# Patient Record
Sex: MALE | Race: WHITE | NOT HISPANIC OR LATINO | Employment: OTHER | RURAL
[De-identification: names, ages, dates, MRNs, and addresses within clinical notes are randomized per-mention and may not be internally consistent; named-entity substitution may affect disease eponyms.]

---

## 2020-06-19 ENCOUNTER — HISTORICAL (OUTPATIENT)
Dept: ADMINISTRATIVE | Facility: HOSPITAL | Age: 80
End: 2020-06-19

## 2020-06-19 LAB
APTT PPP: 28.3 SECONDS (ref 25.2–37.3)
BASOPHILS # BLD AUTO: 0.05 X10E3/UL (ref 0–0.2)
BASOPHILS NFR BLD AUTO: 0.7 % (ref 0–1)
BUN SERPL-MCNC: 42 MG/DL (ref 7–18)
CALCIUM SERPL-MCNC: 8.6 MG/DL (ref 8.5–10.1)
CHLORIDE SERPL-SCNC: 104 MMOL/L (ref 98–107)
CK MB SERPL-MCNC: 2.4 NG/ML (ref 1–3.6)
CK SERPL-CCNC: 133 U/L (ref 39–308)
CO2 SERPL-SCNC: 27 MMOL/L (ref 21–32)
CREAT SERPL-MCNC: 2.01 MG/DL (ref 0.7–1.3)
EOSINOPHIL # BLD AUTO: 0.22 X10E3/UL (ref 0–0.5)
EOSINOPHIL NFR BLD AUTO: 2.9 % (ref 1–4)
ERYTHROCYTE [DISTWIDTH] IN BLOOD BY AUTOMATED COUNT: 13.8 % (ref 11.5–14.5)
GLUCOSE SERPL-MCNC: 214 MG/DL (ref 74–106)
HCT VFR BLD AUTO: 41.4 % (ref 40–54)
HGB BLD-MCNC: 12.7 G/DL (ref 13.5–18)
IMM GRANULOCYTES # BLD AUTO: 0.03 X10E3/UL (ref 0–0.04)
IMM GRANULOCYTES NFR BLD: 0.4 % (ref 0–0.4)
INR BLD: 1.02 (ref 0–3.3)
LYMPHOCYTES # BLD AUTO: 1.97 X10E3/UL (ref 1–4.8)
LYMPHOCYTES NFR BLD AUTO: 26.1 % (ref 27–41)
MAGNESIUM SERPL-MCNC: 2.3 MG/DL (ref 1.7–2.3)
MCH RBC QN AUTO: 28.7 PG (ref 27–31)
MCHC RBC AUTO-ENTMCNC: 30.7 G/DL (ref 32–36)
MCV RBC AUTO: 93.7 FL (ref 80–96)
MONOCYTES # BLD AUTO: 0.9 X10E3/UL (ref 0–0.8)
MONOCYTES NFR BLD AUTO: 11.9 % (ref 2–6)
MPC BLD CALC-MCNC: 10.8 FL (ref 9.4–12.4)
MYOGLOBIN SERPL-MCNC: 272 NG/ML (ref 16–116)
NEUTROPHILS # BLD AUTO: 4.39 X10E3/UL (ref 1.8–7.7)
NEUTROPHILS NFR BLD AUTO: 58 % (ref 53–65)
NRBC # BLD AUTO: 0 X10E3/UL (ref 0–0)
NRBC, AUTO (.00): 0 /100 (ref 0–0)
PLATELET # BLD AUTO: 194 X10E3/UL (ref 150–400)
POTASSIUM SERPL-SCNC: 4.5 MMOL/L (ref 3.5–5.1)
PROTHROMBIN TIME: 13.5 SECONDS (ref 11.7–14.7)
RBC # BLD AUTO: 4.42 X10E6/UL (ref 4.6–6.2)
SODIUM SERPL-SCNC: 137 MMOL/L (ref 136–145)
TROPONIN I SERPL-MCNC: <0.017 NG/ML (ref 0–0.06)
WBC # BLD AUTO: 7.56 X10E3/UL (ref 4.5–11)

## 2021-03-10 VITALS
BODY MASS INDEX: 30.92 KG/M2 | HEART RATE: 60 BPM | TEMPERATURE: 97 F | RESPIRATION RATE: 20 BRPM | OXYGEN SATURATION: 95 % | SYSTOLIC BLOOD PRESSURE: 124 MMHG | WEIGHT: 204 LBS | DIASTOLIC BLOOD PRESSURE: 76 MMHG | HEIGHT: 68 IN

## 2021-03-10 RX ORDER — CHOLECALCIFEROL (VITAMIN D3) 125 MCG
5000 CAPSULE ORAL DAILY
COMMUNITY

## 2021-03-10 RX ORDER — CLOPIDOGREL BISULFATE 75 MG/1
75 TABLET ORAL DAILY
COMMUNITY
End: 2021-03-17 | Stop reason: SDUPTHER

## 2021-03-10 RX ORDER — TAMSULOSIN HYDROCHLORIDE 0.4 MG/1
0.4 CAPSULE ORAL DAILY
COMMUNITY
End: 2021-03-17 | Stop reason: SDUPTHER

## 2021-03-10 RX ORDER — METOPROLOL SUCCINATE 50 MG/1
50 TABLET, EXTENDED RELEASE ORAL DAILY
COMMUNITY
End: 2021-03-17 | Stop reason: SDUPTHER

## 2021-03-10 RX ORDER — ROSUVASTATIN CALCIUM 20 MG/1
20 TABLET, COATED ORAL DAILY
COMMUNITY
End: 2021-03-17 | Stop reason: SDUPTHER

## 2021-03-10 RX ORDER — ACETAMINOPHEN 160 MG/5ML
200 SUSPENSION, ORAL (FINAL DOSE FORM) ORAL DAILY
COMMUNITY

## 2021-03-10 RX ORDER — INSULIN GLARGINE 100 [IU]/ML
70 INJECTION, SOLUTION SUBCUTANEOUS NIGHTLY
COMMUNITY
End: 2021-03-17 | Stop reason: SDUPTHER

## 2021-03-10 RX ORDER — EMPAGLIFLOZIN 10 MG/1
10 TABLET, FILM COATED ORAL DAILY
COMMUNITY
End: 2021-03-17 | Stop reason: SDUPTHER

## 2021-03-17 ENCOUNTER — OFFICE VISIT (OUTPATIENT)
Dept: FAMILY MEDICINE | Facility: CLINIC | Age: 81
End: 2021-03-17
Payer: MEDICARE

## 2021-03-17 VITALS
HEIGHT: 68 IN | WEIGHT: 209.81 LBS | HEART RATE: 56 BPM | DIASTOLIC BLOOD PRESSURE: 78 MMHG | OXYGEN SATURATION: 95 % | RESPIRATION RATE: 20 BRPM | SYSTOLIC BLOOD PRESSURE: 122 MMHG | TEMPERATURE: 97 F | BODY MASS INDEX: 31.8 KG/M2

## 2021-03-17 DIAGNOSIS — N18.31 STAGE 3A CHRONIC KIDNEY DISEASE: ICD-10-CM

## 2021-03-17 DIAGNOSIS — Z79.899 ENCOUNTER FOR LONG-TERM (CURRENT) USE OF OTHER MEDICATIONS: ICD-10-CM

## 2021-03-17 DIAGNOSIS — I10 ESSENTIAL HYPERTENSION: ICD-10-CM

## 2021-03-17 DIAGNOSIS — Z95.5 HISTORY OF CORONARY ARTERY STENT PLACEMENT: ICD-10-CM

## 2021-03-17 DIAGNOSIS — Z12.5 PROSTATE CANCER SCREENING: ICD-10-CM

## 2021-03-17 DIAGNOSIS — N18.31 TYPE 2 DIABETES MELLITUS WITH STAGE 3A CHRONIC KIDNEY DISEASE, WITH LONG-TERM CURRENT USE OF INSULIN: Primary | ICD-10-CM

## 2021-03-17 DIAGNOSIS — N40.0 BENIGN PROSTATIC HYPERPLASIA, UNSPECIFIED WHETHER LOWER URINARY TRACT SYMPTOMS PRESENT: ICD-10-CM

## 2021-03-17 DIAGNOSIS — E11.22 TYPE 2 DIABETES MELLITUS WITH STAGE 3A CHRONIC KIDNEY DISEASE, WITH LONG-TERM CURRENT USE OF INSULIN: Primary | ICD-10-CM

## 2021-03-17 DIAGNOSIS — I25.10 CORONARY ARTERY DISEASE INVOLVING NATIVE CORONARY ARTERY OF NATIVE HEART WITHOUT ANGINA PECTORIS: ICD-10-CM

## 2021-03-17 DIAGNOSIS — E78.2 MIXED HYPERLIPIDEMIA: ICD-10-CM

## 2021-03-17 DIAGNOSIS — Z79.4 TYPE 2 DIABETES MELLITUS WITH STAGE 3A CHRONIC KIDNEY DISEASE, WITH LONG-TERM CURRENT USE OF INSULIN: Primary | ICD-10-CM

## 2021-03-17 PROCEDURE — 99214 PR OFFICE/OUTPT VISIT, EST, LEVL IV, 30-39 MIN: ICD-10-PCS | Mod: ,,, | Performed by: NURSE PRACTITIONER

## 2021-03-17 PROCEDURE — 99214 OFFICE O/P EST MOD 30 MIN: CPT | Mod: ,,, | Performed by: NURSE PRACTITIONER

## 2021-03-17 RX ORDER — INSULIN GLARGINE 100 [IU]/ML
70 INJECTION, SOLUTION SUBCUTANEOUS NIGHTLY
Qty: 63 ML | Refills: 3 | Status: SHIPPED | OUTPATIENT
Start: 2021-03-17 | End: 2021-12-21 | Stop reason: SDUPTHER

## 2021-03-17 RX ORDER — METOPROLOL SUCCINATE 50 MG/1
50 TABLET, EXTENDED RELEASE ORAL DAILY
Qty: 90 TABLET | Refills: 3 | Status: SHIPPED | OUTPATIENT
Start: 2021-03-17 | End: 2021-07-06 | Stop reason: SDUPTHER

## 2021-03-17 RX ORDER — TAMSULOSIN HYDROCHLORIDE 0.4 MG/1
0.4 CAPSULE ORAL DAILY
Qty: 90 CAPSULE | Refills: 3 | Status: SHIPPED | OUTPATIENT
Start: 2021-03-17 | End: 2021-12-21

## 2021-03-17 RX ORDER — PEN NEEDLE, DIABETIC 32GX 5/32"
NEEDLE, DISPOSABLE MISCELLANEOUS
COMMUNITY
Start: 2021-01-20

## 2021-03-17 RX ORDER — EMPAGLIFLOZIN 10 MG/1
10 TABLET, FILM COATED ORAL DAILY
Qty: 90 TABLET | Refills: 3 | Status: SHIPPED | OUTPATIENT
Start: 2021-03-17 | End: 2021-12-21 | Stop reason: SDUPTHER

## 2021-03-17 RX ORDER — ROSUVASTATIN CALCIUM 20 MG/1
20 TABLET, COATED ORAL DAILY
Qty: 90 TABLET | Refills: 3 | Status: SHIPPED | OUTPATIENT
Start: 2021-03-17 | End: 2021-12-01 | Stop reason: DRUGHIGH

## 2021-03-17 RX ORDER — CLOPIDOGREL BISULFATE 75 MG/1
75 TABLET ORAL DAILY
Qty: 90 TABLET | Refills: 3 | Status: SHIPPED | OUTPATIENT
Start: 2021-03-17 | End: 2022-10-19 | Stop reason: SDUPTHER

## 2021-03-19 LAB
ALBUMIN SERPL BCP-MCNC: 3.9 G/DL (ref 3.5–5)
ALBUMIN/GLOB SERPL: 1.1 {RATIO}
ALP SERPL-CCNC: 91 U/L (ref 45–115)
ALT SERPL W P-5'-P-CCNC: 35 U/L (ref 16–61)
ANION GAP SERPL CALCULATED.3IONS-SCNC: 10 MMOL/L (ref 7–16)
AST SERPL W P-5'-P-CCNC: 25 U/L (ref 15–37)
BASOPHILS # BLD AUTO: 0.09 X10E3/UL (ref 0–0.2)
BASOPHILS NFR BLD AUTO: 1.1 % (ref 0–1)
BILIRUB SERPL-MCNC: 0.5 MG/DL (ref 0–1.2)
BUN SERPL-MCNC: 35 MG/DL (ref 7–18)
BUN/CREAT SERPL: 25
CALCIUM SERPL-MCNC: 8.9 MG/DL (ref 8.5–10.1)
CHLORIDE SERPL-SCNC: 106 MMOL/L (ref 98–107)
CHOLEST SERPL-MCNC: 239 MG/DL
CHOLEST/HDLC SERPL: 5.2 {RATIO}
CO2 SERPL-SCNC: 29 MMOL/L (ref 21–32)
CREAT SERPL-MCNC: 1.38 MG/DL (ref 0.7–1.3)
CREAT UR-MCNC: 83 MG/DL (ref 39–259)
EOSINOPHIL # BLD AUTO: 0.17 X10E3/UL (ref 0–0.5)
EOSINOPHIL NFR BLD AUTO: 2.1 % (ref 1–4)
ERYTHROCYTE [DISTWIDTH] IN BLOOD BY AUTOMATED COUNT: 14.5 % (ref 11.5–14.5)
EST. AVERAGE GLUCOSE BLD GHB EST-MCNC: 147 MG/DL
GLOBULIN SER-MCNC: 3.4 G/DL (ref 2–4)
GLUCOSE SERPL-MCNC: 88 MG/DL (ref 74–106)
HBA1C MFR BLD HPLC: 7 %
HCT VFR BLD AUTO: 47.7 % (ref 40–54)
HDLC SERPL-MCNC: 46 MG/DL
HGB BLD-MCNC: 14.6 G/DL (ref 13.5–18)
IMM GRANULOCYTES # BLD AUTO: 0.03 X10E3/UL (ref 0–0.04)
IMM GRANULOCYTES NFR BLD: 0.4 % (ref 0–0.4)
LDLC SERPL CALC-MCNC: 170 MG/DL
LYMPHOCYTES # BLD AUTO: 1.86 X10E3/UL (ref 1–4.8)
LYMPHOCYTES NFR BLD AUTO: 22.5 % (ref 27–41)
MCH RBC QN AUTO: 29.1 PG (ref 27–31)
MCHC RBC AUTO-ENTMCNC: 30.6 G/DL (ref 32–36)
MCV RBC AUTO: 95.2 FL (ref 80–96)
MICROALBUMIN UR-MCNC: 3.4 MG/DL (ref 0–2.8)
MICROALBUMIN/CREAT RATIO PNL UR: 41 MG/G (ref 0–30)
MONOCYTES # BLD AUTO: 0.62 X10E3/UL (ref 0–0.8)
MONOCYTES NFR BLD AUTO: 7.5 % (ref 2–6)
MPC BLD CALC-MCNC: 12.5 FL (ref 9.4–12.4)
NEUTROPHILS # BLD AUTO: 5.48 X10E3/UL (ref 1.8–7.7)
NEUTROPHILS NFR BLD AUTO: 66.4 % (ref 53–65)
NRBC # BLD AUTO: 0 X10E3/UL (ref 0–0)
NRBC, AUTO (.00): 0 /100 (ref 0–0)
PLATELET # BLD AUTO: 227 X10E3/UL (ref 150–400)
POTASSIUM SERPL-SCNC: 5.3 MMOL/L (ref 3.5–5.1)
PROT SERPL-MCNC: 7.3 G/DL (ref 6.4–8.2)
PSA SERPL-MCNC: 0.64 NG/ML (ref 0–4.4)
RBC # BLD AUTO: 5.01 X10E6/UL (ref 4.6–6.2)
SODIUM SERPL-SCNC: 140 MMOL/L (ref 136–145)
TRIGL SERPL-MCNC: 115 MG/DL
WBC # BLD AUTO: 8.25 X10E3/UL (ref 4.5–11)

## 2021-03-24 DIAGNOSIS — E78.2 MIXED HYPERLIPIDEMIA: ICD-10-CM

## 2021-03-24 RX ORDER — ROSUVASTATIN CALCIUM 40 MG/1
40 TABLET, COATED ORAL DAILY
Qty: 90 TABLET | Refills: 1 | Status: CANCELLED | OUTPATIENT
Start: 2021-03-24

## 2021-03-26 ENCOUNTER — TELEPHONE (OUTPATIENT)
Dept: FAMILY MEDICINE | Facility: CLINIC | Age: 81
End: 2021-03-26

## 2021-03-26 RX ORDER — ROSUVASTATIN CALCIUM 40 MG/1
10 TABLET, COATED ORAL NIGHTLY
COMMUNITY
End: 2021-12-01 | Stop reason: DRUGHIGH

## 2021-07-06 DIAGNOSIS — I10 ESSENTIAL HYPERTENSION: ICD-10-CM

## 2021-07-06 RX ORDER — METOPROLOL SUCCINATE 50 MG/1
50 TABLET, EXTENDED RELEASE ORAL DAILY
Qty: 90 TABLET | Refills: 1 | Status: SHIPPED | OUTPATIENT
Start: 2021-07-06 | End: 2021-12-21 | Stop reason: SDUPTHER

## 2021-07-25 ENCOUNTER — HOSPITAL ENCOUNTER (EMERGENCY)
Facility: HOSPITAL | Age: 81
Discharge: HOME | End: 2021-07-25
Payer: MEDICARE

## 2021-07-25 VITALS
SYSTOLIC BLOOD PRESSURE: 164 MMHG | RESPIRATION RATE: 19 BRPM | WEIGHT: 255 LBS | BODY MASS INDEX: 36.51 KG/M2 | HEART RATE: 58 BPM | DIASTOLIC BLOOD PRESSURE: 81 MMHG | TEMPERATURE: 98 F | HEIGHT: 70 IN | OXYGEN SATURATION: 96 %

## 2021-07-25 DIAGNOSIS — N18.31 TYPE 2 DIABETES MELLITUS WITH STAGE 3A CHRONIC KIDNEY DISEASE, WITH LONG-TERM CURRENT USE OF INSULIN: ICD-10-CM

## 2021-07-25 DIAGNOSIS — N18.31 STAGE 3A CHRONIC KIDNEY DISEASE: ICD-10-CM

## 2021-07-25 DIAGNOSIS — S20.229A CONTUSION OF BACK, UNSPECIFIED LATERALITY, INITIAL ENCOUNTER: ICD-10-CM

## 2021-07-25 DIAGNOSIS — Z79.4 TYPE 2 DIABETES MELLITUS WITH STAGE 3A CHRONIC KIDNEY DISEASE, WITH LONG-TERM CURRENT USE OF INSULIN: ICD-10-CM
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
## 2021-07-25 DIAGNOSIS — E78.2 MIXED HYPERLIPIDEMIA: ICD-10-CM

## 2021-07-25 DIAGNOSIS — I25.10 CORONARY ARTERY DISEASE INVOLVING NATIVE CORONARY ARTERY OF NATIVE HEART WITHOUT ANGINA PECTORIS: ICD-10-CM

## 2021-07-25 DIAGNOSIS — E11.22 TYPE 2 DIABETES MELLITUS WITH STAGE 3A CHRONIC KIDNEY DISEASE, WITH LONG-TERM CURRENT USE OF INSULIN: ICD-10-CM

## 2021-07-25 DIAGNOSIS — N40.0 BENIGN PROSTATIC HYPERPLASIA: ICD-10-CM

## 2021-07-25 DIAGNOSIS — I10 ESSENTIAL HYPERTENSION: ICD-10-CM

## 2021-07-25 DIAGNOSIS — Z95.5 HISTORY OF CORONARY ARTERY STENT PLACEMENT: ICD-10-CM

## 2021-07-25 PROCEDURE — 63600175 PHARM REV CODE 636 W HCPCS: Performed by: NURSE PRACTITIONER

## 2021-07-25 PROCEDURE — 99284 EMERGENCY DEPT VISIT MOD MDM: CPT

## 2021-07-25 PROCEDURE — 99283 EMERGENCY DEPT VISIT LOW MDM: CPT | Mod: ,,, | Performed by: NURSE PRACTITIONER

## 2021-07-25 PROCEDURE — 96372 THER/PROPH/DIAG INJ SC/IM: CPT

## 2021-07-25 PROCEDURE — 99283 PR EMERGENCY DEPT VISIT,LEVEL III: ICD-10-PCS | Mod: ,,, | Performed by: NURSE PRACTITIONER

## 2021-07-25 RX ORDER — MUPIROCIN 20 MG/G
OINTMENT TOPICAL 3 TIMES DAILY
Qty: 15 G | Refills: 0 | Status: SHIPPED | OUTPATIENT
Start: 2021-07-25 | End: 2021-12-21

## 2021-07-25 RX ORDER — KETOROLAC TROMETHAMINE 30 MG/ML
60 INJECTION, SOLUTION INTRAMUSCULAR; INTRAVENOUS
Status: COMPLETED | OUTPATIENT
Start: 2021-07-25 | End: 2021-07-25

## 2021-07-25 RX ORDER — IBUPROFEN 800 MG/1
800 TABLET ORAL 3 TIMES DAILY
Qty: 15 TABLET | Refills: 0 | Status: SHIPPED | OUTPATIENT
Start: 2021-07-25 | End: 2021-07-25 | Stop reason: SDUPTHER

## 2021-07-25 RX ORDER — IBUPROFEN 800 MG/1
800 TABLET ORAL 3 TIMES DAILY
Qty: 15 TABLET | Refills: 0 | Status: SHIPPED | OUTPATIENT
Start: 2021-07-25 | End: 2022-05-19

## 2021-07-25 RX ORDER — MUPIROCIN 20 MG/G
OINTMENT TOPICAL 3 TIMES DAILY
Qty: 15 G | Refills: 0 | Status: SHIPPED | OUTPATIENT
Start: 2021-07-25 | End: 2021-07-25 | Stop reason: SDUPTHER

## 2021-07-25 RX ORDER — METHOCARBAMOL 500 MG/1
1000 TABLET, FILM COATED ORAL 3 TIMES DAILY
Qty: 30 TABLET | Refills: 0 | Status: SHIPPED | OUTPATIENT
Start: 2021-07-25 | End: 2021-07-30

## 2021-07-25 RX ORDER — METHOCARBAMOL 500 MG/1
1000 TABLET, FILM COATED ORAL 3 TIMES DAILY
Qty: 30 TABLET | Refills: 0 | Status: SHIPPED | OUTPATIENT
Start: 2021-07-25 | End: 2021-07-25 | Stop reason: SDUPTHER

## 2021-07-25 RX ADMIN — KETOROLAC TROMETHAMINE 60 MG: 60 INJECTION, SOLUTION INTRAMUSCULAR at 10:07

## 2021-12-01 ENCOUNTER — OFFICE VISIT (OUTPATIENT)
Dept: CARDIOLOGY | Facility: CLINIC | Age: 81
End: 2021-12-01
Payer: MEDICARE

## 2021-12-01 VITALS
WEIGHT: 191.5 LBS | SYSTOLIC BLOOD PRESSURE: 122 MMHG | BODY MASS INDEX: 29.02 KG/M2 | HEART RATE: 56 BPM | RESPIRATION RATE: 14 BRPM | HEIGHT: 68 IN | DIASTOLIC BLOOD PRESSURE: 50 MMHG

## 2021-12-01 DIAGNOSIS — E78.5 HYPERLIPIDEMIA, UNSPECIFIED HYPERLIPIDEMIA TYPE: Chronic | ICD-10-CM

## 2021-12-01 DIAGNOSIS — N18.9 CHRONIC KIDNEY DISEASE, UNSPECIFIED CKD STAGE: Chronic | ICD-10-CM

## 2021-12-01 DIAGNOSIS — I25.10 CORONARY ARTERY DISEASE INVOLVING NATIVE CORONARY ARTERY OF NATIVE HEART WITHOUT ANGINA PECTORIS: Primary | Chronic | ICD-10-CM

## 2021-12-01 DIAGNOSIS — E11.9 DIABETES MELLITUS WITHOUT COMPLICATION: Chronic | ICD-10-CM

## 2021-12-01 DIAGNOSIS — I10 ESSENTIAL HYPERTENSION: Chronic | ICD-10-CM

## 2021-12-01 PROCEDURE — 93010 ELECTROCARDIOGRAM REPORT: CPT | Mod: S$PBB,,, | Performed by: INTERNAL MEDICINE

## 2021-12-01 PROCEDURE — 93010 EKG 12-LEAD: ICD-10-PCS | Mod: S$PBB,,, | Performed by: INTERNAL MEDICINE

## 2021-12-01 PROCEDURE — 93005 ELECTROCARDIOGRAM TRACING: CPT | Mod: PBBFAC | Performed by: INTERNAL MEDICINE

## 2021-12-01 PROCEDURE — 99214 PR OFFICE/OUTPT VISIT, EST, LEVL IV, 30-39 MIN: ICD-10-PCS | Mod: S$PBB,,, | Performed by: INTERNAL MEDICINE

## 2021-12-01 PROCEDURE — 99214 OFFICE O/P EST MOD 30 MIN: CPT | Mod: PBBFAC | Performed by: INTERNAL MEDICINE

## 2021-12-01 PROCEDURE — 99214 OFFICE O/P EST MOD 30 MIN: CPT | Mod: S$PBB,,, | Performed by: INTERNAL MEDICINE

## 2021-12-01 RX ORDER — NITROGLYCERIN 0.4 MG/1
0.4 TABLET SUBLINGUAL EVERY 5 MIN PRN
Qty: 25 TABLET | Refills: 3 | Status: SHIPPED | OUTPATIENT
Start: 2021-12-01 | End: 2022-08-29 | Stop reason: SDUPTHER

## 2021-12-01 RX ORDER — ROSUVASTATIN CALCIUM 40 MG/1
40 TABLET, COATED ORAL NIGHTLY
Qty: 90 TABLET | Refills: 1 | Status: SHIPPED | OUTPATIENT
Start: 2021-12-01 | End: 2021-12-21 | Stop reason: SDUPTHER

## 2021-12-06 PROBLEM — N18.9 CHRONIC KIDNEY DISEASE: Chronic | Status: ACTIVE | Noted: 2021-12-06

## 2021-12-06 PROBLEM — E11.9 DIABETES MELLITUS WITHOUT COMPLICATION: Chronic | Status: ACTIVE | Noted: 2021-12-06

## 2021-12-06 PROBLEM — E78.5 HYPERLIPIDEMIA: Status: ACTIVE | Noted: 2021-03-17

## 2021-12-21 ENCOUNTER — OFFICE VISIT (OUTPATIENT)
Dept: FAMILY MEDICINE | Facility: CLINIC | Age: 81
End: 2021-12-21
Payer: MEDICARE

## 2021-12-21 VITALS
HEIGHT: 65 IN | BODY MASS INDEX: 31.29 KG/M2 | TEMPERATURE: 97 F | WEIGHT: 187.81 LBS | SYSTOLIC BLOOD PRESSURE: 128 MMHG | DIASTOLIC BLOOD PRESSURE: 82 MMHG | OXYGEN SATURATION: 98 % | RESPIRATION RATE: 20 BRPM | HEART RATE: 68 BPM

## 2021-12-21 DIAGNOSIS — I10 ESSENTIAL HYPERTENSION: ICD-10-CM

## 2021-12-21 DIAGNOSIS — N18.31 TYPE 2 DIABETES MELLITUS WITH STAGE 3A CHRONIC KIDNEY DISEASE, WITH LONG-TERM CURRENT USE OF INSULIN: Primary | ICD-10-CM

## 2021-12-21 DIAGNOSIS — E11.22 TYPE 2 DIABETES MELLITUS WITH STAGE 3A CHRONIC KIDNEY DISEASE, WITH LONG-TERM CURRENT USE OF INSULIN: Primary | ICD-10-CM

## 2021-12-21 DIAGNOSIS — Z79.899 ENCOUNTER FOR LONG-TERM (CURRENT) USE OF OTHER MEDICATIONS: ICD-10-CM

## 2021-12-21 DIAGNOSIS — Z79.4 TYPE 2 DIABETES MELLITUS WITH STAGE 3A CHRONIC KIDNEY DISEASE, WITH LONG-TERM CURRENT USE OF INSULIN: Primary | ICD-10-CM

## 2021-12-21 DIAGNOSIS — E78.5 HYPERLIPIDEMIA, UNSPECIFIED HYPERLIPIDEMIA TYPE: ICD-10-CM

## 2021-12-21 DIAGNOSIS — N18.31 STAGE 3A CHRONIC KIDNEY DISEASE: ICD-10-CM

## 2021-12-21 PROBLEM — N18.9 CHRONIC KIDNEY DISEASE: Chronic | Status: RESOLVED | Noted: 2021-12-06 | Resolved: 2021-12-21

## 2021-12-21 LAB
ALBUMIN SERPL BCP-MCNC: 3.5 G/DL (ref 3.5–5)
ALBUMIN/GLOB SERPL: 0.9 {RATIO}
ALP SERPL-CCNC: 91 U/L (ref 45–115)
ALT SERPL W P-5'-P-CCNC: 30 U/L (ref 16–61)
ANION GAP SERPL CALCULATED.3IONS-SCNC: 8 MMOL/L (ref 7–16)
AST SERPL W P-5'-P-CCNC: 16 U/L (ref 15–37)
BILIRUB SERPL-MCNC: 0.4 MG/DL (ref 0–1.2)
BUN SERPL-MCNC: 33 MG/DL (ref 7–18)
BUN/CREAT SERPL: 25 (ref 6–20)
CALCIUM SERPL-MCNC: 9.1 MG/DL (ref 8.5–10.1)
CHLORIDE SERPL-SCNC: 107 MMOL/L (ref 98–107)
CHOLEST SERPL-MCNC: 183 MG/DL (ref 0–200)
CHOLEST/HDLC SERPL: 3.9 {RATIO}
CO2 SERPL-SCNC: 29 MMOL/L (ref 21–32)
CREAT SERPL-MCNC: 1.32 MG/DL (ref 0.7–1.3)
EST. AVERAGE GLUCOSE BLD GHB EST-MCNC: 170 MG/DL
GLOBULIN SER-MCNC: 3.9 G/DL (ref 2–4)
GLUCOSE SERPL-MCNC: 179 MG/DL (ref 74–106)
HBA1C MFR BLD HPLC: 7.7 % (ref 4.5–6.6)
HDLC SERPL-MCNC: 47 MG/DL (ref 40–60)
LDLC SERPL CALC-MCNC: 116 MG/DL
LDLC/HDLC SERPL: 2.5 {RATIO}
NONHDLC SERPL-MCNC: 136 MG/DL
POTASSIUM SERPL-SCNC: 5.4 MMOL/L (ref 3.5–5.1)
PROT SERPL-MCNC: 7.4 G/DL (ref 6.4–8.2)
SODIUM SERPL-SCNC: 139 MMOL/L (ref 136–145)
TRIGL SERPL-MCNC: 102 MG/DL (ref 35–150)
TSH SERPL DL<=0.005 MIU/L-ACNC: 2.88 UIU/ML (ref 0.36–3.74)
VLDLC SERPL-MCNC: 20 MG/DL

## 2021-12-21 PROCEDURE — 99214 OFFICE O/P EST MOD 30 MIN: CPT | Mod: ,,, | Performed by: NURSE PRACTITIONER

## 2021-12-21 PROCEDURE — 80053 COMPREHENSIVE METABOLIC PANEL: ICD-10-PCS | Mod: ,,, | Performed by: CLINICAL MEDICAL LABORATORY

## 2021-12-21 PROCEDURE — 83036 HEMOGLOBIN GLYCOSYLATED A1C: CPT | Mod: ,,, | Performed by: CLINICAL MEDICAL LABORATORY

## 2021-12-21 PROCEDURE — 80053 COMPREHEN METABOLIC PANEL: CPT | Mod: ,,, | Performed by: CLINICAL MEDICAL LABORATORY

## 2021-12-21 PROCEDURE — 83036 HEMOGLOBIN A1C: ICD-10-PCS | Mod: ,,, | Performed by: CLINICAL MEDICAL LABORATORY

## 2021-12-21 PROCEDURE — 84443 ASSAY THYROID STIM HORMONE: CPT | Mod: ,,, | Performed by: CLINICAL MEDICAL LABORATORY

## 2021-12-21 PROCEDURE — 84443 TSH: ICD-10-PCS | Mod: ,,, | Performed by: CLINICAL MEDICAL LABORATORY

## 2021-12-21 PROCEDURE — 80061 LIPID PANEL: CPT | Mod: ,,, | Performed by: CLINICAL MEDICAL LABORATORY

## 2021-12-21 PROCEDURE — 80061 LIPID PANEL: ICD-10-PCS | Mod: ,,, | Performed by: CLINICAL MEDICAL LABORATORY

## 2021-12-21 PROCEDURE — 99214 PR OFFICE/OUTPT VISIT, EST, LEVL IV, 30-39 MIN: ICD-10-PCS | Mod: ,,, | Performed by: NURSE PRACTITIONER

## 2021-12-21 RX ORDER — METOPROLOL SUCCINATE 50 MG/1
50 TABLET, EXTENDED RELEASE ORAL DAILY
Qty: 90 TABLET | Refills: 1 | Status: SHIPPED | OUTPATIENT
Start: 2021-12-21 | End: 2022-05-26 | Stop reason: SDUPTHER

## 2021-12-21 RX ORDER — EMPAGLIFLOZIN 10 MG/1
10 TABLET, FILM COATED ORAL DAILY
Qty: 90 TABLET | Refills: 3 | Status: SHIPPED | OUTPATIENT
Start: 2021-12-21 | End: 2022-05-31 | Stop reason: DRUGHIGH

## 2021-12-21 RX ORDER — ROSUVASTATIN CALCIUM 40 MG/1
40 TABLET, COATED ORAL NIGHTLY
Qty: 90 TABLET | Refills: 1 | Status: SHIPPED | OUTPATIENT
Start: 2021-12-21 | End: 2022-10-19 | Stop reason: SDUPTHER

## 2021-12-21 RX ORDER — INSULIN GLARGINE 100 [IU]/ML
70 INJECTION, SOLUTION SUBCUTANEOUS NIGHTLY
Qty: 63 ML | Refills: 3 | Status: SHIPPED | OUTPATIENT
Start: 2021-12-21 | End: 2022-06-03

## 2022-01-20 DIAGNOSIS — E78.5 HYPERLIPIDEMIA, UNSPECIFIED HYPERLIPIDEMIA TYPE: Primary | ICD-10-CM

## 2022-01-20 DIAGNOSIS — Z79.899 ENCOUNTER FOR LONG-TERM (CURRENT) USE OF OTHER MEDICATIONS: ICD-10-CM

## 2022-02-04 RX ORDER — EZETIMIBE 10 MG/1
10 TABLET ORAL DAILY
Qty: 90 TABLET | Refills: 3 | Status: SHIPPED | OUTPATIENT
Start: 2022-02-04 | End: 2022-04-06 | Stop reason: SDUPTHER

## 2022-03-11 DIAGNOSIS — Z71.89 COMPLEX CARE COORDINATION: ICD-10-CM

## 2022-03-25 DIAGNOSIS — E78.5 HYPERLIPIDEMIA, UNSPECIFIED HYPERLIPIDEMIA TYPE: Primary | ICD-10-CM

## 2022-03-25 DIAGNOSIS — Z79.899 ENCOUNTER FOR LONG-TERM (CURRENT) USE OF OTHER MEDICATIONS: ICD-10-CM

## 2022-04-06 RX ORDER — EZETIMIBE 10 MG/1
10 TABLET ORAL DAILY
Qty: 90 TABLET | Refills: 3 | Status: SHIPPED | OUTPATIENT
Start: 2022-04-06 | End: 2023-08-10

## 2022-05-19 ENCOUNTER — OFFICE VISIT (OUTPATIENT)
Dept: FAMILY MEDICINE | Facility: CLINIC | Age: 82
End: 2022-05-19
Payer: MEDICARE

## 2022-05-19 VITALS
OXYGEN SATURATION: 97 % | RESPIRATION RATE: 17 BRPM | DIASTOLIC BLOOD PRESSURE: 64 MMHG | SYSTOLIC BLOOD PRESSURE: 122 MMHG | HEIGHT: 66 IN | TEMPERATURE: 98 F | BODY MASS INDEX: 29.15 KG/M2 | HEART RATE: 56 BPM | WEIGHT: 181.38 LBS

## 2022-05-19 DIAGNOSIS — N18.31 TYPE 2 DIABETES MELLITUS WITH STAGE 3A CHRONIC KIDNEY DISEASE, WITH LONG-TERM CURRENT USE OF INSULIN: ICD-10-CM

## 2022-05-19 DIAGNOSIS — Z79.899 ENCOUNTER FOR LONG-TERM (CURRENT) USE OF OTHER MEDICATIONS: ICD-10-CM

## 2022-05-19 DIAGNOSIS — I73.9 PERIPHERAL VASCULAR DISEASE: ICD-10-CM

## 2022-05-19 DIAGNOSIS — I25.10 CORONARY ARTERY DISEASE INVOLVING NATIVE CORONARY ARTERY OF NATIVE HEART WITHOUT ANGINA PECTORIS: ICD-10-CM

## 2022-05-19 DIAGNOSIS — Z79.4 TYPE 2 DIABETES MELLITUS WITH STAGE 3A CHRONIC KIDNEY DISEASE, WITH LONG-TERM CURRENT USE OF INSULIN: ICD-10-CM

## 2022-05-19 DIAGNOSIS — I10 ESSENTIAL HYPERTENSION: ICD-10-CM

## 2022-05-19 DIAGNOSIS — Z23 NEED FOR DIPHTHERIA-TETANUS-PERTUSSIS (TDAP) VACCINE: ICD-10-CM

## 2022-05-19 DIAGNOSIS — S40.811A ABRASION OF MULTIPLE SITES OF RIGHT UPPER ARM, INITIAL ENCOUNTER: Primary | ICD-10-CM

## 2022-05-19 DIAGNOSIS — E11.22 TYPE 2 DIABETES MELLITUS WITH STAGE 3A CHRONIC KIDNEY DISEASE, WITH LONG-TERM CURRENT USE OF INSULIN: ICD-10-CM

## 2022-05-19 DIAGNOSIS — N18.31 STAGE 3A CHRONIC KIDNEY DISEASE: ICD-10-CM

## 2022-05-19 LAB
ANION GAP SERPL CALCULATED.3IONS-SCNC: 13 MMOL/L (ref 7–16)
BUN SERPL-MCNC: 36 MG/DL (ref 7–18)
BUN/CREAT SERPL: 23 (ref 6–20)
CALCIUM SERPL-MCNC: 9.2 MG/DL (ref 8.5–10.1)
CHLORIDE SERPL-SCNC: 104 MMOL/L (ref 98–107)
CO2 SERPL-SCNC: 26 MMOL/L (ref 21–32)
CREAT SERPL-MCNC: 1.58 MG/DL (ref 0.7–1.3)
CREAT UR-MCNC: 58 MG/DL (ref 39–259)
EST. AVERAGE GLUCOSE BLD GHB EST-MCNC: 277 MG/DL
GLUCOSE SERPL-MCNC: 370 MG/DL (ref 74–106)
HBA1C MFR BLD HPLC: 10.9 % (ref 4.5–6.6)
MICROALBUMIN UR-MCNC: 1.4 MG/DL (ref 0–2.8)
MICROALBUMIN/CREAT RATIO PNL UR: 24.1 MG/G (ref 0–30)
POTASSIUM SERPL-SCNC: 5.6 MMOL/L (ref 3.5–5.1)
SODIUM SERPL-SCNC: 137 MMOL/L (ref 136–145)
VIT B12 SERPL-MCNC: 788 PG/ML (ref 193–986)

## 2022-05-19 PROCEDURE — 82570 MICROALBUMIN / CREATININE RATIO URINE: ICD-10-PCS | Mod: ,,, | Performed by: CLINICAL MEDICAL LABORATORY

## 2022-05-19 PROCEDURE — 80048 BASIC METABOLIC PNL TOTAL CA: CPT | Mod: ,,, | Performed by: CLINICAL MEDICAL LABORATORY

## 2022-05-19 PROCEDURE — 83036 HEMOGLOBIN GLYCOSYLATED A1C: CPT | Mod: ,,, | Performed by: CLINICAL MEDICAL LABORATORY

## 2022-05-19 PROCEDURE — 82043 UR ALBUMIN QUANTITATIVE: CPT | Mod: ,,, | Performed by: CLINICAL MEDICAL LABORATORY

## 2022-05-19 PROCEDURE — 90471 TDAP VACCINE GREATER THAN OR EQUAL TO 7YO IM: ICD-10-PCS | Mod: AT,,, | Performed by: NURSE PRACTITIONER

## 2022-05-19 PROCEDURE — 83036 HEMOGLOBIN A1C: ICD-10-PCS | Mod: ,,, | Performed by: CLINICAL MEDICAL LABORATORY

## 2022-05-19 PROCEDURE — 99214 PR OFFICE/OUTPT VISIT, EST, LEVL IV, 30-39 MIN: ICD-10-PCS | Mod: ,,, | Performed by: NURSE PRACTITIONER

## 2022-05-19 PROCEDURE — 82607 VITAMIN B12: ICD-10-PCS | Mod: ,,, | Performed by: CLINICAL MEDICAL LABORATORY

## 2022-05-19 PROCEDURE — 90715 TDAP VACCINE GREATER THAN OR EQUAL TO 7YO IM: ICD-10-PCS | Mod: AT,,, | Performed by: NURSE PRACTITIONER

## 2022-05-19 PROCEDURE — 99214 OFFICE O/P EST MOD 30 MIN: CPT | Mod: ,,, | Performed by: NURSE PRACTITIONER

## 2022-05-19 PROCEDURE — 82570 ASSAY OF URINE CREATININE: CPT | Mod: ,,, | Performed by: CLINICAL MEDICAL LABORATORY

## 2022-05-19 PROCEDURE — 90715 TDAP VACCINE 7 YRS/> IM: CPT | Mod: AT,,, | Performed by: NURSE PRACTITIONER

## 2022-05-19 PROCEDURE — 82043 MICROALBUMIN / CREATININE RATIO URINE: ICD-10-PCS | Mod: ,,, | Performed by: CLINICAL MEDICAL LABORATORY

## 2022-05-19 PROCEDURE — 80048 BASIC METABOLIC PANEL: ICD-10-PCS | Mod: ,,, | Performed by: CLINICAL MEDICAL LABORATORY

## 2022-05-19 PROCEDURE — 90471 IMMUNIZATION ADMIN: CPT | Mod: AT,,, | Performed by: NURSE PRACTITIONER

## 2022-05-19 PROCEDURE — 82607 VITAMIN B-12: CPT | Mod: ,,, | Performed by: CLINICAL MEDICAL LABORATORY

## 2022-05-19 NOTE — PROGRESS NOTES
[facility] - FAM[facility]IC[facility]       PATIENT NAME: Negro Hale   : 1940    AGE: 81 y.o. DATE: 2022    MRN: [id number]        Reason for Visit / Chief Complaint:  Follow-up (Pt presents for 4 month DM follow up. ) and Diabetes     Subjective:     HPI:  Presents for f/u uncontrolled T2DM on insulin with stage 3 CKD.     Monitoring glucose occasionally with levels ranging 120-130s. Did not bring glucose log or meter. Denies any recent hypoglycemia.   Medications: Lantus 70 units at HS and Jardiance 10 mg  Eye exams per Dr. Lewis.    Injury to bilat arms from new puppy, last tetanus unknown    PHQ9 2022   Total Score 0       Review of Systems:     Review of Systems   Constitutional: Positive for fatigue. Negative for chills and fever.   HENT: Negative.    Eyes: Negative.    Respiratory: Negative.    Cardiovascular: Negative.    Gastrointestinal: Negative.    Endocrine: Negative.    Genitourinary: Negative.    Musculoskeletal: Negative.    Skin: Negative.    Allergic/Immunologic: Negative.    Neurological: Negative.    Hematological: Negative.    Psychiatric/Behavioral: Negative.        Allergies and Medications:     Review of patient's allergies indicates:   Allergen Reactions    Mayonnaise      Upset stomach        Current Outpatient Medications on File Prior to Visit   Medication Sig Dispense Refill    cholecalciferol, vitamin D3, 125 mcg (5,000 unit) capsule Take 5,000 Units by mouth once daily.      clopidogreL (PLAVIX) 75 mg tablet Take 1 tablet (75 mg total) by mouth once daily. 90 tablet 3    coenzyme Q10 200 mg capsule Take 200 mg by mouth once daily.      empagliflozin (JARDIANCE) 10 mg tablet Take 1 tablet (10 mg total) by mouth once daily. for 360 doses 90 tablet 3    ezetimibe (ZETIA) 10 mg tablet Take 1 tablet (10 mg total) by mouth once daily. 90 tablet 3    insulin glargine (LANTUS U-100 INSULIN) 100 unit/mL injection  "Inject 70 Units into the skin every evening. 63 mL 3    metoprolol succinate (TOPROL-XL) 50 MG 24 hr tablet Take 1 tablet (50 mg total) by mouth once daily. 90 tablet 1    nitroGLYCERIN (NITROSTAT) 0.4 MG SL tablet Place 1 tablet (0.4 mg total) under the tongue every 5 (five) minutes as needed for Chest pain. 25 tablet 3    rosuvastatin (CRESTOR) 40 MG Tab Take 1 tablet (40 mg total) by mouth every evening. 90 tablet 1    SURE COMFORT PEN NEEDLE 32 gauge x 5/32" Ndle AS DIRECTED      [DISCONTINUED] ibuprofen (ADVIL,MOTRIN) 800 MG tablet Take 1 tablet (800 mg total) by mouth 3 (three) times daily. (Patient not taking: Reported on 5/19/2022) 15 tablet 0     No current facility-administered medications on file prior to visit.       Labs:      Lab Results   Component Value Date    HGBA1C 7.7 (H) 12/21/2021      Lipids:   Lab Results   Component Value Date    CHOL 156 04/04/2022    CHOL 188 01/31/2022    CHOL 183 12/21/2021     Lab Results   Component Value Date    HDL 42 04/04/2022    HDL 49 01/31/2022    HDL 47 12/21/2021     Lab Results   Component Value Date    LDLCALC 97 04/04/2022    LDLCALC 125 01/31/2022    LDLCALC 116 12/21/2021     Lab Results   Component Value Date    TRIG 83 04/04/2022    TRIG 70 01/31/2022    TRIG 102 12/21/2021     Lab Results   Component Value Date    CHOLHDL 3.7 04/04/2022    CHOLHDL 3.8 01/31/2022    CHOLHDL 3.9 12/21/2021      Urine Ma/creat ratio:  Lab Results   Component Value Date    MICROALBUR 3.4 (H) 03/19/2021      CMP:  Sodium   Date Value Ref Range Status   12/21/2021 139 136 - 145 mmol/L Final     Potassium   Date Value Ref Range Status   12/21/2021 5.4 (H) 3.5 - 5.1 mmol/L Final     Chloride   Date Value Ref Range Status   12/21/2021 107 98 - 107 mmol/L Final     CO2   Date Value Ref Range Status   12/21/2021 29 21 - 32 mmol/L Final     Glucose   Date Value Ref Range Status   12/21/2021 179 (H) 74 - 106 mg/dL Final     BUN   Date Value Ref Range Status   12/21/2021 33 (H) " 7 - 18 mg/dL Final     Creatinine   Date Value Ref Range Status   12/21/2021 1.32 (H) 0.70 - 1.30 mg/dL Final     Calcium   Date Value Ref Range Status   12/21/2021 9.1 8.5 - 10.1 mg/dL Final     Total Protein   Date Value Ref Range Status   12/21/2021 7.4 6.4 - 8.2 g/dL Final     Albumin   Date Value Ref Range Status   12/21/2021 3.5 3.5 - 5.0 g/dL Final     Bilirubin, Total   Date Value Ref Range Status   12/21/2021 0.4 0.0 - 1.2 mg/dL Final     Alk Phos   Date Value Ref Range Status   12/21/2021 91 45 - 115 U/L Final     AST   Date Value Ref Range Status   12/21/2021 16 15 - 37 U/L Final     ALT   Date Value Ref Range Status   04/04/2022 57 16 - 61 U/L Final     Anion Gap   Date Value Ref Range Status   12/21/2021 8 7 - 16 mmol/L Final     eGFR    Date Value Ref Range Status   03/19/2021 64  Final     eGFR   Date Value Ref Range Status   12/21/2021 55 (L) >=60 mL/min/1.73m² Final      CBC:  Lab Results   Component Value Date    WBC 8.25 03/19/2021    RBC 5.01 03/19/2021    HGB 14.6 03/19/2021    HCT 47.7 03/19/2021    MCV 95.2 03/19/2021    MCH 29.1 03/19/2021    MCHC 30.6 (L) 03/19/2021    RDW 14.5 03/19/2021     03/19/2021    MPV 12.5 (H) 03/19/2021    LYMPH 22.5 (L) 03/19/2021    LYMPH 1.86 03/19/2021    MONO 7.5 (H) 03/19/2021    EOS 0.17 03/19/2021    BASO 0.09 03/19/2021    EOSINOPHIL 2.1 03/19/2021    BASOPHIL 1.1 (H) 03/19/2021      Lab Results   Component Value Date    TSH 2.880 12/21/2021       Medical/Social/Family History:     Past Medical History:   Diagnosis Date    Abnormal thyroid stimulating hormone (TSH) level 08/22/2019    Anemia 08/15/2019    Atrial fibrillation 09/07/2012    Bradycardia 05/23/2017    asymptomatic    Change in bowel habit 11/13/2018    Chronic kidney disease, unspecified 01/14/2019    Coronary arteriosclerosis 09/07/2012    Disease of skin and subcutaneous tissue 08/16/2017    medial aspect RLE- cystic structure seen on venous doppler US.   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    Other secondary pulmonary hypertension 05/23/2017    Other spondylosis, lumbosacral region 01/26/2017    Peripheral vascular disease     Personal history of tobacco use, presenting hazards to health 11/17/2016    Pulmonary hypertension     Type 2 diabetes mellitus with chronic kidney disease 01/14/2019    Type 2 diabetes mellitus with hyperglycemia 10/02/2011    Varicose veins of both lower extremities with pain 01/02/2019    Venous insufficiency 01/08/2019      Social History     Tobacco Use   Smoking Status Former Smoker   Smokeless Tobacco Never Used   Tobacco Comment    Quit 10/15/1976      Social History     Substance and Sexual Activity   Alcohol Use Yes    Comment: occasionally       Family History   Problem Relation Age of Onset    No Known Problems Mother     Heart disease Father     Throat cancer Sister     Diabetes Brother     No Known Problems Brother     No Known Problems Maternal Grandmother     No Known Problems Maternal Grandfather     No Known Problems Paternal Grandmother     No Known Problems Paternal Grandfather       Past Surgical History:   Procedure Laterality Date    APPENDECTOMY      CARDIAC CATHETERIZATION  2007    [place]- Stent placement    CATARACT EXTRACTION, BILATERAL      COLONOSCOPY  11/06/2019    Dr. Mendoza    HERNIA REPAIR      Inguinal hernia repair    TONSILLECTOMY          Health Maintenance:     Immunization History   Administered Date(s) Administered    Pneumococcal Conjugate - 13 Valent 11/13/2018     "Pneumococcal Polysaccharide - 23 Valent 01/28/2020    Tdap 05/19/2022      Health Maintenance Due   Topic Date Due    COVID-19 Vaccine (1) Never done    Shingles Vaccine (1 of 2) Never done    Eye Exam  05/28/2021    Diabetes Urine Screening  03/19/2022     Health Maintenance Topics with due status: Not Due       Topic Last Completion Date    Foot Exam 12/21/2021    Hemoglobin A1c 12/21/2021    Lipid Panel 04/04/2022    TETANUS VACCINE 05/19/2022    Aspirin/Antiplatelet Therapy 05/19/2022       Objective:      Wt Readings from Last 3 Encounters:   05/19/22 1104 82.3 kg (181 lb 6 oz)   12/21/21 0820 85.2 kg (187 lb 12.8 oz)   12/01/21 1331 86.9 kg (191 lb 8 oz)     Vitals:    05/19/22 1104   BP: 122/64   BP Location: Right arm   Patient Position: Sitting   BP Method: Large (Automatic)   Pulse: (!) 56   Resp: 17   Temp: 97.5 °F (36.4 °C)   SpO2: 97%   Weight: 82.3 kg (181 lb 6 oz)   Height: 5' 5.5" (1.664 m)     Body mass index is 29.72 kg/m².     Physical Exam:    Physical Exam  Vitals and nursing note reviewed.   Constitutional:       Appearance: Normal appearance.   Cardiovascular:      Rate and Rhythm: Normal rate and regular rhythm.      Pulses: Normal pulses.      Heart sounds: Normal heart sounds.   Pulmonary:      Effort: Pulmonary effort is normal.      Breath sounds: Normal breath sounds.   Musculoskeletal:         General: Signs of injury (abrasion right forearm with mild redness, no drainage) present. No swelling.      Cervical back: Neck supple.   Skin:     General: Skin is warm and dry.   Neurological:      Mental Status: He is alert and oriented to person, place, and time.   Psychiatric:         Mood and Affect: Mood normal.         Behavior: Behavior normal.          Assessment:          ICD-10-CM ICD-9-CM   1. Abrasion of multiple sites of right upper arm, initial encounter  S40.811A 912.0   2. Type 2 diabetes mellitus with stage 3a chronic kidney disease, with long-term current use of insulin  " E11.22 250.40    N18.31 585.3    Z79.4 V58.67   3. Stage 3a chronic kidney disease  N18.31 585.3   4. Essential hypertension  I10 401.9   5. Encounter for long-term (current) use of other medications  Z79.899 V58.69   6. Peripheral vascular disease  I73.9 443.9   7. Coronary artery disease involving native coronary artery of native heart without angina pectoris  I25.10 414.01   8. Need for diphtheria-tetanus-pertussis (Tdap) vaccine  Z23 V06.1        Plan:       Abrasion of multiple sites of right upper arm, initial encounter  -     Tdap Vaccine    Type 2 diabetes mellitus with stage 3a chronic kidney disease, with long-term current use of insulin  -     Microalbumin/Creatinine Ratio, Urine; Future; Expected date: 05/19/2022  -     Hemoglobin A1C; Future; Expected date: 05/19/2022  -     Basic Metabolic Panel; Future; Expected date: 05/19/2022    Stage 3a chronic kidney disease  -     Basic Metabolic Panel; Future; Expected date: 05/19/2022    Essential hypertension    Encounter for long-term (current) use of other medications  -     Vitamin B12; Future; Expected date: 05/19/2022  -     Basic Metabolic Panel; Future; Expected date: 05/19/2022    Peripheral vascular disease    Coronary artery disease involving native coronary artery of native heart without angina pectoris    Need for diphtheria-tetanus-pertussis (Tdap) vaccine  -     Tdap Vaccine        Current Outpatient Medications:     cholecalciferol, vitamin D3, 125 mcg (5,000 unit) capsule, Take 5,000 Units by mouth once daily., Disp: , Rfl:     clopidogreL (PLAVIX) 75 mg tablet, Take 1 tablet (75 mg total) by mouth once daily., Disp: 90 tablet, Rfl: 3    coenzyme Q10 200 mg capsule, Take 200 mg by mouth once daily., Disp: , Rfl:     empagliflozin (JARDIANCE) 10 mg tablet, Take 1 tablet (10 mg total) by mouth once daily. for 360 doses, Disp: 90 tablet, Rfl: 3    ezetimibe (ZETIA) 10 mg tablet, Take 1 tablet (10 mg total) by mouth once daily., Disp: 90  "tablet, Rfl: 3    insulin glargine (LANTUS U-100 INSULIN) 100 unit/mL injection, Inject 70 Units into the skin every evening., Disp: 63 mL, Rfl: 3    metoprolol succinate (TOPROL-XL) 50 MG 24 hr tablet, Take 1 tablet (50 mg total) by mouth once daily., Disp: 90 tablet, Rfl: 1    nitroGLYCERIN (NITROSTAT) 0.4 MG SL tablet, Place 1 tablet (0.4 mg total) under the tongue every 5 (five) minutes as needed for Chest pain., Disp: 25 tablet, Rfl: 3    rosuvastatin (CRESTOR) 40 MG Tab, Take 1 tablet (40 mg total) by mouth every evening., Disp: 90 tablet, Rfl: 1    SURE COMFORT PEN NEEDLE 32 gauge x 5/32" Ndle, AS DIRECTED, Disp: , Rfl:       New & refilled meds:  Requested Prescriptions      No prescriptions requested or ordered in this encounter       Rx changes: none  Reviewed diabetes management goals:  A1C <8.0% given his age and RFs, BP <130/80, and cholesterol LDL <100.  Reminded to get yearly retinal exam.  Discussed ways to avoid symptomatic hypoglycemia.  Current treatment plan is effective, no change in therapy.  Lab results and schedule of future lab studies reviewed with patient.  Orders and follow up as documented in patient record.  Reviewed diet, exercise and weight control.    Return to clinic 4 mths for T2DM on insulin w/ CKD, nonfasting labs; and f/u as needed.    Future Appointments   Date Time Provider Department Center   7/19/2022 12:45 PM Kb Bridges MD [facility] CARD [facility] [place]   9/22/2022 10:40 AM [facility]        Signature:  [facility] - [facility]    Date of encounter: 5/19/22    "

## 2022-05-23 DIAGNOSIS — Z79.899 ENCOUNTER FOR LONG-TERM (CURRENT) USE OF OTHER MEDICATIONS: ICD-10-CM

## 2022-05-23 DIAGNOSIS — E78.5 HYPERLIPIDEMIA, UNSPECIFIED HYPERLIPIDEMIA TYPE: Primary | ICD-10-CM

## 2022-05-26 DIAGNOSIS — I10 ESSENTIAL HYPERTENSION: ICD-10-CM

## 2022-05-26 RX ORDER — METOPROLOL SUCCINATE 50 MG/1
50 TABLET, EXTENDED RELEASE ORAL DAILY
Qty: 90 TABLET | Refills: 0 | Status: SHIPPED | OUTPATIENT
Start: 2022-05-26 | End: 2022-10-19 | Stop reason: SDUPTHER

## 2022-06-03 DIAGNOSIS — Z79.4 TYPE 2 DIABETES MELLITUS WITH STAGE 3A CHRONIC KIDNEY DISEASE, WITH LONG-TERM CURRENT USE OF INSULIN: ICD-10-CM

## 2022-06-03 DIAGNOSIS — N18.31 TYPE 2 DIABETES MELLITUS WITH STAGE 3A CHRONIC KIDNEY DISEASE, WITH LONG-TERM CURRENT USE OF INSULIN: ICD-10-CM

## 2022-06-03 DIAGNOSIS — E11.22 TYPE 2 DIABETES MELLITUS WITH STAGE 3A CHRONIC KIDNEY DISEASE, WITH LONG-TERM CURRENT USE OF INSULIN: ICD-10-CM

## 2022-06-08 DIAGNOSIS — E11.22 TYPE 2 DIABETES MELLITUS WITH STAGE 3A CHRONIC KIDNEY DISEASE, WITH LONG-TERM CURRENT USE OF INSULIN: ICD-10-CM

## 2022-06-08 DIAGNOSIS — Z79.4 TYPE 2 DIABETES MELLITUS WITH STAGE 3A CHRONIC KIDNEY DISEASE, WITH LONG-TERM CURRENT USE OF INSULIN: ICD-10-CM

## 2022-06-08 DIAGNOSIS — N18.31 TYPE 2 DIABETES MELLITUS WITH STAGE 3A CHRONIC KIDNEY DISEASE, WITH LONG-TERM CURRENT USE OF INSULIN: ICD-10-CM

## 2022-06-08 RX ORDER — INSULIN GLARGINE 100 [IU]/ML
80 INJECTION, SOLUTION SUBCUTANEOUS NIGHTLY
Qty: 72 ML | Refills: 3 | Status: SHIPPED | OUTPATIENT
Start: 2022-06-08 | End: 2022-06-08 | Stop reason: SDUPTHER

## 2022-06-08 RX ORDER — INSULIN GLARGINE 100 [IU]/ML
80 INJECTION, SOLUTION SUBCUTANEOUS NIGHTLY
Qty: 5 EACH | Refills: 2 | Status: SHIPPED | OUTPATIENT
Start: 2022-06-08 | End: 2023-01-24 | Stop reason: SDUPTHER

## 2022-06-08 RX ORDER — DAPAGLIFLOZIN 10 MG/1
10 TABLET, FILM COATED ORAL DAILY
Qty: 90 TABLET | Refills: 3 | Status: SHIPPED | OUTPATIENT
Start: 2022-06-08 | End: 2022-10-19 | Stop reason: SDUPTHER

## 2022-06-08 NOTE — TELEPHONE ENCOUNTER
Pharmacy faxed paperwork over stating that the quantity prescribed was less than what his prescription plan allowed and that increasing the days supply may allow pt to take full advantage of benefits.     I have updated the quantity for what they recommended.

## 2022-07-25 DIAGNOSIS — R74.01 ELEVATED ALANINE AMINOTRANSFERASE (ALT) LEVEL: Primary | ICD-10-CM

## 2022-08-08 ENCOUNTER — HOSPITAL ENCOUNTER (EMERGENCY)
Facility: HOSPITAL | Age: 82
Discharge: HOME | End: 2022-08-08
Attending: FAMILY MEDICINE
Payer: MEDICARE

## 2022-08-08 VITALS
TEMPERATURE: 98 F | SYSTOLIC BLOOD PRESSURE: 151 MMHG | BODY MASS INDEX: 29.73 KG/M2 | OXYGEN SATURATION: 99 % | HEART RATE: 68 BPM | WEIGHT: 185 LBS | HEIGHT: 66 IN | RESPIRATION RATE: 14 BRPM | DIASTOLIC BLOOD PRESSURE: 67 MMHG

## 2022-08-08 DIAGNOSIS — R53.1 WEAKNESS: ICD-10-CM

## 2022-08-08 DIAGNOSIS — E16.2 HYPOGLYCEMIA: Primary | ICD-10-CM

## 2022-08-08 LAB
ALBUMIN SERPL BCP-MCNC: 3.3 G/DL (ref 3.5–5)
ALBUMIN/GLOB SERPL: 1.1 {RATIO}
ALP SERPL-CCNC: 84 U/L (ref 45–115)
ALT SERPL W P-5'-P-CCNC: 64 U/L (ref 16–61)
ANION GAP SERPL CALCULATED.3IONS-SCNC: 11 MMOL/L (ref 7–16)
AST SERPL W P-5'-P-CCNC: 46 U/L (ref 15–37)
BASOPHILS # BLD AUTO: 0.08 K/UL (ref 0–0.2)
BASOPHILS NFR BLD AUTO: 0.6 % (ref 0–1)
BILIRUB SERPL-MCNC: 0.2 MG/DL (ref 0–1.2)
BILIRUB UR QL STRIP: NEGATIVE
BUN SERPL-MCNC: 33 MG/DL (ref 7–18)
BUN/CREAT SERPL: 25 (ref 6–20)
CALCIUM SERPL-MCNC: 8.8 MG/DL (ref 8.5–10.1)
CHLORIDE SERPL-SCNC: 110 MMOL/L (ref 98–107)
CLARITY UR: CLEAR
CO2 SERPL-SCNC: 26 MMOL/L (ref 21–32)
COLOR UR: COLORLESS
CREAT SERPL-MCNC: 1.33 MG/DL (ref 0.7–1.3)
DIFFERENTIAL METHOD BLD: ABNORMAL
EGFR (NO RACE VARIABLE) (RUSH/TITUS): 53 ML/MIN/1.73M²
EOSINOPHIL # BLD AUTO: 0.37 K/UL (ref 0–0.5)
EOSINOPHIL NFR BLD AUTO: 2.9 % (ref 1–4)
ERYTHROCYTE [DISTWIDTH] IN BLOOD BY AUTOMATED COUNT: 13.6 % (ref 11.5–14.5)
GLOBULIN SER-MCNC: 3 G/DL (ref 2–4)
GLUCOSE SERPL-MCNC: 159 MG/DL (ref 70–105)
GLUCOSE SERPL-MCNC: 38 MG/DL (ref 70–105)
GLUCOSE SERPL-MCNC: 40 MG/DL (ref 74–106)
GLUCOSE SERPL-MCNC: 41 MG/DL (ref 70–105)
GLUCOSE SERPL-MCNC: 51 MG/DL (ref 70–105)
GLUCOSE SERPL-MCNC: 59 MG/DL (ref 70–105)
GLUCOSE SERPL-MCNC: 73 MG/DL (ref 70–105)
GLUCOSE UR STRIP-MCNC: NORMAL MG/DL
HCT VFR BLD AUTO: 39.7 % (ref 40–54)
HGB BLD-MCNC: 13.6 G/DL (ref 13.5–18)
IMM GRANULOCYTES # BLD AUTO: 0.05 K/UL (ref 0–0.04)
IMM GRANULOCYTES NFR BLD: 0.4 % (ref 0–0.4)
KETONES UR STRIP-SCNC: NEGATIVE MG/DL
LEUKOCYTE ESTERASE UR QL STRIP: NEGATIVE
LYMPHOCYTES # BLD AUTO: 2.93 K/UL (ref 1–4.8)
LYMPHOCYTES NFR BLD AUTO: 23.3 % (ref 27–41)
MCH RBC QN AUTO: 31.1 PG (ref 27–31)
MCHC RBC AUTO-ENTMCNC: 34.3 G/DL (ref 32–36)
MCV RBC AUTO: 90.6 FL (ref 80–96)
MONOCYTES # BLD AUTO: 1.37 K/UL (ref 0–0.8)
MONOCYTES NFR BLD AUTO: 10.9 % (ref 2–6)
MPC BLD CALC-MCNC: 10.7 FL (ref 9.4–12.4)
NEUTROPHILS # BLD AUTO: 7.75 K/UL (ref 1.8–7.7)
NEUTROPHILS NFR BLD AUTO: 61.9 % (ref 53–65)
NITRITE UR QL STRIP: NEGATIVE
NRBC # BLD AUTO: 0 X10E3/UL
NRBC, AUTO (.00): 0 %
PH UR STRIP: 5 PH UNITS
PLATELET # BLD AUTO: 218 K/UL (ref 150–400)
POTASSIUM SERPL-SCNC: 3.9 MMOL/L (ref 3.5–5.1)
PROT SERPL-MCNC: 6.3 G/DL (ref 6.4–8.2)
PROT UR QL STRIP: NEGATIVE
RBC # BLD AUTO: 4.38 M/UL (ref 4.6–6.2)
RBC # UR STRIP: NEGATIVE /UL
SODIUM SERPL-SCNC: 143 MMOL/L (ref 136–145)
SP GR UR STRIP: 1.01
UROBILINOGEN UR STRIP-ACNC: NORMAL MG/DL
WBC # BLD AUTO: 12.55 K/UL (ref 4.5–11)

## 2022-08-08 PROCEDURE — 99283 EMERGENCY DEPT VISIT LOW MDM: CPT | Mod: ,,, | Performed by: FAMILY MEDICINE

## 2022-08-08 PROCEDURE — 36415 COLL VENOUS BLD VENIPUNCTURE: CPT | Performed by: FAMILY MEDICINE

## 2022-08-08 PROCEDURE — 99284 EMERGENCY DEPT VISIT MOD MDM: CPT

## 2022-08-08 PROCEDURE — 93010 ELECTROCARDIOGRAM REPORT: CPT | Mod: ,,, | Performed by: INTERNAL MEDICINE

## 2022-08-08 PROCEDURE — 85025 COMPLETE CBC W/AUTO DIFF WBC: CPT | Performed by: FAMILY MEDICINE

## 2022-08-08 PROCEDURE — 93005 ELECTROCARDIOGRAM TRACING: CPT

## 2022-08-08 PROCEDURE — 93010 EKG 12-LEAD: ICD-10-PCS | Mod: ,,, | Performed by: INTERNAL MEDICINE

## 2022-08-08 PROCEDURE — 81003 URINALYSIS AUTO W/O SCOPE: CPT | Performed by: FAMILY MEDICINE

## 2022-08-08 PROCEDURE — 99283 PR EMERGENCY DEPT VISIT,LEVEL III: ICD-10-PCS | Mod: ,,, | Performed by: FAMILY MEDICINE

## 2022-08-08 PROCEDURE — 80053 COMPREHEN METABOLIC PANEL: CPT | Performed by: FAMILY MEDICINE

## 2022-08-08 PROCEDURE — 82962 GLUCOSE BLOOD TEST: CPT

## 2022-08-08 NOTE — ED NOTES
Negro Hale, a 82 y.o. male presents to the ED w/ complaint of weakness, left arm pain/tingling, heart racing and disorientation for 30 minutes. Pt is awake, alert and oriented x3 at this time. No focal weakness. Denies any chest pain or headache. Reports chronic shortness of breath, no change reported tonight.     Triage note:  Chief Complaint   Patient presents with    Weakness     Present with wife. Pt reports 30 minutes ago awoke from sleep, disoriented, weak, heart racing and left arm pain/tingling.     Review of patient's allergies indicates:   Allergen Reactions    Mayonnaise      Upset stomach     Past Medical History:   Diagnosis Date    Abnormal thyroid stimulating hormone (TSH) level 08/22/2019    Anemia 08/15/2019    Atrial fibrillation 09/07/2012    Bradycardia 05/23/2017    asymptomatic    Change in bowel habit 11/13/2018    Chronic kidney disease, unspecified 01/14/2019    Coronary arteriosclerosis 09/07/2012    Disease of skin and subcutaneous tissue 08/16/2017    medial aspect RLE- cystic structure seen on venous doppler US.   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

## 2022-08-08 NOTE — ED PROVIDER NOTES
Encounter Date: 8/8/2022       History     Chief Complaint   Patient presents with    Weakness     Present with wife. Pt reports 30 minutes ago awoke from sleep, disoriented, weak, heart racing and left arm pain/tingling.     Patient is an 82-year-old male, with a past medical history of diabetes, AFib, coronary artery disease, and hypertension, who presents emergency department for dizziness.  Patient states he woke up this is in the middle of the night with dizziness, diaphoresis, and overall poor feeling.  He also complains of chronic neck pain with radiation down his left arm.  He states for the last several weeks he has been waking up in the middle the night feeling this way.  Blood sugar was noted at 38. He does take Lantus every evening.  He was taking Jardiance, but stopped taking that 4 days ago.          Review of patient's allergies indicates:   Allergen Reactions    Mayonnaise      Upset stomach     Past Medical History:   Diagnosis Date    Abnormal thyroid stimulating hormone (TSH) level 08/22/2019    Anemia 08/15/2019    Atrial fibrillation 09/07/2012    Bradycardia 05/23/2017    asymptomatic    Change in bowel habit 11/13/2018    Chronic kidney disease, unspecified 01/14/2019    Coronary arteriosclerosis 09/07/2012    Disease of skin and subcutaneous tissue 08/16/2017    medial aspect RLE- cystic structure seen on venous doppler US.   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    Other secondary pulmonary hypertension 05/23/2017    Other spondylosis, lumbosacral region 01/26/2017     Peripheral vascular disease     Personal history of tobacco use, presenting hazards to health 11/17/2016    Pulmonary hypertension     Type 2 diabetes mellitus with chronic kidney disease 01/14/2019    Type 2 diabetes mellitus with hyperglycemia 10/02/2011    Varicose veins of both lower extremities with pain 01/02/2019    Venous insufficiency 01/08/2019     Past Surgical History:   Procedure Laterality Date    APPENDECTOMY      CARDIAC CATHETERIZATION  2007    [place]- Stent placement    CATARACT EXTRACTION, BILATERAL      COLONOSCOPY  11/06/2019    Dr. Mendoza    HERNIA REPAIR      Inguinal hernia repair    TONSILLECTOMY       Family History   Problem Relation Age of Onset    No Known Problems Mother     Heart disease Father     Throat cancer Sister     Diabetes Brother     No Known Problems Brother     No Known Problems Maternal Grandmother     No Known Problems Maternal Grandfather     No Known Problems Paternal Grandmother     No Known Problems Paternal Grandfather      Social History     Tobacco Use    Smoking status: Former Smoker    Smokeless tobacco: Never Used    Tobacco comment: Quit 10/15/1976   Substance Use Topics    Alcohol use: Yes     Comment: occasionally    Drug use: Never     Review of Systems   Constitutional: Positive for diaphoresis.   Neurological:        Dizziness     All other systems reviewed and are negative.      Physical Exam     Initial Vitals [08/08/22 0055]   BP Pulse Resp Temp SpO2   (!) 134/48 76 20 97.4 °F (36.3 °C) 97 %      MAP       --         Physical Exam    Vitals reviewed.  Constitutional: He appears well-developed and well-nourished.   HENT:   Head: Normocephalic.   Eyes: Pupils are equal, round, and reactive to light.   Cardiovascular: Normal rate, regular rhythm and normal heart sounds.   Pulmonary/Chest: Breath sounds normal. No respiratory distress. He has no wheezes. He has no rales.   Abdominal: Abdomen is soft. Bowel sounds are normal. He  exhibits no distension. There is no abdominal tenderness.     Neurological: He is alert and oriented to person, place, and time.   Psychiatric: He has a normal mood and affect.         Medical Screening Exam   See Full Note    ED Course   Procedures  Labs Reviewed   COMPREHENSIVE METABOLIC PANEL - Abnormal; Notable for the following components:       Result Value    Chloride 110 (*)     Glucose 40 (*)     BUN 33 (*)     Creatinine 1.33 (*)     BUN/Creatinine Ratio 25 (*)     Total Protein 6.3 (*)     Albumin 3.3 (*)     ALT 64 (*)     AST 46 (*)     eGFR 53 (*)     All other components within normal limits   CBC WITH DIFFERENTIAL - Abnormal; Notable for the following components:    WBC 12.55 (*)     RBC 4.38 (*)     Hematocrit 39.7 (*)     MCH 31.1 (*)     Lymphocytes % 23.3 (*)     Monocytes % 10.9 (*)     Neutrophils, Abs 7.75 (*)     Monocytes, Absolute 1.37 (*)     Immature Granulocytes, Absolute 0.05 (*)     All other components within normal limits   POCT GLUCOSE MONITORING CONTINUOUS - Abnormal; Notable for the following components:    POC Glucose 38 (*)     All other components within normal limits   POCT GLUCOSE MONITORING CONTINUOUS - Abnormal; Notable for the following components:    POC Glucose 41 (*)     All other components within normal limits   POCT GLUCOSE MONITORING CONTINUOUS - Abnormal; Notable for the following components:    POC Glucose 51 (*)     All other components within normal limits   POCT GLUCOSE MONITORING CONTINUOUS - Abnormal; Notable for the following components:    POC Glucose 59 (*)     All other components within normal limits   POCT GLUCOSE MONITORING CONTINUOUS - Abnormal; Notable for the following components:    POC Glucose 159 (*)     All other components within normal limits   CBC W/ AUTO DIFFERENTIAL    Narrative:     The following orders were created for panel order CBC auto differential.  Procedure                               Abnormality         Status                      ---------                               -----------         ------                     CBC with Differential[[id number]]        Abnormal            Final result                 Please view results for these tests on the individual orders.   URINALYSIS, REFLEX TO URINE CULTURE   EXTRA TUBES    Narrative:     The following orders were created for panel order EXTRA TUBES.  Procedure                               Abnormality         Status                     ---------                               -----------         ------                     Light Blue Top Hold[[id number]]                              In process                 Red Top Hold[[id number]]                                     In process                 Light Green Top Hold[[id number]]                             In process                   Please view results for these tests on the individual orders.   LIGHT BLUE TOP HOLD   RED TOP HOLD   LIGHT GREEN TOP HOLD   POCT GLUCOSE MONITORING CONTINUOUS   POCT GLUCOSE MONITORING CONTINUOUS          Imaging Results    None          Medications - No data to display  Medical Decision Making:   ED Management:  Patient was given several cups of juice and his blood sugar eventually did go up.  He states he is feeling much better.  I discussed decreasing his insulin because I feel his dose is too high.                   Clinical Impression:   Final diagnoses:  [R53.1] Weakness  [E16.2] Hypoglycemia (Primary)          ED Disposition Condition    Discharge Stable        ED Prescriptions     None        Follow-up Information    None          Kelly Cai MD  08/08/22 3637

## 2022-08-08 NOTE — DISCHARGE INSTRUCTIONS
Decrease your Lantus to 65 units. Continue to monitor your blood sugars at home and follow up with your primary care provider in 1-2 weeks.

## 2022-08-09 ENCOUNTER — TELEPHONE (OUTPATIENT)
Dept: EMERGENCY MEDICINE | Facility: HOSPITAL | Age: 82
End: 2022-08-09
Payer: MEDICARE

## 2022-08-29 ENCOUNTER — OFFICE VISIT (OUTPATIENT)
Dept: CARDIOLOGY | Facility: CLINIC | Age: 82
End: 2022-08-29
Payer: MEDICARE

## 2022-08-29 VITALS
OXYGEN SATURATION: 96 % | HEIGHT: 68 IN | WEIGHT: 191 LBS | SYSTOLIC BLOOD PRESSURE: 130 MMHG | BODY MASS INDEX: 28.95 KG/M2 | HEART RATE: 61 BPM | DIASTOLIC BLOOD PRESSURE: 60 MMHG

## 2022-08-29 DIAGNOSIS — R07.9 CHEST PAIN, UNSPECIFIED TYPE: Primary | ICD-10-CM

## 2022-08-29 PROCEDURE — 93005 ELECTROCARDIOGRAM TRACING: CPT | Mod: PBBFAC | Performed by: INTERNAL MEDICINE

## 2022-08-29 PROCEDURE — 93010 ELECTROCARDIOGRAM REPORT: CPT | Mod: S$PBB,,, | Performed by: INTERNAL MEDICINE

## 2022-08-29 PROCEDURE — 99213 OFFICE O/P EST LOW 20 MIN: CPT | Mod: PBBFAC | Performed by: NURSE PRACTITIONER

## 2022-08-29 PROCEDURE — 99214 PR OFFICE/OUTPT VISIT, EST, LEVL IV, 30-39 MIN: ICD-10-PCS | Mod: S$PBB,,, | Performed by: NURSE PRACTITIONER

## 2022-08-29 PROCEDURE — 93010 EKG 12-LEAD: ICD-10-PCS | Mod: S$PBB,,, | Performed by: INTERNAL MEDICINE

## 2022-08-29 PROCEDURE — 99214 OFFICE O/P EST MOD 30 MIN: CPT | Mod: S$PBB,,, | Performed by: NURSE PRACTITIONER

## 2022-08-29 RX ORDER — PANTOPRAZOLE SODIUM 40 MG/1
40 TABLET, DELAYED RELEASE ORAL DAILY
COMMUNITY
End: 2022-10-19 | Stop reason: SDUPTHER

## 2022-08-29 RX ORDER — NITROGLYCERIN 0.4 MG/1
0.4 TABLET SUBLINGUAL EVERY 5 MIN PRN
Qty: 25 TABLET | Refills: 3 | Status: SHIPPED | OUTPATIENT
Start: 2022-08-29 | End: 2022-11-17 | Stop reason: SDUPTHER

## 2022-08-29 RX ORDER — TAMSULOSIN HYDROCHLORIDE 0.4 MG/1
CAPSULE ORAL DAILY
COMMUNITY
End: 2023-01-23

## 2022-08-29 NOTE — PROGRESS NOTES
"[facility] note        DATE OF SERVICE: 08/29/2022       PCP: BARBARA Green      CHIEF COMPLAINT:   Chief Complaint   Patient presents with    Chest Pain     Right now. First one in a long time    Shortness of Breath     Right now.         HISTORY OF PRESENT ILLNESS:  Negro Hale is a 82 y.o. male with a PMH of   Past Medical History:   Diagnosis Date    Abnormal thyroid stimulating hormone (TSH) level 08/22/2019    Anemia 08/15/2019    Atrial fibrillation 09/07/2012    Bradycardia 05/23/2017    asymptomatic    Change in bowel habit 11/13/2018    Chronic kidney disease, unspecified 01/14/2019    Coronary arteriosclerosis 09/07/2012    Disease of skin and subcutaneous tissue 08/16/2017    medial aspect RLE- cystic structure seen on venous doppler US.    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    Hyperlipidemia 09/21/2012    Lower extremity edema 04/06/2017    Lumbar radiculopathy 01/26/2017    Obesity, unspecified 12/05/2016    Old myocardial infarction 12/06/2017    Other secondary pulmonary hypertension 05/23/2017    Other spondylosis, lumbosacral region 01/26/2017    Peripheral vascular disease     Personal history of tobacco use, presenting hazards to health 11/17/2016    Pulmonary hypertension     Type 2 diabetes mellitus with chronic kidney disease 01/14/2019    Type 2 diabetes mellitus with hyperglycemia 10/02/2011    Varicose veins of both lower extremities with pain 01/02/2019    Venous insufficiency 01/08/2019     who presents for routine follow up. The patient states that after he sat down he had some needle like chest discomfort substernally lasting seconds. This resolved with "deep breathing". This is the same issue that he described to Dr. Bridges " "in 12/2021. He was given sublingual ntg to use prn and has not haider it but he states he recently lost the ntg bottle. His pain at the time of his MI was "every tooth in my head was hurting and both arms. He has no jaw nor arm pain today.   Chief Complaint   Patient presents with    Chest Pain     Right now. First one in a long time    Shortness of Breath     Right now.           Review of Systems: Review of Systems   Respiratory:  Positive for shortness of breath.    Cardiovascular:  Positive for chest pain. Negative for leg swelling.   Neurological:  Negative for dizziness and loss of consciousness.      PAST MEDICAL HISTORY:  Past Medical History:   Diagnosis Date    Abnormal thyroid stimulating hormone (TSH) level 08/22/2019    Anemia 08/15/2019    Atrial fibrillation 09/07/2012    Bradycardia 05/23/2017    asymptomatic    Change in bowel habit 11/13/2018    Chronic kidney disease, unspecified 01/14/2019    Coronary arteriosclerosis 09/07/2012    Disease of skin and subcutaneous tissue 08/16/2017    medial aspect RLE- cystic structure seen on venous doppler US.    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    Hyperlipidemia 09/21/2012    Lower extremity edema 04/06/2017    Lumbar radiculopathy 01/26/2017    Obesity, unspecified 12/05/2016    Old myocardial infarction 12/06/2017    Other secondary pulmonary hypertension 05/23/2017    Other spondylosis, lumbosacral region 01/26/2017    Peripheral vascular disease     Personal history of tobacco use, presenting hazards to health 11/17/2016    Pulmonary hypertension     Type 2 diabetes mellitus with chronic kidney disease 01/14/2019    Type 2 diabetes mellitus with hyperglycemia 10/02/2011    Varicose veins of both lower extremities with pain " 01/02/2019    Venous insufficiency 01/08/2019       PAST SURGICAL HISTORY:  Past Surgical History:   Procedure Laterality Date    APPENDECTOMY      CARDIAC CATHETERIZATION  2007    [facility]- Stent placement    CATARACT EXTRACTION, BILATERAL      COLONOSCOPY  11/06/2019    Dr. Mendoza    HERNIA REPAIR      Inguinal hernia repair    TONSILLECTOMY         SOCIAL HISTORY:  Social History     Socioeconomic History    Marital status:    Tobacco Use    Smoking status: Former    Smokeless tobacco: Never    Tobacco comments:     Quit 10/15/1976   Substance and Sexual Activity    Alcohol use: Yes     Comment: occasionally    Drug use: Never    Sexual activity: Not Currently       FAMILY HISTORY:  Family History   Problem Relation Age of Onset    No Known Problems Mother     Heart disease Father     Throat cancer Sister     Diabetes Brother     No Known Problems Brother     No Known Problems Maternal Grandmother     No Known Problems Maternal Grandfather     No Known Problems Paternal Grandmother     No Known Problems Paternal Grandfather          ALLERGIES:  Review of patient's allergies indicates:   Allergen Reactions    Mayonnaise      Upset stomach        MEDICATIONS:    Current Outpatient Medications:     cholecalciferol, vitamin D3, 125 mcg (5,000 unit) capsule, Take 5,000 Units by mouth once daily., Disp: , Rfl:     ezetimibe (ZETIA) 10 mg tablet, Take 1 tablet (10 mg total) by mouth once daily., Disp: 90 tablet, Rfl: 3    metoprolol succinate (TOPROL-XL) 50 MG 24 hr tablet, Take 1 tablet (50 mg total) by mouth once daily., Disp: 90 tablet, Rfl: 0    pantoprazole (PROTONIX) 40 MG tablet, Take 40 mg by mouth once daily., Disp: , Rfl:     rosuvastatin (CRESTOR) 40 MG Tab, Take 1 tablet (40 mg total) by mouth every evening., Disp: 90 tablet, Rfl: 1    tamsulosin (FLOMAX) 0.4 mg Cap, Take by mouth once daily., Disp: , Rfl:     clopidogreL (PLAVIX) 75 mg tablet, Take 1 tablet (75 mg total) by mouth once daily., Disp:  "90 tablet, Rfl: 3    coenzyme Q10 200 mg capsule, Take 200 mg by mouth once daily., Disp: , Rfl:     dapagliflozin (FARXIGA) 10 mg tablet, Take 1 tablet (10 mg total) by mouth once daily., Disp: 90 tablet, Rfl: 3    insulin glargine (LANTUS U-100 INSULIN) 100 unit/mL injection, Inject 80 Units into the skin every evening., Disp: 5 each, Rfl: 2    nitroGLYCERIN (NITROSTAT) 0.4 MG SL tablet, Place 1 tablet (0.4 mg total) under the tongue every 5 (five) minutes as needed for Chest pain., Disp: 25 tablet, Rfl: 3    SURE COMFORT PEN NEEDLE 32 gauge x 5/32" Ndle, AS DIRECTED, Disp: , Rfl:      PHYSICAL EXAM:  /60   Pulse 61   Ht 5' 8" (1.727 m)   Wt 86.6 kg (191 lb)   SpO2 96%   BMI 29.04 kg/m²   Wt Readings from Last 3 Encounters:   08/29/22 86.6 kg (191 lb)   08/08/22 83.9 kg (185 lb)   05/19/22 82.3 kg (181 lb 6 oz)      Body mass index is 29.04 kg/m².    Physical Exam  Vitals reviewed.   Constitutional:       Appearance: Normal appearance.   HENT:      Head: Normocephalic and atraumatic.   Neck:      Vascular: No carotid bruit or JVD.   Cardiovascular:      Rate and Rhythm: Normal rate and regular rhythm.      Pulses: Normal pulses.           Radial pulses are 2+ on the right side and 2+ on the left side.        Dorsalis pedis pulses are 2+ on the right side and 2+ on the left side.      Heart sounds: Normal heart sounds.   Pulmonary:      Effort: Pulmonary effort is normal.      Breath sounds: Normal breath sounds.   Musculoskeletal:      Right lower leg: No edema.      Left lower leg: No edema.   Skin:     General: Skin is warm and dry.   Neurological:      Mental Status: He is alert.       LABS REVIEWED:  Lab Results   Component Value Date    WBC 12.55 (H) 08/08/2022    RBC 4.38 (L) 08/08/2022    HGB 13.6 08/08/2022    HCT 39.7 (L) 08/08/2022    MCV 90.6 08/08/2022    MCH 31.1 (H) 08/08/2022    MCHC 34.3 08/08/2022    RDW 13.6 08/08/2022     08/08/2022    MPV 10.7 08/08/2022    NRBC 0.0 08/08/2022 "    INR 1.02 06/19/2020     Lab Results   Component Value Date     08/08/2022    K 3.9 08/08/2022     (H) 08/08/2022    CO2 26 08/08/2022    BUN 33 (H) 08/08/2022    MG 2.3 06/19/2020     Lab Results   Component Value Date     06/19/2020    AST 46 (H) 08/08/2022    ALT 64 (H) 08/08/2022     Lab Results   Component Value Date    GLU 40 (LL) 08/08/2022    HGBA1C 10.9 (H) 05/19/2022     Lab Results   Component Value Date    CHOL 127 06/07/2022    HDL 45 06/07/2022    TRIG 67 06/07/2022    CHOLHDL 2.8 06/07/2022       CARDIAC STUDIES REVIEWED:EKG: RSR with PAC's; HR 63 bpm; o/w normal EKG; reviewed with Dr. Bridges        ASSESSMENT:   Active Problem List with Overview Notes    Diagnosis Date Noted    Peripheral vascular disease 05/19/2022    Type 2 diabetes mellitus with stage 3a chronic kidney disease, with long-term current use of insulin 03/17/2021    Essential hypertension 03/17/2021    Hyperlipidemia 03/17/2021    Stage 3a chronic kidney disease 03/17/2021    Benign prostatic hyperplasia 03/17/2021    Coronary artery disease involving native coronary artery of native heart without angina pectoris 03/17/2021    History of coronary artery stent placement 03/17/2021     VISIT DIAGNOSIS:  Chest pain, unspecified type  -     EKG 12-lead; Future  -     nitroGLYCERIN (NITROSTAT) 0.4 MG SL tablet; Place 1 tablet (0.4 mg total) under the tongue every 5 (five) minutes as needed for Chest pain.  Dispense: 25 tablet; Refill: 3       PLAN:  1NTG 0.4 mg SL q5 min prn chest pain  The patient thinks he needs oxygen at hs like he has had in the past. He has spoken to a representative fromt he company. We discussed referral to the sleep center but he declined.     Orders Placed This Encounter   Procedures    EKG 12-lead     Standing Status:   Future     Number of Occurrences:   1     Standing Expiration Date:   8/29/2023      RTC 6 month.

## 2022-10-09 DIAGNOSIS — Z71.89 COMPLEX CARE COORDINATION: ICD-10-CM

## 2022-10-19 ENCOUNTER — OFFICE VISIT (OUTPATIENT)
Dept: FAMILY MEDICINE | Facility: CLINIC | Age: 82
End: 2022-10-19
Payer: MEDICARE

## 2022-10-19 VITALS
BODY MASS INDEX: 29.25 KG/M2 | SYSTOLIC BLOOD PRESSURE: 120 MMHG | RESPIRATION RATE: 18 BRPM | TEMPERATURE: 98 F | HEIGHT: 68 IN | DIASTOLIC BLOOD PRESSURE: 64 MMHG | WEIGHT: 193 LBS | HEART RATE: 76 BPM | OXYGEN SATURATION: 96 %

## 2022-10-19 DIAGNOSIS — Z79.899 ENCOUNTER FOR LONG-TERM (CURRENT) USE OF OTHER MEDICATIONS: ICD-10-CM

## 2022-10-19 DIAGNOSIS — Z79.4 TYPE 2 DIABETES MELLITUS WITH STAGE 3A CHRONIC KIDNEY DISEASE, WITH LONG-TERM CURRENT USE OF INSULIN: Primary | Chronic | ICD-10-CM

## 2022-10-19 DIAGNOSIS — I10 ESSENTIAL HYPERTENSION: Chronic | ICD-10-CM

## 2022-10-19 DIAGNOSIS — E11.22 TYPE 2 DIABETES MELLITUS WITH STAGE 3A CHRONIC KIDNEY DISEASE, WITH LONG-TERM CURRENT USE OF INSULIN: Primary | Chronic | ICD-10-CM

## 2022-10-19 DIAGNOSIS — E78.2 MIXED HYPERLIPIDEMIA: Chronic | ICD-10-CM

## 2022-10-19 DIAGNOSIS — Z95.5 HISTORY OF CORONARY ARTERY STENT PLACEMENT: ICD-10-CM

## 2022-10-19 DIAGNOSIS — N18.31 TYPE 2 DIABETES MELLITUS WITH STAGE 3A CHRONIC KIDNEY DISEASE, WITH LONG-TERM CURRENT USE OF INSULIN: Primary | Chronic | ICD-10-CM

## 2022-10-19 DIAGNOSIS — I25.10 CORONARY ARTERY DISEASE INVOLVING NATIVE CORONARY ARTERY OF NATIVE HEART WITHOUT ANGINA PECTORIS: Chronic | ICD-10-CM

## 2022-10-19 LAB
ANION GAP SERPL CALCULATED.3IONS-SCNC: 14 MMOL/L (ref 7–16)
BASOPHILS # BLD AUTO: 0.08 K/UL (ref 0–0.2)
BASOPHILS NFR BLD AUTO: 0.9 % (ref 0–1)
BUN SERPL-MCNC: 30 MG/DL (ref 7–18)
BUN/CREAT SERPL: 20 (ref 6–20)
CALCIUM SERPL-MCNC: 9 MG/DL (ref 8.5–10.1)
CHLORIDE SERPL-SCNC: 100 MMOL/L (ref 98–107)
CO2 SERPL-SCNC: 24 MMOL/L (ref 21–32)
CREAT SERPL-MCNC: 1.53 MG/DL (ref 0.7–1.3)
DIFFERENTIAL METHOD BLD: ABNORMAL
EGFR (NO RACE VARIABLE) (RUSH/TITUS): 45 ML/MIN/1.73M²
EOSINOPHIL # BLD AUTO: 0.23 K/UL (ref 0–0.5)
EOSINOPHIL NFR BLD AUTO: 2.6 % (ref 1–4)
ERYTHROCYTE [DISTWIDTH] IN BLOOD BY AUTOMATED COUNT: 13.3 % (ref 11.5–14.5)
EST. AVERAGE GLUCOSE BLD GHB EST-MCNC: 190 MG/DL
GLUCOSE SERPL-MCNC: 462 MG/DL (ref 74–106)
HBA1C MFR BLD HPLC: 8.3 % (ref 4.5–6.6)
HCT VFR BLD AUTO: 41.5 % (ref 40–54)
HGB BLD-MCNC: 13.6 G/DL (ref 13.5–18)
IMM GRANULOCYTES # BLD AUTO: 0.03 K/UL (ref 0–0.04)
IMM GRANULOCYTES NFR BLD: 0.3 % (ref 0–0.4)
LYMPHOCYTES # BLD AUTO: 1.73 K/UL (ref 1–4.8)
LYMPHOCYTES NFR BLD AUTO: 19.7 % (ref 27–41)
MCH RBC QN AUTO: 30 PG (ref 27–31)
MCHC RBC AUTO-ENTMCNC: 32.8 G/DL (ref 32–36)
MCV RBC AUTO: 91.4 FL (ref 80–96)
MONOCYTES # BLD AUTO: 0.66 K/UL (ref 0–0.8)
MONOCYTES NFR BLD AUTO: 7.5 % (ref 2–6)
MPC BLD CALC-MCNC: 11.5 FL (ref 9.4–12.4)
NEUTROPHILS # BLD AUTO: 6.03 K/UL (ref 1.8–7.7)
NEUTROPHILS NFR BLD AUTO: 69 % (ref 53–65)
NRBC # BLD AUTO: 0 X10E3/UL
NRBC, AUTO (.00): 0 %
PLATELET # BLD AUTO: 249 K/UL (ref 150–400)
POTASSIUM SERPL-SCNC: 5.6 MMOL/L (ref 3.5–5.1)
RBC # BLD AUTO: 4.54 M/UL (ref 4.6–6.2)
SODIUM SERPL-SCNC: 132 MMOL/L (ref 136–145)
WBC # BLD AUTO: 8.76 K/UL (ref 4.5–11)

## 2022-10-19 PROCEDURE — 80048 BASIC METABOLIC PANEL: ICD-10-PCS | Mod: ,,, | Performed by: CLINICAL MEDICAL LABORATORY

## 2022-10-19 PROCEDURE — 83036 HEMOGLOBIN GLYCOSYLATED A1C: CPT | Mod: ,,, | Performed by: CLINICAL MEDICAL LABORATORY

## 2022-10-19 PROCEDURE — 85025 CBC WITH DIFFERENTIAL: ICD-10-PCS | Mod: ,,, | Performed by: CLINICAL MEDICAL LABORATORY

## 2022-10-19 PROCEDURE — 83036 HEMOGLOBIN A1C: ICD-10-PCS | Mod: ,,, | Performed by: CLINICAL MEDICAL LABORATORY

## 2022-10-19 PROCEDURE — 99214 OFFICE O/P EST MOD 30 MIN: CPT | Mod: ,,, | Performed by: NURSE PRACTITIONER

## 2022-10-19 PROCEDURE — 80048 BASIC METABOLIC PNL TOTAL CA: CPT | Mod: ,,, | Performed by: CLINICAL MEDICAL LABORATORY

## 2022-10-19 PROCEDURE — 99214 PR OFFICE/OUTPT VISIT, EST, LEVL IV, 30-39 MIN: ICD-10-PCS | Mod: ,,, | Performed by: NURSE PRACTITIONER

## 2022-10-19 PROCEDURE — 85025 COMPLETE CBC W/AUTO DIFF WBC: CPT | Mod: ,,, | Performed by: CLINICAL MEDICAL LABORATORY

## 2022-10-19 RX ORDER — DAPAGLIFLOZIN 10 MG/1
10 TABLET, FILM COATED ORAL DAILY
Qty: 90 TABLET | Refills: 3 | Status: SHIPPED | OUTPATIENT
Start: 2022-10-19 | End: 2022-11-17 | Stop reason: SDUPTHER

## 2022-10-19 RX ORDER — METOPROLOL SUCCINATE 50 MG/1
50 TABLET, EXTENDED RELEASE ORAL DAILY
Qty: 90 TABLET | Refills: 3 | Status: SHIPPED | OUTPATIENT
Start: 2022-10-19 | End: 2023-03-01 | Stop reason: SDUPTHER

## 2022-10-19 RX ORDER — PANTOPRAZOLE SODIUM 40 MG/1
40 TABLET, DELAYED RELEASE ORAL DAILY
Qty: 90 TABLET | Refills: 3 | Status: SHIPPED | OUTPATIENT
Start: 2022-10-19 | End: 2023-04-25

## 2022-10-19 RX ORDER — ROSUVASTATIN CALCIUM 40 MG/1
40 TABLET, COATED ORAL NIGHTLY
Qty: 90 TABLET | Refills: 3 | Status: SHIPPED | OUTPATIENT
Start: 2022-10-19 | End: 2023-08-22 | Stop reason: SDUPTHER

## 2022-10-19 RX ORDER — CLOPIDOGREL BISULFATE 75 MG/1
75 TABLET ORAL DAILY
Qty: 90 TABLET | Refills: 3 | Status: SHIPPED | OUTPATIENT
Start: 2022-10-19 | End: 2023-03-01 | Stop reason: SDUPTHER

## 2022-10-19 NOTE — PROGRESS NOTES
[facility] - FAMILY MEDICINE       PATIENT NAME: Negro Hale   : 1940    AGE: 82 y.o. DATE OF ENCOUNTER: 10/19/22    MRN: [id number]        Reason for Visit / Chief Complaint:  Diabetes (Uncontrolled, missed last f/u appt)     Subjective:     HPI:    Presents for f/u uncontrolled T2DM on insulin with stage 3 CKD.  Last visit w/ me May 2022, A1c 10.9% so advised increase lantus to 80 units dly and change Jardiance 10 mg to Farxiga 10 mg due to CKD and for CV benefits.  Canceled his 1 mth f/u uncontrolled DM f/u to review glucose levels & didn't reschedule.    Today reports had to go to ER 22 for glucose 38, was advised to decrease Lantus to 70 units dly and per ER note said he had stopped Jardiance on his own due to lower glucose levels.  States he never received Farxiga 10 mg which was to replace Jardiance in May.    Occasionally checks glucose with levels ranging 90-160s, doesn't bring a log or meter to appts.      Eye exams per Dr. Lewis.    Review of Systems:     Review of Systems   Constitutional: Negative.    HENT: Negative.     Eyes: Negative.    Respiratory: Negative.     Cardiovascular: Negative.    Gastrointestinal: Negative.    Endocrine: Negative.    Genitourinary: Negative.    Musculoskeletal: Negative.    Skin: Negative.    Allergic/Immunologic: Negative.    Neurological: Negative.    Hematological: Negative.    Psychiatric/Behavioral: Negative.       Allergies:     Review of patient's allergies indicates:   Allergen Reactions    Mayonnaise      Upset stomach        Labs:      Lab Results   Component Value Date    HGBA1C 10.9 (H) 2022      Lipids:   Lab Results   Component Value Date    CHOL 127 2022     Lab Results   Component Value Date    HDL 45 2022     Lab Results   Component Value Date    LDLCALC 69 2022     Lab Results   Component Value Date    TRIG 67 2022     CMP:  Sodium   Date Value Ref Range Status   2022 143 136 - 145  mmol/L Final     Potassium   Date Value Ref Range Status   08/08/2022 3.9 3.5 - 5.1 mmol/L Final     Chloride   Date Value Ref Range Status   08/08/2022 110 (H) 98 - 107 mmol/L Final     Glucose   Date Value Ref Range Status   08/08/2022 40 (LL) 74 - 106 mg/dL Final     BUN   Date Value Ref Range Status   08/08/2022 33 (H) 7 - 18 mg/dL Final     Creatinine   Date Value Ref Range Status   08/08/2022 1.33 (H) 0.70 - 1.30 mg/dL Final     AST   Date Value Ref Range Status   08/08/2022 46 (H) 15 - 37 U/L Final     ALT   Date Value Ref Range Status   08/08/2022 64 (H) 16 - 61 U/L Final     eGFR    Date Value Ref Range Status   03/19/2021 64  Final     eGFR   Date Value Ref Range Status   05/19/2022 45 (L) >=60 mL/min/1.73m² Final      CBC:  Lab Results   Component Value Date    WBC 12.55 (H) 08/08/2022    RBC 4.38 (L) 08/08/2022    HGB 13.6 08/08/2022    HCT 39.7 (L) 08/08/2022     08/08/2022      TSH:  Lab Results   Component Value Date    TSH 2.880 12/21/2021       Medical History:     Past Medical History:   Diagnosis Date    Abnormal thyroid stimulating hormone (TSH) level 08/22/2019    Anemia 08/15/2019    Atrial fibrillation 09/07/2012    Bradycardia 05/23/2017    asymptomatic    Change in bowel habit 11/13/2018    Chronic kidney disease, unspecified 01/14/2019    Coronary arteriosclerosis 09/07/2012    Disease of skin and subcutaneous tissue 08/16/2017    medial aspect RLE- cystic structure seen on venous doppler US.   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    Other  "secondary pulmonary hypertension 05/23/2017    Other spondylosis, lumbosacral region 01/26/2017    Peripheral vascular disease     Personal history of tobacco use, presenting hazards to health 11/17/2016    Pulmonary hypertension     Type 2 diabetes mellitus with chronic kidney disease 01/14/2019    Type 2 diabetes mellitus with hyperglycemia 10/02/2011    Varicose veins of both lower extremities with pain 01/02/2019    Venous insufficiency 01/08/2019      Social History     Tobacco Use   Smoking Status Former   Smokeless Tobacco Never   Tobacco Comments    Quit 10/15/1976      Past Surgical History:   Procedure Laterality Date    APPENDECTOMY      CARDIAC CATHETERIZATION  2007    [facility]- Stent placement    CATARACT EXTRACTION, BILATERAL      COLONOSCOPY  11/06/2019    Dr. Mendoza    HERNIA REPAIR      Inguinal hernia repair    TONSILLECTOMY        Objective:      Wt Readings from Last 3 Encounters:   10/19/22 1358 87.5 kg (193 lb)   08/29/22 1242 86.6 kg (191 lb)   08/08/22 0055 83.9 kg (185 lb)     Vitals:    10/19/22 1358   BP: 120/64   Pulse: 76   Resp: 18   Temp: 98.3 °F (36.8 °C)   SpO2: 96%   Weight: 87.5 kg (193 lb)   Height: 5' 8" (1.727 m)     Body mass index is 29.35 kg/m².     Physical Exam:    Physical Exam  Vitals and nursing note reviewed.   Constitutional:       General: He is not in acute distress.     Appearance: Normal appearance.   HENT:      Head: Normocephalic.   Eyes:      Conjunctiva/sclera: Conjunctivae normal.   Neck:      Thyroid: No thyromegaly or thyroid tenderness.      Trachea: Trachea normal.   Cardiovascular:      Rate and Rhythm: Normal rate and regular rhythm.      Pulses: Normal pulses.      Heart sounds: Normal heart sounds.   Pulmonary:      Effort: Pulmonary effort is normal. No respiratory distress.      Breath sounds: Normal breath sounds.   Musculoskeletal:      Cervical back: Neck supple.      Right lower leg: No edema.      Left lower leg: No edema. "   Lymphadenopathy:      Cervical: No cervical adenopathy.   Skin:     General: Skin is warm and dry.   Neurological:      Mental Status: He is alert and oriented to person, place, and time.   Psychiatric:         Mood and Affect: Mood normal.         Behavior: Behavior normal.        Assessment:          ICD-10-CM ICD-9-CM   1. Type 2 diabetes mellitus with stage 3a chronic kidney disease, with long-term current use of insulin  E11.22 250.40    N18.31 585.3    Z79.4 V58.67   2. Essential hypertension  I10 401.9   3. Coronary artery disease involving native coronary artery of native heart without angina pectoris  I25.10 414.01   4. History of coronary artery stent placement  Z95.5 V45.82   5. Mixed hyperlipidemia  E78.2 272.2   6. Encounter for long-term (current) use of other medications  Z79.899 V58.69   7. BMI 29.0-29.9,adult  Z68.29 V85.25        Plan:       Type 2 diabetes mellitus with stage 3a chronic kidney disease, with long-term current use of insulin  Comments:  poorly controlled, fluctuating levels  Orders:  -     dapagliflozin (FARXIGA) 10 mg tablet; Take 1 tablet (10 mg total) by mouth once daily.  Dispense: 90 tablet; Refill: 3  -     CBC Auto Differential; Future; Expected date: 10/19/2022  -     Basic Metabolic Panel; Future; Expected date: 10/19/2022  -     Hemoglobin A1C; Future; Expected date: 10/19/2022    Essential hypertension  Comments:  well controlled  Orders:  -     metoprolol succinate (TOPROL-XL) 50 MG 24 hr tablet; Take 1 tablet (50 mg total) by mouth once daily.  Dispense: 90 tablet; Refill: 3    Coronary artery disease involving native coronary artery of native heart without angina pectoris  Comments:  followed by Cardiology  Orders:  -     clopidogreL (PLAVIX) 75 mg tablet; Take 1 tablet (75 mg total) by mouth once daily.  Dispense: 90 tablet; Refill: 3    History of coronary artery stent placement  -     clopidogreL (PLAVIX) 75 mg tablet; Take 1 tablet (75 mg total) by mouth once  "daily.  Dispense: 90 tablet; Refill: 3    Mixed hyperlipidemia  -     rosuvastatin (CRESTOR) 40 MG Tab; Take 1 tablet (40 mg total) by mouth every evening.  Dispense: 90 tablet; Refill: 3    Encounter for long-term (current) use of other medications  -     CBC Auto Differential; Future; Expected date: 10/19/2022    BMI 29.0-29.9,adult    Other orders  -     pantoprazole (PROTONIX) 40 MG tablet; Take 1 tablet (40 mg total) by mouth once daily.  Dispense: 90 tablet; Refill: 3      Current Outpatient Medications:     cholecalciferol, vitamin D3, 125 mcg (5,000 unit) capsule, Take 5,000 Units by mouth once daily., Disp: , Rfl:     coenzyme Q10 200 mg capsule, Take 200 mg by mouth once daily., Disp: , Rfl:     ezetimibe (ZETIA) 10 mg tablet, Take 1 tablet (10 mg total) by mouth once daily., Disp: 90 tablet, Rfl: 3    insulin glargine (LANTUS U-100 INSULIN) 100 unit/mL injection, Inject 80 Units into the skin every evening., Disp: 5 each, Rfl: 2    nitroGLYCERIN (NITROSTAT) 0.4 MG SL tablet, Place 1 tablet (0.4 mg total) under the tongue every 5 (five) minutes as needed for Chest pain., Disp: 25 tablet, Rfl: 3    SURE COMFORT PEN NEEDLE 32 gauge x 5/32" Ndle, AS DIRECTED, Disp: , Rfl:     tamsulosin (FLOMAX) 0.4 mg Cap, Take by mouth once daily., Disp: , Rfl:     clopidogreL (PLAVIX) 75 mg tablet, Take 1 tablet (75 mg total) by mouth once daily., Disp: 90 tablet, Rfl: 3    dapagliflozin (FARXIGA) 10 mg tablet, Take 1 tablet (10 mg total) by mouth once daily., Disp: 90 tablet, Rfl: 3    metoprolol succinate (TOPROL-XL) 50 MG 24 hr tablet, Take 1 tablet (50 mg total) by mouth once daily., Disp: 90 tablet, Rfl: 3    pantoprazole (PROTONIX) 40 MG tablet, Take 1 tablet (40 mg total) by mouth once daily., Disp: 90 tablet, Rfl: 3    rosuvastatin (CRESTOR) 40 MG Tab, Take 1 tablet (40 mg total) by mouth every evening., Disp: 90 tablet, Rfl: 3    New & refilled meds:  Requested Prescriptions     Signed Prescriptions " Disp Refills    dapagliflozin (FARXIGA) 10 mg tablet 90 tablet 3     Sig: Take 1 tablet (10 mg total) by mouth once daily.    rosuvastatin (CRESTOR) 40 MG Tab 90 tablet 3     Sig: Take 1 tablet (40 mg total) by mouth every evening.    metoprolol succinate (TOPROL-XL) 50 MG 24 hr tablet 90 tablet 3     Sig: Take 1 tablet (50 mg total) by mouth once daily.    pantoprazole (PROTONIX) 40 MG tablet 90 tablet 3     Sig: Take 1 tablet (40 mg total) by mouth once daily.    clopidogreL (PLAVIX) 75 mg tablet 90 tablet 3     Sig: Take 1 tablet (75 mg total) by mouth once daily.     Had 1st Covid (Moderna) vaccines. Does not plan on getting booster doses.    Discussed previous lab results, very uncontrolled DM - tx plan & measures to prevent hypoglycemia.  Must call if/when glucose levels begin decreasing, with his co-morbidities & age want to avoid glucose < 100 at all times, a little higher is better.    Discussed benefits of Farxiga 10 mg for CKD, CV, & glucose regulation.  Better to take it and we can wean dose of insulin if needed to prevent hypoglycemia - urged to call for advice when needed.  VU.    Med refills sent as requested    Return to clinic 3 mths uncontrolled T2DM, nonfasting; and f/u as needed.    Future Appointments   Date Time Provider Department Center   11/9/2022  3:00 PM DENA NURSE, DAR [facility] DAHIANA Bright   1/23/2023 10:40 AM BARBARA Green [facility]   3/2/2023 11:00 AM CHATO Persaud [facility]        Signature:  BARBARA Green

## 2022-10-25 NOTE — PROGRESS NOTES
Stable CKD.  He isn't on anything that would raise K+, so make sure he isn't using salt substitute or taking OTC K+.  A1c is much improved, but non-fasting glucose was 462 which is way too high (random gluc should be < 200).  He has been taking 70 units Lantus & I sent rx to add Farxiga 10 mg dly - has he received that rx?

## 2022-11-14 NOTE — PROGRESS NOTES
[facility]      PATIENT NAME: Negro Hale   : 1940    AGE: 82 y.o. DATE: 2022   MRN: [id number]        Reason for Visit / Chief Complaint: Medicare AWV (Subsequent Medicare AWV visit )        Negro Hale presents for a Subsequent Medicare AWV today.    Review of Systems   Constitutional: Negative.    HENT: Negative.     Respiratory: Negative.     Cardiovascular: Negative.    Genitourinary: Negative.    Musculoskeletal: Negative.    Skin: Negative.    Neurological: Negative.    Psychiatric/Behavioral: Negative.        The following components were reviewed and updated:      Medical/Social/Family History:  Past Medical History:   Diagnosis Date    Abnormal thyroid stimulating hormone (TSH) level 2019    Anemia 08/15/2019    Atrial fibrillation 2012    Bradycardia 2017    asymptomatic    Change in bowel habit 2018    Chronic kidney disease, unspecified 2019    Coronary arteriosclerosis 2012    Disease of skin and subcutaneous tissue 2017    medial aspect RLE- cystic structure seen on venous doppler US.    Dyspnea on exertion 2016    Elevated serum creatinine 2017    Encounter for long-term (current) use of other medications 2016    Essential (primary) hypertension 2017    Heart disease, hypertensive 2019    Hereditary lymphedema 2019    Hyperlipidemia 2012    Lower extremity edema 2017    Lumbar radiculopathy 2017    Obesity, unspecified 2016    Old myocardial infarction 2017    Other secondary pulmonary hypertension 2017    Other spondylosis, lumbosacral region 2017    Peripheral vascular disease     Personal history of tobacco use, presenting hazards to health 2016    Pulmonary hypertension     Type 2 diabetes mellitus with chronic kidney disease 2019    Type 2 diabetes mellitus with hyperglycemia 10/02/2011    Varicose veins of  both lower extremities with pain 01/02/2019    Venous insufficiency 01/08/2019        Family History   Problem Relation Age of Onset    No Known Problems Mother     Heart disease Father     Throat cancer Sister     Diabetes Brother     No Known Problems Brother     No Known Problems Maternal Grandmother     No Known Problems Maternal Grandfather     No Known Problems Paternal Grandmother     No Known Problems Paternal Grandfather         Past Surgical History:   Procedure Laterality Date    APPENDECTOMY      CARDIAC CATHETERIZATION  2007    [facility]- Stent placement    CATARACT EXTRACTION, BILATERAL      COLONOSCOPY  11/06/2019    Dr. Mendoza    HERNIA REPAIR      Inguinal hernia repair    TONSILLECTOMY         Social History     Tobacco Use   Smoking Status Former   Smokeless Tobacco Never   Tobacco Comments    Quit 10/15/1976       Social History     Substance and Sexual Activity   Alcohol Use Yes    Comment: occasionally         Allergies and Current Medications     Review of patient's allergies indicates:   Allergen Reactions    Mayonnaise      Upset stomach       Current Outpatient Medications:     cholecalciferol, vitamin D3, 125 mcg (5,000 unit) capsule, Take 5,000 Units by mouth once daily., Disp: , Rfl:     clopidogreL (PLAVIX) 75 mg tablet, Take 1 tablet (75 mg total) by mouth once daily., Disp: 90 tablet, Rfl: 3    coenzyme Q10 200 mg capsule, Take 200 mg by mouth once daily., Disp: , Rfl:     ezetimibe (ZETIA) 10 mg tablet, Take 1 tablet (10 mg total) by mouth once daily., Disp: 90 tablet, Rfl: 3    insulin glargine (LANTUS U-100 INSULIN) 100 unit/mL injection, Inject 80 Units into the skin every evening., Disp: 5 each, Rfl: 2    metoprolol succinate (TOPROL-XL) 50 MG 24 hr tablet, Take 1 tablet (50 mg total) by mouth once daily., Disp: 90 tablet, Rfl: 3    nitroGLYCERIN (NITROSTAT) 0.4 MG SL tablet, Place 1 tablet (0.4 mg total) under the tongue every 5 (five) minutes as needed for Chest pain., Disp: 25  "tablet, Rfl: 3    pantoprazole (PROTONIX) 40 MG tablet, Take 1 tablet (40 mg total) by mouth once daily., Disp: 90 tablet, Rfl: 3    rosuvastatin (CRESTOR) 40 MG Tab, Take 1 tablet (40 mg total) by mouth every evening., Disp: 90 tablet, Rfl: 3    SURE COMFORT PEN NEEDLE 32 gauge x 5/32" Ndle, AS DIRECTED, Disp: , Rfl:     dapagliflozin (FARXIGA) 10 mg tablet, Take 1 tablet (10 mg total) by mouth once daily. (Patient not taking: Reported on 11/17/2022), Disp: 90 tablet, Rfl: 3    tamsulosin (FLOMAX) 0.4 mg Cap, Take by mouth once daily., Disp: , Rfl:       Health Risk Assessment   Fall Risk:  at risk with fall safety instructions given   Obesity: BMI Body mass index is 29.19 kg/m².   Advance Directive: has AD   Depression: PHQ9-  0   HTN: DASH diet, exercise, weight management, med compliance, home BP monitoring, and follow-up discussed.   T2DM: diabetic diet, glucose monitoring, activity level, weight management, med compliance, and follow-up discussed.  STI: not at risk   Statin Use: yes      Health Maintenance   Last eye exam: saw Dr. Lewis 6/24/22   Last CV screen with lipids: 6/7/22   Diabetes screening with fasting glucose or A1c: 10/19/22   Colonoscopy: 11/6/19 Dr. Mendoza -no repeat due to age   Flu Vaccine: declined (states can't take)   Pneumonia vaccines: Pneu 13-11/13/18, Pneu 23-1/28/20   COVID vaccine: (moderna) 1/15/21, 2/21/21   Hep B vaccine: na   DEXA: na   Last PSA screen: advanced age   AAA screening: na   HIV Screening: not at risk  Hepatitis C Screen: na  Low Dose CT Scan: na    Health Maintenance Topics with due status: Not Due       Topic Last Completion Date    TETANUS VACCINE 05/19/2022    Diabetes Urine Screening 05/19/2022    Lipid Panel 06/07/2022    Eye Exam 06/24/2022    Hemoglobin A1c 10/19/2022    Aspirin/Antiplatelet Therapy 11/17/2022     There are no preventive care reminders to display for this patient.      Lab results available in Epic or see dates from [facility] above:   Lab Results "   Component Value Date    CHOL 127 06/07/2022    CHOL 156 04/04/2022    CHOL 188 01/31/2022     Lab Results   Component Value Date    HDL 45 06/07/2022    HDL 42 04/04/2022    HDL 49 01/31/2022     Lab Results   Component Value Date    LDLCALC 69 06/07/2022    LDLCALC 97 04/04/2022    LDLCALC 125 01/31/2022     Lab Results   Component Value Date    TRIG 67 06/07/2022    TRIG 83 04/04/2022    TRIG 70 01/31/2022     Lab Results   Component Value Date    CHOLHDL 2.8 06/07/2022    CHOLHDL 3.7 04/04/2022    CHOLHDL 3.8 01/31/2022       Lab Results   Component Value Date    HGBA1C 8.3 (H) 10/19/2022       Sodium   Date Value Ref Range Status   10/19/2022 132 (L) 136 - 145 mmol/L Final     Potassium   Date Value Ref Range Status   10/19/2022 5.6 (H) 3.5 - 5.1 mmol/L Final     Chloride   Date Value Ref Range Status   10/19/2022 100 98 - 107 mmol/L Final     CO2   Date Value Ref Range Status   10/19/2022 24 21 - 32 mmol/L Final     Glucose   Date Value Ref Range Status   10/19/2022 462 (H) 74 - 106 mg/dL Final     BUN   Date Value Ref Range Status   10/19/2022 30 (H) 7 - 18 mg/dL Final     Creatinine   Date Value Ref Range Status   10/19/2022 1.53 (H) 0.70 - 1.30 mg/dL Final     Calcium   Date Value Ref Range Status   10/19/2022 9.0 8.5 - 10.1 mg/dL Final     Total Protein   Date Value Ref Range Status   08/08/2022 6.3 (L) 6.4 - 8.2 g/dL Final     Albumin   Date Value Ref Range Status   08/08/2022 3.3 (L) 3.5 - 5.0 g/dL Final     Bilirubin, Total   Date Value Ref Range Status   08/08/2022 0.2 0.0 - 1.2 mg/dL Final     Alk Phos   Date Value Ref Range Status   08/08/2022 84 45 - 115 U/L Final     AST   Date Value Ref Range Status   08/08/2022 46 (H) 15 - 37 U/L Final     ALT   Date Value Ref Range Status   08/08/2022 64 (H) 16 - 61 U/L Final     Anion Gap   Date Value Ref Range Status   10/19/2022 14 7 - 16 mmol/L Final     eGFR    Date Value Ref Range Status   03/19/2021 64  Final     eGFR   Date Value Ref  "Range Status   05/19/2022 45 (L) >=60 mL/min/1.73m² Final         Lab Results   Component Value Date    PSA 0.636 03/19/2021        Incontinence  Bowel: no  Bladder: no      Care Team  MAGALIE Trevino FNP -PCP                 Dr. Bridges-cardiology                 Dr. Lewis-optometry    **See Completed Assessments for Annual Wellness visit within the encounter summary    The following assessments were completed & reviewed:  Depression Screening  Cognitive function Screening  Timed Get Up Test  Whisper Test  Vision Screen  Health Risk Assessment  Checklist of ADLs and IADLs        Objective  Vitals:    11/17/22 0922   BP: 132/62   Pulse: 60   Resp: 16   Temp: 97.9 °F (36.6 °C)   TempSrc: Oral   SpO2: 96%   Weight: 87.1 kg (192 lb)   Height: 5' 8" (1.727 m)   PainSc: 0-No pain      Body mass index is 29.19 kg/m².  Ideal body weight: 68.4 kg (150 lb 12.7 oz)       Physical Exam  Vitals and nursing note reviewed.   Constitutional:       General: He is not in acute distress.     Appearance: Normal appearance.   HENT:      Head: Normocephalic.   Eyes:      Conjunctiva/sclera: Conjunctivae normal.   Neck:      Thyroid: No thyromegaly or thyroid tenderness.      Trachea: Trachea normal.   Cardiovascular:      Rate and Rhythm: Normal rate and regular rhythm.      Pulses: Normal pulses.      Heart sounds: Normal heart sounds.   Pulmonary:      Effort: Pulmonary effort is normal. No respiratory distress.      Breath sounds: Normal breath sounds.   Musculoskeletal:      Cervical back: Neck supple.      Right lower leg: No edema.      Left lower leg: No edema.   Lymphadenopathy:      Cervical: No cervical adenopathy.   Skin:     General: Skin is warm and dry.   Neurological:      Mental Status: He is alert and oriented to person, place, and time.   Psychiatric:         Mood and Affect: Mood normal.         Behavior: Behavior normal.         Assessment:     1. Encounter for subsequent annual wellness visit (AWV) in Medicare " patient    2. Essential hypertension    3. Mixed hyperlipidemia    4. Type 2 diabetes mellitus with stage 3a chronic kidney disease, with long-term current use of insulin    5. Coronary artery disease involving native coronary artery of native heart without angina pectoris         Plan:    Discussed and provided with a screening schedule and personal prevention plan in accordance with USPSTF age appropriate recommendations and Medicare screening guidelines.   Education, counseling, and referrals were provided as needed.  After Visit Summary printed and given to patient which includes written education and a list of any referrals if indicated.     Education including diet, exercise, falls, preventive health care for older adults and advanced directives discussed with patient and patient verbalized understanding.      F/u plan for yearly AWV.    Signature: Smiley JIMENEZ

## 2022-11-17 ENCOUNTER — OFFICE VISIT (OUTPATIENT)
Dept: FAMILY MEDICINE | Facility: CLINIC | Age: 82
End: 2022-11-17
Payer: MEDICARE

## 2022-11-17 VITALS
WEIGHT: 192 LBS | HEIGHT: 68 IN | RESPIRATION RATE: 16 BRPM | SYSTOLIC BLOOD PRESSURE: 132 MMHG | BODY MASS INDEX: 29.1 KG/M2 | OXYGEN SATURATION: 96 % | DIASTOLIC BLOOD PRESSURE: 62 MMHG | TEMPERATURE: 98 F | HEART RATE: 60 BPM

## 2022-11-17 DIAGNOSIS — N18.31 TYPE 2 DIABETES MELLITUS WITH STAGE 3A CHRONIC KIDNEY DISEASE, WITH LONG-TERM CURRENT USE OF INSULIN: Chronic | ICD-10-CM

## 2022-11-17 DIAGNOSIS — E11.22 TYPE 2 DIABETES MELLITUS WITH STAGE 3A CHRONIC KIDNEY DISEASE, WITH LONG-TERM CURRENT USE OF INSULIN: Chronic | ICD-10-CM

## 2022-11-17 DIAGNOSIS — Z00.00 ENCOUNTER FOR SUBSEQUENT ANNUAL WELLNESS VISIT (AWV) IN MEDICARE PATIENT: Primary | ICD-10-CM

## 2022-11-17 DIAGNOSIS — I25.10 CORONARY ARTERY DISEASE INVOLVING NATIVE CORONARY ARTERY OF NATIVE HEART WITHOUT ANGINA PECTORIS: ICD-10-CM

## 2022-11-17 DIAGNOSIS — I10 ESSENTIAL HYPERTENSION: Chronic | ICD-10-CM

## 2022-11-17 DIAGNOSIS — R07.9 CHEST PAIN, UNSPECIFIED TYPE: ICD-10-CM

## 2022-11-17 DIAGNOSIS — Z79.4 TYPE 2 DIABETES MELLITUS WITH STAGE 3A CHRONIC KIDNEY DISEASE, WITH LONG-TERM CURRENT USE OF INSULIN: Chronic | ICD-10-CM

## 2022-11-17 DIAGNOSIS — E78.2 MIXED HYPERLIPIDEMIA: ICD-10-CM

## 2022-11-17 PROCEDURE — G0439 PPPS, SUBSEQ VISIT: HCPCS | Mod: ,,, | Performed by: NURSE PRACTITIONER

## 2022-11-17 PROCEDURE — G0439 PR MEDICARE ANNUAL WELLNESS SUBSEQUENT VISIT: ICD-10-PCS | Mod: ,,, | Performed by: NURSE PRACTITIONER

## 2022-11-17 RX ORDER — NITROGLYCERIN 0.4 MG/1
0.4 TABLET SUBLINGUAL EVERY 5 MIN PRN
Qty: 25 TABLET | Refills: 3 | Status: SHIPPED | OUTPATIENT
Start: 2022-11-17 | End: 2023-10-11 | Stop reason: SDUPTHER

## 2022-11-17 RX ORDER — DAPAGLIFLOZIN 10 MG/1
10 TABLET, FILM COATED ORAL DAILY
Qty: 90 TABLET | Refills: 3 | Status: SHIPPED | OUTPATIENT
Start: 2022-11-17 | End: 2023-11-14 | Stop reason: SDUPTHER

## 2022-11-17 NOTE — PATIENT INSTRUCTIONS
*Counseling and Referral of Other Preventative  (Italic type indicates deductible and co-insurance are waived)    Patient Name: Negro Hale  Today's Date: 11/17/2022    Health Maintenance         Date Due Completion Date    Shingles Vaccine (1 of 2) 10/19/2023 (Originally 7/30/1990) ---    Hemoglobin A1c 01/19/2023 10/19/2022    Diabetes Urine Screening 05/19/2023 5/19/2022    Lipid Panel 06/07/2023 6/7/2022    Eye Exam 06/24/2023 6/24/2022 (Done)    Override on 6/24/2022: Done ([facility])    Override on 5/28/2020: Done ([facility]. Scanned into documents.)    Aspirin/Antiplatelet Therapy 10/19/2023 10/19/2022    TETANUS VACCINE 05/19/2032 5/19/2022          No orders of the defined types were placed in this encounter.

## 2022-11-23 ENCOUNTER — TELEPHONE (OUTPATIENT)
Dept: FAMILY MEDICINE | Facility: CLINIC | Age: 82
End: 2022-11-23
Payer: MEDICARE

## 2023-01-23 ENCOUNTER — OFFICE VISIT (OUTPATIENT)
Dept: FAMILY MEDICINE | Facility: CLINIC | Age: 83
End: 2023-01-23
Payer: MEDICARE

## 2023-01-23 VITALS
DIASTOLIC BLOOD PRESSURE: 60 MMHG | RESPIRATION RATE: 20 BRPM | TEMPERATURE: 98 F | WEIGHT: 198.63 LBS | OXYGEN SATURATION: 99 % | HEIGHT: 68 IN | SYSTOLIC BLOOD PRESSURE: 132 MMHG | HEART RATE: 69 BPM | BODY MASS INDEX: 30.1 KG/M2

## 2023-01-23 DIAGNOSIS — N18.31 STAGE 3A CHRONIC KIDNEY DISEASE: Chronic | ICD-10-CM

## 2023-01-23 DIAGNOSIS — N18.31 TYPE 2 DIABETES MELLITUS WITH STAGE 3A CHRONIC KIDNEY DISEASE, WITH LONG-TERM CURRENT USE OF INSULIN: Primary | Chronic | ICD-10-CM

## 2023-01-23 DIAGNOSIS — E11.22 TYPE 2 DIABETES MELLITUS WITH STAGE 3A CHRONIC KIDNEY DISEASE, WITH LONG-TERM CURRENT USE OF INSULIN: Primary | Chronic | ICD-10-CM

## 2023-01-23 DIAGNOSIS — I73.9 PERIPHERAL VASCULAR DISEASE: ICD-10-CM

## 2023-01-23 DIAGNOSIS — I10 ESSENTIAL HYPERTENSION: Chronic | ICD-10-CM

## 2023-01-23 DIAGNOSIS — Z79.4 TYPE 2 DIABETES MELLITUS WITH STAGE 3A CHRONIC KIDNEY DISEASE, WITH LONG-TERM CURRENT USE OF INSULIN: Primary | Chronic | ICD-10-CM

## 2023-01-23 PROBLEM — E78.00 HYPERCHOLESTEROLEMIA: Chronic | Status: ACTIVE | Noted: 2021-03-17

## 2023-01-23 PROBLEM — I25.10 CORONARY ARTERY DISEASE INVOLVING NATIVE CORONARY ARTERY OF NATIVE HEART WITHOUT ANGINA PECTORIS: Chronic | Status: ACTIVE | Noted: 2021-03-17

## 2023-01-23 PROBLEM — N40.0 BENIGN PROSTATIC HYPERPLASIA: Chronic | Status: ACTIVE | Noted: 2021-03-17

## 2023-01-23 LAB
ANION GAP SERPL CALCULATED.3IONS-SCNC: 8 MMOL/L (ref 7–16)
BUN SERPL-MCNC: 28 MG/DL (ref 7–18)
BUN/CREAT SERPL: 19 (ref 6–20)
CALCIUM SERPL-MCNC: 9 MG/DL (ref 8.5–10.1)
CHLORIDE SERPL-SCNC: 109 MMOL/L (ref 98–107)
CO2 SERPL-SCNC: 28 MMOL/L (ref 21–32)
CREAT SERPL-MCNC: 1.47 MG/DL (ref 0.7–1.3)
EGFR (NO RACE VARIABLE) (RUSH/TITUS): 47 ML/MIN/1.73M²
EST. AVERAGE GLUCOSE BLD GHB EST-MCNC: 174 MG/DL
GLUCOSE SERPL-MCNC: 125 MG/DL (ref 74–106)
HBA1C MFR BLD HPLC: 7.8 % (ref 4.5–6.6)
POTASSIUM SERPL-SCNC: 5.8 MMOL/L (ref 3.5–5.1)
SODIUM SERPL-SCNC: 139 MMOL/L (ref 136–145)

## 2023-01-23 PROCEDURE — 99214 OFFICE O/P EST MOD 30 MIN: CPT | Mod: ,,, | Performed by: NURSE PRACTITIONER

## 2023-01-23 PROCEDURE — 83036 HEMOGLOBIN GLYCOSYLATED A1C: CPT | Mod: ,,, | Performed by: CLINICAL MEDICAL LABORATORY

## 2023-01-23 PROCEDURE — 80048 BASIC METABOLIC PANEL: ICD-10-PCS | Mod: ,,, | Performed by: CLINICAL MEDICAL LABORATORY

## 2023-01-23 PROCEDURE — 99214 PR OFFICE/OUTPT VISIT, EST, LEVL IV, 30-39 MIN: ICD-10-PCS | Mod: ,,, | Performed by: NURSE PRACTITIONER

## 2023-01-23 PROCEDURE — 83036 HEMOGLOBIN A1C: ICD-10-PCS | Mod: ,,, | Performed by: CLINICAL MEDICAL LABORATORY

## 2023-01-23 PROCEDURE — 80048 BASIC METABOLIC PNL TOTAL CA: CPT | Mod: ,,, | Performed by: CLINICAL MEDICAL LABORATORY

## 2023-01-23 NOTE — PROGRESS NOTES
[facility]       PATIENT NAME: Negro Hale   : 1940    AGE: 82 y.o. DATE OF ENCOUNTER: 23    MRN: [id number]      PCP: BARBARA Green    Reason for Visit / Chief Complaint:  Diabetes and Follow-up (Patient presents to clinic for 3 month follow up of DM)         274}    Subjective:     HPI:    Presents for 3 mth f/u uncontrolled T2DM & HTN.    Review of Systems:   Review of Systems   Constitutional: Negative.    HENT: Negative.     Eyes: Negative.    Respiratory: Negative.     Cardiovascular: Negative.    Gastrointestinal: Negative.    Endocrine: Negative.    Genitourinary: Negative.    Musculoskeletal: Negative.    Skin: Negative.    Allergic/Immunologic: Negative.    Neurological: Negative.    Hematological: Negative.    Psychiatric/Behavioral: Negative.       Allergies and Meds: 274}     Review of patient's allergies indicates:   Allergen Reactions    Mayonnaise      Upset stomach        Current Outpatient Medications:     cholecalciferol, vitamin D3, 125 mcg (5,000 unit) capsule, Take 5,000 Units by mouth once daily., Disp: , Rfl:     clopidogreL (PLAVIX) 75 mg tablet, Take 1 tablet (75 mg total) by mouth once daily., Disp: 90 tablet, Rfl: 3    coenzyme Q10 200 mg capsule, Take 200 mg by mouth once daily., Disp: , Rfl:     ezetimibe (ZETIA) 10 mg tablet, Take 1 tablet (10 mg total) by mouth once daily., Disp: 90 tablet, Rfl: 3    insulin glargine (LANTUS U-100 INSULIN) 100 unit/mL injection, Inject 80 Units into the skin every evening. (Patient taking differently: Inject 70 Units into the skin every evening.), Disp: 5 each, Rfl: 2    metoprolol succinate (TOPROL-XL) 50 MG 24 hr tablet, Take 1 tablet (50 mg total) by mouth once daily., Disp: 90 tablet, Rfl: 3    nitroGLYCERIN (NITROSTAT) 0.4 MG SL tablet, Place 1 tablet (0.4 mg total) under the tongue every 5 (five) minutes as needed for Chest pain., Disp: 25 tablet, Rfl: 3    rosuvastatin (CRESTOR)  "40 MG Tab, Take 1 tablet (40 mg total) by mouth every evening., Disp: 90 tablet, Rfl: 3    SURE COMFORT PEN NEEDLE 32 gauge x 5/32" Ndle, AS DIRECTED, Disp: , Rfl:     dapagliflozin (FARXIGA) 10 mg tablet, Take 1 tablet (10 mg total) by mouth once daily. (Patient not taking: Reported on 1/23/2023), Disp: 90 tablet, Rfl: 3    Labs:274}    I have reviewed old labs below:  Lab Results   Component Value Date    WBC 8.76 10/19/2022    RBC 4.54 (L) 10/19/2022    HGB 13.6 10/19/2022    HCT 41.5 10/19/2022     10/19/2022     (L) 10/19/2022    K 5.6 (H) 10/19/2022     10/19/2022    CALCIUM 9.0 10/19/2022     (H) 10/19/2022    BUN 30 (H) 10/19/2022    CREATININE 1.53 (H) 10/19/2022    ESTGFRAFRICA 64 03/19/2021    EGFRNONAA 45 (L) 05/19/2022    ALT 64 (H) 08/08/2022    AST 46 (H) 08/08/2022    INR 1.02 06/19/2020    CHOL 127 06/07/2022    TRIG 67 06/07/2022    HDL 45 06/07/2022    LDLCALC 69 06/07/2022    TSH 2.880 12/21/2021    PSA 0.636 03/19/2021    HGBA1C 8.3 (H) 10/19/2022    MICROALBUR 1.4 05/19/2022       Medical History: 274}     Past Medical History:   Diagnosis Date    Abnormal thyroid stimulating hormone (TSH) level 08/22/2019    Anemia 08/15/2019    Atrial fibrillation 09/07/2012    Bradycardia 05/23/2017    asymptomatic    Change in bowel habit 11/13/2018    Chronic kidney disease, unspecified 01/14/2019    Coronary arteriosclerosis 09/07/2012    Disease of skin and subcutaneous tissue 08/16/2017    medial aspect RLE- cystic structure seen on venous doppler US.    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    Hyperlipidemia 09/21/2012    Lower extremity edema 04/06/2017    Lumbar radiculopathy 01/26/2017    Obesity, unspecified 12/05/2016    Old myocardial " "infarction 12/06/2017    Other secondary pulmonary hypertension 05/23/2017    Other spondylosis, lumbosacral region 01/26/2017    Peripheral vascular disease     Personal history of tobacco use, presenting hazards to health 11/17/2016    Pulmonary hypertension     Type 2 diabetes mellitus with chronic kidney disease 01/14/2019    Type 2 diabetes mellitus with hyperglycemia 10/02/2011    Varicose veins of both lower extremities with pain 01/02/2019    Venous insufficiency 01/08/2019      Social History     Tobacco Use   Smoking Status Former   Smokeless Tobacco Never   Tobacco Comments    Quit 10/15/1976      Past Surgical History:   Procedure Laterality Date    APPENDECTOMY      CARDIAC CATHETERIZATION  2007    [facility]- Stent placement    CATARACT EXTRACTION, BILATERAL      COLONOSCOPY  11/06/2019    Dr. Mendoza    HERNIA REPAIR      Inguinal hernia repair    TONSILLECTOMY          Health Maintenance: 274}     Health Maintenance         Date Due Completion Date    Hemoglobin A1c 01/19/2023 10/19/2022    Shingles Vaccine (1 of 2) 10/19/2023 (Originally 7/30/1990) ---    Diabetes Urine Screening 05/19/2023 5/19/2022    Lipid Panel 06/07/2023 6/7/2022    Eye Exam 06/24/2023 6/24/2022 (Done)    Override on 6/24/2022: Done ([facility])    Override on 5/28/2020: Done ([facility]. Scanned into documents.)    Aspirin/Antiplatelet Therapy 11/17/2023 11/17/2022    TETANUS VACCINE 05/19/2032 5/19/2022            Objective:  274}   /60 (BP Location: Left arm, Patient Position: Sitting, BP Method: Large (Manual))   Pulse 69   Temp 97.9 °F (36.6 °C) (Oral)   Resp 20   Ht 5' 8" (1.727 m)   Wt 90.1 kg (198 lb 9.6 oz)   SpO2 99%   BMI 30.20 kg/m²   Wt Readings from Last 3 Encounters:   01/23/23 90.1 kg (198 lb 9.6 oz)   11/17/22 87.1 kg (192 lb)   10/19/22 87.5 kg (193 lb)     BP Readings from Last 3 Encounters:   01/23/23 132/60   11/17/22 132/62   10/19/22 120/64     Body mass index is 30.2 " kg/m².     Physical Exam  Vitals and nursing note reviewed.   Constitutional:       General: He is not in acute distress.     Appearance: Normal appearance.   HENT:      Head: Normocephalic.   Eyes:      Conjunctiva/sclera: Conjunctivae normal.   Neck:      Thyroid: No thyromegaly or thyroid tenderness.      Trachea: Trachea normal.   Cardiovascular:      Rate and Rhythm: Normal rate and regular rhythm.      Pulses: Normal pulses.      Heart sounds: Normal heart sounds.   Pulmonary:      Effort: Pulmonary effort is normal. No respiratory distress.      Breath sounds: Normal breath sounds.   Musculoskeletal:      Cervical back: Neck supple.      Right lower leg: No edema.      Left lower leg: No edema.   Lymphadenopathy:      Cervical: No cervical adenopathy.   Skin:     General: Skin is warm and dry.   Neurological:      Mental Status: He is alert and oriented to person, place, and time.   Psychiatric:         Mood and Affect: Mood normal.         Behavior: Behavior normal.        Assessment and Plan: 274}     1. Type 2 diabetes mellitus with stage 3a chronic kidney disease, with long-term current use of insulin  Assessment & Plan:  Uncontrolled.  Checking glucose once per day - running 130s in the morning and less than 200 in evening.  Last A1c 8.3%, will recheck today.  Has only been taking Lantus 70 units daily.  Encouraged to start Farxiga 10 mg dly which also offers benefit with CKD & prevention of CHF.  Will decrease insulin if needed to prevent hypoglycemia.    Orders:  -     Basic Metabolic Panel; Future; Expected date: 01/23/2023  -     Hemoglobin A1C; Future; Expected date: 01/23/2023    2. Stage 3a chronic kidney disease  Assessment & Plan:  Chronic, stable    Orders:  -     Basic Metabolic Panel; Future; Expected date: 01/23/2023    3. Essential hypertension  Assessment & Plan:  The current medical regimen is effective;  continue present plan and medications.      4. Peripheral vascular  disease  Assessment & Plan:  Chronic, stable.      5. BMI 30.0-30.9,adult  Assessment & Plan:  Discussed need for healthy diet, regular exercise, and weight control.          Lab results reviewed with patient and plan for today or future lab studies discussed.  Will notify if any abnormal results or treatment changes are needed.     Return to clinic 3 mths for f/u uncontrolled T2DM w/ fasting labs; and sooner as needed.    Future Appointments   Date Time Provider Department Center   3/2/2023 11:00 AM CHATO Persaud [facility] CARD [facility]   5/8/2023 10:40 AM BARBARA Green [facility] DAHIANA Bright   11/30/2023  9:00 AM AWJIM NURSE, [facility] FAMILY MEDICINE [facility] DAHIANA Bright        Signature:  BARBARA Green        Reviewed 3/10/2023 by Thaddeus Macias MD

## 2023-01-23 NOTE — ASSESSMENT & PLAN NOTE
Uncontrolled.  Checking glucose once per day - running 130s in the morning and less than 200 in evening.  Last A1c 8.3%, will recheck today.  Has only been taking Lantus 70 units daily.  Encouraged to start Farxiga 10 mg dly which also offers benefit with CKD & prevention of CHF.  Will decrease insulin if needed to prevent hypoglycemia.

## 2023-01-24 ENCOUNTER — TELEPHONE (OUTPATIENT)
Dept: FAMILY MEDICINE | Facility: CLINIC | Age: 83
End: 2023-01-24
Payer: MEDICARE

## 2023-01-24 DIAGNOSIS — E11.22 TYPE 2 DIABETES MELLITUS WITH STAGE 3A CHRONIC KIDNEY DISEASE, WITH LONG-TERM CURRENT USE OF INSULIN: ICD-10-CM

## 2023-01-24 DIAGNOSIS — Z79.4 TYPE 2 DIABETES MELLITUS WITH STAGE 3A CHRONIC KIDNEY DISEASE, WITH LONG-TERM CURRENT USE OF INSULIN: ICD-10-CM

## 2023-01-24 DIAGNOSIS — N18.31 TYPE 2 DIABETES MELLITUS WITH STAGE 3A CHRONIC KIDNEY DISEASE, WITH LONG-TERM CURRENT USE OF INSULIN: ICD-10-CM

## 2023-01-24 RX ORDER — INSULIN GLARGINE 100 [IU]/ML
70 INJECTION, SOLUTION SUBCUTANEOUS NIGHTLY
Qty: 63 ML | Refills: 3 | Status: SHIPPED | OUTPATIENT
Start: 2023-01-24 | End: 2023-11-14 | Stop reason: SDUPTHER

## 2023-01-24 NOTE — TELEPHONE ENCOUNTER
----- Message from Cesilia Wood sent at 1/24/2023 11:15 AM CST -----  Pt need refill on his insulin glargine (LANTUS U-100 INSULIN) 100 unit/mL injection Sent to [facility]  Pt # [phone]

## 2023-01-24 NOTE — PROGRESS NOTES
Stable CKD.  Needs to increase water intake.  A1c improved slightly.  Remind him to let us know if any low blood sugar so we can decrease his Lantus when he starts taking Farxiga 10 mg daily.

## 2023-02-28 ENCOUNTER — TELEPHONE (OUTPATIENT)
Dept: FAMILY MEDICINE | Facility: CLINIC | Age: 83
End: 2023-02-28
Payer: MEDICARE

## 2023-02-28 DIAGNOSIS — I10 ESSENTIAL HYPERTENSION: Chronic | ICD-10-CM

## 2023-02-28 NOTE — TELEPHONE ENCOUNTER
----- Message from Damian Johnson sent at 2/28/2023 11:11 AM CST -----  PT SAID THAT HE NEEDS ALL HIS MEDS ???? (HE WOULDN'T SAY)SENT TO [facility] -[phone]

## 2023-03-01 DIAGNOSIS — I10 ESSENTIAL HYPERTENSION: Chronic | ICD-10-CM

## 2023-03-01 DIAGNOSIS — Z95.5 HISTORY OF CORONARY ARTERY STENT PLACEMENT: ICD-10-CM

## 2023-03-01 DIAGNOSIS — I25.10 CORONARY ARTERY DISEASE INVOLVING NATIVE CORONARY ARTERY OF NATIVE HEART WITHOUT ANGINA PECTORIS: Chronic | ICD-10-CM

## 2023-03-01 RX ORDER — CLOPIDOGREL BISULFATE 75 MG/1
75 TABLET ORAL DAILY
Qty: 90 TABLET | Refills: 3 | Status: SHIPPED | OUTPATIENT
Start: 2023-03-01 | End: 2023-07-24 | Stop reason: SDUPTHER

## 2023-03-01 RX ORDER — METOPROLOL SUCCINATE 50 MG/1
50 TABLET, EXTENDED RELEASE ORAL DAILY
Qty: 90 TABLET | Refills: 3 | Status: SHIPPED | OUTPATIENT
Start: 2023-03-01 | End: 2023-11-14 | Stop reason: SDUPTHER

## 2023-03-16 ENCOUNTER — TELEPHONE (OUTPATIENT)
Dept: CARDIOLOGY | Facility: CLINIC | Age: 83
End: 2023-03-16
Payer: MEDICARE

## 2023-03-16 NOTE — TELEPHONE ENCOUNTER
Ana at Dr. Woodard's office called asking if pt could hold Plavix for a tooth extraction.     Per Breanna, okay to hold 5-7 days before procedure and restart ASAP.     I spoke with Ana at Dr. Woodard's office and she verbalized understanding.

## 2023-04-13 ENCOUNTER — OFFICE VISIT (OUTPATIENT)
Dept: FAMILY MEDICINE | Facility: CLINIC | Age: 83
End: 2023-04-13
Payer: MEDICARE

## 2023-04-13 VITALS
HEIGHT: 68 IN | OXYGEN SATURATION: 95 % | SYSTOLIC BLOOD PRESSURE: 120 MMHG | WEIGHT: 186 LBS | DIASTOLIC BLOOD PRESSURE: 69 MMHG | BODY MASS INDEX: 28.19 KG/M2 | HEART RATE: 66 BPM | TEMPERATURE: 98 F | RESPIRATION RATE: 20 BRPM

## 2023-04-13 DIAGNOSIS — J06.9 UPPER RESPIRATORY INFECTION WITH COUGH AND CONGESTION: Primary | ICD-10-CM

## 2023-04-13 DIAGNOSIS — Z79.4 TYPE 2 DIABETES MELLITUS WITH STAGE 3A CHRONIC KIDNEY DISEASE, WITH LONG-TERM CURRENT USE OF INSULIN: Chronic | ICD-10-CM

## 2023-04-13 DIAGNOSIS — E11.22 TYPE 2 DIABETES MELLITUS WITH STAGE 3A CHRONIC KIDNEY DISEASE, WITH LONG-TERM CURRENT USE OF INSULIN: Chronic | ICD-10-CM

## 2023-04-13 DIAGNOSIS — N18.31 TYPE 2 DIABETES MELLITUS WITH STAGE 3A CHRONIC KIDNEY DISEASE, WITH LONG-TERM CURRENT USE OF INSULIN: Chronic | ICD-10-CM

## 2023-04-13 LAB
CTP QC/QA: YES
FLUAV AG NPH QL: NEGATIVE
FLUBV AG NPH QL: NEGATIVE
SARS-COV-2 AG RESP QL IA.RAPID: NEGATIVE

## 2023-04-13 PROCEDURE — 99213 OFFICE O/P EST LOW 20 MIN: CPT | Mod: ,,, | Performed by: NURSE PRACTITIONER

## 2023-04-13 PROCEDURE — 99213 PR OFFICE/OUTPT VISIT, EST, LEVL III, 20-29 MIN: ICD-10-PCS | Mod: ,,, | Performed by: NURSE PRACTITIONER

## 2023-04-13 PROCEDURE — 87428 SARSCOV & INF VIR A&B AG IA: CPT | Mod: RHCUB | Performed by: NURSE PRACTITIONER

## 2023-04-13 RX ORDER — BENZONATATE 200 MG/1
200 CAPSULE ORAL 3 TIMES DAILY PRN
Qty: 30 CAPSULE | Refills: 0 | Status: SHIPPED | OUTPATIENT
Start: 2023-04-13 | End: 2023-05-08

## 2023-04-13 RX ORDER — AZELASTINE 1 MG/ML
1 SPRAY, METERED NASAL 2 TIMES DAILY
Qty: 30 ML | Refills: 2 | Status: SHIPPED | OUTPATIENT
Start: 2023-04-13 | End: 2023-08-10

## 2023-04-13 NOTE — PROGRESS NOTES
"[facility] FAMILY MEDICINE       PATIENT NAME: Negro Hale   : 1940    AGE: 82 y.o. DATE OF ENCOUNTER: 23   MRN: [id number]      Visit type: WALK-IN  PCP:  BARBARA Geren    Reason for Visit / Chief Complaint: Cough (Patient presents to the clinic complaints of cough and sore throat for 3 days)     Subjective:     Presents c/o upper resp s/s x 5 days.  Had a cold x 2 days then after sitting outside in the wind he developed a cough 3 days ago. Denies SOB or fever.  Feels a little better today.  Took some OTC nasal spray and hasn't taken anything for cough.     T2DM not controlled - reports glucose has been okay "depending on what he eats", doesn't have a glucose log    Review of Systems:     Review of Systems   Constitutional:  Positive for fatigue. Negative for chills and fever.   HENT:  Positive for congestion, postnasal drip, rhinorrhea and sore throat. Negative for ear pain.    Respiratory:  Positive for cough. Negative for shortness of breath and wheezing.    Cardiovascular: Negative.    Skin: Negative.    Neurological: Negative.      Allergies and Meds:     Review of patient's allergies indicates:   Allergen Reactions    Mayonnaise      Upset stomach        Current Outpatient Medications:     cholecalciferol, vitamin D3, 125 mcg (5,000 unit) capsule, Take 5,000 Units by mouth once daily., Disp: , Rfl:     clopidogreL (PLAVIX) 75 mg tablet, Take 1 tablet (75 mg total) by mouth once daily., Disp: 90 tablet, Rfl: 3    coenzyme Q10 200 mg capsule, Take 200 mg by mouth once daily., Disp: , Rfl:     dapagliflozin (FARXIGA) 10 mg tablet, Take 1 tablet (10 mg total) by mouth once daily., Disp: 90 tablet, Rfl: 3    ezetimibe (ZETIA) 10 mg tablet, Take 1 tablet (10 mg total) by mouth once daily., Disp: 90 tablet, Rfl: 3    insulin glargine (LANTUS U-100 INSULIN) 100 unit/mL injection, Inject 70 Units into the skin every evening. (Patient taking differently: Inject 50 Units into " "the skin every evening.), Disp: 63 mL, Rfl: 3    metoprolol succinate (TOPROL-XL) 50 MG 24 hr tablet, Take 1 tablet (50 mg total) by mouth once daily., Disp: 90 tablet, Rfl: 3    nitroGLYCERIN (NITROSTAT) 0.4 MG SL tablet, Place 1 tablet (0.4 mg total) under the tongue every 5 (five) minutes as needed for Chest pain., Disp: 25 tablet, Rfl: 3    rosuvastatin (CRESTOR) 40 MG Tab, Take 1 tablet (40 mg total) by mouth every evening., Disp: 90 tablet, Rfl: 3    SURE COMFORT PEN NEEDLE 32 gauge x 5/32" Ndle, AS DIRECTED, Disp: , Rfl:     azelastine (ASTELIN) 137 mcg (0.1 %) nasal spray, 1 spray (137 mcg total) by Nasal route 2 (two) times daily., Disp: 30 mL, Rfl: 2    benzonatate (TESSALON) 200 MG capsule, Take 1 capsule (200 mg total) by mouth 3 (three) times daily as needed for Cough., Disp: 30 capsule, Rfl: 0    Medical History:     Past Medical History:   Diagnosis Date    Abnormal thyroid stimulating hormone (TSH) level 08/22/2019    Anemia 08/15/2019    Atrial fibrillation 09/07/2012    Bradycardia 05/23/2017    asymptomatic    Change in bowel habit 11/13/2018    Chronic kidney disease, unspecified 01/14/2019    Coronary arteriosclerosis 09/07/2012    Disease of skin and subcutaneous tissue 08/16/2017    medial aspect RLE- cystic structure seen on venous doppler US.    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    Hyperlipidemia 09/21/2012    Lower extremity edema 04/06/2017    Lumbar radiculopathy 01/26/2017    Obesity, unspecified 12/05/2016    Old myocardial infarction 12/06/2017    Other secondary pulmonary hypertension 05/23/2017    Other spondylosis, lumbosacral region 01/26/2017    Peripheral vascular disease     Personal history of tobacco use, presenting hazards to health 11/17/2016 " "   Pulmonary hypertension     Type 2 diabetes mellitus with chronic kidney disease 01/14/2019    Type 2 diabetes mellitus with hyperglycemia 10/02/2011    Varicose veins of both lower extremities with pain 01/02/2019    Venous insufficiency 01/08/2019      Social History     Tobacco Use   Smoking Status Former   Smokeless Tobacco Never   Tobacco Comments    Quit 10/15/1976      Objective:     Wt Readings from Last 3 Encounters:   04/13/23 1039 84.4 kg (186 lb)   01/23/23 1021 90.1 kg (198 lb 9.6 oz)   11/17/22 0922 87.1 kg (192 lb)       /69 (BP Location: Right arm, Patient Position: Sitting, BP Method: Large (Manual))   Pulse 66   Temp 97.7 °F (36.5 °C) (Oral)   Resp 20   Ht 5' 8" (1.727 m)   Wt 84.4 kg (186 lb)   SpO2 95%   BMI 28.28 kg/m²   Body mass index is 28.28 kg/m².     Physical Exam:    Physical Exam  Vitals and nursing note reviewed.   Constitutional:       General: He is not in acute distress.     Appearance: Normal appearance.   HENT:      Head: Normocephalic.      Right Ear: Hearing, tympanic membrane, ear canal and external ear normal.      Left Ear: Hearing, tympanic membrane, ear canal and external ear normal.      Nose: Congestion and rhinorrhea present. Rhinorrhea is clear.      Right Turbinates: Swollen.      Left Turbinates: Swollen.      Right Sinus: No maxillary sinus tenderness or frontal sinus tenderness.      Left Sinus: No maxillary sinus tenderness or frontal sinus tenderness.      Mouth/Throat:      Lips: Pink.      Mouth: Mucous membranes are moist.      Tongue: No lesions.      Pharynx: Uvula midline. No pharyngeal swelling, oropharyngeal exudate, posterior oropharyngeal erythema (injected, PND) or uvula swelling.   Eyes:      Conjunctiva/sclera: Conjunctivae normal.      Pupils: Pupils are equal, round, and reactive to light.   Cardiovascular:      Rate and Rhythm: Normal rate and regular rhythm.      Heart sounds: Normal heart sounds.   Pulmonary:      Effort: Pulmonary " effort is normal. No respiratory distress.      Breath sounds: Normal breath sounds. No wheezing, rhonchi or rales.      Comments: No cough noted during visit.  Musculoskeletal:      Cervical back: No rigidity.   Lymphadenopathy:      Cervical: No cervical adenopathy.   Skin:     General: Skin is warm and dry.   Neurological:      General: No focal deficit present.      Mental Status: He is alert and oriented to person, place, and time.       Assessment and Plan:        1. Upper respiratory infection with cough and congestion  -     POCT SARS-COV2 (COVID) with Flu Antigen  -     azelastine (ASTELIN) 137 mcg (0.1 %) nasal spray; 1 spray (137 mcg total) by Nasal route 2 (two) times daily.  Dispense: 30 mL; Refill: 2  -     benzonatate (TESSALON) 200 MG capsule; Take 1 capsule (200 mg total) by mouth 3 (three) times daily as needed for Cough.  Dispense: 30 capsule; Refill: 0    2. Type 2 diabetes mellitus with stage 3a chronic kidney disease, with long-term current use of insulin  Comments:  uncontrolled, encouraged continued diet changes and weight loss, glucose monitoring up to 3x/day due to patient on insulin, continue current meds       Office Visit on 04/13/2023   Component Date Value Ref Range Status    SARS Coronavirus 2 Antigen 04/13/2023 Negative  Negative Final    Rapid Influenza A Ag 04/13/2023 Negative  Negative Final    Rapid Influenza B Ag 04/13/2023 Negative  Negative Final     Acceptable 04/13/2023 Yes   Final        Discussed diagnosis and treatment of URI.  Discussed the importance of avoiding unnecessary antibiotic therapy.  Nasal steroids per orders.  Increase water intake, encouraged deep breathing and cough, monitor for worsening and f/u for persistent cough, fever, SOB, or other concerns.    F/u as needed or if symptoms worsen or persist.  Future Appointments   Date Time Provider Department Center   5/8/2023 10:40 AM BARBARA Green [facility]   11/30/2023   9:00 AM AWV NURSE, [facility] FAMILY MEDICINE [facility] DAHIANA Bright       Signature: Smiley Trevino FNP-BC

## 2023-04-25 DIAGNOSIS — K21.9 GASTROESOPHAGEAL REFLUX DISEASE WITHOUT ESOPHAGITIS: Primary | ICD-10-CM

## 2023-04-25 RX ORDER — PANTOPRAZOLE SODIUM 40 MG/1
40 TABLET, DELAYED RELEASE ORAL DAILY
Qty: 90 TABLET | Refills: 3 | Status: SHIPPED | OUTPATIENT
Start: 2023-04-25 | End: 2023-08-22

## 2023-04-25 NOTE — TELEPHONE ENCOUNTER
----- Message from Cesilia Wood sent at 4/25/2023  1:30 PM CDT -----  Pt Need refill on [facility]op[facility]

## 2023-05-08 ENCOUNTER — OFFICE VISIT (OUTPATIENT)
Dept: FAMILY MEDICINE | Facility: CLINIC | Age: 83
End: 2023-05-08
Payer: MEDICARE

## 2023-05-08 VITALS
WEIGHT: 181 LBS | BODY MASS INDEX: 27.43 KG/M2 | OXYGEN SATURATION: 97 % | HEART RATE: 54 BPM | DIASTOLIC BLOOD PRESSURE: 78 MMHG | SYSTOLIC BLOOD PRESSURE: 120 MMHG | RESPIRATION RATE: 20 BRPM | HEIGHT: 68 IN | TEMPERATURE: 98 F

## 2023-05-08 DIAGNOSIS — Z79.899 ENCOUNTER FOR LONG-TERM (CURRENT) USE OF OTHER MEDICATIONS: ICD-10-CM

## 2023-05-08 DIAGNOSIS — E11.22 TYPE 2 DIABETES MELLITUS WITH STAGE 3A CHRONIC KIDNEY DISEASE, WITH LONG-TERM CURRENT USE OF INSULIN: Primary | Chronic | ICD-10-CM

## 2023-05-08 DIAGNOSIS — I10 ESSENTIAL HYPERTENSION: Chronic | ICD-10-CM

## 2023-05-08 DIAGNOSIS — I25.10 CORONARY ARTERY DISEASE INVOLVING NATIVE CORONARY ARTERY OF NATIVE HEART WITHOUT ANGINA PECTORIS: Chronic | ICD-10-CM

## 2023-05-08 DIAGNOSIS — Z79.4 TYPE 2 DIABETES MELLITUS WITH STAGE 3A CHRONIC KIDNEY DISEASE, WITH LONG-TERM CURRENT USE OF INSULIN: Primary | Chronic | ICD-10-CM

## 2023-05-08 DIAGNOSIS — N18.31 TYPE 2 DIABETES MELLITUS WITH STAGE 3A CHRONIC KIDNEY DISEASE, WITH LONG-TERM CURRENT USE OF INSULIN: Primary | Chronic | ICD-10-CM

## 2023-05-08 DIAGNOSIS — E78.00 HYPERCHOLESTEROLEMIA: Chronic | ICD-10-CM

## 2023-05-08 LAB
ALBUMIN SERPL BCP-MCNC: 3.8 G/DL (ref 3.5–5)
ALBUMIN/GLOB SERPL: 1.2 {RATIO}
ALP SERPL-CCNC: 79 U/L (ref 45–115)
ALT SERPL W P-5'-P-CCNC: 107 U/L (ref 16–61)
ANION GAP SERPL CALCULATED.3IONS-SCNC: 6 MMOL/L (ref 7–16)
AST SERPL W P-5'-P-CCNC: 88 U/L (ref 15–37)
BILIRUB SERPL-MCNC: 0.4 MG/DL (ref ?–1.2)
BUN SERPL-MCNC: 39 MG/DL (ref 7–18)
BUN/CREAT SERPL: 28 (ref 6–20)
CALCIUM SERPL-MCNC: 9.1 MG/DL (ref 8.5–10.1)
CHLORIDE SERPL-SCNC: 104 MMOL/L (ref 98–107)
CHOLEST SERPL-MCNC: 169 MG/DL (ref 0–200)
CHOLEST/HDLC SERPL: 3.5 {RATIO}
CO2 SERPL-SCNC: 31 MMOL/L (ref 21–32)
CREAT SERPL-MCNC: 1.37 MG/DL (ref 0.7–1.3)
CREAT UR-MCNC: 116 MG/DL (ref 39–259)
EGFR (NO RACE VARIABLE) (RUSH/TITUS): 51 ML/MIN/1.73M2
EST. AVERAGE GLUCOSE BLD GHB EST-MCNC: 320 MG/DL
GLOBULIN SER-MCNC: 3.1 G/DL (ref 2–4)
GLUCOSE SERPL-MCNC: 114 MG/DL (ref 74–106)
HBA1C MFR BLD HPLC: 12.2 % (ref 4.5–6.6)
HDLC SERPL-MCNC: 48 MG/DL (ref 40–60)
LDLC SERPL CALC-MCNC: 101 MG/DL
LDLC/HDLC SERPL: 2.1 {RATIO}
MICROALBUMIN UR-MCNC: 3.2 MG/DL (ref 0–2.8)
MICROALBUMIN/CREAT RATIO PNL UR: 27.6 MG/G (ref 0–30)
NONHDLC SERPL-MCNC: 121 MG/DL
POTASSIUM SERPL-SCNC: 4.6 MMOL/L (ref 3.5–5.1)
PROT SERPL-MCNC: 6.9 G/DL (ref 6.4–8.2)
SODIUM SERPL-SCNC: 136 MMOL/L (ref 136–145)
TRIGL SERPL-MCNC: 102 MG/DL (ref 35–150)
VLDLC SERPL-MCNC: 20 MG/DL

## 2023-05-08 PROCEDURE — 82043 MICROALBUMIN / CREATININE RATIO URINE: ICD-10-PCS | Mod: ,,, | Performed by: CLINICAL MEDICAL LABORATORY

## 2023-05-08 PROCEDURE — 82570 ASSAY OF URINE CREATININE: CPT | Mod: ,,, | Performed by: CLINICAL MEDICAL LABORATORY

## 2023-05-08 PROCEDURE — 99214 PR OFFICE/OUTPT VISIT, EST, LEVL IV, 30-39 MIN: ICD-10-PCS | Mod: ,,, | Performed by: NURSE PRACTITIONER

## 2023-05-08 PROCEDURE — 80053 COMPREHEN METABOLIC PANEL: CPT | Mod: ,,, | Performed by: CLINICAL MEDICAL LABORATORY

## 2023-05-08 PROCEDURE — 80061 LIPID PANEL: CPT | Mod: ,,, | Performed by: CLINICAL MEDICAL LABORATORY

## 2023-05-08 PROCEDURE — 80053 COMPREHENSIVE METABOLIC PANEL: ICD-10-PCS | Mod: ,,, | Performed by: CLINICAL MEDICAL LABORATORY

## 2023-05-08 PROCEDURE — 82043 UR ALBUMIN QUANTITATIVE: CPT | Mod: ,,, | Performed by: CLINICAL MEDICAL LABORATORY

## 2023-05-08 PROCEDURE — 83036 HEMOGLOBIN A1C: ICD-10-PCS | Mod: ,,, | Performed by: CLINICAL MEDICAL LABORATORY

## 2023-05-08 PROCEDURE — 82570 MICROALBUMIN / CREATININE RATIO URINE: ICD-10-PCS | Mod: ,,, | Performed by: CLINICAL MEDICAL LABORATORY

## 2023-05-08 PROCEDURE — 80061 LIPID PANEL: ICD-10-PCS | Mod: ,,, | Performed by: CLINICAL MEDICAL LABORATORY

## 2023-05-08 PROCEDURE — 83036 HEMOGLOBIN GLYCOSYLATED A1C: CPT | Mod: ,,, | Performed by: CLINICAL MEDICAL LABORATORY

## 2023-05-08 PROCEDURE — 99214 OFFICE O/P EST MOD 30 MIN: CPT | Mod: ,,, | Performed by: NURSE PRACTITIONER

## 2023-05-08 NOTE — PROGRESS NOTES
[facility] - FAMILY MEDICINE       PATIENT NAME: Negro Hale   : 1940    AGE: 82 y.o. DATE OF ENCOUNTER: 23    MRN: [id number]      PCP: BARBARA Green    Reason for Visit / Chief Complaint:  Follow-up (Patient present to the clinic f/u on diabetes) and Diabetes         274}    Subjective:     HPI:    Presents for 3 mth f/u uncontrolled T2DM & HTN.  Denies problems or complaints.    Checking glucose several times per week, no glucose log or meter.  On lantus 50 units dly & farxiga 10 mg dly.    Review of Systems:   Review of Systems   Constitutional: Negative.    HENT: Negative.     Eyes: Negative.    Respiratory: Negative.     Cardiovascular: Negative.    Gastrointestinal: Negative.    Endocrine: Negative.    Genitourinary: Negative.    Musculoskeletal: Negative.    Skin: Negative.    Allergic/Immunologic: Negative.    Neurological: Negative.    Hematological: Negative.    Psychiatric/Behavioral: Negative.       Allergies and Meds: 274}     Review of patient's allergies indicates:   Allergen Reactions    Mayonnaise      Upset stomach        Current Outpatient Medications:     azelastine (ASTELIN) 137 mcg (0.1 %) nasal spray, 1 spray (137 mcg total) by Nasal route 2 (two) times daily., Disp: 30 mL, Rfl: 2    cholecalciferol, vitamin D3, 125 mcg (5,000 unit) capsule, Take 5,000 Units by mouth once daily., Disp: , Rfl:     clopidogreL (PLAVIX) 75 mg tablet, Take 1 tablet (75 mg total) by mouth once daily., Disp: 90 tablet, Rfl: 3    coenzyme Q10 200 mg capsule, Take 200 mg by mouth once daily., Disp: , Rfl:     dapagliflozin (FARXIGA) 10 mg tablet, Take 1 tablet (10 mg total) by mouth once daily., Disp: 90 tablet, Rfl: 3    ezetimibe (ZETIA) 10 mg tablet, Take 1 tablet (10 mg total) by mouth once daily., Disp: 90 tablet, Rfl: 3    insulin glargine (LANTUS U-100 INSULIN) 100 unit/mL injection, Inject 70 Units into the skin every evening. (Patient taking differently: Inject  "50 Units into the skin every evening.), Disp: 63 mL, Rfl: 3    metoprolol succinate (TOPROL-XL) 50 MG 24 hr tablet, Take 1 tablet (50 mg total) by mouth once daily., Disp: 90 tablet, Rfl: 3    nitroGLYCERIN (NITROSTAT) 0.4 MG SL tablet, Place 1 tablet (0.4 mg total) under the tongue every 5 (five) minutes as needed for Chest pain., Disp: 25 tablet, Rfl: 3    pantoprazole (PROTONIX) 40 MG tablet, Take 1 tablet (40 mg total) by mouth once daily., Disp: 90 tablet, Rfl: 3    rosuvastatin (CRESTOR) 40 MG Tab, Take 1 tablet (40 mg total) by mouth every evening., Disp: 90 tablet, Rfl: 3    SURE COMFORT PEN NEEDLE 32 gauge x 5/32" Ndle, AS DIRECTED, Disp: , Rfl:     Labs:274}    I have reviewed old labs below:  Lab Results   Component Value Date    WBC 8.76 10/19/2022    RBC 4.54 (L) 10/19/2022    HGB 13.6 10/19/2022    HCT 41.5 10/19/2022     10/19/2022     01/23/2023    K 5.8 (H) 01/23/2023     (H) 01/23/2023    CALCIUM 9.0 01/23/2023     (H) 01/23/2023    BUN 28 (H) 01/23/2023    CREATININE 1.47 (H) 01/23/2023    ESTGFRAFRICA 64 03/19/2021    EGFRNONAA 45 (L) 05/19/2022    ALT 64 (H) 08/08/2022    AST 46 (H) 08/08/2022    INR 1.02 06/19/2020    CHOL 127 06/07/2022    TRIG 67 06/07/2022    HDL 45 06/07/2022    LDLCALC 69 06/07/2022    TSH 2.880 12/21/2021    PSA 0.636 03/19/2021    HGBA1C 7.8 (H) 01/23/2023    MICROALBUR 1.4 05/19/2022       Medical History: 274}     Past Medical History:   Diagnosis Date    Abnormal thyroid stimulating hormone (TSH) level 08/22/2019    Anemia 08/15/2019    Atrial fibrillation 09/07/2012    Bradycardia 05/23/2017    asymptomatic    Change in bowel habit 11/13/2018    Chronic kidney disease, unspecified 01/14/2019    Coronary arteriosclerosis 09/07/2012    Disease of skin and subcutaneous tissue 08/16/2017    medial aspect RLE- cystic structure seen on venous doppler US.    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    Hyperlipidemia 09/21/2012    Lower extremity edema 04/06/2017    Lumbar radiculopathy 01/26/2017    Obesity, unspecified 12/05/2016    Old myocardial infarction 12/06/2017    Other secondary pulmonary hypertension 05/23/2017    Other spondylosis, lumbosacral region 01/26/2017    Peripheral vascular disease     Personal history of tobacco use, presenting hazards to health 11/17/2016    Pulmonary hypertension     Type 2 diabetes mellitus with chronic kidney disease 01/14/2019    Type 2 diabetes mellitus with hyperglycemia 10/02/2011    Varicose veins of both lower extremities with pain 01/02/2019    Venous insufficiency 01/08/2019      Social History     Tobacco Use   Smoking Status Former   Smokeless Tobacco Never   Tobacco Comments    Quit 10/15/1976      Past Surgical History:   Procedure Laterality Date    APPENDECTOMY      CARDIAC CATHETERIZATION  2007    [facility]- Stent placement    CATARACT EXTRACTION, BILATERAL      COLONOSCOPY  11/06/2019    Dr. Mendoza    HERNIA REPAIR      Inguinal hernia repair    TONSILLECTOMY          Health Maintenance: 274}     Health Maintenance         Date Due Completion Date    Hemoglobin A1c 04/23/2023 1/23/2023    Diabetes Urine Screening 05/19/2023 5/19/2022    Shingles Vaccine (1 of 2) 10/19/2023 (Originally 7/30/1989) ---    Lipid Panel 06/07/2023 6/7/2022    Eye Exam 02/01/2024 2/1/2023    Override on 6/24/2022: Done ([facility])    Override on 5/28/2020: Done ([facility]. Scanned into documents.)    Aspirin/Antiplatelet Therapy 04/13/2024 4/13/2023    TETANUS VACCINE 05/19/2032 5/19/2022            Objective:  274}   /78 (BP Location: Left arm, Patient Position: Sitting, BP Method: Large (Manual))   Pulse (!) 54   Temp 97.6 °F (36.4 °C) (Oral)   Resp 20   Ht 5'  "8" (1.727 m)   Wt 82.1 kg (181 lb)   SpO2 97%   BMI 27.52 kg/m²     Wt Readings from Last 3 Encounters:   05/08/23 82.1 kg (181 lb)   04/13/23 84.4 kg (186 lb)   01/23/23 90.1 kg (198 lb 9.6 oz)     BP Readings from Last 3 Encounters:   05/08/23 120/78   04/13/23 120/69   01/23/23 132/60     Body mass index is 27.52 kg/m².     Physical Exam  Vitals and nursing note reviewed.   Constitutional:       General: He is not in acute distress.     Appearance: Normal appearance. He is not ill-appearing.   HENT:      Head: Normocephalic.   Eyes:      Conjunctiva/sclera: Conjunctivae normal.   Cardiovascular:      Rate and Rhythm: Normal rate and regular rhythm.      Heart sounds: Normal heart sounds.   Pulmonary:      Effort: Pulmonary effort is normal. No respiratory distress.      Breath sounds: Normal breath sounds.   Musculoskeletal:      Cervical back: Neck supple.      Right lower leg: No edema.      Left lower leg: No edema.   Skin:     General: Skin is warm and dry.   Neurological:      Mental Status: He is alert and oriented to person, place, and time.        Assessment and Plan: 274}     1. Type 2 diabetes mellitus with stage 3a chronic kidney disease, with long-term current use of insulin  Comments:  Not well controlled, continue Lantus and Farxiga.  Orders:  -     Comprehensive Metabolic Panel; Future; Expected date: 05/08/2023  -     Hemoglobin A1C; Future; Expected date: 05/08/2023  -     Microalbumin/Creatinine Ratio, Urine; Future; Expected date: 05/08/2023    2. Essential hypertension  Comments:  Controlled, continue current treatment.    3. Hypercholesterolemia  Comments:  Controlled, continue rosuvastatin.  Orders:  -     Lipid Panel; Future; Expected date: 05/08/2023    4. Encounter for long-term (current) use of other medications  -     Comprehensive Metabolic Panel; Future; Expected date: 05/08/2023    5. Coronary artery disease involving native coronary artery of native heart without angina " pectoris  Comments:  Stable, followed by Cardiology.         Return to clinic 3-mth f/u insulin and oral treated T2DM, uncontrolled; and sooner as needed.    Future Appointments   Date Time Provider Department Center   8/10/2023 11:00 AM BARBARA Green [facility] DAHIANA Bright   11/30/2023  9:00 AM AWV NURSE, [facility] FAMILY MEDICINE [facility] DAHIANA Bright        Signature:  BARBARA Green

## 2023-05-09 DIAGNOSIS — Z71.89 COMPLEX CARE COORDINATION: ICD-10-CM

## 2023-05-15 NOTE — PROGRESS NOTES
Stable kidney function. DM control is terrible w/ A1c 12.2% higher than it has ever been.  Decreased sugars, portions, white foods  Make sure he is taking Farxiga 10 mg every day w/o fail and even though his med list says Lantus 70 units I think he is taking 50 units.  Make sure he is taking lantus every day w/o fail and increase it to 54 units daily. Bring glucose meter to next f/u visit.

## 2023-07-24 DIAGNOSIS — I25.10 CORONARY ARTERY DISEASE INVOLVING NATIVE CORONARY ARTERY OF NATIVE HEART WITHOUT ANGINA PECTORIS: Chronic | ICD-10-CM

## 2023-07-24 DIAGNOSIS — Z95.5 HISTORY OF CORONARY ARTERY STENT PLACEMENT: ICD-10-CM

## 2023-07-24 RX ORDER — CLOPIDOGREL BISULFATE 75 MG/1
75 TABLET ORAL DAILY
Qty: 30 TABLET | Refills: 0 | Status: SHIPPED | OUTPATIENT
Start: 2023-07-24 | End: 2023-10-11 | Stop reason: SDUPTHER

## 2023-07-25 ENCOUNTER — TELEPHONE (OUTPATIENT)
Dept: FAMILY MEDICINE | Facility: CLINIC | Age: 83
End: 2023-07-25
Payer: MEDICARE

## 2023-07-25 NOTE — TELEPHONE ENCOUNTER
----- Message from Damian Johnson sent at 7/24/2023 12:17 PM CDT -----  Rosuvastatin 40mg, clopidogrel 75mg to [facility] pt num [phone]

## 2023-08-10 ENCOUNTER — OFFICE VISIT (OUTPATIENT)
Dept: FAMILY MEDICINE | Facility: CLINIC | Age: 83
End: 2023-08-10
Payer: MEDICARE

## 2023-08-10 VITALS
DIASTOLIC BLOOD PRESSURE: 69 MMHG | RESPIRATION RATE: 20 BRPM | BODY MASS INDEX: 27.58 KG/M2 | OXYGEN SATURATION: 97 % | WEIGHT: 182 LBS | HEIGHT: 68 IN | HEART RATE: 63 BPM | SYSTOLIC BLOOD PRESSURE: 122 MMHG | TEMPERATURE: 99 F

## 2023-08-10 DIAGNOSIS — N18.31 TYPE 2 DIABETES MELLITUS WITH STAGE 3A CHRONIC KIDNEY DISEASE, WITH LONG-TERM CURRENT USE OF INSULIN: Primary | Chronic | ICD-10-CM

## 2023-08-10 DIAGNOSIS — Z79.4 TYPE 2 DIABETES MELLITUS WITH STAGE 3A CHRONIC KIDNEY DISEASE, WITH LONG-TERM CURRENT USE OF INSULIN: Primary | Chronic | ICD-10-CM

## 2023-08-10 DIAGNOSIS — E11.22 TYPE 2 DIABETES MELLITUS WITH STAGE 3A CHRONIC KIDNEY DISEASE, WITH LONG-TERM CURRENT USE OF INSULIN: Primary | Chronic | ICD-10-CM

## 2023-08-10 LAB
ANION GAP SERPL CALCULATED.3IONS-SCNC: 11 MMOL/L (ref 7–16)
BUN SERPL-MCNC: 25 MG/DL (ref 7–18)
BUN/CREAT SERPL: 20 (ref 6–20)
CALCIUM SERPL-MCNC: 9.3 MG/DL (ref 8.5–10.1)
CHLORIDE SERPL-SCNC: 106 MMOL/L (ref 98–107)
CO2 SERPL-SCNC: 29 MMOL/L (ref 21–32)
CREAT SERPL-MCNC: 1.23 MG/DL (ref 0.7–1.3)
EGFR (NO RACE VARIABLE) (RUSH/TITUS): 58 ML/MIN/1.73M2
EST. AVERAGE GLUCOSE BLD GHB EST-MCNC: 234 MG/DL
GLUCOSE SERPL-MCNC: 100 MG/DL (ref 74–106)
HBA1C MFR BLD HPLC: 9.6 % (ref 4.5–6.6)
POTASSIUM SERPL-SCNC: 5 MMOL/L (ref 3.5–5.1)
SODIUM SERPL-SCNC: 141 MMOL/L (ref 136–145)

## 2023-08-10 PROCEDURE — 80048 BASIC METABOLIC PANEL: ICD-10-PCS | Mod: ,,, | Performed by: CLINICAL MEDICAL LABORATORY

## 2023-08-10 PROCEDURE — 83036 HEMOGLOBIN GLYCOSYLATED A1C: CPT | Mod: ,,, | Performed by: CLINICAL MEDICAL LABORATORY

## 2023-08-10 PROCEDURE — 83036 HEMOGLOBIN A1C: ICD-10-PCS | Mod: ,,, | Performed by: CLINICAL MEDICAL LABORATORY

## 2023-08-10 PROCEDURE — 80048 BASIC METABOLIC PNL TOTAL CA: CPT | Mod: ,,, | Performed by: CLINICAL MEDICAL LABORATORY

## 2023-08-10 PROCEDURE — 99213 OFFICE O/P EST LOW 20 MIN: CPT | Mod: ,,, | Performed by: NURSE PRACTITIONER

## 2023-08-10 PROCEDURE — 99213 PR OFFICE/OUTPT VISIT, EST, LEVL III, 20-29 MIN: ICD-10-PCS | Mod: ,,, | Performed by: NURSE PRACTITIONER

## 2023-08-10 RX ORDER — FLASH GLUCOSE SCANNING READER
1 EACH MISCELLANEOUS ONCE
Qty: 1 EACH | Refills: 0 | Status: SHIPPED | OUTPATIENT
Start: 2023-08-10 | End: 2023-11-14

## 2023-08-10 RX ORDER — FLASH GLUCOSE SENSOR
KIT MISCELLANEOUS
Qty: 6 KIT | Refills: 12 | Status: SHIPPED | OUTPATIENT
Start: 2023-08-10 | End: 2023-11-14

## 2023-08-10 NOTE — PROGRESS NOTES
[facility] FAMILY MEDICINE       PATIENT NAME: Negro Hale   : 1940    AGE: 83 y.o. DATE OF ENCOUNTER: 8/10/23    MRN: [id number]      PCP: Smiley Trevino FNP    Reason for Visit / Chief Complaint:  Diabetes (Patient presents to the clinic for 3m f/u dm )         274}    Subjective:     HPI:    Presents for 3-mth f/u uncontrolled T2DM.  Last A1c 12.2%.  He had decreased insulin on his own from 70 units to 50 units due to previous hypoglycemic episode requiring ER visit.  Last appointment reported taking 50 units Lantus daily. After lab review last visit, was advised to increase Lantus to 54 units daily and bring meter to appointment, but he did not.    Reports he has been taking 55 units of Lantus which he forgets to take on occasion due to falling asleep, admits he did not take it last night, and reports 100% compliance with Farxiga 10 mg daily.  Admits only checking glucose once about every 2 weeks, and is requesting a CGM which I feel would improved insulin compliance and DM control due to increasing awareness as well as addressing his concern of hypoglycemia risk due to being able to monitor glucose frequently w/o fear of pain to fingers.    Review of Systems:   Review of Systems   Constitutional: Negative.    HENT: Negative.     Eyes: Negative.    Respiratory: Negative.     Cardiovascular: Negative.    Gastrointestinal: Negative.    Endocrine: Negative.    Genitourinary: Negative.    Musculoskeletal:  Positive for neck pain (has intermittent tingling in LUE).   Skin: Negative.    Allergic/Immunologic: Negative.    Neurological: Negative.    Hematological: Negative.    Psychiatric/Behavioral: Negative.         Allergies and Meds: 274}     Review of patient's allergies indicates:   Allergen Reactions    Mayonnaise      Upset stomach        Current Outpatient Medications:     cholecalciferol, vitamin D3, 125 mcg (5,000 unit) capsule, Take 5,000 Units by mouth once daily.,  "Disp: , Rfl:     clopidogreL (PLAVIX) 75 mg tablet, Take 1 tablet (75 mg total) by mouth once daily., Disp: 30 tablet, Rfl: 0    coenzyme Q10 200 mg capsule, Take 200 mg by mouth once daily., Disp: , Rfl:     dapagliflozin (FARXIGA) 10 mg tablet, Take 1 tablet (10 mg total) by mouth once daily., Disp: 90 tablet, Rfl: 3    insulin glargine (LANTUS U-100 INSULIN) 100 unit/mL injection, Inject 70 Units into the skin every evening. (Patient taking differently: Inject 50 Units into the skin every evening.), Disp: 63 mL, Rfl: 3    metoprolol succinate (TOPROL-XL) 50 MG 24 hr tablet, Take 1 tablet (50 mg total) by mouth once daily., Disp: 90 tablet, Rfl: 3    nitroGLYCERIN (NITROSTAT) 0.4 MG SL tablet, Place 1 tablet (0.4 mg total) under the tongue every 5 (five) minutes as needed for Chest pain., Disp: 25 tablet, Rfl: 3    rosuvastatin (CRESTOR) 40 MG Tab, Take 1 tablet (40 mg total) by mouth every evening., Disp: 90 tablet, Rfl: 3    SURE COMFORT PEN NEEDLE 32 gauge x 5/32" Ndle, AS DIRECTED, Disp: , Rfl:     flash glucose scanning reader (FREESTYLE ADORE 2 READER) Misc, 1 each by Misc.(Non-Drug; Combo Route) route once. FreeStyle Adore 2 reader for continuous glucose monitoring with uncontrolled T2DM with hyperglycemia and CKD stage 3b. for 1 dose, Disp: 1 each, Rfl: 0    flash glucose sensor (FREESTYLE ADORE 2 SENSOR) Kit, Apply FreeStyle Adore sensor to upper arm and change every 14 days or as needed if becomes dislodged.  For continuous glucose monitoring with uncontrolled T2DM with hyperglycemia and CKD stage 3., Disp: 6 kit, Rfl: 12    Labs:274}   I have reviewed labs below:  Lab Results   Component Value Date    WBC 8.76 10/19/2022    RBC 4.54 (L) 10/19/2022    HGB 13.6 10/19/2022    HCT 41.5 10/19/2022     10/19/2022     08/10/2023    K 5.0 08/10/2023     08/10/2023    CALCIUM 9.3 08/10/2023     08/10/2023    BUN 25 (H) 08/10/2023    CREATININE 1.23 08/10/2023    ESTGFRAFRICA 64 " 03/19/2021    EGFRNONAA 45 (L) 05/19/2022     (H) 05/08/2023    AST 88 (H) 05/08/2023    INR 1.02 06/19/2020    CHOL 169 05/08/2023    TRIG 102 05/08/2023    HDL 48 05/08/2023    LDLCALC 101 05/08/2023    TSH 2.880 12/21/2021    PSA 0.636 03/19/2021    HGBA1C 9.6 (H) 08/10/2023    MICROALBUR 3.2 (H) 05/08/2023       Medical History: 274}     Past Medical History:   Diagnosis Date    Abnormal thyroid stimulating hormone (TSH) level 08/22/2019    Anemia 08/15/2019    Atrial fibrillation 09/07/2012    Bradycardia 05/23/2017    asymptomatic    Change in bowel habit 11/13/2018    Chronic kidney disease, unspecified 01/14/2019    Coronary arteriosclerosis 09/07/2012    Disease of skin and subcutaneous tissue 08/16/2017    medial aspect RLE- cystic structure seen on venous doppler US.    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    Hyperlipidemia 09/21/2012    Lower extremity edema 04/06/2017    Lumbar radiculopathy 01/26/2017    Obesity, unspecified 12/05/2016    Old myocardial infarction 12/06/2017    Other secondary pulmonary hypertension 05/23/2017    Other spondylosis, lumbosacral region 01/26/2017    Peripheral vascular disease     Personal history of tobacco use, presenting hazards to health 11/17/2016    Pulmonary hypertension     Type 2 diabetes mellitus with chronic kidney disease 01/14/2019    Type 2 diabetes mellitus with hyperglycemia 10/02/2011    Varicose veins of both lower extremities with pain 01/02/2019    Venous insufficiency 01/08/2019      Social History     Tobacco Use   Smoking Status Former    Current packs/day: 0.00   Smokeless Tobacco Never   Tobacco Comments    Quit 10/15/1976      Past Surgical History:   Procedure Laterality Date    APPENDECTOMY      CARDIAC  "CATHETERIZATION  2007    [place]- Stent placement    CATARACT EXTRACTION, BILATERAL      COLONOSCOPY  11/06/2019    Dr. Mendoza    HERNIA REPAIR      Inguinal hernia repair    TONSILLECTOMY          Health Maintenance: 274}     Health Maintenance         Date Due Completion Date    Shingles Vaccine (1 of 2) 10/19/2023 (Originally 7/30/1989) ---    Hemoglobin A1c 11/10/2023 8/10/2023    Eye Exam 02/01/2024 2/1/2023    Override on 6/24/2022: Done ([facility])    Override on 5/28/2020: Done ([facility]. Scanned into documents.)    Diabetes Urine Screening 05/08/2024 5/8/2023    Lipid Panel 05/08/2024 5/8/2023    Aspirin/Antiplatelet Therapy 08/10/2024 8/10/2023    TETANUS VACCINE 05/19/2032 5/19/2022            Objective:  274}   /69 (BP Location: Right arm, Patient Position: Sitting, BP Method: Large (Automatic))   Pulse 63   Temp 98.6 °F (37 °C) (Oral)   Resp 20   Ht 5' 8" (1.727 m)   Wt 82.6 kg (182 lb)   SpO2 97%   BMI 27.67 kg/m²     Wt Readings from Last 3 Encounters:   08/10/23 82.6 kg (182 lb)   05/08/23 82.1 kg (181 lb)   04/13/23 84.4 kg (186 lb)     BP Readings from Last 3 Encounters:   08/10/23 122/69   05/08/23 120/78   04/13/23 120/69     Body mass index is 27.67 kg/m².     Physical Exam  Vitals and nursing note reviewed.   Constitutional:       General: He is not in acute distress.     Appearance: Normal appearance.   HENT:      Head: Normocephalic.   Eyes:      Conjunctiva/sclera: Conjunctivae normal.   Neck:      Thyroid: No thyromegaly or thyroid tenderness.      Trachea: Trachea normal.   Cardiovascular:      Rate and Rhythm: Normal rate and regular rhythm.      Pulses: Normal pulses.      Heart sounds: Normal heart sounds.   Pulmonary:      Effort: Pulmonary effort is normal. No respiratory distress.      Breath sounds: Normal breath sounds.   Musculoskeletal:      Cervical back: Neck supple. Bony tenderness and crepitus present. No edema or erythema. No pain " with movement.      Right lower leg: No edema.      Left lower leg: No edema.   Lymphadenopathy:      Cervical: No cervical adenopathy.   Skin:     General: Skin is warm and dry.   Neurological:      Mental Status: He is alert and oriented to person, place, and time.   Psychiatric:         Mood and Affect: Mood normal.         Behavior: Behavior normal.          Assessment and Plan: 274}     1. Type 2 diabetes mellitus with stage 3a chronic kidney disease, with long-term current use of insulin  Comments:  not controlled  Orders:  -     Basic Metabolic Panel; Future; Expected date: 08/10/2023  -     Hemoglobin A1C; Future; Expected date: 08/10/2023  -     flash glucose sensor (FREESTYLE DAVID 2 SENSOR) Kit; Apply FreeStyle David sensor to upper arm and change every 14 days or as needed if becomes dislodged.  For continuous glucose monitoring with uncontrolled T2DM with hyperglycemia and CKD stage 3.  Dispense: 6 kit; Refill: 12  -     flash glucose scanning reader (FREESTYLE DAVID 2 READER) Misc; 1 each by Misc.(Non-Drug; Combo Route) route once. FreeStyle David 2 reader for continuous glucose monitoring with uncontrolled T2DM with hyperglycemia and CKD stage 3b. for 1 dose  Dispense: 1 each; Refill: 0       Advised to increase Lantus to 60 units daily and can take in am if it is easier to remember & improve daily compliance.  Reminded to bring glucose meter to visits. Voices understanding.    Reviewed neck stretches/exercises to help with discomfort.    Return to clinic 3 mth f/u uncontrolled T2DM; and sooner as needed.    Future Appointments   Date Time Provider Department Center   8/14/2023 12:45 PM Margy Marie ACNP [facility] CARD [facility]   11/14/2023 11:00 AM Smiley Trevino FNP [facility]   11/30/2023  9:00 AM AWV NURSE, [facility] FAMILY MEDICINE [facility]        Signature:  BARBARA Green

## 2023-08-17 NOTE — PROGRESS NOTES
Call pt and review results. A1c is some better but still very uncontrolled at 9.6%.  I advised increasing Lantus to 60 units daily at his appointment and I would like to increase further if if he is not having any low blood sugars.  I did order a freestyle David so he can monitor his glucose more often if it is covered.  Kidney function is better on this check. Stress to bring glucose meter to appts.

## 2023-08-19 ENCOUNTER — HOSPITAL ENCOUNTER (EMERGENCY)
Facility: HOSPITAL | Age: 83
Discharge: HOME | End: 2023-08-19
Attending: FAMILY MEDICINE
Payer: MEDICARE

## 2023-08-19 VITALS
OXYGEN SATURATION: 97 % | SYSTOLIC BLOOD PRESSURE: 158 MMHG | HEART RATE: 60 BPM | WEIGHT: 182 LBS | RESPIRATION RATE: 11 BRPM | BODY MASS INDEX: 27.58 KG/M2 | HEIGHT: 68 IN | DIASTOLIC BLOOD PRESSURE: 60 MMHG | TEMPERATURE: 99 F

## 2023-08-19 DIAGNOSIS — R07.89 ATYPICAL CHEST PAIN: Primary | ICD-10-CM

## 2023-08-19 DIAGNOSIS — R07.9 CHEST PAIN: ICD-10-CM

## 2023-08-19 LAB
ALBUMIN SERPL BCP-MCNC: 3.3 G/DL (ref 3.5–5)
ALBUMIN/GLOB SERPL: 1.1 {RATIO}
ALP SERPL-CCNC: 76 U/L (ref 45–115)
ALT SERPL W P-5'-P-CCNC: 35 U/L (ref 16–61)
ANION GAP SERPL CALCULATED.3IONS-SCNC: 10 MMOL/L (ref 7–16)
AST SERPL W P-5'-P-CCNC: 31 U/L (ref 15–37)
BASOPHILS # BLD AUTO: 0.07 K/UL (ref 0–0.2)
BASOPHILS NFR BLD AUTO: 1.1 % (ref 0–1)
BILIRUB SERPL-MCNC: 0.4 MG/DL (ref ?–1.2)
BUN SERPL-MCNC: 31 MG/DL (ref 7–18)
BUN/CREAT SERPL: 24 (ref 6–20)
CALCIUM SERPL-MCNC: 8.6 MG/DL (ref 8.5–10.1)
CHLORIDE SERPL-SCNC: 110 MMOL/L (ref 98–107)
CO2 SERPL-SCNC: 26 MMOL/L (ref 21–32)
CREAT SERPL-MCNC: 1.29 MG/DL (ref 0.7–1.3)
EGFR (NO RACE VARIABLE) (RUSH/TITUS): 55 ML/MIN/1.73M2
EOSINOPHIL # BLD AUTO: 0.1 K/UL (ref 0–0.5)
EOSINOPHIL NFR BLD AUTO: 1.6 % (ref 1–4)
ERYTHROCYTE [DISTWIDTH] IN BLOOD BY AUTOMATED COUNT: 14.6 % (ref 11.5–14.5)
GLOBULIN SER-MCNC: 2.9 G/DL (ref 2–4)
GLUCOSE SERPL-MCNC: 149 MG/DL (ref 74–106)
HCT VFR BLD AUTO: 41.2 % (ref 40–54)
HGB BLD-MCNC: 13.5 G/DL (ref 13.5–18)
IMM GRANULOCYTES # BLD AUTO: 0.01 K/UL (ref 0–0.04)
IMM GRANULOCYTES NFR BLD: 0.2 % (ref 0–0.4)
INR BLD: 1.02
LYMPHOCYTES # BLD AUTO: 1.52 K/UL (ref 1–4.8)
LYMPHOCYTES NFR BLD AUTO: 24.9 % (ref 27–41)
MCH RBC QN AUTO: 29.6 PG (ref 27–31)
MCHC RBC AUTO-ENTMCNC: 32.8 G/DL (ref 32–36)
MCV RBC AUTO: 90.4 FL (ref 80–96)
MONOCYTES # BLD AUTO: 0.6 K/UL (ref 0–0.8)
MONOCYTES NFR BLD AUTO: 9.8 % (ref 2–6)
MPC BLD CALC-MCNC: 9.8 FL (ref 9.4–12.4)
NEUTROPHILS # BLD AUTO: 3.8 K/UL (ref 1.8–7.7)
NEUTROPHILS NFR BLD AUTO: 62.4 % (ref 53–65)
NRBC # BLD AUTO: 0 X10E3/UL
NRBC, AUTO (.00): 0 %
PLATELET # BLD AUTO: 247 K/UL (ref 150–400)
POTASSIUM SERPL-SCNC: 4.7 MMOL/L (ref 3.5–5.1)
PROT SERPL-MCNC: 6.2 G/DL (ref 6.4–8.2)
PROTHROMBIN TIME: 13.3 SECONDS (ref 11.7–14.7)
RBC # BLD AUTO: 4.56 M/UL (ref 4.6–6.2)
SODIUM SERPL-SCNC: 141 MMOL/L (ref 136–145)
TROPONIN I SERPL DL<=0.01 NG/ML-MCNC: 13 PG/ML
WBC # BLD AUTO: 6.1 K/UL (ref 4.5–11)

## 2023-08-19 PROCEDURE — 99283 PR EMERGENCY DEPT VISIT,LEVEL III: ICD-10-PCS | Mod: ,,, | Performed by: FAMILY MEDICINE

## 2023-08-19 PROCEDURE — 85610 PROTHROMBIN TIME: CPT | Performed by: FAMILY MEDICINE

## 2023-08-19 PROCEDURE — 93005 ELECTROCARDIOGRAM TRACING: CPT

## 2023-08-19 PROCEDURE — 99284 EMERGENCY DEPT VISIT MOD MDM: CPT | Mod: 25

## 2023-08-19 PROCEDURE — 93010 EKG 12-LEAD: ICD-10-PCS | Mod: ,,, | Performed by: INTERNAL MEDICINE

## 2023-08-19 PROCEDURE — 85025 COMPLETE CBC W/AUTO DIFF WBC: CPT | Performed by: FAMILY MEDICINE

## 2023-08-19 PROCEDURE — 93010 ELECTROCARDIOGRAM REPORT: CPT | Mod: ,,, | Performed by: INTERNAL MEDICINE

## 2023-08-19 PROCEDURE — 84484 ASSAY OF TROPONIN QUANT: CPT | Performed by: FAMILY MEDICINE

## 2023-08-19 PROCEDURE — 99283 EMERGENCY DEPT VISIT LOW MDM: CPT | Mod: ,,, | Performed by: FAMILY MEDICINE

## 2023-08-19 PROCEDURE — 80053 COMPREHEN METABOLIC PANEL: CPT | Performed by: FAMILY MEDICINE

## 2023-08-19 NOTE — ED PROVIDER NOTES
Encounter Date: 8/19/2023       History     Chief Complaint   Patient presents with    Chest Pain     Chest tightness     Patient comes in with chest tightness.  No other complaint history having hypoglycemic episode and hypotension episode this year saw Dr. Jones--he is much better present he has no pain and no dizziness no nausea vomiting        Review of patient's allergies indicates:   Allergen Reactions    Mayonnaise      Upset stomach     Past Medical History:   Diagnosis Date    Abnormal thyroid stimulating hormone (TSH) level 08/22/2019    Anemia 08/15/2019    Atrial fibrillation 09/07/2012    Bradycardia 05/23/2017    asymptomatic    Change in bowel habit 11/13/2018    Chronic kidney disease, unspecified 01/14/2019    Coronary arteriosclerosis 09/07/2012    Disease of skin and subcutaneous tissue 08/16/2017    medial aspect RLE- cystic structure seen on venous doppler US.    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    Hyperlipidemia 09/21/2012    Lower extremity edema 04/06/2017    Lumbar radiculopathy 01/26/2017    Obesity, unspecified 12/05/2016    Old myocardial infarction 12/06/2017    Other secondary pulmonary hypertension 05/23/2017    Other spondylosis, lumbosacral region 01/26/2017    Peripheral vascular disease     Personal history of tobacco use, presenting hazards to health 11/17/2016    Pulmonary hypertension     Type 2 diabetes mellitus with chronic kidney disease 01/14/2019    Type 2 diabetes mellitus with hyperglycemia 10/02/2011    Varicose veins of both lower extremities with pain 01/02/2019    Venous insufficiency 01/08/2019     Past Surgical History:   Procedure Laterality Date    APPENDECTOMY      CARDIAC CATHETERIZATION  2007    [place]- Stent placement     CATARACT EXTRACTION, BILATERAL      COLONOSCOPY  11/06/2019    Dr. Mendoza    HERNIA REPAIR      Inguinal hernia repair    TONSILLECTOMY       Family History   Problem Relation Age of Onset    No Known Problems Mother     Heart disease Father     Throat cancer Sister     Diabetes Brother     No Known Problems Brother     No Known Problems Maternal Grandmother     No Known Problems Maternal Grandfather     No Known Problems Paternal Grandmother     No Known Problems Paternal Grandfather      Social History     Tobacco Use    Smoking status: Former     Current packs/day: 0.00    Smokeless tobacco: Never    Tobacco comments:     Quit 10/15/1976   Substance Use Topics    Alcohol use: Yes     Comment: occasionally    Drug use: Never     Review of Systems   Constitutional:  Positive for fatigue. Negative for fever.   HENT: Negative.  Negative for sore throat.    Eyes: Negative.    Respiratory:  Positive for chest tightness. Negative for shortness of breath.    Cardiovascular: Negative.  Negative for chest pain.   Gastrointestinal: Negative.  Negative for nausea.   Endocrine: Negative.    Genitourinary: Negative.  Negative for dysuria.   Musculoskeletal: Negative.  Negative for back pain.   Skin: Negative.  Negative for rash.   Allergic/Immunologic: Negative.    Neurological: Negative.  Negative for weakness.   Hematological: Negative.  Does not bruise/bleed easily.   Psychiatric/Behavioral: Negative.         Physical Exam     Initial Vitals [08/19/23 1025]   BP Pulse Resp Temp SpO2   (!) 155/52 65 14 98.7 °F (37.1 °C) 98 %      MAP       --         Physical Exam    Constitutional: He appears well-developed and well-nourished.   HENT:   Head: Normocephalic and atraumatic.   Right Ear: External ear normal.   Left Ear: External ear normal.   Nose: Nose normal.   Mouth/Throat: Oropharynx is clear and moist.   Eyes: Conjunctivae and EOM are normal. Pupils are equal, round, and reactive to light.   Neck: Neck supple.   Normal  range of motion.  Cardiovascular:  Normal rate, regular rhythm, normal heart sounds and intact distal pulses.           Pulmonary/Chest: Breath sounds normal.   Abdominal: Abdomen is soft. Bowel sounds are normal.   Genitourinary:    Prostate and penis normal.     Musculoskeletal:         General: Normal range of motion.      Cervical back: Normal range of motion and neck supple.     Neurological: He is alert and oriented to person, place, and time. He has normal strength and normal reflexes.   Skin: Skin is warm and dry.   Psychiatric: He has a normal mood and affect. His behavior is normal. Judgment and thought content normal.         Medical Screening Exam   See Full Note    ED Course   Procedures  Labs Reviewed   COMPREHENSIVE METABOLIC PANEL - Abnormal; Notable for the following components:       Result Value    Chloride 110 (*)     Glucose 149 (*)     BUN 31 (*)     BUN/Creatinine Ratio 24 (*)     Total Protein 6.2 (*)     Albumin 3.3 (*)     eGFR 55 (*)     All other components within normal limits   CBC WITH DIFFERENTIAL - Abnormal; Notable for the following components:    RBC 4.56 (*)     RDW 14.6 (*)     Lymphocytes % 24.9 (*)     Monocytes % 9.8 (*)     Basophils % 1.1 (*)     All other components within normal limits   TROPONIN I - Normal   PROTIME-INR - Normal   CBC W/ AUTO DIFFERENTIAL    Narrative:     The following orders were created for panel order CBC auto differential.  Procedure                               Abnormality         Status                     ---------                               -----------         ------                     CBC with Differential[[id number]]        Abnormal            Final result                 Please view results for these tests on the individual orders.          Imaging Results              X-Ray Chest AP Portable (Final result)  Result time 08/19/23 11:43:55      Final result by Scott Lynne DO (08/19/23 11:43:55)                   Impression:      No  acute pulmonary disease      Electronically signed by: Scott Lynne  Date:    08/19/2023  Time:    11:43               Narrative:    EXAMINATION:  XR CHEST AP PORTABLE    CLINICAL HISTORY:  Chest pain, unspecified    TECHNIQUE:  XR CHEST AP PORTABLE    COMPARISON:  2019    FINDINGS:  No lines or tubes.    Lungs are clear.    Normal pleura.    Cardiac silhouette is similar to comparison exam.    No obvious acute bone findings.                                       Medications - No data to display  Medical Decision Making  Amount and/or Complexity of Data Reviewed  Labs: ordered.  Radiology: ordered.                          Medical Decision Making:   Initial Assessment:   Patient with chest pain tightness.  Started this morning much better present no shortness breast no diaphoresis  Differential Diagnosis:   Chest tightness      Clinical Impression:   Final diagnoses:  [R07.9] Chest pain  [R07.89] Atypical chest pain (Primary)        ED Disposition Condition    Discharge Stable          ED Prescriptions    None       Follow-up Information    None          Chago Whipple,   08/19/23 1238

## 2023-08-22 DIAGNOSIS — E78.2 MIXED HYPERLIPIDEMIA: Chronic | ICD-10-CM

## 2023-08-22 DIAGNOSIS — K21.9 GASTROESOPHAGEAL REFLUX DISEASE WITHOUT ESOPHAGITIS: ICD-10-CM

## 2023-08-22 RX ORDER — PANTOPRAZOLE SODIUM 40 MG/1
40 TABLET, DELAYED RELEASE ORAL DAILY
Qty: 90 TABLET | Refills: 3 | Status: SHIPPED | OUTPATIENT
Start: 2023-08-22 | End: 2023-10-11 | Stop reason: SDUPTHER

## 2023-08-22 RX ORDER — ROSUVASTATIN CALCIUM 40 MG/1
40 TABLET, COATED ORAL NIGHTLY
Qty: 90 TABLET | Refills: 3 | Status: SHIPPED | OUTPATIENT
Start: 2023-08-22 | End: 2023-10-11 | Stop reason: SDUPTHER

## 2023-08-22 NOTE — TELEPHONE ENCOUNTER
----- Message from Damian Johnson sent at 8/22/2023 11:06 AM CDT -----  [facility] [phone]

## 2023-08-25 ENCOUNTER — PATIENT OUTREACH (OUTPATIENT)
Dept: ADMINISTRATIVE | Facility: HOSPITAL | Age: 83
End: 2023-08-25
Payer: MEDICARE

## 2023-08-25 LAB
LEFT EYE DM RETINOPATHY: POSITIVE
RIGHT EYE DM RETINOPATHY: POSITIVE

## 2023-08-28 ENCOUNTER — OFFICE VISIT (OUTPATIENT)
Dept: CARDIOLOGY | Facility: CLINIC | Age: 83
End: 2023-08-28
Payer: MEDICARE

## 2023-08-28 VITALS
OXYGEN SATURATION: 95 % | BODY MASS INDEX: 27.46 KG/M2 | HEART RATE: 66 BPM | SYSTOLIC BLOOD PRESSURE: 122 MMHG | DIASTOLIC BLOOD PRESSURE: 60 MMHG | WEIGHT: 181.19 LBS | HEIGHT: 68 IN

## 2023-08-28 DIAGNOSIS — I20.89 ANGINAL EQUIVALENT: ICD-10-CM

## 2023-08-28 DIAGNOSIS — Z95.5 HISTORY OF CORONARY ARTERY STENT PLACEMENT: ICD-10-CM

## 2023-08-28 DIAGNOSIS — E78.00 HYPERCHOLESTEROLEMIA: Chronic | ICD-10-CM

## 2023-08-28 DIAGNOSIS — R07.9 CHEST PAIN, UNSPECIFIED TYPE: ICD-10-CM

## 2023-08-28 DIAGNOSIS — R94.31 ABNORMAL ELECTROCARDIOGRAM (ECG) (EKG): ICD-10-CM

## 2023-08-28 DIAGNOSIS — I25.10 ATHEROSCLEROSIS OF NATIVE CORONARY ARTERY OF NATIVE HEART WITHOUT ANGINA PECTORIS: Primary | ICD-10-CM

## 2023-08-28 DIAGNOSIS — I10 ESSENTIAL HYPERTENSION: Chronic | ICD-10-CM

## 2023-08-28 DIAGNOSIS — I25.10 CORONARY ARTERY DISEASE INVOLVING NATIVE CORONARY ARTERY OF NATIVE HEART WITHOUT ANGINA PECTORIS: Chronic | ICD-10-CM

## 2023-08-28 PROCEDURE — 99214 PR OFFICE/OUTPT VISIT, EST, LEVL IV, 30-39 MIN: ICD-10-PCS | Mod: S$PBB,,, | Performed by: NURSE PRACTITIONER

## 2023-08-28 PROCEDURE — 99214 OFFICE O/P EST MOD 30 MIN: CPT | Mod: PBBFAC | Performed by: NURSE PRACTITIONER

## 2023-08-28 PROCEDURE — 99214 OFFICE O/P EST MOD 30 MIN: CPT | Mod: S$PBB,,, | Performed by: NURSE PRACTITIONER

## 2023-08-28 NOTE — PROGRESS NOTES
"PCP: Smiley Trevino FNP    Referring Provider:     Subjective:   Negro Hale is a 83 y.o. male with hx of a fib, bradycardia, MI/CAD, CKD, pulmonary HTN, HLD, and DM,  who presents for ED follow.Patient was seen in the ED 8/19/2023 with chest tightness and left arm burning pain. He r/o for MI and follows up today for evaluation. His EKG 8/19/2023 demonstrates RSR with PAC and 1st degree AVB with HR 64 and NS lateral TWA.   The patient has had ongoing issues with his left arm from his shoulder to his fingers burning. This is intermittent. It seems to start in the am when he awakens and gets better after he gets up and starts moving. Slow deep breaths also improves the discomfort. The patient feels that he may have neck issues similar to what his brother has had surgery for in the past.       8/29/2022 presents for routine follow up. The patient states that after he sat down he had some needle like chest discomfort substernally lasting seconds. This resolved with "deep breathing". This is the same issue that he described to Dr. Bridges in 12/2021. He was given sublingual ntg to use prn and has not haider it but he states he recently lost the ntg bottle. His pain at the time of his MI was "every tooth in my head was hurting and both arms. He has no jaw nor arm pain today.      Fhx:  Family History   Problem Relation Age of Onset    No Known Problems Mother     Heart disease Father     Throat cancer Sister     Diabetes Brother     No Known Problems Brother     No Known Problems Maternal Grandmother     No Known Problems Maternal Grandfather     No Known Problems Paternal Grandmother     No Known Problems Paternal Grandfather      Shx:   Social History     Socioeconomic History    Marital status:    Tobacco Use    Smoking status: Former    Smokeless tobacco: Never    Tobacco comments:     Quit 10/15/1976   Substance and Sexual Activity    Alcohol use: Yes     Comment: occasionally    Drug use: Never    " Sexual activity: Not Currently       EKG 8/19/2023 RSR with Pas with 1st degree AVB; HR 64 bpm;  NS lateral TWA  8/29/2022 RSR with PACs; HR 63 bpm; o/w normal EKG    ECHO No results found for this or any previous visit.    [facility] 2/26/07 SEVERE SINGLE VESSEL CAD, INFEROLATERAL HYPOKINESIS WITH OVERALL NORMAL LV SYSTOLIC FUNCITON, SUCCESSFUL STENT WITH POST DILATATION OF MID LEFT CIRCUMFLEX, SUCCESSFUL CARDIOVERSION OF A-FIB TO NSR         Lab Results   Component Value Date     08/19/2023    K 4.7 08/19/2023     (H) 08/19/2023    CO2 26 08/19/2023    BUN 31 (H) 08/19/2023    CREATININE 1.29 08/19/2023    CALCIUM 8.6 08/19/2023    ANIONGAP 10 08/19/2023    ESTGFRAFRICA 64 03/19/2021    EGFRNONAA 45 (L) 05/19/2022       Lab Results   Component Value Date    CHOL 169 05/08/2023    CHOL 127 06/07/2022    CHOL 156 04/04/2022     Lab Results   Component Value Date    HDL 48 05/08/2023    HDL 45 06/07/2022    HDL 42 04/04/2022     Lab Results   Component Value Date    LDLCALC 101 05/08/2023    LDLCALC 69 06/07/2022    LDLCALC 97 04/04/2022     Lab Results   Component Value Date    TRIG 102 05/08/2023    TRIG 67 06/07/2022    TRIG 83 04/04/2022     Lab Results   Component Value Date    CHOLHDL 3.5 05/08/2023    CHOLHDL 2.8 06/07/2022    CHOLHDL 3.7 04/04/2022       Lab Results   Component Value Date    WBC 6.10 08/19/2023    HGB 13.5 08/19/2023    HCT 41.2 08/19/2023    MCV 90.4 08/19/2023     08/19/2023           Current Outpatient Medications:     cholecalciferol, vitamin D3, 125 mcg (5,000 unit) capsule, Take 5,000 Units by mouth once daily., Disp: , Rfl:     clopidogreL (PLAVIX) 75 mg tablet, Take 1 tablet (75 mg total) by mouth once daily., Disp: 30 tablet, Rfl: 0    coenzyme Q10 200 mg capsule, Take 200 mg by mouth once daily., Disp: , Rfl:     dapagliflozin (FARXIGA) 10 mg tablet, Take 1 tablet (10 mg total) by mouth once daily., Disp: 90 tablet, Rfl: 3    flash glucose scanning reader (FREESTYLE  "ADORE 2 READER) Misc, 1 each by Misc.(Non-Drug; Combo Route) route once. FreeStyle Adore 2 reader for continuous glucose monitoring with uncontrolled T2DM with hyperglycemia and CKD stage 3b. for 1 dose, Disp: 1 each, Rfl: 0    flash glucose sensor (FREESTYLE ADORE 2 SENSOR) Kit, Apply FreeStyle Adore sensor to upper arm and change every 14 days or as needed if becomes dislodged.  For continuous glucose monitoring with uncontrolled T2DM with hyperglycemia and CKD stage 3., Disp: 6 kit, Rfl: 12    insulin glargine (LANTUS U-100 INSULIN) 100 unit/mL injection, Inject 70 Units into the skin every evening. (Patient taking differently: Inject 50 Units into the skin every evening.), Disp: 63 mL, Rfl: 3    metoprolol succinate (TOPROL-XL) 50 MG 24 hr tablet, Take 1 tablet (50 mg total) by mouth once daily., Disp: 90 tablet, Rfl: 3    nitroGLYCERIN (NITROSTAT) 0.4 MG SL tablet, Place 1 tablet (0.4 mg total) under the tongue every 5 (five) minutes as needed for Chest pain., Disp: 25 tablet, Rfl: 3    pantoprazole (PROTONIX) 40 MG tablet, Take 1 tablet (40 mg total) by mouth once daily., Disp: 90 tablet, Rfl: 3    rosuvastatin (CRESTOR) 40 MG Tab, Take 1 tablet (40 mg total) by mouth every evening., Disp: 90 tablet, Rfl: 3    SURE COMFORT PEN NEEDLE 32 gauge x 5/32" Ndle, AS DIRECTED, Disp: , Rfl:   Meds reviewed but not reconciled.  He did not bring his meds.    Review of Systems   Respiratory:  Negative for shortness of breath.    Cardiovascular:  Positive for chest pain. Negative for palpitations, orthopnea, claudication, leg swelling and PND.        Chest tightness with left arm burning pain from shoulder to finger tips   Neurological:  Negative for dizziness, loss of consciousness and weakness.           Objective:   /60 (BP Location: Left arm, Patient Position: Sitting)   Pulse 66   Ht 5' 8" (1.727 m)   Wt 82.2 kg (181 lb 3.2 oz)   SpO2 95%   BMI 27.55 kg/m²     Physical Exam  Vitals and nursing note " reviewed.   Constitutional:       Appearance: Normal appearance. He is normal weight.   HENT:      Head: Normocephalic and atraumatic.   Neck:      Vascular: No carotid bruit.   Cardiovascular:      Rate and Rhythm: Normal rate and regular rhythm.      Pulses: Normal pulses.      Heart sounds: Normal heart sounds.   Pulmonary:      Effort: Pulmonary effort is normal.      Breath sounds: Normal breath sounds.   Abdominal:      Palpations: Abdomen is soft.   Musculoskeletal:      Cervical back: Neck supple.      Right lower leg: No edema.      Left lower leg: No edema.   Skin:     General: Skin is warm and dry.      Capillary Refill: Capillary refill takes less than 2 seconds.   Neurological:      Mental Status: He is alert.           Assessment:     1. Atherosclerosis of native coronary artery of native heart without angina pectoris  NM Myocardial Perfusion Spect Multi Pharmacologic    Nuclear Stress Test    Echo      2. Chest pain, unspecified type  NM Myocardial Perfusion Spect Multi Pharmacologic    Nuclear Stress Test    Echo      3. Abnormal electrocardiogram (ECG) (EKG)  NM Myocardial Perfusion Spect Multi Pharmacologic    Nuclear Stress Test    Echo      4. Coronary artery disease involving native coronary artery of native heart without angina pectoris        5. Essential hypertension        6. Hypercholesterolemia        7. History of coronary artery stent placement        8. Anginal equivalent              Plan:   Coronary artery disease involving native coronary artery of native heart without angina pectoris  BHAKTI mid LCx 2/26/2007  Left arm burning pain; no chest pain; not similar to pain prior to previous revascularization.  Continue ASA/Plavix/Crestor  Lexiscan/echo    Essential hypertension  122/60 mmHg    Hypercholesterolemia  Lipid panel results from 5/8/2023 reviewed    Trig 102  HDL 48    Crestor 40 mg po daily  Lipids followed by PCP    RT: 3 months

## 2023-08-28 NOTE — ASSESSMENT & PLAN NOTE
BHAKTI mid LCx 2/26/2007  Left arm burning pain; no chest pain; not similar to pain prior to previous revascularization.  Continue ASA/Plavix/Crestor  Lexiscan/echo

## 2023-08-28 NOTE — ASSESSMENT & PLAN NOTE
Patient was seen in the ED 8/19/2023 with chest tightness and left arm burning pain. He r/o for MI and follows up today for evaluation. His EKG 8/19/2023 demonstrates RSR with PAC and 1st degree AVB with HR 64 and NS lateral TWA.   The patient has had ongoing issues with his left arm from his shoulder to his fingers burning. This is intermittent. It seems to start in the am when he awakens and gets better after he gets up and starts moving. Slow deep breaths also improves the discomfort. The patient feels that he may have neck issues similar to what his brother has had surgery for in the past.     Lexiscan/echo

## 2023-08-28 NOTE — ASSESSMENT & PLAN NOTE
Lipid panel results from 5/8/2023 reviewed    Trig 102  HDL 48    Crestor 40 mg po daily  Lipids followed by PCP

## 2023-09-12 ENCOUNTER — HOSPITAL ENCOUNTER (OUTPATIENT)
Dept: RADIOLOGY | Facility: HOSPITAL | Age: 83
Discharge: HOME | End: 2023-09-12
Attending: NURSE PRACTITIONER
Payer: MEDICARE

## 2023-09-12 ENCOUNTER — HOSPITAL ENCOUNTER (OUTPATIENT)
Dept: CARDIOLOGY | Facility: HOSPITAL | Age: 83
Discharge: HOME | End: 2023-09-12
Attending: NURSE PRACTITIONER
Payer: MEDICARE

## 2023-09-12 VITALS — BODY MASS INDEX: 27.43 KG/M2 | HEIGHT: 68 IN | WEIGHT: 181 LBS

## 2023-09-12 VITALS
SYSTOLIC BLOOD PRESSURE: 144 MMHG | HEART RATE: 60 BPM | HEIGHT: 68 IN | DIASTOLIC BLOOD PRESSURE: 58 MMHG | BODY MASS INDEX: 27.52 KG/M2

## 2023-09-12 DIAGNOSIS — I25.10 ATHEROSCLEROSIS OF NATIVE CORONARY ARTERY OF NATIVE HEART WITHOUT ANGINA PECTORIS: ICD-10-CM

## 2023-09-12 DIAGNOSIS — R94.31 ABNORMAL ELECTROCARDIOGRAM (ECG) (EKG): ICD-10-CM

## 2023-09-12 DIAGNOSIS — R07.9 CHEST PAIN, UNSPECIFIED TYPE: ICD-10-CM

## 2023-09-12 LAB
AORTIC ROOT ANNULUS: 2.74 CM
AORTIC VALVE CUSP SEPERATION: 1.7 CM
AV INDEX (PROSTH): 0.92
AV MEAN GRADIENT: 2 MMHG
AV PEAK GRADIENT: 4 MMHG
AV VALVE AREA BY VELOCITY RATIO: 2.36 CM²
AV VALVE AREA: 2.51 CM²
AV VELOCITY RATIO: 0.87
BSA FOR ECHO PROCEDURE: 1.98 M2
CV ECHO LV RWT: 0.6 CM
CV STRESS BASE HR: 60 BPM
DIASTOLIC BLOOD PRESSURE: 58 MMHG
DOP CALC AO PEAK VEL: 1 M/S
DOP CALC AO VTI: 24.8 CM
DOP CALC LVOT AREA: 2.7 CM2
DOP CALC LVOT DIAMETER: 1.86 CM
DOP CALC LVOT PEAK VEL: 0.87 M/S
DOP CALC LVOT STROKE VOLUME: 62.19 CM3
DOP CALCLVOT PEAK VEL VTI: 22.9 CM
E WAVE DECELERATION TIME: 343.36 MSEC
E/A RATIO: 0.76
E/E' RATIO: 13.83 M/S
ECHO LV POSTERIOR WALL: 1.2 CM (ref 0.6–1.1)
EJECTION FRACTION: 55 %
FRACTIONAL SHORTENING: 14 % (ref 28–44)
INTERVENTRICULAR SEPTUM: 1.01 CM (ref 0.6–1.1)
LEFT ATRIUM SIZE: 3.4 CM
LEFT ATRIUM VOLUME INDEX MOD: 23.9 ML/M2
LEFT ATRIUM VOLUME MOD: 46.91 CM3
LEFT INTERNAL DIMENSION IN SYSTOLE: 3.44 CM (ref 2.1–4)
LEFT VENTRICLE DIASTOLIC VOLUME INDEX: 35.89 ML/M2
LEFT VENTRICLE DIASTOLIC VOLUME: 70.34 ML
LEFT VENTRICLE MASS INDEX: 75 G/M2
LEFT VENTRICLE SYSTOLIC VOLUME INDEX: 24.9 ML/M2
LEFT VENTRICLE SYSTOLIC VOLUME: 48.8 ML
LEFT VENTRICULAR INTERNAL DIMENSION IN DIASTOLE: 4.01 CM (ref 3.5–6)
LEFT VENTRICULAR MASS: 147.17 G
LV LATERAL E/E' RATIO: 11.86 M/S
LV SEPTAL E/E' RATIO: 16.6 M/S
LVOT MG: 1.56 MMHG
LVOT MV: 0.58 CM/S
MV PEAK A VEL: 1.09 M/S
MV PEAK E VEL: 0.83 M/S
MV STENOSIS PRESSURE HALF TIME: 99.57 MS
MV VALVE AREA P 1/2 METHOD: 2.21 CM2
OHS CV CPX 85 PERCENT MAX PREDICTED HEART RATE MALE: 116
OHS CV CPX MAX PREDICTED HEART RATE: 136
OHS CV CPX PATIENT IS FEMALE: 0
OHS CV CPX PATIENT IS MALE: 1
OHS CV CPX PEAK DIASTOLIC BLOOD PRESSURE: 60 MMHG
OHS CV CPX PEAK HEAR RATE: 75 BPM
OHS CV CPX PEAK RATE PRESSURE PRODUCT: NORMAL
OHS CV CPX PEAK SYSTOLIC BLOOD PRESSURE: 144 MMHG
OHS CV CPX PERCENT MAX PREDICTED HEART RATE ACHIEVED: 55
OHS CV CPX RATE PRESSURE PRODUCT PRESENTING: 8640
PISA TR MAX VEL: 2.37 M/S
PV PEAK GRADIENT: 3 MMHG
PV PEAK VELOCITY: 0.89 M/S
RA PRESSURE ESTIMATED: 3 MMHG
RA VOL SYS: 38.24 ML
RIGHT ATRIAL AREA: 15 CM2
RIGHT VENTRICLE DIASTOLIC LENGTH: 5.4 CM
RIGHT VENTRICLE DIASTOLIC MID DIMENSION: 1.9 CM
RIGHT VENTRICULAR END-DIASTOLIC DIMENSION: 3.5 CM
RIGHT VENTRICULAR LENGTH IN DIASTOLE (APICAL 4-CHAMBER VIEW): 5.38 CM
RV MID DIAMA: 1.88 CM
RV TB RVSP: 5 MMHG
SYSTOLIC BLOOD PRESSURE: 144 MMHG
TDI LATERAL: 0.07 M/S
TDI SEPTAL: 0.05 M/S
TDI: 0.06 M/S
TR MAX PG: 22 MMHG
TRICUSPID ANNULAR PLANE SYSTOLIC EXCURSION: 2.63 CM
TV REST PULMONARY ARTERY PRESSURE: 25 MMHG
Z-SCORE OF LEFT VENTRICULAR DIMENSION IN END DIASTOLE: -3.36
Z-SCORE OF LEFT VENTRICULAR DIMENSION IN END SYSTOLE: -0.02

## 2023-09-12 PROCEDURE — 93016 NUCLEAR STRESS TEST (CUPID ONLY): ICD-10-PCS | Mod: ,,, | Performed by: NURSE PRACTITIONER

## 2023-09-12 PROCEDURE — 93306 TTE W/DOPPLER COMPLETE: CPT | Mod: 26,,, | Performed by: STUDENT IN AN ORGANIZED HEALTH CARE EDUCATION/TRAINING PROGRAM

## 2023-09-12 PROCEDURE — 93016 CV STRESS TEST SUPVJ ONLY: CPT | Mod: ,,, | Performed by: NURSE PRACTITIONER

## 2023-09-12 PROCEDURE — 93018 CV STRESS TEST I&R ONLY: CPT | Mod: ,,, | Performed by: STUDENT IN AN ORGANIZED HEALTH CARE EDUCATION/TRAINING PROGRAM

## 2023-09-12 PROCEDURE — 63600175 PHARM REV CODE 636 W HCPCS: Performed by: NURSE PRACTITIONER

## 2023-09-12 PROCEDURE — A9500 TC99M SESTAMIBI: HCPCS

## 2023-09-12 PROCEDURE — 93306 TTE W/DOPPLER COMPLETE: CPT

## 2023-09-12 PROCEDURE — 78452 HT MUSCLE IMAGE SPECT MULT: CPT | Mod: TC

## 2023-09-12 PROCEDURE — 93018 NUCLEAR STRESS TEST (CUPID ONLY): ICD-10-PCS | Mod: ,,, | Performed by: STUDENT IN AN ORGANIZED HEALTH CARE EDUCATION/TRAINING PROGRAM

## 2023-09-12 PROCEDURE — 93306 ECHO (CUPID ONLY): ICD-10-PCS | Mod: 26,,, | Performed by: STUDENT IN AN ORGANIZED HEALTH CARE EDUCATION/TRAINING PROGRAM

## 2023-09-12 PROCEDURE — 93017 CV STRESS TEST TRACING ONLY: CPT

## 2023-09-12 PROCEDURE — 78452 HT MUSCLE IMAGE SPECT MULT: CPT | Mod: 26,,, | Performed by: STUDENT IN AN ORGANIZED HEALTH CARE EDUCATION/TRAINING PROGRAM

## 2023-09-12 PROCEDURE — 78452 NM MYOCARDIAL PERFUSION SPECT MULTI PHARM: ICD-10-PCS | Mod: 26,,, | Performed by: STUDENT IN AN ORGANIZED HEALTH CARE EDUCATION/TRAINING PROGRAM

## 2023-09-12 RX ORDER — REGADENOSON 0.08 MG/ML
0.4 INJECTION, SOLUTION INTRAVENOUS ONCE
Status: COMPLETED | OUTPATIENT
Start: 2023-09-12 | End: 2023-09-12

## 2023-09-12 RX ADMIN — REGADENOSON 0.4 MG: 0.08 INJECTION, SOLUTION INTRAVENOUS at 09:09

## 2023-10-11 DIAGNOSIS — Z95.5 HISTORY OF CORONARY ARTERY STENT PLACEMENT: ICD-10-CM

## 2023-10-11 DIAGNOSIS — E78.2 MIXED HYPERLIPIDEMIA: Chronic | ICD-10-CM

## 2023-10-11 DIAGNOSIS — I25.10 CORONARY ARTERY DISEASE INVOLVING NATIVE CORONARY ARTERY OF NATIVE HEART WITHOUT ANGINA PECTORIS: Chronic | ICD-10-CM

## 2023-10-11 DIAGNOSIS — K21.9 GASTROESOPHAGEAL REFLUX DISEASE WITHOUT ESOPHAGITIS: ICD-10-CM

## 2023-10-11 DIAGNOSIS — R07.9 CHEST PAIN, UNSPECIFIED TYPE: ICD-10-CM

## 2023-10-11 RX ORDER — CLOPIDOGREL BISULFATE 75 MG/1
75 TABLET ORAL DAILY
Qty: 90 TABLET | Refills: 3 | Status: SHIPPED | OUTPATIENT
Start: 2023-10-11 | End: 2024-02-08 | Stop reason: SDUPTHER

## 2023-10-11 RX ORDER — NITROGLYCERIN 0.4 MG/1
0.4 TABLET SUBLINGUAL EVERY 5 MIN PRN
Qty: 25 TABLET | Refills: 1 | Status: SHIPPED | OUTPATIENT
Start: 2023-10-11 | End: 2024-03-18 | Stop reason: SDUPTHER

## 2023-10-11 RX ORDER — PANTOPRAZOLE SODIUM 40 MG/1
40 TABLET, DELAYED RELEASE ORAL DAILY
Qty: 90 TABLET | Refills: 3 | Status: SHIPPED | OUTPATIENT
Start: 2023-10-11 | End: 2024-02-08 | Stop reason: SDUPTHER

## 2023-10-11 RX ORDER — ROSUVASTATIN CALCIUM 40 MG/1
40 TABLET, COATED ORAL NIGHTLY
Qty: 90 TABLET | Refills: 3 | Status: SHIPPED | OUTPATIENT
Start: 2023-10-11

## 2023-10-11 NOTE — TELEPHONE ENCOUNTER
----- Message from Cesilia Wood sent at 10/10/2023  4:00 PM CDT -----  Pt need refill on  nitroGLYCERIN , clopidogreL,LANTUS ,rosuvastatin, pantoprazole sent to [facility]

## 2023-11-14 ENCOUNTER — OFFICE VISIT (OUTPATIENT)
Dept: FAMILY MEDICINE | Facility: CLINIC | Age: 83
End: 2023-11-14
Payer: MEDICARE

## 2023-11-14 VITALS
HEIGHT: 68 IN | SYSTOLIC BLOOD PRESSURE: 119 MMHG | RESPIRATION RATE: 20 BRPM | WEIGHT: 186 LBS | OXYGEN SATURATION: 96 % | BODY MASS INDEX: 28.19 KG/M2 | HEART RATE: 65 BPM | DIASTOLIC BLOOD PRESSURE: 68 MMHG | TEMPERATURE: 98 F

## 2023-11-14 DIAGNOSIS — N18.31 STAGE 3A CHRONIC KIDNEY DISEASE: Chronic | ICD-10-CM

## 2023-11-14 DIAGNOSIS — I25.10 CORONARY ARTERY DISEASE INVOLVING NATIVE CORONARY ARTERY OF NATIVE HEART WITHOUT ANGINA PECTORIS: Chronic | ICD-10-CM

## 2023-11-14 DIAGNOSIS — N18.31 TYPE 2 DIABETES MELLITUS WITH STAGE 3A CHRONIC KIDNEY DISEASE, WITH LONG-TERM CURRENT USE OF INSULIN: Primary | Chronic | ICD-10-CM

## 2023-11-14 DIAGNOSIS — Z79.4 TYPE 2 DIABETES MELLITUS WITH STAGE 3A CHRONIC KIDNEY DISEASE, WITH LONG-TERM CURRENT USE OF INSULIN: Primary | Chronic | ICD-10-CM

## 2023-11-14 DIAGNOSIS — E11.22 TYPE 2 DIABETES MELLITUS WITH STAGE 3A CHRONIC KIDNEY DISEASE, WITH LONG-TERM CURRENT USE OF INSULIN: Primary | Chronic | ICD-10-CM

## 2023-11-14 DIAGNOSIS — I10 ESSENTIAL HYPERTENSION: Chronic | ICD-10-CM

## 2023-11-14 LAB
ANION GAP SERPL CALCULATED.3IONS-SCNC: 9 MMOL/L (ref 7–16)
BUN SERPL-MCNC: 29 MG/DL (ref 7–18)
BUN/CREAT SERPL: 24 (ref 6–20)
CALCIUM SERPL-MCNC: 8.7 MG/DL (ref 8.5–10.1)
CHLORIDE SERPL-SCNC: 105 MMOL/L (ref 98–107)
CO2 SERPL-SCNC: 30 MMOL/L (ref 21–32)
CREAT SERPL-MCNC: 1.23 MG/DL (ref 0.7–1.3)
EGFR (NO RACE VARIABLE) (RUSH/TITUS): 58 ML/MIN/1.73M2
EST. AVERAGE GLUCOSE BLD GHB EST-MCNC: 197 MG/DL
GLUCOSE SERPL-MCNC: 385 MG/DL (ref 74–106)
HBA1C MFR BLD HPLC: 8.5 % (ref 4.5–6.6)
POTASSIUM SERPL-SCNC: 5.2 MMOL/L (ref 3.5–5.1)
SODIUM SERPL-SCNC: 139 MMOL/L (ref 136–145)

## 2023-11-14 PROCEDURE — 99214 OFFICE O/P EST MOD 30 MIN: CPT | Mod: ,,, | Performed by: NURSE PRACTITIONER

## 2023-11-14 PROCEDURE — 80048 BASIC METABOLIC PNL TOTAL CA: CPT | Mod: ,,, | Performed by: CLINICAL MEDICAL LABORATORY

## 2023-11-14 PROCEDURE — 80048 BASIC METABOLIC PANEL: ICD-10-PCS | Mod: ,,, | Performed by: CLINICAL MEDICAL LABORATORY

## 2023-11-14 PROCEDURE — 83036 HEMOGLOBIN GLYCOSYLATED A1C: CPT | Mod: ,,, | Performed by: CLINICAL MEDICAL LABORATORY

## 2023-11-14 PROCEDURE — 83036 HEMOGLOBIN A1C: ICD-10-PCS | Mod: ,,, | Performed by: CLINICAL MEDICAL LABORATORY

## 2023-11-14 PROCEDURE — 99214 PR OFFICE/OUTPT VISIT, EST, LEVL IV, 30-39 MIN: ICD-10-PCS | Mod: ,,, | Performed by: NURSE PRACTITIONER

## 2023-11-14 RX ORDER — DAPAGLIFLOZIN 10 MG/1
10 TABLET, FILM COATED ORAL DAILY
Qty: 90 TABLET | Refills: 3 | Status: SHIPPED | OUTPATIENT
Start: 2023-11-14 | End: 2024-02-08 | Stop reason: SDUPTHER

## 2023-11-14 RX ORDER — METOPROLOL SUCCINATE 50 MG/1
50 TABLET, EXTENDED RELEASE ORAL DAILY
Qty: 90 TABLET | Refills: 3 | Status: ON HOLD | OUTPATIENT
Start: 2023-11-14 | End: 2024-02-05 | Stop reason: HOSPADM

## 2023-11-14 RX ORDER — INSULIN GLARGINE 100 [IU]/ML
70 INJECTION, SOLUTION SUBCUTANEOUS NIGHTLY
Qty: 63 ML | Refills: 3 | Status: SHIPPED | OUTPATIENT
Start: 2023-11-14 | End: 2024-01-04 | Stop reason: SDUPTHER

## 2023-11-14 NOTE — PROGRESS NOTES
"   [facility]       PATIENT NAME: Negro Hale   : 1940    AGE: 83 y.o. DATE OF ENCOUNTER: 23    MRN: [id number]      PCP: Smiley Trevino FNP    Reason for Visit / Chief Complaint:  Follow-up (Patient presents to the clinic for 3m f/u dm ckd)         274}    Subjective:     HPI:    Presents for 3 mth f/u uncontrolled T2DM, not taking meds as prescribed and only checks glucose "every once and a while" states insurance wouldn't cover CGM, and admits noncompliance with diet.    Prior to running out of lantus he was taking "50-60 units daily".  Reports he ordered his insulin but didn't get any and hasn't had it in 2 wks; also didn't get toprol or farxiga.    Review of Systems:   Review of Systems   Constitutional: Negative.    HENT: Negative.     Eyes: Negative.    Respiratory: Negative.     Cardiovascular: Negative.    Gastrointestinal: Negative.    Endocrine: Negative.    Genitourinary: Negative.    Musculoskeletal: Negative.    Skin: Negative.    Allergic/Immunologic: Negative.    Neurological: Negative.    Hematological: Negative.    Psychiatric/Behavioral: Negative.         Allergies and Meds: 274}     Review of patient's allergies indicates:   Allergen Reactions    Mayonnaise      Upset stomach        Current Outpatient Medications:     cholecalciferol, vitamin D3, 125 mcg (5,000 unit) capsule, Take 5,000 Units by mouth once daily., Disp: , Rfl:     clopidogreL (PLAVIX) 75 mg tablet, Take 1 tablet (75 mg total) by mouth once daily., Disp: 90 tablet, Rfl: 3    coenzyme Q10 200 mg capsule, Take 200 mg by mouth once daily., Disp: , Rfl:     nitroGLYCERIN (NITROSTAT) 0.4 MG SL tablet, Place 1 tablet (0.4 mg total) under the tongue every 5 (five) minutes as needed for Chest pain., Disp: 25 tablet, Rfl: 1    pantoprazole (PROTONIX) 40 MG tablet, Take 1 tablet (40 mg total) by mouth once daily., Disp: 90 tablet, Rfl: 3    rosuvastatin (CRESTOR) 40 MG Tab, Take 1 " "tablet (40 mg total) by mouth every evening., Disp: 90 tablet, Rfl: 3    SURE COMFORT PEN NEEDLE 32 gauge x 5/32" Ndle, AS DIRECTED, Disp: , Rfl:     dapagliflozin propanediol (FARXIGA) 10 mg tablet, Take 1 tablet (10 mg total) by mouth once daily., Disp: 90 tablet, Rfl: 3    insulin glargine (LANTUS U-100 INSULIN) 100 unit/mL injection, Inject 70 Units into the skin every evening., Disp: 63 mL, Rfl: 3    metoprolol succinate (TOPROL-XL) 50 MG 24 hr tablet, Take 1 tablet (50 mg total) by mouth once daily., Disp: 90 tablet, Rfl: 3    Labs:274}   I have reviewed labs below:  Lab Results   Component Value Date    WBC 6.10 08/19/2023    RBC 4.56 (L) 08/19/2023    HGB 13.5 08/19/2023    HCT 41.2 08/19/2023     08/19/2023     08/19/2023    K 4.7 08/19/2023     (H) 08/19/2023    CALCIUM 8.6 08/19/2023     (H) 08/19/2023    BUN 31 (H) 08/19/2023    CREATININE 1.29 08/19/2023    ESTGFRAFRICA 64 03/19/2021    EGFRNONAA 45 (L) 05/19/2022    ALT 35 08/19/2023    AST 31 08/19/2023    INR 1.02 08/19/2023    CHOL 169 05/08/2023    TRIG 102 05/08/2023    HDL 48 05/08/2023    LDLCALC 101 05/08/2023    TSH 2.880 12/21/2021    PSA 0.636 03/19/2021    HGBA1C 9.6 (H) 08/10/2023    MICROALBUR 3.2 (H) 05/08/2023       Medical History: 274}     Past Medical History:   Diagnosis Date    Abnormal thyroid stimulating hormone (TSH) level 08/22/2019    Anemia 08/15/2019    Atrial fibrillation 09/07/2012    Bradycardia 05/23/2017    asymptomatic    Change in bowel habit 11/13/2018    Chronic kidney disease, unspecified 01/14/2019    Coronary arteriosclerosis 09/07/2012    Disease of skin and subcutaneous tissue 08/16/2017    medial aspect RLE- cystic structure seen on venous doppler US.    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    Hyperlipidemia 09/21/2012    Lower extremity edema 04/06/2017    Lumbar radiculopathy 01/26/2017    Obesity, unspecified 12/05/2016    Old myocardial infarction 12/06/2017    Other secondary pulmonary hypertension 05/23/2017    Other spondylosis, lumbosacral region 01/26/2017    Peripheral vascular disease     Personal history of tobacco use, presenting hazards to health 11/17/2016    Pulmonary hypertension     Type 2 diabetes mellitus with chronic kidney disease 01/14/2019    Type 2 diabetes mellitus with hyperglycemia 10/02/2011    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 Lewis Eye exam    Varicose veins of both lower extremities with pain 01/02/2019    Venous insufficiency 01/08/2019      Social History     Tobacco Use   Smoking Status Former   Smokeless Tobacco Never   Tobacco Comments    Quit 10/15/1976      Past Surgical History:   Procedure Laterality Date    APPENDECTOMY      CARDIAC CATHETERIZATION  2007    [facility]- Stent placement    CATARACT EXTRACTION, BILATERAL      COLONOSCOPY  11/06/2019    Dr. Mendoza    HERNIA REPAIR      Inguinal hernia repair    TONSILLECTOMY          Health Maintenance: 274}     Health Maintenance         Date Due Completion Date    Shingles Vaccine (1 of 2) Never done ---    RSV Vaccine (Age 60+) (1 - 1-dose 60+ series) Never done ---    Influenza Vaccine (1) Never done ---    COVID-19 Vaccine (2 - 2023-24 season) 09/01/2023 2/21/2021    Hemoglobin A1c 02/14/2024 11/14/2023    Diabetes Urine Screening 05/08/2024 5/8/2023    Lipid Panel 05/08/2024 5/8/2023    Eye Exam 08/25/2024 8/25/2023    Override on 6/24/2022: Done ([facility])    Override on 5/28/2020: Done ([facility]. Scanned into documents.)  "   Aspirin/Antiplatelet Therapy 11/14/2024 11/14/2023    TETANUS VACCINE 05/19/2032 5/19/2022          Immunization History   Administered Date(s) Administered    COVID-19, MRNA, LN-S, PF (MODERNA FULL 0.5 ML DOSE) 01/15/2021    COVID-19, mRNA, LNP-S, bivalent booster, PF (Moderna Omicron) 02/21/2021    Pneumococcal Conjugate - 13 Valent 11/13/2018    Pneumococcal Polysaccharide - 23 Valent 01/28/2020    Tdap 05/19/2022     Objective:  274}   /68 (BP Location: Right arm, Patient Position: Sitting, BP Method: X-Large (Automatic))   Pulse 65   Temp 97.7 °F (36.5 °C) (Oral)   Resp 20   Ht 5' 8" (1.727 m)   Wt 84.4 kg (186 lb)   SpO2 96%   BMI 28.28 kg/m²     Wt Readings from Last 3 Encounters:   11/14/23 84.4 kg (186 lb)   09/12/23 82.1 kg (181 lb)   08/28/23 82.2 kg (181 lb 3.2 oz)     BP Readings from Last 3 Encounters:   11/14/23 119/68   09/12/23 (!) 144/58   08/28/23 122/60     Body mass index is 28.28 kg/m².     Physical Exam  Vitals and nursing note reviewed.   Constitutional:       General: He is not in acute distress.     Appearance: Normal appearance.   HENT:      Head: Normocephalic.   Eyes:      Conjunctiva/sclera: Conjunctivae normal.   Neck:      Thyroid: No thyromegaly or thyroid tenderness.      Trachea: Trachea normal.   Cardiovascular:      Rate and Rhythm: Normal rate and regular rhythm.      Pulses: Normal pulses.      Heart sounds: Normal heart sounds.   Pulmonary:      Effort: Pulmonary effort is normal. No respiratory distress.      Breath sounds: Normal breath sounds.   Musculoskeletal:      Cervical back: Neck supple.      Right lower leg: No edema.      Left lower leg: No edema.   Lymphadenopathy:      Cervical: No cervical adenopathy.   Skin:     General: Skin is warm and dry.   Neurological:      Mental Status: He is alert and oriented to person, place, and time.   Psychiatric:         Mood and Affect: Mood normal.         Behavior: Behavior normal.          Assessment and Plan: " 274}     1. Type 2 diabetes mellitus with stage 3a chronic kidney disease, with long-term current use of insulin  Comments:  poorly controlled, last A1c 9.6% - hasn't taken any DM meds (insulin or farxiga) in > 2 wks  noncompliant w/ monitoring and diet  Orders:  -     Basic Metabolic Panel; Future; Expected date: 11/14/2023  -     Hemoglobin A1C; Future; Expected date: 11/14/2023  -     dapagliflozin propanediol (FARXIGA) 10 mg tablet; Take 1 tablet (10 mg total) by mouth once daily.  Dispense: 90 tablet; Refill: 3  -     insulin glargine (LANTUS U-100 INSULIN) 100 unit/mL injection; Inject 70 Units into the skin every evening.  Dispense: 63 mL; Refill: 3    2. Stage 3a chronic kidney disease  Comments:  Stable, continue monitoring.    3. Essential hypertension  Comments:  Controlled, continue metoprolol XL.  Orders:  -     metoprolol succinate (TOPROL-XL) 50 MG 24 hr tablet; Take 1 tablet (50 mg total) by mouth once daily.  Dispense: 90 tablet; Refill: 3    4. Coronary artery disease involving native coronary artery of native heart without angina pectoris  Comments:  stable/controlled, followed by cardiology  Overview:  BHAKTI mid LCx 2/26/2007    Orders:  -     metoprolol succinate (TOPROL-XL) 50 MG 24 hr tablet; Take 1 tablet (50 mg total) by mouth once daily.  Dispense: 90 tablet; Refill: 3    Shouldn't have been out of refills, not sure how he ran out of meds. Resume meds and take daily as prescribed.  Needs to monitor glucose at minimum daily, but ideally 3 times per day, and needs to improve dietary compliance.  Wants to try [facility] since [facility] isn't sending meds as prescribed.    Return to clinic 3 mth f/u uncontrolled T2DM, CKD, HTN, non-fasting labs; and sooner as needed.    Future Appointments   Date Time Provider Department Center   12/27/2023  9:00 AM DENA NURSEDAR [facility]   2/15/2024 10:20 AM Smiley Trevino FNP [facility]         Signature:  BARBARA Green      Reviewed on 01/04/2023 by Thaddeus Macias MD

## 2023-11-20 NOTE — PROGRESS NOTES
Call pt and review results. 1.1% improvement in A1c, but I am surprised at that since his glucose was 385 and he had not been taking insulin or Farxiga x2 wks prior to labs.  Hopefully his A1c will improve a lot when back taking his meds consistently.

## 2023-11-30 ENCOUNTER — OFFICE VISIT (OUTPATIENT)
Dept: FAMILY MEDICINE | Facility: CLINIC | Age: 83
End: 2023-11-30
Payer: MEDICARE

## 2023-11-30 VITALS
BODY MASS INDEX: 27.89 KG/M2 | DIASTOLIC BLOOD PRESSURE: 66 MMHG | TEMPERATURE: 98 F | RESPIRATION RATE: 20 BRPM | HEART RATE: 65 BPM | OXYGEN SATURATION: 99 % | HEIGHT: 68 IN | SYSTOLIC BLOOD PRESSURE: 106 MMHG | WEIGHT: 184 LBS

## 2023-11-30 DIAGNOSIS — J01.90 ACUTE NON-RECURRENT SINUSITIS, UNSPECIFIED LOCATION: Primary | ICD-10-CM

## 2023-11-30 PROCEDURE — 99213 PR OFFICE/OUTPT VISIT, EST, LEVL III, 20-29 MIN: ICD-10-PCS | Mod: ,,, | Performed by: NURSE PRACTITIONER

## 2023-11-30 PROCEDURE — 99213 OFFICE O/P EST LOW 20 MIN: CPT | Mod: ,,, | Performed by: NURSE PRACTITIONER

## 2023-11-30 RX ORDER — AMOXICILLIN 875 MG/1
875 TABLET, FILM COATED ORAL EVERY 12 HOURS
Qty: 20 TABLET | Refills: 0 | Status: SHIPPED | OUTPATIENT
Start: 2023-11-30 | End: 2023-12-10

## 2023-11-30 NOTE — PROGRESS NOTES
[facility]       PATIENT NAME: Negro aHle   : 1940    AGE: 83 y.o. DATE OF ENCOUNTER: 23   MRN: [id number]      Visit type: WALK-IN  PCP:  Smiley Trevino FNP    Reason for Visit / Chief Complaint: Sinus Problem (Patient presents to clinic with complaints of sinus pressure and cough for over a week. )     Subjective:     Presents c/o upper resp X 1 wk with lots of PND & cough.  Started to feel some better.    Review of Systems:     Review of Systems   Constitutional:  Positive for fatigue. Negative for chills and fever.   HENT:  Positive for congestion, postnasal drip, rhinorrhea and sore throat. Negative for ear pain.    Respiratory:  Positive for cough. Negative for shortness of breath and wheezing.    Cardiovascular: Negative.    Skin: Negative.    Neurological: Negative.        Allergies and Meds:     Review of patient's allergies indicates:   Allergen Reactions    Mayonnaise      Upset stomach        Current Outpatient Medications:     cholecalciferol, vitamin D3, 125 mcg (5,000 unit) capsule, Take 5,000 Units by mouth once daily., Disp: , Rfl:     clopidogreL (PLAVIX) 75 mg tablet, Take 1 tablet (75 mg total) by mouth once daily., Disp: 90 tablet, Rfl: 3    coenzyme Q10 200 mg capsule, Take 200 mg by mouth once daily., Disp: , Rfl:     dapagliflozin propanediol (FARXIGA) 10 mg tablet, Take 1 tablet (10 mg total) by mouth once daily., Disp: 90 tablet, Rfl: 3    insulin glargine (LANTUS U-100 INSULIN) 100 unit/mL injection, Inject 70 Units into the skin every evening., Disp: 63 mL, Rfl: 3    metoprolol succinate (TOPROL-XL) 50 MG 24 hr tablet, Take 1 tablet (50 mg total) by mouth once daily., Disp: 90 tablet, Rfl: 3    nitroGLYCERIN (NITROSTAT) 0.4 MG SL tablet, Place 1 tablet (0.4 mg total) under the tongue every 5 (five) minutes as needed for Chest pain., Disp: 25 tablet, Rfl: 1    pantoprazole (PROTONIX) 40 MG tablet, Take 1 tablet (40 mg total) by mouth  "once daily., Disp: 90 tablet, Rfl: 3    rosuvastatin (CRESTOR) 40 MG Tab, Take 1 tablet (40 mg total) by mouth every evening., Disp: 90 tablet, Rfl: 3    SURE COMFORT PEN NEEDLE 32 gauge x 5/32" Ndle, AS DIRECTED, Disp: , Rfl:     amoxicillin (AMOXIL) 875 MG tablet, Take 1 tablet (875 mg total) by mouth every 12 (twelve) hours. for 10 days, Disp: 20 tablet, Rfl: 0    Medical History:     Past Medical History:   Diagnosis Date    Abnormal thyroid stimulating hormone (TSH) level 08/22/2019    Anemia 08/15/2019    Atrial fibrillation 09/07/2012    Bradycardia 05/23/2017    asymptomatic    Change in bowel habit 11/13/2018    Chronic kidney disease, unspecified 01/14/2019    Coronary arteriosclerosis 09/07/2012    Disease of skin and subcutaneous tissue 08/16/2017    medial aspect RLE- cystic structure seen on venous doppler US.    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 Lewis Eye exam    Varicose veins of " "both lower extremities with pain 01/02/2019    Venous insufficiency 01/08/2019      Social History     Tobacco Use   Smoking Status Former   Smokeless Tobacco Never   Tobacco Comments    Quit 10/15/1976       Immunization History   Administered Date(s) Administered    COVID-19, MRNA, LN-S, PF (MODERNA FULL 0.5 ML DOSE) 01/15/2021    COVID-19, mRNA, LNP-S, bivalent booster, PF (Moderna Omicron) 02/21/2021    Pneumococcal Conjugate - 13 Valent 11/13/2018    Pneumococcal Polysaccharide - 23 Valent 01/28/2020    Tdap 05/19/2022        Objective:     Wt Readings from Last 3 Encounters:   11/30/23 1129 83.5 kg (184 lb)   11/14/23 1028 84.4 kg (186 lb)   09/12/23 0857 82.1 kg (181 lb)       /66 (BP Location: Right arm, Patient Position: Sitting, BP Method: Large (Automatic))   Pulse 65   Temp 98.4 °F (36.9 °C) (Oral)   Resp 20   Ht 5' 8" (1.727 m)   Wt 83.5 kg (184 lb)   SpO2 99%   BMI 27.98 kg/m²   Body mass index is 27.98 kg/m².     Physical Exam:    Physical Exam  Vitals and nursing note reviewed.   Constitutional:       General: He is not in acute distress.     Appearance: Normal appearance.   HENT:      Head: Normocephalic.      Right Ear: Hearing, tympanic membrane, ear canal and external ear normal.      Left Ear: Hearing, tympanic membrane, ear canal and external ear normal.      Nose: Congestion and rhinorrhea present. Rhinorrhea is purulent.      Right Turbinates: Swollen.      Left Turbinates: Swollen.      Right Sinus: No maxillary sinus tenderness or frontal sinus tenderness.      Left Sinus: No maxillary sinus tenderness or frontal sinus tenderness.      Mouth/Throat:      Lips: Pink.      Mouth: Mucous membranes are moist.      Tongue: No lesions.      Pharynx: Uvula midline. No pharyngeal swelling, oropharyngeal exudate, posterior oropharyngeal erythema (injected, PND) or uvula swelling.   Eyes:      Conjunctiva/sclera: Conjunctivae normal.      Pupils: Pupils are equal, round, and reactive to " light.   Cardiovascular:      Rate and Rhythm: Normal rate and regular rhythm.      Heart sounds: Normal heart sounds.   Pulmonary:      Effort: Pulmonary effort is normal. No respiratory distress.      Breath sounds: Normal breath sounds. No wheezing, rhonchi or rales.      Comments: No cough noted during visit.  Musculoskeletal:      Cervical back: No rigidity.   Lymphadenopathy:      Cervical: No cervical adenopathy.   Skin:     General: Skin is warm and dry.   Neurological:      General: No focal deficit present.      Mental Status: He is alert and oriented to person, place, and time.       Assessment and Plan:        1. Acute non-recurrent sinusitis, unspecified location  -     amoxicillin (AMOXIL) 875 MG tablet; Take 1 tablet (875 mg total) by mouth every 12 (twelve) hours. for 10 days  Dispense: 20 tablet; Refill: 0       F/u as needed or if symptoms worsen or persist.  Future Appointments   Date Time Provider Department Center   12/27/2023  9:00 AM AWJIM NURSE, [facility] FAMILY MEDICINE [facility]   2/15/2024 10:20 AM Smiley Trevino FNP [facility]       Signature: Smiley Trevino [facility]

## 2023-12-09 DIAGNOSIS — Z71.89 COMPLEX CARE COORDINATION: ICD-10-CM

## 2024-01-21 ENCOUNTER — HOSPITAL ENCOUNTER (EMERGENCY)
Facility: HOSPITAL | Age: 84
Discharge: HOME | End: 2024-01-21
Attending: EMERGENCY MEDICINE
Payer: MEDICARE

## 2024-01-21 VITALS
HEART RATE: 71 BPM | HEIGHT: 68 IN | WEIGHT: 176 LBS | SYSTOLIC BLOOD PRESSURE: 172 MMHG | BODY MASS INDEX: 26.67 KG/M2 | RESPIRATION RATE: 20 BRPM | DIASTOLIC BLOOD PRESSURE: 114 MMHG | OXYGEN SATURATION: 97 % | TEMPERATURE: 98 F

## 2024-01-21 DIAGNOSIS — S01.81XA LACERATION OF FOREHEAD, INITIAL ENCOUNTER: Primary | ICD-10-CM

## 2024-01-21 PROCEDURE — 90471 IMMUNIZATION ADMIN: CPT | Performed by: EMERGENCY MEDICINE

## 2024-01-21 PROCEDURE — 99284 EMERGENCY DEPT VISIT MOD MDM: CPT | Mod: 25,,, | Performed by: EMERGENCY MEDICINE

## 2024-01-21 PROCEDURE — 12013 RPR F/E/E/N/L/M 2.6-5.0 CM: CPT | Mod: ,,, | Performed by: EMERGENCY MEDICINE

## 2024-01-21 PROCEDURE — 99285 EMERGENCY DEPT VISIT HI MDM: CPT | Mod: 25

## 2024-01-21 PROCEDURE — 25000003 PHARM REV CODE 250: Performed by: EMERGENCY MEDICINE

## 2024-01-21 PROCEDURE — 63600175 PHARM REV CODE 636 W HCPCS: Performed by: EMERGENCY MEDICINE

## 2024-01-21 PROCEDURE — 90715 TDAP VACCINE 7 YRS/> IM: CPT | Performed by: EMERGENCY MEDICINE

## 2024-01-21 PROCEDURE — 12013 RPR F/E/E/N/L/M 2.6-5.0 CM: CPT

## 2024-01-21 RX ORDER — LIDOCAINE HYDROCHLORIDE 10 MG/ML
10 INJECTION, SOLUTION EPIDURAL; INFILTRATION; INTRACAUDAL; PERINEURAL
Status: COMPLETED | OUTPATIENT
Start: 2024-01-21 | End: 2024-01-21

## 2024-01-21 RX ORDER — ACETAMINOPHEN 325 MG/1
650 TABLET ORAL
Status: COMPLETED | OUTPATIENT
Start: 2024-01-21 | End: 2024-01-21

## 2024-01-21 RX ADMIN — TETANUS TOXOID, REDUCED DIPHTHERIA TOXOID AND ACELLULAR PERTUSSIS VACCINE, ADSORBED 0.5 ML: 5; 2.5; 8; 8; 2.5 SUSPENSION INTRAMUSCULAR at 03:01

## 2024-01-21 RX ADMIN — BACITRACIN ZINC, NEOMYCIN, POLYMYXIN B 1 EACH: 400; 3.5; 5 OINTMENT TOPICAL at 03:01

## 2024-01-21 RX ADMIN — LIDOCAINE HYDROCHLORIDE 100 MG: 10 INJECTION, SOLUTION EPIDURAL; INFILTRATION; INTRACAUDAL; PERINEURAL at 03:01

## 2024-01-21 RX ADMIN — ACETAMINOPHEN 650 MG: 325 TABLET ORAL at 03:01

## 2024-01-21 NOTE — ED PROVIDER NOTES
Encounter Date: 1/21/2024    SCRIBE #1 NOTE: I, Michael Gonzáles, am scribing for, and in the presence of,  Jamal Hollins MD. I have scribed the entire note.       History     Chief Complaint   Patient presents with    Fall    Head Laceration    Elbow Pain     Left elbow with skin tear    Knee Pain     Abrasions to bilateral knees     83 y.o.male presents to the ED for a fall. PT states he fell from the stairs 2 hours ago and caused a laceration above his left eyebrow, wound on his nose,laceration on both knees, and his left forearm. PT also scraped both thumbs. PT stated he doesn't have any sickness in his home and didn't lose consciousness. PT has  HX of smoking.  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   PT reports no pain/ cough presented in the ED at this time.     The history is provided by the patient. No  was used.   Laceration   The incident occurred today. The laceration is located on the Face, right hand, left hand, left leg, right leg and left arm. The laceration mechanism is unknown.He reports no foreign bodies present.     Review of patient's allergies indicates:   Allergen Reactions    Mayonnaise      Upset stomach     Past Medical History:   Diagnosis Date    Abnormal thyroid stimulating hormone (TSH) level 08/22/2019    Anemia 08/15/2019    Atrial fibrillation 09/07/2012    Bradycardia 05/23/2017    asymptomatic    Change in bowel habit 11/13/2018    Chronic kidney disease, unspecified 01/14/2019    Coronary arteriosclerosis 09/07/2012    Disease of skin and subcutaneous tissue 08/16/2017    medial aspect RLE- cystic structure seen on venous doppler US.    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    Hyperlipidemia 09/21/2012    Lower extremity edema 04/06/2017    Lumbar radiculopathy 01/26/2017    Obesity, unspecified 12/05/2016    Old myocardial infarction 12/06/2017    Other secondary pulmonary hypertension 05/23/2017    Other spondylosis, lumbosacral region 01/26/2017    Peripheral vascular disease     Personal history of tobacco use, presenting hazards to health 11/17/2016    Pulmonary hypertension     Type 2 diabetes mellitus with chronic kidney disease 01/14/2019    Type 2 diabetes mellitus with hyperglycemia 10/02/2011    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 Lewis Eye exam    Varicose veins of both lower extremities with pain 01/02/2019    Venous insufficiency 01/08/2019     Past Surgical History:   Procedure Laterality Date    APPENDECTOMY      CARDIAC CATHETERIZATION  2007    [facility]- Stent placement    CATARACT EXTRACTION, BILATERAL      COLONOSCOPY  11/06/2019    Dr. Mendoza    HERNIA REPAIR      Inguinal hernia repair    TONSILLECTOMY       Family History   Problem Relation Age of Onset    No Known Problems Mother     Heart disease Father     Throat cancer Sister     Diabetes Brother     No Known Problems Brother     No Known Problems Maternal Grandmother     No Known Problems Maternal Grandfather     No Known Problems Paternal Grandmother     No Known Problems Paternal Grandfather      Social History     Tobacco Use    Smoking status: Former    Smokeless tobacco: Never    Tobacco comments:     Quit 10/15/1976   Substance Use Topics    Alcohol use: Yes     Comment: occasionally     Drug use: Never     Review of Systems   Constitutional:  Negative for fever.   Respiratory:  Negative for cough.    Cardiovascular:  Negative for leg swelling.   Gastrointestinal:  Negative for diarrhea, nausea and vomiting.   Musculoskeletal:  Negative for back pain, joint swelling and neck pain.   Skin:  Positive for wound (Lacerations on right forehead, both knees, left elbow, both thumbs).   Neurological:  Negative for weakness.   All other systems reviewed and are negative.      Physical Exam     Initial Vitals [01/21/24 1330]   BP Pulse Resp Temp SpO2   (!) 172/114 71 20 97.5 °F (36.4 °C) 97 %      MAP       --         Physical Exam    Nursing note and vitals reviewed.  Constitutional: He appears well-developed and well-nourished.   HENT:   SCRAPE ON NOSE / DOESN'T REQUIRE REPAIR OR STITCHES.    Eyes: EOM are normal.   Neck:   Normal range of motion.  Cardiovascular:  Normal rate, regular rhythm and normal heart sounds.           Pulmonary/Chest: Breath sounds normal.   Abdominal: Abdomen is soft.   Musculoskeletal:         General: No tenderness or edema. Normal range of motion.      Cervical back: Normal range of motion.     Neurological: He is alert.   Skin: Skin is warm.   Lacerations on right forehead, both knees, left elbow, both thumbs   Psychiatric: He has a normal mood and affect. His behavior is normal. Judgment and thought content normal.         ED Course   Lac Repair    Date/Time: 1/21/2024 4:31 PM    Performed by: Jamal Hollins MD  Authorized by: Jamal Hollins MD    Consent:     Consent obtained:  Verbal    Consent given by:  Patient  Universal protocol:     Patient identity confirmed:  Verbally with patient and arm band  Anesthesia:     Anesthesia method:  Local infiltration    Local anesthetic:  Lidocaine 1% w/o epi  Laceration details:     Location:  Face    Face location:  Forehead    Length (cm):  4.5  Pre-procedure details:     Preparation:  Patient was prepped and draped in  usual sterile fashion  Treatment:     Area cleansed with:  Povidone-iodine    Amount of cleaning:  Standard  Skin repair:     Repair method:  Sutures    Suture size:  5-0    Suture material:  Prolene    Suture technique:  Simple interrupted    Number of sutures:  8  Approximation:     Approximation:  Close  Post-procedure details:     Procedure completion:  Tolerated well, no immediate complications  Lac Repair    Date/Time: 1/21/2024 4:38 PM    Performed by: Jamal Hollins MD  Authorized by: Jamal Hollins MD    Consent:     Consent obtained:  Written    Consent given by:  Patient  Universal protocol:     Patient identity confirmed:  Verbally with patient and arm band  Anesthesia:     Anesthesia method:  None  Laceration details:     Location:  Shoulder/arm    Shoulder/arm location:  L elbow  Skin repair:     Repair method:  Steri-Strips  Post-procedure details:     Dressing:  Antibiotic ointment and adhesive bandage  Lac Repair    Date/Time: 1/21/2024 4:40 PM    Performed by: Jamal Hollins MD  Authorized by: Jamal Hollins MD    Consent:     Consent obtained:  Written    Consent given by:  Patient  Universal protocol:     Patient identity confirmed:  Verbally with patient and arm band  Anesthesia:     Anesthesia method:  None  Laceration details:     Location:  Finger    Finger location:  L thumb  Exploration:     Limited defect created (wound extended): no      Contaminated: no    Comments:      Was taken care of on both thumbs by Nurse on duty with bandages. NO SUTURES NEEDED.     Labs Reviewed - No data to display       Imaging Results              CT Cervical Spine Without Contrast (Final result)  Result time 01/21/24 14:47:31      Final result by Paul Rodriguez DO (01/21/24 14:47:31)                   Impression:      No convincing CT evidence of acute injury involving the osseous cervical spine.    The CT exam was performed using one or more of the following dose    reduction  techniques- Automated exposure control, adjustment of the mA    and/or kV according to patient size, and/or use of iterative    reconstructed technique.    Point of Service: [facility]      Electronically signed by: Paul Rodriguez  Date:    01/21/2024  Time:    14:47               Narrative:    EXAMINATION:  CT CERVICAL SPINE WITHOUT CONTRAST    CLINICAL HISTORY:  FALL;    COMPARISON:  None    TECHNIQUE:  Multiple axial tomographic images of the cervical spine were obtained without the use of intravenous contrast. Coronal and sagittal reformatted images provided.    FINDINGS:  Scattered degenerative change of the cervical spine.  Cervical vertebral body heights appear maintained. Scattered posterior facet and uncovertebral joint hypertrophy.  Osseous fusion of posterior elements at several locations.  Bridging osteophyte formation noted at several levels.                                       CT Head Without Contrast (Final result)  Result time 01/21/24 14:45:21      Final result by Paul Rodriguez DO (01/21/24 14:45:21)                   Impression:      No convincing evidence of acute intracranial hemorrhage.  Probable chronic microvascular ischemic change and volume loss.  Small left frontal scalp contusion/laceration.    The CT exam was performed using one or more of the following dose    reduction techniques- Automated exposure control, adjustment of the mA    and/or kV according to patient size, and/or use of iterative    reconstructed technique.    Point of Service: [facility]      Electronically signed by: Paul Rodriguez  Date:    01/21/2024  Time:    14:45               Narrative:    EXAMINATION:  CT HEAD WITHOUT CONTRAST    CLINICAL HISTORY:  Polytrauma, blunt;    COMPARISON:  CT brain December 16, 2016    TECHNIQUE:  Multiple axial tomographic images of the brain were obtained without the use of intravenous contrast.    FINDINGS:  Midline structures are nondisplaced.  No  convincing evidence of acute intracranial hemorrhage.  No convincing evidence of hydrocephalus.  Moderate global volume loss demonstrated.  Mild periventricular and subcortical hypoattenuation noted which is nonspecific but consistent with chronic microvascular ischemic change. Less likely considerations include demyelinating process and vasculitis. Small left frontal scalp contusion/laceration.  Visualized paranasal sinuses and mastoid air cells are predominantly clear.                                       Medications   LIDOcaine (PF) 10 mg/ml (1%) injection 100 mg (100 mg Infiltration Given 1/21/24 1503)   acetaminophen tablet 650 mg (650 mg Oral Given 1/21/24 1500)   neomycin-bacitracnZn-polymyxnB packet (1 each Topical (Top) Given 1/21/24 1503)   Tdap (BOOSTRIX) vaccine injection 0.5 mL (0.5 mLs Intramuscular Given 1/21/24 1529)     Medical Decision Making  [facility]    Patient presents for emergent evaluation of acute fall that poses a threat to life and/or bodily function.    In the ED patient found to have acute laceration of head abrasion of nose bright had left forearm bilateral knees.  Not consistent with fracture of the extremities.  No hip for long bone tenderness.  Full range of motion of all joints ambulating well.  Patient fell due to a mechanical reason do not suspect syncope or MI. neck nontender not consistent with cervical fracture.  GCS 15 no loss of consciousness  I ordered CT scan and personally reviewed it and reviewed the radiologist interpretation.  CT significant for CT head and C-spine unremarkable.      Discharge MDM  Patient was managed in the ED with sutures Steri-Strips left forearm and bandages on the abrasions   The response to treatment was improved.    Patient was discharged in stable condition.  Detailed return precautions discussed.     Amount and/or Complexity of Data Reviewed  Radiology: ordered.    Risk  OTC drugs.  Prescription drug management.              Attending Attestation:            Physician Attestation for Scribe:  Physician Attestation Statement for Scribe #1: I, Jamal Hollins MD, reviewed documentation, as scribed by Michael Gonzáles in my presence, and it is both accurate and complete.                                    Clinical Impression:  Final diagnoses:  [S01.81XA] Laceration of forehead, initial encounter (Primary)          ED Disposition Condition    Discharge Stable          ED Prescriptions    None       Follow-up Information       Follow up With Specialties Details Why Contact Info    [facility] - Emergency Department Emergency Medicine Go in 6 days For suture removal [address] [place] [id number] [phone]             Jamal Hollins MD  01/21/24 3055

## 2024-01-22 ENCOUNTER — TELEPHONE (OUTPATIENT)
Dept: EMERGENCY MEDICINE | Facility: HOSPITAL | Age: 84
End: 2024-01-22
Payer: MEDICARE

## 2024-01-27 ENCOUNTER — HOSPITAL ENCOUNTER (EMERGENCY)
Facility: HOSPITAL | Age: 84
Discharge: HOME | End: 2024-01-27
Payer: MEDICARE

## 2024-01-27 VITALS
RESPIRATION RATE: 20 BRPM | TEMPERATURE: 98 F | HEIGHT: 68 IN | BODY MASS INDEX: 26.67 KG/M2 | WEIGHT: 176 LBS | OXYGEN SATURATION: 97 % | DIASTOLIC BLOOD PRESSURE: 73 MMHG | HEART RATE: 82 BPM | SYSTOLIC BLOOD PRESSURE: 123 MMHG

## 2024-01-27 DIAGNOSIS — Z48.02 VISIT FOR SUTURE REMOVAL: Primary | ICD-10-CM

## 2024-01-27 PROCEDURE — 99024 POSTOP FOLLOW-UP VISIT: CPT | Mod: ,,, | Performed by: NURSE PRACTITIONER

## 2024-01-27 PROCEDURE — 99281 EMR DPT VST MAYX REQ PHY/QHP: CPT

## 2024-01-27 NOTE — ED PROVIDER NOTES
Encounter Date: 1/27/2024       History     Chief Complaint   Patient presents with    Suture / Staple Removal     Pt here for suture removal to forehead.       83-year-old male presents to the emergency department to be evaluated for scheduled suture removal.  He had sutures placed 6 days ago after he fell and hit his face.  He denies any complaints said he is doing well.    The history is provided by the patient.   Suture / Staple Removal   The sutures were placed 3 to 6 days ago. There has been no drainage from the wound. There is no redness present. There is no swelling present. There is no pain present.     Review of patient's allergies indicates:   Allergen Reactions    Mayonnaise      Upset stomach     Past Medical History:   Diagnosis Date    Abnormal thyroid stimulating hormone (TSH) level 08/22/2019    Anemia 08/15/2019    Atrial fibrillation 09/07/2012    Bradycardia 05/23/2017    asymptomatic    Change in bowel habit 11/13/2018    Chronic kidney disease, unspecified 01/14/2019    Coronary arteriosclerosis 09/07/2012    Disease of skin and subcutaneous tissue 08/16/2017    medial aspect RLE- cystic structure seen on venous doppler US.    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    Hyperlipidemia 09/21/2012    Lower extremity edema 04/06/2017    Lumbar radiculopathy 01/26/2017    Obesity, unspecified 12/05/2016    Old myocardial infarction 12/06/2017    Other secondary pulmonary hypertension 05/23/2017    Other spondylosis, lumbosacral region 01/26/2017    Peripheral vascular disease     Personal history of tobacco use, presenting hazards to health 11/17/2016    Pulmonary hypertension     Type 2 diabetes mellitus with chronic kidney disease 01/14/2019    Type 2 diabetes mellitus  with hyperglycemia 10/02/2011    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 Lewis Eye exam    Varicose veins of both lower extremities with pain 01/02/2019    Venous insufficiency 01/08/2019     Past Surgical History:   Procedure Laterality Date    APPENDECTOMY      CARDIAC CATHETERIZATION  2007    [facility]- Stent placement    CATARACT EXTRACTION, BILATERAL      COLONOSCOPY  11/06/2019    Dr. Mendoza    HERNIA REPAIR      Inguinal hernia repair    TONSILLECTOMY       Family History   Problem Relation Age of Onset    No Known Problems Mother     Heart disease Father     Throat cancer Sister     Diabetes Brother     No Known Problems Brother     No Known Problems Maternal Grandmother     No Known Problems Maternal Grandfather     No Known Problems Paternal Grandmother     No Known Problems Paternal Grandfather      Social History     Tobacco Use    Smoking status: Former    Smokeless tobacco: Never    Tobacco comments:     Quit 10/15/1976   Substance Use Topics    Alcohol use: Yes     Comment: occasionally    Drug use: Never     Review of Systems   Constitutional:  Negative for chills, diaphoresis and fever.   All other systems reviewed and are negative.      Physical Exam     Initial Vitals [01/27/24 1054]   BP Pulse Resp Temp SpO2   123/73 82 20 98.4 °F (36.9 °C) 97 %      MAP       --         Physical Exam    Vitals reviewed.  Constitutional: He appears well-developed and well-nourished.   Neck: Neck supple.   Cardiovascular:  Normal rate and regular rhythm.           Pulmonary/Chest: Breath sounds normal.   Musculoskeletal:         General: Normal range of motion.      Cervical back: Neck supple.     Neurological: He is alert and oriented to person, place, and time. He has normal strength. GCS score  is 15. GCS eye subscore is 4. GCS verbal subscore is 5. GCS motor subscore is 6.   Skin: Skin is warm and dry. Capillary refill takes less than 2 seconds.     Skin tear to left upper extremity any dry and intact without any redness, swelling or drainage   Psychiatric: He has a normal mood and affect.         Medical Screening Exam   See Full Note    ED Course   Procedures  Labs Reviewed - No data to display       Imaging Results    None          Medications - No data to display  Medical Decision Making   83-year-old male presents to the emergency department to be evaluated for scheduled suture removal.  He had sutures placed 6 days ago after he fell and hit his face.  He denies any complaints said he is doing well.   Sutures removed by the nurse, patient  tolerated well, no complications   Diagnosis: Suture removal                                      Clinical Impression:   Final diagnoses:  [Z48.02] Visit for suture removal (Primary)        ED Disposition Condition    Discharge Stable          ED Prescriptions    None       Follow-up Information    None          Michelle Toth FNP  01/27/24 1100

## 2024-02-01 ENCOUNTER — HOSPITAL ENCOUNTER (INPATIENT)
Facility: HOSPITAL | Age: 84
LOS: 4 days | Discharge: HOME HEALTH | DRG: 552 | End: 2024-02-05
Attending: EMERGENCY MEDICINE | Admitting: INTERNAL MEDICINE
Payer: MEDICARE

## 2024-02-01 DIAGNOSIS — Z79.4 TYPE 2 DIABETES MELLITUS WITH STAGE 3A CHRONIC KIDNEY DISEASE, WITH LONG-TERM CURRENT USE OF INSULIN: Chronic | ICD-10-CM

## 2024-02-01 DIAGNOSIS — S22.068A OTHER CLOSED FRACTURE OF EIGHTH THORACIC VERTEBRA, INITIAL ENCOUNTER: ICD-10-CM
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
## 2024-02-01 DIAGNOSIS — R07.9 CHEST PAIN: ICD-10-CM

## 2024-02-01 DIAGNOSIS — N18.31 TYPE 2 DIABETES MELLITUS WITH STAGE 3A CHRONIC KIDNEY DISEASE, WITH LONG-TERM CURRENT USE OF INSULIN: Chronic | ICD-10-CM

## 2024-02-01 DIAGNOSIS — S22.069A CLOSED FRACTURE OF EIGHTH THORACIC VERTEBRA, UNSPECIFIED FRACTURE MORPHOLOGY, INITIAL ENCOUNTER: Primary | ICD-10-CM

## 2024-02-01 DIAGNOSIS — E11.22 TYPE 2 DIABETES MELLITUS WITH STAGE 3A CHRONIC KIDNEY DISEASE, WITH LONG-TERM CURRENT USE OF INSULIN: Chronic | ICD-10-CM

## 2024-02-01 PROBLEM — E11.9 TYPE 2 DIABETES MELLITUS, WITH LONG-TERM CURRENT USE OF INSULIN: Status: ACTIVE | Noted: 2024-02-01

## 2024-02-01 LAB
ALBUMIN SERPL BCP-MCNC: 3.6 G/DL (ref 3.5–5)
ALBUMIN/GLOB SERPL: 0.8 {RATIO}
ALP SERPL-CCNC: 121 U/L (ref 45–115)
ALT SERPL W P-5'-P-CCNC: 62 U/L (ref 16–61)
ANION GAP SERPL CALCULATED.3IONS-SCNC: 7 MMOL/L (ref 7–16)
APTT PPP: 27.6 SECONDS (ref 25.2–37.3)
AST SERPL W P-5'-P-CCNC: 38 U/L (ref 15–37)
BASOPHILS # BLD AUTO: 0.07 K/UL (ref 0–0.2)
BASOPHILS NFR BLD AUTO: 0.8 % (ref 0–1)
BILIRUB SERPL-MCNC: 0.6 MG/DL (ref ?–1.2)
BILIRUB UR QL STRIP: NEGATIVE
BUN SERPL-MCNC: 28 MG/DL (ref 7–18)
BUN/CREAT SERPL: 19 (ref 6–20)
CALCIUM SERPL-MCNC: 9.9 MG/DL (ref 8.5–10.1)
CHLORIDE SERPL-SCNC: 98 MMOL/L (ref 98–107)
CLARITY UR: CLEAR
CO2 SERPL-SCNC: 30 MMOL/L (ref 21–32)
COLOR UR: COLORLESS
CREAT SERPL-MCNC: 1.5 MG/DL (ref 0.7–1.3)
DIFFERENTIAL METHOD BLD: ABNORMAL
EGFR (NO RACE VARIABLE) (RUSH/TITUS): 46 ML/MIN/1.73M2
EOSINOPHIL # BLD AUTO: 0.11 K/UL (ref 0–0.5)
EOSINOPHIL NFR BLD AUTO: 1.2 % (ref 1–4)
ERYTHROCYTE [DISTWIDTH] IN BLOOD BY AUTOMATED COUNT: 13.2 % (ref 11.5–14.5)
GLOBULIN SER-MCNC: 4.3 G/DL (ref 2–4)
GLUCOSE SERPL-MCNC: 369 MG/DL (ref 70–105)
GLUCOSE SERPL-MCNC: 485 MG/DL (ref 74–106)
GLUCOSE UR STRIP-MCNC: >1000 MG/DL
HCT VFR BLD AUTO: 42.2 % (ref 40–54)
HGB BLD-MCNC: 14.2 G/DL (ref 13.5–18)
IMM GRANULOCYTES # BLD AUTO: 0.08 K/UL (ref 0–0.04)
IMM GRANULOCYTES NFR BLD: 0.9 % (ref 0–0.4)
INDIRECT COOMBS: NORMAL
INR BLD: 1.07
KETONES UR STRIP-SCNC: NEGATIVE MG/DL
LEUKOCYTE ESTERASE UR QL STRIP: NEGATIVE
LYMPHOCYTES # BLD AUTO: 1.46 K/UL (ref 1–4.8)
LYMPHOCYTES NFR BLD AUTO: 15.6 % (ref 27–41)
MAGNESIUM SERPL-MCNC: 2 MG/DL (ref 1.7–2.3)
MCH RBC QN AUTO: 30.1 PG (ref 27–31)
MCHC RBC AUTO-ENTMCNC: 33.6 G/DL (ref 32–36)
MCV RBC AUTO: 89.6 FL (ref 80–96)
MONOCYTES # BLD AUTO: 0.6 K/UL (ref 0–0.8)
MONOCYTES NFR BLD AUTO: 6.4 % (ref 2–6)
MPC BLD CALC-MCNC: 10.9 FL (ref 9.4–12.4)
NEUTROPHILS # BLD AUTO: 7.01 K/UL (ref 1.8–7.7)
NEUTROPHILS NFR BLD AUTO: 75.1 % (ref 53–65)
NITRITE UR QL STRIP: NEGATIVE
NRBC # BLD AUTO: 0 X10E3/UL
NRBC, AUTO (.00): 0 %
PH UR STRIP: 7 PH UNITS
PLATELET # BLD AUTO: 251 K/UL (ref 150–400)
POTASSIUM SERPL-SCNC: 5 MMOL/L (ref 3.5–5.1)
PROT SERPL-MCNC: 7.9 G/DL (ref 6.4–8.2)
PROT UR QL STRIP: NEGATIVE
PROTHROMBIN TIME: 13.8 SECONDS (ref 11.7–14.7)
RBC # BLD AUTO: 4.71 M/UL (ref 4.6–6.2)
RBC # UR STRIP: NEGATIVE /UL
RH BLD: NORMAL
SODIUM SERPL-SCNC: 130 MMOL/L (ref 136–145)
SP GR UR STRIP: 1.02
SPECIMEN OUTDATE: NORMAL
TROPONIN I SERPL DL<=0.01 NG/ML-MCNC: 15.2 PG/ML
TROPONIN I SERPL DL<=0.01 NG/ML-MCNC: 17.4 PG/ML
UROBILINOGEN UR STRIP-ACNC: NORMAL MG/DL
WBC # BLD AUTO: 9.33 K/UL (ref 4.5–11)

## 2024-02-01 PROCEDURE — 99285 EMERGENCY DEPT VISIT HI MDM: CPT | Mod: 25

## 2024-02-01 PROCEDURE — 84484 ASSAY OF TROPONIN QUANT: CPT | Performed by: EMERGENCY MEDICINE

## 2024-02-01 PROCEDURE — 80053 COMPREHEN METABOLIC PANEL: CPT | Performed by: EMERGENCY MEDICINE

## 2024-02-01 PROCEDURE — 93005 ELECTROCARDIOGRAM TRACING: CPT

## 2024-02-01 PROCEDURE — 93010 ELECTROCARDIOGRAM REPORT: CPT | Mod: ,,, | Performed by: HOSPITALIST

## 2024-02-01 PROCEDURE — 25000003 PHARM REV CODE 250: Performed by: EMERGENCY MEDICINE

## 2024-02-01 PROCEDURE — 96375 TX/PRO/DX INJ NEW DRUG ADDON: CPT

## 2024-02-01 PROCEDURE — 63600175 PHARM REV CODE 636 W HCPCS: Mod: JZ,JG | Performed by: EMERGENCY MEDICINE

## 2024-02-01 PROCEDURE — 99285 EMERGENCY DEPT VISIT HI MDM: CPT | Mod: ,,, | Performed by: EMERGENCY MEDICINE

## 2024-02-01 PROCEDURE — 82962 GLUCOSE BLOOD TEST: CPT

## 2024-02-01 PROCEDURE — 25000003 PHARM REV CODE 250: Performed by: INTERNAL MEDICINE

## 2024-02-01 PROCEDURE — 96374 THER/PROPH/DIAG INJ IV PUSH: CPT

## 2024-02-01 PROCEDURE — 86901 BLOOD TYPING SEROLOGIC RH(D): CPT | Performed by: EMERGENCY MEDICINE

## 2024-02-01 PROCEDURE — 85730 THROMBOPLASTIN TIME PARTIAL: CPT | Performed by: INTERNAL MEDICINE

## 2024-02-01 PROCEDURE — 81003 URINALYSIS AUTO W/O SCOPE: CPT | Performed by: EMERGENCY MEDICINE

## 2024-02-01 PROCEDURE — 99223 1ST HOSP IP/OBS HIGH 75: CPT | Mod: AI,,, | Performed by: INTERNAL MEDICINE

## 2024-02-01 PROCEDURE — 83735 ASSAY OF MAGNESIUM: CPT | Performed by: EMERGENCY MEDICINE

## 2024-02-01 PROCEDURE — 85610 PROTHROMBIN TIME: CPT | Performed by: INTERNAL MEDICINE

## 2024-02-01 PROCEDURE — 63600175 PHARM REV CODE 636 W HCPCS: Performed by: INTERNAL MEDICINE

## 2024-02-01 PROCEDURE — G0390 TRAUMA RESPONS W/HOSP CRITI: HCPCS | Mod: TRAUMA2 | Performed by: EMERGENCY MEDICINE

## 2024-02-01 PROCEDURE — 11000001 HC ACUTE MED/SURG PRIVATE ROOM

## 2024-02-01 PROCEDURE — 85025 COMPLETE CBC W/AUTO DIFF WBC: CPT | Performed by: EMERGENCY MEDICINE

## 2024-02-01 RX ORDER — MORPHINE SULFATE 4 MG/ML
4 INJECTION, SOLUTION INTRAMUSCULAR; INTRAVENOUS EVERY 4 HOURS PRN
Status: DISCONTINUED | OUTPATIENT
Start: 2024-02-01 | End: 2024-02-01

## 2024-02-01 RX ORDER — ONDANSETRON HYDROCHLORIDE 2 MG/ML
4 INJECTION, SOLUTION INTRAVENOUS
Status: COMPLETED | OUTPATIENT
Start: 2024-02-01 | End: 2024-02-01

## 2024-02-01 RX ORDER — NITROGLYCERIN 0.4 MG/1
0.4 TABLET SUBLINGUAL EVERY 5 MIN PRN
Status: DISCONTINUED | OUTPATIENT
Start: 2024-02-01 | End: 2024-02-05 | Stop reason: HOSPADM

## 2024-02-01 RX ORDER — NALOXONE HCL 0.4 MG/ML
0.02 VIAL (ML) INJECTION
Status: DISCONTINUED | OUTPATIENT
Start: 2024-02-01 | End: 2024-02-05 | Stop reason: HOSPADM

## 2024-02-01 RX ORDER — ACETAMINOPHEN 325 MG/1
650 TABLET ORAL EVERY 4 HOURS PRN
Status: DISCONTINUED | OUTPATIENT
Start: 2024-02-01 | End: 2024-02-05 | Stop reason: HOSPADM

## 2024-02-01 RX ORDER — PANTOPRAZOLE SODIUM 40 MG/1
40 TABLET, DELAYED RELEASE ORAL DAILY
Status: DISCONTINUED | OUTPATIENT
Start: 2024-02-02 | End: 2024-02-05 | Stop reason: HOSPADM

## 2024-02-01 RX ORDER — HYDROCODONE BITARTRATE AND ACETAMINOPHEN 5; 325 MG/1; MG/1
1 TABLET ORAL EVERY 6 HOURS PRN
Status: DISCONTINUED | OUTPATIENT
Start: 2024-02-01 | End: 2024-02-05 | Stop reason: HOSPADM

## 2024-02-01 RX ORDER — INSULIN ASPART 100 [IU]/ML
0-10 INJECTION, SOLUTION INTRAVENOUS; SUBCUTANEOUS EVERY 4 HOURS PRN
Status: DISCONTINUED | OUTPATIENT
Start: 2024-02-01 | End: 2024-02-05 | Stop reason: HOSPADM

## 2024-02-01 RX ORDER — MORPHINE SULFATE 2 MG/ML
2 INJECTION, SOLUTION INTRAMUSCULAR; INTRAVENOUS
Status: COMPLETED | OUTPATIENT
Start: 2024-02-01 | End: 2024-02-01

## 2024-02-01 RX ORDER — ATORVASTATIN CALCIUM 80 MG/1
80 TABLET, FILM COATED ORAL DAILY
Status: DISCONTINUED | OUTPATIENT
Start: 2024-02-02 | End: 2024-02-05 | Stop reason: HOSPADM

## 2024-02-01 RX ORDER — GLUCAGON 1 MG
1 KIT INJECTION
Status: DISCONTINUED | OUTPATIENT
Start: 2024-02-01 | End: 2024-02-05 | Stop reason: HOSPADM

## 2024-02-01 RX ORDER — IBUPROFEN 200 MG
24 TABLET ORAL
Status: DISCONTINUED | OUTPATIENT
Start: 2024-02-01 | End: 2024-02-05 | Stop reason: HOSPADM

## 2024-02-01 RX ORDER — ACETAMINOPHEN 500 MG
5000 TABLET ORAL DAILY
Status: DISCONTINUED | OUTPATIENT
Start: 2024-02-02 | End: 2024-02-05 | Stop reason: HOSPADM

## 2024-02-01 RX ORDER — METOPROLOL SUCCINATE 50 MG/1
50 TABLET, EXTENDED RELEASE ORAL DAILY
Status: DISCONTINUED | OUTPATIENT
Start: 2024-02-02 | End: 2024-02-04

## 2024-02-01 RX ORDER — ONDANSETRON HYDROCHLORIDE 2 MG/ML
4 INJECTION, SOLUTION INTRAVENOUS EVERY 8 HOURS PRN
Status: DISCONTINUED | OUTPATIENT
Start: 2024-02-01 | End: 2024-02-05 | Stop reason: HOSPADM

## 2024-02-01 RX ORDER — LANOLIN ALCOHOL/MO/W.PET/CERES
100 CREAM (GRAM) TOPICAL DAILY
COMMUNITY

## 2024-02-01 RX ORDER — MORPHINE SULFATE 2 MG/ML
2 INJECTION, SOLUTION INTRAMUSCULAR; INTRAVENOUS EVERY 4 HOURS PRN
Status: DISCONTINUED | OUTPATIENT
Start: 2024-02-01 | End: 2024-02-05 | Stop reason: HOSPADM

## 2024-02-01 RX ORDER — SODIUM CHLORIDE 0.9 % (FLUSH) 0.9 %
10 SYRINGE (ML) INJECTION EVERY 12 HOURS PRN
Status: DISCONTINUED | OUTPATIENT
Start: 2024-02-01 | End: 2024-02-05 | Stop reason: HOSPADM

## 2024-02-01 RX ORDER — HEPARIN SODIUM 5000 [USP'U]/ML
5000 INJECTION, SOLUTION INTRAVENOUS; SUBCUTANEOUS EVERY 8 HOURS
Status: DISCONTINUED | OUTPATIENT
Start: 2024-02-01 | End: 2024-02-05 | Stop reason: HOSPADM

## 2024-02-01 RX ORDER — PROCHLORPERAZINE EDISYLATE 5 MG/ML
5 INJECTION INTRAMUSCULAR; INTRAVENOUS EVERY 6 HOURS PRN
Status: DISCONTINUED | OUTPATIENT
Start: 2024-02-01 | End: 2024-02-05 | Stop reason: HOSPADM

## 2024-02-01 RX ORDER — IBUPROFEN 200 MG
16 TABLET ORAL
Status: DISCONTINUED | OUTPATIENT
Start: 2024-02-01 | End: 2024-02-05 | Stop reason: HOSPADM

## 2024-02-01 RX ORDER — ACETAMINOPHEN 325 MG/1
650 TABLET ORAL
Status: COMPLETED | OUTPATIENT
Start: 2024-02-01 | End: 2024-02-01

## 2024-02-01 RX ADMIN — ONDANSETRON 4 MG: 2 INJECTION INTRAMUSCULAR; INTRAVENOUS at 06:02

## 2024-02-01 RX ADMIN — HEPARIN SODIUM 5000 UNITS: 5000 INJECTION, SOLUTION INTRAVENOUS; SUBCUTANEOUS at 09:02

## 2024-02-01 RX ADMIN — INSULIN ASPART 5 UNITS: 100 INJECTION, SOLUTION INTRAVENOUS; SUBCUTANEOUS at 09:02

## 2024-02-01 RX ADMIN — MORPHINE SULFATE 2 MG: 2 INJECTION, SOLUTION INTRAMUSCULAR; INTRAVENOUS at 06:02

## 2024-02-01 RX ADMIN — INSULIN DETEMIR 70 UNITS: 100 INJECTION, SOLUTION SUBCUTANEOUS at 09:02

## 2024-02-01 RX ADMIN — ACETAMINOPHEN 650 MG: 325 TABLET ORAL at 11:02

## 2024-02-01 RX ADMIN — HYDROCODONE BITARTRATE AND ACETAMINOPHEN 1 TABLET: 5; 325 TABLET ORAL at 09:02

## 2024-02-01 NOTE — ED PROVIDER NOTES
Encounter Date: 2/1/2024       History     Chief Complaint   Patient presents with    Fall     Was getting a cooler full of ice out of back of truck when he fell backwards.  C/C hit head, No LOC, middle back pain.      Patient complains of a fall from standing.  Patient had been cleaning a cooler in the back of the truck and says the cooler slid abruptly causing him to fall backwards onto the ground.  Patient struck his head but had no loss of consciousness.  Complains of a laceration to the posterior scalp with controlled bleeding.  Also he complains of pain to the mid back around T10.  No associated numbness or weakness.  Patient also complained of some mid chest pain that occurred after the fall.  Patient had a similar fall recently.  But however he denies recent illness.  No focal weakness or numbness.  Patient says he is little slower than he used to be but he has been ambulating well at home.  No associated hip or extremity pain.  Patient was healing well from his last ER visit for a separate fall.      Review of patient's allergies indicates:   Allergen Reactions    Mayonnaise      Upset stomach     Past Medical History:   Diagnosis Date    Abnormal thyroid stimulating hormone (TSH) level 08/22/2019    Anemia 08/15/2019    Atrial fibrillation 09/07/2012    Bradycardia 05/23/2017    asymptomatic    Change in bowel habit 11/13/2018    Chronic kidney disease, unspecified 01/14/2019    Coronary arteriosclerosis 09/07/2012    Disease of skin and subcutaneous tissue 08/16/2017    medial aspect RLE- cystic structure seen on venous doppler US.    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    Hyperlipidemia 09/21/2012    Lower extremity edema 04/06/2017    Lumbar radiculopathy 01/26/2017     Obesity, unspecified 12/05/2016    Old myocardial infarction 12/06/2017    Other secondary pulmonary hypertension 05/23/2017    Other spondylosis, lumbosacral region 01/26/2017    Peripheral vascular disease     Personal history of tobacco use, presenting hazards to health 11/17/2016    Pulmonary hypertension     Type 2 diabetes mellitus with chronic kidney disease 01/14/2019    Type 2 diabetes mellitus with hyperglycemia 10/02/2011    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 Lewis Eye exam    Varicose veins of both lower extremities with pain 01/02/2019    Venous insufficiency 01/08/2019     Past Surgical History:   Procedure Laterality Date    APPENDECTOMY      CARDIAC CATHETERIZATION  2007    [facility]- Stent placement    CATARACT EXTRACTION, BILATERAL      COLONOSCOPY  11/06/2019    Dr. Mendoza    HERNIA REPAIR      Inguinal hernia repair    TONSILLECTOMY       Family History   Problem Relation Age of Onset    No Known Problems Mother     Heart disease Father     Throat cancer Sister     Diabetes Brother     No Known Problems Brother     No Known Problems Maternal Grandmother     No Known Problems Maternal Grandfather     No Known Problems Paternal Grandmother     No Known Problems Paternal Grandfather      Social History     Tobacco Use    Smoking status: Former    Smokeless tobacco: Never    Tobacco comments:     Quit 10/15/1976   Substance Use Topics    Alcohol use: Yes     Comment: occasionally    Drug use: Never     Review of Systems   Constitutional:  Negative for fever.   HENT:  Negative for sore throat.    Respiratory:  Negative for shortness of breath.    Cardiovascular:  Negative for chest pain.   Gastrointestinal:  Negative for nausea.   Genitourinary:  Negative for dysuria.    Musculoskeletal:  Negative for back pain.   Skin:  Negative for rash.   Neurological:  Negative for weakness.   Hematological:  Does not bruise/bleed easily.       Physical Exam     Initial Vitals [02/01/24 1111]   BP Pulse Resp Temp SpO2   (!) 170/62 (!) 59 18 97.6 °F (36.4 °C) 100 %      MAP       --         Physical Exam    Nursing note and vitals reviewed.  Constitutional: He appears well-developed and well-nourished.   HENT:   Head: Normocephalic.   Healing bruises all the face consistent with injuries from his last ER visit.  No acute lacerations.  Laceration site to the forehead healing well.  Posterior scalp with a acute laceration 3 cm with hemostasis   Eyes: EOM are normal. Pupils are equal, round, and reactive to light.   Neck: Neck supple. No thyromegaly present. No JVD present.   Normal range of motion.  Cardiovascular:  Normal rate, regular rhythm, normal heart sounds and intact distal pulses.           No murmur heard.  Pulmonary/Chest: Breath sounds normal. No stridor. No respiratory distress. He has no wheezes. He exhibits tenderness.   Abdominal: Abdomen is soft. Bowel sounds are normal. He exhibits no distension. There is no abdominal tenderness.   Musculoskeletal:         General: No tenderness or edema. Normal range of motion.      Cervical back: Normal range of motion and neck supple.      Comments: No extremity tenderness     Lymphadenopathy:     He has no cervical adenopathy.   Neurological: He is alert and oriented to person, place, and time. He has normal strength. No cranial nerve deficit or sensory deficit. GCS score is 15. GCS eye subscore is 4. GCS verbal subscore is 5. GCS motor subscore is 6.   Skin: Skin is warm and dry. Capillary refill takes less than 2 seconds. No rash noted.   Psychiatric: He has a normal mood and affect.         Medical Screening Exam   See Full Note    ED Course   Procedures  Labs Reviewed   COMPREHENSIVE METABOLIC PANEL - Abnormal; Notable for the following  components:       Result Value    Sodium 130 (*)     Glucose 485 (*)     BUN 28 (*)     Creatinine 1.50 (*)     Globulin 4.3 (*)     Alk Phos 121 (*)     ALT 62 (*)     AST 38 (*)     eGFR 46 (*)     All other components within normal limits   URINALYSIS, REFLEX TO URINE CULTURE - Abnormal; Notable for the following components:    Glucose, UA >1000 (*)     All other components within normal limits   CBC WITH DIFFERENTIAL - Abnormal; Notable for the following components:    Neutrophils % 75.1 (*)     Lymphocytes % 15.6 (*)     Monocytes % 6.4 (*)     Immature Granulocytes % 0.9 (*)     Immature Granulocytes, Absolute 0.08 (*)     All other components within normal limits   POCT GLUCOSE MONITORING CONTINUOUS - Abnormal; Notable for the following components:    POC Glucose 369 (*)     All other components within normal limits   POCT GLUCOSE MONITORING CONTINUOUS - Abnormal; Notable for the following components:    POC Glucose 263 (*)     All other components within normal limits   MAGNESIUM - Normal   TROPONIN I - Normal   TROPONIN I - Normal   PROTIME-INR - Normal   APTT - Normal   CBC W/ AUTO DIFFERENTIAL    Narrative:     The following orders were created for panel order CBC auto differential.  Procedure                               Abnormality         Status                     ---------                               -----------         ------                     CBC with Differential[[id number]]       Abnormal            Final result                 Please view results for these tests on the individual orders.   EXTRA TUBES    Narrative:     The following orders were created for panel order EXTRA TUBES.  Procedure                               Abnormality         Status                     ---------                               -----------         ------                     Light Green Top Hold[[id number]]                            In process                 Light Green Top Hold[[id number]]                             In process                   Please view results for these tests on the individual orders.   LIGHT GREEN TOP HOLD   LIGHT GREEN TOP HOLD   TYPE & SCREEN        ECG Results              EKG 12-lead (Final result)  Result time 02/02/24 00:07:14      Final result by Interface, Lab In [facility] (02/02/24 00:07:14)                   Narrative:    Test Reason : R07.9,    Vent. Rate : 057 BPM     Atrial Rate : 057 BPM     P-R Int : 216 ms          QRS Dur : 088 ms      QT Int : 416 ms       P-R-T Axes : 061 006 086 degrees     QTc Int : 404 ms    Sinus bradycardia with 1st degree A-V block  Otherwise normal ECG  When compared with ECG of 19-AUG-2023 10:24,  PREVIOUS ECG IS PRESENT  Confirmed by Colin Lopez MD ([id number]) on 2/2/2024 12:07:04 AM    Referred By: [email or web address]   SELF           Confirmed By:Colin Lopez MD                                  Imaging Results              MRI Thoracic Spine Without Contrast (Final result)  Result time 02/01/24 15:59:42      Final result by Flako Perez MD (02/01/24 15:59:42)                   Impression:      Fracture at the T7-8 bridging osteophyte extending through the T7-8 disc and along the superior endplate of T8 as detailed.  Disruption anterior longitudinal ligament T7-8.  Very minimal edema at the T7-8 inter spinous ligament without disruption.  No epidural hematoma.    Additional fractures of T3 and T4 as detailed without ligamentous disruption.      Electronically signed by: Flako Perez  Date:    02/01/2024  Time:    15:59               Narrative:    EXAMINATION:  MRI THORACIC SPINE WITHOUT CONTRAST    CLINICAL HISTORY:  Spine fracture, thoracic, traumatic;    TECHNIQUE:  Multiplanar, multisequence images were performed through the thoracic spine.  Contrast was not administered.    COMPARISON:  Cervical spine CT 02/01/2024.    FINDINGS:  There is a fracture through the anterior bridging osteophyte at the T7-8 level with disruption of the anterior longitudinal ligament.   The fracture extends through the disc of T7-8 and extends posterolaterally along the superior endplate of the T8 vertebral body.  The posterior longitudinal ligament appears to be intact.  No epidural hematoma in this location or ligamentum flavum disruption.  There is also increased STIR signal very faintly at the T7-8 inter spinous ligament without clear disruption.    There is also fracture involving the inferior endplate of T3 that appears to extend through the potentially the vertebral body and into the disc.  There is marrow edema and what also appears to be a minimal superior endplate compression fracture of T4.  No ligamentous disruption.  There is also edema that extends to the right T3 and T4 pedicle.  No epidural hematoma.  No abnormal cord signal.  No malalignment.  No spinal canal or foraminal stenosis.                                       CT Thoracic Spine Without Contrast (Final result)  Result time 02/01/24 12:27:30      Final result by Donald Monreal MD (02/01/24 12:27:30)                   Impression:      Evidence of chalk stick fracture through the ossified paraspinous ligaments at T7-T8 as discussed with emergency room personnel    The acute fracture of the inferior endplate of T3 is not as well seen on this exam as on the CT cervical spine.      Electronically signed by: Donald Monreal  Date:    02/01/2024  Time:    12:27               Narrative:    EXAMINATION:  CT THORACIC SPINE WITHOUT CONTRAST    CLINICAL HISTORY:  Back trauma, no prior imaging (Age >= 16y);    TECHNIQUE:  Thin spiral CT sections were obtained through the thoracic spine without contrast. Multiplanar reconstruction images are also evaluated.    The CT examination was performed using one or more of the following dose reduction techniques: Automated exposure control, adjustment of the mA and kV according to patient's size, use of acute or iterative reconstruction techniques.    COMPARISON:  Thoracic x-ray July 25,  2021 available.  No previous thoracic CT available    FINDINGS:  There is flowing spondylosis which fuses the thoracic spine throughout its length.  There is an apparent nondisplaced chalk stick type fracture through the ossified ligaments left laterally and right laterally at T7-T8 as seen on image 28 of the coronal images.  This type of fracture can be associated with 3 column injury and has an element of instability as discussed with emergency room personnel.  Recommend consultation with orthopedic spine surgeon.    The acute fracture involving the inferior endplate of T3 anteriorly and laterally is not as well seen on this exam as on the cervical spine CT from the same day.    There is no significant compression of the thoracic vertebral bodies.  There is no malalignment.  There is scattered mild to moderate degenerative disc narrowing of the spine.    There is no disc extrusion or high-grade spinal stenosis at the thoracic level.                                       X-Ray Chest 1 View (Final result)  Result time 02/01/24 12:06:37      Final result by Scott Lynne DO (02/01/24 12:06:37)                   Impression:      No acute pulmonary disease      Electronically signed by: Scott Lynne  Date:    02/01/2024  Time:    12:06               Narrative:    EXAMINATION:  XR CHEST 1 VIEW    CLINICAL HISTORY:  Chest pain, unspecified    TECHNIQUE:  XR CHEST 1 VIEW    COMPARISON:  8/19/23    FINDINGS:  No lines or tubes.    Lungs are clear.    Normal pleura.    Cardiac silhouette is similar to comparison exam.    No obvious acute bone findings.                                       CT Cervical Spine Without Contrast (Final result)  Result time 02/01/24 12:23:06      Final result by Flako Perez MD (02/01/24 12:23:06)                   Impression:      No cervical spine fracture identified.  Question a T3 inferior endplate fracture.  This is not well appreciated on the thoracic spine imaging but given  the presence of a fracture is worrisome none the less.      Electronically signed by: Flako Perez  Date:    02/01/2024  Time:    12:23               Narrative:    EXAMINATION:  CT CERVICAL SPINE WITHOUT CONTRAST    CLINICAL HISTORY:  Polytrauma, blunt;    TECHNIQUE:  CT of the cervical spine performed without intravenous contrast. The CT examination was performed using one or more of the following dose reduction techniques: Automated exposure control, adjustment of the mA and kV according to patient's size, use of acute or iterative reconstruction techniques.    COMPARISON:  Cervical spine CT 01/21/2024    FINDINGS:  Again seen is anterior bridging osteophytes seen at the lower cervical spine into the thoracic spine.  Only seen on coronal image 21 is what appears to be a fracture through the inferior left endplate of the T3 vertebral body.  This is not seen well on the sagittal imaging and preps may be seen on the lowest axial image 84.  There is no cervical spine fracture identified.  The alignment is maintained.  Craniocervical junction intact.  Facet degeneration throughout with foraminal narrowing throughout.                                       CT Head Without Contrast (Final result)  Result time 02/01/24 11:59:12      Final result by Scott Lynne DO (02/01/24 11:59:12)                   Impression:      No acute intracranial findings.      Electronically signed by: Scott Lynne  Date:    02/01/2024  Time:    11:59               Narrative:    EXAMINATION:  CT HEAD WITHOUT CONTRAST    CLINICAL HISTORY:  Polytrauma, blunt    TECHNIQUE:  Multiplanar CT imaging from the vertex to skull the skull base was performed without contrast.    Dose reduction: The CT exam was performed using one or more dose reduction techniques: Automatic exposure control, automated adjustment of the MA and/or kVP according to patient size, or use of iterative reconstruction technique.    COMPARISON:  1/21/24    FINDINGS:  Global  brain parenchymal volume loss.  Chronic small vessel ischemic change, similar.    There is no CT evidence of acute intracranial hemorrhage or large territorial infarct. No epidural/subdural hematomas.    There is no evidence of hydrocephalus, midline shift or mass effect.    Small hematoma overlying the left frontal bone.                                       Medications   cholecalciferol (vitamin D3) 125 mcg (5,000 unit) tablet 5,000 Units (5,000 Units Oral Given 2/2/24 0826)   metoprolol succinate (TOPROL-XL) 24 hr tablet 50 mg (50 mg Oral Given 2/2/24 0825)   nitroGLYCERIN SL tablet 0.4 mg (has no administration in time range)   pantoprazole EC tablet 40 mg (40 mg Oral Given 2/2/24 0825)   atorvastatin tablet 80 mg (80 mg Oral Given 2/2/24 0825)   sodium chloride 0.9% flush 10 mL (has no administration in time range)   naloxone 0.4 mg/mL injection 0.02 mg (has no administration in time range)   glucose chewable tablet 16 g (has no administration in time range)   glucose chewable tablet 24 g (has no administration in time range)   glucagon (human recombinant) injection 1 mg (has no administration in time range)   heparin (porcine) injection 5,000 Units (5,000 Units Subcutaneous Given 2/3/24 0531)   acetaminophen tablet 650 mg (has no administration in time range)   HYDROcodone-acetaminophen 5-325 mg per tablet 1 tablet (1 tablet Oral Given 2/2/24 1607)   ondansetron injection 4 mg (has no administration in time range)   prochlorperazine injection Soln 5 mg (has no administration in time range)   insulin aspart U-100 injection 0-10 Units (2 Units Subcutaneous Given 2/2/24 2134)   dextrose 10% bolus 125 mL 125 mL (has no administration in time range)   dextrose 10% bolus 250 mL 250 mL (has no administration in time range)   morphine injection 2 mg (2 mg Intravenous Given 2/2/24 0321)   gabapentin capsule 100 mg (100 mg Oral Given 2/2/24 2134)   insulin detemir U-100 injection 36 Units (36 Units Subcutaneous Given  2/2/24 2134)   acetaminophen tablet 650 mg (650 mg Oral Given 2/1/24 1157)   morphine injection 2 mg (2 mg Intravenous Given 2/1/24 1841)   ondansetron injection 4 mg (4 mg Intravenous Given 2/1/24 1841)     Medical Decision Making  [facility]    Patient presents for emergent evaluation of acute fall with back pain that poses a threat to life and/or bodily function.    In the ED patient found to have acute unstable thoracic spine fracture.    I ordered labs and personally reviewed them.  Labs significant for creatinine 1.5.  Glucosuria.  Hyperglycemia.  Troponin normal not consistent with myocardial infarction.  Electrolytes unremarkable  I ordered X-rays and personally reviewed them and reviewed the radiologist interpretation.  Xray significant for chest x-ray no acute abnormality.    I ordered EKG and personally reviewed it.  EKG significant for sinus bradycardia.    I ordered CT scan and personally reviewed it and reviewed the radiologist interpretation.  CT significant for CT head shows no intracranial hemorrhage or fracture skull.  CT thoracic spine shows chalk stick fracture concerning for instability.  Emergent MRI ordered also showed compromise of the paravertebral ligaments.  No neurologic deficits.  Not consistent with spinal cord injury    Admission [facility]  I discussed the patient presentation and findings with the consultant for Orthopedic spine surgery hospitalist (speciality).    Patient was managed in the ED with IV morphine.  Orthotic thoracic lumbar brace  The response to treatment was stable.    Patient required emergent consultation to Hospital Medicine (admitting physician) for admission.     Amount and/or Complexity of Data Reviewed  Labs: ordered.  Radiology: ordered.    Risk  OTC drugs.  Prescription drug management.  Decision regarding hospitalization.               ED Course as of 02/03/24 0647   u Feb 01, 2024   5409 Neurologically intact.  Discussed with Dr. Leroy orthopedic spine surgeon will  obtain emergent MRI of the thoracic spine [PK]   1836 Discussed with Dr. Eastman.  Plan is to admit to hospitalist.  Patient last dose of Plavix was yesterday.  Plavix should be discontinued.  Most likely surgery will be Tuesday but uncertain.  No need for NPO. [PK]      ED Course User Index  [PK] Jamal Hollins MD                           Clinical Impression:   Final diagnoses:  [R07.9] Chest pain  [S22.069A] Closed fracture of eighth thoracic vertebra, unspecified fracture morphology, initial encounter (Primary)  [W19.XXXA] Fall, initial encounter        ED Disposition Condition    Admit Stable                Jamal Hollins MD  02/03/24 0627

## 2024-02-02 LAB
ALBUMIN SERPL BCP-MCNC: 3.1 G/DL (ref 3.5–5)
ALBUMIN/GLOB SERPL: 0.9 {RATIO}
ALP SERPL-CCNC: 108 U/L (ref 45–115)
ALT SERPL W P-5'-P-CCNC: 44 U/L (ref 16–61)
ANION GAP SERPL CALCULATED.3IONS-SCNC: 11 MMOL/L (ref 7–16)
AST SERPL W P-5'-P-CCNC: 22 U/L (ref 15–37)
BASOPHILS # BLD AUTO: 0.06 K/UL (ref 0–0.2)
BASOPHILS NFR BLD AUTO: 0.7 % (ref 0–1)
BILIRUB SERPL-MCNC: 0.7 MG/DL (ref ?–1.2)
BUN SERPL-MCNC: 29 MG/DL (ref 7–18)
BUN/CREAT SERPL: 23 (ref 6–20)
CALCIUM SERPL-MCNC: 9.5 MG/DL (ref 8.5–10.1)
CHLORIDE SERPL-SCNC: 102 MMOL/L (ref 98–107)
CO2 SERPL-SCNC: 28 MMOL/L (ref 21–32)
CREAT SERPL-MCNC: 1.26 MG/DL (ref 0.7–1.3)
DIFFERENTIAL METHOD BLD: ABNORMAL
EGFR (NO RACE VARIABLE) (RUSH/TITUS): 56 ML/MIN/1.73M2
EOSINOPHIL # BLD AUTO: 0.11 K/UL (ref 0–0.5)
EOSINOPHIL NFR BLD AUTO: 1.2 % (ref 1–4)
ERYTHROCYTE [DISTWIDTH] IN BLOOD BY AUTOMATED COUNT: 13.2 % (ref 11.5–14.5)
GLOBULIN SER-MCNC: 3.3 G/DL (ref 2–4)
GLUCOSE SERPL-MCNC: 113 MG/DL (ref 70–105)
GLUCOSE SERPL-MCNC: 127 MG/DL (ref 74–106)
GLUCOSE SERPL-MCNC: 175 MG/DL (ref 70–105)
GLUCOSE SERPL-MCNC: 235 MG/DL (ref 70–105)
GLUCOSE SERPL-MCNC: 263 MG/DL (ref 70–105)
HCT VFR BLD AUTO: 39.1 % (ref 40–54)
HGB BLD-MCNC: 13.3 G/DL (ref 13.5–18)
IMM GRANULOCYTES # BLD AUTO: 0.03 K/UL (ref 0–0.04)
IMM GRANULOCYTES NFR BLD: 0.3 % (ref 0–0.4)
LYMPHOCYTES # BLD AUTO: 1.59 K/UL (ref 1–4.8)
LYMPHOCYTES NFR BLD AUTO: 17.4 % (ref 27–41)
MCH RBC QN AUTO: 29.6 PG (ref 27–31)
MCHC RBC AUTO-ENTMCNC: 34 G/DL (ref 32–36)
MCV RBC AUTO: 86.9 FL (ref 80–96)
MONOCYTES # BLD AUTO: 0.85 K/UL (ref 0–0.8)
MONOCYTES NFR BLD AUTO: 9.3 % (ref 2–6)
MPC BLD CALC-MCNC: 10.9 FL (ref 9.4–12.4)
NEUTROPHILS # BLD AUTO: 6.51 K/UL (ref 1.8–7.7)
NEUTROPHILS NFR BLD AUTO: 71.1 % (ref 53–65)
NRBC # BLD AUTO: 0 X10E3/UL
NRBC, AUTO (.00): 0 %
PLATELET # BLD AUTO: 248 K/UL (ref 150–400)
POTASSIUM SERPL-SCNC: 4.2 MMOL/L (ref 3.5–5.1)
PROT SERPL-MCNC: 6.4 G/DL (ref 6.4–8.2)
RBC # BLD AUTO: 4.5 M/UL (ref 4.6–6.2)
SODIUM SERPL-SCNC: 137 MMOL/L (ref 136–145)
WBC # BLD AUTO: 9.15 K/UL (ref 4.5–11)

## 2024-02-02 PROCEDURE — 80053 COMPREHEN METABOLIC PANEL: CPT | Performed by: INTERNAL MEDICINE

## 2024-02-02 PROCEDURE — 25000003 PHARM REV CODE 250: Performed by: INTERNAL MEDICINE

## 2024-02-02 PROCEDURE — 63600175 PHARM REV CODE 636 W HCPCS: Performed by: INTERNAL MEDICINE

## 2024-02-02 PROCEDURE — 82962 GLUCOSE BLOOD TEST: CPT

## 2024-02-02 PROCEDURE — 11000001 HC ACUTE MED/SURG PRIVATE ROOM

## 2024-02-02 PROCEDURE — 99232 SBSQ HOSP IP/OBS MODERATE 35: CPT | Mod: ,,, | Performed by: INTERNAL MEDICINE

## 2024-02-02 PROCEDURE — 85025 COMPLETE CBC W/AUTO DIFF WBC: CPT | Performed by: INTERNAL MEDICINE

## 2024-02-02 RX ORDER — GABAPENTIN 100 MG/1
100 CAPSULE ORAL 2 TIMES DAILY
Status: DISCONTINUED | OUTPATIENT
Start: 2024-02-02 | End: 2024-02-05 | Stop reason: HOSPADM

## 2024-02-02 RX ADMIN — HEPARIN SODIUM 5000 UNITS: 5000 INJECTION, SOLUTION INTRAVENOUS; SUBCUTANEOUS at 01:02

## 2024-02-02 RX ADMIN — ATORVASTATIN CALCIUM 80 MG: 80 TABLET, FILM COATED ORAL at 08:02

## 2024-02-02 RX ADMIN — INSULIN ASPART 2 UNITS: 100 INJECTION, SOLUTION INTRAVENOUS; SUBCUTANEOUS at 09:02

## 2024-02-02 RX ADMIN — PANTOPRAZOLE SODIUM 40 MG: 40 TABLET, DELAYED RELEASE ORAL at 08:02

## 2024-02-02 RX ADMIN — INSULIN ASPART 6 UNITS: 100 INJECTION, SOLUTION INTRAVENOUS; SUBCUTANEOUS at 12:02

## 2024-02-02 RX ADMIN — HEPARIN SODIUM 5000 UNITS: 5000 INJECTION, SOLUTION INTRAVENOUS; SUBCUTANEOUS at 09:02

## 2024-02-02 RX ADMIN — HEPARIN SODIUM 5000 UNITS: 5000 INJECTION, SOLUTION INTRAVENOUS; SUBCUTANEOUS at 06:02

## 2024-02-02 RX ADMIN — GABAPENTIN 100 MG: 100 CAPSULE ORAL at 09:02

## 2024-02-02 RX ADMIN — HYDROCODONE BITARTRATE AND ACETAMINOPHEN 1 TABLET: 5; 325 TABLET ORAL at 08:02

## 2024-02-02 RX ADMIN — MORPHINE SULFATE 2 MG: 2 INJECTION, SOLUTION INTRAMUSCULAR; INTRAVENOUS at 03:02

## 2024-02-02 RX ADMIN — HYDROCODONE BITARTRATE AND ACETAMINOPHEN 1 TABLET: 5; 325 TABLET ORAL at 04:02

## 2024-02-02 RX ADMIN — INSULIN DETEMIR 36 UNITS: 100 INJECTION, SOLUTION SUBCUTANEOUS at 09:02

## 2024-02-02 RX ADMIN — METOPROLOL SUCCINATE 50 MG: 50 TABLET, EXTENDED RELEASE ORAL at 08:02

## 2024-02-02 RX ADMIN — GABAPENTIN 100 MG: 100 CAPSULE ORAL at 01:02

## 2024-02-02 RX ADMIN — CHOLECALCIFEROL TAB 125 MCG (5000 UNIT) 5000 UNITS: 125 TAB at 08:02

## 2024-02-02 NOTE — PHARMACY MED REC
"Admission Medication History     The home medication history was taken by Ericka Persaud.    You may go to "Admission" then "Reconcile Home Medications" tabs to review and/or act upon these items.     The home medication list has been updated by the Pharmacy department.   Please read ALL comments highlighted in yellow.   Please address this information as you see fit.    Feel free to contact us if you have any questions or require assistance.    The following dose change was added:  Lantus 60 units nighlty (from 70 units)  The following medication was added:  Cyanocobalamin 1000 mcg      Medications listed below were obtained from: Patient/family and Analytic software- [employer]  (Not in a hospital admission)        Current Outpatient Medications on File Prior to Encounter   Medication Sig Dispense Refill Last Dose    cholecalciferol, vitamin D3, 125 mcg (5,000 unit) capsule Take 5,000 Units by mouth once daily.   1/31/2024    clopidogreL (PLAVIX) 75 mg tablet Take 1 tablet (75 mg total) by mouth once daily. 90 tablet 3 1/31/2024    coenzyme Q10 200 mg capsule Take 200 mg by mouth once daily.   1/31/2024    cyanocobalamin (VITAMIN B-12) 1000 MCG tablet Take 100 mcg by mouth once daily.   1/31/2024    dapagliflozin propanediol (FARXIGA) 10 mg tablet Take 1 tablet (10 mg total) by mouth once daily. 90 tablet 3 1/31/2024    insulin (LANTUS SOLOSTAR U-100 INSULIN) glargine 100 units/mL SubQ pen Inject 70 Units into the skin every evening. (Patient taking differently: Inject 60 Units into the skin every evening.) 63 mL 3 1/31/2024 at PM    metoprolol succinate (TOPROL-XL) 50 MG 24 hr tablet Take 1 tablet (50 mg total) by mouth once daily. 90 tablet 3 1/31/2024    pantoprazole (PROTONIX) 40 MG tablet Take 1 tablet (40 mg total) by mouth once daily. 90 tablet 3 1/31/2024    rosuvastatin (CRESTOR) 40 MG Tab Take 1 tablet (40 mg total) by mouth every evening. 90 tablet 3 1/31/2024    nitroGLYCERIN (NITROSTAT) 0.4 MG SL tablet " "Place 1 tablet (0.4 mg total) under the tongue every 5 (five) minutes as needed for Chest pain. 25 tablet 1 Unknown    SURE COMFORT PEN NEEDLE 32 gauge x 5/32" Ndle AS DIRECTED          Potential issues to be addressed PRIOR TO DISCHARGE  N/A    Ericka Persaud  EXT [id number]                 .          "

## 2024-02-02 NOTE — PROGRESS NOTES
[facility] Medicine  Progress Note    Patient Name: Negro Hale  MRN: [id number]  Patient Class: IP- Inpatient   Admission Date: 2/1/2024  Length of Stay: 1 days  Attending Physician: Ana Chau MD  Primary Care Provider: Smiley Trevino FNP        Subjective:     Principal Problem:Fracture of eighth thoracic vertebra        HPI:  Patient is a 83-year-old male with a history of type 2 diabetes on insulin coupled with CKD stage IIIA, essential hypertension, CAD status post PCI with stent placement in 2015 with grade 1 diastolic dysfunction who presents to the emergency room today with a chief complaint of upper back pain following a fall while trying to get a cooler on the back of a truck.  Patient denied any associated neurological symptoms such as fecal or urinary incontinence, saddle anesthesia, lower extremity weakness or numbness.    Ensuing workup was notable for presence of significant hyperglycemia with blood glucose of 485 with anion gap of 2 and MRI of thoracic spine demonstrating fracture of T3-T4 and T7-T8.  This case was discussed between the ED physician and spine surgeon and tentative decision was made take patient to OR on Tuesday due to patient's last dose of Plavix being yesterday.    Overview/Hospital Course:  No notes on file    Interval History:   Pt c/o burning apin in L foot that has been bothering him for years, says walking and wrapping help with it, likely c/f RLS , will add low dose gabapentin to see if that help, he has otherwise no new complains, awaiting for surgery on Tuesday.     Review of Systems   Constitutional:  Negative for chills, diaphoresis, fatigue and fever.   HENT:  Negative for congestion, hearing loss, nosebleeds, postnasal drip, sore throat, tinnitus and trouble swallowing.    Eyes:  Negative for photophobia, pain, discharge, itching and visual disturbance.   Respiratory:  Negative for cough, shortness of  breath, wheezing and stridor.    Cardiovascular:  Negative for chest pain, palpitations and leg swelling.   Gastrointestinal:  Negative for abdominal distention, abdominal pain, anal bleeding, blood in stool, constipation, diarrhea, nausea and vomiting.   Endocrine: Negative for cold intolerance, heat intolerance, polydipsia, polyphagia and polyuria.   Genitourinary:  Negative for decreased urine volume, difficulty urinating, dysuria, flank pain, frequency, hematuria and urgency.   Musculoskeletal:  Positive for arthralgias and back pain. Negative for gait problem, joint swelling, myalgias, neck pain and neck stiffness.   Skin:  Negative for color change, pallor and rash.   Allergic/Immunologic: Negative for immunocompromised state.   Neurological:  Negative for dizziness, tremors, seizures, syncope, facial asymmetry, speech difficulty, weakness, light-headedness, numbness and headaches.   Hematological:  Negative for adenopathy. Does not bruise/bleed easily.     Objective:     Vital Signs (Most Recent):  Temp: 97.8 °F (36.6 °C) (02/02/24 1014)  Pulse: 67 (02/02/24 1014)  Resp: 19 (02/02/24 1014)  BP: (!) 145/76 (02/02/24 1014)  SpO2: (!) 92 % (02/02/24 1014) Vital Signs (24h Range):  Temp:  [97.4 °F (36.3 °C)-98 °F (36.7 °C)] 97.8 °F (36.6 °C)  Pulse:  [65-79] 67  Resp:  [16-21] 19  SpO2:  [92 %-99 %] 92 %  BP: (112-167)/(66-84) 145/76     Weight: 78.1 kg (172 lb 3.2 oz)  Body mass index is 26.18 kg/m².    Intake/Output Summary (Last 24 hours) at 2/2/2024 1142  Last data filed at 2/1/2024 2129  Gross per 24 hour   Intake 240 ml   Output 300 ml   Net -60 ml         Physical Exam  Vitals reviewed.   Constitutional:       Comments: Hematoma involving left upper maxillary region was noted   HENT:      Head: Normocephalic and atraumatic.      Right Ear: External ear normal.      Left Ear: External ear normal.   Eyes:      Extraocular Movements: Extraocular movements intact.      Pupils: Pupils are equal, round, and  reactive to light.   Cardiovascular:      Rate and Rhythm: Normal rate and regular rhythm.      Pulses: Normal pulses.      Heart sounds: Normal heart sounds. No murmur heard.  Pulmonary:      Effort: Pulmonary effort is normal. No respiratory distress.      Breath sounds: Normal breath sounds. No wheezing.   Chest:      Chest wall: No tenderness.   Abdominal:      General: Abdomen is flat.      Palpations: Abdomen is soft. There is no mass.      Tenderness: There is no abdominal tenderness. There is no right CVA tenderness or left CVA tenderness.   Musculoskeletal:         General: No swelling or tenderness. Normal range of motion.   Skin:     General: Skin is warm and dry.      Capillary Refill: Capillary refill takes less than 2 seconds.   Neurological:      General: No focal deficit present.      Mental Status: He is alert and oriented to person, place, and time. Mental status is at baseline.   Psychiatric:         Mood and Affect: Mood normal.         Thought Content: Thought content normal.             Significant Labs: All pertinent labs within the past 24 hours have been reviewed.    Significant Imaging: I have reviewed all pertinent imaging results/findings within the past 24 hours.    Assessment/Plan:      * Fracture of eighth thoracic vertebra    MRI of thoracic spine demonstrated fracture of T3-T4 and T7-T8.  This case was discussed between the ED physician and spine surgeon and tentative decision was made take patient to OR on Tuesday due to patient's last dose of Plavix being yesterday.        Type 2 diabetes mellitus, with long-term current use of insulin    Patient's diabetes currently not adequately control with blood glucose of 485.  Anion gap was only 2.  Will resume patient's basal insulin and start sliding scale insulin q.4 hours to maintain CBGs in the range of 130s to 180s      Coronary artery disease involving native coronary artery of native heart without angina pectoris    Patient has a  history of known CAD status post PCI with stent placement of left circumflex artery in 2015 and is currently in the care of Dr. Bridges.  Patient is doing well overall.  Due to the need for thoracic spine surgery Plavix will be held for now    Stage 3a chronic kidney disease    Patient's serum creatinine is at his baseline will continue to monitor serum creatinine levels and urine output    Essential hypertension    Continue present management for now      VTE Risk Mitigation (From admission, onward)           Ordered     heparin (porcine) injection 5,000 Units  Every 8 hours         02/01/24 2010     IP VTE HIGH RISK PATIENT  Once         02/01/24 2010     Place sequential compression device  Until discontinued         02/01/24 2010                    Discharge Planning   RICKY:      Code Status: Full Code   Is the patient medically ready for discharge?:     Reason for patient still in hospital (select all that apply): Treatment  Discharge Plan A: Home with family                  Rehmat CHEYENNE Chau MD  Department of Hospital Medicine   [facility]

## 2024-02-02 NOTE — HPI
Patient is a 83-year-old male with a history of type 2 diabetes on insulin coupled with CKD stage IIIA, essential hypertension, CAD status post PCI with stent placement in 2015 with grade 1 diastolic dysfunction who presents to the emergency room today with a chief complaint of upper back pain following a fall while trying to get a cooler on the back of a truck.  Patient denied any associated neurological symptoms such as fecal or urinary incontinence, saddle anesthesia, lower extremity weakness or numbness.    Ensuing workup was notable for presence of significant hyperglycemia with blood glucose of 485 with anion gap of 2 and MRI of thoracic spine demonstrating fracture of T3-T4 and T7-T8.  This case was discussed between the ED physician and spine surgeon and tentative decision was made take patient to OR on Tuesday due to patient's last dose of Plavix being yesterday.

## 2024-02-02 NOTE — ASSESSMENT & PLAN NOTE
Patient has a history of known CAD status post PCI with stent placement of left circumflex artery in 2015 and is currently in the care of Dr. Bridges.  Patient is doing well overall.  Due to the need for thoracic spine surgery Plavix will be held for now

## 2024-02-02 NOTE — ASSESSMENT & PLAN NOTE
Patient's serum creatinine is at his baseline will continue to monitor serum creatinine levels and urine output

## 2024-02-02 NOTE — H&P
[facility] - Emergency Department  [facility] Medicine  History & Physical    Patient Name: Negro Hale  MRN: [id number]  Patient Class: Emergency  Admission Date: 2/1/2024  Attending Physician:  SONNY Chau MD  Primary Care Provider: Smiley Trevino FNP         Patient information was obtained from patient and ER records.     Subjective:     Principal Problem:Fracture of eighth thoracic vertebra    Chief Complaint:   Chief Complaint   Patient presents with    Fall     Was getting a cooler full of ice out of back of truck when he fell backwards.  C/C hit head, No LOC, middle back pain.         HPI: Patient is a 83-year-old male with a history of type 2 diabetes on insulin coupled with CKD stage IIIA, essential hypertension, CAD status post PCI with stent placement in 2015 with grade 1 diastolic dysfunction who presents to the emergency room today with a chief complaint of upper back pain following a fall while trying to get a cooler on the back of a truck.  Patient otherwise denied any associated neurological symptoms such as fecal or urinary incontinence, saddle anesthesia, lower extremity weakness or numbness.    Ensuing workup was notable for presence of significant hyperglycemia with blood glucose of 485 with anion gap of 2 and MRI of thoracic spine demonstrating fracture of T3-T4 and T7-T8.  This case was discussed between the ED physician and spine surgeon and tentative decision was made take patient to OR on Tuesday due to patient's last dose of Plavix being yesterday.    Past Medical History:   Diagnosis Date    Abnormal thyroid stimulating hormone (TSH) level 08/22/2019    Anemia 08/15/2019    Atrial fibrillation 09/07/2012    Bradycardia 05/23/2017    asymptomatic    Change in bowel habit 11/13/2018    Chronic kidney disease, unspecified 01/14/2019    Coronary arteriosclerosis 09/07/2012    Disease of skin and subcutaneous tissue 08/16/2017    medial aspect RLE- cystic structure seen on  venous doppler US.    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 Lewis Eye exam    Varicose veins of both lower extremities with pain 01/02/2019    Venous insufficiency 01/08/2019       Past Surgical History:   Procedure Laterality Date    APPENDECTOMY      CARDIAC CATHETERIZATION  2007    Bridges- Stent placement    CATARACT EXTRACTION, BILATERAL      COLONOSCOPY  11/06/2019    Dr. Mendoza    HERNIA REPAIR      Inguinal hernia repair    TONSILLECTOMY         Review of patient's allergies indicates:   Allergen Reactions    Mayonnaise      Upset stomach       No current facility-administered medications on file prior to encounter.     Current Outpatient Medications on File Prior to Encounter   Medication Sig    cholecalciferol, vitamin D3, 125 mcg (5,000 unit) capsule Take 5,000 Units by mouth once daily.    clopidogreL (PLAVIX) 75 mg tablet Take 1 tablet (75 mg total) by mouth once daily.    coenzyme Q10  "200 mg capsule Take 200 mg by mouth once daily.    dapagliflozin propanediol (FARXIGA) 10 mg tablet Take 1 tablet (10 mg total) by mouth once daily.    insulin (LANTUS SOLOSTAR U-100 INSULIN) glargine 100 units/mL SubQ pen Inject 70 Units into the skin every evening.    metoprolol succinate (TOPROL-XL) 50 MG 24 hr tablet Take 1 tablet (50 mg total) by mouth once daily.    nitroGLYCERIN (NITROSTAT) 0.4 MG SL tablet Place 1 tablet (0.4 mg total) under the tongue every 5 (five) minutes as needed for Chest pain.    pantoprazole (PROTONIX) 40 MG tablet Take 1 tablet (40 mg total) by mouth once daily.    rosuvastatin (CRESTOR) 40 MG Tab Take 1 tablet (40 mg total) by mouth every evening.    SURE COMFORT PEN NEEDLE 32 gauge x 5/32" Ndle AS DIRECTED     Family History       Problem Relation (Age of Onset)    Diabetes Brother    Heart disease Father    No Known Problems Mother, Brother, Maternal Grandmother, Maternal Grandfather, Paternal Grandmother, Paternal Grandfather    Throat cancer Sister          Tobacco Use    Smoking status: Former    Smokeless tobacco: Never    Tobacco comments:     Quit 10/15/1976   Substance and Sexual Activity    Alcohol use: Yes     Comment: occasionally    Drug use: Never    Sexual activity: Not Currently     Review of Systems   Constitutional:  Negative for chills, diaphoresis, fatigue and fever.   HENT:  Negative for congestion, hearing loss, nosebleeds, postnasal drip, sore throat, tinnitus and trouble swallowing.    Eyes:  Negative for photophobia, pain, discharge, itching and visual disturbance.   Respiratory:  Negative for cough, shortness of breath, wheezing and stridor.    Cardiovascular:  Negative for chest pain, palpitations and leg swelling.   Gastrointestinal:  Negative for abdominal distention, abdominal pain, anal bleeding, blood in stool, constipation, diarrhea, nausea and vomiting.   Endocrine: Negative for cold intolerance, heat intolerance, polydipsia, polyphagia and " polyuria.   Genitourinary:  Negative for decreased urine volume, difficulty urinating, dysuria, flank pain, frequency, hematuria and urgency.   Musculoskeletal:  Positive for back pain. Negative for arthralgias, gait problem, joint swelling, myalgias, neck pain and neck stiffness.   Skin:  Negative for color change, pallor and rash.   Allergic/Immunologic: Negative for immunocompromised state.   Neurological:  Negative for dizziness, tremors, seizures, syncope, facial asymmetry, speech difficulty, weakness, light-headedness, numbness and headaches.   Hematological:  Negative for adenopathy. Does not bruise/bleed easily.     Objective:     Vital Signs (Most Recent):  Temp: 98 °F (36.7 °C) (02/01/24 1842)  Pulse: 70 (02/01/24 1842)  Resp: 16 (02/01/24 1842)  BP: (!) 161/73 (02/01/24 1842)  SpO2: 97 % (02/01/24 1842) Vital Signs (24h Range):  Temp:  [97.6 °F (36.4 °C)-98 °F (36.7 °C)] 98 °F (36.7 °C)  Pulse:  [59-70] 70  Resp:  [16-18] 16  SpO2:  [96 %-100 %] 97 %  BP: (161-170)/(62-84) 161/73     Weight: 79.4 kg (175 lb)  Body mass index is 26.61 kg/m².     Physical Exam  Vitals reviewed.   Constitutional:       Comments: Hematoma involving left upper maxillary region was noted   HENT:      Head: Normocephalic and atraumatic.      Right Ear: External ear normal.      Left Ear: External ear normal.   Eyes:      Extraocular Movements: Extraocular movements intact.      Pupils: Pupils are equal, round, and reactive to light.   Cardiovascular:      Rate and Rhythm: Normal rate and regular rhythm.      Pulses: Normal pulses.      Heart sounds: Normal heart sounds. No murmur heard.  Pulmonary:      Effort: Pulmonary effort is normal. No respiratory distress.      Breath sounds: Normal breath sounds. No wheezing.   Chest:      Chest wall: No tenderness.   Abdominal:      General: Abdomen is flat.      Palpations: Abdomen is soft. There is no mass.      Tenderness: There is no abdominal tenderness. There is no right CVA  tenderness or left CVA tenderness.   Musculoskeletal:         General: No swelling or tenderness. Normal range of motion.   Skin:     General: Skin is warm and dry.      Capillary Refill: Capillary refill takes less than 2 seconds.   Neurological:      General: No focal deficit present.      Mental Status: He is alert and oriented to person, place, and time. Mental status is at baseline.   Psychiatric:         Mood and Affect: Mood normal.         Thought Content: Thought content normal.              CRANIAL NERVES     CN III, IV, VI   Pupils are equal, round, and reactive to light.       Significant Labs: All pertinent labs within the past 24 hours have been reviewed.    Significant Imaging: I have reviewed all pertinent imaging results/findings within the past 24 hours.  Assessment/Plan:     * Fracture of eighth thoracic vertebra    MRI of thoracic spine demonstrated fracture of T3-T4 and T7-T8.  This case was discussed between the ED physician and spine surgeon and tentative decision was made take patient to OR on Tuesday due to patient's last dose of Plavix being yesterday.        Type 2 diabetes mellitus, with long-term current use of insulin    Patient's diabetes currently not adequately control with blood glucose of 485.  Anion gap was only 2.  Will resume patient's basal insulin and start sliding scale insulin q.4 hours to maintain CBGs in the range of 130s to 180s      Coronary artery disease involving native coronary artery of native heart without angina pectoris    Patient has a history of known CAD status post PCI with stent placement of left circumflex artery in 2015 and is currently in the care of Dr. Bridges.  Patient is doing well overall.  Due to the need for thoracic spine surgery Plavix will be held for now    Stage 3a chronic kidney disease    Patient's serum creatinine is at his baseline will continue to monitor serum creatinine levels and urine output    Essential hypertension    Continue  present management for now      VTE Risk Mitigation (From admission, onward)      Heparin 5000 units subQ q.8 hours and SCDs                            Missael Eastman MD  Department of Hospital Medicine  [facility] - Emergency Department

## 2024-02-02 NOTE — SUBJECTIVE & OBJECTIVE
Past Medical History:   Diagnosis Date    Abnormal thyroid stimulating hormone (TSH) level 08/22/2019    Anemia 08/15/2019    Atrial fibrillation 09/07/2012    Bradycardia 05/23/2017    asymptomatic    Change in bowel habit 11/13/2018    Chronic kidney disease, unspecified 01/14/2019    Coronary arteriosclerosis 09/07/2012    Disease of skin and subcutaneous tissue 08/16/2017    medial aspect RLE- cystic structure seen on venous doppler US.    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 Lewis Eye exam    Varicose veins of both lower extremities with pain 01/02/2019    Venous insufficiency 01/08/2019       Past Surgical History:   Procedure Laterality Date    APPENDECTOMY      CARDIAC CATHETERIZATION  2007    [facility]- Stent placement    CATARACT EXTRACTION, BILATERAL      COLONOSCOPY  11/06/2019    Dr. Mendoza    HERNIA REPAIR      Inguinal hernia repair    TONSILLECTOMY         Review of patient's  "allergies indicates:   Allergen Reactions    Mayonnaise      Upset stomach       No current facility-administered medications on file prior to encounter.     Current Outpatient Medications on File Prior to Encounter   Medication Sig    cholecalciferol, vitamin D3, 125 mcg (5,000 unit) capsule Take 5,000 Units by mouth once daily.    clopidogreL (PLAVIX) 75 mg tablet Take 1 tablet (75 mg total) by mouth once daily.    coenzyme Q10 200 mg capsule Take 200 mg by mouth once daily.    dapagliflozin propanediol (FARXIGA) 10 mg tablet Take 1 tablet (10 mg total) by mouth once daily.    insulin (LANTUS SOLOSTAR U-100 INSULIN) glargine 100 units/mL SubQ pen Inject 70 Units into the skin every evening.    metoprolol succinate (TOPROL-XL) 50 MG 24 hr tablet Take 1 tablet (50 mg total) by mouth once daily.    nitroGLYCERIN (NITROSTAT) 0.4 MG SL tablet Place 1 tablet (0.4 mg total) under the tongue every 5 (five) minutes as needed for Chest pain.    pantoprazole (PROTONIX) 40 MG tablet Take 1 tablet (40 mg total) by mouth once daily.    rosuvastatin (CRESTOR) 40 MG Tab Take 1 tablet (40 mg total) by mouth every evening.    SURE COMFORT PEN NEEDLE 32 gauge x 5/32" Ndle AS DIRECTED     Family History       Problem Relation (Age of Onset)    Diabetes Brother    Heart disease Father    No Known Problems Mother, Brother, Maternal Grandmother, Maternal Grandfather, Paternal Grandmother, Paternal Grandfather    Throat cancer Sister          Tobacco Use    Smoking status: Former    Smokeless tobacco: Never    Tobacco comments:     Quit 10/15/1976   Substance and Sexual Activity    Alcohol use: Yes     Comment: occasionally    Drug use: Never    Sexual activity: Not Currently     Review of Systems   Constitutional:  Negative for chills, diaphoresis, fatigue and fever.   HENT:  Negative for congestion, hearing loss, nosebleeds, postnasal drip, sore throat, tinnitus and trouble swallowing.    Eyes:  Negative for photophobia, pain, " discharge, itching and visual disturbance.   Respiratory:  Negative for cough, shortness of breath, wheezing and stridor.    Cardiovascular:  Negative for chest pain, palpitations and leg swelling.   Gastrointestinal:  Negative for abdominal distention, abdominal pain, anal bleeding, blood in stool, constipation, diarrhea, nausea and vomiting.   Endocrine: Negative for cold intolerance, heat intolerance, polydipsia, polyphagia and polyuria.   Genitourinary:  Negative for decreased urine volume, difficulty urinating, dysuria, flank pain, frequency, hematuria and urgency.   Musculoskeletal:  Positive for back pain. Negative for arthralgias, gait problem, joint swelling, myalgias, neck pain and neck stiffness.   Skin:  Negative for color change, pallor and rash.   Allergic/Immunologic: Negative for immunocompromised state.   Neurological:  Negative for dizziness, tremors, seizures, syncope, facial asymmetry, speech difficulty, weakness, light-headedness, numbness and headaches.   Hematological:  Negative for adenopathy. Does not bruise/bleed easily.     Objective:     Vital Signs (Most Recent):  Temp: 98 °F (36.7 °C) (02/01/24 1842)  Pulse: 70 (02/01/24 1842)  Resp: 16 (02/01/24 1842)  BP: (!) 161/73 (02/01/24 1842)  SpO2: 97 % (02/01/24 1842) Vital Signs (24h Range):  Temp:  [97.6 °F (36.4 °C)-98 °F (36.7 °C)] 98 °F (36.7 °C)  Pulse:  [59-70] 70  Resp:  [16-18] 16  SpO2:  [96 %-100 %] 97 %  BP: (161-170)/(62-84) 161/73     Weight: 79.4 kg (175 lb)  Body mass index is 26.61 kg/m².     Physical Exam  Vitals reviewed.   Constitutional:       Comments: Hematoma involving left upper maxillary region was noted   HENT:      Head: Normocephalic and atraumatic.      Right Ear: External ear normal.      Left Ear: External ear normal.   Eyes:      Extraocular Movements: Extraocular movements intact.      Pupils: Pupils are equal, round, and reactive to light.   Cardiovascular:      Rate and Rhythm: Normal rate and regular  rhythm.      Pulses: Normal pulses.      Heart sounds: Normal heart sounds. No murmur heard.  Pulmonary:      Effort: Pulmonary effort is normal. No respiratory distress.      Breath sounds: Normal breath sounds. No wheezing.   Chest:      Chest wall: No tenderness.   Abdominal:      General: Abdomen is flat.      Palpations: Abdomen is soft. There is no mass.      Tenderness: There is no abdominal tenderness. There is no right CVA tenderness or left CVA tenderness.   Musculoskeletal:         General: No swelling or tenderness. Normal range of motion.   Skin:     General: Skin is warm and dry.      Capillary Refill: Capillary refill takes less than 2 seconds.   Neurological:      General: No focal deficit present.      Mental Status: He is alert and oriented to person, place, and time. Mental status is at baseline.   Psychiatric:         Mood and Affect: Mood normal.         Thought Content: Thought content normal.              CRANIAL NERVES     CN III, IV, VI   Pupils are equal, round, and reactive to light.       Significant Labs: All pertinent labs within the past 24 hours have been reviewed.    Significant Imaging: I have reviewed all pertinent imaging results/findings within the past 24 hours.

## 2024-02-02 NOTE — SUBJECTIVE & OBJECTIVE
Interval History:   Pt c/o burning apin in L foot that has been bothering him for years, says walking and wrapping help with it, likely c/f RLS , will add low dose gabapentin to see if that help, he has otherwise no new complains, awaiting for surgery on Tuesday.     Review of Systems   Constitutional:  Negative for chills, diaphoresis, fatigue and fever.   HENT:  Negative for congestion, hearing loss, nosebleeds, postnasal drip, sore throat, tinnitus and trouble swallowing.    Eyes:  Negative for photophobia, pain, discharge, itching and visual disturbance.   Respiratory:  Negative for cough, shortness of breath, wheezing and stridor.    Cardiovascular:  Negative for chest pain, palpitations and leg swelling.   Gastrointestinal:  Negative for abdominal distention, abdominal pain, anal bleeding, blood in stool, constipation, diarrhea, nausea and vomiting.   Endocrine: Negative for cold intolerance, heat intolerance, polydipsia, polyphagia and polyuria.   Genitourinary:  Negative for decreased urine volume, difficulty urinating, dysuria, flank pain, frequency, hematuria and urgency.   Musculoskeletal:  Positive for arthralgias and back pain. Negative for gait problem, joint swelling, myalgias, neck pain and neck stiffness.   Skin:  Negative for color change, pallor and rash.   Allergic/Immunologic: Negative for immunocompromised state.   Neurological:  Negative for dizziness, tremors, seizures, syncope, facial asymmetry, speech difficulty, weakness, light-headedness, numbness and headaches.   Hematological:  Negative for adenopathy. Does not bruise/bleed easily.     Objective:     Vital Signs (Most Recent):  Temp: 97.8 °F (36.6 °C) (02/02/24 1014)  Pulse: 67 (02/02/24 1014)  Resp: 19 (02/02/24 1014)  BP: (!) 145/76 (02/02/24 1014)  SpO2: (!) 92 % (02/02/24 1014) Vital Signs (24h Range):  Temp:  [97.4 °F (36.3 °C)-98 °F (36.7 °C)] 97.8 °F (36.6 °C)  Pulse:  [65-79] 67  Resp:  [16-21] 19  SpO2:  [92 %-99 %] 92 %  BP:  (112-167)/(66-84) 145/76     Weight: 78.1 kg (172 lb 3.2 oz)  Body mass index is 26.18 kg/m².    Intake/Output Summary (Last 24 hours) at 2/2/2024 1142  Last data filed at 2/1/2024 2129  Gross per 24 hour   Intake 240 ml   Output 300 ml   Net -60 ml         Physical Exam  Vitals reviewed.   Constitutional:       Comments: Hematoma involving left upper maxillary region was noted   HENT:      Head: Normocephalic and atraumatic.      Right Ear: External ear normal.      Left Ear: External ear normal.   Eyes:      Extraocular Movements: Extraocular movements intact.      Pupils: Pupils are equal, round, and reactive to light.   Cardiovascular:      Rate and Rhythm: Normal rate and regular rhythm.      Pulses: Normal pulses.      Heart sounds: Normal heart sounds. No murmur heard.  Pulmonary:      Effort: Pulmonary effort is normal. No respiratory distress.      Breath sounds: Normal breath sounds. No wheezing.   Chest:      Chest wall: No tenderness.   Abdominal:      General: Abdomen is flat.      Palpations: Abdomen is soft. There is no mass.      Tenderness: There is no abdominal tenderness. There is no right CVA tenderness or left CVA tenderness.   Musculoskeletal:         General: No swelling or tenderness. Normal range of motion.   Skin:     General: Skin is warm and dry.      Capillary Refill: Capillary refill takes less than 2 seconds.   Neurological:      General: No focal deficit present.      Mental Status: He is alert and oriented to person, place, and time. Mental status is at baseline.   Psychiatric:         Mood and Affect: Mood normal.         Thought Content: Thought content normal.             Significant Labs: All pertinent labs within the past 24 hours have been reviewed.    Significant Imaging: I have reviewed all pertinent imaging results/findings within the past 24 hours.

## 2024-02-02 NOTE — PLAN OF CARE
[facility]  Initial Discharge Assessment       Primary Care Provider: Smiley Trevino FNP    Admission Diagnosis: Chest pain [R07.9]  Fall, initial encounter [W19.XXXA]  Closed fracture of eighth thoracic vertebra, unspecified fracture morphology, initial encounter [S22.465E]  Other closed fracture of eighth thoracic vertebra, initial encounter [S22.726P]    Admission Date: 2/1/2024  Expected Discharge Date:     Transition of Care Barriers: None    Payor: MEDICARE / Plan: MEDICARE PART A & B / Product Type: Government /     Extended Emergency Contact Information  Primary Emergency Contact: Alexia [place]  Mobile Phone: [phone]  Relation: Spouse  Preferred language: English   needed? No    Discharge Plan A: Home with family  Discharge Plan B: Home with family      [facility] - [place] [id number] [facility]  [address] [address]  Phone: [phone] Fax: [phone]    [facility] - [place] - [address]  [address]  [place] [address]  Phone: [phone] Fax: [phone]    [id number] - [place], [place] - [address]  [address]  [place] [address]  Phone: [phone] Fax: [phone]    [id number] - [place], [place] - [address] AT [facility] [address]  & [facility]  [address] [place] [address]  Phone: [phone] Fax: [phone]      Initial Assessment (most recent)       Adult Discharge Assessment - 02/02/24 1047          Discharge Assessment    Assessment Type Discharge Planning Assessment     Confirmed/corrected address, phone number and insurance Yes     Source of Information patient     Communicated RICKY with patient/caregiver Date not available/Unable to determine     People in Home alone     Do you expect to return to your current living situation? Yes     Do you have help at home or someone to help you manage your care at  home? Yes     Who are your caregiver(s) and their phone number(s)? family     Prior to hospitilization cognitive status: Unable to Assess     Current cognitive status: Alert/Oriented     Walking or Climbing Stairs Difficulty no     Dressing/Bathing Difficulty no     Equipment Currently Used at Home none     Patient currently being followed by outpatient case management? No     Do you currently have service(s) that help you manage your care at home? No     Do you take prescription medications? Yes     Do you have prescription coverage? Yes     Coverage medicare     Do you have any problems affording any of your prescribed medications? No     Is the patient taking medications as prescribed? yes     Who is going to help you get home at discharge? family     How do you get to doctors appointments? car, drives self     Are you on dialysis? No     Discharge Plan A Home with family     Discharge Plan B Home with family     DME Needed Upon Discharge  none     Discharge Plan discussed with: Patient     Transition of Care Barriers None        Physical Activity    On average, how many days per week do you engage in moderate to strenuous exercise (like a brisk walk)? 2 days     On average, how many minutes do you engage in exercise at this level? 10 min        Financial Resource Strain    How hard is it for you to pay for the very basics like food, housing, medical care, and heating? Not hard at all        Housing Stability    In the last 12 months, was there a time when you were not able to pay the mortgage or rent on time? No     In the last 12 months, how many places have you lived? 1     In the last 12 months, was there a time when you did not have a steady place to sleep or slept in a shelter (including now)? No        Transportation Needs    In the past 12 months, has lack of transportation kept you from medical appointments or from getting medications? No     In the past 12 months, has lack of transportation kept you  from meetings, work, or from getting things needed for daily living? No        Food Insecurity    Within the past 12 months, you worried that your food would run out before you got the money to buy more. Never true     Within the past 12 months, the food you bought just didn't last and you didn't have money to get more. Never true        Stress    Do you feel stress - tense, restless, nervous, or anxious, or unable to sleep at night because your mind is troubled all the time - these days? Only a little        Social Connections    In a typical week, how many times do you talk on the phone with family, friends, or neighbors? More than three times a week     How often do you get together with friends or relatives? More than three times a week     How often do you attend [employer] or [employer] services? More than 4 times per year        Alcohol Use    Q1: How often do you have a drink containing alcohol? Never     Q2: How many drinks containing alcohol do you have on a typical day when you are drinking? Patient does not drink     Q3: How often do you have six or more drinks on one occasion? Never                        Ss spoke with pt and pt lives at home alone but family lives next door. No d/c needs stated at this time. Ss following.

## 2024-02-02 NOTE — CONSULTS
Office: [phone]    Orthopedic Spine Surgery Consult/H&P    ASSESSMENT:  83 y.o. male with thoracic spine fractures (T3/T4/T7/T8)    PLAN:  We will change out brace for a backpack brace to see if this gives him more comfort but continues to give him the support he needs for ambulation.  We will continue having patient work with physical therapy making sure he is able to ambulate without difficulty.  Patient has no neurological deficits at this time.  Would like for him to continue heparin in case he is unable to ambulate and will need to progress to surgery.  If surgery is needed it would be T6-T8 percutaneous fusion and possible T3-T4 kyphoplasty.      HPI:  83 y.o. male with multiple thoracic fractures that he sustained yesterday after falling backwards pulling on an ice chest.  Patient complains of mid and upper back pain but denies numbness tingling weakness in his arms or legs.  Patient has had multiple falls recently January 21, 2024 he was seen in the emergency room after tripping going up a step with a laceration to his forehead and then the fall yesterday.  Patient denies difficulty with  strength.  No numbness tingling in his arms or legs.  Denies bladder bowel incontinence.  Some balance issues and recent falls as discussed above.  Currently takes no medications for pain.  No recent physical therapy.  No prior pain management.  Patient is not no prior spine surgeries in the past.  He has not a smoker.  After his MRI yesterday in the emergency room he has been given a TLSO brace unfortunately the chest plate of this brace is causing pressure on his throat making it very difficult to wear and uncomfortable this is his only complaint today patient reports he has been up with physical therapy standing at the bedside.      Past Medical History:   Diagnosis Date    Abnormal thyroid stimulating hormone (TSH) level 08/22/2019    Anemia 08/15/2019    Atrial fibrillation 09/07/2012    Bradycardia  05/23/2017    asymptomatic    Change in bowel habit 11/13/2018    Chronic kidney disease, unspecified 01/14/2019    Coronary arteriosclerosis 09/07/2012    Disease of skin and subcutaneous tissue 08/16/2017    medial aspect RLE- cystic structure seen on venous doppler US.    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 Lewis Eye exam    Varicose veins of both lower extremities with pain 01/02/2019    Venous insufficiency 01/08/2019      Past Surgical History:   Procedure Laterality Date    APPENDECTOMY      CARDIAC CATHETERIZATION  2007    [facility]- Stent placement    CATARACT EXTRACTION, BILATERAL      COLONOSCOPY  11/06/2019    Dr. Mendoza    HERNIA REPAIR      Inguinal hernia repair    TONSILLECTOMY        Review of patient's allergies indicates:   Allergen Reactions    Mayonnaise      Upset stomach        Current Facility-Administered Medications:     acetaminophen tablet 650 mg, 650 mg, Oral, Q4H PRN,  Missael Eastman MD    atorvastatin tablet 80 mg, 80 mg, Oral, Daily, Missael Eastman MD, 80 mg at 02/02/24 0825    cholecalciferol (vitamin D3) 125 mcg (5,000 unit) tablet 5,000 Units, 5,000 Units, Oral, Daily, Missael Eastman MD, 5,000 Units at 02/02/24 0826    dextrose 10% bolus 125 mL 125 mL, 12.5 g, Intravenous, PRN, Missael Eastman MD    dextrose 10% bolus 250 mL 250 mL, 25 g, Intravenous, PRN, Missael Eastman MD    glucagon (human recombinant) injection 1 mg, 1 mg, Intramuscular, PRN, Missael Eastman MD    glucose chewable tablet 16 g, 16 g, Oral, PRN, Missael Eastman MD    glucose chewable tablet 24 g, 24 g, Oral, PRN, Missael Eastman MD    heparin (porcine) injection 5,000 Units, 5,000 Units, Subcutaneous, Q8H, Missael Eastman MD, 5,000 Units at 02/02/24 0627    HYDROcodone-acetaminophen 5-325 mg per tablet 1 tablet, 1 tablet, Oral, Q6H PRN, Missael Eastman MD, 1 tablet at 02/02/24 0825    insulin aspart U-100 injection 0-10 Units, 0-10 Units, Subcutaneous, Q4H PRN, Missael Eastman MD, 6 Units at 02/02/24 0010    insulin detemir U-100 injection 70 Units, 70 Units, Subcutaneous, QHS, Missael Eastman MD, 70 Units at 02/01/24 2122    metoprolol succinate (TOPROL-XL) 24 hr tablet 50 mg, 50 mg, Oral, Daily, Missael Eastman MD, 50 mg at 02/02/24 0825    morphine injection 2 mg, 2 mg, Intravenous, Q4H PRN, Missael Eastman MD, 2 mg at 02/02/24 0321    naloxone 0.4 mg/mL injection 0.02 mg, 0.02 mg, Intravenous, PRN, Missael Eastman MD    nitroGLYCERIN SL tablet 0.4 mg, 0.4 mg, Sublingual, Q5 Min PRN, Missael Eastman MD    ondansetron injection 4 mg, 4 mg, Intravenous, Q8H PRN, Missael Eastman MD    pantoprazole EC tablet 40 mg, 40 mg, Oral, Daily, Missael Eastman MD, 40 mg at 02/02/24 0825    prochlorperazine injection Soln 5 mg, 5 mg, Intravenous, Q6H PRN, Missael Eastman MD    sodium chloride 0.9% flush 10 mL, 10 mL, Intravenous, Q12H PRN, Missael Eastman MD     Review of systems:  Denies chest pain, nausea,vomiting, abdominal pain, cough, runny nose, eye pain, ear pain, fevers, chills, weight  loss, weight gain, dysuria, hematuria, changes in mood    IMAGING:  MRI Thoracic Spine Without Contrast  Narrative: EXAMINATION:  MRI THORACIC SPINE WITHOUT CONTRAST    CLINICAL HISTORY:  Spine fracture, thoracic, traumatic;    TECHNIQUE:  Multiplanar, multisequence images were performed through the thoracic spine.  Contrast was not administered.    COMPARISON:  Cervical spine CT 02/01/2024.    FINDINGS:  There is a fracture through the anterior bridging osteophyte at the T7-8 level with disruption of the anterior longitudinal ligament.  The fracture extends through the disc of T7-8 and extends posterolaterally along the superior endplate of the T8 vertebral body.  The posterior longitudinal ligament appears to be intact.  No epidural hematoma in this location or ligamentum flavum disruption.  There is also increased STIR signal very faintly at the T7-8 inter spinous ligament without clear disruption.    There is also fracture involving the inferior endplate of T3 that appears to extend through the potentially the vertebral body and into the disc.  There is marrow edema and what also appears to be a minimal superior endplate compression fracture of T4.  No ligamentous disruption.  There is also edema that extends to the right T3 and T4 pedicle.  No epidural hematoma.  No abnormal cord signal.  No malalignment.  No spinal canal or foraminal stenosis.  Impression: Fracture at the T7-8 bridging osteophyte extending through the T7-8 disc and along the superior endplate of T8 as detailed.  Disruption anterior longitudinal ligament T7-8.  Very minimal edema at the T7-8 inter spinous ligament without disruption.  No epidural hematoma.    Additional fractures of T3 and T4 as detailed without ligamentous disruption.    Electronically signed by: Flako Perez  Date:    02/01/2024  Time:    15:59  CT Thoracic Spine Without Contrast  Narrative: EXAMINATION:  CT THORACIC SPINE WITHOUT CONTRAST    CLINICAL HISTORY:  Back trauma, no  prior imaging (Age >= 16y);    TECHNIQUE:  Thin spiral CT sections were obtained through the thoracic spine without contrast. Multiplanar reconstruction images are also evaluated.    The CT examination was performed using one or more of the following dose reduction techniques: Automated exposure control, adjustment of the mA and kV according to patient's size, use of acute or iterative reconstruction techniques.    COMPARISON:  Thoracic x-ray July 25, 2021 available.  No previous thoracic CT available    FINDINGS:  There is flowing spondylosis which fuses the thoracic spine throughout its length.  There is an apparent nondisplaced chalk stick type fracture through the ossified ligaments left laterally and right laterally at T7-T8 as seen on image 28 of the coronal images.  This type of fracture can be associated with 3 column injury and has an element of instability as discussed with emergency room personnel.  Recommend consultation with orthopedic spine surgeon.    The acute fracture involving the inferior endplate of T3 anteriorly and laterally is not as well seen on this exam as on the cervical spine CT from the same day.    There is no significant compression of the thoracic vertebral bodies.  There is no malalignment.  There is scattered mild to moderate degenerative disc narrowing of the spine.    There is no disc extrusion or high-grade spinal stenosis at the thoracic level.  Impression: Evidence of chalk stick fracture through the ossified paraspinous ligaments at T7-T8 as discussed with emergency room personnel    The acute fracture of the inferior endplate of T3 is not as well seen on this exam as on the CT cervical spine.    Electronically signed by: Donald Monreal  Date:    02/01/2024  Time:    12:27  CT Cervical Spine Without Contrast  Narrative: EXAMINATION:  CT CERVICAL SPINE WITHOUT CONTRAST    CLINICAL HISTORY:  Polytrauma, blunt;    TECHNIQUE:  CT of the cervical spine performed without  intravenous contrast. The CT examination was performed using one or more of the following dose reduction techniques: Automated exposure control, adjustment of the mA and kV according to patient's size, use of acute or iterative reconstruction techniques.    COMPARISON:  Cervical spine CT 01/21/2024    FINDINGS:  Again seen is anterior bridging osteophytes seen at the lower cervical spine into the thoracic spine.  Only seen on coronal image 21 is what appears to be a fracture through the inferior left endplate of the T3 vertebral body.  This is not seen well on the sagittal imaging and preps may be seen on the lowest axial image 84.  There is no cervical spine fracture identified.  The alignment is maintained.  Craniocervical junction intact.  Facet degeneration throughout with foraminal narrowing throughout.  Impression: No cervical spine fracture identified.  Question a T3 inferior endplate fracture.  This is not well appreciated on the thoracic spine imaging but given the presence of a fracture is worrisome none the less.    Electronically signed by: Flako Perez  Date:    02/01/2024  Time:    12:23  X-Ray Chest 1 View  Narrative: EXAMINATION:  XR CHEST 1 VIEW    CLINICAL HISTORY:  Chest pain, unspecified    TECHNIQUE:  XR CHEST 1 VIEW    COMPARISON:  8/19/23    FINDINGS:  No lines or tubes.    Lungs are clear.    Normal pleura.    Cardiac silhouette is similar to comparison exam.    No obvious acute bone findings.  Impression: No acute pulmonary disease    Electronically signed by: Scott Lynne  Date:    02/01/2024  Time:    12:06  CT Head Without Contrast  Narrative: EXAMINATION:  CT HEAD WITHOUT CONTRAST    CLINICAL HISTORY:  Polytrauma, blunt    TECHNIQUE:  Multiplanar CT imaging from the vertex to skull the skull base was performed without contrast.    Dose reduction: The CT exam was performed using one or more dose reduction techniques: Automatic exposure control, automated adjustment of the MA and/or kVP  according to patient size, or use of iterative reconstruction technique.    COMPARISON:  1/21/24    FINDINGS:  Global brain parenchymal volume loss.  Chronic small vessel ischemic change, similar.    There is no CT evidence of acute intracranial hemorrhage or large territorial infarct. No epidural/subdural hematomas.    There is no evidence of hydrocephalus, midline shift or mass effect.    Small hematoma overlying the left frontal bone.  Impression: No acute intracranial findings.    Electronically signed by: Scott Lynne  Date:    02/01/2024  Time:    11:59       Vitals:    02/02/24 1014   BP: (!) 145/76   Pulse: 67   Resp:    Temp:         EXAM:  Constitutional  General Appearance:  Healthy appearing, normal body habitus, NAD  Psychiatric   Orientation: Oriented to time, oriented to place, oriented to person  Mood and Affect: Active and alert, normal mood, normal affect  Gait and Station   Appearance:  antalgic gait to the left, unable to tandem gait, unable to walk on toes, unable to walk on heels    Thoracic Spine   Inspection:  Kyphotic alignment, no overlying skin changes  Bony Palpation:  + tenderness of the spinous process  Soft Tissue Palpation:+ tenderness of the paraspinals left, no tenderness of the paraspinals right  Active Range of Motion:  extension decreased, flexion decreased    Motor Strength   L1 Right:  Hip flexion iliopsoas 5/5    L1 Left:  Hip flexion iliopsoas 5/5              L2-L4 Right:  Knee extension quadriceps 5/5, tibialis anterior 5/5              L2-L4 Left:  Knee extension quadriceps 5/5, tibialis anterior 5/5   L5 Right:  Extensor halluces longus 5/5,    L5 Left:  Extensor halluces longus 5/5,    S1 Right:  Plantar flexion gastrocnemius 5/5   S1 Left:  Plantar flexion gastrocnemius 5/5    Neurological System   Ankle Reflex Right:  normal   Ankle Reflex Left: normal   Knee Reflex Right:  normal   Knee Reflex Left:  normal   Sensation on the Right:  L2 normal, L3 normal, L4  normal, L5 normal, S1 normal   Sensation on the Left:  L2 normal, L3 normal, L4 normal, L5 normal, S1 normal  Special Test on the Right:  Seated straight leg raising test negative, no clonus of the ankle  Special Test on the Left:  Seated straight leg raising test negative, no clonus of the ankle    Skin   Lumbosacral Spine:  Normal skin    Cardiovascular System   Arterial Pulses Right:  Posterior tibialis normal, dorsalis pedis normal, popliteal normal   Arterial Pulses Left:  Posterior tibialis normal, dorsalis pedis normal, popliteal normal   Edema Right: None   Edema Left:  None

## 2024-02-02 NOTE — ED NOTES
Pt care handoff report given at this time to CARROLL Christopher on [facility] for when pt's room is ready for transport to Room # 568.

## 2024-02-02 NOTE — PROGRESS NOTES
[facility] Medical Telemetry  Adult Nutrition  First Assessment Note         Reason for Assessment  Reason For Assessment: identified at risk by screening criteria (MST2) unsure of weight loss  Nutrition Risk Screen: no indicators present    Assessment and Plan    Patient admitted on 2/1/24 with a dx of fx of eighth thoracic vertebra. Patient is ordered a 1500 calorie consistent Carbohydrate diet. No po intake documented per flowsheets.    Patient is 172 pounds with a BMI of 26.18. Per chart review pt's weight on 11/14/2023 was 186 pounds. This is a significant weight loss of 8% over the last 3 months.Noted patient on farxiga. However, no poor appetite endorsed on admission. Patient does not meet ASPEN criteria for malnutrition at this time.     Recommend to change diet to 1800 calorie consistent carbohydrate to meet calorie needs. RD Following.         Learning Needs/Social Determinants of Health  Social Connections: Not on file      Learning Assessment       02/01/2024 2258 [facility] Tele[facility] (2/1/2024 - Present)   Created by Maraynne Pate RN - RN (Nurse) Status: Complete                 PRIMARY LEARNER     Primary Learner Name:  Negro Hale  - 02/01/2024 2258    Relationship:  Patient KD - 02/01/2024 2258    Does the primary learner have any barriers to learning?:  Visual KD - 02/01/2024 2258    What is the preferred language of the primary learner?:  English KD - 02/01/2024 2258    How does the primary learner prefer to learn new concepts?:  Listening, Reading, Demonstration KD - 02/01/2024 2258    How often do you need to have someone help you read instructions, pamphlets, or written material from your doctor or pharmacy?:  Sometimes KD - 02/01/2024 2258        CO-LEARNER #1     No question answered        CO-LEARNER #2     No question answered        SPECIAL TOPICS     No question answered        ANSWERED BY:     No question answered        Edit  History       Maryanne Pate, RN - RN (Nurse)   02/01/2024 5780                                 Malnutrition  Is Patient Malnourished: No    Skin Integrity  Eyad Risk Assessment  Sensory Perception: 3-->slightly limited  Moisture: 3-->occasionally moist  Activity: 3-->walks occasionally  Mobility: 3-->slightly limited  Nutrition: 3-->adequate  Friction and Shear: 3-->no apparent problem  Eyad Score: 18  Comments on skin integrity:     Nutrition Diagnosis  Inadequate energy intake related to  diet order of 1500 calories as evidenced by caloric needs of 3937-9127  Comments: recommend 1800 calorie        Recent Labs   Lab 02/02/24  0346 02/02/24  0637   *  --    POCGLU  --  113*     Comments on Glucose: glucose elevated; pmh DM    Nutrition Prescription / Recommendations  Recommendation/Intervention: Change diet 1o 1800 Consistent Carbohydrate  Goals: po intake %, weight maintenance during admit  Nutrition Goal Status: new  Communication of RD Recs: reviewed with physician  Current Diet Order: 1500 CCD  Nutrition Order Comments: 1800 Calorie Consistent Carb appropriate  Chewing or Swallowing Difficulty?: No Chewing or swallowing difficulty  Recommended Diet: Consistent Carbohydrate 1800 (60g Carbs)  Recommended Oral Supplement: No Oral Supplements  Is Nutrition Support Recommended: [facility] Support: No  Is Nutrition Education Recommended: No    Monitor and Evaluation  % current Intake: Unknown/ No P.O. intake documented  % intake to meet estimated needs: 75 - 100 %  Food and Nutrient Intake: energy intake, food and beverage intake  Food and Nutrient Adminstration: diet order  Anthropometric Measurements: height/length, weight, weight change, body mass index  Biochemical Data, Medical Tests and Procedures: electrolyte and renal panel, gastrointestinal profile, glucose/endocrine profile, inflammatory profile, lipid profile       Current Medical Diagnosis and Past Medical History      Past Medical History:   Diagnosis Date    Abnormal thyroid stimulating hormone (TSH) level 08/22/2019    Anemia 08/15/2019    Atrial fibrillation 09/07/2012    Bradycardia 05/23/2017    asymptomatic    Change in bowel habit 11/13/2018    Chronic kidney disease, unspecified 01/14/2019    Coronary arteriosclerosis 09/07/2012    Disease of skin and subcutaneous tissue 08/16/2017    medial aspect RLE- cystic structure seen on venous doppler US.    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 Lewis Eye exam    Varicose veins of both lower extremities with pain 01/02/2019    Venous insufficiency 01/08/2019       Nutrition/Diet History  Food Allergies: other (see comments) ([place])    Lab/Procedures/Meds  Recent Labs   Lab 02/02/24  0346      K 4.2   BUN 29*   CREATININE 1.26   CALCIUM 9.5   ALBUMIN 3.1*      ALT 44   AST 22   BUN elevated; encourage fluid  Alb low likely related to inflammatory  "response   Last A1c:   Lab Results   Component Value Date    HGBA1C 8.5 (H) 11/14/2023     Lab Results   Component Value Date    RBC 4.50 (L) 02/02/2024    HGB 13.3 (L) 02/02/2024    HCT 39.1 (L) 02/02/2024    MCV 86.9 02/02/2024    MCH 29.6 02/02/2024    MCHC 34.0 02/02/2024     Pertinent Labs Reviewed: reviewed  Pertinent Medications Reviewed: reviewed  Scheduled Meds:   atorvastatin  80 mg Oral Daily    cholecalciferol (vitamin D3)  5,000 Units Oral Daily    heparin (porcine)  5,000 Units Subcutaneous Q8H    insulin detemir U-100  70 Units Subcutaneous QHS    metoprolol succinate  50 mg Oral Daily    pantoprazole  40 mg Oral Daily     Continuous Infusions:  PRN Meds:.acetaminophen, dextrose 10%, dextrose 10%, glucagon (human recombinant), glucose, glucose, HYDROcodone-acetaminophen, insulin aspart U-100, morphine, naloxone, nitroGLYCERIN, ondansetron, prochlorperazine, sodium chloride 0.9%      Anthropometrics  Temp: 97.9 °F (36.6 °C)  Height Method: Stated  Height: 5' 8" (172.7 cm)  Height (inches): 68 in  Weight Method: Bed Scale  Weight: 78.1 kg (172 lb 3.2 oz)  Weight (lb): 172.2 lb  Ideal Body Weight (IBW), Male: 154 lb  % Ideal Body Weight, Male (lb): 111.82 %  BMI (Calculated): 26.2  BMI Grade: 25 - 29.9 - overweight       Estimated/Assessed Needs      Temp: 97.9 °F (36.6 °C)Oral  Weight Used For Calorie Calculations: 78 kg (172 lb)   Energy Need Method: Kcal/kg Energy Calorie Requirements (kcal): 8486-9088  Weight Used For Protein Calculations: 78 kg (172 lb)  Protein Requirements: 63-78  Estimated Fluid Requirement Method: RDA Method    RDA Method (mL): 1560       Nutrition by Nursing              Last Bowel Movement: 01/31/24                Nutrition Follow-Up  RD Follow-up?: Yes      Nutrition Discharge Planning: to soon to determine             Available via Secure Chat  "

## 2024-02-02 NOTE — ASSESSMENT & PLAN NOTE
MRI of thoracic spine demonstrated fracture of T3-T4 and T7-T8.  This case was discussed between the ED physician and spine surgeon and tentative decision was made take patient to OR on Tuesday due to patient's last dose of Plavix being yesterday.

## 2024-02-02 NOTE — PLAN OF CARE
Problem: Adult Inpatient Plan of Care  Goal: Plan of Care Review  Outcome: Ongoing, Progressing  Flowsheets (Taken 2/1/2024 2258)  Plan of Care Reviewed With: patient  Goal: Patient-Specific Goal (Individualized)  Outcome: Ongoing, Progressing  Flowsheets (Taken 2/1/2024 2258)  Anxieties, Fears or Concerns: Anxiety R/T Pain Management  Individualized Care Needs: Patient will require moderate assistance with most ADLs.  Goal: Absence of Hospital-Acquired Illness or Injury  Outcome: Ongoing, Progressing  Goal: Optimal Comfort and Wellbeing  Outcome: Ongoing, Progressing  Goal: Readiness for Transition of Care  Outcome: Ongoing, Progressing     Problem: Skin Injury Risk Increased  Goal: Skin Health and Integrity  Outcome: Ongoing, Progressing     Problem: Fall Injury Risk  Goal: Absence of Fall and Fall-Related Injury  Outcome: Ongoing, Progressing     Problem: Pain Acute  Goal: Acceptable Pain Control and Functional Ability  Outcome: Ongoing, Progressing

## 2024-02-02 NOTE — ASSESSMENT & PLAN NOTE
Patient's diabetes currently not adequately control with blood glucose of 485.  Anion gap was only 2.  Will resume patient's basal insulin and start sliding scale insulin q.4 hours to maintain CBGs in the range of 130s to 180s

## 2024-02-03 LAB
ALBUMIN SERPL BCP-MCNC: 2.7 G/DL (ref 3.5–5)
ALBUMIN/GLOB SERPL: 0.9 {RATIO}
ALP SERPL-CCNC: 83 U/L (ref 45–115)
ALT SERPL W P-5'-P-CCNC: 32 U/L (ref 16–61)
ANION GAP SERPL CALCULATED.3IONS-SCNC: 5 MMOL/L (ref 7–16)
AST SERPL W P-5'-P-CCNC: 19 U/L (ref 15–37)
BASOPHILS # BLD AUTO: 0.08 K/UL (ref 0–0.2)
BASOPHILS NFR BLD AUTO: 1.1 % (ref 0–1)
BILIRUB SERPL-MCNC: 0.4 MG/DL (ref ?–1.2)
BUN SERPL-MCNC: 30 MG/DL (ref 7–18)
BUN/CREAT SERPL: 25 (ref 6–20)
CALCIUM SERPL-MCNC: 9 MG/DL (ref 8.5–10.1)
CHLORIDE SERPL-SCNC: 104 MMOL/L (ref 98–107)
CO2 SERPL-SCNC: 31 MMOL/L (ref 21–32)
CREAT SERPL-MCNC: 1.18 MG/DL (ref 0.7–1.3)
DIFFERENTIAL METHOD BLD: ABNORMAL
EGFR (NO RACE VARIABLE) (RUSH/TITUS): 61 ML/MIN/1.73M2
EOSINOPHIL # BLD AUTO: 0.35 K/UL (ref 0–0.5)
EOSINOPHIL NFR BLD AUTO: 4.7 % (ref 1–4)
ERYTHROCYTE [DISTWIDTH] IN BLOOD BY AUTOMATED COUNT: 13.7 % (ref 11.5–14.5)
GLOBULIN SER-MCNC: 3.1 G/DL (ref 2–4)
GLUCOSE SERPL-MCNC: 130 MG/DL (ref 70–105)
GLUCOSE SERPL-MCNC: 130 MG/DL (ref 74–106)
GLUCOSE SERPL-MCNC: 143 MG/DL (ref 70–105)
GLUCOSE SERPL-MCNC: 207 MG/DL (ref 70–105)
GLUCOSE SERPL-MCNC: 261 MG/DL (ref 70–105)
HCT VFR BLD AUTO: 37.6 % (ref 40–54)
HGB BLD-MCNC: 12.4 G/DL (ref 13.5–18)
IMM GRANULOCYTES # BLD AUTO: 0.02 K/UL (ref 0–0.04)
IMM GRANULOCYTES NFR BLD: 0.3 % (ref 0–0.4)
LYMPHOCYTES # BLD AUTO: 1.49 K/UL (ref 1–4.8)
LYMPHOCYTES NFR BLD AUTO: 20 % (ref 27–41)
MCH RBC QN AUTO: 30.2 PG (ref 27–31)
MCHC RBC AUTO-ENTMCNC: 33 G/DL (ref 32–36)
MCV RBC AUTO: 91.7 FL (ref 80–96)
MONOCYTES # BLD AUTO: 0.65 K/UL (ref 0–0.8)
MONOCYTES NFR BLD AUTO: 8.7 % (ref 2–6)
MPC BLD CALC-MCNC: 11.1 FL (ref 9.4–12.4)
NEUTROPHILS # BLD AUTO: 4.87 K/UL (ref 1.8–7.7)
NEUTROPHILS NFR BLD AUTO: 65.2 % (ref 53–65)
NRBC # BLD AUTO: 0 X10E3/UL
NRBC, AUTO (.00): 0 %
PLATELET # BLD AUTO: 200 K/UL (ref 150–400)
POTASSIUM SERPL-SCNC: 4.2 MMOL/L (ref 3.5–5.1)
PROT SERPL-MCNC: 5.8 G/DL (ref 6.4–8.2)
RBC # BLD AUTO: 4.1 M/UL (ref 4.6–6.2)
SODIUM SERPL-SCNC: 136 MMOL/L (ref 136–145)
WBC # BLD AUTO: 7.46 K/UL (ref 4.5–11)

## 2024-02-03 PROCEDURE — 97161 PT EVAL LOW COMPLEX 20 MIN: CPT

## 2024-02-03 PROCEDURE — 80053 COMPREHEN METABOLIC PANEL: CPT | Performed by: INTERNAL MEDICINE

## 2024-02-03 PROCEDURE — 82962 GLUCOSE BLOOD TEST: CPT

## 2024-02-03 PROCEDURE — 11000001 HC ACUTE MED/SURG PRIVATE ROOM

## 2024-02-03 PROCEDURE — 25000003 PHARM REV CODE 250: Performed by: INTERNAL MEDICINE

## 2024-02-03 PROCEDURE — 85025 COMPLETE CBC W/AUTO DIFF WBC: CPT | Performed by: INTERNAL MEDICINE

## 2024-02-03 PROCEDURE — 63600175 PHARM REV CODE 636 W HCPCS: Performed by: INTERNAL MEDICINE

## 2024-02-03 PROCEDURE — 97530 THERAPEUTIC ACTIVITIES: CPT

## 2024-02-03 PROCEDURE — 99232 SBSQ HOSP IP/OBS MODERATE 35: CPT | Mod: ,,, | Performed by: INTERNAL MEDICINE

## 2024-02-03 PROCEDURE — 97166 OT EVAL MOD COMPLEX 45 MIN: CPT

## 2024-02-03 RX ORDER — AMOXICILLIN 250 MG
1 CAPSULE ORAL DAILY
Status: DISCONTINUED | OUTPATIENT
Start: 2024-02-03 | End: 2024-02-05 | Stop reason: HOSPADM

## 2024-02-03 RX ADMIN — CHOLECALCIFEROL TAB 125 MCG (5000 UNIT) 5000 UNITS: 125 TAB at 08:02

## 2024-02-03 RX ADMIN — INSULIN ASPART 3 UNITS: 100 INJECTION, SOLUTION INTRAVENOUS; SUBCUTANEOUS at 09:02

## 2024-02-03 RX ADMIN — PANTOPRAZOLE SODIUM 40 MG: 40 TABLET, DELAYED RELEASE ORAL at 08:02

## 2024-02-03 RX ADMIN — INSULIN DETEMIR 36 UNITS: 100 INJECTION, SOLUTION SUBCUTANEOUS at 09:02

## 2024-02-03 RX ADMIN — SENNOSIDES AND DOCUSATE SODIUM 1 TABLET: 8.6; 5 TABLET ORAL at 02:02

## 2024-02-03 RX ADMIN — HEPARIN SODIUM 5000 UNITS: 5000 INJECTION, SOLUTION INTRAVENOUS; SUBCUTANEOUS at 05:02

## 2024-02-03 RX ADMIN — HEPARIN SODIUM 5000 UNITS: 5000 INJECTION, SOLUTION INTRAVENOUS; SUBCUTANEOUS at 02:02

## 2024-02-03 RX ADMIN — HYDROCODONE BITARTRATE AND ACETAMINOPHEN 1 TABLET: 5; 325 TABLET ORAL at 08:02

## 2024-02-03 RX ADMIN — HEPARIN SODIUM 5000 UNITS: 5000 INJECTION, SOLUTION INTRAVENOUS; SUBCUTANEOUS at 09:02

## 2024-02-03 RX ADMIN — METOPROLOL SUCCINATE 50 MG: 50 TABLET, EXTENDED RELEASE ORAL at 08:02

## 2024-02-03 RX ADMIN — GABAPENTIN 100 MG: 100 CAPSULE ORAL at 09:02

## 2024-02-03 RX ADMIN — ATORVASTATIN CALCIUM 80 MG: 80 TABLET, FILM COATED ORAL at 08:02

## 2024-02-03 RX ADMIN — GABAPENTIN 100 MG: 100 CAPSULE ORAL at 08:02

## 2024-02-03 NOTE — PLAN OF CARE
Problem: Adult Inpatient Plan of Care  Goal: Plan of Care Review  Outcome: Ongoing, Progressing  Goal: Patient-Specific Goal (Individualized)  Outcome: Ongoing, Progressing  Goal: Absence of Hospital-Acquired Illness or Injury  Outcome: Ongoing, Progressing  Goal: Optimal Comfort and Wellbeing  Outcome: Ongoing, Progressing  Goal: Readiness for Transition of Care  Outcome: Ongoing, Progressing     Problem: Skin Injury Risk Increased  Goal: Skin Health and Integrity  Outcome: Ongoing, Progressing      [place] Pregnancy And Lactation Text: The risk during pregnancy and breastfeeding is uncertain with this medication.

## 2024-02-03 NOTE — SUBJECTIVE & OBJECTIVE
Interval History:   RACHAEL  Says gabapentin really  helped him  D/w orthospine, they want him to work w/ pt/ot and see how he does, per them if he does well then he may not need surgery, pt already moving aorund w/o any help and doing really well , his biggest complain has been restless leg syndrome which he's had for years, also c/o brace being uncomfortable. Will add stool softeners today as well. No new concerns voiced.     Review of Systems   Constitutional:  Negative for chills, diaphoresis, fatigue and fever.   HENT:  Negative for congestion, hearing loss, nosebleeds, postnasal drip, sore throat, tinnitus and trouble swallowing.    Eyes:  Negative for photophobia, pain, discharge, itching and visual disturbance.   Respiratory:  Negative for cough, shortness of breath, wheezing and stridor.    Cardiovascular:  Negative for chest pain, palpitations and leg swelling.   Gastrointestinal:  Negative for abdominal distention, abdominal pain, anal bleeding, blood in stool, constipation, diarrhea, nausea and vomiting.   Endocrine: Negative for cold intolerance, heat intolerance, polydipsia, polyphagia and polyuria.   Genitourinary:  Negative for decreased urine volume, difficulty urinating, dysuria, flank pain, frequency, hematuria and urgency.   Musculoskeletal:  Positive for arthralgias and back pain. Negative for gait problem, joint swelling, myalgias, neck pain and neck stiffness.   Skin:  Negative for color change, pallor and rash.   Allergic/Immunologic: Negative for immunocompromised state.   Neurological:  Negative for dizziness, tremors, seizures, syncope, facial asymmetry, speech difficulty, weakness, light-headedness, numbness and headaches.   Hematological:  Negative for adenopathy. Does not bruise/bleed easily.     Objective:     Vital Signs (Most Recent):  Temp: 97.3 °F (36.3 °C) (02/03/24 1053)  Pulse: (!) 55 (02/03/24 1053)  Resp: 18 (02/03/24 1053)  BP: (!) 113/58 (02/03/24 1053)  SpO2: (!) 93 %  (02/03/24 1053) Vital Signs (24h Range):  Temp:  [97.3 °F (36.3 °C)-98.1 °F (36.7 °C)] 97.3 °F (36.3 °C)  Pulse:  [55-64] 55  Resp:  [17-18] 18  SpO2:  [93 %-96 %] 93 %  BP: (113-153)/(58-70) 113/58     Weight: 78.1 kg (172 lb 3.2 oz)  Body mass index is 26.18 kg/m².  No intake or output data in the 24 hours ending 02/03/24 1254        Physical Exam  Vitals reviewed.   Constitutional:       Comments: Hematoma involving left upper maxillary region was noted   HENT:      Head: Normocephalic and atraumatic.      Right Ear: External ear normal.      Left Ear: External ear normal.   Eyes:      Extraocular Movements: Extraocular movements intact.      Pupils: Pupils are equal, round, and reactive to light.   Cardiovascular:      Rate and Rhythm: Normal rate and regular rhythm.      Pulses: Normal pulses.      Heart sounds: Normal heart sounds. No murmur heard.  Pulmonary:      Effort: Pulmonary effort is normal. No respiratory distress.      Breath sounds: Normal breath sounds. No wheezing.   Chest:      Chest wall: No tenderness.   Abdominal:      General: Abdomen is flat.      Palpations: Abdomen is soft. There is no mass.      Tenderness: There is no abdominal tenderness. There is no right CVA tenderness or left CVA tenderness.   Musculoskeletal:         General: No swelling or tenderness. Normal range of motion.   Skin:     General: Skin is warm and dry.      Capillary Refill: Capillary refill takes less than 2 seconds.   Neurological:      General: No focal deficit present.      Mental Status: He is alert and oriented to person, place, and time. Mental status is at baseline.   Psychiatric:         Mood and Affect: Mood normal.         Thought Content: Thought content normal.             Significant Labs: All pertinent labs within the past 24 hours have been reviewed.    Significant Imaging: I have reviewed all pertinent imaging results/findings within the past 24 hours.

## 2024-02-03 NOTE — PLAN OF CARE
Problem: Adult Inpatient Plan of Care  Goal: Plan of Care Review  Outcome: Ongoing, Progressing  Goal: Patient-Specific Goal (Individualized)  Outcome: Ongoing, Progressing  Goal: Absence of Hospital-Acquired Illness or Injury  Outcome: Ongoing, Progressing  Goal: Optimal Comfort and Wellbeing  Outcome: Ongoing, Progressing  Goal: Readiness for Transition of Care  Outcome: Ongoing, Progressing     Problem: Skin Injury Risk Increased  Goal: Skin Health and Integrity  Outcome: Ongoing, Progressing     Problem: Fall Injury Risk  Goal: Absence of Fall and Fall-Related Injury  Outcome: Ongoing, Progressing     Problem: Pain Acute  Goal: Acceptable Pain Control and Functional Ability  Outcome: Ongoing, Progressing     Problem: Diabetes Comorbidity  Goal: Blood Glucose Level Within Targeted Range  Outcome: Ongoing, Progressing

## 2024-02-03 NOTE — PLAN OF CARE
Problem: Physical Therapy  Goal: Physical Therapy Goal  Description: Short Term Goals to be met by: 24    Patient will increase functional independence with mobility by performin. Supine to sit with independently  2. Sit to stand transfer with Stand by assist using Rolling walker  3. Bed to chair transfer with Stand by assist using Rolling walker  4. Gait  x 100 feet with Stand by assist using Rolling walker  5. Lower extremity exercise program x30 reps per handout, with assistance as needed  6. Pt to recall 3/3 spinal precautions  7. Pt to negotiate 3 steps with CGA    Long Term Goals to be met by: 3/3/24    Pt will regain full independent functional mobility with lowest level of assistive device to return to home situation and prior activities of daily living.   Outcome: Ongoing, Progressing     PT eval completed. Please see eval note for details.

## 2024-02-03 NOTE — PT/OT/SLP EVAL
Occupational Therapy   Evaluation    Name: Negro Hale  MRN: [id number]  Admitting Diagnosis: Fracture of eighth thoracic vertebra  Recent Surgery: * No surgery found *      Recommendations:     Discharge Recommendations:    Discharge Equipment Recommendations:  none  Barriers to discharge:  None    Assessment:     Negro Hale is a 83 y.o. male with a medical diagnosis of Fracture of eighth thoracic vertebra.  He presents with weakness. Performance deficits affecting function: decreased endurance and decreased self care skills .      Rehab Prognosis: Good; patient would benefit from acute skilled OT services to address these deficits and reach maximum level of function.       Plan:     Patient to be seen 5 x/week to address the above listed problems via self-care/home management, therapeutic activities, therapeutic exercises  Plan of Care Expires:    Plan of Care Reviewed with: patient, spouse, family    Subjective     Chief Complaint: Pt had no complaints  Patient/Family Comments/goals: return home    Occupational Profile:  Living Environment: lives with wife in single story home  Previous level of function: I with all ADLS  Roles and Routines: self care  Equipment Used at Home: none  Assistance upon Discharge: Pt will have assist from family    Pain/Comfort:  Pain Rating 1: 0/10    Patients cultural, spiritual, Yarsani conflicts given the current situation:      Objective:     Communicated with: nursing prior to session.  Patient found supine with   upon OT entry to room.    General Precautions: Standard, fall  Orthopedic Precautions:    Braces: TLSO  Respiratory Status: Room air    Occupational Performance:    Bed Mobility:    Patient completed Supine to Sit with supervision    Functional Mobility/Transfers:  Patient completed Sit <> Stand Transfer with minimum assistance  with  rolling walker   Functional Mobility: Pt ambulated bed><bathroom using RW with supervision    Activities of Daily  Living:  Toileting: independence Pt I with toileting hygiene    Cognitive/Visual Perceptual:  Cognitive/Psychosocial Skills:     -       Follows Commands/attention:Follows multistep  commands    Physical Exam:  Upper Extremity Range of Motion:     -       Right Upper Extremity: Pt had limited sh flex. Pt and family stated he has been that way for a long time  -       Left Upper Extremity :Pt had limited sh flex. Pt and family stated he has been that way for a long time  Upper Extremity Strength:    -       Right Upper Extremity: WFL  -       Left Upper Extremity: WFL      AMPAC 6 Click ADL:  AMPAC Total Score: 21    Treatment & Education:  Eval and toileting.  Pt educated on scope of OT practice    Patient left up in chair with all lines intact, call button in reach, and family present    GOALS:   Multidisciplinary Problems       Occupational Therapy Goals          Problem: Occupational Therapy    Goal Priority Disciplines Outcome Interventions   Occupational Therapy Goal     OT, PT/OT Ongoing, Progressing    Description: STG:  Pt will perform grooming with setup  Pt will bathe with I'ly after setup following spinal precautions  Pt will perform UE dressing with I  Pt will perform LE dressing with I following spinal precaution  Pt will sit EOB x 10 min with supervision assistance  Pt will transfer bed/chair/bsc with Mod I  Pt will perform standing task x 10 min with supervision assistance  Pt will tolerate 30 minutes of tx without fatigue      LT.Restore to max I with self care and mobility.                         History:     Past Medical History:   Diagnosis Date    Abnormal thyroid stimulating hormone (TSH) level 2019    Anemia 08/15/2019    Atrial fibrillation 2012    Bradycardia 2017    asymptomatic    Change in bowel habit 2018    Chronic kidney disease, unspecified 2019    Coronary arteriosclerosis 2012    Disease of skin and subcutaneous tissue 2017    medial  aspect RLE- cystic structure seen on venous doppler US.    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 Lewis Eye exam    Varicose veins of both lower extremities with pain 01/02/2019    Venous insufficiency 01/08/2019         Past Surgical History:   Procedure Laterality Date    APPENDECTOMY      CARDIAC CATHETERIZATION  2007    [facility]- Stent placement    CATARACT EXTRACTION, BILATERAL      COLONOSCOPY  11/06/2019    Dr. Mendoza    HERNIA REPAIR      Inguinal hernia repair    TONSILLECTOMY         Time Tracking:     OT Date of Treatment:    OT Start Time: 1111  OT Stop Time: 1130  OT Total Time (min): 19 min    Billable Minutes:Evaluation 19    2/3/2024

## 2024-02-03 NOTE — PT/OT/SLP EVAL
Physical Therapy Evaluation and Treatment    Patient Name: Negro Hale   MRN: [id number]  Recent Surgery: * No surgery found *      Recommendations:     Discharge Recommendations: Low Intensity Therapy   Discharge Equipment Recommendations: none   Barriers to discharge: Ongoing medical treatment    Assessment:     Negro Hale is a 83 y.o. male admitted with a medical diagnosis of Fracture of eighth thoracic vertebra. He presents with the following impairments/functional limitations: impaired functional mobility, gait instability, decreased safety awareness, decreased ROM, orthopedic precautions. Pt now has a backpack brace that is more comfortable than original TLSO and allows better mobility for pt. Pt demo good mobility and ambulation ability and would do well to receive home health when medically stable for d/c.     Rehab Prognosis: Good; patient would benefit from acute PT services to address these deficits and reach maximum level of function.    Plan:     During this hospitalization, patient to be seen daily to address the above listed problems via gait training, therapeutic activities, therapeutic exercises    Plan of Care Expires: 03/03/24    Subjective     Chief Complaint: thoracic vertebrae fractures  Patient Comments/Goals: agreeable to rehab  Pain/Comfort:  Pain Rating 1: 0/10    Social History:  Living Environment: Patient lives with their spouse in a single story home with number of outside stair(s): 1-2 and number of inside stair(s): 2  Prior Level of Function: Prior to admission, patient was modified independent  Equipment Used at Home: none  DME owned (not currently used): rolling walker, single point cane, bedside commode, and shower chair  Assistance Upon Discharge: family    Objective:     Communicated with RN prior to session. Patient found HOB elevated with peripheral IV, TLSO, telemetry upon PT entry to room.    General Precautions: Standard, fall   Orthopedic Precautions: spinal  precautions   Braces: TLSO    Respiratory Status: Room air    Exams:  RLE ROM: WFL  RLE Strength: WFL  LLE ROM: WFL  LLE Strength: WFL except knee flexion 4-/5  Cognitive: Patient is oriented to Person, Place, Time, Situation  Sensation:    -       Intact    Functional Mobility:  Gait belt applied - Yes  Bed Mobility  Supine to Sit: contact guard assistance and minimum assistance for trunk management  Transfers  Sit to Stand: contact guard assistance with rolling walker and with cues for hand placement and foot placement  Toilet Transfer: minimum assistance with rolling walker and with cues for hand placement and foot placement using Step Transfer  Gait  Patient ambulated 20ft x 2 with rolling walker and stand by assistance and contact guard assistance. Patient required cues for position in walker and upright posture to increase independence and safety. Patient required cues ~ 25% of the time.  Balance  Sitting: FAIR+: Maintains balance through MINIMAL excursions of active trunk motion  Standing: FAIR: Needs CONTACT GUARD during gait      Therapeutic Activities and Exercises:  Patient educated on role of acute care PT and PT POC, safety while in hospital including calling nurse for mobility, and call light usage.  Educated on spinal precautions including avoidance of bending, lifting, and twisting. Patient expressed understanding. Educated on log roll technique, performed to right.  Educated about importance of OOB mobility and remaining up in chair most of the day.  Patient educated in:  -PT role and POC  -spinal precautions (no bending, lifting, or twisting)  -log rolling during bed mobility  -safety with transfers including hand placement  -gait sequence and RW management  -OOB activity to maximize recovery including getting on a daily walking program at home   -ambulating in hallway 3x's/day with nursing staff assistance     AM-PAC 6 CLICK MOBILITY  Total Score:18    Patient left up in chair with all lines  intact, call button in reach, RN notified, and family present.    GOALS:   Multidisciplinary Problems       Physical Therapy Goals          Problem: Physical Therapy    Goal Priority Disciplines Outcome Goal Variances Interventions   Physical Therapy Goal     PT, PT/OT Ongoing, Progressing     Description: Short Term Goals to be met by: 24    Patient will increase functional independence with mobility by performin. Supine to sit with independently  2. Sit to stand transfer with Stand by assist using Rolling walker  3. Bed to chair transfer with Stand by assist using Rolling walker  4. Gait  x 100 feet with Stand by assist using Rolling walker  5. Lower extremity exercise program x30 reps per handout, with assistance as needed  6. Pt to recall 3/3 spinal precautions  7. Pt to negotiate 3 steps with CGA    Long Term Goals to be met by: 3/3/24    Pt will regain full independent functional mobility with lowest level of assistive device to return to home situation and prior activities of daily living.                        History:     Past Medical History:   Diagnosis Date    Abnormal thyroid stimulating hormone (TSH) level 2019    Anemia 08/15/2019    Atrial fibrillation 2012    Bradycardia 2017    asymptomatic    Change in bowel habit 2018    Chronic kidney disease, unspecified 2019    Coronary arteriosclerosis 2012    Disease of skin and subcutaneous tissue 2017    medial aspect RLE- cystic structure seen on venous doppler US.    Dyspnea on exertion 2016    Elevated serum creatinine 2017    Encounter for long-term (current) use of other medications 2016    Essential (primary) hypertension 2017    Heart disease, hypertensive 2019    Hereditary lymphedema 2019    Hyperlipidemia 2012    Lower extremity edema 2017    Lumbar radiculopathy 2017    Obesity, unspecified 2016    Old myocardial infarction  12/06/2017    Other secondary pulmonary hypertension 05/23/2017    Other spondylosis, lumbosacral region 01/26/2017    Peripheral vascular disease     Personal history of tobacco use, presenting hazards to health 11/17/2016    Pulmonary hypertension     Type 2 diabetes mellitus with chronic kidney disease 01/14/2019    Type 2 diabetes mellitus with hyperglycemia 10/02/2011    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 Lewis Eye exam    Varicose veins of both lower extremities with pain 01/02/2019    Venous insufficiency 01/08/2019       Past Surgical History:   Procedure Laterality Date    APPENDECTOMY      CARDIAC CATHETERIZATION  2007    [facility]- Stent placement    CATARACT EXTRACTION, BILATERAL      COLONOSCOPY  11/06/2019    Dr. Mendoza    HERNIA REPAIR      Inguinal hernia repair    TONSILLECTOMY         Time Tracking:     PT Received On: 02/03/24  PT Start Time: 1111  PT Stop Time: 1135  PT Total Time (min): 24 min     Billable Minutes: Evaluation 12 and Therapeutic Activity 12    2/3/2024

## 2024-02-03 NOTE — PROGRESS NOTES
[facility] Hospital Medicine  Progress Note    Patient Name: Negro Hale  MRN: [id number]  Patient Class: IP- Inpatient   Admission Date: 2/1/2024  Length of Stay: 2 days  Attending Physician: Ana Chau MD  Primary Care Provider: Smiley Trevino FNP        Subjective:     Principal Problem:Fracture of eighth thoracic vertebra        HPI:  Patient is a 83-year-old male with a history of type 2 diabetes on insulin coupled with CKD stage IIIA, essential hypertension, CAD status post PCI with stent placement in 2015 with grade 1 diastolic dysfunction who presents to the emergency room today with a chief complaint of upper back pain following a fall while trying to get a cooler on the back of a truck.  Patient denied any associated neurological symptoms such as fecal or urinary incontinence, saddle anesthesia, lower extremity weakness or numbness.    Ensuing workup was notable for presence of significant hyperglycemia with blood glucose of 485 with anion gap of 2 and MRI of thoracic spine demonstrating fracture of T3-T4 and T7-T8.  This case was discussed between the ED physician and spine surgeon and tentative decision was made take patient to OR on Tuesday due to patient's last dose of Plavix being yesterday.    Overview/Hospital Course:  No notes on file    Interval History:   RACHAEL  Says gabapentin really  helped him  D/w orthospine, they want him to work w/ pt/ot and see how he does, per them if he does well then he may not need surgery, pt already moving aorund w/o any help and doing really well , his biggest complain has been restless leg syndrome which he's had for years, also c/o brace being uncomfortable. Will add stool softeners today as well. No new concerns voiced.     Review of Systems   Constitutional:  Negative for chills, diaphoresis, fatigue and fever.   HENT:  Negative for congestion, hearing loss, nosebleeds, postnasal drip, sore throat, tinnitus  and trouble swallowing.    Eyes:  Negative for photophobia, pain, discharge, itching and visual disturbance.   Respiratory:  Negative for cough, shortness of breath, wheezing and stridor.    Cardiovascular:  Negative for chest pain, palpitations and leg swelling.   Gastrointestinal:  Negative for abdominal distention, abdominal pain, anal bleeding, blood in stool, constipation, diarrhea, nausea and vomiting.   Endocrine: Negative for cold intolerance, heat intolerance, polydipsia, polyphagia and polyuria.   Genitourinary:  Negative for decreased urine volume, difficulty urinating, dysuria, flank pain, frequency, hematuria and urgency.   Musculoskeletal:  Positive for arthralgias and back pain. Negative for gait problem, joint swelling, myalgias, neck pain and neck stiffness.   Skin:  Negative for color change, pallor and rash.   Allergic/Immunologic: Negative for immunocompromised state.   Neurological:  Negative for dizziness, tremors, seizures, syncope, facial asymmetry, speech difficulty, weakness, light-headedness, numbness and headaches.   Hematological:  Negative for adenopathy. Does not bruise/bleed easily.     Objective:     Vital Signs (Most Recent):  Temp: 97.3 °F (36.3 °C) (02/03/24 1053)  Pulse: (!) 55 (02/03/24 1053)  Resp: 18 (02/03/24 1053)  BP: (!) 113/58 (02/03/24 1053)  SpO2: (!) 93 % (02/03/24 1053) Vital Signs (24h Range):  Temp:  [97.3 °F (36.3 °C)-98.1 °F (36.7 °C)] 97.3 °F (36.3 °C)  Pulse:  [55-64] 55  Resp:  [17-18] 18  SpO2:  [93 %-96 %] 93 %  BP: (113-153)/(58-70) 113/58     Weight: 78.1 kg (172 lb 3.2 oz)  Body mass index is 26.18 kg/m².  No intake or output data in the 24 hours ending 02/03/24 1254        Physical Exam  Vitals reviewed.   Constitutional:       Comments: Hematoma involving left upper maxillary region was noted   HENT:      Head: Normocephalic and atraumatic.      Right Ear: External ear normal.      Left Ear: External ear normal.   Eyes:      Extraocular Movements:  Extraocular movements intact.      Pupils: Pupils are equal, round, and reactive to light.   Cardiovascular:      Rate and Rhythm: Normal rate and regular rhythm.      Pulses: Normal pulses.      Heart sounds: Normal heart sounds. No murmur heard.  Pulmonary:      Effort: Pulmonary effort is normal. No respiratory distress.      Breath sounds: Normal breath sounds. No wheezing.   Chest:      Chest wall: No tenderness.   Abdominal:      General: Abdomen is flat.      Palpations: Abdomen is soft. There is no mass.      Tenderness: There is no abdominal tenderness. There is no right CVA tenderness or left CVA tenderness.   Musculoskeletal:         General: No swelling or tenderness. Normal range of motion.   Skin:     General: Skin is warm and dry.      Capillary Refill: Capillary refill takes less than 2 seconds.   Neurological:      General: No focal deficit present.      Mental Status: He is alert and oriented to person, place, and time. Mental status is at baseline.   Psychiatric:         Mood and Affect: Mood normal.         Thought Content: Thought content normal.             Significant Labs: All pertinent labs within the past 24 hours have been reviewed.    Significant Imaging: I have reviewed all pertinent imaging results/findings within the past 24 hours.    Assessment/Plan:      * Fracture of eighth thoracic vertebra    MRI of thoracic spine demonstrated fracture of T3-T4 and T7-T8.  This case was discussed between the ED physician and spine surgeon and tentative decision was made take patient to OR on Tuesday due to patient's last dose of Plavix being yesterday.        Type 2 diabetes mellitus, with long-term current use of insulin    Patient's diabetes currently not adequately control with blood glucose of 485.  Anion gap was only 2.  Will resume patient's basal insulin and start sliding scale insulin q.4 hours to maintain CBGs in the range of 130s to 180s      Coronary artery disease involving native  coronary artery of native heart without angina pectoris    Patient has a history of known CAD status post PCI with stent placement of left circumflex artery in 2015 and is currently in the care of Dr. Bridges.  Patient is doing well overall.  Due to the need for thoracic spine surgery Plavix will be held for now    Stage 3a chronic kidney disease    Patient's serum creatinine is at his baseline will continue to monitor serum creatinine levels and urine output    Essential hypertension    Continue present management for now      VTE Risk Mitigation (From admission, onward)           Ordered     heparin (porcine) injection 5,000 Units  Every 8 hours         02/01/24 2010     IP VTE HIGH RISK PATIENT  Once         02/01/24 2010     Place sequential compression device  Until discontinued         02/01/24 2010                    Discharge Planning   RICKY:      Code Status: Full Code   Is the patient medically ready for discharge?:     Reason for patient still in hospital (select all that apply): Treatment  Discharge Plan A: Home with family                  Rehmat CHEYENNE Chau MD  Department of Hospital Medicine   [facility]

## 2024-02-03 NOTE — PLAN OF CARE
Problem: Occupational Therapy  Goal: Occupational Therapy Goal  Description: STG:  Pt will perform grooming with setup  Pt will bathe with I'ly after setup following spinal precautions  Pt will perform UE dressing with I  Pt will perform LE dressing with I following spinal precaution  Pt will sit EOB x 10 min with supervision assistance  Pt will transfer bed/chair/bsc with Mod I  Pt will perform standing task x 10 min with supervision assistance  Pt will tolerate 30 minutes of tx without fatigue      LT.Restore to max I with self care and mobility.    Outcome: Ongoing, Progressing

## 2024-02-03 NOTE — PLAN OF CARE
SW received consult for Home Health. Choice previously obtained by YASMANI for [facility].  Referral faxed. SS following.

## 2024-02-04 LAB
ALBUMIN SERPL BCP-MCNC: 2.8 G/DL (ref 3.5–5)
ALBUMIN/GLOB SERPL: 0.8 {RATIO}
ALP SERPL-CCNC: 89 U/L (ref 45–115)
ALT SERPL W P-5'-P-CCNC: 28 U/L (ref 16–61)
ANION GAP SERPL CALCULATED.3IONS-SCNC: 9 MMOL/L (ref 7–16)
AST SERPL W P-5'-P-CCNC: 17 U/L (ref 15–37)
BASOPHILS # BLD AUTO: 0.06 K/UL (ref 0–0.2)
BASOPHILS NFR BLD AUTO: 0.8 % (ref 0–1)
BILIRUB SERPL-MCNC: 0.3 MG/DL (ref ?–1.2)
BUN SERPL-MCNC: 38 MG/DL (ref 7–18)
BUN/CREAT SERPL: 28 (ref 6–20)
CALCIUM SERPL-MCNC: 9 MG/DL (ref 8.5–10.1)
CHLORIDE SERPL-SCNC: 102 MMOL/L (ref 98–107)
CO2 SERPL-SCNC: 29 MMOL/L (ref 21–32)
CREAT SERPL-MCNC: 1.34 MG/DL (ref 0.7–1.3)
DIFFERENTIAL METHOD BLD: ABNORMAL
EGFR (NO RACE VARIABLE) (RUSH/TITUS): 52 ML/MIN/1.73M2
EOSINOPHIL # BLD AUTO: 0.35 K/UL (ref 0–0.5)
EOSINOPHIL NFR BLD AUTO: 4.9 % (ref 1–4)
ERYTHROCYTE [DISTWIDTH] IN BLOOD BY AUTOMATED COUNT: 13.3 % (ref 11.5–14.5)
GLOBULIN SER-MCNC: 3.4 G/DL (ref 2–4)
GLUCOSE SERPL-MCNC: 142 MG/DL (ref 70–105)
GLUCOSE SERPL-MCNC: 165 MG/DL (ref 74–106)
GLUCOSE SERPL-MCNC: 227 MG/DL (ref 70–105)
GLUCOSE SERPL-MCNC: 236 MG/DL (ref 70–105)
HCT VFR BLD AUTO: 37.5 % (ref 40–54)
HGB BLD-MCNC: 12.4 G/DL (ref 13.5–18)
IMM GRANULOCYTES # BLD AUTO: 0.03 K/UL (ref 0–0.04)
IMM GRANULOCYTES NFR BLD: 0.4 % (ref 0–0.4)
LYMPHOCYTES # BLD AUTO: 1.98 K/UL (ref 1–4.8)
LYMPHOCYTES NFR BLD AUTO: 27.8 % (ref 27–41)
MCH RBC QN AUTO: 29.6 PG (ref 27–31)
MCHC RBC AUTO-ENTMCNC: 33.1 G/DL (ref 32–36)
MCV RBC AUTO: 89.5 FL (ref 80–96)
MONOCYTES # BLD AUTO: 0.68 K/UL (ref 0–0.8)
MONOCYTES NFR BLD AUTO: 9.6 % (ref 2–6)
MPC BLD CALC-MCNC: 10.9 FL (ref 9.4–12.4)
NEUTROPHILS # BLD AUTO: 4.02 K/UL (ref 1.8–7.7)
NEUTROPHILS NFR BLD AUTO: 56.5 % (ref 53–65)
NRBC # BLD AUTO: 0 X10E3/UL
NRBC, AUTO (.00): 0 %
PLATELET # BLD AUTO: 221 K/UL (ref 150–400)
POTASSIUM SERPL-SCNC: 4.2 MMOL/L (ref 3.5–5.1)
PROT SERPL-MCNC: 6.2 G/DL (ref 6.4–8.2)
RBC # BLD AUTO: 4.19 M/UL (ref 4.6–6.2)
SODIUM SERPL-SCNC: 136 MMOL/L (ref 136–145)
WBC # BLD AUTO: 7.12 K/UL (ref 4.5–11)

## 2024-02-04 PROCEDURE — 11000001 HC ACUTE MED/SURG PRIVATE ROOM

## 2024-02-04 PROCEDURE — 63600175 PHARM REV CODE 636 W HCPCS: Performed by: INTERNAL MEDICINE

## 2024-02-04 PROCEDURE — 97116 GAIT TRAINING THERAPY: CPT

## 2024-02-04 PROCEDURE — 25000003 PHARM REV CODE 250: Performed by: INTERNAL MEDICINE

## 2024-02-04 PROCEDURE — 99232 SBSQ HOSP IP/OBS MODERATE 35: CPT | Mod: ,,, | Performed by: INTERNAL MEDICINE

## 2024-02-04 PROCEDURE — 82962 GLUCOSE BLOOD TEST: CPT

## 2024-02-04 PROCEDURE — 85025 COMPLETE CBC W/AUTO DIFF WBC: CPT | Performed by: INTERNAL MEDICINE

## 2024-02-04 PROCEDURE — 80053 COMPREHEN METABOLIC PANEL: CPT | Performed by: INTERNAL MEDICINE

## 2024-02-04 RX ORDER — SODIUM CHLORIDE, SODIUM GLUCONATE, SODIUM ACETATE, POTASSIUM CHLORIDE AND MAGNESIUM CHLORIDE 30; 37; 368; 526; 502 MG/100ML; MG/100ML; MG/100ML; MG/100ML; MG/100ML
INJECTION, SOLUTION INTRAVENOUS CONTINUOUS
Status: DISCONTINUED | OUTPATIENT
Start: 2024-02-04 | End: 2024-02-04

## 2024-02-04 RX ORDER — HYDRALAZINE HYDROCHLORIDE 20 MG/ML
5 INJECTION INTRAMUSCULAR; INTRAVENOUS EVERY 6 HOURS PRN
Status: DISCONTINUED | OUTPATIENT
Start: 2024-02-04 | End: 2024-02-05 | Stop reason: HOSPADM

## 2024-02-04 RX ADMIN — HEPARIN SODIUM 5000 UNITS: 5000 INJECTION, SOLUTION INTRAVENOUS; SUBCUTANEOUS at 05:02

## 2024-02-04 RX ADMIN — INSULIN ASPART 2 UNITS: 100 INJECTION, SOLUTION INTRAVENOUS; SUBCUTANEOUS at 09:02

## 2024-02-04 RX ADMIN — SODIUM CHLORIDE, SODIUM GLUCONATE, SODIUM ACETATE, POTASSIUM CHLORIDE AND MAGNESIUM CHLORIDE: 526; 502; 368; 37; 30 INJECTION, SOLUTION INTRAVENOUS at 02:02

## 2024-02-04 RX ADMIN — GABAPENTIN 100 MG: 100 CAPSULE ORAL at 09:02

## 2024-02-04 RX ADMIN — PANTOPRAZOLE SODIUM 40 MG: 40 TABLET, DELAYED RELEASE ORAL at 09:02

## 2024-02-04 RX ADMIN — CHOLECALCIFEROL TAB 125 MCG (5000 UNIT) 5000 UNITS: 125 TAB at 09:02

## 2024-02-04 RX ADMIN — INSULIN DETEMIR 36 UNITS: 100 INJECTION, SOLUTION SUBCUTANEOUS at 09:02

## 2024-02-04 RX ADMIN — INSULIN ASPART 4 UNITS: 100 INJECTION, SOLUTION INTRAVENOUS; SUBCUTANEOUS at 06:02

## 2024-02-04 RX ADMIN — HEPARIN SODIUM 5000 UNITS: 5000 INJECTION, SOLUTION INTRAVENOUS; SUBCUTANEOUS at 02:02

## 2024-02-04 RX ADMIN — SENNOSIDES AND DOCUSATE SODIUM 1 TABLET: 8.6; 5 TABLET ORAL at 09:02

## 2024-02-04 RX ADMIN — HEPARIN SODIUM 5000 UNITS: 5000 INJECTION, SOLUTION INTRAVENOUS; SUBCUTANEOUS at 09:02

## 2024-02-04 RX ADMIN — ATORVASTATIN CALCIUM 80 MG: 80 TABLET, FILM COATED ORAL at 09:02

## 2024-02-04 NOTE — SUBJECTIVE & OBJECTIVE
Interval History:   RACHAEL  Wife says gabapentin has helped him a lot, resting well  Pt is haivng breakfast,he has no new concers, per them ambulation has been pretty good, now awaiting final recs form ortho spine.     Review of Systems   Constitutional:  Negative for chills, diaphoresis, fatigue and fever.   HENT:  Negative for congestion, hearing loss, nosebleeds, postnasal drip, sore throat, tinnitus and trouble swallowing.    Eyes:  Negative for photophobia, pain, discharge, itching and visual disturbance.   Respiratory:  Negative for cough, shortness of breath, wheezing and stridor.    Cardiovascular:  Negative for chest pain, palpitations and leg swelling.   Gastrointestinal:  Negative for abdominal distention, abdominal pain, anal bleeding, blood in stool, constipation, diarrhea, nausea and vomiting.   Endocrine: Negative for cold intolerance, heat intolerance, polydipsia, polyphagia and polyuria.   Genitourinary:  Negative for decreased urine volume, difficulty urinating, dysuria, flank pain, frequency, hematuria and urgency.   Musculoskeletal:  Positive for arthralgias and back pain. Negative for gait problem, joint swelling, myalgias, neck pain and neck stiffness.   Skin:  Negative for color change, pallor and rash.   Allergic/Immunologic: Negative for immunocompromised state.   Neurological:  Negative for dizziness, tremors, seizures, syncope, facial asymmetry, speech difficulty, weakness, light-headedness, numbness and headaches.   Hematological:  Negative for adenopathy. Does not bruise/bleed easily.     Objective:     Vital Signs (Most Recent):  Temp: 97.7 °F (36.5 °C) (02/04/24 1039)  Pulse: 64 (02/04/24 1039)  Resp: 17 (02/04/24 1039)  BP: (!) 173/69 (02/04/24 1039)  SpO2: 95 % (02/04/24 1039) Vital Signs (24h Range):  Temp:  [97.2 °F (36.2 °C)-98.5 °F (36.9 °C)] 97.7 °F (36.5 °C)  Pulse:  [50-64] 64  Resp:  [17-18] 17  SpO2:  [93 %-97 %] 95 %  BP: ()/(54-74) 173/69     Weight: 78.1 kg (172 lb  3.2 oz)  Body mass index is 26.18 kg/m².  No intake or output data in the 24 hours ending 02/04/24 1341        Physical Exam  Vitals reviewed.   Constitutional:       Comments: Hematoma involving left upper maxillary region was noted   HENT:      Head: Normocephalic and atraumatic.      Right Ear: External ear normal.      Left Ear: External ear normal.   Eyes:      Extraocular Movements: Extraocular movements intact.      Pupils: Pupils are equal, round, and reactive to light.   Cardiovascular:      Rate and Rhythm: Normal rate and regular rhythm.      Pulses: Normal pulses.      Heart sounds: Normal heart sounds. No murmur heard.  Pulmonary:      Effort: Pulmonary effort is normal. No respiratory distress.      Breath sounds: Normal breath sounds. No wheezing.   Chest:      Chest wall: No tenderness.   Abdominal:      General: Abdomen is flat.      Palpations: Abdomen is soft. There is no mass.      Tenderness: There is no abdominal tenderness. There is no right CVA tenderness or left CVA tenderness.   Musculoskeletal:         General: No swelling or tenderness. Normal range of motion.   Skin:     General: Skin is warm and dry.      Capillary Refill: Capillary refill takes less than 2 seconds.   Neurological:      General: No focal deficit present.      Mental Status: He is alert and oriented to person, place, and time. Mental status is at baseline.   Psychiatric:         Mood and Affect: Mood normal.         Thought Content: Thought content normal.             Significant Labs: All pertinent labs within the past 24 hours have been reviewed.    Significant Imaging: I have reviewed all pertinent imaging results/findings within the past 24 hours.

## 2024-02-04 NOTE — PT/OT/SLP PROGRESS
Physical Therapy Treatment    Patient Name:  Negro Hale   MRN:  [id number]    Recommendations:     Discharge Recommendations: Low Intensity Therapy  Discharge Equipment Recommendations: none  Barriers to discharge:  ongoing medical care    Assessment:     Negro Hale is a 83 y.o. male admitted with a medical diagnosis of Fracture of eighth thoracic vertebra.  He presents with the following impairments/functional limitations: impaired functional mobility, gait instability, decreased safety awareness, decreased ROM, orthopedic precautions. Pt continues to demo good mobility with no c/o pain.     Rehab Prognosis: Good; patient would benefit from acute skilled PT services to address these deficits and reach maximum level of function.    Recent Surgery: * No surgery found *      Plan:     During this hospitalization, patient to be seen daily to address the identified rehab impairments via gait training, therapeutic activities, therapeutic exercises and progress toward the following goals:    Plan of Care Expires:  03/03/24    Subjective     Chief Complaint: thoracic vertebrae fracture  Patient/Family Comments/goals: agreeable  Pain/Comfort:0/10         Objective:     Communicated with FLAVIO Henry RN prior to session.  Patient found sitting edge of bed with peripheral IV, TLSO, telemetry upon PT entry to room.     General Precautions: Standard, fall  Orthopedic Precautions: spinal precautions  Braces: TLSO  Respiratory Status: Room air     Functional Mobility:  Transfers:     Sit to Stand:  contact guard assistance with rolling walker  Gait: 90ft x 2 with RW for 1st trial and then half of second trial then pt amb with CGA with no AD, shorter stride, good emilee   Balance: Fair+ in stance      AM-PAC 6 CLICK MOBILITY          Treatment & Education:  Bilateral lower extremity exercise x 20 reps: ankle pumps, hip abduction/adduction, Long arc quads, and Marching with verbal cues for sequencing and safety      Patient left up in chair with all lines intact, call button in reach, and wife present..    GOALS:   Multidisciplinary Problems       Physical Therapy Goals          Problem: Physical Therapy    Goal Priority Disciplines Outcome Goal Variances Interventions   Physical Therapy Goal     PT, PT/OT Ongoing, Progressing     Description: Short Term Goals to be met by: 24    Patient will increase functional independence with mobility by performin. Supine to sit with independently  2. Sit to stand transfer with Stand by assist using Rolling walker  3. Bed to chair transfer with Stand by assist using Rolling walker  4. Gait  x 100 feet with Stand by assist using Rolling walker  5. Lower extremity exercise program x30 reps per handout, with assistance as needed  6. Pt to recall 3/3 spinal precautions  7. Pt to negotiate 3 steps with CGA    Long Term Goals to be met by: 3/3/24    Pt will regain full independent functional mobility with lowest level of assistive device to return to home situation and prior activities of daily living.                        Time Tracking:     PT Received On: 24  PT Start Time: 1427     PT Stop Time: 1441  PT Total Time (min): 14 min     Billable Minutes: Gait Training 10    Treatment Type: Evaluation              2024

## 2024-02-04 NOTE — PROGRESS NOTES
[facility] Medicine  Progress Note    Patient Name: Negro Hale  MRN: [id number]  Patient Class: IP- Inpatient   Admission Date: 2/1/2024  Length of Stay: 3 days  Attending Physician: Ana Chau MD  Primary Care Provider: Smiley Trevino FNP        Subjective:     Principal Problem:Fracture of eighth thoracic vertebra        HPI:  Patient is a 83-year-old male with a history of type 2 diabetes on insulin coupled with CKD stage IIIA, essential hypertension, CAD status post PCI with stent placement in 2015 with grade 1 diastolic dysfunction who presents to the emergency room today with a chief complaint of upper back pain following a fall while trying to get a cooler on the back of a truck.  Patient denied any associated neurological symptoms such as fecal or urinary incontinence, saddle anesthesia, lower extremity weakness or numbness.    Ensuing workup was notable for presence of significant hyperglycemia with blood glucose of 485 with anion gap of 2 and MRI of thoracic spine demonstrating fracture of T3-T4 and T7-T8.  This case was discussed between the ED physician and spine surgeon and tentative decision was made take patient to OR on Tuesday due to patient's last dose of Plavix being yesterday.    Overview/Hospital Course:  No notes on file    Interval History:   NAEO  Wife says gabapentin has helped him a lot, resting well  Pt is haivng breakfast,he has no new concers, per them ambulation has been pretty good, now awaiting final recs form ortho spine.     Review of Systems   Constitutional:  Negative for chills, diaphoresis, fatigue and fever.   HENT:  Negative for congestion, hearing loss, nosebleeds, postnasal drip, sore throat, tinnitus and trouble swallowing.    Eyes:  Negative for photophobia, pain, discharge, itching and visual disturbance.   Respiratory:  Negative for cough, shortness of breath, wheezing and stridor.    Cardiovascular:   Negative for chest pain, palpitations and leg swelling.   Gastrointestinal:  Negative for abdominal distention, abdominal pain, anal bleeding, blood in stool, constipation, diarrhea, nausea and vomiting.   Endocrine: Negative for cold intolerance, heat intolerance, polydipsia, polyphagia and polyuria.   Genitourinary:  Negative for decreased urine volume, difficulty urinating, dysuria, flank pain, frequency, hematuria and urgency.   Musculoskeletal:  Positive for arthralgias and back pain. Negative for gait problem, joint swelling, myalgias, neck pain and neck stiffness.   Skin:  Negative for color change, pallor and rash.   Allergic/Immunologic: Negative for immunocompromised state.   Neurological:  Negative for dizziness, tremors, seizures, syncope, facial asymmetry, speech difficulty, weakness, light-headedness, numbness and headaches.   Hematological:  Negative for adenopathy. Does not bruise/bleed easily.     Objective:     Vital Signs (Most Recent):  Temp: 97.7 °F (36.5 °C) (02/04/24 1039)  Pulse: 64 (02/04/24 1039)  Resp: 17 (02/04/24 1039)  BP: (!) 173/69 (02/04/24 1039)  SpO2: 95 % (02/04/24 1039) Vital Signs (24h Range):  Temp:  [97.2 °F (36.2 °C)-98.5 °F (36.9 °C)] 97.7 °F (36.5 °C)  Pulse:  [50-64] 64  Resp:  [17-18] 17  SpO2:  [93 %-97 %] 95 %  BP: ()/(54-74) 173/69     Weight: 78.1 kg (172 lb 3.2 oz)  Body mass index is 26.18 kg/m².  No intake or output data in the 24 hours ending 02/04/24 1341        Physical Exam  Vitals reviewed.   Constitutional:       Comments: Hematoma involving left upper maxillary region was noted   HENT:      Head: Normocephalic and atraumatic.      Right Ear: External ear normal.      Left Ear: External ear normal.   Eyes:      Extraocular Movements: Extraocular movements intact.      Pupils: Pupils are equal, round, and reactive to light.   Cardiovascular:      Rate and Rhythm: Normal rate and regular rhythm.      Pulses: Normal pulses.      Heart sounds: Normal heart  sounds. No murmur heard.  Pulmonary:      Effort: Pulmonary effort is normal. No respiratory distress.      Breath sounds: Normal breath sounds. No wheezing.   Chest:      Chest wall: No tenderness.   Abdominal:      General: Abdomen is flat.      Palpations: Abdomen is soft. There is no mass.      Tenderness: There is no abdominal tenderness. There is no right CVA tenderness or left CVA tenderness.   Musculoskeletal:         General: No swelling or tenderness. Normal range of motion.   Skin:     General: Skin is warm and dry.      Capillary Refill: Capillary refill takes less than 2 seconds.   Neurological:      General: No focal deficit present.      Mental Status: He is alert and oriented to person, place, and time. Mental status is at baseline.   Psychiatric:         Mood and Affect: Mood normal.         Thought Content: Thought content normal.             Significant Labs: All pertinent labs within the past 24 hours have been reviewed.    Significant Imaging: I have reviewed all pertinent imaging results/findings within the past 24 hours.    Assessment/Plan:      * Fracture of eighth thoracic vertebra    MRI of thoracic spine demonstrated fracture of T3-T4 and T7-T8.  This case was discussed between the ED physician and spine surgeon and tentative decision was made take patient to OR on Tuesday due to patient's last dose of Plavix being yesterday.        Type 2 diabetes mellitus, with long-term current use of insulin    Patient's diabetes currently not adequately control with blood glucose of 485.  Anion gap was only 2.  Will resume patient's basal insulin and start sliding scale insulin q.4 hours to maintain CBGs in the range of 130s to 180s      Coronary artery disease involving native coronary artery of native heart without angina pectoris    Patient has a history of known CAD status post PCI with stent placement of left circumflex artery in 2015 and is currently in the care of Dr. Bridges.  Patient is  doing well overall.  Due to the need for thoracic spine surgery Plavix will be held for now    Stage 3a chronic kidney disease    Patient's serum creatinine is at his baseline will continue to monitor serum creatinine levels and urine output    Essential hypertension    Continue present management for now      VTE Risk Mitigation (From admission, onward)           Ordered     heparin (porcine) injection 5,000 Units  Every 8 hours         02/01/24 2010     IP VTE HIGH RISK PATIENT  Once         02/01/24 2010     Place sequential compression device  Until discontinued         02/01/24 2010                    Discharge Planning   RICKY:      Code Status: Full Code   Is the patient medically ready for discharge?:     Reason for patient still in hospital (select all that apply): Treatment  Discharge Plan A: Home with family                  Rehmat CHEYENNE Chau MD  Department of Hospital Medicine   [facility]

## 2024-02-05 VITALS
TEMPERATURE: 97 F | WEIGHT: 172.19 LBS | HEART RATE: 60 BPM | HEIGHT: 68 IN | RESPIRATION RATE: 18 BRPM | BODY MASS INDEX: 26.1 KG/M2 | OXYGEN SATURATION: 98 % | DIASTOLIC BLOOD PRESSURE: 57 MMHG | SYSTOLIC BLOOD PRESSURE: 138 MMHG

## 2024-02-05 LAB
GLUCOSE SERPL-MCNC: 100 MG/DL (ref 70–105)
GLUCOSE SERPL-MCNC: 186 MG/DL (ref 70–105)
GLUCOSE SERPL-MCNC: 234 MG/DL (ref 70–105)

## 2024-02-05 PROCEDURE — 63600175 PHARM REV CODE 636 W HCPCS: Performed by: INTERNAL MEDICINE

## 2024-02-05 PROCEDURE — 99239 HOSP IP/OBS DSCHRG MGMT >30: CPT | Mod: ,,, | Performed by: INTERNAL MEDICINE

## 2024-02-05 PROCEDURE — 25000003 PHARM REV CODE 250: Performed by: INTERNAL MEDICINE

## 2024-02-05 PROCEDURE — 82962 GLUCOSE BLOOD TEST: CPT

## 2024-02-05 PROCEDURE — 97116 GAIT TRAINING THERAPY: CPT

## 2024-02-05 PROCEDURE — 97110 THERAPEUTIC EXERCISES: CPT

## 2024-02-05 RX ORDER — GABAPENTIN 100 MG/1
100 CAPSULE ORAL 2 TIMES DAILY
Qty: 60 CAPSULE | Refills: 0 | Status: SHIPPED | OUTPATIENT
Start: 2024-02-05 | End: 2024-05-21

## 2024-02-05 RX ORDER — INSULIN GLARGINE 100 [IU]/ML
60 INJECTION, SOLUTION SUBCUTANEOUS NIGHTLY
Qty: 54 ML | Refills: 3 | Status: SHIPPED | OUTPATIENT
Start: 2024-02-05 | End: 2024-06-03 | Stop reason: SDUPTHER

## 2024-02-05 RX ORDER — AMOXICILLIN 250 MG
1 CAPSULE ORAL DAILY
Qty: 30 TABLET | Refills: 0 | Status: SHIPPED | OUTPATIENT
Start: 2024-02-06 | End: 2024-02-15

## 2024-02-05 RX ADMIN — HEPARIN SODIUM 5000 UNITS: 5000 INJECTION, SOLUTION INTRAVENOUS; SUBCUTANEOUS at 05:02

## 2024-02-05 RX ADMIN — SENNOSIDES AND DOCUSATE SODIUM 1 TABLET: 8.6; 5 TABLET ORAL at 08:02

## 2024-02-05 RX ADMIN — CHOLECALCIFEROL TAB 125 MCG (5000 UNIT) 5000 UNITS: 125 TAB at 08:02

## 2024-02-05 RX ADMIN — ATORVASTATIN CALCIUM 80 MG: 80 TABLET, FILM COATED ORAL at 08:02

## 2024-02-05 RX ADMIN — GABAPENTIN 100 MG: 100 CAPSULE ORAL at 08:02

## 2024-02-05 RX ADMIN — PANTOPRAZOLE SODIUM 40 MG: 40 TABLET, DELAYED RELEASE ORAL at 08:02

## 2024-02-05 NOTE — HOSPITAL COURSE
For a more detailed hospital course see IP notes briefly, pt was a/w fall resulting in vertebral farcture (see imaging for details), pt was seen by ortho who recommended using brace and per orthospine, since he is doing well with the brace, they do not recommend doing any surgery at this point. Therefore per them pt is okay to d/c with close follow up in clinic and that pt can resume plavix. Pt was educated on red flags , he verbalised understanding.    Pt has no new complains.he wants to go home w/ home health.     Dispo:Home  Condition: HDS.

## 2024-02-05 NOTE — PT/OT/SLP PROGRESS
Physical Therapy Treatment    Patient Name:  Negro Hale   MRN:  [id number]    Recommendations:     Discharge Recommendations: Low Intensity Therapy  Discharge Equipment Recommendations: none  Barriers to discharge:  ongoing medical care    Assessment:     Negro Hale is a 83 y.o. male admitted with a medical diagnosis of Fracture of eighth thoracic vertebra.  He presents with the following impairments/functional limitations: impaired functional mobility, gait instability, decreased safety awareness, decreased ROM, orthopedic precautions. Pt and wife ed on spinal precautions, verbalizing understanding.     Rehab Prognosis: Good; patient would benefit from acute skilled PT services to address these deficits and reach maximum level of function.    Recent Surgery: * No surgery found *      Plan:     During this hospitalization, patient to be seen daily to address the identified rehab impairments via gait training, therapeutic activities, therapeutic exercises and progress toward the following goals:    Plan of Care Expires:  03/03/24    Subjective     Chief Complaint: fracture of thoracic vertebra  Patient/Family Comments/goals: agreeable   Pain/Comfort: 0/10         Objective:     Communicated with RN prior to session.  Patient found HOB elevated with peripheral IV, TLSO, telemetry upon PT entry to room.     General Precautions: Standard, fall  Orthopedic Precautions: spinal precautions  Braces: TLSO  Respiratory Status: Room air     Functional Mobility:  Bed Mobility:     Supine to Sit: modified independence  Transfers:     Sit to Stand:  stand by assistance with rolling walker  Toilet Transfer: stand by assistance with  rolling walker  using  Step Transfer  Gait: 20ft x 2 with RW, SBA normal emilee  Balance: Fair+ in stance       AM-PAC 6 CLICK MOBILITY          Treatment & Education:  Standing exercises x 20 reps toe/heel raises, mini-squats, hip flexion, hip abduction, and hamstring curls  with Stand by  assist, verbal cues for sequencing and safety, and tactile cues       Spinal precautions of no bending, lifting >10lbs, or twisting reviewed with pt. Pt and wife ed on donning and doffing brace.   Patient left up in chair with all lines intact and call button in reach..    GOALS:   Multidisciplinary Problems       Physical Therapy Goals          Problem: Physical Therapy    Goal Priority Disciplines Outcome Goal Variances Interventions   Physical Therapy Goal     PT, PT/OT Ongoing, Progressing     Description: Short Term Goals to be met by: 24    Patient will increase functional independence with mobility by performin. Supine to sit with independently  2. Sit to stand transfer with Stand by assist using Rolling walker  3. Bed to chair transfer with Stand by assist using Rolling walker  4. Gait  x 100 feet with Stand by assist using Rolling walker  5. Lower extremity exercise program x30 reps per handout, with assistance as needed  6. Pt to recall 3/3 spinal precautions  7. Pt to negotiate 3 steps with CGA    Long Term Goals to be met by: 3/3/24    Pt will regain full independent functional mobility with lowest level of assistive device to return to home situation and prior activities of daily living.                        Time Tracking:     PT Received On: 24  PT Start Time: 1050     PT Stop Time: 1114  PT Total Time (min): 24 min     Billable Minutes: Gait Training 10 and Therapeutic Exercise 14    Treatment Type: Evaluation              2024

## 2024-02-05 NOTE — DISCHARGE INSTRUCTIONS
Ankle Pump        Bend ankles up and down, alternating feet.  Repeat 30 times. Do 2 sessions per day.       Quad Set        Slowly tighten muscles on thigh of right straight leg while counting out loud to 5 .   Repeat 30 times. Repeat on the left 30 times. Do 2 sessions per day.           Gluteal Sets        Squeeze pelvic floor and hold. Tighten bottom. Repeat 30 times. Do 2 sessions a day.           Heel Slide        Bend knee and pull right heel toward buttocks. Straighten out the leg.  Repeat 30 times. Repeat on the left 30 times. Do 2 sessions per day.           Hip Abduction / Adduction: with Extended Knee (Supine)        Bring right leg out to side and return to midline position. Keep knee straight.  Repeat 30 times. Repeat on the left 30 times. Do 2 sessions per day.            Straight Leg Raise        Bend left leg. Raise right leg 8-12 inches with knee locked. Exhale and tighten thigh muscles while raising leg.   Repeat 30 times. Switch legs and repeat 30 times. Do 2 sessions per day.           Bridging        Lie on back with feet shoulder width apart. Lift hips toward the ceiling. Hold 5 seconds.  Repeat 30 times. Do 2 sessions per day.         KNEE: Extension, Long Arc Quads - Sitting        Raise right foot until knee is straight. Return foot to the floor.  Repeat 30 times. Repeat on the left 30 times. Do 2 sessions per day.     Physical Therapy Instructions    Complete the exercises standing at the kitchen sink for stability. Work up to 30 repetitions each 2 times a day. Take rest breaks as needed and keep a chair close by to sit down if needed.      Toe/Heel Raises            Stand while holding a stable object. Rise up on toes. Then rock back on heels. Hold each position 2 seconds.  Repeat 30 times per session. Do 2 sessions per day.            Squat: Double Leg (Supported)        With feet shoulder width apart, hold support. Squat, keeping lower leg vertical, knee in line with second toe. Use  legs, do not pull up and down with arms. Only squat down as low as you are comfortable. Repeat 30 times with rest breaks as needed. Do 2 sessions per day         Hip Flexion (Standing)        Stand with support. Lift right knee upward. Repeat 30 times. Repeat with other leg.   Do 2 sessions per day.      HIP: Abduction - Standing        Lift one leg out to the side holding on to a support. Keep toe pointing forward to focus the exercise on the muscles on the outside of your hip.  Repeat 30 times per side. 2 times per day    Copyright © [employer]. All rights reserved.

## 2024-02-05 NOTE — DISCHARGE SUMMARY
[facility] Tele[facility] Hospital Medicine  Discharge Summary      Patient Name: Negro Hale  MRN: [id number]  CHRISTY: [id number]  Patient Class: IP- Inpatient  Admission Date: 2/1/2024  Hospital Length of Stay: 4 days  Discharge Date and Time:  02/05/2024 11:33 AM  Attending Physician: nAa Chau MD   Discharging Provider: Ana Chau MD  Primary Care Provider: Smiley Trevino FNP    Primary Care Team: Networked reference to record PCT     HPI:   Patient is a 83-year-old male with a history of type 2 diabetes on insulin coupled with CKD stage IIIA, essential hypertension, CAD status post PCI with stent placement in 2015 with grade 1 diastolic dysfunction who presents to the emergency room today with a chief complaint of upper back pain following a fall while trying to get a cooler on the back of a truck.  Patient denied any associated neurological symptoms such as fecal or urinary incontinence, saddle anesthesia, lower extremity weakness or numbness.    Ensuing workup was notable for presence of significant hyperglycemia with blood glucose of 485 with anion gap of 2 and MRI of thoracic spine demonstrating fracture of T3-T4 and T7-T8.  This case was discussed between the ED physician and spine surgeon and tentative decision was made take patient to OR on Tuesday due to patient's last dose of Plavix being yesterday.    * No surgery found *      Hospital Course:   For a more detailed hospital course see IP notes briefly, pt was a/w fall resulting in vertebral farcture (see imaging for details), pt was seen by ortho who recommended using brace and per orthospine, since he is doing well with the brace, they do not recommend doing any surgery at this point. Therefore per them pt is okay to d/c with close follow up in clinic and that pt can resume plavix. Pt was educated on red flags , he verbalised understanding.    Pt has no new complains.he wants to go home w/ home health.      Dispo:Home  Condition: HDS.      Goals of Care Treatment Preferences:  Code Status: Full Code      Consults:   Consults (From admission, onward)          Status Ordering Provider     Inpatient consult to Social Work  Once        Provider:  (Not yet assigned)    Completed FRANTZ, REHMAT U     Inpatient consult to Orthopedics  Once        Provider:  Thaddeus Leroy MD    Completed KLAUDIA, MIN S            No new Assessment & Plan notes have been filed under this hospital service since the last note was generated.  Service: Hospital Medicine    Final Active Diagnoses:    Diagnosis Date Noted POA    PRINCIPAL PROBLEM:  Fracture of eighth thoracic vertebra [S22.069A] 02/01/2024 Yes    Type 2 diabetes mellitus, with long-term current use of insulin [E11.9, Z79.4] 02/01/2024 Not Applicable    Stage 3a chronic kidney disease [N18.31] 03/17/2021 Yes     Chronic    Essential hypertension [I10] 03/17/2021 Yes     Chronic    Coronary artery disease involving native coronary artery of native heart without angina pectoris [I25.10] 03/17/2021 Yes     Chronic      Problems Resolved During this Admission:       Discharged Condition: stable    Disposition:     Follow Up:   Follow-up Information       Smiley Trevino FNP Follow up in 1 week(s).    Specialty: Family Medicine  Contact information:  [address]  [facility] [place] [id number] [phone]               Thaddeus Leroy MD Follow up in 1 week(s).    Specialties: Orthopedic Surgery, Spine Surgery  Contact information:  [address] [place] [address] [facility] [address]  [phone]                           Patient Instructions:      Diet Adult Regular     Notify your health care provider if you experience any of the following:  temperature >100.4     Notify your health care provider if you experience any of the following:  persistent nausea and vomiting or diarrhea     Notify your health care provider if you experience  any of the following:  severe uncontrolled pain     Notify your health care provider if you experience any of the following:  difficulty breathing or increased cough     Notify your health care provider if you experience any of the following:  severe persistent headache     Notify your health care provider if you experience any of the following:  worsening rash     Notify your health care provider if you experience any of the following:  persistent dizziness, light-headedness, or visual disturbances     Notify your health care provider if you experience any of the following:  increased confusion or weakness     Activity as tolerated       Significant Diagnostic Studies: N/A    Pending Diagnostic Studies:       Procedure Component Value Units Date/Time    EXTRA TUBES [[id number]] Collected: 02/01/24 2049    Order Status: Sent Lab Status: In process Updated: 02/01/24 2054    Specimen: Blood, Venous     Narrative:      The following orders were created for panel order EXTRA TUBES.  Procedure                               Abnormality         Status                     ---------                               -----------         ------                     Light Green Top Hold[[id number]]                            In process                 Light Green Top Hold[[id number]]                            In process                   Please view results for these tests on the individual orders.           Medications:  Reconciled Home Medications:      Medication List        START taking these medications      gabapentin 100 MG capsule  Commonly known as: NEURONTIN  Take 1 capsule (100 mg total) by mouth 2 (two) times daily.     senna-docusate 8.6-50 mg 8.6-50 mg per tablet  Commonly known as: PERICOLACE  Take 1 tablet by mouth once daily. Hold it if having diarrhea.  Start taking on: February 6, 2024            CONTINUE taking these medications      cholecalciferol (vitamin D3) 125 mcg (5,000 unit) capsule  Take 5,000 Units by  "mouth once daily.     clopidogreL 75 mg tablet  Commonly known as: PLAVIX  Take 1 tablet (75 mg total) by mouth once daily.     coenzyme Q10 200 mg capsule  Take 200 mg by mouth once daily.     FARXIGA 10 mg tablet  Generic drug: dapagliflozin propanediol  Take 1 tablet (10 mg total) by mouth once daily.     LANTUS SOLOSTAR U-100 INSULIN glargine 100 units/mL SubQ pen  Generic drug: insulin  Inject 60 Units into the skin every evening.     nitroGLYCERIN 0.4 MG SL tablet  Commonly known as: NITROSTAT  Place 1 tablet (0.4 mg total) under the tongue every 5 (five) minutes as needed for Chest pain.     pantoprazole 40 MG tablet  Commonly known as: PROTONIX  Take 1 tablet (40 mg total) by mouth once daily.     rosuvastatin 40 MG Tab  Commonly known as: CRESTOR  Take 1 tablet (40 mg total) by mouth every evening.     SURE COMFORT PEN NEEDLE 32 gauge x 5/32" Ndle  Generic drug: pen needle, diabetic  AS DIRECTED     VITAMIN B-12 1000 MCG tablet  Generic drug: cyanocobalamin  Take 100 mcg by mouth once daily.            STOP taking these medications      metoprolol succinate 50 MG 24 hr tablet  Commonly known as: TOPROL-XL              Indwelling Lines/Drains at time of discharge:   Lines/Drains/Airways       None                   Time spent on the discharge of patient: >30 minutes         Rehmat CHEYENNE Chau MD  Department of Hospital Medicine  [facility] - [address] [place] [address]  " all other ROS negative except as per HPI

## 2024-02-05 NOTE — PLAN OF CARE
[facility] - [facility] Telemetry  Discharge Final Note    Primary Care Provider: Smiley Trevino FNP    Expected Discharge Date: 2/5/2024    Final Discharge Note (most recent)       Final Note - 02/05/24 1140          Final Note    Assessment Type Final Discharge Note     Anticipated Discharge Disposition Home-Health Care Svc        Post-Acute Status    Post-Acute Authorization Home Health     Home Health Status Set-up Complete/Auth obtained     Patient choice form signed by patient/caregiver List with quality metrics by geographic area provided;List from CMS Compare;List from System Post-Acute Care     Discharge Delays None known at this time                 Previous ss note stated choice obtained for [employer]. D/c info faxed to [employer].    Important Message from Medicare  Important Message from Medicare regarding Discharge Appeal Rights: Given to patient/caregiver, Explained to patient/caregiver, Signed/date by patient/caregiver     Date IMM was signed: 02/05/24  Time IMM was signed: 1140     Follow-up providers       Smiley Trevino FNP   Specialty: Family Medicine   Relationship: PCP - General    [address] [facility] [place] [address]   Phone: [phone]       Next Steps: Follow up in 1 week(s)    Thaddeus Leroy MD   Specialty: Orthopedic Surgery, Spine Surgery    [address] [place] [address] [facility]  [place] [address]   Phone: [phone]       Next Steps: Follow up in 1 week(s)              After-discharge care                Home Medical Care       [facility]   Service: Home Health Services    [address] [facility] [place] [address]   Phone: [phone]

## 2024-02-06 ENCOUNTER — PATIENT OUTREACH (OUTPATIENT)
Dept: ADMINISTRATIVE | Facility: CLINIC | Age: 84
End: 2024-02-06
Payer: MEDICARE

## 2024-02-06 NOTE — PROGRESS NOTES
C3 nurse attempted to contact Negro Hale  for a TCC post hospital discharge follow up call. No answer. Left voicemail with callback information. The patient has a scheduled HOSFU appointment with Smiley Trevino FNP  on 2/15/2024 @ 1020.

## 2024-02-07 ENCOUNTER — TELEPHONE (OUTPATIENT)
Dept: FAMILY MEDICINE | Facility: CLINIC | Age: 84
End: 2024-02-07
Payer: MEDICARE

## 2024-02-07 NOTE — TELEPHONE ENCOUNTER
----- Message from Apurva Donato RN sent at 2/7/2024 11:14 AM CST -----  Regarding: FW: MEDICATION CLARIFICATION    ----- Message -----  From: Judie Angela RN  Sent: 2/7/2024  11:03 AM CST  To: Belinda ELLISON Staff  Subject: MEDICATION CLARIFICATION                         Good Morning!!  Contacted this patient for post discharge call.    The patient was instructed to continue Farxiga 10 mg daily when discharged from hospital.   The patient's spouse, Virginia, states the patient was told to stop taking this medication and was not currently taking as a home medication.  Patient currently is taking Lantus 60 units in the evening.    Virginia reports patient's blood sugar was 316 on 2/16/2024 around noon.   Does this patient need to take Farxiga 10 mg daily?  Thanks!!!

## 2024-02-07 NOTE — PROGRESS NOTES
The patient was instructed to continue Farxiga 10 mg daily when discharged from hospital.   The patient's spouse, Virginia, states the patient was told to stop taking this medication and was not currently taking as a home medication.  In basket message sent to BARBARA Huitron for clarification on Farxiga.      General Abdominal Pain, N/V/D

## 2024-02-07 NOTE — TELEPHONE ENCOUNTER
I do not know where he would have gotten it from that he was not supposed to take the Farxiga.  I sent refills of Farxiga in November because he has uncontrolled T2DM and CKD.  It is on his hospital discharge that he should be taking Farxiga 10 mg daily and according to the med list the only medication that was stopped was metoprolol succinate 50 mg daily.    He definitely needs to be taking Farxiga 10 mg daily.

## 2024-02-07 NOTE — PROGRESS NOTES
C3 nurse spoke with Negro Hale  for a TCC post hospital discharge follow up call. The patient has a scheduled [facility] appointment with Smiley Trevino FNP  on 2/15/2024  @ 1020.

## 2024-02-07 NOTE — TELEPHONE ENCOUNTER
Spoke with patient explained he is suppose to be taking the Farxiga 10 mg.  States he will start today

## 2024-02-08 DIAGNOSIS — E11.22 TYPE 2 DIABETES MELLITUS WITH STAGE 3A CHRONIC KIDNEY DISEASE, WITH LONG-TERM CURRENT USE OF INSULIN: Primary | Chronic | ICD-10-CM

## 2024-02-08 DIAGNOSIS — I25.10 CORONARY ARTERY DISEASE INVOLVING NATIVE CORONARY ARTERY OF NATIVE HEART WITHOUT ANGINA PECTORIS: Chronic | ICD-10-CM

## 2024-02-08 DIAGNOSIS — Z95.5 HISTORY OF CORONARY ARTERY STENT PLACEMENT: ICD-10-CM

## 2024-02-08 DIAGNOSIS — N18.31 TYPE 2 DIABETES MELLITUS WITH STAGE 3A CHRONIC KIDNEY DISEASE, WITH LONG-TERM CURRENT USE OF INSULIN: Primary | Chronic | ICD-10-CM

## 2024-02-08 DIAGNOSIS — Z79.4 TYPE 2 DIABETES MELLITUS WITH STAGE 3A CHRONIC KIDNEY DISEASE, WITH LONG-TERM CURRENT USE OF INSULIN: Primary | Chronic | ICD-10-CM

## 2024-02-08 DIAGNOSIS — N18.31 STAGE 3A CHRONIC KIDNEY DISEASE: Chronic | ICD-10-CM

## 2024-02-08 DIAGNOSIS — K21.9 GASTROESOPHAGEAL REFLUX DISEASE WITHOUT ESOPHAGITIS: ICD-10-CM

## 2024-02-08 RX ORDER — DAPAGLIFLOZIN 10 MG/1
10 TABLET, FILM COATED ORAL DAILY
Qty: 90 TABLET | Refills: 3 | Status: SHIPPED | OUTPATIENT
Start: 2024-02-08 | End: 2024-06-03 | Stop reason: SDUPTHER

## 2024-02-08 RX ORDER — PANTOPRAZOLE SODIUM 40 MG/1
40 TABLET, DELAYED RELEASE ORAL DAILY
Qty: 90 TABLET | Refills: 3 | Status: SHIPPED | OUTPATIENT
Start: 2024-02-08 | End: 2024-06-03 | Stop reason: SDUPTHER

## 2024-02-08 RX ORDER — CLOPIDOGREL BISULFATE 75 MG/1
75 TABLET ORAL DAILY
Qty: 90 TABLET | Refills: 3 | Status: SHIPPED | OUTPATIENT
Start: 2024-02-08 | End: 2024-06-03 | Stop reason: SDUPTHER

## 2024-02-08 NOTE — TELEPHONE ENCOUNTER
----- Message from Damian Johnson sent at 2/8/2024 11:16 AM CST -----  PLAVIX, PANTOPRAZOLE, FARXIGA TO [facility] [phone]

## 2024-02-08 NOTE — TELEPHONE ENCOUNTER
Patient requesting refills for plavix, protonix, and farxiga. Patient should have refills for plavix and protonix, attempted to call, left vm; needs [facility]iga [facility]. Thank you

## 2024-02-08 NOTE — PROGRESS NOTES
Notified patient's spouse that medication(Farxiga) prescription was at [facility] and would be available for pickup today.  Voiced understanding.

## 2024-02-08 NOTE — PROGRESS NOTES
Follow up call made to patient to verify patient restarted Farxiga  and was able to get prescription from Pharmacy.  Patient's spouse states the pharmacy was unable to fill the prescription.   Notified pharmacy spoke with Leah Martinez Tech.  Presciption is available at pharmacy and will fill for the patient.

## 2024-02-15 ENCOUNTER — OFFICE VISIT (OUTPATIENT)
Dept: FAMILY MEDICINE | Facility: CLINIC | Age: 84
End: 2024-02-15
Payer: MEDICARE

## 2024-02-15 VITALS
WEIGHT: 174.38 LBS | HEIGHT: 68 IN | BODY MASS INDEX: 26.43 KG/M2 | OXYGEN SATURATION: 95 % | TEMPERATURE: 97 F | DIASTOLIC BLOOD PRESSURE: 62 MMHG | HEART RATE: 104 BPM | RESPIRATION RATE: 20 BRPM | SYSTOLIC BLOOD PRESSURE: 124 MMHG

## 2024-02-15 DIAGNOSIS — I10 ESSENTIAL HYPERTENSION: Chronic | ICD-10-CM

## 2024-02-15 DIAGNOSIS — N18.31 STAGE 3A CHRONIC KIDNEY DISEASE: Chronic | ICD-10-CM

## 2024-02-15 DIAGNOSIS — S22.068D OTHER CLOSED FRACTURE OF EIGHTH THORACIC VERTEBRA WITH ROUTINE HEALING, SUBSEQUENT ENCOUNTER: Primary | ICD-10-CM

## 2024-02-15 DIAGNOSIS — I73.9 PERIPHERAL VASCULAR DISEASE: Chronic | ICD-10-CM

## 2024-02-15 DIAGNOSIS — E11.22 TYPE 2 DIABETES MELLITUS WITH STAGE 3A CHRONIC KIDNEY DISEASE, WITH LONG-TERM CURRENT USE OF INSULIN: Chronic | ICD-10-CM

## 2024-02-15 DIAGNOSIS — N18.31 TYPE 2 DIABETES MELLITUS WITH STAGE 3A CHRONIC KIDNEY DISEASE, WITH LONG-TERM CURRENT USE OF INSULIN: Chronic | ICD-10-CM

## 2024-02-15 DIAGNOSIS — Z79.4 TYPE 2 DIABETES MELLITUS WITH STAGE 3A CHRONIC KIDNEY DISEASE, WITH LONG-TERM CURRENT USE OF INSULIN: Chronic | ICD-10-CM

## 2024-02-15 DIAGNOSIS — I25.119 ATHEROSCLEROSIS OF NATIVE CORONARY ARTERY OF NATIVE HEART WITH ANGINA PECTORIS: Chronic | ICD-10-CM

## 2024-02-15 PROBLEM — E11.65 TYPE 2 DIABETES MELLITUS WITH HYPERGLYCEMIA, WITH LONG-TERM CURRENT USE OF INSULIN: Chronic | Status: ACTIVE | Noted: 2024-02-01

## 2024-02-15 LAB
EST. AVERAGE GLUCOSE BLD GHB EST-MCNC: 318 MG/DL
HBA1C MFR BLD HPLC: 12.7 % (ref 4.5–6.6)

## 2024-02-15 PROCEDURE — 99495 TRANSJ CARE MGMT MOD F2F 14D: CPT | Mod: ,,, | Performed by: NURSE PRACTITIONER

## 2024-02-15 PROCEDURE — 83036 HEMOGLOBIN GLYCOSYLATED A1C: CPT | Mod: ,,, | Performed by: CLINICAL MEDICAL LABORATORY

## 2024-02-15 RX ORDER — FLASH GLUCOSE SENSOR
KIT MISCELLANEOUS
Qty: 6 KIT | Refills: 12 | Status: SHIPPED | OUTPATIENT
Start: 2024-02-15 | End: 2024-05-21

## 2024-02-15 RX ORDER — FLASH GLUCOSE SCANNING READER
1 EACH MISCELLANEOUS ONCE
Qty: 1 EACH | Refills: 0 | Status: SHIPPED | OUTPATIENT
Start: 2024-02-15 | End: 2024-05-21 | Stop reason: ALTCHOICE

## 2024-02-15 NOTE — PROGRESS NOTES
[facility] - FAMILY MEDICINE       PATIENT NAME: Negro Hale   : 1940    AGE: 83 y.o. DATE OF ENCOUNTER: 2/15/24    MRN: [id number]      PCP: Smiley Trevino FNP    Reason for Visit / Chief Complaint:  Follow-up (Patient presents to the clinic for a 3m f/u /Flu vaccine refused. ) and Diabetes         274}    Subjective:     HPI:    Negro Hale presents for a [facility] hospital discharge follow-up visit. He was admitted to [facility] on 24 for upper back pain following a fall while trying to get a cooler on the back of a truck with fractures of T3-T4 and T7-T8 noted on MRI of the thoracic spine.  Significant hyperglycemia with blood glucose of 485 noted in ED.  He was admitted with a tentative plan of surgery initially but is doing well with a back brace and will f/u with Dr. Thaddeus Leroy 2024.  He was discharged on 24.     Denies any back pain.    Per wife, checking FPG daily 70-80s with no bedtime snack so HH told him to start eating bedtime snack.  Taking Farxiga 10 mg daily at bedtime and Lantus 55 units nightly.    Would greatly benefit from CGM due to fluctuating blood glucose with both hypo and hyperglycemia which is dangerous given his recent falls and has CKD stage 3.  CGM would allow more frequent glucose monitoring and appropriate medication adjustment.  Rx sent for David 2 but advised can change to Dex Com or David 3 whatever product would be covered.    Family and/or Caretaker present at visit?  Yes, wife.  Diagnostic tests reviewed/disposition: No diagnosic tests pending after this hospitalization.  Home health/community services discussion/referrals: Patient has home health established at [facility] .   Establishment or re-establishment of referral orders for community resources: No other necessary community resources.   Discussion with other health care providers: No discussion with other health care  "providers necessary.     Discharge and current medications have been reconciled.    Review of Systems:   Review of Systems   Constitutional: Negative.    HENT: Negative.     Eyes: Negative.    Respiratory: Negative.     Cardiovascular: Negative.    Gastrointestinal: Negative.    Endocrine: Negative.    Genitourinary: Negative.    Musculoskeletal: Negative.    Skin: Negative.    Allergic/Immunologic: Negative.    Neurological: Negative.    Hematological: Negative.    Psychiatric/Behavioral: Negative.         Allergies and Meds: 274}     Review of patient's allergies indicates:   Allergen Reactions    Mayonnaise      Upset stomach        Current Outpatient Medications:     cholecalciferol, vitamin D3, 125 mcg (5,000 unit) capsule, Take 5,000 Units by mouth once daily., Disp: , Rfl:     clopidogreL (PLAVIX) 75 mg tablet, Take 1 tablet (75 mg total) by mouth once daily., Disp: 90 tablet, Rfl: 3    coenzyme Q10 200 mg capsule, Take 200 mg by mouth once daily., Disp: , Rfl:     cyanocobalamin (VITAMIN B-12) 1000 MCG tablet, Take 100 mcg by mouth once daily., Disp: , Rfl:     dapagliflozin propanediol (FARXIGA) 10 mg tablet, Take 1 tablet (10 mg total) by mouth once daily., Disp: 90 tablet, Rfl: 3    gabapentin (NEURONTIN) 100 MG capsule, Take 1 capsule (100 mg total) by mouth 2 (two) times daily., Disp: 60 capsule, Rfl: 0    insulin (LANTUS SOLOSTAR U-100 INSULIN) glargine 100 units/mL SubQ pen, Inject 60 Units into the skin every evening., Disp: 54 mL, Rfl: 3    nitroGLYCERIN (NITROSTAT) 0.4 MG SL tablet, Place 1 tablet (0.4 mg total) under the tongue every 5 (five) minutes as needed for Chest pain., Disp: 25 tablet, Rfl: 1    pantoprazole (PROTONIX) 40 MG tablet, Take 1 tablet (40 mg total) by mouth once daily., Disp: 90 tablet, Rfl: 3    rosuvastatin (CRESTOR) 40 MG Tab, Take 1 tablet (40 mg total) by mouth every evening., Disp: 90 tablet, Rfl: 3    SURE COMFORT PEN NEEDLE 32 gauge x 5/32" Ndle, AS DIRECTED, Disp: , " Rfl:     flash glucose scanning reader (FREESTYLE ADORE 2 READER) Misc, 1 each by Misc.(Non-Drug; Combo Route) route once. FreeStyle Adore reader for continuous glucose monitoring with uncontrolled T2DM with hyperglycemia and CKD stage 3. Having hypo and hyperglycemia. for 1 dose, Disp: 1 each, Rfl: 0    flash glucose sensor (FREESTYLE ADORE 2 SENSOR) Kit, Apply FreeStyle Adore sensor to upper arm and change every 14 days or as needed if becomes dislodged.  For continuous glucose monitoring with uncontrolled T2DM with hyperglycemia and CKD stage 3., Disp: 6 kit, Rfl: 12    Labs:274}    I have reviewed old labs below:  Lab Results   Component Value Date    WBC 7.12 02/04/2024    RBC 4.19 (L) 02/04/2024    HGB 12.4 (L) 02/04/2024    HCT 37.5 (L) 02/04/2024     02/04/2024     02/04/2024    K 4.2 02/04/2024     02/04/2024    CALCIUM 9.0 02/04/2024     (H) 02/04/2024    BUN 38 (H) 02/04/2024    CREATININE 1.34 (H) 02/04/2024    ESTGFRAFRICA 64 03/19/2021    EGFRNONAA 45 (L) 05/19/2022    ALT 28 02/04/2024    AST 17 02/04/2024    INR 1.07 02/01/2024    CHOL 169 05/08/2023    TRIG 102 05/08/2023    HDL 48 05/08/2023    LDLCALC 101 05/08/2023    TSH 2.880 12/21/2021    PSA 0.636 03/19/2021    HGBA1C 8.5 (H) 11/14/2023    MICROALBUR 3.2 (H) 05/08/2023       Medical History: 274}     Past Medical History:   Diagnosis Date    Abnormal thyroid stimulating hormone (TSH) level 08/22/2019    Anemia 08/15/2019    Atrial fibrillation 09/07/2012    Bradycardia 05/23/2017    asymptomatic    Change in bowel habit 11/13/2018    Chronic kidney disease, unspecified 01/14/2019    Coronary arteriosclerosis 09/07/2012    Disease of skin and subcutaneous tissue 08/16/2017    medial aspect RLE- cystic structure seen on venous doppler US.    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    Hyperlipidemia 09/21/2012    Lower extremity edema 04/06/2017    Lumbar radiculopathy 01/26/2017    Obesity, unspecified 12/05/2016    Old myocardial infarction 12/06/2017    Other secondary pulmonary hypertension 05/23/2017    Other spondylosis, lumbosacral region 01/26/2017    Peripheral vascular disease     Personal history of tobacco use, presenting hazards to health 11/17/2016    Pulmonary hypertension     Type 2 diabetes mellitus with chronic kidney disease 01/14/2019    Type 2 diabetes mellitus with hyperglycemia 10/02/2011    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 Lewis Eye exam    Varicose veins of both lower extremities with pain 01/02/2019    Venous insufficiency 01/08/2019      Social History     Tobacco Use   Smoking Status Former   Smokeless Tobacco Never   Tobacco Comments    Quit 10/15/1976      Past Surgical History:   Procedure Laterality Date    APPENDECTOMY      CARDIAC CATHETERIZATION  2007    [facility]- Stent placement    CATARACT EXTRACTION, BILATERAL      COLONOSCOPY  11/06/2019    Dr. Mendoza    HERNIA REPAIR      Inguinal hernia repair    TONSILLECTOMY          Health Maintenance: 274}     Health Maintenance         Date Due Completion Date    Shingles Vaccine (1 of 2) Never done ---    RSV Vaccine (Age 60+ and Pregnant patients) (1 - 1-dose 60+ series) Never done ---    Influenza Vaccine (1) Never done ---    COVID-19 Vaccine (2 - 2023-24 season) 09/01/2023 2/21/2021    Hemoglobin A1c 02/14/2024 11/14/2023    Diabetes Urine Screening 05/08/2024 5/8/2023    Lipid Panel 05/08/2024 5/8/2023    Eye Exam 08/25/2024 8/25/2023    Override on 6/24/2022: Done ([facility])     "Override on 5/28/2020: Done ([facility]. Scanned into documents.)    TETANUS VACCINE 01/21/2034 1/21/2024            Objective:  274}   /62 (BP Location: Right arm, Patient Position: Sitting, BP Method: Large (Automatic))   Pulse 104   Temp 97.4 °F (36.3 °C) (Oral)   Resp 20   Ht 5' 8" (1.727 m)   Wt 79.1 kg (174 lb 6.4 oz)   SpO2 95%   BMI 26.52 kg/m²     Wt Readings from Last 3 Encounters:   02/15/24 79.1 kg (174 lb 6.4 oz)   02/02/24 78.1 kg (172 lb 3.2 oz)   01/27/24 79.8 kg (176 lb)     BP Readings from Last 3 Encounters:   02/15/24 124/62   02/05/24 (!) 138/57   01/27/24 123/73     Body mass index is 26.52 kg/m².     Physical Exam  Vitals and nursing note reviewed.   Constitutional:       General: He is not in acute distress.     Appearance: Normal appearance. He is not ill-appearing.   HENT:      Head: Normocephalic.   Eyes:      Conjunctiva/sclera: Conjunctivae normal.   Cardiovascular:      Rate and Rhythm: Normal rate and regular rhythm.      Heart sounds: Normal heart sounds.   Pulmonary:      Effort: Pulmonary effort is normal. No respiratory distress.      Breath sounds: Normal breath sounds. No wheezing, rhonchi or rales.   Musculoskeletal:      Cervical back: Neck supple.      Right lower leg: No edema.      Left lower leg: No edema.      Comments: Wearing back brace.  Denies pain.   Skin:     General: Skin is warm and dry.   Neurological:      Mental Status: He is alert and oriented to person, place, and time.          Assessment and Plan: 274}     1. Other closed fracture of eighth thoracic vertebra with routine healing, subsequent encounter  Comments:  Continue wearing back brace f/u with spine specialist as scheduled.  Fall precautions stressed.    2. Type 2 diabetes mellitus with stage 3a chronic kidney disease, with long-term current use of insulin  Comments:  Poor control with hypo and hyperglycemia.  Switch Farxiga to a.m. dosing.  Decrease Lantus to 50 units daily at " bedtime.  CGM highly recommended.  Recheck A1c.  Orders:  -     Hemoglobin A1C; Future; Expected date: 02/15/2024  -     flash glucose sensor (FREESTYLE DAVID 2 SENSOR) Kit; Apply FreeStyle David sensor to upper arm and change every 14 days or as needed if becomes dislodged.  For continuous glucose monitoring with uncontrolled T2DM with hyperglycemia and CKD stage 3.  Dispense: 6 kit; Refill: 12  -     flash glucose scanning reader (FREESTYLE DAVID 2 READER) Misc; 1 each by Misc.(Non-Drug; Combo Route) route once. FreeStyle David reader for continuous glucose monitoring with uncontrolled T2DM with hyperglycemia and CKD stage 3. Having hypo and hyperglycemia. for 1 dose  Dispense: 1 each; Refill: 0    3. Stage 3a chronic kidney disease  Comments:  Stable, continue monitoring.    Avoid nephrotoxins.    4. Essential hypertension  Comments:  Controlled, continue current meds and treatment.    5. Peripheral vascular disease  Comments:  Stable, no treatment needed.    6. Atherosclerosis of native coronary artery of native heart with angina pectoris  Comments:  Stable, followed by Cardiology.  Overview:  BHAKTI mid LCx 2/26/2007       AWV 02/22/2024 as scheduled  Return to clinic 3-mth f/u uncontrolled T2DM; and sooner as needed.  Advised wife after review of labs I may have him return to clinic sooner.    Future Appointments   Date Time Provider Department Center   2/22/2024  2:00 PM DENA NURSE, [facility] FAMILY MEDICINE [facility] DAHIANA Bright   3/1/2024  8:15 AM Thaddeus Leroy MD [facility] SPINE [facility]   5/21/2024 11:00 AM Smiley Trevino FNP [facility]   1/2/2025 10:00 AM AWJIM NURSE, [facility] FAMILY MEDICINE [facility] DAHIANA Bright        Signature:  BARBARA Green

## 2024-02-16 NOTE — PATIENT INSTRUCTIONS
Patient Education       Preventing Falls   The Basics   Written by the doctors and editors at [facility]   Am I at risk of falling? -- Your risk of falling increases as you grow older. That's because getting older can make it harder to walk steadily and keep your balance. Also, the effects of falls are more serious in older people.  Overall, 3 to 4 out of every 10 people over the age of 65 fall each year. Up to 75 percent of people who fracture a hip never recover to the point they were before they had their fracture. If you have fallen in the past, you are at higher risk of falling again.  Several things can increase your risk of a fall, including:  Illness  A change in the medicines you take  An unsafe or unfamiliar setting (for example, a room with rugs or furniture that might trip you, or an area you don't know well)  How can my doctor help me to avoid falling? -- Your doctor can talk to you about the following things:  Past falls - It is important to tell your doctor about any times you have fallen or almost fallen. He or she can then suggest ways to prevent another fall.  Your health conditions - Some health problems can put you at risk of falling. These include conditions that affect eyesight, hearing, muscle strength, or balance.  The medicines you take - Certain medicines can increase the risk of falling. These include some medicines that are used for sleeping problems, anxiety, high blood pressure, or depression. Adding new medicines, or changing doses of some medicines, can also affect your risk of falling.  The more your doctor knows about your situation, the better he or she will be able to help you. For example, if you fell because you have a condition that causes pain, your doctor might suggest treatments to deal with the pain. Or if one of your medicines is making you dizzy and more likely to fall, your doctor might switch you to a different medicine.  Is there anything I can do on my own? -- Yes. To  help keep from falling, you can:  Make your home safer - To avoid falling at home, get rid of things that might make you trip or slip. This might include furniture, electrical cords, clutter, and loose rugs (figure 1). Keep your home well-lit so that you can easily see where you are going. Avoid storing things in high places so you don't have to reach or climb.  Wear sturdy shoes that fit well - Wearing shoes with high heels or slippery soles, or shoes that are too loose, can lead to falls. Walking around in bare feet, or only socks, can also increase your risk of falling.  Take vitamin D pills - Taking vitamin D might lower the risk of falls in older people. This is because vitamin D helps make bones and muscles stronger. Your doctor can talk to you about whether you should take extra vitamin D, and how much.  Stay active - Exercising on a regular basis can help lower your risk of falling. It might also help prevent you from getting hurt if you do fall. It is best to do a few different activities that help with both strength and balance. There are many kinds of exercise that can be safe for older people. These include walking, swimming, and Flynn Chi (a Chinese martial art that involves slow, gentle movements).  Use a cane, walker, and other safety devices - If your doctor recommends that you use a cane or walker, be sure that it's the right size and you know how to use it. There are other devices that might help you avoid falling, too. These include grab bars or a sturdy seat for the shower, non-slip bath mats, and hand rails or treads for the stairs (to prevent slipping).  If you worry that you could fall, there are also alarm buttons that let you call for help if you fall and can't get up.  What should I do if I fall? -- If you fall, see your doctor right away, even if you aren't hurt. Your doctor can try to figure out what caused you to fall, and how likely you are to fall again. He or she will do an exam and  talk to you about your health problems, medicines, and activities. Then he or she can suggest things you can do to avoid falling again.  Many older people have a hard time recovering after a fall. Doing things to prevent falling can help you to protect your health and independence.  All topics are updated as new evidence becomes available and our peer review process is complete.  This topic retrieved from [employer] on: Sep 21, 2021.  Topic 14982 Version 18.0  Release: 29.4.2 - C29.263  © 2021 UpToDate, Inc. and/or its affiliates. All rights reserved.  figure 1: How to avoid falling at home     This picture shows some of the things that can cause a fall in your home. Look around and remove any loose rugs, electrical cords, clutter, or furniture that could trip you.  Graphic 55877 Version 1.0    Consumer Information Use and Disclaimer   This information is not specific medical advice and does not replace information you receive from your health care provider. This is only a brief summary of general information. It does NOT include all information about conditions, illnesses, injuries, tests, procedures, treatments, therapies, discharge instructions or life-style choices that may apply to you. You must talk with your health care provider for complete information about your health and treatment options. This information should not be used to decide whether or not to accept your health care provider's advice, instructions or recommendations. Only your health care provider has the knowledge and training to provide advice that is right for you. The use of this information is governed by the [employer] End User License Agreement, available at https://www.[employer].IT[employer]SUGAR/en/solutions/[employer]/about/nicolle.The use of [employer] content is governed by the [employer] Terms of Use. ©2021 UpToDate, Inc. All rights reserved.  Copyright   © 2021 UpToDate, Inc. and/or its affiliates. All rights reserved.

## 2024-02-18 DIAGNOSIS — E11.22 TYPE 2 DIABETES MELLITUS WITH STAGE 3A CHRONIC KIDNEY DISEASE, WITH LONG-TERM CURRENT USE OF INSULIN: Primary | Chronic | ICD-10-CM

## 2024-02-18 DIAGNOSIS — Z79.4 TYPE 2 DIABETES MELLITUS WITH STAGE 3A CHRONIC KIDNEY DISEASE, WITH LONG-TERM CURRENT USE OF INSULIN: Primary | Chronic | ICD-10-CM

## 2024-02-18 DIAGNOSIS — N18.31 TYPE 2 DIABETES MELLITUS WITH STAGE 3A CHRONIC KIDNEY DISEASE, WITH LONG-TERM CURRENT USE OF INSULIN: Primary | Chronic | ICD-10-CM

## 2024-02-18 NOTE — PROGRESS NOTES
His A1c is higher than ever.  I had advised decreasing Lantus to 50 units because he is waking with glucose 70-80s, but obviously his glucose is way too high at other times for his A1c to be this high.  I recommend referral to Nicloe Dewey NP and she should be able to help get him on a CGM if he wasn't able to get the David I ordered.

## 2024-02-20 NOTE — PROGRESS NOTES
"   [facility] MEDICINE       PATIENT NAME: Negro Hale   : 1940    AGE: 83 y.o. DATE OF ENCOUNTER: 24    MRN: [id number]      Negro Hale presented for a  Medicare AWV and comprehensive Health Risk Assessment today. The following components were reviewed and updated:    Medical history  Family History  Social history  Allergies and Current Medications  Health Risk Assessment  Health Maintenance  Care Team         ** See Completed Assessments for Annual Wellness Visit within the encounter summary.**         The following assessments were completed:  Living Situation  CAGE  Depression Screening  Timed Get Up and Go  Whisper Test  Cognitive Function Screening  Nutrition Screening  ADL Screening  PAQ Screening        Vitals:    24 1100   BP: 110/60   BP Location: Left arm   Patient Position: Sitting   Pulse: 63   Resp: 18   Temp: 98.1 °F (36.7 °C)   TempSrc: Oral   SpO2: 98%   Weight: 80.3 kg (177 lb)   Height: 5' 4.5" (1.638 m)     Body mass index is 29.91 kg/m².  Physical Exam  Vitals and nursing note reviewed.   Constitutional:       General: He is not in acute distress.     Appearance: Normal appearance. He is not ill-appearing.   HENT:      Head: Normocephalic.   Eyes:      Conjunctiva/sclera: Conjunctivae normal.   Cardiovascular:      Rate and Rhythm: Normal rate and regular rhythm.      Heart sounds: Normal heart sounds.   Pulmonary:      Effort: Pulmonary effort is normal. No respiratory distress.      Breath sounds: Normal breath sounds. No wheezing, rhonchi or rales.   Abdominal:      Palpations: Mass: Using walker.   Musculoskeletal:      Cervical back: Neck supple.      Comments: Wearing back brace.  Denies pain.   Skin:     General: Skin is warm and dry.   Neurological:      Mental Status: He is alert and oriented to person, place, and time.      Gait: Gait abnormal.               Diagnoses and health risks identified today and associated " recommendations/orders:    1. Encounter for subsequent annual wellness visit (AWV) in Medicare patient    2. Hypercholesterolemia  Assessment & Plan:   Latest Reference Range & Units 05/08/23 11:32   Cholesterol Total 0 - 200 mg/dL 169   HDL 40 - 60 mg/dL 48   CHOL/HDLC Ratio  3.5   LDL Calculated mg/dL 101   LDL/HDL Ratio  2.1   Non-HDL Cholesterol mg/dL 121   Triglycerides 35 - 150 mg/dL 102   VLDL Cholesterol Lorenzo mg/dL 20     Continue atorvastatin.      3. Type 2 diabetes mellitus with stage 3a chronic kidney disease, with long-term current use of insulin  Assessment & Plan:  CKD stable, but T2DM not controlled with fluctuating glucose levels and A1c 12.7% with fasting hypoglycemia      4. Abnormality of gait and mobility  Overview:  Continue use of walker.    Fall precautions.      5. Other reduced mobility  Comments:  Continue use of walker or other assistive device as needed to prevent falls or injury.    6. BMI 29.0-29.9,adult    7. Need for vaccination  -     varicella-zoster [id number], PF, (SHINGRIX, PF,) 50 mcg/0.5 mL injection; Inject 0.5 mLs into the muscle once. for 1 dose  Dispense: 1 each; Refill: 0    8. Type 2 diabetes mellitus with hyperglycemia, with long-term current use of insulin  Assessment & Plan:  Poorly controlled.   Lab Results   Component Value Date    HGBA1C 12.7 (H) 02/15/2024     Is taking Farxiga 10 mg daily and Lantus 50 units at bedtime.  Only checking glucose fasting daily with levels ranging  (84 [phone] 84 64 78 70 72 72 [phone] 68).  Does not have smart phone, David with reader was denied.    Advised to switch Lantus to a.m. dosing and stay at 50 units for now and begin checking glucose fasting and 2 hours after supper until he sees Nicole Dewey.    Keep appointment 03/14/2024 with [facility] to help with glucose regulation.        9. Atherosclerosis of native coronary artery of native heart with angina pectoris  Comments:  Stable, followed by  Cardiology.  Overview:  BHAKTI mid LCx 2/26/2007      10. Stage 3a chronic kidney disease  Assessment & Plan:  Stable, continue monitoring.  Continue Farxiga 10 mg daily.           Provided Negro with a 5-10 year written screening schedule and personal prevention plan. Recommendations were developed using the USPSTF age appropriate recommendations. Education, counseling, and referrals were provided as needed. After Visit Summary printed and given to patient which includes a list of additional screenings\tests needed.    Follow up in about 1 year (around 2/21/2025).    Smiley P Brandon, FNP    Shingles vaccine - order sent to pharmacy,  Covid vaccine - declined, Influenza vaccine - declined, RSV vaccine - VIS (10/19/2023) given with instructions to take at pharmacy    Patient has an advanced directive and was advised to bring a copy to place on his chart.

## 2024-02-21 ENCOUNTER — OFFICE VISIT (OUTPATIENT)
Dept: FAMILY MEDICINE | Facility: CLINIC | Age: 84
End: 2024-02-21
Payer: MEDICARE

## 2024-02-21 VITALS
DIASTOLIC BLOOD PRESSURE: 60 MMHG | SYSTOLIC BLOOD PRESSURE: 110 MMHG | BODY MASS INDEX: 29.49 KG/M2 | RESPIRATION RATE: 18 BRPM | HEIGHT: 65 IN | WEIGHT: 177 LBS | TEMPERATURE: 98 F | OXYGEN SATURATION: 98 % | HEART RATE: 63 BPM

## 2024-02-21 DIAGNOSIS — E11.22 TYPE 2 DIABETES MELLITUS WITH STAGE 3A CHRONIC KIDNEY DISEASE, WITH LONG-TERM CURRENT USE OF INSULIN: Chronic | ICD-10-CM

## 2024-02-21 DIAGNOSIS — Z79.4 TYPE 2 DIABETES MELLITUS WITH HYPERGLYCEMIA, WITH LONG-TERM CURRENT USE OF INSULIN: Chronic | ICD-10-CM

## 2024-02-21 DIAGNOSIS — I25.119 ATHEROSCLEROSIS OF NATIVE CORONARY ARTERY OF NATIVE HEART WITH ANGINA PECTORIS: Chronic | ICD-10-CM

## 2024-02-21 DIAGNOSIS — Z79.4 TYPE 2 DIABETES MELLITUS WITH STAGE 3A CHRONIC KIDNEY DISEASE, WITH LONG-TERM CURRENT USE OF INSULIN: Chronic | ICD-10-CM

## 2024-02-21 DIAGNOSIS — E11.65 TYPE 2 DIABETES MELLITUS WITH HYPERGLYCEMIA, WITH LONG-TERM CURRENT USE OF INSULIN: Chronic | ICD-10-CM

## 2024-02-21 DIAGNOSIS — R26.9 ABNORMALITY OF GAIT AND MOBILITY: ICD-10-CM

## 2024-02-21 DIAGNOSIS — Z00.00 ENCOUNTER FOR SUBSEQUENT ANNUAL WELLNESS VISIT (AWV) IN MEDICARE PATIENT: Primary | ICD-10-CM

## 2024-02-21 DIAGNOSIS — N18.31 TYPE 2 DIABETES MELLITUS WITH STAGE 3A CHRONIC KIDNEY DISEASE, WITH LONG-TERM CURRENT USE OF INSULIN: Chronic | ICD-10-CM

## 2024-02-21 DIAGNOSIS — E78.00 HYPERCHOLESTEROLEMIA: Chronic | ICD-10-CM

## 2024-02-21 DIAGNOSIS — N18.31 STAGE 3A CHRONIC KIDNEY DISEASE: Chronic | ICD-10-CM

## 2024-02-21 DIAGNOSIS — Z74.09 OTHER REDUCED MOBILITY: ICD-10-CM

## 2024-02-21 DIAGNOSIS — Z23 NEED FOR VACCINATION: ICD-10-CM

## 2024-02-21 PROBLEM — I10 ESSENTIAL HYPERTENSION: Chronic | Status: RESOLVED | Noted: 2021-03-17 | Resolved: 2024-02-21

## 2024-02-21 PROCEDURE — G0439 PPPS, SUBSEQ VISIT: HCPCS | Mod: ,,, | Performed by: NURSE PRACTITIONER

## 2024-02-21 RX ORDER — ZOSTER VACCINE RECOMBINANT, ADJUVANTED 50 MCG/0.5
0.5 KIT INTRAMUSCULAR ONCE
Qty: 1 EACH | Refills: 0 | Status: SHIPPED | OUTPATIENT
Start: 2024-02-21 | End: 2024-02-21

## 2024-02-21 NOTE — PATIENT INSTRUCTIONS
Counseling and Referral of Other Preventative  (Italic type indicates deductible and co-insurance are waived)    Patient Name: Negro Hale  Today's Date: 2/21/2024    Health Maintenance       Date Due Completion Date    Shingles Vaccine (1 of 2) Never done ---    RSV Vaccine (Age 60+ and Pregnant patients) (1 - 1-dose 60+ series) Never done ---    Influenza Vaccine (1) Never done ---    COVID-19 Vaccine (2 - 2023-24 season) 09/01/2023 2/21/2021    Diabetes Urine Screening 05/08/2024 5/8/2023    Lipid Panel 05/08/2024 5/8/2023    Hemoglobin A1c 05/15/2024 2/15/2024    Eye Exam 08/25/2024 8/25/2023    Override on 6/24/2022: Done ([facility])    Override on 5/28/2020: Done ([facility]. Scanned into documents.)    TETANUS VACCINE 01/21/2034 1/21/2024        No orders of the defined types were placed in this encounter.      The following information is provided to all patients.  This information is to help you find resources for any of the problems found today that may be affecting your health:                  Living healthy guide: ms.gov    Understanding Diabetes: www.diabetes.org      Eating healthy: www.cdc.gov/healthyweight      CDC home safety checklist: www.cdc.gov/steadi/patient.html      Agency on Aging: ms.gov    Alcoholics anonymous (AA): www.aa.org      Physical Activity: www.dee dee.nih.gov/rp1rvoh      Tobacco use: ms.gov

## 2024-02-21 NOTE — ASSESSMENT & PLAN NOTE
Poorly controlled.   Lab Results   Component Value Date    HGBA1C 12.7 (H) 02/15/2024     Is taking Farxiga 10 mg daily and Lantus 50 units at bedtime.  Only checking glucose fasting daily with levels ranging  (84 71 113 84 64 78 70 72 72 58 77 72 68).  Does not have smart phone, David with reader was denied.    Advised to switch Lantus to a.m. dosing and stay at 50 units for now and begin checking glucose fasting and 2 hours after supper until he sees Nicole Dewey.    Keep appointment 03/14/2024 with Nicole Dewey NP [facility] to help with glucose regulation.

## 2024-02-21 NOTE — ASSESSMENT & PLAN NOTE
Latest Reference Range & Units 05/08/23 11:32   Cholesterol Total 0 - 200 mg/dL 169   HDL 40 - 60 mg/dL 48   CHOL/HDLC Ratio  3.5   LDL Calculated mg/dL 101   LDL/HDL Ratio  2.1   Non-HDL Cholesterol mg/dL 121   Triglycerides 35 - 150 mg/dL 102   VLDL Cholesterol Lorenzo mg/dL 20     Continue atorvastatin.

## 2024-02-21 NOTE — ASSESSMENT & PLAN NOTE
CKD stable, but T2DM not controlled with fluctuating glucose levels and A1c 12.7% with fasting hypoglycemia

## 2024-02-29 DIAGNOSIS — S22.068D OTHER CLOSED FRACTURE OF EIGHTH THORACIC VERTEBRA WITH ROUTINE HEALING, SUBSEQUENT ENCOUNTER: Primary | ICD-10-CM

## 2024-02-29 NOTE — PROGRESS NOTES
MDM/time:   Greater than 30 minutes spent on this encounter including 10 minutes reviewing imaging and notes, 15 minutes with the patient, 5 minutes documentation    ASSESSMENT:  83 y.o. male with thoracic spine fractures (T3/T4/T7/T8)    PLAN:  Continue conservative care.  Follow-up in 1 month with x-ray scoliosis series      HPI:  83 y.o. male here for repeat evaluation of multiple thoracic fractures that he sustained after falling backwards pulling on an ice chest.  Reports he has been doing well.  Denies any back pain.  Has been wearing the brace.  Denies any new injuries.  Patient has had multiple falls recently January 21, 2024 he was seen in the emergency room after tripping going up a step with a laceration to his forehead.  Patient denies difficulty with  strength.  No numbness tingling in his arms or legs.  Denies bladder bowel incontinence.  Some balance issues and recent falls as discussed above.  Currently takes no medications for pain.  No recent physical therapy.  No prior pain management.  Patient is not no prior spine surgeries in the past.  He is not a smoker.      IMAGING:  X-ray scoliosis series reviewed show:  On the AP there is normal coronal alignment of the thoracic spine and slight lumbar curvature to the right.  There are 5 non-rib-bearing lumbar vertebra.  On the lateral view there is decreased thoracic kyphosis and lumbar lordosis.  There is spondylotic disease of the thoracic and lumbar spine.  There is ankylosis of the thoracic and lumbar spine.  Fractures noted on prior CT not well visualized on these images.    Past Medical History:   Diagnosis Date    Abnormal thyroid stimulating hormone (TSH) level 08/22/2019    Anemia 08/15/2019    Atrial fibrillation 09/07/2012    Bradycardia 05/23/2017    asymptomatic    Change in bowel habit 11/13/2018    Chronic kidney disease, unspecified 01/14/2019    Coronary arteriosclerosis 09/07/2012    Disease of skin and subcutaneous tissue  08/16/2017    medial aspect RLE- cystic structure seen on venous doppler US.    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 Lewis Eye exam    Varicose veins of both lower extremities with pain 01/02/2019    Venous insufficiency 01/08/2019      Past Surgical History:   Procedure Laterality Date    APPENDECTOMY      CARDIAC CATHETERIZATION  2007    [facility]- Stent placement    CATARACT EXTRACTION, BILATERAL      COLONOSCOPY  11/06/2019    Dr. Mendoza    HERNIA REPAIR      Inguinal hernia repair    TONSILLECTOMY        Review of patient's allergies indicates:   Allergen Reactions    Mayonnaise      Upset stomach        Current Outpatient Medications:     cholecalciferol, vitamin D3, 125 mcg (5,000 unit) capsule, Take 5,000 Units by mouth once daily., Disp: , Rfl:     clopidogreL (PLAVIX) 75 mg tablet, Take 1 tablet (75 mg total) by mouth once daily., Disp: 90 tablet, Rfl:  "3    coenzyme Q10 200 mg capsule, Take 200 mg by mouth once daily., Disp: , Rfl:     cyanocobalamin (VITAMIN B-12) 1000 MCG tablet, Take 100 mcg by mouth once daily., Disp: , Rfl:     dapagliflozin propanediol (FARXIGA) 10 mg tablet, Take 1 tablet (10 mg total) by mouth once daily., Disp: 90 tablet, Rfl: 3    flash glucose scanning reader (FREESTYLE ADORE 2 READER) Misc, 1 each by Misc.(Non-Drug; Combo Route) route once. FreeStyle Adore reader for continuous glucose monitoring with uncontrolled T2DM with hyperglycemia and CKD stage 3. Having hypo and hyperglycemia. for 1 dose (Patient not taking: Reported on 2/21/2024), Disp: 1 each, Rfl: 0    flash glucose sensor (FREESTYLE ADORE 2 SENSOR) Kit, Apply FreeStyle Adore sensor to upper arm and change every 14 days or as needed if becomes dislodged.  For continuous glucose monitoring with uncontrolled T2DM with hyperglycemia and CKD stage 3. (Patient not taking: Reported on 2/21/2024), Disp: 6 kit, Rfl: 12    gabapentin (NEURONTIN) 100 MG capsule, Take 1 capsule (100 mg total) by mouth 2 (two) times daily., Disp: 60 capsule, Rfl: 0    insulin (LANTUS SOLOSTAR U-100 INSULIN) glargine 100 units/mL SubQ pen, Inject 60 Units into the skin every evening., Disp: 54 mL, Rfl: 3    nitroGLYCERIN (NITROSTAT) 0.4 MG SL tablet, Place 1 tablet (0.4 mg total) under the tongue every 5 (five) minutes as needed for Chest pain., Disp: 25 tablet, Rfl: 1    pantoprazole (PROTONIX) 40 MG tablet, Take 1 tablet (40 mg total) by mouth once daily., Disp: 90 tablet, Rfl: 3    rosuvastatin (CRESTOR) 40 MG Tab, Take 1 tablet (40 mg total) by mouth every evening., Disp: 90 tablet, Rfl: 3    SURE COMFORT PEN NEEDLE 32 gauge x 5/32" Ndle, AS DIRECTED, Disp: , Rfl:      EXAM:  Constitutional  General Appearance:  Healthy appearing, normal body habitus, NAD  Psychiatric   Orientation: Oriented to time, oriented to place, oriented to person  Mood and Affect: Active and alert, normal mood, normal " affect  Gait and Station   Appearance:  Ambulates with a shuffling gait, unable to tandem gait, unable to walk on toes, unable to walk on heels    Thoracic Spine   Inspection:  Kyphotic alignment, no overlying skin changes  Bony Palpation:  + tenderness of the spinous process  Soft Tissue Palpation:+ tenderness of the paraspinals left, no tenderness of the paraspinals right  Active Range of Motion:  extension decreased, flexion decreased    Motor Strength   L1 Right:  Hip flexion iliopsoas 5/5    L1 Left:  Hip flexion iliopsoas 5/5              L2-L4 Right:  Knee extension quadriceps 5/5, tibialis anterior 5/5              L2-L4 Left:  Knee extension quadriceps 5/5, tibialis anterior 5/5   L5 Right:  Extensor halluces longus 5/5,    L5 Left:  Extensor halluces longus 5/5,    S1 Right:  Plantar flexion gastrocnemius 5/5   S1 Left:  Plantar flexion gastrocnemius 5/5    Neurological System   Ankle Reflex Right:  normal   Ankle Reflex Left: normal   Knee Reflex Right:  normal   Knee Reflex Left:  normal   Sensation on the Right:  L2 normal, L3 normal, L4 normal, L5 normal, S1 normal   Sensation on the Left:  L2 normal, L3 normal, L4 normal, L5 normal, S1 normal  Special Test on the Right:  Seated straight leg raising test negative, no clonus of the ankle  Special Test on the Left:  Seated straight leg raising test negative, no clonus of the ankle    Skin   Lumbosacral Spine:  Normal skin    Cardiovascular System   Arterial Pulses Right:  Posterior tibialis normal, dorsalis pedis normal, popliteal normal   Arterial Pulses Left:  Posterior tibialis normal, dorsalis pedis normal, popliteal normal   Edema Right: None   Edema Left:  None

## 2024-03-01 ENCOUNTER — OFFICE VISIT (OUTPATIENT)
Dept: SPINE | Facility: CLINIC | Age: 84
End: 2024-03-01
Payer: MEDICARE

## 2024-03-01 ENCOUNTER — HOSPITAL ENCOUNTER (OUTPATIENT)
Dept: RADIOLOGY | Facility: HOSPITAL | Age: 84
Discharge: HOME | End: 2024-03-01
Attending: ORTHOPAEDIC SURGERY
Payer: MEDICARE

## 2024-03-01 DIAGNOSIS — S22.068D OTHER CLOSED FRACTURE OF EIGHTH THORACIC VERTEBRA WITH ROUTINE HEALING, SUBSEQUENT ENCOUNTER: Primary | ICD-10-CM

## 2024-03-01 DIAGNOSIS — S22.068D OTHER CLOSED FRACTURE OF EIGHTH THORACIC VERTEBRA WITH ROUTINE HEALING, SUBSEQUENT ENCOUNTER: ICD-10-CM

## 2024-03-01 PROCEDURE — 72082 X-RAY EXAM ENTIRE SPI 2/3 VW: CPT | Mod: 26,,, | Performed by: ORTHOPAEDIC SURGERY

## 2024-03-01 PROCEDURE — 72082 X-RAY EXAM ENTIRE SPI 2/3 VW: CPT | Mod: TC

## 2024-03-01 PROCEDURE — 99214 OFFICE O/P EST MOD 30 MIN: CPT | Mod: S$PBB,,, | Performed by: ORTHOPAEDIC SURGERY

## 2024-03-01 PROCEDURE — 99211 OFF/OP EST MAY X REQ PHY/QHP: CPT | Mod: PBBFAC,25 | Performed by: ORTHOPAEDIC SURGERY

## 2024-03-02 NOTE — PROGRESS NOTES
AP and lateral views scoliosis series reviewed    On the AP there is normal coronal alignment of the thoracic spine and slight lumbar curvature to the right.  There are 5 non-rib-bearing lumbar vertebra.  On the lateral view there is decreased thoracic kyphosis and lumbar lordosis.  There is spondylotic disease of the thoracic and lumbar spine.  There is ankylosis of the thoracic and lumbar spine.  Fractures noted on prior CT not well visualized on these images.      Impression:  Thoracolumbar spondylosis, ankylosis

## 2024-03-11 ENCOUNTER — DOCUMENT SCAN (OUTPATIENT)
Dept: HOME HEALTH SERVICES | Facility: HOSPITAL | Age: 84
End: 2024-03-11
Payer: MEDICARE

## 2024-03-14 ENCOUNTER — OFFICE VISIT (OUTPATIENT)
Dept: DIABETES SERVICES | Facility: CLINIC | Age: 84
End: 2024-03-14
Payer: MEDICARE

## 2024-03-14 VITALS
BODY MASS INDEX: 29.82 KG/M2 | DIASTOLIC BLOOD PRESSURE: 68 MMHG | RESPIRATION RATE: 14 BRPM | HEART RATE: 71 BPM | SYSTOLIC BLOOD PRESSURE: 120 MMHG | OXYGEN SATURATION: 95 % | HEIGHT: 65 IN | WEIGHT: 179 LBS

## 2024-03-14 DIAGNOSIS — E78.00 HYPERCHOLESTEROLEMIA: Chronic | ICD-10-CM

## 2024-03-14 DIAGNOSIS — E11.22 TYPE 2 DIABETES MELLITUS WITH STAGE 3A CHRONIC KIDNEY DISEASE, WITH LONG-TERM CURRENT USE OF INSULIN: Primary | Chronic | ICD-10-CM

## 2024-03-14 DIAGNOSIS — Z79.4 TYPE 2 DIABETES MELLITUS WITH STAGE 3A CHRONIC KIDNEY DISEASE, WITH LONG-TERM CURRENT USE OF INSULIN: Primary | Chronic | ICD-10-CM

## 2024-03-14 DIAGNOSIS — N18.31 TYPE 2 DIABETES MELLITUS WITH STAGE 3A CHRONIC KIDNEY DISEASE, WITH LONG-TERM CURRENT USE OF INSULIN: Primary | Chronic | ICD-10-CM

## 2024-03-14 DIAGNOSIS — I73.9 PERIPHERAL VASCULAR DISEASE: Chronic | ICD-10-CM

## 2024-03-14 DIAGNOSIS — I25.119 ATHEROSCLEROSIS OF NATIVE CORONARY ARTERY OF NATIVE HEART WITH ANGINA PECTORIS: ICD-10-CM

## 2024-03-14 DIAGNOSIS — N18.31 STAGE 3A CHRONIC KIDNEY DISEASE: Chronic | ICD-10-CM

## 2024-03-14 LAB — GLUCOSE SERPL-MCNC: 187 MG/DL (ref 70–110)

## 2024-03-14 PROCEDURE — 82962 GLUCOSE BLOOD TEST: CPT | Mod: PBBFAC | Performed by: NURSE PRACTITIONER

## 2024-03-14 PROCEDURE — 99215 OFFICE O/P EST HI 40 MIN: CPT | Mod: PBBFAC | Performed by: NURSE PRACTITIONER

## 2024-03-14 PROCEDURE — 99214 OFFICE O/P EST MOD 30 MIN: CPT | Mod: S$PBB,ICN,, | Performed by: NURSE PRACTITIONER

## 2024-03-14 PROCEDURE — 99999PBSHW POCT GLUCOSE, HAND-HELD DEVICE: Mod: PBBFAC,,,

## 2024-03-14 RX ORDER — GLIPIZIDE 5 MG/1
5 TABLET, FILM COATED, EXTENDED RELEASE ORAL
Qty: 90 TABLET | Refills: 3 | Status: SHIPPED | OUTPATIENT
Start: 2024-03-14 | End: 2024-03-20

## 2024-03-14 NOTE — PROGRESS NOTES
PCP: Smiley Trevino FNP    Subjective:     Chief Complaint: Diabetes Consult    HISTORY OF PRESENT ILLNESS: 83 y.o.   male presenting for diabetes consult.   Patient is here today to establish for Type 2.    Pertinent to decision making is the following comorbidities: HTN, HLD, CAD, and CKD III    He  is to be enrolled in diabetes education classes and has attended diabetes education in the past.     Patient states since His last A1c His blood glucose levels have been both high and low in the morning / fasting.   Patient monitors blood glucose 1 times per day.  Log of am glucose readings will be scanned into documents.  Range from 60-90s.  We will need to decrease basal insulin.      Patient endorses the following diabetes related symptoms: Urinary Frequency.  Hypoglycemia: Yes          CURRENT DM MEDICATIONS:   lantus    Patient has failed the following Diabetes medications:   none       Labs Reviewed.     Review of Systems   Constitutional:  Negative for activity change, appetite change, diaphoresis and fatigue.   HENT:  Negative for nasal congestion, facial swelling and sinus pressure/congestion.    Eyes:  Negative for visual disturbance.   Respiratory:  Negative for shortness of breath and wheezing.    Cardiovascular:  Negative for chest pain and leg swelling.   Gastrointestinal:  Negative for constipation, diarrhea, nausea and vomiting.   Endocrine: Negative for polydipsia, polyphagia and polyuria.   Genitourinary:  Negative for dysuria, frequency and urgency.   Musculoskeletal:  Negative for gait problem and myalgias.   Integumentary:  Negative for color change, rash and wound.   Neurological:  Negative for dizziness, syncope, weakness, headaches and coordination difficulties.   Hematological:  Does not bruise/bleed easily.   Psychiatric/Behavioral:  Negative for self-injury, sleep disturbance and suicidal ideas. The patient is not nervous/anxious.           Diabetes Management Status  Statin:  Taking  ACE/ARB: Not taking    Screening or Prevention Patient's value Goal    HgA1C Testing and Control   Hemoglobin A1C   Date Value Ref Range Status   02/15/2024 12.7 (H) 4.5 - 6.6 % Final     Comment:       Normal:               <5.7%  Pre-Diabetic:       5.7% to 6.4%  Diabetic:             >6.4%  Diabetic Goal:     <7%   11/14/2023 8.5 (H) 4.5 - 6.6 % Final     Comment:       Normal:               <5.7%  Pre-Diabetic:       5.7% to 6.4%  Diabetic:             >6.4%  Diabetic Goal:     <7%   08/10/2023 9.6 (H) 4.5 - 6.6 % Final     Comment:       Normal:               <5.7%  Pre-Diabetic:       5.7% to 6.4%  Diabetic:             >6.4%  Diabetic Goal:     <7%       Annually/Less than 8%    Lipid profile Lab Results   Component Value Date    CHOL 169 05/08/2023    CHOL 127 06/07/2022    CHOL 156 04/04/2022     Lab Results   Component Value Date    HDL 48 05/08/2023    HDL 45 06/07/2022    HDL 42 04/04/2022     Lab Results   Component Value Date    LDLCALC 101 05/08/2023    LDLCALC 69 06/07/2022    LDLCALC 97 04/04/2022     Lab Results   Component Value Date    TRIG 102 05/08/2023    TRIG 67 06/07/2022    TRIG 83 04/04/2022       Lab Results   Component Value Date    CHOLHDL 3.5 05/08/2023    CHOLHDL 2.8 06/07/2022    CHOLHDL 3.7 04/04/2022      Annually    CMP CMP  Sodium   Date Value Ref Range Status   02/04/2024 136 136 - 145 mmol/L Final     Potassium   Date Value Ref Range Status   02/04/2024 4.2 3.5 - 5.1 mmol/L Final     Chloride   Date Value Ref Range Status   02/04/2024 102 98 - 107 mmol/L Final     CO2   Date Value Ref Range Status   02/04/2024 29 21 - 32 mmol/L Final     Glucose   Date Value Ref Range Status   02/04/2024 165 (H) 74 - 106 mg/dL Final     BUN   Date Value Ref Range Status   02/04/2024 38 (H) 7 - 18 mg/dL Final     Creatinine   Date Value Ref Range Status   02/04/2024 1.34 (H) 0.70 - 1.30 mg/dL Final     Calcium   Date Value Ref Range Status   02/04/2024 9.0 8.5 - 10.1 mg/dL Final  "    Total Protein   Date Value Ref Range Status   02/04/2024 6.2 (L) 6.4 - 8.2 g/dL Final     Albumin   Date Value Ref Range Status   02/04/2024 2.8 (L) 3.5 - 5.0 g/dL Final     Bilirubin, Total   Date Value Ref Range Status   02/04/2024 0.3 >0.0 - 1.2 mg/dL Final     Alk Phos   Date Value Ref Range Status   02/04/2024 89 45 - 115 U/L Final     AST   Date Value Ref Range Status   02/04/2024 17 15 - 37 U/L Final     ALT   Date Value Ref Range Status   02/04/2024 28 16 - 61 U/L Final     Anion Gap   Date Value Ref Range Status   02/04/2024 9 7 - 16 mmol/L Final     eGFR   Date Value Ref Range Status   02/04/2024 52 (L) >=60 mL/min/1.73m2 Final     Annually    Microalbumin  Lab Results   Component Value Date    MICROALBUR 3.2 (H) 05/08/2023     Annually     LDL control Lab Results   Component Value Date    LDLCALC 101 05/08/2023    Annually/Less than 100 mg/dl     Nephropathy screening No results found for: "MICALBCREAT"  Lab Results   Component Value Date    PROTEINUA Negative 02/01/2024    Annually    Blood pressure BP Readings from Last 1 Encounters:   03/14/24 120/68    Less than 140/90    Dilated retinal exam : 08/25/2023 Annually    Foot exam   UTD, today Annually      Social History     Socioeconomic History    Marital status:    Tobacco Use    Smoking status: Former     Passive exposure: Past    Smokeless tobacco: Never    Tobacco comments:     Quit 10/15/1976   Substance and Sexual Activity    Alcohol use: Not Currently     Comment: occasionally    Drug use: Never    Sexual activity: Not Currently     Social Determinants of Health     Financial Resource Strain: Low Risk  (2/21/2024)    Overall Financial Resource Strain (CARDIA)     Difficulty of Paying Living Expenses: Not hard at all   Food Insecurity: No Food Insecurity (2/21/2024)    Hunger Vital Sign     Worried About Running Out of Food in the Last Year: Never true     Ran Out of Food in the Last Year: Never true   Transportation Needs: No " Transportation Needs (2/21/2024)    PRAPARE - Transportation     Lack of Transportation (Medical): No     Lack of Transportation (Non-Medical): No   Physical Activity: Insufficiently Active (2/21/2024)    Exercise Vital Sign     Days of Exercise per Week: 7 days     Minutes of Exercise per Session: 10 min   Stress: No Stress Concern Present (2/21/2024)    [employer] [place] of Occupational Health - Occupational Stress Questionnaire     Feeling of Stress : Not at all   Social Connections: Socially Integrated (2/21/2024)    Social Connection and Isolation Panel [NHANES]     Frequency of Communication with Friends and Family: More than three times a week     Frequency of Social Gatherings with Friends and Family: More than three times a week     Attends Sikh Services: More than 4 times per year     Active Member of Clubs or Organizations: Yes     Attends Club or Organization Meetings: More than 4 times per year     Marital Status:    Housing Stability: Low Risk  (2/21/2024)    Housing Stability Vital Sign     Unable to Pay for Housing in the Last Year: No     Number of Places Lived in the Last Year: 1     Unstable Housing in the Last Year: No     Past Medical History:   Diagnosis Date    Abnormal thyroid stimulating hormone (TSH) level 08/22/2019    Anemia 08/15/2019    Atrial fibrillation 09/07/2012    Bradycardia 05/23/2017    asymptomatic    Change in bowel habit 11/13/2018    Chronic kidney disease, unspecified 01/14/2019    Coronary arteriosclerosis 09/07/2012    Disease of skin and subcutaneous tissue 08/16/2017    medial aspect RLE- cystic structure seen on venous doppler US.    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    Hyperlipidemia  09/21/2012    Lower extremity edema 04/06/2017    Lumbar radiculopathy 01/26/2017    Obesity, unspecified 12/05/2016    Old myocardial infarction 12/06/2017    Other secondary pulmonary hypertension 05/23/2017    Other spondylosis, lumbosacral region 01/26/2017    Peripheral vascular disease     Personal history of tobacco use, presenting hazards to health 11/17/2016    Pulmonary hypertension     Type 2 diabetes mellitus with chronic kidney disease 01/14/2019    Type 2 diabetes mellitus with hyperglycemia 10/02/2011    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 Lewis Eye exam    Varicose veins of both lower extremities with pain 01/02/2019    Venous insufficiency 01/08/2019       Objective:      Physical Exam  Vitals and nursing note reviewed.   Constitutional:       Appearance: He is obese.      Comments: Uses can for ambulation assistance   HENT:      Head: Normocephalic.   Neck:      Thyroid: No thyromegaly.      Vascular: No carotid bruit.   Cardiovascular:      Rate and Rhythm: Normal rate and regular rhythm.      Pulses:           Dorsalis pedis pulses are 2+ on the right side and 2+ on the left side.        Posterior tibial pulses are 2+ on the right side and 2+ on the left side.      Heart sounds: Normal heart sounds.   Pulmonary:      Effort: Pulmonary effort is normal.   Musculoskeletal:         General: Normal range of motion.      Right lower leg: Edema present.      Left lower leg: Edema present.      Right foot: Normal range of motion. Deformity present.      Left foot: Normal range of motion. Deformity present.   Feet:      Right foot:      Protective Sensation: 6 sites tested.  6 sites sensed.      Skin integrity: Callus present.      Toenail Condition: Fungal disease present.     Left  foot:      Protective Sensation: 6 sites tested.  6 sites sensed.      Skin integrity: Callus present.      Toenail Condition: Fungal disease present.  Skin:     General: Skin is warm and dry.      Findings: Erythema present.      Comments: Right lower leg noted with erythema, healing areas of skin where there were previous blisters per pt.  Not suspicious for active infection. States that this is chronic in nature and has been present for over a year now.     Neurological:      General: No focal deficit present.      Mental Status: He is alert and oriented to person, place, and time.   Psychiatric:         Mood and Affect: Mood normal.         Behavior: Behavior normal.         Thought Content: Thought content normal.         Judgment: Judgment normal.           Assessment / Plan:     1. Type 2 diabetes mellitus with stage 3a chronic kidney disease, with long-term current use of insulin  Decrease lantus 45 units.  Continue to decrease by 2 units every 2-3 days as long as you morning readings stay less than 130s.     We will send an order in to a mail order pharmacy for Chevia 3 and when you get the supplies, call [phone] and we will help you put the first sensor on during a nurse visit.     Start glipizide 5 mg daily  Continue farxiga 10 mg once daily     -     POCT Glucose, Hand-Held Device  -     Ambulatory referral/consult to Diabetic Advanced Practice Providers (Medical Management)  -     glipiZIDE 5 MG TR24; Take 1 tablet (5 mg total) by mouth daily with breakfast.  Dispense: 90 tablet; Refill: 3    2. Hypercholesterolemia  Crestro 40mg daily     3. Stage 3a chronic kidney disease  Avoid nephrotoxins, SGLT-2, not on ACE/ARB  low normal b/p will monitor.     4. Peripheral vascular disease      5. Atherosclerosis of native coronary artery of native heart with angina pectoris    Overview:  BHAKTI GROVESx 2/26/2007              Additional Plan Details:    - Encouraged continuation of lifestyle changes including  regular exercise and limiting carbohydrates to 30-45 grams per meal threes times daily and 15 grams per snack with a limit of two daily.      Nicole Dewey FNP-BC ADM-BC  [facility]    A total of 24 minutes was spent in face to face time, of which over 50 % was spent in counseling patient on disease process, complications, treatment, and side effects of medications.

## 2024-03-14 NOTE — PATIENT INSTRUCTIONS
Decrease lantus 45 units.  Continue to decrease by 2 units every 2-3 days as long as you morning readings stay less than 130s.     We will send an order in to a mail order pharmacy for diana 3 and when you get the supplies, call [phone] and we will help you put the first sensor on during a nurse visit.     Start glipizide 5 mg daily

## 2024-03-18 DIAGNOSIS — R07.9 CHEST PAIN, UNSPECIFIED TYPE: ICD-10-CM

## 2024-03-18 RX ORDER — NITROGLYCERIN 0.4 MG/1
0.4 TABLET SUBLINGUAL EVERY 5 MIN PRN
Qty: 25 TABLET | Refills: 1 | Status: SHIPPED | OUTPATIENT
Start: 2024-03-18 | End: 2025-03-18

## 2024-03-18 NOTE — TELEPHONE ENCOUNTER
----- Message from Damian Johnson sent at 3/18/2024  1:48 PM CDT -----  [facility]`

## 2024-03-20 DIAGNOSIS — N18.31 TYPE 2 DIABETES MELLITUS WITH STAGE 3A CHRONIC KIDNEY DISEASE, WITH LONG-TERM CURRENT USE OF INSULIN: Chronic | ICD-10-CM

## 2024-03-20 DIAGNOSIS — E11.22 TYPE 2 DIABETES MELLITUS WITH STAGE 3A CHRONIC KIDNEY DISEASE, WITH LONG-TERM CURRENT USE OF INSULIN: Chronic | ICD-10-CM

## 2024-03-20 DIAGNOSIS — Z79.4 TYPE 2 DIABETES MELLITUS WITH STAGE 3A CHRONIC KIDNEY DISEASE, WITH LONG-TERM CURRENT USE OF INSULIN: Chronic | ICD-10-CM

## 2024-03-20 RX ORDER — METOPROLOL SUCCINATE 50 MG/1
50 TABLET, EXTENDED RELEASE ORAL
Qty: 90 TABLET | Refills: 3 | OUTPATIENT
Start: 2024-03-20

## 2024-03-20 RX ORDER — GLIPIZIDE 5 MG/1
5 TABLET, FILM COATED, EXTENDED RELEASE ORAL
Qty: 90 TABLET | Refills: 1 | Status: SHIPPED | OUTPATIENT
Start: 2024-03-20 | End: 2024-05-29 | Stop reason: SDUPTHER

## 2024-03-20 NOTE — TELEPHONE ENCOUNTER
Received refill request for metoprolol, but this medication was discontinued during his hospitalization 2/1/24   none

## 2024-03-25 ENCOUNTER — DOCUMENT SCAN (OUTPATIENT)
Dept: HOME HEALTH SERVICES | Facility: HOSPITAL | Age: 84
End: 2024-03-25
Payer: MEDICARE

## 2024-03-28 DIAGNOSIS — S22.068D OTHER CLOSED FRACTURE OF EIGHTH THORACIC VERTEBRA WITH ROUTINE HEALING, SUBSEQUENT ENCOUNTER: Primary | ICD-10-CM

## 2024-04-01 ENCOUNTER — HOSPITAL ENCOUNTER (OUTPATIENT)
Dept: RADIOLOGY | Facility: HOSPITAL | Age: 84
Discharge: HOME | End: 2024-04-01
Attending: ORTHOPAEDIC SURGERY
Payer: MEDICARE

## 2024-04-01 ENCOUNTER — OFFICE VISIT (OUTPATIENT)
Dept: SPINE | Facility: CLINIC | Age: 84
End: 2024-04-01
Payer: MEDICARE

## 2024-04-01 DIAGNOSIS — S22.068D OTHER CLOSED FRACTURE OF EIGHTH THORACIC VERTEBRA WITH ROUTINE HEALING, SUBSEQUENT ENCOUNTER: Primary | ICD-10-CM

## 2024-04-01 DIAGNOSIS — S22.068D OTHER CLOSED FRACTURE OF EIGHTH THORACIC VERTEBRA WITH ROUTINE HEALING, SUBSEQUENT ENCOUNTER: ICD-10-CM

## 2024-04-01 PROCEDURE — 72082 X-RAY EXAM ENTIRE SPI 2/3 VW: CPT | Mod: TC

## 2024-04-01 PROCEDURE — 99214 OFFICE O/P EST MOD 30 MIN: CPT | Mod: S$PBB,,, | Performed by: ORTHOPAEDIC SURGERY

## 2024-04-01 PROCEDURE — 99211 OFF/OP EST MAY X REQ PHY/QHP: CPT | Mod: PBBFAC,25 | Performed by: ORTHOPAEDIC SURGERY

## 2024-04-01 PROCEDURE — 72082 X-RAY EXAM ENTIRE SPI 2/3 VW: CPT | Mod: 26,,, | Performed by: ORTHOPAEDIC SURGERY

## 2024-04-04 NOTE — PROGRESS NOTES
MDM/time:   Greater than 30 minutes spent on this encounter including 10 minutes reviewing imaging and notes, 15 minutes with the patient, 5 minutes documentation    ASSESSMENT:  83 y.o. male with thoracic spine fractures (T3/T4/T7/T8)    PLAN:  Okay to discontinue the brace.  Follow-up in 3 months with x-ray scoliosis series      HPI:  83 y.o. male here for repeat evaluation of multiple thoracic fractures that he sustained after falling backwards pulling on an ice chest.  Reports he has been doing well.  Denies any back pain.  Has been wearing the brace.  Denies any new injuries.  Patient has had multiple falls recently January 21, 2024 he was seen in the emergency room after tripping going up a step with a laceration to his forehead.  Patient denies difficulty with  strength.  No numbness tingling in his arms or legs.  Denies bladder bowel incontinence.  Some balance issues and recent falls as discussed above.  Currently takes no medications for pain.  No recent physical therapy.  No prior pain management.  Patient is not no prior spine surgeries in the past.  He is not a smoker.      IMAGING:  X-ray scoliosis series reviewed show:  On the AP there is normal coronal alignment of the thoracic spine and slight lumbar curvature to the right.  There are 5 non-rib-bearing lumbar vertebra.  On the lateral view there is decreased thoracic kyphosis and lumbar lordosis.  There is spondylotic disease of the thoracic and lumbar spine.  There is ankylosis of the thoracic and lumbar spine.  Fractures noted on prior CT not well visualized on these images.    Past Medical History:   Diagnosis Date    Abnormal thyroid stimulating hormone (TSH) level 08/22/2019    Anemia 08/15/2019    Atrial fibrillation 09/07/2012    Bradycardia 05/23/2017    asymptomatic    Change in bowel habit 11/13/2018    Chronic kidney disease, unspecified 01/14/2019    Coronary arteriosclerosis 09/07/2012    Disease of skin and subcutaneous tissue  08/16/2017    medial aspect RLE- cystic structure seen on venous doppler US.    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 Lewis Eye exam    Varicose veins of both lower extremities with pain 01/02/2019    Venous insufficiency 01/08/2019      Past Surgical History:   Procedure Laterality Date    APPENDECTOMY      CARDIAC CATHETERIZATION  2007    [facility]- Stent placement    CATARACT EXTRACTION, BILATERAL      COLONOSCOPY  11/06/2019    Dr. Mendoza    HERNIA REPAIR      Inguinal hernia repair    TONSILLECTOMY        Review of patient's allergies indicates:   Allergen Reactions    Mayonnaise      Upset stomach        Current Outpatient Medications:     cholecalciferol, vitamin D3, 125 mcg (5,000 unit) capsule, Take 5,000 Units by mouth once daily., Disp: , Rfl:     clopidogreL (PLAVIX) 75 mg tablet, Take 1 tablet (75 mg total) by mouth once daily., Disp: 90 tablet, Rfl:  "3    coenzyme Q10 200 mg capsule, Take 200 mg by mouth once daily., Disp: , Rfl:     cyanocobalamin (VITAMIN B-12) 1000 MCG tablet, Take 100 mcg by mouth once daily., Disp: , Rfl:     dapagliflozin propanediol (FARXIGA) 10 mg tablet, Take 1 tablet (10 mg total) by mouth once daily., Disp: 90 tablet, Rfl: 3    flash glucose scanning reader (FREESTYLE ADORE 2 READER) Misc, 1 each by Misc.(Non-Drug; Combo Route) route once. FreeStyle Adore reader for continuous glucose monitoring with uncontrolled T2DM with hyperglycemia and CKD stage 3. Having hypo and hyperglycemia. for 1 dose (Patient not taking: Reported on 2/21/2024), Disp: 1 each, Rfl: 0    flash glucose sensor (FREESTYLE ADORE 2 SENSOR) Kit, Apply FreeStyle Adore sensor to upper arm and change every 14 days or as needed if becomes dislodged.  For continuous glucose monitoring with uncontrolled T2DM with hyperglycemia and CKD stage 3. (Patient not taking: Reported on 2/21/2024), Disp: 6 kit, Rfl: 12    gabapentin (NEURONTIN) 100 MG capsule, Take 1 capsule (100 mg total) by mouth 2 (two) times daily., Disp: 60 capsule, Rfl: 0    glipiZIDE 5 MG TR24, Take 1 tablet (5 mg total) by mouth daily with breakfast., Disp: 90 tablet, Rfl: 1    insulin (LANTUS SOLOSTAR U-100 INSULIN) glargine 100 units/mL SubQ pen, Inject 60 Units into the skin every evening., Disp: 54 mL, Rfl: 3    nitroGLYCERIN (NITROSTAT) 0.4 MG SL tablet, Place 1 tablet (0.4 mg total) under the tongue every 5 (five) minutes as needed for Chest pain., Disp: 25 tablet, Rfl: 1    pantoprazole (PROTONIX) 40 MG tablet, Take 1 tablet (40 mg total) by mouth once daily., Disp: 90 tablet, Rfl: 3    rosuvastatin (CRESTOR) 40 MG Tab, Take 1 tablet (40 mg total) by mouth every evening., Disp: 90 tablet, Rfl: 3    SURE COMFORT PEN NEEDLE 32 gauge x 5/32" Ndle, AS DIRECTED, Disp: , Rfl:      EXAM:  Constitutional  General Appearance:  Healthy appearing, normal body habitus, NAD  Psychiatric   Orientation: Oriented to " time, oriented to place, oriented to person  Mood and Affect: Active and alert, normal mood, normal affect  Gait and Station   Appearance:  Ambulates with a shuffling gait, unable to tandem gait, unable to walk on toes, unable to walk on heels    Thoracic Spine   Inspection:  Kyphotic alignment, no overlying skin changes  Bony Palpation:  + tenderness of the spinous process  Soft Tissue Palpation:+ tenderness of the paraspinals left, no tenderness of the paraspinals right  Active Range of Motion:  extension decreased, flexion decreased    Motor Strength   L1 Right:  Hip flexion iliopsoas 5/5    L1 Left:  Hip flexion iliopsoas 5/5              L2-L4 Right:  Knee extension quadriceps 5/5, tibialis anterior 5/5              L2-L4 Left:  Knee extension quadriceps 5/5, tibialis anterior 5/5   L5 Right:  Extensor halluces longus 5/5,    L5 Left:  Extensor halluces longus 5/5,    S1 Right:  Plantar flexion gastrocnemius 5/5   S1 Left:  Plantar flexion gastrocnemius 5/5    Neurological System   Ankle Reflex Right:  normal   Ankle Reflex Left: normal   Knee Reflex Right:  normal   Knee Reflex Left:  normal   Sensation on the Right:  L2 normal, L3 normal, L4 normal, L5 normal, S1 normal   Sensation on the Left:  L2 normal, L3 normal, L4 normal, L5 normal, S1 normal  Special Test on the Right:  Seated straight leg raising test negative, no clonus of the ankle  Special Test on the Left:  Seated straight leg raising test negative, no clonus of the ankle    Skin   Lumbosacral Spine:  Normal skin    Cardiovascular System   Arterial Pulses Right:  Posterior tibialis normal, dorsalis pedis normal, popliteal normal   Arterial Pulses Left:  Posterior tibialis normal, dorsalis pedis normal, popliteal normal   Edema Right: None   Edema Left:  None

## 2024-05-21 ENCOUNTER — OFFICE VISIT (OUTPATIENT)
Dept: FAMILY MEDICINE | Facility: CLINIC | Age: 84
End: 2024-05-21
Payer: MEDICARE

## 2024-05-21 VITALS
OXYGEN SATURATION: 94 % | BODY MASS INDEX: 28.99 KG/M2 | HEART RATE: 108 BPM | DIASTOLIC BLOOD PRESSURE: 63 MMHG | RESPIRATION RATE: 20 BRPM | SYSTOLIC BLOOD PRESSURE: 127 MMHG | WEIGHT: 174 LBS | TEMPERATURE: 99 F | HEIGHT: 65 IN

## 2024-05-21 DIAGNOSIS — N18.31 STAGE 3A CHRONIC KIDNEY DISEASE: Chronic | ICD-10-CM

## 2024-05-21 DIAGNOSIS — Z79.899 ENCOUNTER FOR LONG-TERM (CURRENT) USE OF OTHER MEDICATIONS: ICD-10-CM

## 2024-05-21 DIAGNOSIS — E78.00 HYPERCHOLESTEROLEMIA: Chronic | ICD-10-CM

## 2024-05-21 DIAGNOSIS — E11.65 TYPE 2 DIABETES MELLITUS WITH HYPERGLYCEMIA, WITH LONG-TERM CURRENT USE OF INSULIN: Primary | Chronic | ICD-10-CM

## 2024-05-21 DIAGNOSIS — Z79.4 TYPE 2 DIABETES MELLITUS WITH HYPERGLYCEMIA, WITH LONG-TERM CURRENT USE OF INSULIN: Primary | Chronic | ICD-10-CM

## 2024-05-21 LAB
ALBUMIN SERPL BCP-MCNC: 3.8 G/DL (ref 3.5–5)
ALBUMIN/GLOB SERPL: 1 {RATIO}
ALP SERPL-CCNC: 80 U/L (ref 45–115)
ALT SERPL W P-5'-P-CCNC: 29 U/L (ref 16–61)
ANION GAP SERPL CALCULATED.3IONS-SCNC: 9 MMOL/L (ref 7–16)
AST SERPL W P-5'-P-CCNC: 32 U/L (ref 15–37)
BASOPHILS # BLD AUTO: 0.07 K/UL (ref 0–0.2)
BASOPHILS NFR BLD AUTO: 0.8 % (ref 0–1)
BILIRUB SERPL-MCNC: 0.4 MG/DL (ref ?–1.2)
BUN SERPL-MCNC: 33 MG/DL (ref 7–18)
BUN/CREAT SERPL: 26 (ref 6–20)
CALCIUM SERPL-MCNC: 9.1 MG/DL (ref 8.5–10.1)
CHLORIDE SERPL-SCNC: 108 MMOL/L (ref 98–107)
CHOLEST SERPL-MCNC: 166 MG/DL (ref 0–200)
CHOLEST/HDLC SERPL: 3.3 {RATIO}
CO2 SERPL-SCNC: 26 MMOL/L (ref 21–32)
CREAT SERPL-MCNC: 1.27 MG/DL (ref 0.7–1.3)
DIFFERENTIAL METHOD BLD: ABNORMAL
EGFR (NO RACE VARIABLE) (RUSH/TITUS): 56 ML/MIN/1.73M2
EOSINOPHIL # BLD AUTO: 0.23 K/UL (ref 0–0.5)
EOSINOPHIL NFR BLD AUTO: 2.6 % (ref 1–4)
ERYTHROCYTE [DISTWIDTH] IN BLOOD BY AUTOMATED COUNT: 14.4 % (ref 11.5–14.5)
EST. AVERAGE GLUCOSE BLD GHB EST-MCNC: 140 MG/DL
GLOBULIN SER-MCNC: 3.7 G/DL (ref 2–4)
GLUCOSE SERPL-MCNC: 101 MG/DL (ref 74–106)
HBA1C MFR BLD HPLC: 6.5 % (ref 4.5–6.6)
HCT VFR BLD AUTO: 46.1 % (ref 40–54)
HDLC SERPL-MCNC: 50 MG/DL (ref 40–60)
HGB BLD-MCNC: 14.2 G/DL (ref 13.5–18)
IMM GRANULOCYTES # BLD AUTO: 0.04 K/UL (ref 0–0.04)
IMM GRANULOCYTES NFR BLD: 0.5 % (ref 0–0.4)
LDLC SERPL CALC-MCNC: 99 MG/DL
LDLC/HDLC SERPL: 2 {RATIO}
LYMPHOCYTES # BLD AUTO: 1.92 K/UL (ref 1–4.8)
LYMPHOCYTES NFR BLD AUTO: 21.8 % (ref 27–41)
MCH RBC QN AUTO: 29.2 PG (ref 27–31)
MCHC RBC AUTO-ENTMCNC: 30.8 G/DL (ref 32–36)
MCV RBC AUTO: 94.9 FL (ref 80–96)
MONOCYTES # BLD AUTO: 0.65 K/UL (ref 0–0.8)
MONOCYTES NFR BLD AUTO: 7.4 % (ref 2–6)
MPC BLD CALC-MCNC: 11.1 FL (ref 9.4–12.4)
NEUTROPHILS # BLD AUTO: 5.89 K/UL (ref 1.8–7.7)
NEUTROPHILS NFR BLD AUTO: 66.9 % (ref 53–65)
NONHDLC SERPL-MCNC: 116 MG/DL
NRBC # BLD AUTO: 0 X10E3/UL
NRBC, AUTO (.00): 0 %
PLATELET # BLD AUTO: 289 K/UL (ref 150–400)
POTASSIUM SERPL-SCNC: 5.8 MMOL/L (ref 3.5–5.1)
PROT SERPL-MCNC: 7.5 G/DL (ref 6.4–8.2)
RBC # BLD AUTO: 4.86 M/UL (ref 4.6–6.2)
SODIUM SERPL-SCNC: 137 MMOL/L (ref 136–145)
TRIGL SERPL-MCNC: 83 MG/DL (ref 35–150)
TSH SERPL DL<=0.005 MIU/L-ACNC: 2.78 UIU/ML (ref 0.36–3.74)
VLDLC SERPL-MCNC: 17 MG/DL
WBC # BLD AUTO: 8.8 K/UL (ref 4.5–11)

## 2024-05-21 PROCEDURE — 99214 OFFICE O/P EST MOD 30 MIN: CPT | Mod: ,,, | Performed by: NURSE PRACTITIONER

## 2024-05-21 PROCEDURE — 85025 COMPLETE CBC W/AUTO DIFF WBC: CPT | Mod: ,,, | Performed by: CLINICAL MEDICAL LABORATORY

## 2024-05-21 PROCEDURE — 80053 COMPREHEN METABOLIC PANEL: CPT | Mod: ,,, | Performed by: CLINICAL MEDICAL LABORATORY

## 2024-05-21 PROCEDURE — 84443 ASSAY THYROID STIM HORMONE: CPT | Mod: ,,, | Performed by: CLINICAL MEDICAL LABORATORY

## 2024-05-21 PROCEDURE — 83036 HEMOGLOBIN GLYCOSYLATED A1C: CPT | Mod: ,,, | Performed by: CLINICAL MEDICAL LABORATORY

## 2024-05-21 PROCEDURE — 80061 LIPID PANEL: CPT | Mod: ,,, | Performed by: CLINICAL MEDICAL LABORATORY

## 2024-05-21 RX ORDER — BLOOD-GLUCOSE SENSOR
1 EACH MISCELLANEOUS ONCE
COMMUNITY

## 2024-05-21 NOTE — ASSESSMENT & PLAN NOTE
Poorly controlled.   Lab Results   Component Value Date    HGBA1C 12.7 (H) 02/15/2024     Had initial consult with Nicole Dewey NP for DM management 3/14/24.  David 3 ordered but having difficulty getting sensors - glucose ranging 60s-250s.   She decreased Lantus to 45 units daily, started glipizide 5 mg daily and continued Farxiga 10 mg daily.    She has left [facility] so will schedule a f/u visit at her new clinic and send her lab results from today.

## 2024-05-21 NOTE — PATIENT INSTRUCTIONS
Patient Education       Preventing Falls   The Basics   Written by the doctors and editors at [facility]   Am I at risk of falling? -- Your risk of falling increases as you grow older. That's because getting older can make it harder to walk steadily and keep your balance. Also, the effects of falls are more serious in older people.  Overall, 3 to 4 out of every 10 people over the age of 65 fall each year. Up to 75 percent of people who fracture a hip never recover to the point they were before they had their fracture. If you have fallen in the past, you are at higher risk of falling again.  Several things can increase your risk of a fall, including:  Illness  A change in the medicines you take  An unsafe or unfamiliar setting (for example, a room with rugs or furniture that might trip you, or an area you don't know well)  How can my doctor help me to avoid falling? -- Your doctor can talk to you about the following things:  Past falls - It is important to tell your doctor about any times you have fallen or almost fallen. He or she can then suggest ways to prevent another fall.  Your health conditions - Some health problems can put you at risk of falling. These include conditions that affect eyesight, hearing, muscle strength, or balance.  The medicines you take - Certain medicines can increase the risk of falling. These include some medicines that are used for sleeping problems, anxiety, high blood pressure, or depression. Adding new medicines, or changing doses of some medicines, can also affect your risk of falling.  The more your doctor knows about your situation, the better he or she will be able to help you. For example, if you fell because you have a condition that causes pain, your doctor might suggest treatments to deal with the pain. Or if one of your medicines is making you dizzy and more likely to fall, your doctor might switch you to a different medicine.  Is there anything I can do on my own? -- Yes. To  help keep from falling, you can:  Make your home safer - To avoid falling at home, get rid of things that might make you trip or slip. This might include furniture, electrical cords, clutter, and loose rugs (figure 1). Keep your home well-lit so that you can easily see where you are going. Avoid storing things in high places so you don't have to reach or climb.  Wear sturdy shoes that fit well - Wearing shoes with high heels or slippery soles, or shoes that are too loose, can lead to falls. Walking around in bare feet, or only socks, can also increase your risk of falling.  Take vitamin D pills - Taking vitamin D might lower the risk of falls in older people. This is because vitamin D helps make bones and muscles stronger. Your doctor can talk to you about whether you should take extra vitamin D, and how much.  Stay active - Exercising on a regular basis can help lower your risk of falling. It might also help prevent you from getting hurt if you do fall. It is best to do a few different activities that help with both strength and balance. There are many kinds of exercise that can be safe for older people. These include walking, swimming, and Flynn Chi (a Chinese martial art that involves slow, gentle movements).  Use a cane, walker, and other safety devices - If your doctor recommends that you use a cane or walker, be sure that it's the right size and you know how to use it. There are other devices that might help you avoid falling, too. These include grab bars or a sturdy seat for the shower, non-slip bath mats, and hand rails or treads for the stairs (to prevent slipping).  If you worry that you could fall, there are also alarm buttons that let you call for help if you fall and can't get up.  What should I do if I fall? -- If you fall, see your doctor right away, even if you aren't hurt. Your doctor can try to figure out what caused you to fall, and how likely you are to fall again. He or she will do an exam and  talk to you about your health problems, medicines, and activities. Then he or she can suggest things you can do to avoid falling again.  Many older people have a hard time recovering after a fall. Doing things to prevent falling can help you to protect your health and independence.  All topics are updated as new evidence becomes available and our peer review process is complete.  This topic retrieved from [employer] on: Sep 21, 2021.  Topic 81240 Version 18.0  Release: 29.4.2 - C29.263  © 2021 UpToDate, Inc. and/or its affiliates. All rights reserved.  figure 1: How to avoid falling at home     This picture shows some of the things that can cause a fall in your home. Look around and remove any loose rugs, electrical cords, clutter, or furniture that could trip you.  Graphic 43570 Version 1.0    Consumer Information Use and Disclaimer   This information is not specific medical advice and does not replace information you receive from your health care provider. This is only a brief summary of general information. It does NOT include all information about conditions, illnesses, injuries, tests, procedures, treatments, therapies, discharge instructions or life-style choices that may apply to you. You must talk with your health care provider for complete information about your health and treatment options. This information should not be used to decide whether or not to accept your health care provider's advice, instructions or recommendations. Only your health care provider has the knowledge and training to provide advice that is right for you. The use of this information is governed by the [employer] End User License Agreement, available at https://[email or web address]emon[employer]/en/solutions/[email or web address]/nicolle.The use of [employer] content is governed by the [employer] Terms of Use. ©2021 UpToDate, Inc. All rights reserved.  Copyright   © 2021 UpToDate, Inc. and/or its affiliates. All rights reserved.

## 2024-05-21 NOTE — PROGRESS NOTES
[facility] - FAMILY MEDICINE       PATIENT NAME: Negro Hale   : 1940    AGE: 83 y.o. DATE OF ENCOUNTER: 24    MRN: [id number]      PCP: Smiley Trevino FNP    Subjective:     Reason for Visit / Chief Complaint:     274}  Chief Complaint   Patient presents with    Diabetes    Follow-up     Pt presents to the clinic for 3m f/u     Medication Refill     Pt has the david 3 so I put in for removal of 2 and need a kit for sensor 3 stated they been calling and nobody been responding        HPI:    Presents for f/u uncontrolled DM, CKD, & HTN.    Referred him to Nicole Dewey NP for uncontrolled T2DM with initial visit 3/14/2024 - David 3 ordered, decrease Lantus to 45 units daily, started glipizide 5 mg daily and continue Farxiga 10 mg daily.  She has left [facility] so will schedule a f/u visit at her new clinic and send her  lab results from today.    Reviewed David 3 monitor with fluctuating glucose noted from 60s to 250s, but reports is out of sensors and having trouble with coverage.  Rx was denied when I ordered CGM & supplies, but hopefully Nicole can help with getting the supplies he needs.    Review of Systems:     Review of Systems   Constitutional: Negative.    HENT: Negative.     Eyes: Negative.    Respiratory: Negative.     Cardiovascular: Negative.    Gastrointestinal: Negative.    Endocrine: Negative.    Genitourinary: Negative.    Musculoskeletal: Negative.    Skin: Negative.    Allergic/Immunologic: Negative.    Neurological: Negative.    Hematological: Negative.    Psychiatric/Behavioral: Negative.         Allergies and Meds: 274}     Review of patient's allergies indicates:   Allergen Reactions    Mayonnaise      Upset stomach        Current Outpatient Medications   Medication Sig Dispense Refill    blood-glucose sensor (FREESTYLE DAVID 3 SENSOR) Giulia 1 each by Misc.(Non-Drug; Combo Route) route once.      cholecalciferol, vitamin D3, 125 mcg (5,000 unit) capsule Take  "5,000 Units by mouth once daily.      clopidogreL (PLAVIX) 75 mg tablet Take 1 tablet (75 mg total) by mouth once daily. 90 tablet 3    coenzyme Q10 200 mg capsule Take 200 mg by mouth once daily.      cyanocobalamin (VITAMIN B-12) 1000 MCG tablet Take 100 mcg by mouth once daily.      dapagliflozin propanediol (FARXIGA) 10 mg tablet Take 1 tablet (10 mg total) by mouth once daily. 90 tablet 3    glipiZIDE 5 MG TR24 Take 1 tablet (5 mg total) by mouth daily with breakfast. 90 tablet 1    insulin (LANTUS SOLOSTAR U-100 INSULIN) glargine 100 units/mL SubQ pen Inject 60 Units into the skin every evening. (Patient taking differently: Inject 45 Units into the skin every evening.) 54 mL 3    nitroGLYCERIN (NITROSTAT) 0.4 MG SL tablet Place 1 tablet (0.4 mg total) under the tongue every 5 (five) minutes as needed for Chest pain. 25 tablet 1    pantoprazole (PROTONIX) 40 MG tablet Take 1 tablet (40 mg total) by mouth once daily. 90 tablet 3    rosuvastatin (CRESTOR) 40 MG Tab Take 1 tablet (40 mg total) by mouth every evening. 90 tablet 3    SURE COMFORT PEN NEEDLE 32 gauge x 5/32" Ndle AS DIRECTED       No current facility-administered medications for this visit.       Labs:274}   I have reviewed labs below:    Lab Results   Component Value Date    WBC 7.12 02/04/2024    RBC 4.19 (L) 02/04/2024    HGB 12.4 (L) 02/04/2024    HCT 37.5 (L) 02/04/2024     02/04/2024     02/04/2024    K 4.2 02/04/2024     02/04/2024    CALCIUM 9.0 02/04/2024     (H) 02/04/2024    BUN 38 (H) 02/04/2024    CREATININE 1.34 (H) 02/04/2024    ESTGFRAFRICA 64 03/19/2021    EGFRNONAA 45 (L) 05/19/2022    ALT 28 02/04/2024    AST 17 02/04/2024    INR 1.07 02/01/2024    CHOL 169 05/08/2023    TRIG 102 05/08/2023    HDL 48 05/08/2023    LDLCALC 101 05/08/2023    TSH 2.880 12/21/2021    PSA 0.636 03/19/2021    HGBA1C 12.7 (H) 02/15/2024    MICROALBUR 3.2 (H) 05/08/2023       Medical History: 274}     Past Medical History: "   Diagnosis Date    Abnormal thyroid stimulating hormone (TSH) level 08/22/2019    Anemia 08/15/2019    Atrial fibrillation 09/07/2012    Bradycardia 05/23/2017    asymptomatic    Change in bowel habit 11/13/2018    Chronic kidney disease, unspecified 01/14/2019    Coronary arteriosclerosis 09/07/2012    Disease of skin and subcutaneous tissue 08/16/2017    medial aspect RLE- cystic structure seen on venous doppler US.    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 Lewis Eye exam    Varicose veins of both lower extremities with pain 01/02/2019    Venous insufficiency 01/08/2019      Social History     Tobacco Use   Smoking Status Former    Passive exposure: Past   Smokeless Tobacco Never   Tobacco Comments    Quit 10/15/1976      Past Surgical History:   Procedure Laterality Date    APPENDECTOMY      CARDIAC CATHETERIZATION  2007    [place]- Stent placement     "CATARACT EXTRACTION, BILATERAL      COLONOSCOPY  11/06/2019    Dr. Mendoza    HERNIA REPAIR      Inguinal hernia repair    TONSILLECTOMY          Health Maintenance: {      Health Maintenance         Date Due Completion Date    Shingles Vaccine (1 of 2) Never done ---    RSV Vaccine (Age 60+ and Pregnant patients) (1 - 1-dose 60+ series) Never done ---    COVID-19 Vaccine (2 - 2023-24 season) 09/01/2023 2/21/2021    Lipid Panel 05/08/2024 5/8/2023    Diabetes Urine Screening 05/08/2024 5/8/2023    Hemoglobin A1c 05/15/2024 2/15/2024    Eye Exam 08/25/2024 8/25/2023    Override on 6/24/2022: Done ([facility])    Override on 5/28/2020: Done ([facility]. Scanned into documents.)    Influenza Vaccine (Season Ended) 09/01/2024 ---    TETANUS VACCINE 01/21/2034 1/21/2024          Immunization History   Administered Date(s) Administered    COVID-19, MRNA, LN-S, PF (MODERNA FULL 0.5 ML DOSE) 01/15/2021    COVID-19, mRNA, LNP-S, bivalent booster, PF (Moderna Omicron) 02/21/2021    Pneumococcal Conjugate - 13 Valent 11/13/2018    Pneumococcal Polysaccharide - 23 Valent 01/28/2020    Tdap 05/19/2022, 01/21/2024       Objective:  274}   Vital Signs  Temp: 98.5 °F (36.9 °C)  Temp Source: Oral  Pulse: 108  Resp: 20  SpO2: (!) 94 %  BP: 127/63  BP Location: Right arm  Patient Position: Sitting  Pain Score: 0-No pain  Height and Weight  Height: 5' 4.5" (163.8 cm)  Weight: 78.9 kg (174 lb)  BSA (Calculated - sq m): 1.9 sq meters  BMI (Calculated): 29.4  Weight in (lb) to have BMI = 25: 147.6    Over the last two weeks how often have you been bothered by little interest or pleasure in doing things: 0  Over the last two weeks how often have you been bothered by feeling down, depressed or hopeless: 0  PHQ-2 Total Score: 0  PHQ-9 Score: 0  PHQ-9 Interpretation: Minimal or None    Wt Readings from Last 3 Encounters:   05/21/24 78.9 kg (174 lb)   03/14/24 81.2 kg (179 lb)   02/21/24 80.3 kg (177 lb)     Physical " Exam  Vitals and nursing note reviewed.   Constitutional:       General: He is not in acute distress.     Appearance: Normal appearance. He is not ill-appearing.   HENT:      Head: Normocephalic.   Eyes:      Conjunctiva/sclera: Conjunctivae normal.   Neck:      Trachea: Trachea normal.   Cardiovascular:      Rate and Rhythm: Normal rate and regular rhythm.      Pulses: Normal pulses.      Heart sounds: Normal heart sounds.   Pulmonary:      Effort: Pulmonary effort is normal. No respiratory distress.      Breath sounds: Normal breath sounds. No wheezing, rhonchi or rales.   Musculoskeletal:      Cervical back: Neck supple.      Right lower leg: No edema.      Left lower leg: No edema.   Lymphadenopathy:      Cervical: No cervical adenopathy.   Skin:     General: Skin is warm and dry.      Coloration: Skin is not jaundiced or pale.   Neurological:      Mental Status: He is alert and oriented to person, place, and time.      Gait: Gait normal.   Psychiatric:         Mood and Affect: Mood normal.         Behavior: Behavior normal.          Assessment and Plan: 274}     1. Type 2 diabetes mellitus with hyperglycemia, with long-term current use of insulin  Assessment & Plan:  Poorly controlled.   Lab Results   Component Value Date    HGBA1C 12.7 (H) 02/15/2024     Had initial consult with Nicole Dewey NP for DM management 3/14/24.  David 3 ordered but having difficulty getting sensors - glucose ranging 60s-250s.   She decreased Lantus to 45 units daily, started glipizide 5 mg daily and continued Farxiga 10 mg daily.    She has left [facility] so will schedule a f/u visit at her new clinic and send her lab results from today.    Orders:  -     Comprehensive Metabolic Panel; Future; Expected date: 05/21/2024  -     Microalbumin/Creatinine Ratio, Urine; Future; Expected date: 05/21/2024  -     Hemoglobin A1C; Future; Expected date: 05/21/2024    2. Hypercholesterolemia  Assessment & Plan:  Controlled  Continue rosuvastatin 40 mg  daily.    Orders:  -     Lipid Panel; Future; Expected date: 05/21/2024    3. Stage 3a chronic kidney disease  Assessment & Plan:  Stable, continue monitoring.  Continue Farxiga 10 mg daily.    Orders:  -     CBC Auto Differential; Future; Expected date: 05/21/2024    4. Encounter for long-term (current) use of other medications  -     CBC Auto Differential; Future; Expected date: 05/21/2024  -     TSH; Future; Expected date: 05/21/2024      Diagnosis, risks, benefits, and side effects of meds and treatment plan were discussed with the patient.  Patient to call or follow-up with any new or worsening symptoms or problems prior to next appointment.  Go to ER for any urgent complications.  All questions were answered to the satisfaction of the patient, and pt verbalized understanding and agreement to treatment plan.      Follow up in about 4 months (around 9/21/2024) for T2DM, CKD, hypertension, with nonfasting lab.    Signature:  BARBARA Green-BC    Future Appointments   Date Time Provider Department Center   7/1/2024  8:15 AM Thaddeus Leroy MD [facility] SPINE [facility]   2/26/2025  3:00 PM AWV NURSE, [facility] FAMILY MEDICINE [facility] DAHIANA Bright

## 2024-05-26 DIAGNOSIS — E87.5 HYPERKALEMIA: Primary | ICD-10-CM

## 2024-05-27 NOTE — PROGRESS NOTES
Call pt and review results. Stable kidney function, anemia is improved, and lipids are well controlled.  A1c is MUCH improved.  Please send results to Nicole Dewey for his DM management.  K+ is a little high, maybe from stick with butterfly, but should be repeated to make sure.  He isn't on any meds that should elevate his potassium.

## 2024-05-29 ENCOUNTER — APPOINTMENT (OUTPATIENT)
Dept: RADIOLOGY | Facility: CLINIC | Age: 84
End: 2024-05-29
Attending: NURSE PRACTITIONER
Payer: MEDICARE

## 2024-05-29 ENCOUNTER — OFFICE VISIT (OUTPATIENT)
Dept: FAMILY MEDICINE | Facility: CLINIC | Age: 84
End: 2024-05-29
Payer: MEDICARE

## 2024-05-29 VITALS
OXYGEN SATURATION: 97 % | WEIGHT: 174 LBS | BODY MASS INDEX: 28.99 KG/M2 | RESPIRATION RATE: 18 BRPM | TEMPERATURE: 99 F | HEART RATE: 71 BPM | SYSTOLIC BLOOD PRESSURE: 126 MMHG | HEIGHT: 65 IN | DIASTOLIC BLOOD PRESSURE: 56 MMHG

## 2024-05-29 DIAGNOSIS — S80.811A INFECTED ABRASION OF RIGHT LEG, INITIAL ENCOUNTER: ICD-10-CM

## 2024-05-29 DIAGNOSIS — Z79.4 TYPE 2 DIABETES MELLITUS WITH STAGE 3A CHRONIC KIDNEY DISEASE, WITH LONG-TERM CURRENT USE OF INSULIN: Chronic | ICD-10-CM

## 2024-05-29 DIAGNOSIS — L08.9 INFECTED ABRASION OF RIGHT LEG, INITIAL ENCOUNTER: ICD-10-CM

## 2024-05-29 DIAGNOSIS — L08.9 INFECTED ABRASION OF RIGHT LEG, INITIAL ENCOUNTER: Primary | ICD-10-CM

## 2024-05-29 DIAGNOSIS — E11.22 TYPE 2 DIABETES MELLITUS WITH STAGE 3A CHRONIC KIDNEY DISEASE, WITH LONG-TERM CURRENT USE OF INSULIN: Chronic | ICD-10-CM

## 2024-05-29 DIAGNOSIS — S80.811A INFECTED ABRASION OF RIGHT LEG, INITIAL ENCOUNTER: Primary | ICD-10-CM

## 2024-05-29 DIAGNOSIS — N18.31 TYPE 2 DIABETES MELLITUS WITH STAGE 3A CHRONIC KIDNEY DISEASE, WITH LONG-TERM CURRENT USE OF INSULIN: Chronic | ICD-10-CM

## 2024-05-29 PROBLEM — I25.119 ATHEROSCLEROSIS OF NATIVE CORONARY ARTERY OF NATIVE HEART WITH ANGINA PECTORIS: Chronic | Status: ACTIVE | Noted: 2021-03-17

## 2024-05-29 PROBLEM — S22.069A: Status: RESOLVED | Noted: 2024-02-01 | Resolved: 2024-05-29

## 2024-05-29 LAB
CREAT UR-MCNC: 111 MG/DL (ref 39–259)
MICROALBUMIN UR-MCNC: 2 MG/DL (ref 0–2.8)
MICROALBUMIN/CREAT RATIO PNL UR: 18 MG/G (ref 0–30)

## 2024-05-29 PROCEDURE — 82570 ASSAY OF URINE CREATININE: CPT | Mod: ,,, | Performed by: CLINICAL MEDICAL LABORATORY

## 2024-05-29 PROCEDURE — 96372 THER/PROPH/DIAG INJ SC/IM: CPT | Mod: ,,, | Performed by: NURSE PRACTITIONER

## 2024-05-29 PROCEDURE — 82043 UR ALBUMIN QUANTITATIVE: CPT | Mod: ,,, | Performed by: CLINICAL MEDICAL LABORATORY

## 2024-05-29 PROCEDURE — 99213 OFFICE O/P EST LOW 20 MIN: CPT | Mod: ,,, | Performed by: NURSE PRACTITIONER

## 2024-05-29 PROCEDURE — 73590 X-RAY EXAM OF LOWER LEG: CPT | Mod: TC,RHCUB,FY,RT | Performed by: NURSE PRACTITIONER

## 2024-05-29 RX ORDER — CEFTRIAXONE 1 G/1
1 INJECTION, POWDER, FOR SOLUTION INTRAMUSCULAR; INTRAVENOUS
Status: COMPLETED | OUTPATIENT
Start: 2024-05-29 | End: 2024-05-29

## 2024-05-29 RX ORDER — CLINDAMYCIN HYDROCHLORIDE 300 MG/1
300 CAPSULE ORAL EVERY 8 HOURS
Qty: 30 CAPSULE | Refills: 0 | Status: SHIPPED | OUTPATIENT
Start: 2024-05-29 | End: 2024-06-08

## 2024-05-29 RX ORDER — GLIPIZIDE 5 MG/1
5 TABLET, FILM COATED, EXTENDED RELEASE ORAL
Qty: 90 TABLET | Refills: 1 | Status: SHIPPED | OUTPATIENT
Start: 2024-05-29 | End: 2024-06-03 | Stop reason: SDUPTHER

## 2024-05-29 RX ORDER — NITROGLYCERIN 0.4 MG/1
0.4 TABLET SUBLINGUAL EVERY 5 MIN PRN
Qty: 25 TABLET | Refills: 1 | Status: CANCELLED | OUTPATIENT
Start: 2024-05-29 | End: 2025-05-29

## 2024-05-29 RX ORDER — MUPIROCIN 20 MG/G
OINTMENT TOPICAL 3 TIMES DAILY
Qty: 22 G | Refills: 0 | Status: SHIPPED | OUTPATIENT
Start: 2024-05-29

## 2024-05-29 RX ADMIN — CEFTRIAXONE 1 G: 1 INJECTION, POWDER, FOR SOLUTION INTRAMUSCULAR; INTRAVENOUS at 01:05

## 2024-05-29 NOTE — PATIENT INSTRUCTIONS
Patient Education       Preventing Falls   The Basics   Written by the doctors and editors at [facility]   Am I at risk of falling? -- Your risk of falling increases as you grow older. That's because getting older can make it harder to walk steadily and keep your balance. Also, the effects of falls are more serious in older people.  Overall, 3 to 4 out of every 10 people over the age of 65 fall each year. Up to 75 percent of people who fracture a hip never recover to the point they were before they had their fracture. If you have fallen in the past, you are at higher risk of falling again.  Several things can increase your risk of a fall, including:  Illness  A change in the medicines you take  An unsafe or unfamiliar setting (for example, a room with rugs or furniture that might trip you, or an area you don't know well)  How can my doctor help me to avoid falling? -- Your doctor can talk to you about the following things:  Past falls - It is important to tell your doctor about any times you have fallen or almost fallen. He or she can then suggest ways to prevent another fall.  Your health conditions - Some health problems can put you at risk of falling. These include conditions that affect eyesight, hearing, muscle strength, or balance.  The medicines you take - Certain medicines can increase the risk of falling. These include some medicines that are used for sleeping problems, anxiety, high blood pressure, or depression. Adding new medicines, or changing doses of some medicines, can also affect your risk of falling.  The more your doctor knows about your situation, the better he or she will be able to help you. For example, if you fell because you have a condition that causes pain, your doctor might suggest treatments to deal with the pain. Or if one of your medicines is making you dizzy and more likely to fall, your doctor might switch you to a different medicine.  Is there anything I can do on my own? -- Yes. To  help keep from falling, you can:  Make your home safer - To avoid falling at home, get rid of things that might make you trip or slip. This might include furniture, electrical cords, clutter, and loose rugs (figure 1). Keep your home well-lit so that you can easily see where you are going. Avoid storing things in high places so you don't have to reach or climb.  Wear sturdy shoes that fit well - Wearing shoes with high heels or slippery soles, or shoes that are too loose, can lead to falls. Walking around in bare feet, or only socks, can also increase your risk of falling.  Take vitamin D pills - Taking vitamin D might lower the risk of falls in older people. This is because vitamin D helps make bones and muscles stronger. Your doctor can talk to you about whether you should take extra vitamin D, and how much.  Stay active - Exercising on a regular basis can help lower your risk of falling. It might also help prevent you from getting hurt if you do fall. It is best to do a few different activities that help with both strength and balance. There are many kinds of exercise that can be safe for older people. These include walking, swimming, and Flynn Chi (a Chinese martial art that involves slow, gentle movements).  Use a cane, walker, and other safety devices - If your doctor recommends that you use a cane or walker, be sure that it's the right size and you know how to use it. There are other devices that might help you avoid falling, too. These include grab bars or a sturdy seat for the shower, non-slip bath mats, and hand rails or treads for the stairs (to prevent slipping).  If you worry that you could fall, there are also alarm buttons that let you call for help if you fall and can't get up.  What should I do if I fall? -- If you fall, see your doctor right away, even if you aren't hurt. Your doctor can try to figure out what caused you to fall, and how likely you are to fall again. He or she will do an exam and  talk to you about your health problems, medicines, and activities. Then he or she can suggest things you can do to avoid falling again.  Many older people have a hard time recovering after a fall. Doing things to prevent falling can help you to protect your health and independence.  All topics are updated as new evidence becomes available and our peer review process is complete.  This topic retrieved from [employer] on: Sep 21, 2021.  Topic 58112 Version 18.0  Release: 29.4.2 - C29.263  © 2021 UpToDate, Inc. and/or its affiliates. All rights reserved.  figure 1: How to avoid falling at home     This picture shows some of the things that can cause a fall in your home. Look around and remove any loose rugs, electrical cords, clutter, or furniture that could trip you.  Graphic 39630 Version 1.0    Consumer Information Use and Disclaimer   This information is not specific medical advice and does not replace information you receive from your health care provider. This is only a brief summary of general information. It does NOT include all information about conditions, illnesses, injuries, tests, procedures, treatments, therapies, discharge instructions or life-style choices that may apply to you. You must talk with your health care provider for complete information about your health and treatment options. This information should not be used to decide whether or not to accept your health care provider's advice, instructions or recommendations. Only your health care provider has the knowledge and training to provide advice that is right for you. The use of this information is governed by the [employer] End User License Agreement, available at https://[email or web address].2theloo/en/solutions/[employer]/about/nicolle.The use of [employer] content is governed by the [employer] Terms of Use. ©2021 UpToDate, Inc. All rights reserved.  Copyright   © 2021 UpToDate, Inc. and/or its affiliates. All rights reserved.

## 2024-05-29 NOTE — PROGRESS NOTES
"[facility] FAMILY MEDICINE       PATIENT NAME: Negro Hale   : 1940    AGE: 83 y.o. DATE OF ENCOUNTER: 24    MRN: [id number]      PCP: Smiley Trevino FNP    Subjective:     Reason for Visit / Chief Complaint:     274}  Chief Complaint   Patient presents with    Leg Injury     Has been using atibiotic ointment BID. Scraped skin off on . Draining, redness present around injury. Incident happened Friday.       HPI:    Presents for c/o redness and drainage from abrasion RLE sustained 5 days ago he struck right lower leg on the frame of his lawnmower  Has been washing and applying advanced healing ointment from [employer].     Allergies and Meds: 274}     Review of patient's allergies indicates:   Allergen Reactions    Mayonnaise      Upset stomach        Current Outpatient Medications   Medication Sig Dispense Refill    cholecalciferol, vitamin D3, 125 mcg (5,000 unit) capsule Take 5,000 Units by mouth once daily.      clopidogreL (PLAVIX) 75 mg tablet Take 1 tablet (75 mg total) by mouth once daily. 90 tablet 3    coenzyme Q10 200 mg capsule Take 200 mg by mouth once daily.      cyanocobalamin (VITAMIN B-12) 1000 MCG tablet Take 100 mcg by mouth once daily.      dapagliflozin propanediol (FARXIGA) 10 mg tablet Take 1 tablet (10 mg total) by mouth once daily. 90 tablet 3    insulin (LANTUS SOLOSTAR U-100 INSULIN) glargine 100 units/mL SubQ pen Inject 60 Units into the skin every evening. (Patient taking differently: Inject 45 Units into the skin every evening.) 54 mL 3    nitroGLYCERIN (NITROSTAT) 0.4 MG SL tablet Place 1 tablet (0.4 mg total) under the tongue every 5 (five) minutes as needed for Chest pain. 25 tablet 1    pantoprazole (PROTONIX) 40 MG tablet Take 1 tablet (40 mg total) by mouth once daily. 90 tablet 3    rosuvastatin (CRESTOR) 40 MG Tab Take 1 tablet (40 mg total) by mouth every evening. 90 tablet 3    SURE COMFORT PEN NEEDLE 32 gauge x 5/32" Ndle " AS DIRECTED      blood-glucose sensor (FREESTYLE ADORE 3 SENSOR) Giulia 1 each by Misc.(Non-Drug; Combo Route) route once. (Patient not taking: Reported on 5/29/2024)      clindamycin (CLEOCIN) 300 MG capsule Take 1 capsule (300 mg total) by mouth every 8 (eight) hours. for 10 days 30 capsule 0    glipiZIDE 5 MG TR24 Take 1 tablet (5 mg total) by mouth daily with breakfast. 90 tablet 1    mupirocin (BACTROBAN) 2 % ointment Apply topically 3 (three) times daily. 22 g 0     No current facility-administered medications for this visit.       Labs:274}   I have reviewed labs below:    Lab Results   Component Value Date    WBC 8.80 05/21/2024    RBC 4.86 05/21/2024    HGB 14.2 05/21/2024    HCT 46.1 05/21/2024     05/21/2024     05/21/2024    K 5.8 (H) 05/21/2024     (H) 05/21/2024    CALCIUM 9.1 05/21/2024     05/21/2024    BUN 33 (H) 05/21/2024    CREATININE 1.27 05/21/2024    ESTGFRAFRICA 64 03/19/2021    EGFRNONAA 45 (L) 05/19/2022    ALT 29 05/21/2024    AST 32 05/21/2024    INR 1.07 02/01/2024    CHOL 166 05/21/2024    TRIG 83 05/21/2024    HDL 50 05/21/2024    LDLCALC 99 05/21/2024    TSH 2.780 05/21/2024    PSA 0.636 03/19/2021    HGBA1C 6.5 05/21/2024    MICROALBUR 3.2 (H) 05/08/2023     Medical History: 274}     Past Medical History:   Diagnosis Date    Abnormal thyroid stimulating hormone (TSH) level 08/22/2019    Anemia 08/15/2019    Atrial fibrillation 09/07/2012    Bradycardia 05/23/2017    asymptomatic    Change in bowel habit 11/13/2018    Chronic kidney disease, unspecified 01/14/2019    Coronary arteriosclerosis 09/07/2012    Disease of skin and subcutaneous tissue 08/16/2017    medial aspect RLE- cystic structure seen on venous doppler US.    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 Lewis    Type 2 diabetes mellitus with mild nonproliferative retinopathy of right eye without macular edema 2023    See Dr. Lewis Eye exam    Varicose veins of both lower extremities with pain 2019    Venous insufficiency 2019      Social History     Tobacco Use   Smoking Status Former    Current packs/day: 0.00    Average packs/day: 0.1 packs/day for 3.0 years (0.3 ttl pk-yrs)    Types: Cigarettes    Start date:     Quit date: 1960    Years since quittin.4    Passive exposure: Past   Smokeless Tobacco Never   Tobacco Comments    Quit 10/15/1976      Past Surgical History:   Procedure Laterality Date    APPENDECTOMY      CARDIAC CATHETERIZATION      [place]- Stent placement    CATARACT EXTRACTION, BILATERAL      COLONOSCOPY  2019    Dr. Mendoza    HERNIA REPAIR      Inguinal hernia repair    TONSILLECTOMY        Objective:  274}   Vital Signs  Temp: 98.5 °F (36.9 °C)  Temp Source: Oral  Pulse: 71  Resp: 18  SpO2: 97 %  BP: (!) 126/56  BP Location: Left arm  Patient Position: Standing  Pain Score:   3  Pain Loc: Leg  Height and Weight  Height: 5' 4.5" (163.8 cm)  Weight: 78.9 kg (174 lb)  BSA (Calculated - sq m): 1.9 sq meters  BMI (Calculated): " 29.4  Weight in (lb) to have BMI = 25: 147.6    Over the last two weeks how often have you been bothered by little interest or pleasure in doing things: 0  Over the last two weeks how often have you been bothered by feeling down, depressed or hopeless: 0  PHQ-2 Total Score: 0  PHQ-9 Score: 0  PHQ-9 Interpretation: Minimal or None    Wt Readings from Last 3 Encounters:   05/29/24 78.9 kg (174 lb)   05/21/24 78.9 kg (174 lb)   03/14/24 81.2 kg (179 lb)     Physical Exam  Vitals and nursing note reviewed.   Constitutional:       General: He is not in acute distress.     Appearance: Normal appearance. He is not ill-appearing.   HENT:      Head: Normocephalic.   Eyes:      Conjunctiva/sclera: Conjunctivae normal.   Cardiovascular:      Rate and Rhythm: Normal rate and regular rhythm.      Heart sounds: Normal heart sounds.   Pulmonary:      Effort: Pulmonary effort is normal. No respiratory distress.      Breath sounds: Normal breath sounds.   Musculoskeletal:         General: Signs of injury (see pics below) present.   Skin:     General: Skin is warm and dry.      Coloration: Skin is not jaundiced or pale.   Neurological:      Mental Status: He is alert and oriented to person, place, and time.      Gait: Gait normal.   Psychiatric:         Mood and Affect: Mood normal.         Behavior: Behavior normal.         Thought Content: Thought content normal.         Judgment: Judgment normal.                        Assessment and Plan: 274}     1. Infected abrasion of right leg, initial encounter  Assessment & Plan:  TDAP was updated 1/21/24    Rocephin 1 GM IM  Start clindamycin po and topical mupirocin.  Wound recheck in 5 days or go to ER outside of regular clinic hours if progressively worsening s/s of infection given his co-morbidities.    Orders:  -     clindamycin (CLEOCIN) 300 MG capsule; Take 1 capsule (300 mg total) by mouth every 8 (eight) hours. for 10 days  Dispense: 30 capsule; Refill: 0  -     mupirocin  (BACTROBAN) 2 % ointment; Apply topically 3 (three) times daily.  Dispense: 22 g; Refill: 0  -     cefTRIAXone injection 1 g  -     X-Ray Tibia Fibula 2 View Right; Future; Expected date: 05/29/2024    2. Type 2 diabetes mellitus with stage 3a chronic kidney disease, with long-term current use of insulin  Assessment & Plan:  CKD stable  Most recent A1c much improved. Followed by Nicole Dewey NP for diabetes management.  Lab Results   Component Value Date    HGBA1C 6.5 05/21/2024      Latest Reference Range & Units 02/15/24 11:05   Hemoglobin A1C External 4.5 - 6.6 % 12.7 (H)       Orders:  -     glipiZIDE 5 MG TR24; Take 1 tablet (5 mg total) by mouth daily with breakfast.  Dispense: 90 tablet; Refill: 1  -     Microalbumin/Creatinine Ratio, Urine; Future; Expected date: 05/29/2024  -     X-Ray Tibia Fibula 2 View Right; Future; Expected date: 05/29/2024      Diagnosis, risks, benefits, and side effects of any meds and treatment plan were discussed with the patient.  Any diagnostic testing results obtained in office or prior to appointment were reviewed.  Patient to call or follow-up with any new or worsening symptoms or problems prior to next appointment.  Go to ER for any urgent complications.  All questions were answered to the satisfaction of the patient, and pt verbalized understanding and agreement to treatment plan.      Follow up in about 5 days (around 6/3/2024) for infected wound recheck.    Signature:  BARBARA Green-BC    Future Appointments   Date Time Provider Department Center   7/1/2024  8:15 AM Thaddeus Leroy MD [facility] SPINE [facility]   9/24/2024  1:20 PM Smiley Trevino FNP [facility]   2/26/2025  3:00 PM AWV NURSE, [facility] FAMILY MEDICINE [facility]

## 2024-05-30 NOTE — ASSESSMENT & PLAN NOTE
CKD stable  Most recent A1c much improved. Followed by Nicole Dewey NP for diabetes management.  Lab Results   Component Value Date    HGBA1C 6.5 05/21/2024      Latest Reference Range & Units 02/15/24 11:05   Hemoglobin A1C External 4.5 - 6.6 % 12.7 (H)

## 2024-05-30 NOTE — ASSESSMENT & PLAN NOTE
TDAP was updated 1/21/24    Rocephin 1 GM IM  Start clindamycin po and topical mupirocin.  Wound recheck in 5 days or go to ER outside of regular clinic hours if progressively worsening s/s of infection given his co-morbidities.

## 2024-06-03 ENCOUNTER — OFFICE VISIT (OUTPATIENT)
Dept: FAMILY MEDICINE | Facility: CLINIC | Age: 84
End: 2024-06-03
Payer: MEDICARE

## 2024-06-03 VITALS
WEIGHT: 175.38 LBS | TEMPERATURE: 98 F | DIASTOLIC BLOOD PRESSURE: 71 MMHG | HEART RATE: 67 BPM | OXYGEN SATURATION: 97 % | HEIGHT: 65 IN | BODY MASS INDEX: 29.22 KG/M2 | RESPIRATION RATE: 18 BRPM | SYSTOLIC BLOOD PRESSURE: 142 MMHG

## 2024-06-03 DIAGNOSIS — N18.31 TYPE 2 DIABETES MELLITUS WITH STAGE 3A CHRONIC KIDNEY DISEASE, WITH LONG-TERM CURRENT USE OF INSULIN: Chronic | ICD-10-CM

## 2024-06-03 DIAGNOSIS — Z79.4 TYPE 2 DIABETES MELLITUS WITH HYPERGLYCEMIA, WITH LONG-TERM CURRENT USE OF INSULIN: Chronic | ICD-10-CM

## 2024-06-03 DIAGNOSIS — Z95.5 HISTORY OF CORONARY ARTERY STENT PLACEMENT: ICD-10-CM

## 2024-06-03 DIAGNOSIS — E11.22 TYPE 2 DIABETES MELLITUS WITH STAGE 3A CHRONIC KIDNEY DISEASE, WITH LONG-TERM CURRENT USE OF INSULIN: Chronic | ICD-10-CM

## 2024-06-03 DIAGNOSIS — S80.811A INFECTED ABRASION OF RIGHT LEG, INITIAL ENCOUNTER: Primary | ICD-10-CM

## 2024-06-03 DIAGNOSIS — E11.65 TYPE 2 DIABETES MELLITUS WITH HYPERGLYCEMIA, WITH LONG-TERM CURRENT USE OF INSULIN: Chronic | ICD-10-CM

## 2024-06-03 DIAGNOSIS — L08.9 INFECTED ABRASION OF RIGHT LEG, INITIAL ENCOUNTER: Primary | ICD-10-CM

## 2024-06-03 DIAGNOSIS — Z79.4 TYPE 2 DIABETES MELLITUS WITH STAGE 3A CHRONIC KIDNEY DISEASE, WITH LONG-TERM CURRENT USE OF INSULIN: Chronic | ICD-10-CM

## 2024-06-03 DIAGNOSIS — K21.9 GASTROESOPHAGEAL REFLUX DISEASE WITHOUT ESOPHAGITIS: ICD-10-CM

## 2024-06-03 DIAGNOSIS — I25.10 ATHEROSCLEROSIS OF NATIVE CORONARY ARTERY OF NATIVE HEART WITHOUT ANGINA PECTORIS: Chronic | ICD-10-CM

## 2024-06-03 DIAGNOSIS — N18.31 STAGE 3A CHRONIC KIDNEY DISEASE: Chronic | ICD-10-CM

## 2024-06-03 DIAGNOSIS — E78.2 MIXED HYPERLIPIDEMIA: Chronic | ICD-10-CM

## 2024-06-03 PROCEDURE — 99214 OFFICE O/P EST MOD 30 MIN: CPT | Mod: ,,, | Performed by: NURSE PRACTITIONER

## 2024-06-03 RX ORDER — PANTOPRAZOLE SODIUM 40 MG/1
40 TABLET, DELAYED RELEASE ORAL DAILY
Qty: 90 TABLET | Refills: 3 | Status: SHIPPED | OUTPATIENT
Start: 2024-06-03 | End: 2025-06-03

## 2024-06-03 RX ORDER — DAPAGLIFLOZIN 10 MG/1
10 TABLET, FILM COATED ORAL DAILY
Qty: 90 TABLET | Refills: 3 | Status: SHIPPED | OUTPATIENT
Start: 2024-06-03

## 2024-06-03 RX ORDER — GLIPIZIDE 5 MG/1
5 TABLET, FILM COATED, EXTENDED RELEASE ORAL
Qty: 90 TABLET | Refills: 1 | Status: SHIPPED | OUTPATIENT
Start: 2024-06-03

## 2024-06-03 RX ORDER — ROSUVASTATIN CALCIUM 40 MG/1
40 TABLET, COATED ORAL NIGHTLY
Qty: 90 TABLET | Refills: 3 | Status: SHIPPED | OUTPATIENT
Start: 2024-06-03

## 2024-06-03 RX ORDER — INSULIN GLARGINE 100 [IU]/ML
45 INJECTION, SOLUTION SUBCUTANEOUS NIGHTLY
Qty: 45 ML | Refills: 3 | Status: SHIPPED | OUTPATIENT
Start: 2024-06-03 | End: 2025-06-03

## 2024-06-03 RX ORDER — CLOPIDOGREL BISULFATE 75 MG/1
75 TABLET ORAL DAILY
Qty: 90 TABLET | Refills: 3 | Status: SHIPPED | OUTPATIENT
Start: 2024-06-03

## 2024-06-03 NOTE — PATIENT INSTRUCTIONS
Patient Education       Preventing Falls   The Basics   Written by the doctors and editors at [facility]   Am I at risk of falling? -- Your risk of falling increases as you grow older. That's because getting older can make it harder to walk steadily and keep your balance. Also, the effects of falls are more serious in older people.  Overall, 3 to 4 out of every 10 people over the age of 65 fall each year. Up to 75 percent of people who fracture a hip never recover to the point they were before they had their fracture. If you have fallen in the past, you are at higher risk of falling again.  Several things can increase your risk of a fall, including:  Illness  A change in the medicines you take  An unsafe or unfamiliar setting (for example, a room with rugs or furniture that might trip you, or an area you don't know well)  How can my doctor help me to avoid falling? -- Your doctor can talk to you about the following things:  Past falls - It is important to tell your doctor about any times you have fallen or almost fallen. He or she can then suggest ways to prevent another fall.  Your health conditions - Some health problems can put you at risk of falling. These include conditions that affect eyesight, hearing, muscle strength, or balance.  The medicines you take - Certain medicines can increase the risk of falling. These include some medicines that are used for sleeping problems, anxiety, high blood pressure, or depression. Adding new medicines, or changing doses of some medicines, can also affect your risk of falling.  The more your doctor knows about your situation, the better he or she will be able to help you. For example, if you fell because you have a condition that causes pain, your doctor might suggest treatments to deal with the pain. Or if one of your medicines is making you dizzy and more likely to fall, your doctor might switch you to a different medicine.  Is there anything I can do on my own? -- Yes. To  help keep from falling, you can:  Make your home safer - To avoid falling at home, get rid of things that might make you trip or slip. This might include furniture, electrical cords, clutter, and loose rugs (figure 1). Keep your home well-lit so that you can easily see where you are going. Avoid storing things in high places so you don't have to reach or climb.  Wear sturdy shoes that fit well - Wearing shoes with high heels or slippery soles, or shoes that are too loose, can lead to falls. Walking around in bare feet, or only socks, can also increase your risk of falling.  Take vitamin D pills - Taking vitamin D might lower the risk of falls in older people. This is because vitamin D helps make bones and muscles stronger. Your doctor can talk to you about whether you should take extra vitamin D, and how much.  Stay active - Exercising on a regular basis can help lower your risk of falling. It might also help prevent you from getting hurt if you do fall. It is best to do a few different activities that help with both strength and balance. There are many kinds of exercise that can be safe for older people. These include walking, swimming, and Flynn Chi (a Chinese martial art that involves slow, gentle movements).  Use a cane, walker, and other safety devices - If your doctor recommends that you use a cane or walker, be sure that it's the right size and you know how to use it. There are other devices that might help you avoid falling, too. These include grab bars or a sturdy seat for the shower, non-slip bath mats, and hand rails or treads for the stairs (to prevent slipping).  If you worry that you could fall, there are also alarm buttons that let you call for help if you fall and can't get up.  What should I do if I fall? -- If you fall, see your doctor right away, even if you aren't hurt. Your doctor can try to figure out what caused you to fall, and how likely you are to fall again. He or she will do an exam and  talk to you about your health problems, medicines, and activities. Then he or she can suggest things you can do to avoid falling again.  Many older people have a hard time recovering after a fall. Doing things to prevent falling can help you to protect your health and independence.  All topics are updated as new evidence becomes available and our peer review process is complete.  This topic retrieved from [employer] on: Sep 21, 2021.  Topic 49988 Version 18.0  Release: 29.4.2 - C29.263  © 2021 UpToDate, Inc. and/or its affiliates. All rights reserved.  figure 1: How to avoid falling at home     This picture shows some of the things that can cause a fall in your home. Look around and remove any loose rugs, electrical cords, clutter, or furniture that could trip you.  Graphic 31312 Version 1.0    Consumer Information Use and Disclaimer   This information is not specific medical advice and does not replace information you receive from your health care provider. This is only a brief summary of general information. It does NOT include all information about conditions, illnesses, injuries, tests, procedures, treatments, therapies, discharge instructions or life-style choices that may apply to you. You must talk with your health care provider for complete information about your health and treatment options. This information should not be used to decide whether or not to accept your health care provider's advice, instructions or recommendations. Only your health care provider has the knowledge and training to provide advice that is right for you. The use of this information is governed by the [employer] End User License Agreement, available at [email or web address].The use of [employer] content is governed by the [employer] Terms of Use. ©2021 UpToDate, Inc. All rights reserved.  Copyright   © 2021 UpToDate, Inc. and/or its affiliates. All rights reserved.

## 2024-06-03 NOTE — ASSESSMENT & PLAN NOTE
Fluctuating glucose levels but A1c much improved on recent check.  Lab Results   Component Value Date    HGBA1C 6.5 05/21/2024     Continue f/u with Nicole Dewey NP for DM management  Continue Lantus 45 units daily, glipizide 5 mg daily, and Farxiga 10 mg daily.

## 2024-06-03 NOTE — PROGRESS NOTES
"     [facility] FAMILY MEDICINE       PATIENT NAME: Negro Hale   : 1940    AGE: 83 y.o. DATE OF ENCOUNTER: 6/3/24    MRN: [id number]      PCP: Smiley Trevino FNP    Subjective:     Reason for Visit / Chief Complaint:     274}  Chief Complaint   Patient presents with    Wound Check     Patient reports to the clinic today for wound check.    Health Maintenance     Care gaps addressed, patient states he has had his first Shingles vaccine, declines all other vaccines.    Namita Gallardo CMA       HPI:    Presents for 5 day f/u infected wound RLE.    Reports  nurse has been checking his leg and gave him Skintegrity to clean it with and bandages which he is using to change dressing 2 times per day with wound improving.      Allergies and Meds: 274}     Review of patient's allergies indicates:   Allergen Reactions    Mayonnaise      Upset stomach        Current Outpatient Medications   Medication Sig Dispense Refill    blood-glucose sensor (FREESTYLE ADORE 3 SENSOR) Giulia 1 each by Misc.(Non-Drug; Combo Route) route once.      cholecalciferol, vitamin D3, 125 mcg (5,000 unit) capsule Take 5,000 Units by mouth once daily.      clindamycin (CLEOCIN) 300 MG capsule Take 1 capsule (300 mg total) by mouth every 8 (eight) hours. for 10 days 30 capsule 0    coenzyme Q10 200 mg capsule Take 200 mg by mouth once daily.      cyanocobalamin (VITAMIN B-12) 1000 MCG tablet Take 100 mcg by mouth once daily.      mupirocin (BACTROBAN) 2 % ointment Apply topically 3 (three) times daily. 22 g 0    nitroGLYCERIN (NITROSTAT) 0.4 MG SL tablet Place 1 tablet (0.4 mg total) under the tongue every 5 (five) minutes as needed for Chest pain. 25 tablet 1    SURE COMFORT PEN NEEDLE 32 gauge x " Ndle AS DIRECTED      clopidogreL (PLAVIX) 75 mg tablet Take 1 tablet (75 mg total) by mouth once daily. 90 tablet 3    dapagliflozin propanediol (FARXIGA) 10 mg tablet Take 1 tablet (10 mg total) by mouth once daily. " "90 tablet 3    glipiZIDE 5 MG TR24 Take 1 tablet (5 mg total) by mouth daily with breakfast. 90 tablet 1    insulin glargine U-100, Lantus, (LANTUS SOLOSTAR U-100 INSULIN) 100 unit/mL (3 mL) InPn pen Inject 45 Units into the skin every evening. 45 mL 3    pantoprazole (PROTONIX) 40 MG tablet Take 1 tablet (40 mg total) by mouth once daily. 90 tablet 3    rosuvastatin (CRESTOR) 40 MG Tab Take 1 tablet (40 mg total) by mouth every evening. 90 tablet 3     No current facility-administered medications for this visit.     Objective:  274}   Vital Signs  Temp: 97.6 °F (36.4 °C)  Temp Source: Oral  Pulse: 67  Resp: 18  SpO2: 97 %  BP: (!) 142/71  BP Location: Left arm  Patient Position: Sitting  Pain Score: 0-No pain  Height and Weight  Height: 5' 4.5" (163.8 cm)  Weight: 79.6 kg (175 lb 6.4 oz)  BSA (Calculated - sq m): 1.9 sq meters  BMI (Calculated): 29.7  Weight in (lb) to have BMI = 25: 147.6    Over the last two weeks how often have you been bothered by little interest or pleasure in doing things: 0  Over the last two weeks how often have you been bothered by feeling down, depressed or hopeless: 0  PHQ-2 Total Score: 0  PHQ-9 Score: 0  PHQ-9 Interpretation: Minimal or None    Wt Readings from Last 3 Encounters:   06/03/24 79.6 kg (175 lb 6.4 oz)   05/29/24 78.9 kg (174 lb)   05/21/24 78.9 kg (174 lb)     Physical Exam  Vitals and nursing note reviewed.   Constitutional:       General: He is not in acute distress.     Appearance: Normal appearance. He is not ill-appearing.   HENT:      Head: Normocephalic.   Eyes:      Conjunctiva/sclera: Conjunctivae normal.   Cardiovascular:      Rate and Rhythm: Normal rate.   Pulmonary:      Effort: Pulmonary effort is normal. No respiratory distress.   Skin:     General: Skin is warm and dry.      Coloration: Skin is not jaundiced or pale.   Neurological:      Mental Status: He is alert and oriented to person, place, and time.      Gait: Gait normal.   Psychiatric:         Mood " and Affect: Mood normal.         Behavior: Behavior normal.         Thought Content: Thought content normal.         Judgment: Judgment normal.              Assessment and Plan: 274}     1. Infected abrasion of right leg, initial encounter  Assessment & Plan:  Improving with leg much less red and swollen than on visit 5 days ago.  Continued monitoring per home health nurse and dressing changes at home with mupirocin 3 times per day.  Complete clindamycin.  Call clinic Monday if needs antibiotic to be extended for one-week.      2. Mixed hyperlipidemia  -     rosuvastatin (CRESTOR) 40 MG Tab; Take 1 tablet (40 mg total) by mouth every evening.  Dispense: 90 tablet; Refill: 3    3. Gastroesophageal reflux disease without esophagitis  -     pantoprazole (PROTONIX) 40 MG tablet; Take 1 tablet (40 mg total) by mouth once daily.  Dispense: 90 tablet; Refill: 3    4. Type 2 diabetes mellitus with stage 3a chronic kidney disease, with long-term current use of insulin  -     dapagliflozin propanediol (FARXIGA) 10 mg tablet; Take 1 tablet (10 mg total) by mouth once daily.  Dispense: 90 tablet; Refill: 3    5. Stage 3a chronic kidney disease  -     dapagliflozin propanediol (FARXIGA) 10 mg tablet; Take 1 tablet (10 mg total) by mouth once daily.  Dispense: 90 tablet; Refill: 3    6. History of coronary artery stent placement  Overview:  [facility] 2007    Orders:  -     clopidogreL (PLAVIX) 75 mg tablet; Take 1 tablet (75 mg total) by mouth once daily.  Dispense: 90 tablet; Refill: 3    7. Atherosclerosis of native coronary artery of native heart without angina pectoris  Overview:  [facility] 2/26/2007    Orders:  -     clopidogreL (PLAVIX) 75 mg tablet; Take 1 tablet (75 mg total) by mouth once daily.  Dispense: 90 tablet; Refill: 3    8. Type 2 diabetes mellitus with hyperglycemia, with long-term current use of insulin  Assessment & Plan:  Fluctuating glucose levels but A1c much improved on recent check.  Lab Results    Component Value Date    HGBA1C 6.5 05/21/2024     Continue f/u with Nicole Dewey NP for DM management  Continue Lantus 45 units daily, glipizide 5 mg daily, and Farxiga 10 mg daily.    Orders:  -     glipiZIDE 5 MG TR24; Take 1 tablet (5 mg total) by mouth daily with breakfast.  Dispense: 90 tablet; Refill: 1  -     insulin glargine U-100, Lantus, (LANTUS SOLOSTAR U-100 INSULIN) 100 unit/mL (3 mL) InPn pen; Inject 45 Units into the skin every evening.  Dispense: 45 mL; Refill: 3      Diagnosis, risks, benefits, and side effects of any meds and treatment plan were discussed with the patient.  Patient to call or follow-up with any new or worsening symptoms or problems prior to next appointment.  Go to ER for any urgent complications.  All questions were answered to the satisfaction of the patient, and pt verbalized understanding and agreement to treatment plan.      Follow up if symptoms worsen or fail to improve.    Signature:  BARBARA Green-BC    Future Appointments   Date Time Provider Department Center   7/1/2024  8:15 AM Thaddeus Leroy MD [facility] SPINE [facility]   9/24/2024  1:20 PM Smiley Trevino FNP [facility]   2/26/2025  3:00 PM AWV NURSE, [facility] FAMILY MEDICINE [facility]

## 2024-06-03 NOTE — ASSESSMENT & PLAN NOTE
Improving with leg much less red and swollen than on visit 5 days ago.  Continued monitoring per home health nurse and dressing changes at home with mupirocin 3 times per day.  Complete clindamycin.  Call clinic Monday if needs antibiotic to be extended for one-week.

## 2024-06-21 ENCOUNTER — DOCUMENT SCAN (OUTPATIENT)
Dept: HOME HEALTH SERVICES | Facility: HOSPITAL | Age: 84
End: 2024-06-21
Payer: MEDICARE

## 2024-06-28 DIAGNOSIS — S22.068D OTHER CLOSED FRACTURE OF EIGHTH THORACIC VERTEBRA WITH ROUTINE HEALING, SUBSEQUENT ENCOUNTER: Primary | ICD-10-CM

## 2024-07-01 ENCOUNTER — OFFICE VISIT (OUTPATIENT)
Dept: SPINE | Facility: CLINIC | Age: 84
End: 2024-07-01
Payer: MEDICARE

## 2024-07-01 ENCOUNTER — HOSPITAL ENCOUNTER (OUTPATIENT)
Dept: RADIOLOGY | Facility: HOSPITAL | Age: 84
Discharge: HOME | End: 2024-07-01
Attending: ORTHOPAEDIC SURGERY
Payer: MEDICARE

## 2024-07-01 DIAGNOSIS — S22.068D OTHER CLOSED FRACTURE OF EIGHTH THORACIC VERTEBRA WITH ROUTINE HEALING, SUBSEQUENT ENCOUNTER: Primary | ICD-10-CM

## 2024-07-01 DIAGNOSIS — S22.068D OTHER CLOSED FRACTURE OF EIGHTH THORACIC VERTEBRA WITH ROUTINE HEALING, SUBSEQUENT ENCOUNTER: ICD-10-CM

## 2024-07-01 PROCEDURE — 99999 PR PBB SHADOW E&M-EST. PATIENT-LVL II: CPT | Mod: PBBFAC,,, | Performed by: ORTHOPAEDIC SURGERY

## 2024-07-01 PROCEDURE — 99212 OFFICE O/P EST SF 10 MIN: CPT | Mod: PBBFAC,25 | Performed by: ORTHOPAEDIC SURGERY

## 2024-07-01 PROCEDURE — 72082 X-RAY EXAM ENTIRE SPI 2/3 VW: CPT | Mod: TC

## 2024-07-01 PROCEDURE — 99214 OFFICE O/P EST MOD 30 MIN: CPT | Mod: S$PBB,,, | Performed by: ORTHOPAEDIC SURGERY

## 2024-07-01 NOTE — PROGRESS NOTES
MDM/time:   30-35 minutes spent on this encounter including 10 minutes reviewing imaging and notes, 15 minutes with the patient, 5 minutes documentation    ASSESSMENT:  83 y.o. male with thoracic spine fractures (T3/T4/T7/T8)    PLAN:  Activity as tolerated.  Follow-up as needed      HPI:  83 y.o. male here for repeat evaluation of multiple thoracic fractures that he sustained after falling backwards pulling on an ice chest.  Reports he has been doing well.  Denies any back pain.  Denies any new injuries.  Patient has had multiple falls, most recently January 21, 2024 he was seen in the emergency room after tripping going up a step with a laceration to his forehead.  Patient denies difficulty with  strength.  No numbness tingling in his arms or legs.  Denies bladder bowel incontinence.  Some balance issues and recent falls as discussed above.  Currently takes no medications for pain.  No recent physical therapy.  No prior pain management.  Patient is not no prior spine surgeries in the past.  He is not a smoker.      IMAGING:  X-ray scoliosis series reviewed show:  On the AP there is normal coronal alignment of the thoracic spine and slight lumbar curvature to the right.  There are 5 non-rib-bearing lumbar vertebra.  On the lateral view there is decreased thoracic kyphosis and lumbar lordosis.  There is spondylotic disease of the thoracic and lumbar spine.  There is ankylosis of the thoracic and lumbar spine.  Fractures noted on prior CT not well visualized on these images.    Past Medical History:   Diagnosis Date    Abnormal thyroid stimulating hormone (TSH) level 08/22/2019    Anemia 08/15/2019    Atrial fibrillation 09/07/2012    Bradycardia 05/23/2017    asymptomatic    Change in bowel habit 11/13/2018    Chronic kidney disease, unspecified 01/14/2019    Coronary arteriosclerosis 09/07/2012    Disease of skin and subcutaneous tissue 08/16/2017    medial aspect RLE- cystic structure seen on venous doppler  US.    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 Lewis Eye exam    Varicose veins of both lower extremities with pain 01/02/2019    Venous insufficiency 01/08/2019      Past Surgical History:   Procedure Laterality Date    APPENDECTOMY      CARDIAC CATHETERIZATION  2007    [facility]- Stent placement    CATARACT EXTRACTION, BILATERAL      COLONOSCOPY  11/06/2019    Dr. Mendoza    HERNIA REPAIR      Inguinal hernia repair    TONSILLECTOMY        Review of patient's allergies indicates:   Allergen Reactions    Mayonnaise      Upset stomach        Current Outpatient Medications:     blood-glucose sensor (FREESTYLE ADORE 3 SENSOR) Giulia, 1 each by Misc.(Non-Drug; Combo Route) route once., Disp: , Rfl:     cholecalciferol, vitamin D3, 125 mcg (5,000 unit) capsule, Take 5,000 Units by mouth once daily., Disp: , Rfl:     clopidogreL (PLAVIX) 75 mg tablet, Take 1 tablet (75 mg  "total) by mouth once daily., Disp: 90 tablet, Rfl: 3    coenzyme Q10 200 mg capsule, Take 200 mg by mouth once daily., Disp: , Rfl:     cyanocobalamin (VITAMIN B-12) 1000 MCG tablet, Take 100 mcg by mouth once daily., Disp: , Rfl:     dapagliflozin propanediol (FARXIGA) 10 mg tablet, Take 1 tablet (10 mg total) by mouth once daily., Disp: 90 tablet, Rfl: 3    glipiZIDE 5 MG TR24, Take 1 tablet (5 mg total) by mouth daily with breakfast., Disp: 90 tablet, Rfl: 1    insulin glargine U-100, Lantus, (LANTUS SOLOSTAR U-100 INSULIN) 100 unit/mL (3 mL) InPn pen, Inject 45 Units into the skin every evening., Disp: 45 mL, Rfl: 3    mupirocin (BACTROBAN) 2 % ointment, Apply topically 3 (three) times daily., Disp: 22 g, Rfl: 0    nitroGLYCERIN (NITROSTAT) 0.4 MG SL tablet, Place 1 tablet (0.4 mg total) under the tongue every 5 (five) minutes as needed for Chest pain., Disp: 25 tablet, Rfl: 1    pantoprazole (PROTONIX) 40 MG tablet, Take 1 tablet (40 mg total) by mouth once daily., Disp: 90 tablet, Rfl: 3    rosuvastatin (CRESTOR) 40 MG Tab, Take 1 tablet (40 mg total) by mouth every evening., Disp: 90 tablet, Rfl: 3    SURE COMFORT PEN NEEDLE 32 gauge x 5/32" Ndle, AS DIRECTED, Disp: , Rfl:      EXAM:  Constitutional  General Appearance:  Healthy appearing, normal body habitus, NAD  Psychiatric   Orientation: Oriented to time, oriented to place, oriented to person  Mood and Affect: Active and alert, normal mood, normal affect  Gait and Station   Appearance:  Ambulates with a shuffling gait, unable to tandem gait, unable to walk on toes, unable to walk on heels    Thoracic Spine   Inspection:  Kyphotic alignment, no overlying skin changes  Bony Palpation:  + tenderness of the spinous process  Soft Tissue Palpation:+ tenderness of the paraspinals left, no tenderness of the paraspinals right  Active Range of Motion:  extension decreased, flexion decreased    Motor Strength   L1 Right:  Hip flexion iliopsoas 5/5    L1 Left:  Hip " flexion iliopsoas 5/5              L2-L4 Right:  Knee extension quadriceps 5/5, tibialis anterior 5/5              L2-L4 Left:  Knee extension quadriceps 5/5, tibialis anterior 5/5   L5 Right:  Extensor halluces longus 5/5,    L5 Left:  Extensor halluces longus 5/5,    S1 Right:  Plantar flexion gastrocnemius 5/5   S1 Left:  Plantar flexion gastrocnemius 5/5    Neurological System   Ankle Reflex Right:  normal   Ankle Reflex Left: normal   Knee Reflex Right:  normal   Knee Reflex Left:  normal   Sensation on the Right:  L2 normal, L3 normal, L4 normal, L5 normal, S1 normal   Sensation on the Left:  L2 normal, L3 normal, L4 normal, L5 normal, S1 normal  Special Test on the Right:  Seated straight leg raising test negative, no clonus of the ankle  Special Test on the Left:  Seated straight leg raising test negative, no clonus of the ankle    Skin   Lumbosacral Spine:  Normal skin    Cardiovascular System   Arterial Pulses Right:  Posterior tibialis normal, dorsalis pedis normal, popliteal normal   Arterial Pulses Left:  Posterior tibialis normal, dorsalis pedis normal, popliteal normal   Edema Right: None   Edema Left:  None

## 2024-07-09 DIAGNOSIS — Z71.89 COMPLEX CARE COORDINATION: ICD-10-CM

## 2024-08-22 ENCOUNTER — HOSPITAL ENCOUNTER (INPATIENT)
Facility: HOSPITAL | Age: 84
LOS: 6 days | Discharge: NURSING, REHAB OR LONG-TERM CARE | DRG: 464 | End: 2024-08-29
Attending: EMERGENCY MEDICINE | Admitting: SURGERY
Payer: MEDICARE

## 2024-08-22 DIAGNOSIS — E87.5 HYPERKALEMIA: ICD-10-CM

## 2024-08-22 DIAGNOSIS — E11.22 TYPE 2 DIABETES MELLITUS WITH STAGE 3A CHRONIC KIDNEY DISEASE, WITH LONG-TERM CURRENT USE OF INSULIN: Chronic | ICD-10-CM

## 2024-08-22 DIAGNOSIS — R50.9 FEVER: ICD-10-CM

## 2024-08-22 DIAGNOSIS — K61.1 PERIRECTAL ABSCESS: Primary | ICD-10-CM

## 2024-08-22 DIAGNOSIS — M79.89 NECROTIZING SOFT TISSUE INFECTION: ICD-10-CM

## 2024-08-22 DIAGNOSIS — E11.65 TYPE 2 DIABETES MELLITUS WITH HYPERGLYCEMIA, WITH LONG-TERM CURRENT USE OF INSULIN: Chronic | ICD-10-CM

## 2024-08-22 DIAGNOSIS — R78.81 POSITIVE BLOOD CULTURE: ICD-10-CM

## 2024-08-22 DIAGNOSIS — Z79.4 TYPE 2 DIABETES MELLITUS WITH HYPERGLYCEMIA, WITH LONG-TERM CURRENT USE OF INSULIN: Chronic | ICD-10-CM

## 2024-08-22 DIAGNOSIS — Z79.4 TYPE 2 DIABETES MELLITUS WITH STAGE 3A CHRONIC KIDNEY DISEASE, WITH LONG-TERM CURRENT USE OF INSULIN: Chronic | ICD-10-CM

## 2024-08-22 DIAGNOSIS — I10 PRIMARY HYPERTENSION: ICD-10-CM

## 2024-08-22 DIAGNOSIS — N18.31 TYPE 2 DIABETES MELLITUS WITH STAGE 3A CHRONIC KIDNEY DISEASE, WITH LONG-TERM CURRENT USE OF INSULIN: Chronic | ICD-10-CM

## 2024-08-22 PROCEDURE — 99285 EMERGENCY DEPT VISIT HI MDM: CPT | Mod: 25

## 2024-08-22 PROCEDURE — 96365 THER/PROPH/DIAG IV INF INIT: CPT

## 2024-08-22 PROCEDURE — 96361 HYDRATE IV INFUSION ADD-ON: CPT

## 2024-08-23 ENCOUNTER — ANESTHESIA EVENT (OUTPATIENT)
Dept: SURGERY | Facility: HOSPITAL | Age: 84
End: 2024-08-23
Payer: MEDICARE

## 2024-08-23 ENCOUNTER — ANESTHESIA (OUTPATIENT)
Dept: SURGERY | Facility: HOSPITAL | Age: 84
End: 2024-08-23
Payer: MEDICARE

## 2024-08-23 PROBLEM — M79.89 NECROTIZING SOFT TISSUE INFECTION: Status: ACTIVE | Noted: 2024-08-23

## 2024-08-23 PROBLEM — K61.1 PERIRECTAL ABSCESS: Status: ACTIVE | Noted: 2024-08-23

## 2024-08-23 PROBLEM — I48.0 PAROXYSMAL ATRIAL FIBRILLATION: Status: ACTIVE | Noted: 2024-08-23

## 2024-08-23 LAB
ALBUMIN SERPL BCP-MCNC: 2.8 G/DL (ref 3.5–5)
ALBUMIN/GLOB SERPL: 0.8 {RATIO}
ALP SERPL-CCNC: 66 U/L (ref 45–115)
ALT SERPL W P-5'-P-CCNC: 27 U/L (ref 16–61)
ANION GAP SERPL CALCULATED.3IONS-SCNC: 9 MMOL/L (ref 7–16)
AST SERPL W P-5'-P-CCNC: 47 U/L (ref 15–37)
BASOPHILS # BLD AUTO: 0.06 K/UL (ref 0–0.2)
BASOPHILS NFR BLD AUTO: 0.3 % (ref 0–1)
BILIRUB SERPL-MCNC: 0.8 MG/DL (ref ?–1.2)
BILIRUB UR QL STRIP: NEGATIVE
BUN SERPL-MCNC: 44 MG/DL (ref 7–18)
BUN/CREAT SERPL: 28 (ref 6–20)
CALCIUM SERPL-MCNC: 8.1 MG/DL (ref 8.5–10.1)
CHLORIDE SERPL-SCNC: 103 MMOL/L (ref 98–107)
CLARITY UR: CLEAR
CO2 SERPL-SCNC: 23 MMOL/L (ref 21–32)
COLOR UR: ABNORMAL
CREAT SERPL-MCNC: 1.57 MG/DL (ref 0.7–1.3)
CRENATED CELLS: ABNORMAL
DIFFERENTIAL METHOD BLD: ABNORMAL
EGFR (NO RACE VARIABLE) (RUSH/TITUS): 43 ML/MIN/1.73M2
EOSINOPHIL # BLD AUTO: 0.12 K/UL (ref 0–0.5)
EOSINOPHIL NFR BLD AUTO: 0.6 % (ref 1–4)
ERYTHROCYTE [DISTWIDTH] IN BLOOD BY AUTOMATED COUNT: 13.8 % (ref 11.5–14.5)
EST. AVERAGE GLUCOSE BLD GHB EST-MCNC: 146 MG/DL
GLOBULIN SER-MCNC: 3.3 G/DL (ref 2–4)
GLUCOSE SERPL-MCNC: 101 MG/DL (ref 70–105)
GLUCOSE SERPL-MCNC: 111 MG/DL (ref 70–105)
GLUCOSE SERPL-MCNC: 116 MG/DL (ref 74–106)
GLUCOSE SERPL-MCNC: 168 MG/DL (ref 70–105)
GLUCOSE UR STRIP-MCNC: 300 MG/DL
HBA1C MFR BLD HPLC: 6.7 % (ref 4.5–6.6)
HCT VFR BLD AUTO: 36 % (ref 40–54)
HGB BLD-MCNC: 11.5 G/DL (ref 13.5–18)
IMM GRANULOCYTES # BLD AUTO: 0.48 K/UL (ref 0–0.04)
IMM GRANULOCYTES NFR BLD: 2.3 % (ref 0–0.4)
KETONES UR STRIP-SCNC: NEGATIVE MG/DL
LACTATE SERPL-SCNC: 1.4 MMOL/L (ref 0.4–2)
LEUKOCYTE ESTERASE UR QL STRIP: NEGATIVE
LYMPHOCYTES # BLD AUTO: 1.02 K/UL (ref 1–4.8)
LYMPHOCYTES NFR BLD AUTO: 4.8 % (ref 27–41)
MCH RBC QN AUTO: 29.7 PG (ref 27–31)
MCHC RBC AUTO-ENTMCNC: 31.9 G/DL (ref 32–36)
MCV RBC AUTO: 93 FL (ref 80–96)
MONOCYTES # BLD AUTO: 2.3 K/UL (ref 0–0.8)
MONOCYTES NFR BLD AUTO: 10.9 % (ref 2–6)
MPC BLD CALC-MCNC: 10.8 FL (ref 9.4–12.4)
MUCOUS, UA: ABNORMAL /LPF
NEUTROPHILS # BLD AUTO: 17.12 K/UL (ref 1.8–7.7)
NEUTROPHILS NFR BLD AUTO: 81.1 % (ref 53–65)
NITRITE UR QL STRIP: NEGATIVE
NRBC # BLD AUTO: 0 X10E3/UL
NRBC, AUTO (.00): 0 %
OVALOCYTES BLD QL SMEAR: ABNORMAL
PH UR STRIP: 5.5 PH UNITS
PLATELET # BLD AUTO: 169 K/UL (ref 150–400)
PLATELET MORPHOLOGY: ABNORMAL
POTASSIUM SERPL-SCNC: 4 MMOL/L (ref 3.5–5.1)
PROT SERPL-MCNC: 6.1 G/DL (ref 6.4–8.2)
PROT UR QL STRIP: 30
RBC # BLD AUTO: 3.87 M/UL (ref 4.6–6.2)
RBC # UR STRIP: ABNORMAL /UL
RBC #/AREA URNS HPF: 3 /HPF
SARS-COV-2 RDRP RESP QL NAA+PROBE: NEGATIVE
SODIUM SERPL-SCNC: 131 MMOL/L (ref 136–145)
SP GR UR STRIP: 1.01
SQUAMOUS #/AREA URNS LPF: ABNORMAL /HPF
UROBILINOGEN UR STRIP-ACNC: NORMAL MG/DL
WBC # BLD AUTO: 21.1 K/UL (ref 4.5–11)
WBC #/AREA URNS HPF: 2 /HPF

## 2024-08-23 PROCEDURE — 87076 CULTURE ANAEROBE IDENT EACH: CPT | Performed by: SURGERY

## 2024-08-23 PROCEDURE — 99900035 HC TECH TIME PER 15 MIN (STAT)

## 2024-08-23 PROCEDURE — 87040 BLOOD CULTURE FOR BACTERIA: CPT | Performed by: EMERGENCY MEDICINE

## 2024-08-23 PROCEDURE — 37000008 HC ANESTHESIA 1ST 15 MINUTES: Performed by: SURGERY

## 2024-08-23 PROCEDURE — 87077 CULTURE AEROBIC IDENTIFY: CPT | Performed by: EMERGENCY MEDICINE

## 2024-08-23 PROCEDURE — 63600175 PHARM REV CODE 636 W HCPCS: Performed by: EMERGENCY MEDICINE

## 2024-08-23 PROCEDURE — 36000705 HC OR TIME LEV I EA ADD 15 MIN: Performed by: SURGERY

## 2024-08-23 PROCEDURE — 99223 1ST HOSP IP/OBS HIGH 75: CPT | Mod: ,,, | Performed by: HOSPITALIST

## 2024-08-23 PROCEDURE — 82962 GLUCOSE BLOOD TEST: CPT

## 2024-08-23 PROCEDURE — 25000003 PHARM REV CODE 250: Performed by: SURGERY

## 2024-08-23 PROCEDURE — 63600175 PHARM REV CODE 636 W HCPCS: Mod: JZ,JG | Performed by: SURGERY

## 2024-08-23 PROCEDURE — 63600175 PHARM REV CODE 636 W HCPCS: Performed by: NURSE ANESTHETIST, CERTIFIED REGISTERED

## 2024-08-23 PROCEDURE — 27000655: Performed by: ANESTHESIOLOGY

## 2024-08-23 PROCEDURE — 46040 I&D ISCHIORCT&/PERIRCT ABSC: CPT | Mod: ,,, | Performed by: SURGERY

## 2024-08-23 PROCEDURE — 87070 CULTURE OTHR SPECIMN AEROBIC: CPT | Performed by: SURGERY

## 2024-08-23 PROCEDURE — 63600175 PHARM REV CODE 636 W HCPCS: Performed by: HOSPITALIST

## 2024-08-23 PROCEDURE — 88304 TISSUE EXAM BY PATHOLOGIST: CPT | Mod: 26,,, | Performed by: PATHOLOGY

## 2024-08-23 PROCEDURE — 71000033 HC RECOVERY, INTIAL HOUR: Performed by: SURGERY

## 2024-08-23 PROCEDURE — 63600175 PHARM REV CODE 636 W HCPCS: Performed by: SURGERY

## 2024-08-23 PROCEDURE — 36415 COLL VENOUS BLD VENIPUNCTURE: CPT | Performed by: EMERGENCY MEDICINE

## 2024-08-23 PROCEDURE — 83605 ASSAY OF LACTIC ACID: CPT | Performed by: EMERGENCY MEDICINE

## 2024-08-23 PROCEDURE — 27000510 HC BLANKET BAIR HUGGER ANY SIZE: Performed by: ANESTHESIOLOGY

## 2024-08-23 PROCEDURE — 99900031 HC PATIENT EDUCATION (STAT)

## 2024-08-23 PROCEDURE — 27000758 HC SPIROMETER

## 2024-08-23 PROCEDURE — 63600175 PHARM REV CODE 636 W HCPCS: Mod: JZ,JG | Performed by: EMERGENCY MEDICINE

## 2024-08-23 PROCEDURE — 80053 COMPREHEN METABOLIC PANEL: CPT | Performed by: EMERGENCY MEDICINE

## 2024-08-23 PROCEDURE — 83036 HEMOGLOBIN GLYCOSYLATED A1C: CPT | Performed by: HOSPITALIST

## 2024-08-23 PROCEDURE — 94761 N-INVAS EAR/PLS OXIMETRY MLT: CPT

## 2024-08-23 PROCEDURE — 81003 URINALYSIS AUTO W/O SCOPE: CPT | Performed by: EMERGENCY MEDICINE

## 2024-08-23 PROCEDURE — 87635 SARS-COV-2 COVID-19 AMP PRB: CPT | Performed by: EMERGENCY MEDICINE

## 2024-08-23 PROCEDURE — 85025 COMPLETE CBC W/AUTO DIFF WBC: CPT | Performed by: EMERGENCY MEDICINE

## 2024-08-23 PROCEDURE — 11000001 HC ACUTE MED/SURG PRIVATE ROOM

## 2024-08-23 PROCEDURE — 37000009 HC ANESTHESIA EA ADD 15 MINS: Performed by: SURGERY

## 2024-08-23 PROCEDURE — 36000704 HC OR TIME LEV I 1ST 15 MIN: Performed by: SURGERY

## 2024-08-23 PROCEDURE — 27000716 HC OXISENSOR PROBE, ANY SIZE: Performed by: ANESTHESIOLOGY

## 2024-08-23 PROCEDURE — 0JBB0ZZ EXCISION OF PERINEUM SUBCUTANEOUS TISSUE AND FASCIA, OPEN APPROACH: ICD-10-PCS | Performed by: SURGERY

## 2024-08-23 PROCEDURE — 88304 TISSUE EXAM BY PATHOLOGIST: CPT | Mod: TC,SUR | Performed by: SURGERY

## 2024-08-23 PROCEDURE — 25000003 PHARM REV CODE 250: Performed by: NURSE ANESTHETIST, CERTIFIED REGISTERED

## 2024-08-23 PROCEDURE — D9220A PRA ANESTHESIA: Mod: ANES,,, | Performed by: ANESTHESIOLOGY

## 2024-08-23 PROCEDURE — 99223 1ST HOSP IP/OBS HIGH 75: CPT | Mod: 57,AI,, | Performed by: SURGERY

## 2024-08-23 PROCEDURE — 27000177 HC AIRWAY, LARYNGEAL MASK: Performed by: ANESTHESIOLOGY

## 2024-08-23 PROCEDURE — 25000003 PHARM REV CODE 250: Performed by: EMERGENCY MEDICINE

## 2024-08-23 PROCEDURE — D9220A PRA ANESTHESIA: Mod: CRNA,,, | Performed by: NURSE ANESTHETIST, CERTIFIED REGISTERED

## 2024-08-23 RX ORDER — MEPERIDINE HYDROCHLORIDE 25 MG/ML
25 INJECTION INTRAMUSCULAR; INTRAVENOUS; SUBCUTANEOUS EVERY 10 MIN PRN
Status: DISCONTINUED | OUTPATIENT
Start: 2024-08-23 | End: 2024-08-23 | Stop reason: HOSPADM

## 2024-08-23 RX ORDER — CLOPIDOGREL BISULFATE 75 MG/1
75 TABLET ORAL DAILY
Status: DISCONTINUED | OUTPATIENT
Start: 2024-08-24 | End: 2024-08-25

## 2024-08-23 RX ORDER — INSULIN ASPART 100 [IU]/ML
0-10 INJECTION, SOLUTION INTRAVENOUS; SUBCUTANEOUS
Status: DISCONTINUED | OUTPATIENT
Start: 2024-08-23 | End: 2024-08-29 | Stop reason: HOSPADM

## 2024-08-23 RX ORDER — HYDROCODONE BITARTRATE AND ACETAMINOPHEN 5; 325 MG/1; MG/1
1 TABLET ORAL EVERY 4 HOURS PRN
Status: DISCONTINUED | OUTPATIENT
Start: 2024-08-23 | End: 2024-08-29 | Stop reason: HOSPADM

## 2024-08-23 RX ORDER — ONDANSETRON HYDROCHLORIDE 2 MG/ML
INJECTION, SOLUTION INTRAVENOUS
Status: DISCONTINUED | OUTPATIENT
Start: 2024-08-23 | End: 2024-08-23

## 2024-08-23 RX ORDER — PHENYLEPHRINE HYDROCHLORIDE 10 MG/ML
INJECTION INTRAVENOUS
Status: DISCONTINUED | OUTPATIENT
Start: 2024-08-23 | End: 2024-08-23

## 2024-08-23 RX ORDER — PROPOFOL 10 MG/ML
VIAL (ML) INTRAVENOUS
Status: DISCONTINUED | OUTPATIENT
Start: 2024-08-23 | End: 2024-08-23

## 2024-08-23 RX ORDER — IPRATROPIUM BROMIDE AND ALBUTEROL SULFATE 2.5; .5 MG/3ML; MG/3ML
3 SOLUTION RESPIRATORY (INHALATION) ONCE
Status: DISCONTINUED | OUTPATIENT
Start: 2024-08-23 | End: 2024-08-23 | Stop reason: HOSPADM

## 2024-08-23 RX ORDER — SODIUM CHLORIDE 9 MG/ML
INJECTION, SOLUTION INTRAVENOUS CONTINUOUS
Status: DISCONTINUED | OUTPATIENT
Start: 2024-08-23 | End: 2024-08-29 | Stop reason: HOSPADM

## 2024-08-23 RX ORDER — ENOXAPARIN SODIUM 100 MG/ML
30 INJECTION SUBCUTANEOUS EVERY 24 HOURS
Status: DISCONTINUED | OUTPATIENT
Start: 2024-08-23 | End: 2024-08-25

## 2024-08-23 RX ORDER — HYDROMORPHONE HYDROCHLORIDE 2 MG/ML
0.5 INJECTION, SOLUTION INTRAMUSCULAR; INTRAVENOUS; SUBCUTANEOUS EVERY 5 MIN PRN
Status: DISCONTINUED | OUTPATIENT
Start: 2024-08-23 | End: 2024-08-23 | Stop reason: HOSPADM

## 2024-08-23 RX ORDER — MORPHINE SULFATE 10 MG/ML
4 INJECTION INTRAMUSCULAR; INTRAVENOUS; SUBCUTANEOUS EVERY 5 MIN PRN
Status: DISCONTINUED | OUTPATIENT
Start: 2024-08-23 | End: 2024-08-23 | Stop reason: HOSPADM

## 2024-08-23 RX ORDER — ACETAMINOPHEN 500 MG
1000 TABLET ORAL
Status: COMPLETED | OUTPATIENT
Start: 2024-08-23 | End: 2024-08-23

## 2024-08-23 RX ORDER — DIPHENHYDRAMINE HYDROCHLORIDE 50 MG/ML
25 INJECTION INTRAMUSCULAR; INTRAVENOUS EVERY 6 HOURS PRN
Status: DISCONTINUED | OUTPATIENT
Start: 2024-08-23 | End: 2024-08-23 | Stop reason: HOSPADM

## 2024-08-23 RX ORDER — IBUPROFEN 200 MG
16 TABLET ORAL
Status: DISCONTINUED | OUTPATIENT
Start: 2024-08-23 | End: 2024-08-29 | Stop reason: HOSPADM

## 2024-08-23 RX ORDER — CLINDAMYCIN PHOSPHATE 900 MG/50ML
900 INJECTION, SOLUTION INTRAVENOUS
Status: COMPLETED | OUTPATIENT
Start: 2024-08-23 | End: 2024-08-23

## 2024-08-23 RX ORDER — HYDROCODONE BITARTRATE AND ACETAMINOPHEN 10; 325 MG/1; MG/1
1 TABLET ORAL EVERY 4 HOURS PRN
Status: DISCONTINUED | OUTPATIENT
Start: 2024-08-23 | End: 2024-08-29 | Stop reason: HOSPADM

## 2024-08-23 RX ORDER — INSULIN GLARGINE 100 [IU]/ML
45 INJECTION, SOLUTION SUBCUTANEOUS NIGHTLY
Status: DISCONTINUED | OUTPATIENT
Start: 2024-08-23 | End: 2024-08-23

## 2024-08-23 RX ORDER — LIDOCAINE HYDROCHLORIDE 20 MG/ML
INJECTION, SOLUTION EPIDURAL; INFILTRATION; INTRACAUDAL; PERINEURAL
Status: DISCONTINUED | OUTPATIENT
Start: 2024-08-23 | End: 2024-08-23

## 2024-08-23 RX ORDER — PANTOPRAZOLE SODIUM 40 MG/1
40 TABLET, DELAYED RELEASE ORAL DAILY
Status: DISCONTINUED | OUTPATIENT
Start: 2024-08-24 | End: 2024-08-29 | Stop reason: HOSPADM

## 2024-08-23 RX ORDER — BUPIVACAINE HYDROCHLORIDE 2.5 MG/ML
INJECTION, SOLUTION EPIDURAL; INFILTRATION; INTRACAUDAL
Status: DISCONTINUED | OUTPATIENT
Start: 2024-08-23 | End: 2024-08-23 | Stop reason: HOSPADM

## 2024-08-23 RX ORDER — IBUPROFEN 200 MG
24 TABLET ORAL
Status: DISCONTINUED | OUTPATIENT
Start: 2024-08-23 | End: 2024-08-29 | Stop reason: HOSPADM

## 2024-08-23 RX ORDER — MUPIROCIN 20 MG/G
1 OINTMENT TOPICAL 2 TIMES DAILY
Status: COMPLETED | OUTPATIENT
Start: 2024-08-23 | End: 2024-08-28

## 2024-08-23 RX ORDER — CLINDAMYCIN PHOSPHATE 600 MG/50ML
600 INJECTION, SOLUTION INTRAVENOUS
Status: DISCONTINUED | OUTPATIENT
Start: 2024-08-23 | End: 2024-08-29 | Stop reason: HOSPADM

## 2024-08-23 RX ORDER — INSULIN GLARGINE 100 [IU]/ML
25 INJECTION, SOLUTION SUBCUTANEOUS NIGHTLY
Status: DISCONTINUED | OUTPATIENT
Start: 2024-08-23 | End: 2024-08-24

## 2024-08-23 RX ORDER — CHOLECALCIFEROL (VITAMIN D3) 10 MCG
5000 TABLET ORAL DAILY
Status: DISCONTINUED | OUTPATIENT
Start: 2024-08-24 | End: 2024-08-23

## 2024-08-23 RX ORDER — GLUCAGON 1 MG
1 KIT INJECTION
Status: DISCONTINUED | OUTPATIENT
Start: 2024-08-23 | End: 2024-08-29 | Stop reason: HOSPADM

## 2024-08-23 RX ORDER — ONDANSETRON HYDROCHLORIDE 2 MG/ML
4 INJECTION, SOLUTION INTRAVENOUS DAILY PRN
Status: DISCONTINUED | OUTPATIENT
Start: 2024-08-23 | End: 2024-08-23 | Stop reason: HOSPADM

## 2024-08-23 RX ORDER — ACETAMINOPHEN 500 MG
5000 TABLET ORAL DAILY
Status: DISCONTINUED | OUTPATIENT
Start: 2024-08-24 | End: 2024-08-29 | Stop reason: HOSPADM

## 2024-08-23 RX ORDER — FENTANYL CITRATE 50 UG/ML
INJECTION, SOLUTION INTRAMUSCULAR; INTRAVENOUS
Status: DISCONTINUED | OUTPATIENT
Start: 2024-08-23 | End: 2024-08-23

## 2024-08-23 RX ADMIN — ENOXAPARIN SODIUM 30 MG: 30 INJECTION SUBCUTANEOUS at 05:08

## 2024-08-23 RX ADMIN — ONDANSETRON 4 MG: 2 INJECTION INTRAMUSCULAR; INTRAVENOUS at 11:08

## 2024-08-23 RX ADMIN — FENTANYL CITRATE 50 MCG: 50 INJECTION, SOLUTION INTRAMUSCULAR; INTRAVENOUS at 11:08

## 2024-08-23 RX ADMIN — ACETAMINOPHEN 1000 MG: 500 TABLET ORAL at 12:08

## 2024-08-23 RX ADMIN — MUPIROCIN 1 G: 20 OINTMENT TOPICAL at 09:08

## 2024-08-23 RX ADMIN — LIDOCAINE HYDROCHLORIDE 100 MG: 20 INJECTION, SOLUTION INTRAVENOUS at 11:08

## 2024-08-23 RX ADMIN — SODIUM CHLORIDE: 9 INJECTION, SOLUTION INTRAVENOUS at 11:08

## 2024-08-23 RX ADMIN — PROPOFOL 80 MG: 10 INJECTION, EMULSION INTRAVENOUS at 11:08

## 2024-08-23 RX ADMIN — SODIUM CHLORIDE, POTASSIUM CHLORIDE, SODIUM LACTATE AND CALCIUM CHLORIDE 500 ML: 600; 310; 30; 20 INJECTION, SOLUTION INTRAVENOUS at 02:08

## 2024-08-23 RX ADMIN — FENTANYL CITRATE 50 MCG: 50 INJECTION, SOLUTION INTRAMUSCULAR; INTRAVENOUS at 12:08

## 2024-08-23 RX ADMIN — PIPERACILLIN SODIUM AND TAZOBACTAM SODIUM 4.5 G: 4; .5 INJECTION, POWDER, LYOPHILIZED, FOR SOLUTION INTRAVENOUS at 06:08

## 2024-08-23 RX ADMIN — VANCOMYCIN HYDROCHLORIDE 1750 MG: 500 INJECTION, POWDER, LYOPHILIZED, FOR SOLUTION INTRAVENOUS at 06:08

## 2024-08-23 RX ADMIN — INSULIN GLARGINE 25 UNITS: 100 INJECTION, SOLUTION SUBCUTANEOUS at 09:08

## 2024-08-23 RX ADMIN — CLINDAMYCIN PHOSPHATE 600 MG: 600 INJECTION, SOLUTION INTRAVENOUS at 11:08

## 2024-08-23 RX ADMIN — PHENYLEPHRINE HYDROCHLORIDE 200 MCG: 10 INJECTION INTRAVENOUS at 12:08

## 2024-08-23 RX ADMIN — CLINDAMYCIN PHOSPHATE 900 MG: 900 INJECTION, SOLUTION INTRAVENOUS at 08:08

## 2024-08-23 NOTE — SUBJECTIVE & OBJECTIVE
Past Medical History:   Diagnosis Date    Abnormal thyroid stimulating hormone (TSH) level 08/22/2019    Anemia 08/15/2019    Atrial fibrillation 09/07/2012    Bradycardia 05/23/2017    asymptomatic    Change in bowel habit 11/13/2018    Chronic kidney disease, unspecified 01/14/2019    Coronary arteriosclerosis 09/07/2012    Disease of skin and subcutaneous tissue 08/16/2017    medial aspect RLE- cystic structure seen on venous doppler US.    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 Lewis Eye exam    Varicose veins of both lower extremities with pain 01/02/2019    Venous insufficiency 01/08/2019       Past Surgical History:   Procedure Laterality Date    APPENDECTOMY      CARDIAC CATHETERIZATION  2007    [facility]- Stent placement    CATARACT EXTRACTION, BILATERAL      COLONOSCOPY  11/06/2019    Dr. Mendoza    HERNIA REPAIR      Inguinal hernia repair     "TONSILLECTOMY         Review of patient's allergies indicates:   Allergen Reactions    Mayonnaise      Upset stomach       No current facility-administered medications on file prior to encounter.     Current Outpatient Medications on File Prior to Encounter   Medication Sig    blood-glucose sensor (FREESTYLE ADORE 3 SENSOR) Giulia 1 each by Misc.(Non-Drug; Combo Route) route once.    cholecalciferol, vitamin D3, 125 mcg (5,000 unit) capsule Take 5,000 Units by mouth once daily.    clopidogreL (PLAVIX) 75 mg tablet Take 1 tablet (75 mg total) by mouth once daily.    coenzyme Q10 200 mg capsule Take 200 mg by mouth once daily.    cyanocobalamin (VITAMIN B-12) 1000 MCG tablet Take 100 mcg by mouth once daily.    dapagliflozin propanediol (FARXIGA) 10 mg tablet Take 1 tablet (10 mg total) by mouth once daily.    glipiZIDE 5 MG TR24 Take 1 tablet (5 mg total) by mouth daily with breakfast.    insulin glargine U-100, Lantus, (LANTUS SOLOSTAR U-100 INSULIN) 100 unit/mL (3 mL) InPn pen Inject 45 Units into the skin every evening.    pantoprazole (PROTONIX) 40 MG tablet Take 1 tablet (40 mg total) by mouth once daily.    rosuvastatin (CRESTOR) 40 MG Tab Take 1 tablet (40 mg total) by mouth every evening.    SURE COMFORT PEN NEEDLE 32 gauge x 5/32" Ndle AS DIRECTED    nitroGLYCERIN (NITROSTAT) 0.4 MG SL tablet Place 1 tablet (0.4 mg total) under the tongue every 5 (five) minutes as needed for Chest pain.    [DISCONTINUED] mupirocin (BACTROBAN) 2 % ointment Apply topically 3 (three) times daily.     Family History       Problem Relation (Age of Onset)    Diabetes Brother    Heart disease Father    Neurofibromatosis Brother    No Known Problems Mother, Brother, Maternal Grandmother, Maternal Grandfather, Paternal Grandmother, Paternal Grandfather    Throat cancer Sister          Tobacco Use    Smoking status: Former     Current packs/day: 0.00     Average packs/day: 0.1 packs/day for 3.0 years (0.3 ttl pk-yrs)     Types: " Cigarettes     Start date:      Quit date:      Years since quittin.6     Passive exposure: Past    Smokeless tobacco: Never    Tobacco comments:     Quit 10/15/1976   Substance and Sexual Activity    Alcohol use: Not Currently     Comment: occasionally    Drug use: Never    Sexual activity: Not Currently     Review of Systems   Constitutional:  Negative for appetite change, fatigue and fever.   HENT:  Negative for congestion, hearing loss and trouble swallowing.    Respiratory:  Negative for chest tightness, shortness of breath and wheezing.    Cardiovascular:  Negative for chest pain and palpitations.   Gastrointestinal:  Positive for constipation and rectal pain. Negative for abdominal pain and nausea.   Genitourinary:  Negative for difficulty urinating and dysuria.   Musculoskeletal:  Positive for gait problem. Negative for back pain and neck stiffness.   Skin:  Negative for pallor and rash.   Neurological:  Negative for dizziness, speech difficulty and headaches.   Psychiatric/Behavioral:  Positive for confusion. Negative for suicidal ideas.      Objective:     Vital Signs (Most Recent):  Temp: 97.7 °F (36.5 °C) (24 1329)  Pulse: 88 (24 1818)  Resp: 15 (24 1310)  BP: (!) 96/49 (24 1818)  SpO2: (!) 85 % (24 181) Vital Signs (24h Range):  Temp:  [97.6 °F (36.4 °C)-102.7 °F (39.3 °C)] 97.7 °F (36.5 °C)  Pulse:  [] 88  Resp:  [11-20] 15  SpO2:  [85 %-99 %] 85 %  BP: ()/(49-70) 96/49     Weight: 81.6 kg (180 lb)  Body mass index is 28.19 kg/m².     Physical Exam  Vitals reviewed.   Constitutional:       General: He is awake. He is not in acute distress.     Appearance: He is well-developed. He is not toxic-appearing.   HENT:      Head: Normocephalic.      Nose: Nose normal.      Mouth/Throat:      Pharynx: Oropharynx is clear.   Eyes:      Extraocular Movements: Extraocular movements intact.      Pupils: Pupils are equal, round, and reactive to light.   Neck:       Thyroid: No thyroid mass.      Vascular: No carotid bruit.   Cardiovascular:      Rate and Rhythm: Normal rate and regular rhythm.      Pulses: Normal pulses.      Heart sounds: Normal heart sounds. No murmur heard.  Pulmonary:      Effort: Pulmonary effort is normal.      Breath sounds: Normal breath sounds and air entry. No wheezing.   Abdominal:      General: Bowel sounds are normal. There is no distension.      Palpations: Abdomen is soft.      Tenderness: There is no abdominal tenderness.   Musculoskeletal:         General: Normal range of motion.      Cervical back: Neck supple. No rigidity.   Skin:     General: Skin is warm.      Coloration: Skin is not jaundiced.      Findings: No lesion.      Comments: Dressing over surgical site to perirectal area   Neurological:      General: No focal deficit present.      Mental Status: He is alert and oriented to person, place, and time.      Cranial Nerves: No cranial nerve deficit.      Comments: Patient is oriented and was able to give me a fairly good history but less several details out and got mixed up on some things had to be straightened out from records   Psychiatric:         Attention and Perception: Attention normal.         Mood and Affect: Mood normal.         Behavior: Behavior normal. Behavior is cooperative.         Thought Content: Thought content normal.         Cognition and Memory: Cognition normal.          Significant Labs: All pertinent labs within the past 24 hours have been reviewed.  BMP:   Recent Labs   Lab 08/23/24  0146   *   *   K 4.0      CO2 23   BUN 44*   CREATININE 1.57*   CALCIUM 8.1*     CBC:   Recent Labs   Lab 08/23/24  0305   WBC 21.10*   HGB 11.5*   HCT 36.0*        CMP:   Recent Labs   Lab 08/23/24  0146   *   K 4.0      CO2 23   *   BUN 44*   CREATININE 1.57*   CALCIUM 8.1*   PROT 6.1*   ALBUMIN 2.8*   BILITOT 0.8   ALKPHOS 66   AST 47*   ALT 27   ANIONGAP 9       Significant  Imaging: I have reviewed all pertinent imaging results/findings within the past 24 hours.    Imaging Results              CT Chest Abdomen Pelvis Without Contrast (XPD) (Final result)  Result time 08/23/24 07:44:53      Final result by Donald Monreal MD (08/23/24 07:44:53)                   Impression:      There is evidence of necrotizing fasciitis of the posterior peroneal area extending into the soft tissues right lateral to the intergluteal cleft.  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  Verbal report to Dr. Loya at 07:30 is documented.    Cholelithiasis    Moderate retained stool in the colon    Moderate coronary artery calcification      Electronically signed by: Donald Monreal  Date:    08/23/2024  Time:    07:44               Narrative:    EXAMINATION:  CT chest abdomen pelvis without IV contrast    CLINICAL HISTORY:  Sepsis    TECHNIQUE:  Axial CT images were obtained from the lung apices through the abdomen and pelvis without IV contrast.  Oral contrast was not given.  Coronal and sagittal reconstructions submitted and interpreted.  Total DLP 1321.9 mGycm.  Automated exposure control utilized.    COMPARISON:  No previous similar    FINDINGS:  CT chest: No supraclavicular soft tissue mass is seen.  There is no mediastinal lymphadenopathy by short-axis diameter criteria.  There is no mediastinal mass otherwise.  There is no focal aneurysm of the moderately calcified thoracic aorta.  There is moderate coronary artery calcification present.    There is no pericardial effusion.  There is trace bilateral pleural effusion.    There is mild dependent atelectasis in the lungs.    CT abdomen:    There is no evidence of pneumoperitoneum.    Liver, bile ducts, spleen, and adrenal glands are unremarkable in noncontrast CT appearance.    Gallbladder is mildly distended and contain some layering radiopaque gallstones.    There is some diffuse fatty infiltration of the pancreas.  Pancreas is  otherwise unremarkable.    There is no hydronephrosis or abnormal perinephric fluid.  There is no radiopaque renal stone.    There is no aneurysm of the moderately calcified abdominal aorta.    Stomach is grossly unremarkable but is not well distended, limiting evaluation.    Appendix is not seen.    There is no rosalee bowel obstruction.  There is moderate retained stool in the colon.    There is no retroperitoneal lymphadenopathy by short-axis diameter criteria.    CT pelvis:    There is abnormal soft tissue emphysema in the right perineal soft tissues posteriorly along the right side of the intergluteal cleft.  This could represent necrotizing fasciitis.  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    There is no soft tissue mass within the pelvis itself.  Urinary bladder is moderately distended.    No acute osseous findings.  There is prominent degenerative disc narrowing and moderate spondylosis of the thoracolumbar spine                                       X-Ray Chest AP Portable (Final result)  Result time 08/23/24 07:36:09      Final result by Flako Perez MD (08/23/24 07:36:09)                   Impression:      No acute findings.      Electronically signed by: Flako Perez  Date:    08/23/2024  Time:    07:36               Narrative:    EXAMINATION:  XR CHEST AP PORTABLE    CLINICAL HISTORY:  Fever, unspecified    TECHNIQUE:  Single frontal view of the chest was performed.    COMPARISON:  02/01/2024    FINDINGS:  Heart size normal. Lungs clear. No pneumothorax or pleural effusion.                                    Intake/Output - Last 3 Shifts         08/21 0700 08/22 0659 08/22 0700 08/23 0659 08/23 0700  08/24 0659    I.V. (mL/kg)   25 (0.3)    IV Piggyback  1000 550    Total Intake(mL/kg)  1000 (12.3) 575 (7)    Blood   10    Total Output   10    Net  +1000 +565                 Microbiology Results (last 7 days)       Procedure Component Value Units Date/Time    Culture, Anaerobe  [[id number]] Collected: 08/23/24 1246    Order Status: Sent Specimen: Abscess from Perirectal Updated: 08/23/24 1246    Culture, Wound [[id number]] Collected: 08/23/24 1245    Order Status: Sent Specimen: Wound from Perirectal Updated: 08/23/24 1246    Blood Culture #1 [[id number]] Collected: 08/23/24 0146    Order Status: Sent Specimen: Blood Updated: 08/23/24 0159    Blood Culture #2 [[id number]] Collected: 08/23/24 0150    Order Status: Sent Specimen: Blood Updated: 08/23/24 0158

## 2024-08-23 NOTE — NURSING
Pt rc'd to the unit via stretcher ; dressing to buttocks noted to be clean dry and intact. Pt oriented to room and controls, bed in lowest position. Will resume care and monitor.

## 2024-08-23 NOTE — ED NOTES
Pt placed on a hospital bed. Warm blankets given. Call bell within pt reach. Instructed pt to press button for any needs.

## 2024-08-23 NOTE — H&P
[facility] - Emergency Department  General Surgery  History & Physical    Patient Name: Negro Hale  MRN: [id number]  Admission Date: 8/22/2024  Attending Physician: He Loya MD   Primary Care Provider: Smiley Trevino FNP    Patient information was obtained from patient, spouse/SO, and ER records.     Subjective:     Chief Complaint/Reason for Admission: rectal pain    History of Present Illness:  Patient is a 84 y.o. male presents with few day history of some rectal discomfort.  He has been having some fevers at home as well.  Came in and CT scan showed this rectal abscess in the right side elevated white count 21.  Never had this before.  He was diabetic.    No current facility-administered medications on file prior to encounter.     Current Outpatient Medications on File Prior to Encounter   Medication Sig    blood-glucose sensor (FREESTYLE ADORE 3 SENSOR) Giulia 1 each by Misc.(Non-Drug; Combo Route) route once.    cholecalciferol, vitamin D3, 125 mcg (5,000 unit) capsule Take 5,000 Units by mouth once daily.    clopidogreL (PLAVIX) 75 mg tablet Take 1 tablet (75 mg total) by mouth once daily.    coenzyme Q10 200 mg capsule Take 200 mg by mouth once daily.    cyanocobalamin (VITAMIN B-12) 1000 MCG tablet Take 100 mcg by mouth once daily.    dapagliflozin propanediol (FARXIGA) 10 mg tablet Take 1 tablet (10 mg total) by mouth once daily.    glipiZIDE 5 MG TR24 Take 1 tablet (5 mg total) by mouth daily with breakfast.    insulin glargine U-100, Lantus, (LANTUS SOLOSTAR U-100 INSULIN) 100 unit/mL (3 mL) InPn pen Inject 45 Units into the skin every evening.    mupirocin (BACTROBAN) 2 % ointment Apply topically 3 (three) times daily.    nitroGLYCERIN (NITROSTAT) 0.4 MG SL tablet Place 1 tablet (0.4 mg total) under the tongue every 5 (five) minutes as needed for Chest pain.    pantoprazole (PROTONIX) 40 MG tablet Take 1 tablet (40 mg total) by mouth once daily.    rosuvastatin (CRESTOR) 40  "MG Tab Take 1 tablet (40 mg total) by mouth every evening.    SURE COMFORT PEN NEEDLE 32 gauge x 5/32" Ndle AS DIRECTED       Review of patient's allergies indicates:   Allergen Reactions    Mayonnaise      Upset stomach       Past Medical History:   Diagnosis Date    Abnormal thyroid stimulating hormone (TSH) level 08/22/2019    Anemia 08/15/2019    Atrial fibrillation 09/07/2012    Bradycardia 05/23/2017    asymptomatic    Change in bowel habit 11/13/2018    Chronic kidney disease, unspecified 01/14/2019    Coronary arteriosclerosis 09/07/2012    Disease of skin and subcutaneous tissue 08/16/2017    medial aspect RLE- cystic structure seen on venous doppler US.    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 Lewis Eye exam    Varicose veins of both lower extremities with pain 01/02/2019    Venous insufficiency 01/08/2019     Past Surgical History:   Procedure " Laterality Date    APPENDECTOMY      CARDIAC CATHETERIZATION      [facility]- Stent placement    CATARACT EXTRACTION, BILATERAL      COLONOSCOPY  2019    Dr. Mendoza    HERNIA REPAIR      Inguinal hernia repair    TONSILLECTOMY       Family History       Problem Relation (Age of Onset)    Diabetes Brother    Heart disease Father    Neurofibromatosis Brother    No Known Problems Mother, Brother, Maternal Grandmother, Maternal Grandfather, Paternal Grandmother, Paternal Grandfather    Throat cancer Sister          Tobacco Use    Smoking status: Former     Current packs/day: 0.00     Average packs/day: 0.1 packs/day for 3.0 years (0.3 ttl pk-yrs)     Types: Cigarettes     Start date:      Quit date:      Years since quittin.6     Passive exposure: Past    Smokeless tobacco: Never    Tobacco comments:     Quit 10/15/1976   Substance and Sexual Activity    Alcohol use: Not Currently     Comment: occasionally    Drug use: Never    Sexual activity: Not Currently     Review of Systems   Constitutional:  Negative for activity change, appetite change, fatigue and fever.   HENT:  Negative for trouble swallowing.    Respiratory:  Negative for cough and shortness of breath.    Cardiovascular:  Negative for chest pain and palpitations.   Gastrointestinal:  Positive for rectal pain. Negative for abdominal distention, abdominal pain, blood in stool, constipation and diarrhea.   Genitourinary:  Negative for flank pain.   Musculoskeletal:  Negative for neck pain and neck stiffness.   Neurological:  Negative for weakness.     Objective:     Vital Signs (Most Recent):  Temp: 97.6 °F (36.4 °C) (24 0754)  Pulse: 79 (24 0754)  Resp: 18 (24 0754)  BP: (!) 109/58 (24 0754)  SpO2: 96 % (24 0754) Vital Signs (24h Range):  Temp:  [97.6 °F (36.4 °C)-102.7 °F (39.3 °C)] 97.6 °F (36.4 °C)  Pulse:  [] 79  Resp:  [18] 18  SpO2:  [93 %-96 %] 96 %  BP: (109-144)/(49-58) 109/58     Weight: 81.6  kg (180 lb)  Body mass index is 28.19 kg/m².    Physical Exam  Constitutional:       General: He is not in acute distress.  HENT:      Head: Normocephalic.   Cardiovascular:      Rate and Rhythm: Normal rate and regular rhythm.      Pulses: Normal pulses.   Pulmonary:      Effort: Pulmonary effort is normal. No respiratory distress.      Breath sounds: Normal breath sounds.   Abdominal:      General: Abdomen is flat. There is no distension.      Palpations: Abdomen is soft.      Tenderness: There is no abdominal tenderness.   Genitourinary:     Comments: Perirectal area has an indurated fluctuant area with some purple discoloration.  Musculoskeletal:         General: Normal range of motion.   Skin:     General: Skin is warm.   Neurological:      General: No focal deficit present.      Mental Status: He is oriented to person, place, and time.         Significant Labs:  I have reviewed all pertinent lab results within the past 24 hours.  CBC:   Recent Labs   Lab 08/23/24  0305   WBC 21.10*   RBC 3.87*   HGB 11.5*   HCT 36.0*      MCV 93.0   MCH 29.7   MCHC 31.9*     BMP:   Recent Labs   Lab 08/23/24  0146   *   *   K 4.0      CO2 23   BUN 44*   CREATININE 1.57*   CALCIUM 8.1*       Significant Diagnostics:  I have reviewed all pertinent imaging results/findings within the past 24 hours.  CT: I have reviewed all pertinent results/findings within the past 24 hours and my personal findings are:  Right perirectal abscess    Assessment/Plan:     Active Diagnoses:    Diagnosis Date Noted POA    Perirectal abscess [K61.1] 08/23/2024 Unknown      Problems Resolved During this Admission:     VTE Risk Mitigation (From admission, onward)      None          Patient go to the OR today for incision and drainage of right perirectal abscess.  Risks and benefits explained to the patient including risk of bleeding, infection, need for additional operations.  All questions were answered.    Pop Butcher,  MD  General Surgery  [facility] - Emergency Department

## 2024-08-23 NOTE — ED NOTES
Pt's family states that pt is generally confused at baseline. Family states he was using the restroom and he could not stand from off commode. Family came in to try and get him off the commode and could not lift him and just lowered him to the ground. Pt states he just felt like he couldn't use his muscles. Family states that his urine has had a foul odor.

## 2024-08-23 NOTE — TRANSFER OF CARE
"Anesthesia Transfer of Care Note    Patient: Negro Hale    Procedure(s) Performed: Procedure(s) (LRB):  INCISION AND DRAINAGE, WITH DEBRIDEMENT PERIRECTAL REGION (Right)    Patient location: PACU    Anesthesia Type: general    Transport from OR: Transported from OR on 6-10 L/min O2 by face mask with adequate spontaneous ventilation    Post pain: adequate analgesia    Post assessment: no apparent anesthetic complications    Post vital signs: stable    Level of consciousness: awake and alert    Nausea/Vomiting: no nausea/vomiting    Complications: none    Transfer of care protocol was followed      Last vitals: Visit Vitals  BP (!) 119/49 (BP Location: Right arm, Patient Position: Lying)   Pulse 73   Temp 37 °C (98.6 °F) (Oral)   Resp 16   Ht 5' 7" (1.702 m)   Wt 81.6 kg (180 lb)   SpO2 99%   BMI 28.19 kg/m²     "

## 2024-08-23 NOTE — PHARMACY MED REC
"Admission Medication History     The home medication history was taken by Shobha Montelongo.    You may go to "Admission" then "Reconcile Home Medications" tabs to review and/or act upon these items.     The home medication list has been updated by the Pharmacy department.   Please read ALL comments highlighted in yellow.   Please address this information as you see fit.    Feel free to contact us if you have any questions or require assistance.      Patient reports no longer taking the following medication(s). The medication(s) listed below were removed from the home medication list. Please reorder if appropriate:  Mupirocin 2% ointment    Medications listed below were obtained from: Analytic software- [employer], Medical records, and Patient's pharmacy  PTA Medications   Medication Sig    blood-glucose sensor (AppsperseSTYLE ADORE 3 SENSOR) Giulia 1 each by Misc.(Non-Drug; Combo Route) route once.    cholecalciferol, vitamin D3, 125 mcg (5,000 unit) capsule Take 5,000 Units by mouth once daily.    clopidogreL (PLAVIX) 75 mg tablet Take 1 tablet (75 mg total) by mouth once daily.    coenzyme Q10 200 mg capsule Take 200 mg by mouth once daily.    cyanocobalamin (VITAMIN B-12) 1000 MCG tablet Take 100 mcg by mouth once daily.    dapagliflozin propanediol (FARXIGA) 10 mg tablet Take 1 tablet (10 mg total) by mouth once daily.    glipiZIDE 5 MG TR24 Take 1 tablet (5 mg total) by mouth daily with breakfast.    insulin glargine U-100, Lantus, (LANTUS SOLOSTAR U-100 INSULIN) 100 unit/mL (3 mL) InPn pen Inject 45 Units into the skin every evening.    pantoprazole (PROTONIX) 40 MG tablet Take 1 tablet (40 mg total) by mouth once daily.    rosuvastatin (CRESTOR) 40 MG Tab Take 1 tablet (40 mg total) by mouth every evening.    SURE COMFORT PEN NEEDLE 32 gauge x 5/32" Ndle AS DIRECTED    nitroGLYCERIN (NITROSTAT) 0.4 MG SL tablet Place 1 tablet (0.4 mg total) under the tongue every 5 (five) minutes as needed for Chest pain.         Current " "Outpatient Medications on File Prior to Encounter   Medication Sig Dispense Refill Last Dose    blood-glucose sensor (FREESTYLE ADORE 3 SENSOR) Giulia 1 each by Misc.(Non-Drug; Combo Route) route once.   Past Week    cholecalciferol, vitamin D3, 125 mcg (5,000 unit) capsule Take 5,000 Units by mouth once daily.   Past Week    clopidogreL (PLAVIX) 75 mg tablet Take 1 tablet (75 mg total) by mouth once daily. 90 tablet 3 Past Week    coenzyme Q10 200 mg capsule Take 200 mg by mouth once daily.   Past Week    cyanocobalamin (VITAMIN B-12) 1000 MCG tablet Take 100 mcg by mouth once daily.   Past Week    dapagliflozin propanediol (FARXIGA) 10 mg tablet Take 1 tablet (10 mg total) by mouth once daily. 90 tablet 3 Past Week    glipiZIDE 5 MG TR24 Take 1 tablet (5 mg total) by mouth daily with breakfast. 90 tablet 1 Past Week    insulin glargine U-100, Lantus, (LANTUS SOLOSTAR U-100 INSULIN) 100 unit/mL (3 mL) InPn pen Inject 45 Units into the skin every evening. 45 mL 3 Past Week    pantoprazole (PROTONIX) 40 MG tablet Take 1 tablet (40 mg total) by mouth once daily. 90 tablet 3 Past Week    rosuvastatin (CRESTOR) 40 MG Tab Take 1 tablet (40 mg total) by mouth every evening. 90 tablet 3 Past Week    SURE COMFORT PEN NEEDLE 32 gauge x 5/32" Ndle AS DIRECTED   Past Week    nitroGLYCERIN (NITROSTAT) 0.4 MG SL tablet Place 1 tablet (0.4 mg total) under the tongue every 5 (five) minutes as needed for Chest pain. 25 tablet 1     [DISCONTINUED] mupirocin (BACTROBAN) 2 % ointment Apply topically 3 (three) times daily. 22 g 0          Potential issues to be addressed PRIOR TO DISCHARGE  No issues identified.    Shobha Montelongo  Pharmacy [facility] - Medication History  EXT. [phone]    .          "

## 2024-08-23 NOTE — ED NOTES
Consent for Incision and Drainage of Perirectal Abscess and Blood Consent signed after risk and benefits discussed per Dr Butcher.  Anesthesia consent to be signed after seen per Anesthesia.

## 2024-08-23 NOTE — ED PROVIDER NOTES
Encounter Date: 8/22/2024    SCRIBE #1 NOTE: I, Martha Pineda, am scribing for, and in the presence of,  Josh Barraza MD. I have scribed the entire note.       History     Chief Complaint   Patient presents with    Fatigue    Fever     This is an 85 y/o male,who presents to the ED via EMS with complaints of weakness. He states over the past few days he has been weak but this has worsened since starting. His wife notes the pt was in the bathroom and had a hard time getting off the toilet. Upon further examination, the pt's nurse reported he has a fever. There is no Hx of a cough or congestion. There are no other complaints/pain in the ED at this time. He has a known hx of Afib, HTN, diabetes, heart disease, CKD, and MI. There is no smoking hx on file.     The history is provided by the patient, the EMS personnel and the spouse.     Review of patient's allergies indicates:   Allergen Reactions    Mayonnaise      Upset stomach     Past Medical History:   Diagnosis Date    Abnormal thyroid stimulating hormone (TSH) level 08/22/2019    Anemia 08/15/2019    Atrial fibrillation 09/07/2012    Bradycardia 05/23/2017    asymptomatic    Change in bowel habit 11/13/2018    Chronic kidney disease, unspecified 01/14/2019    Coronary arteriosclerosis 09/07/2012    Disease of skin and subcutaneous tissue 08/16/2017    medial aspect RLE- cystic structure seen on venous doppler US.    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    Hyperlipidemia 09/21/2012    Lower extremity edema 04/06/2017    Lumbar radiculopathy 01/26/2017    Obesity, unspecified 12/05/2016    Old myocardial infarction 12/06/2017    Other secondary pulmonary hypertension 05/23/2017    Other  spondylosis, lumbosacral region 2017    Peripheral vascular disease     Personal history of tobacco use, presenting hazards to health 2016    Pulmonary hypertension     Type 2 diabetes mellitus with chronic kidney disease 2019    Type 2 diabetes mellitus with hyperglycemia 10/02/2011    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 Lewis    Type 2 diabetes mellitus with mild nonproliferative retinopathy of right eye without macular edema 2023    See Dr. Lewis Eye exam    Varicose veins of both lower extremities with pain 2019    Venous insufficiency 2019     Past Surgical History:   Procedure Laterality Date    APPENDECTOMY      CARDIAC CATHETERIZATION      [facility]- Stent placement    CATARACT EXTRACTION, BILATERAL      COLONOSCOPY  2019    Dr. Mendoza    HERNIA REPAIR      Inguinal hernia repair    TONSILLECTOMY       Family History   Problem Relation Name Age of Onset    No Known Problems Mother      Heart disease Father      Throat cancer Sister      Diabetes Brother      No Known Problems Brother      Neurofibromatosis Brother      No Known Problems Maternal Grandmother      No Known Problems Maternal Grandfather      No Known Problems Paternal Grandmother      No Known Problems Paternal Grandfather       Social History     Tobacco Use    Smoking status: Former     Current packs/day: 0.00     Average packs/day: 0.1 packs/day for 3.0 years (0.3 ttl pk-yrs)     Types: Cigarettes     Start date:      Quit date: 1960     Years since quittin.6     Passive exposure: Past    Smokeless tobacco: Never    Tobacco comments:     Quit 10/15/1976   Substance Use Topics    Alcohol use: Not Currently     Comment: occasionally    Drug use: Never     Review of Systems   Constitutional:  Positive for fever.   HENT:  Negative for congestion.    Respiratory:  Negative for cough.     Neurological:  Positive for weakness.   All other systems reviewed and are negative.      Physical Exam     Initial Vitals [08/23/24 0002]   BP Pulse Resp Temp SpO2   (!) 144/49 107 18 (!) 102.7 °F (39.3 °C) (!) 93 %      MAP       --         Physical Exam    Nursing note and vitals reviewed.  Constitutional: He appears well-developed and well-nourished.   HENT:   Head: Normocephalic and atraumatic.   Eyes: EOM are normal. Pupils are equal, round, and reactive to light.   Neck: Neck supple. No thyromegaly present.   Normal range of motion.  Cardiovascular:  Normal rate, regular rhythm, normal heart sounds and intact distal pulses.           No murmur heard.  Pulmonary/Chest: Breath sounds normal. No respiratory distress. He has no wheezes.   Abdominal: Abdomen is soft. Bowel sounds are normal. There is no abdominal tenderness.   Genitourinary:    Genitourinary Comments: There is a right rectal masses which isarenot tender     Musculoskeletal:         General: No tenderness or edema. Normal range of motion.      Cervical back: Normal range of motion and neck supple.     Lymphadenopathy:     He has no cervical adenopathy.   Neurological: He is alert and oriented to person, place, and time. He has normal strength and normal reflexes. No cranial nerve deficit or sensory deficit. GCS score is 15. GCS eye subscore is 4. GCS verbal subscore is 5. GCS motor subscore is 6.   Skin: Skin is warm and dry. Capillary refill takes less than 2 seconds. No rash noted.   Psychiatric: He has a normal mood and affect.         ED Course   Procedures  Labs Reviewed   COMPREHENSIVE METABOLIC PANEL - Abnormal       Result Value    Sodium 131 (*)     Potassium 4.0      Chloride 103      CO2 23      Anion Gap 9      Glucose 116 (*)     BUN 44 (*)     Creatinine 1.57 (*)     BUN/Creatinine Ratio 28 (*)     Calcium 8.1 (*)     Total Protein 6.1 (*)     Albumin 2.8 (*)     Globulin 3.3      A/G Ratio 0.8      Bilirubin, Total 0.8      Alk  Phos 66      ALT 27      AST 47 (*)     eGFR 43 (*)    URINALYSIS, REFLEX TO URINE CULTURE - Abnormal    Color, UA Light-Yellow      Clarity, UA Clear      pH, UA 5.5      Leukocytes, UA Negative      Nitrites, UA Negative      Protein, UA 30 (*)     Glucose,  (*)     Ketones, UA Negative      Urobilinogen, UA Normal      Bilirubin, UA Negative      Blood, UA Moderate (*)     Specific Gravity, UA 1.013     CBC WITH DIFFERENTIAL - Abnormal    WBC 21.10 (*)     RBC 3.87 (*)     Hemoglobin 11.5 (*)     Hematocrit 36.0 (*)     MCV 93.0      MCH 29.7      MCHC 31.9 (*)     RDW 13.8      Platelet Count 169      MPV 10.8      Neutrophils % 81.1 (*)     Lymphocytes % 4.8 (*)     Monocytes % 10.9 (*)     Eosinophils % 0.6 (*)     Basophils % 0.3      Immature Granulocytes % 2.3 (*)     nRBC, Auto 0.0      Neutrophils, Abs 17.12 (*)     Lymphocytes, Absolute 1.02      Monocytes, Absolute 2.30 (*)     Eosinophils, Absolute 0.12      Basophils, Absolute 0.06      Immature Granulocytes, Absolute 0.48 (*)     nRBC, Absolute 0.00      Diff Type Scan Smear     CBC MORPHOLOGY - Abnormal    Platelet Morphology Few Large Platelets (*)     Crenated Cells Few      Ovalocytes Few     URINALYSIS, MICROSCOPIC - Abnormal    WBC, UA 2      RBC, UA 3      Squamous Epithelial Cells, UA Occasional (*)     Mucous Occasional (*)    SARS-COV-2 RNA AMPLIFICATION, QUAL - Normal    SARS COV-2 Molecular Negative      Narrative:     Negative SARS-CoV results should not be used as the sole basis for treatment or patient management decisions; negative results should be considered in the context of a patient's recent exposures, history and the presene of clinical signs and symptoms consistent with COVID-19.  Negative results should be treated as presumptive and confirmed by molecular assay, if necessary for patient management.   LACTIC ACID, PLASMA - Normal    Lactic Acid 1.4     CULTURE, BLOOD   CULTURE, BLOOD   CBC W/ AUTO DIFFERENTIAL     Narrative:     The following orders were created for panel order CBC auto differential.  Procedure                               Abnormality         Status                     ---------                               -----------         ------                     CBC with Differential[[id number]]       Abnormal            Final result               Manual Differential[[id number]]                                                          Please view results for these tests on the individual orders.          Imaging Results              CT Chest Abdomen Pelvis Without Contrast (XPD) (Final result)  Result time 08/23/24 07:44:53      Final result by Donald Monreal MD (08/23/24 07:44:53)                   Impression:      There is evidence of necrotizing fasciitis of the posterior peroneal area extending into the soft tissues right lateral to the intergluteal cleft.  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  Verbal report to Dr. Loya at 07:30 is documented.    Cholelithiasis    Moderate retained stool in the colon    Moderate coronary artery calcification      Electronically signed by: Donald Monreal  Date:    08/23/2024  Time:    07:44               Narrative:    EXAMINATION:  CT chest abdomen pelvis without IV contrast    CLINICAL HISTORY:  Sepsis    TECHNIQUE:  Axial CT images were obtained from the lung apices through the abdomen and pelvis without IV contrast.  Oral contrast was not given.  Coronal and sagittal reconstructions submitted and interpreted.  Total DLP 1321.9 mGycm.  Automated exposure control utilized.    COMPARISON:  No previous similar    FINDINGS:  CT chest: No supraclavicular soft tissue mass is seen.  There is no mediastinal lymphadenopathy by short-axis diameter criteria.  There is no mediastinal mass otherwise.  There is no focal aneurysm of the moderately calcified thoracic aorta.  There is moderate coronary artery calcification present.    There is no pericardial  effusion.  There is trace bilateral pleural effusion.    There is mild dependent atelectasis in the lungs.    CT abdomen:    There is no evidence of pneumoperitoneum.    Liver, bile ducts, spleen, and adrenal glands are unremarkable in noncontrast CT appearance.    Gallbladder is mildly distended and contain some layering radiopaque gallstones.    There is some diffuse fatty infiltration of the pancreas.  Pancreas is otherwise unremarkable.    There is no hydronephrosis or abnormal perinephric fluid.  There is no radiopaque renal stone.    There is no aneurysm of the moderately calcified abdominal aorta.    Stomach is grossly unremarkable but is not well distended, limiting evaluation.    Appendix is not seen.    There is no rosalee bowel obstruction.  There is moderate retained stool in the colon.    There is no retroperitoneal lymphadenopathy by short-axis diameter criteria.    CT pelvis:    There is abnormal soft tissue emphysema in the right perineal soft tissues posteriorly along the right side of the intergluteal cleft.  This could represent necrotizing fasciitis.  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    There is no soft tissue mass within the pelvis itself.  Urinary bladder is moderately distended.    No acute osseous findings.  There is prominent degenerative disc narrowing and moderate spondylosis of the thoracolumbar spine                                       X-Ray Chest AP Portable (Final result)  Result time 08/23/24 07:36:09      Final result by Flako Perez MD (08/23/24 07:36:09)                   Impression:      No acute findings.      Electronically signed by: Flako Perez  Date:    08/23/2024  Time:    07:36               Narrative:    EXAMINATION:  XR CHEST AP PORTABLE    CLINICAL HISTORY:  Fever, unspecified    TECHNIQUE:  Single frontal view of the chest was performed.    COMPARISON:  02/01/2024    FINDINGS:  Heart size normal. Lungs clear. No pneumothorax or pleural effusion.                                        Medications   0.9%  NaCl infusion (has no administration in time range)   mupirocin 2 % ointment 1 g (has no administration in time range)   HYDROcodone-acetaminophen 5-325 mg per tablet 1 tablet (has no administration in time range)   enoxaparin injection 30 mg (30 mg Subcutaneous Given 8/23/24 1759)   HYDROcodone-acetaminophen  mg per tablet 1 tablet (has no administration in time range)   clindamycin in D5W 600 mg/50 mL IVPB 600 mg (600 mg Intravenous Not Given 8/23/24 1400)   vancomycin - pharmacy to dose (has no administration in time range)   piperacillin-tazobactam (ZOSYN) 4.5 g in D5W 100 mL IVPB (MB+) (4.5 g Intravenous Not Given 8/23/24 1330)   piperacillin-tazobactam (ZOSYN) 4.5 g in D5W 100 mL IVPB (MB+) (4.5 g Intravenous New Bag 8/23/24 1803)   vancomycin (VANCOCIN) 1,750 mg in D5W 500 mL IVPB (1,750 mg Intravenous New Bag 8/23/24 1806)   clopidogreL tablet 75 mg (has no administration in time range)   pantoprazole EC tablet 40 mg (has no administration in time range)   insulin glargine U-100 (Lantus) injection 25 Units (has no administration in time range)   glucose chewable tablet 16 g (has no administration in time range)   glucose chewable tablet 24 g (has no administration in time range)   glucagon (human recombinant) injection 1 mg (has no administration in time range)   insulin aspart U-100 injection 0-10 Units (has no administration in time range)   dextrose 10% bolus 125 mL 125 mL (has no administration in time range)   dextrose 10% bolus 250 mL 250 mL (has no administration in time range)   vitamin D 1000 units tablet 5,000 Units (has no administration in time range)   acetaminophen tablet 1,000 mg (1,000 mg Oral Given 8/23/24 0039)   lactated ringers bolus 500 mL (0 mLs Intravenous Stopped 8/23/24 0329)   lactated ringers bolus 500 mL (0 mLs Intravenous Stopped 8/23/24 0416)   clindamycin in D5W 900 mg/50 mL IVPB 900 mg (0 mg Intravenous Stopped  8/23/24 0858)     Medical Decision Making  This is an 85 y/o male,who presents to the ED via EMS with complaints of weakness. He states over the past few days he has been weak but this has worsened since starting. His wife notes the pt was in the bathroom and had a hard time getting off the toilet. Upon further examination, the pt's nurse reported he has a fever. There is no Hx of a cough or congestion. There are no other complaints/pain in the ED at this time. He has a known hx of Afib, HTN, diabetes, heart disease, CKD, and MI. There is no smoking hx on file.     Amount and/or Complexity of Data Reviewed  Independent Historian: spouse     Details: We spoke with the patient and his wife.   Labs: ordered.  Radiology: ordered.    Risk  OTC drugs.  Prescription drug management.  Decision regarding hospitalization.              Attending Attestation:           Physician Attestation for Scribe:  Physician Attestation Statement for Scribe #1: I, Josh Barraza MD, reviewed documentation, as scribed by Martha Pineda in my presence, and it is both accurate and complete.             ED Course as of 08/23/24 1913   Fri Aug 23, 2024   0609 Care transferred to Dr. Loya [HK]      ED Course User Index  [HK] Josh Barraza MD                           Clinical Impression:  Final diagnoses:  [R50.9] Fever  [K61.1] Perirectal abscess (Primary)          ED Disposition Condition    Admit Stable                Josh Barraza MD  08/23/24 1913

## 2024-08-23 NOTE — ED NOTES
Contacted patient's wife, Yareli Hale and notified her that patient will be going to surgery for Incision and Drainage of Perirectal Abscess.  Wife states that she will be coming back in a little while, patient notified.

## 2024-08-23 NOTE — ASSESSMENT & PLAN NOTE
Patient with known CAD s/p stent placement, which is controlled Will continue ASA and Statin and monitor for S/Sx of angina/ACS. Continue to monitor on telemetry.   [facility]AKTI mid LCx 2/26/2007  followed by Dr. Bridges; this history obtained from chart in patient did not remember    Cardiolite Impression:     1. Medium size, mild intensity [id number] inferolateral defect with decreased wall motion and thickening, suggestive of infarct.  2. The global left ventricular systolic function is normal with an LV ejection fraction of 70 % and no evidence of LV dilatation. Wall motion is impaired.  Electronically signed by:Gabriel Barahona  Date:                                            09/12/2023  Time:                                           14:33

## 2024-08-23 NOTE — OR NURSING
1242 Rec'd pt to PACU asleep with no distress noted, respirations even and unlabored. VSS. Perirectal dressing C/D/I. No needs. Will continue to monitor.     1314 Out of PACU. VSS. No signs of bleeding/distress noted. Family notified of transfer to room.      1325 Pt to room 466 awake and alert. No signs of distress noted, respirations even and unlabored. Family at bedside with pt belongings. Bedside report given to SANCHEZ Tafoya RN. Moved pt to regular bed with safety precautions in place. Perirectal dressing C/D/I. Denies pain/needs. /61, P 74, R 16, O2 96% 3L NC, T 97.7 oral.

## 2024-08-23 NOTE — ED NOTES
Radiology called CT result to Dr Loya, Dr Loya and Dr Butcher in to assess patient for possible surgery.  Patient remains NPO.  Patient placed in gown.

## 2024-08-23 NOTE — HPI
Thank you for consult    Chief complaint:  Rectal abscess    History of present illness:       Mr. Hale is a 84-year-old admitted 08/22 with rectal discomfort.  CT scan showed rectal abscess.  Patient admitted to Dr. Butcher.  Underwent surgery earlier 08/23 with I and D of perirectal region.   Patient did well with surgery.  Hospitalist service was asked to see patient postoperative.  Patient before admission had complained of rectal pain for proximally 1 week.  Had some subjective fever but no chills.  White blood cell count was noted to be elevated on admission.  Patient feels better after surgery.    Review of system:  See above.  Has had some past problems with constipation.  Has 2-3 episodes of nocturia nightly.  States weight is been stable.  Ambulates with cane and walker.  Review of systems otherwise negative.    Past medical history:  -hypertension greater than 20 years  -diabetes mellitus greater than 20 years.  States takes 1 shot of insulin 45 units daily.  -coronary arteriosclerosis has seen Dr. Bridges with BHAKTI mid LCx 2/26/2007  (patient did not remember this and obtained from records)  -paroxysmal atrial fibrillation with rhythm being in normal sinus  -dyslipidemia  -chronic back pain with past vertebral fracture followed by Dr. Destin Leroy    Past surgical history:  Surgery this admission for perirectal abscess with I and D  Bilateral cataract surgery  Remote appendectomy  Tonsillectomy  Inguinal hernia repair    Allergy to Mayonnaise    Social history:  No history of tobacco alcohol use    Family history  Father with heart disease    Health maintenance issues:  Primary care provider is Smiley Trevino NP  Had flu shots  Had previous Pneumovax  No known COVID-19 infection  COVID vaccination x2  Says no prior colonoscopy but looks to possibly have had 1 by Dr. Mendoza in 2019

## 2024-08-23 NOTE — ED NOTES
Assumed care of patient, resting in bed quietly without distress.  Patient with no complaints at present.  See Boarders Tab for complete assessment.

## 2024-08-23 NOTE — PROGRESS NOTES
"Pharmacokinetic Initial Assessment: IV Vancomycin    Assessment/Plan:    Initiate intravenous vancomycin with loading dose of 1750 mg q24h  Desired empiric serum trough concentration is 15 to 20 mcg/mL  Draw vancomycin trough level 30 min prior to fourth dose on 8/26 at approximately 1330  Pharmacy will continue to follow and monitor vancomycin.      Please contact pharmacy at extension [phone] with any questions regarding this assessment.        Patient brief summary:  Negro Hale is a 84 y.o. male initiated on antimicrobial therapy with IV Vancomycin for treatment of suspected skin & soft tissue infection/necrotizing fasciitis     Drug Allergies:   Review of patient's allergies indicates:   Allergen Reactions    Mayonnaise      Upset stomach       Actual Body Weight:   81.6kg    Renal Function:   Estimated Creatinine Clearance: 35.2 mL/min (A) (based on SCr of 1.57 mg/dL (H)).,     Dialysis Method (if applicable):  N/A    CBC (last 72 hours):  Recent Labs   Lab Result Units 08/23/24  0305   WBC K/uL 21.10*   Hemoglobin g/dL 11.5*   Hematocrit % 36.0*   Platelet Count K/uL 169   Lymphocytes % % 4.8*   Monocytes % % 10.9*   Eosinophils % % 0.6*   Basophils % % 0.3   Diff Type  Scan Smear       Metabolic Panel (last 72 hours):  Recent Labs   Lab Result Units 08/23/24  0146 08/23/24  0209   Sodium mmol/L 131*  --    Potassium mmol/L 4.0  --    Chloride mmol/L 103  --    CO2 mmol/L 23  --    Glucose mg/dL 116*  --    Glucose, UA mg/dL  --  300*   BUN mg/dL 44*  --    Creatinine mg/dL 1.57*  --    Albumin g/dL 2.8*  --    Bilirubin, Total mg/dL 0.8  --    Alk Phos U/L 66  --    AST U/L 47*  --    ALT U/L 27  --        Drug levels (last 3 results):  No results for input(s): "VANCOMYCINRA", "VANCORANDOM", "VANCOMYCINPE", "VANCOPEAK", "VANCOMYCINTR", "VANCOTROUGH" in the last 72 hours.    Microbiologic Results:  Microbiology Results (last 7 days)       Procedure Component Value Units Date/Time    Culture, Anaerobe " [[id number]] Collected: 08/23/24 1246    Order Status: Sent Specimen: Abscess from Perirectal Updated: 08/23/24 1246    Culture, Wound [[id number]] Collected: 08/23/24 1245    Order Status: Sent Specimen: Wound from Perirectal Updated: 08/23/24 1246    Blood Culture #1 [[id number]] Collected: 08/23/24 0146    Order Status: Sent Specimen: Blood Updated: 08/23/24 0159    Blood Culture #2 [[id number]] Collected: 08/23/24 0150    Order Status: Sent Specimen: Blood Updated: 08/23/24 0158            Davin Laureano PharmD, BCPS

## 2024-08-23 NOTE — ASSESSMENT & PLAN NOTE
Creatine stable for now. BMP reviewed- noted Estimated Creatinine Clearance: 35.2 mL/min (A) (based on SCr of 1.57 mg/dL (H)). according to latest data. Based on current GFR, CKD stage is stage 3 - GFR 30-59.  Monitor UOP and serial BMP and adjust therapy as needed. Renally dose meds. Avoid nephrotoxic medications and procedures.

## 2024-08-23 NOTE — ANESTHESIA PROCEDURE NOTES
Intubation    Date/Time: 8/23/2024 11:57 AM    Performed by: Paul Martinez CRNA  Authorized by: Adam Nayak MD    Intubation:     Induction:  Intravenous    Intubated:  Postinduction    Mask Ventilation:  Easy mask    Attempts:  1    Attempted By:  CRNA    Difficult Airway Encountered?: No      Complications:  None    Airway Device:  Supraglottic airway/LMA    Airway Device Size:  4.0    Style/Cuff Inflation:  Cuffed (inflated to minimal occlusive pressure)    Placement Verified By:  Capnometry    Complicating Factors:  None    Findings Post-Intubation:  BS equal bilateral

## 2024-08-23 NOTE — CONSULTS
[facility]  Consult Note    Patient Name: Negro Hale  MRN: [id number]  Admission Date: 8/22/2024  Hospital Length of Stay: 0 days  Attending Physician: No att. providers found   Primary Care Provider: Smiley Trevino FNP           Patient information was obtained from patient, past medical records, and ER records.     Inpatient consult to Internal Medicine  Consult performed by: Colin Lopez MD  Consult ordered by: Pop Butcher MD        Subjective:     Principal Problem: Necrotizing soft tissue infection    Chief Complaint:   Chief Complaint   Patient presents with    Fatigue    Fever        HPI: Thank you for consult    Chief complaint:  Rectal abscess    History of present illness:       Mr. Hale is a 84-year-old admitted 08/22 with rectal discomfort.  CT scan showed rectal abscess.  Patient admitted to Dr. Butcher.  Underwent surgery earlier 08/23 with I and D of perirectal region.   Patient did well with surgery.  Hospitalist service was asked to see patient postoperative.  Patient before admission had complained of rectal pain for proximally 1 week.  Had some subjective fever but no chills.  White blood cell count was noted to be elevated on admission.  Patient feels better after surgery.    Review of system:  See above.  Has had some past problems with constipation.  Has 2-3 episodes of nocturia nightly.  States weight is been stable.  Ambulates with cane and walker.  Review of systems otherwise negative.    Past medical history:  -hypertension greater than 20 years  -diabetes mellitus greater than 20 years.  States takes 1 shot of insulin 45 units daily.  -coronary arteriosclerosis has seen Dr. Bridges with BHAKTI mid LCx 2/26/2007  (patient did not remember this and obtained from records)  -paroxysmal atrial fibrillation with rhythm being in normal sinus  -dyslipidemia  -chronic back pain with past vertebral fracture followed by Dr. Destin Leroy    Past  surgical history:  Surgery this admission for perirectal abscess with I and D  Bilateral cataract surgery  Remote appendectomy  Tonsillectomy  Inguinal hernia repair    Allergy to Mayonnaise    Social history:  No history of tobacco alcohol use    Family history  Father with heart disease    Health maintenance issues:  Primary care provider is Smiley Trevino NP  Had flu shots  Had previous Pneumovax  No known COVID-19 infection  COVID vaccination x2  Says no prior colonoscopy but looks to possibly have had 1 by Dr. Mendoza in 2019        Past Medical History:   Diagnosis Date    Abnormal thyroid stimulating hormone (TSH) level 08/22/2019    Anemia 08/15/2019    Atrial fibrillation 09/07/2012    Bradycardia 05/23/2017    asymptomatic    Change in bowel habit 11/13/2018    Chronic kidney disease, unspecified 01/14/2019    Coronary arteriosclerosis 09/07/2012    Disease of skin and subcutaneous tissue 08/16/2017    medial aspect RLE- cystic structure seen on venous doppler US.    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    Hyperlipidemia 09/21/2012    Lower extremity edema 04/06/2017    Lumbar radiculopathy 01/26/2017    Obesity, unspecified 12/05/2016    Old myocardial infarction 12/06/2017    Other secondary pulmonary hypertension 05/23/2017    Other spondylosis, lumbosacral region 01/26/2017    Peripheral vascular disease     Personal history of tobacco use, presenting hazards to health 11/17/2016    Pulmonary hypertension     Type 2 diabetes mellitus with chronic kidney disease 01/14/2019    Type 2 diabetes mellitus with hyperglycemia 10/02/2011    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 Lewis Eye exam    Varicose veins of both lower extremities with pain 01/02/2019    Venous insufficiency 01/08/2019       Past Surgical History:   Procedure Laterality Date    APPENDECTOMY      CARDIAC CATHETERIZATION  2007    Bridges- Stent placement    CATARACT EXTRACTION, BILATERAL      COLONOSCOPY  11/06/2019    Dr. Mendoza    HERNIA REPAIR      Inguinal hernia repair    TONSILLECTOMY         Review of patient's allergies indicates:   Allergen Reactions    Mayonnaise      Upset stomach       No current facility-administered medications on file prior to encounter.     Current Outpatient Medications on File Prior to Encounter   Medication Sig    blood-glucose sensor (FREESTYLE ADORE 3 SENSOR) Giulia 1 each by Misc.(Non-Drug; Combo Route) route once.    cholecalciferol, vitamin D3, 125 mcg (5,000 unit) capsule Take 5,000 Units by mouth once daily.    clopidogreL (PLAVIX) 75 mg tablet Take 1 tablet (75 mg total) by mouth once daily.    coenzyme Q10 200 mg capsule Take 200 mg by mouth once daily.    cyanocobalamin (VITAMIN B-12) 1000 MCG tablet Take 100 mcg by mouth once daily.    dapagliflozin propanediol (FARXIGA) 10 mg tablet Take 1 tablet (10 mg total) by mouth once daily.    glipiZIDE 5 MG TR24 Take 1 tablet (5 mg total) by mouth daily with breakfast.    insulin glargine U-100, Lantus, (LANTUS SOLOSTAR U-100 INSULIN) 100 unit/mL (3 mL) InPn pen Inject 45 Units into the skin every evening.    pantoprazole (PROTONIX) 40 MG tablet Take 1 tablet (40 mg total) by mouth once daily.    rosuvastatin (CRESTOR) 40 MG Tab Take 1 tablet (40 mg total) by mouth every evening.    SURE COMFORT PEN NEEDLE 32 gauge x 5/32" Ndle AS DIRECTED    nitroGLYCERIN (NITROSTAT) 0.4 MG SL tablet Place 1 tablet (0.4 mg total) under the tongue every 5 (five) minutes as needed for " Chest pain.    [DISCONTINUED] mupirocin (BACTROBAN) 2 % ointment Apply topically 3 (three) times daily.     Family History       Problem Relation (Age of Onset)    Diabetes Brother    Heart disease Father    Neurofibromatosis Brother    No Known Problems Mother, Brother, Maternal Grandmother, Maternal Grandfather, Paternal Grandmother, Paternal Grandfather    Throat cancer Sister          Tobacco Use    Smoking status: Former     Current packs/day: 0.00     Average packs/day: 0.1 packs/day for 3.0 years (0.3 ttl pk-yrs)     Types: Cigarettes     Start date:      Quit date:      Years since quittin.6     Passive exposure: Past    Smokeless tobacco: Never    Tobacco comments:     Quit 10/15/1976   Substance and Sexual Activity    Alcohol use: Not Currently     Comment: occasionally    Drug use: Never    Sexual activity: Not Currently     Review of Systems   Constitutional:  Negative for appetite change, fatigue and fever.   HENT:  Negative for congestion, hearing loss and trouble swallowing.    Respiratory:  Negative for chest tightness, shortness of breath and wheezing.    Cardiovascular:  Negative for chest pain and palpitations.   Gastrointestinal:  Positive for constipation and rectal pain. Negative for abdominal pain and nausea.   Genitourinary:  Negative for difficulty urinating and dysuria.   Musculoskeletal:  Positive for gait problem. Negative for back pain and neck stiffness.   Skin:  Negative for pallor and rash.   Neurological:  Negative for dizziness, speech difficulty and headaches.   Psychiatric/Behavioral:  Positive for confusion. Negative for suicidal ideas.      Objective:     Vital Signs (Most Recent):  Temp: 97.7 °F (36.5 °C) (24 1329)  Pulse: 88 (24 1818)  Resp: 15 (24 1310)  BP: (!) 96/49 (24 1818)  SpO2: (!) 85 % (24) Vital Signs (24h Range):  Temp:  [97.6 °F (36.4 °C)-102.7 °F (39.3 °C)] 97.7 °F (36.5 °C)  Pulse:  [] 88  Resp:  [11-20]  15  SpO2:  [85 %-99 %] 85 %  BP: ()/(49-70) 96/49     Weight: 81.6 kg (180 lb)  Body mass index is 28.19 kg/m².     Physical Exam  Vitals reviewed.   Constitutional:       General: He is awake. He is not in acute distress.     Appearance: He is well-developed. He is not toxic-appearing.   HENT:      Head: Normocephalic.      Nose: Nose normal.      Mouth/Throat:      Pharynx: Oropharynx is clear.   Eyes:      Extraocular Movements: Extraocular movements intact.      Pupils: Pupils are equal, round, and reactive to light.   Neck:      Thyroid: No thyroid mass.      Vascular: No carotid bruit.   Cardiovascular:      Rate and Rhythm: Normal rate and regular rhythm.      Pulses: Normal pulses.      Heart sounds: Normal heart sounds. No murmur heard.  Pulmonary:      Effort: Pulmonary effort is normal.      Breath sounds: Normal breath sounds and air entry. No wheezing.   Abdominal:      General: Bowel sounds are normal. There is no distension.      Palpations: Abdomen is soft.      Tenderness: There is no abdominal tenderness.   Musculoskeletal:         General: Normal range of motion.      Cervical back: Neck supple. No rigidity.   Skin:     General: Skin is warm.      Coloration: Skin is not jaundiced.      Findings: No lesion.      Comments: Dressing over surgical site to perirectal area   Neurological:      General: No focal deficit present.      Mental Status: He is alert and oriented to person, place, and time.      Cranial Nerves: No cranial nerve deficit.      Comments: Patient is oriented and was able to give me a fairly good history but less several details out and got mixed up on some things had to be straightened out from records   Psychiatric:         Attention and Perception: Attention normal.         Mood and Affect: Mood normal.         Behavior: Behavior normal. Behavior is cooperative.         Thought Content: Thought content normal.         Cognition and Memory: Cognition normal.           Significant Labs: All pertinent labs within the past 24 hours have been reviewed.  BMP:   Recent Labs   Lab 08/23/24  0146   *   *   K 4.0      CO2 23   BUN 44*   CREATININE 1.57*   CALCIUM 8.1*     CBC:   Recent Labs   Lab 08/23/24  0305   WBC 21.10*   HGB 11.5*   HCT 36.0*        CMP:   Recent Labs   Lab 08/23/24  0146   *   K 4.0      CO2 23   *   BUN 44*   CREATININE 1.57*   CALCIUM 8.1*   PROT 6.1*   ALBUMIN 2.8*   BILITOT 0.8   ALKPHOS 66   AST 47*   ALT 27   ANIONGAP 9       Significant Imaging: I have reviewed all pertinent imaging results/findings within the past 24 hours.    Imaging Results              CT Chest Abdomen Pelvis Without Contrast (XPD) (Final result)  Result time 08/23/24 07:44:53      Final result by Donald Monreal MD (08/23/24 07:44:53)                   Impression:      There is evidence of necrotizing fasciitis of the posterior peroneal area extending into the soft tissues right lateral to the intergluteal cleft.  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  Verbal report to Dr. Loya at 07:30 is documented.    Cholelithiasis    Moderate retained stool in the colon    Moderate coronary artery calcification      Electronically signed by: Donald Monreal  Date:    08/23/2024  Time:    07:44               Narrative:    EXAMINATION:  CT chest abdomen pelvis without IV contrast    CLINICAL HISTORY:  Sepsis    TECHNIQUE:  Axial CT images were obtained from the lung apices through the abdomen and pelvis without IV contrast.  Oral contrast was not given.  Coronal and sagittal reconstructions submitted and interpreted.  Total DLP 1321.9 mGycm.  Automated exposure control utilized.    COMPARISON:  No previous similar    FINDINGS:  CT chest: No supraclavicular soft tissue mass is seen.  There is no mediastinal lymphadenopathy by short-axis diameter criteria.  There is no mediastinal mass otherwise.  There is no focal aneurysm of the  moderately calcified thoracic aorta.  There is moderate coronary artery calcification present.    There is no pericardial effusion.  There is trace bilateral pleural effusion.    There is mild dependent atelectasis in the lungs.    CT abdomen:    There is no evidence of pneumoperitoneum.    Liver, bile ducts, spleen, and adrenal glands are unremarkable in noncontrast CT appearance.    Gallbladder is mildly distended and contain some layering radiopaque gallstones.    There is some diffuse fatty infiltration of the pancreas.  Pancreas is otherwise unremarkable.    There is no hydronephrosis or abnormal perinephric fluid.  There is no radiopaque renal stone.    There is no aneurysm of the moderately calcified abdominal aorta.    Stomach is grossly unremarkable but is not well distended, limiting evaluation.    Appendix is not seen.    There is no rosalee bowel obstruction.  There is moderate retained stool in the colon.    There is no retroperitoneal lymphadenopathy by short-axis diameter criteria.    CT pelvis:    There is abnormal soft tissue emphysema in the right perineal soft tissues posteriorly along the right side of the intergluteal cleft.  This could represent necrotizing fasciitis.  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    There is no soft tissue mass within the pelvis itself.  Urinary bladder is moderately distended.    No acute osseous findings.  There is prominent degenerative disc narrowing and moderate spondylosis of the thoracolumbar spine                                       X-Ray Chest AP Portable (Final result)  Result time 08/23/24 07:36:09      Final result by Flako Perez MD (08/23/24 07:36:09)                   Impression:      No acute findings.      Electronically signed by: Flako Perez  Date:    08/23/2024  Time:    07:36               Narrative:    EXAMINATION:  XR CHEST AP PORTABLE    CLINICAL HISTORY:  Fever, unspecified    TECHNIQUE:  Single frontal view of the chest was  performed.    COMPARISON:  02/01/2024    FINDINGS:  Heart size normal. Lungs clear. No pneumothorax or pleural effusion.                                    Intake/Output - Last 3 Shifts         08/21 0700  08/22 0659 08/22 0700  08/23 0659 08/23 0700  08/24 0659    I.V. (mL/kg)   25 (0.3)    IV Piggyback  1000 550    Total Intake(mL/kg)  1000 (12.3) 575 (7)    Blood   10    Total Output   10    Net  +1000 +565                 Microbiology Results (last 7 days)       Procedure Component Value Units Date/Time    Culture, Anaerobe [[id number]] Collected: 08/23/24 1246    Order Status: Sent Specimen: Abscess from Perirectal Updated: 08/23/24 1246    Culture, Wound [[id number]] Collected: 08/23/24 1245    Order Status: Sent Specimen: Wound from Perirectal Updated: 08/23/24 1246    Blood Culture #1 [[id number]] Collected: 08/23/24 0146    Order Status: Sent Specimen: Blood Updated: 08/23/24 0159    Blood Culture #2 [[id number]] Collected: 08/23/24 0150    Order Status: Sent Specimen: Blood Updated: 08/23/24 0158            Assessment/Plan:     * Necrotizing soft tissue infection    Surgery 08/22/24    Paroxysmal atrial fibrillation  Patient with Paroxysmal (<7 days) atrial fibrillation which is controlled currently with Beta Blocker. Patient is currently in sinus rhythm.[id number] Score: 4. HASBLED Score: 2. Anticoagulation not indicated due to patient looks unsteady and will have increased risk from fall while on anticoagulation.  Will monitor can defer to his outpatient providers .  Use antiplatelet.    Echo Summary  09/2023  Show Result Comparison     Left Ventricle: The left ventricle is normal in size. Normal wall thickness. There is concentric remodeling. There is low normal systolic function with a visually estimated ejection fraction of 50 - 55%. Ejection fraction by visual approximation is 55%. Grade I diastolic dysfunction.    Left Atrium: Left atrium is mildly dilated.    Right Ventricle: Normal right  ventricular cavity size. Systolic function is normal.    Aortic Valve: The aortic valve is a trileaflet valve. There is mild aortic valve sclerosis.    Mitral Valve: Mildly calcified leaflets. Mildly calcified posterior subvalvular apparatus. There is mild regurgitation.    Tricuspid Valve: There is mild regurgitation.    Pulmonary Artery: The estimated pulmonary artery systolic pressure is 25 mmHg.    IVC/SVC: Normal venous pressure at 3 mmHg.       Perirectal abscess    Surgery 08/22/24    Type 2 diabetes mellitus with hyperglycemia, with long-term current use of insulin    Cover with sliding scale and maintenance insulin    Atherosclerosis of native coronary artery of native heart without angina pectoris  Patient with known CAD s/p stent placement, which is controlled Will continue ASA and Statin and monitor for S/Sx of angina/ACS. Continue to monitor on telemetry.   BHAKTI mid LCx 2/26/2007  followed by Dr. Bridges; this history obtained from chart in patient did not remember    Cardiolite Impression:     1. Medium size, mild intensity [id number] inferolateral defect with decreased wall motion and thickening, suggestive of infarct.  2. The global left ventricular systolic function is normal with an LV ejection fraction of 70 % and no evidence of LV dilatation. Wall motion is impaired.  Electronically signed by:Gabriel Barahona  Date:                                            09/12/2023  Time:                                           14:33    Hypercholesterolemia  Statin    Type 2 diabetes mellitus with stage 3a chronic kidney disease, with long-term current use of insulin  Creatine stable for now. BMP reviewed- noted Estimated Creatinine Clearance: 35.2 mL/min (A) (based on SCr of 1.57 mg/dL (H)). according to latest data. Based on current GFR, CKD stage is stage 3 - GFR 30-59.  Monitor UOP and serial BMP and adjust therapy as needed. Renally dose meds. Avoid nephrotoxic medications and procedures.      VTE Risk  Mitigation (From admission, onward)           Ordered     enoxaparin injection 30 mg  Daily         08/23/24 1342     IP VTE HIGH RISK PATIENT  Once         08/23/24 1342     Place sequential compression device  Until discontinued         08/23/24 1342                        Thank you for your consult. I will follow-up with patient. Please contact us if you have any additional questions.    Colin Lopez MD  Department of Hospital Medicine   [facility]       none

## 2024-08-23 NOTE — OP NOTE
[facility]  Surgery Department  Operative Note    SUMMARY     Date of Procedure: 8/23/2024     Procedure: Procedure(s) (LRB):  INCISION AND DRAINAGE, WITH DEBRIDEMENT PERIRECTAL REGION (Right)     Surgeons and Role:     * Pop Butcher MD - Primary    Assisting Surgeon: None    Pre-Operative Diagnosis: Perirectal abscess [K61.1]    Post-Operative Diagnosis: Post-Op Diagnosis Codes:     * Perirectal abscess [K61.1]    Anesthesia: General    Procedures Performed:  Debridement of necrotizing soft tissue infection    Significant Findings of the Procedure:  Right perirectal area had necrotic skin and underlying necrotic soft tissue fashion proximally 5 x 4 x 3 cm area and there was tracking of the pocket deep as well as on the superficial planes for about a 10 by 7 x 6 cm area beyond the area.  Opened this area up.    Procedure in Detail:  After informed consent patient brought to the OR placed in lithotomy position.  Prepped and draped in usual sterile fashion.  He had a necrotic area on the surface of the skin about 3 x 3 cm we had a unroof this area got into a pocket of pus and necrotic underlying tissues including subcu fat and fascia.  We surgically excise and debrided proximally 5 x 4 x 3 cm area and there was still tracking deeper to this but less infection/necrotic tissue.  This extended for a total of a 10 x 7 x 6 cm area beyond the balance and we irrigated this whole area out after breaking up the loculations.  Again this traveled superficially anteriorly and posteriorly and then deep along the perirectal area.  No actual connection with the rectum.  It did get very close to his internal sphincter muscles and therefore incontinence was a possibility as explained to the family.  We then irrigated the area out packed with a Kerlix wet-to-dry after irrigating with Dakin's.  Patient tolerated the procedure well.    Will likely need to be brought back in 2 days' time for 2nd  look.    Complications: No    Estimated Blood Loss (EBL): 40 mL           Implants: * No implants in log *    Specimens:   Specimen (24h ago, onward)       Start     Ordered    08/23/24 1227  Surgical Pathology  RELEASE UPON ORDERING         08/23/24 1227                            Condition: Good    Disposition: PACU - hemodynamically stable.    Attestation: I was present and scrubbed for the entire procedure.

## 2024-08-23 NOTE — ASSESSMENT & PLAN NOTE
Patient with Paroxysmal (<7 days) atrial fibrillation which is controlled currently with Beta Blocker. Patient is currently in sinus rhythm.[id number] Score: 4. HASBLED Score: 2. Anticoagulation not indicated due to patient looks unsteady and will have increased risk from fall while on anticoagulation.  Will monitor can defer to his outpatient providers .  Use antiplatelet.

## 2024-08-23 NOTE — ANESTHESIA PREPROCEDURE EVALUATION
08/23/2024  Negro Hale is a 84 y.o., male.      Pre-op Assessment    I have reviewed the Patient Summary Reports.     I have reviewed the Nursing Notes. I have reviewed the NPO Status.   I have reviewed the Medications.     Review of Systems  Anesthesia Hx:             Denies Family Hx of Anesthesia complications.    Denies Personal Hx of Anesthesia complications.                    Social:  Non-Smoker, No Alcohol Use       Cardiovascular:     Hypertension  Past MI CAD  asymptomatic CABG/stent Dysrhythmias atrial fibrillation     PVD       Cardiovascular Symptoms: Angina   Shortness of Breath    Coronary Artery Disease:          Hx of Myocardial Infarction                  Hypertension     Atrial Fibrillation     Pulmonary:      Shortness of breath                  Renal/:  Chronic Renal Disease        Kidney Function/Disease             Musculoskeletal:       Arthritis          Neurological:    Neuromuscular Disease,           Arthritis                         Neuromuscular Disease   Endocrine:  Diabetes    Diabetes                          Physical Exam  General: Well nourished, Cooperative, Alert and Oriented    Airway:  Mallampati: II / II  Mouth Opening: Normal  TM Distance: Normal  Neck ROM: Normal ROM    Dental:  Intact    Chest/Lungs:  Clear to auscultation    Heart:  Rate: Normal  Rhythm: Regular Rhythm  Sounds: Normal        Chemistry        Component Value Date/Time     (L) 08/23/2024 0146    K 4.0 08/23/2024 0146     08/23/2024 0146    CO2 23 08/23/2024 0146    BUN 44 (H) 08/23/2024 0146    CREATININE 1.57 (H) 08/23/2024 0146     (H) 08/23/2024 0146        Component Value Date/Time    CALCIUM 8.1 (L) 08/23/2024 0146    ALKPHOS 66 08/23/2024 0146    AST 47 (H) 08/23/2024 0146    ALT 27 08/23/2024 0146    BILITOT 0.8 08/23/2024 0146    ESTGFRAFRICA 64 03/19/2021 0919     EGFRNONAA 45 (L) 05/19/2022 1159        Lab Results   Component Value Date    WBC 21.10 (H) 08/23/2024    HGB 11.5 (L) 08/23/2024    HCT 36.0 (L) 08/23/2024     08/23/2024     Results for orders placed or performed during the hospital encounter of 02/01/24   EKG 12-lead    Collection Time: 02/01/24 12:15 PM    Narrative    Test Reason : R07.9,    Vent. Rate : 057 BPM     Atrial Rate : 057 BPM     P-R Int : 216 ms          QRS Dur : 088 ms      QT Int : 416 ms       P-R-T Axes : 061 006 086 degrees     QTc Int : 404 ms    Sinus bradycardia with 1st degree A-V block  Otherwise normal ECG  When compared with ECG of 19-AUG-2023 10:24,  PREVIOUS ECG IS PRESENT  Confirmed by Colin Lopez MD ([id number]) on 2/2/2024 12:07:04 AM    Referred By: [email or web address]   SELF           Confirmed By:Colin Lopez MD         Anesthesia Plan  Type of Anesthesia, risks & benefits discussed:    Anesthesia Type: Gen Supraglottic Airway  Intra-op Monitoring Plan: Standard ASA Monitors  Post Op Pain Control Plan: multimodal analgesia  Induction:  IV  Airway Plan: Direct  Informed Consent: Informed consent signed with the Patient and all parties understand the risks and agree with anesthesia plan.  All questions answered.   ASA Score: 4  Day of Surgery Review of History & Physical: H&P Update referred to the surgeon/provider.I have interviewed and examined the patient. I have reviewed the patient's H&P dated: There are no significant changes.     Ready For Surgery From Anesthesia Perspective.     .

## 2024-08-24 LAB
ANION GAP SERPL CALCULATED.3IONS-SCNC: 8 MMOL/L (ref 7–16)
BASOPHILS # BLD AUTO: 0.04 K/UL (ref 0–0.2)
BASOPHILS NFR BLD AUTO: 0.2 % (ref 0–1)
BUN SERPL-MCNC: 39 MG/DL (ref 7–18)
BUN/CREAT SERPL: 30 (ref 6–20)
CALCIUM SERPL-MCNC: 8 MG/DL (ref 8.5–10.1)
CHLORIDE SERPL-SCNC: 105 MMOL/L (ref 98–107)
CO2 SERPL-SCNC: 25 MMOL/L (ref 21–32)
CREAT SERPL-MCNC: 1.31 MG/DL (ref 0.7–1.3)
DIFFERENTIAL METHOD BLD: ABNORMAL
EGFR (NO RACE VARIABLE) (RUSH/TITUS): 53 ML/MIN/1.73M2
EOSINOPHIL # BLD AUTO: 0.04 K/UL (ref 0–0.5)
EOSINOPHIL NFR BLD AUTO: 0.2 % (ref 1–4)
ERYTHROCYTE [DISTWIDTH] IN BLOOD BY AUTOMATED COUNT: 13.8 % (ref 11.5–14.5)
GLUCOSE SERPL-MCNC: 105 MG/DL (ref 74–106)
GLUCOSE SERPL-MCNC: 113 MG/DL (ref 70–105)
GLUCOSE SERPL-MCNC: 124 MG/DL (ref 70–105)
GLUCOSE SERPL-MCNC: 168 MG/DL (ref 70–105)
GLUCOSE SERPL-MCNC: 199 MG/DL (ref 70–105)
GLUCOSE SERPL-MCNC: 62 MG/DL (ref 70–105)
HCT VFR BLD AUTO: 34.2 % (ref 40–54)
HGB BLD-MCNC: 11 G/DL (ref 13.5–18)
IMM GRANULOCYTES # BLD AUTO: 0.28 K/UL (ref 0–0.04)
IMM GRANULOCYTES NFR BLD: 1.7 % (ref 0–0.4)
LYMPHOCYTES # BLD AUTO: 0.85 K/UL (ref 1–4.8)
LYMPHOCYTES NFR BLD AUTO: 5.2 % (ref 27–41)
MCH RBC QN AUTO: 29.6 PG (ref 27–31)
MCHC RBC AUTO-ENTMCNC: 32.2 G/DL (ref 32–36)
MCV RBC AUTO: 91.9 FL (ref 80–96)
MONOCYTES # BLD AUTO: 0.95 K/UL (ref 0–0.8)
MONOCYTES NFR BLD AUTO: 5.8 % (ref 2–6)
MPC BLD CALC-MCNC: 11.2 FL (ref 9.4–12.4)
NEUTROPHILS # BLD AUTO: 14.32 K/UL (ref 1.8–7.7)
NEUTROPHILS NFR BLD AUTO: 86.9 % (ref 53–65)
NRBC # BLD AUTO: 0 X10E3/UL
NRBC, AUTO (.00): 0 %
PLATELET # BLD AUTO: 160 K/UL (ref 150–400)
POTASSIUM SERPL-SCNC: 4 MMOL/L (ref 3.5–5.1)
RBC # BLD AUTO: 3.72 M/UL (ref 4.6–6.2)
SODIUM SERPL-SCNC: 134 MMOL/L (ref 136–145)
WBC # BLD AUTO: 16.48 K/UL (ref 4.5–11)

## 2024-08-24 PROCEDURE — 11000001 HC ACUTE MED/SURG PRIVATE ROOM

## 2024-08-24 PROCEDURE — 63600175 PHARM REV CODE 636 W HCPCS: Performed by: HOSPITALIST

## 2024-08-24 PROCEDURE — 63600175 PHARM REV CODE 636 W HCPCS: Performed by: SURGERY

## 2024-08-24 PROCEDURE — 25000003 PHARM REV CODE 250: Performed by: SURGERY

## 2024-08-24 PROCEDURE — 25000003 PHARM REV CODE 250: Performed by: HOSPITALIST

## 2024-08-24 PROCEDURE — 99232 SBSQ HOSP IP/OBS MODERATE 35: CPT | Mod: ,,, | Performed by: HOSPITALIST

## 2024-08-24 PROCEDURE — 80048 BASIC METABOLIC PNL TOTAL CA: CPT | Performed by: SURGERY

## 2024-08-24 PROCEDURE — 25000003 PHARM REV CODE 250: Performed by: FAMILY MEDICINE

## 2024-08-24 PROCEDURE — 85025 COMPLETE CBC W/AUTO DIFF WBC: CPT | Performed by: SURGERY

## 2024-08-24 PROCEDURE — 97161 PT EVAL LOW COMPLEX 20 MIN: CPT

## 2024-08-24 PROCEDURE — 82962 GLUCOSE BLOOD TEST: CPT

## 2024-08-24 PROCEDURE — 36415 COLL VENOUS BLD VENIPUNCTURE: CPT | Performed by: SURGERY

## 2024-08-24 RX ORDER — INSULIN GLARGINE 100 [IU]/ML
20 INJECTION, SOLUTION SUBCUTANEOUS NIGHTLY
Status: DISCONTINUED | OUTPATIENT
Start: 2024-08-25 | End: 2024-08-29 | Stop reason: HOSPADM

## 2024-08-24 RX ADMIN — CLINDAMYCIN PHOSPHATE 600 MG: 600 INJECTION, SOLUTION INTRAVENOUS at 11:08

## 2024-08-24 RX ADMIN — PIPERACILLIN SODIUM AND TAZOBACTAM SODIUM 4.5 G: 4; .5 INJECTION, POWDER, LYOPHILIZED, FOR SOLUTION INTRAVENOUS at 07:08

## 2024-08-24 RX ADMIN — INSULIN ASPART 1 UNITS: 100 INJECTION, SOLUTION INTRAVENOUS; SUBCUTANEOUS at 09:08

## 2024-08-24 RX ADMIN — MUPIROCIN 1 G: 20 OINTMENT TOPICAL at 08:08

## 2024-08-24 RX ADMIN — SODIUM CHLORIDE: 9 INJECTION, SOLUTION INTRAVENOUS at 05:08

## 2024-08-24 RX ADMIN — CLINDAMYCIN PHOSPHATE 600 MG: 600 INJECTION, SOLUTION INTRAVENOUS at 03:08

## 2024-08-24 RX ADMIN — CLOPIDOGREL BISULFATE 75 MG: 75 TABLET ORAL at 08:08

## 2024-08-24 RX ADMIN — MUPIROCIN 1 G: 20 OINTMENT TOPICAL at 09:08

## 2024-08-24 RX ADMIN — CLINDAMYCIN PHOSPHATE 600 MG: 600 INJECTION, SOLUTION INTRAVENOUS at 08:08

## 2024-08-24 RX ADMIN — INSULIN GLARGINE 25 UNITS: 100 INJECTION, SOLUTION SUBCUTANEOUS at 09:08

## 2024-08-24 RX ADMIN — ENOXAPARIN SODIUM 30 MG: 30 INJECTION SUBCUTANEOUS at 05:08

## 2024-08-24 RX ADMIN — PANTOPRAZOLE SODIUM 40 MG: 40 TABLET, DELAYED RELEASE ORAL at 08:08

## 2024-08-24 RX ADMIN — PIPERACILLIN SODIUM AND TAZOBACTAM SODIUM 4.5 G: 4; .5 INJECTION, POWDER, LYOPHILIZED, FOR SOLUTION INTRAVENOUS at 03:08

## 2024-08-24 RX ADMIN — CHOLECALCIFEROL TAB 125 MCG (5000 UNIT) 5000 UNITS: 125 TAB at 08:08

## 2024-08-24 RX ADMIN — VANCOMYCIN HYDROCHLORIDE 1750 MG: 500 INJECTION, POWDER, LYOPHILIZED, FOR SOLUTION INTRAVENOUS at 05:08

## 2024-08-24 RX ADMIN — PIPERACILLIN SODIUM AND TAZOBACTAM SODIUM 4.5 G: 4; .5 INJECTION, POWDER, LYOPHILIZED, FOR SOLUTION INTRAVENOUS at 11:08

## 2024-08-24 NOTE — PROGRESS NOTES
[facility]  General Surgery  Progress Note    Subjective:     Interval History:  Patient doing well overall.  No acute events.  Bowel movements cause of the dressing to come out.    Post-Op Info:  Procedure(s) (LRB):  INCISION AND DRAINAGE, WITH DEBRIDEMENT PERIRECTAL REGION (Right)   1 Day Post-Op      Medications:  Continuous Infusions:   0.9% NaCl   Intravenous Continuous 125 mL/hr at 08/23/24 2323 New Bag at 08/23/24 2323     Scheduled Meds:   clindamycin IV (PEDS and ADULTS)  600 mg Intravenous Q8H    clopidogreL  75 mg Oral Daily    enoxparin  30 mg Subcutaneous Daily    insulin glargine U-100 (Lantus)  25 Units Subcutaneous QHS    mupirocin  1 g Nasal BID    pantoprazole  40 mg Oral Daily    piperacillin-tazobactam (Zosyn) IV (PEDS and ADULTS) (extended infusion is not appropriate)  4.5 g Intravenous Once    piperacillin-tazobactam (Zosyn) IV (PEDS and ADULTS) (extended infusion is not appropriate)  4.5 g Intravenous Q8H    vancomycin (VANCOCIN) IV (PEDS and ADULTS)  1,750 mg Intravenous Q24H    vitamin D  5,000 Units Oral Daily     PRN Meds:  Current Facility-Administered Medications:     dextrose 10%, 12.5 g, Intravenous, PRN    dextrose 10%, 25 g, Intravenous, PRN    glucagon (human recombinant), 1 mg, Intramuscular, PRN    glucose, 16 g, Oral, PRN    glucose, 24 g, Oral, PRN    HYDROcodone-acetaminophen, 1 tablet, Oral, Q4H PRN    HYDROcodone-acetaminophen, 1 tablet, Oral, Q4H PRN    insulin aspart U-100, 0-10 Units, Subcutaneous, QID (AC + HS) PRN    Pharmacy to dose Vancomycin consult, , , Once **AND** vancomycin - pharmacy to dose, , Intravenous, pharmacy to manage frequency     Objective:     Vital Signs (Most Recent):  Temp: 98.6 °F (37 °C) (08/24/24 0740)  Pulse: 79 (08/24/24 0740)  Resp: 16 (08/24/24 0740)  BP: (!) 106/57 (08/24/24 0740)  SpO2: (!) 94 % (08/24/24 0740) Vital Signs (24h Range):  Temp:  [97.7 °F (36.5 °C)-99.3 °F (37.4 °C)] 98.6 °F (37 °C)  Pulse:  [68-88]  "79  Resp:  [11-20] 16  SpO2:  [85 %-99 %] 94 %  BP: ()/(49-70) 106/57       Intake/Output Summary (Last 24 hours) at 8/24/2024 1102  Last data filed at 8/24/2024 0304  Gross per 24 hour   Intake 525 ml   Output 285 ml   Net 240 ml       Physical Exam  Constitutional:       General: He is not in acute distress.  HENT:      Head: Normocephalic.   Cardiovascular:      Rate and Rhythm: Normal rate and regular rhythm.      Pulses: Normal pulses.   Pulmonary:      Effort: Pulmonary effort is normal. No respiratory distress.      Breath sounds: Normal breath sounds.   Abdominal:      General: Abdomen is flat. There is no distension.      Palpations: Abdomen is soft.      Tenderness: There is no abdominal tenderness.   Musculoskeletal:         General: Normal range of motion.   Skin:     General: Skin is warm.      Findings: Lesion (Perianal area with some mild skin edges with some necrotic area but no gross purulence) present.   Neurological:      General: No focal deficit present.      Mental Status: He is oriented to person, place, and time.         Significant Labs:  Cardiac markers: No results for input(s): "CKMB", "CPKMB", "TROPONINT", "TROPONINI", "MYOGLOBIN" in the last 48 hours.  CBC:   Recent Labs   Lab 08/24/24  0349   WBC 16.48*   RBC 3.72*   HGB 11.0*   HCT 34.2*      MCV 91.9   MCH 29.6   MCHC 32.2     CMP:   Recent Labs   Lab 08/23/24  0146 08/24/24  0349   * 105   CALCIUM 8.1* 8.0*   ALBUMIN 2.8*  --    PROT 6.1*  --    * 134*   K 4.0 4.0   CO2 23 25    105   BUN 44* 39*   CREATININE 1.57* 1.31*   ALKPHOS 66  --    ALT 27  --    AST 47*  --    BILITOT 0.8  --        Significant Diagnostics:  None    Assessment/Plan:     Active Diagnoses:    Diagnosis Date Noted POA    PRINCIPAL PROBLEM:  Necrotizing soft tissue infection [M79.89] 08/23/2024 Yes    Perirectal abscess [K61.1] 08/23/2024 Yes    Paroxysmal atrial fibrillation [I48.0] 08/23/2024 Yes    Type 2 diabetes mellitus with " hyperglycemia, with long-term current use of insulin [E11.65, Z79.4] 02/01/2024 Not Applicable     Chronic    Atherosclerosis of native coronary artery of native heart without angina pectoris [I25.10] 03/17/2021 Yes     Chronic    Type 2 diabetes mellitus with stage 3a chronic kidney disease, with long-term current use of insulin [E11.22, N18.31, Z79.4] 03/17/2021 Not Applicable     Chronic    Hypercholesterolemia [E78.00] 03/17/2021 Yes     Chronic    Primary hypertension [I10] 03/17/2021 Yes      Problems Resolved During this Admission:         Pop Butcher MD  General Surgery  [facility]

## 2024-08-24 NOTE — PLAN OF CARE
Problem: Fall Injury Risk  Goal: Absence of Fall and Fall-Related Injury  Outcome: Progressing     Problem: Adult Inpatient Plan of Care  Goal: Plan of Care Review  Outcome: Progressing  Goal: Patient-Specific Goal (Individualized)  Outcome: Progressing  Goal: Absence of Hospital-Acquired Illness or Injury  Outcome: Progressing  Goal: Optimal Comfort and Wellbeing  Outcome: Progressing  Goal: Readiness for Transition of Care  Outcome: Progressing     Problem: Diabetes Comorbidity  Goal: Blood Glucose Level Within Targeted Range  Outcome: Progressing     Problem: Wound  Goal: Optimal Coping  Outcome: Progressing  Goal: Optimal Functional Ability  Outcome: Progressing  Goal: Absence of Infection Signs and Symptoms  Outcome: Progressing  Goal: Improved Oral Intake  Outcome: Progressing  Goal: Optimal Pain Control and Function  Outcome: Progressing  Goal: Skin Health and Integrity  Outcome: Progressing  Goal: Optimal Wound Healing  Outcome: Progressing

## 2024-08-24 NOTE — PLAN OF CARE
Problem: Physical Therapy  Goal: Physical Therapy Goal  Description: Short term goals:  . Supine to sit with Contact Guard Assistance  . Sit to stand transfer with Contact Guard Assistance  . Gait  x 250 feet with Contact Guard Assistance using Rolling Walker.     Long term goals:  Patient will regain full independent functional mobility with lowest level of assistive device to return to desired living arrangement and prior ADL's.     Outcome: Progressing

## 2024-08-24 NOTE — PT/OT/SLP EVAL
Physical Therapy Evaluation    Patient Name:  Negro Hale   MRN:  [id number]    Recommendations:     Discharge Recommendations: Low Intensity Therapy   Discharge Equipment Recommendations: none   Barriers to discharge:  ongoing medical treatment    Assessment:     Negro Hale is a 84 y.o. male admitted with a medical diagnosis of Necrotizing soft tissue infection.  He presents with the following impairments/functional limitations: weakness, impaired endurance, impaired functional mobility     Patient agreeable to PT eval. Demonstrated good strength and ROM of BLE. Able to ambulate 200 ft with RW and CGA. Good balance    Rehab Prognosis: Good; patient would benefit from acute skilled PT services to address these deficits and reach maximum level of function.    Recent Surgery: Procedure(s) (LRB):  INCISION AND DRAINAGE, WITH DEBRIDEMENT PERIRECTAL REGION (Right) 1 Day Post-Op    Plan:     During this hospitalization, patient to be seen 5 x/week to address the identified rehab impairments via gait training, therapeutic activities, therapeutic exercises, neuromuscular re-education and progress toward the following goals:    Plan of Care Expires:       Subjective     Chief Complaint: I and D of perirectal region  Patient/Family Comments/goals: agreeable to eval  Pain/Comfort:  Pain Rating 1: 0/10    Patients cultural, spiritual, Caodaism conflicts given the current situation: no    Living Environment:  Home with spouse  Prior to admission, patients level of function was independent.  Equipment used at home: none.  DME owned (not currently used): none.  Upon discharge, patient will have assistance from family.    Objective:     Communicated with nurse prior to session.  Patient found supine with peripheral IV  upon PT entry to room.    General Precautions: Standard, fall  Orthopedic Precautions:    Braces:    Respiratory Status: Room air    Exams:  Sensation:    -       Intact  RLE ROM: WNL  RLE Strength:  WNL  LLE ROM: WNL  LLE Strength: WNL    Functional Mobility:  Bed Mobility:     Supine to Sit: moderate assistance  Sit to Supine: minimum assistance  Transfers:     Sit to Stand:  minimum assistance with rolling walker  Gait: 200 ft with RW and CGA  Balance: good      AM-PAC 6 CLICK MOBILITY  Total Score:19       Treatment & Education:  Patient educated on the role of physical therapy in the acute care setting, call light usage, and safety including calling nurse for mobility  Patient educated on importance of OOB activity and remaining up in chair.  Mobility as noted  Education on importance of safety during ambulation and risk of falling    Patient left HOB elevated with all lines intact, call button in reach, and nurse notified.    GOALS:   Multidisciplinary Problems       Physical Therapy Goals          Problem: Physical Therapy    Goal Priority Disciplines Outcome Goal Variances Interventions   Physical Therapy Goal     PT, PT/OT Progressing     Description: Short term goals:  . Supine to sit with Contact Guard Assistance  . Sit to stand transfer with Contact Guard Assistance  . Gait  x 250 feet with Contact Guard Assistance using Rolling Walker.     Long term goals:  Patient will regain full independent functional mobility with lowest level of assistive device to return to desired living arrangement and prior ADL's.                          History:     Past Medical History:   Diagnosis Date    Abnormal thyroid stimulating hormone (TSH) level 08/22/2019    Anemia 08/15/2019    Atrial fibrillation 09/07/2012    Bradycardia 05/23/2017    asymptomatic    Change in bowel habit 11/13/2018    Chronic kidney disease, unspecified 01/14/2019    Coronary arteriosclerosis 09/07/2012    Disease of skin and subcutaneous tissue 08/16/2017    medial aspect RLE- cystic structure seen on venous doppler US.    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    Hyperlipidemia 09/21/2012    Lower extremity edema 04/06/2017    Lumbar radiculopathy 01/26/2017    Obesity, unspecified 12/05/2016    Old myocardial infarction 12/06/2017    Other secondary pulmonary hypertension 05/23/2017    Other spondylosis, lumbosacral region 01/26/2017    Peripheral vascular disease     Personal history of tobacco use, presenting hazards to health 11/17/2016    Pulmonary hypertension     Type 2 diabetes mellitus with chronic kidney disease 01/14/2019    Type 2 diabetes mellitus with hyperglycemia 10/02/2011    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 Lewis Eye exam    Varicose veins of both lower extremities with pain 01/02/2019    Venous insufficiency 01/08/2019       Past Surgical History:   Procedure Laterality Date    APPENDECTOMY      CARDIAC CATHETERIZATION  2007    [facility]- Stent placement    CATARACT EXTRACTION, BILATERAL      COLONOSCOPY  11/06/2019    Dr. Mendoaz    HERNIA REPAIR      Inguinal hernia repair    TONSILLECTOMY         Time Tracking:     PT Received On: 08/24/24  PT Start Time: 1320     PT Stop Time: 1347  PT Total Time (min): 27 min     Billable Minutes: Evaluation 27      08/24/2024

## 2024-08-24 NOTE — NURSING
at the start of this shift, nurses placed pt on bedside commode with call bell next to him and we checked on him a few minutes later and he was still on the bedside commode and was found with multiple skin tears to his right arm. he said he thought he heard the phone ring and went to the bedside table and slipped and hit the vital sign machine; he was never in any pain; he denied falling to his feet or hit his head; applied dressings to each skin tear; BP was 107/52 and HR 76 afterwards

## 2024-08-24 NOTE — ANESTHESIA POSTPROCEDURE EVALUATION
Anesthesia Post Evaluation    Patient: Negro Hale    Procedure(s) Performed: Procedure(s) (LRB):  INCISION AND DRAINAGE, WITH DEBRIDEMENT PERIRECTAL REGION (Right)    Final Anesthesia Type: general      Patient location during evaluation: PACU  Patient participation: Yes- Able to Participate  Level of consciousness: awake and alert  Post-procedure vital signs: reviewed and stable  Pain management: adequate  Airway patency: patent  MARTHA mitigation strategies: Multimodal analgesia  PONV status at discharge: No PONV  Anesthetic complications: no      Cardiovascular status: blood pressure returned to baseline  Respiratory status: unassisted  Hydration status: euvolemic  Follow-up not needed.              Vitals Value Taken Time   /57 08/24/24 0740   Temp 37 °C (98.6 °F) 08/24/24 0740   Pulse 79 08/24/24 0740   Resp 16 08/24/24 0740   SpO2 94 % 08/24/24 0740         Event Time   Out of Recovery 08/23/2024 13:14:00         Pain/Suzie Score: Pain Rating Prior to Med Admin: 0 (8/23/2024 12:39 AM)  Suzie Score: 8 (8/23/2024  1:10 PM)

## 2024-08-25 PROBLEM — R78.81 POSITIVE BLOOD CULTURE: Status: ACTIVE | Noted: 2024-08-25

## 2024-08-25 LAB
BACTERIA BLD CULT: ABNORMAL
BUN SERPL-MCNC: 37 MG/DL (ref 7–18)
BUN/CREAT SERPL: 24 (ref 6–20)
CREAT SERPL-MCNC: 1.56 MG/DL (ref 0.7–1.3)
EGFR (NO RACE VARIABLE) (RUSH/TITUS): 43 ML/MIN/1.73M2
GLUCOSE SERPL-MCNC: 139 MG/DL (ref 70–105)
GLUCOSE SERPL-MCNC: 206 MG/DL (ref 70–105)
GLUCOSE SERPL-MCNC: 246 MG/DL (ref 70–105)
GRAM STN SPEC: ABNORMAL
MICROORGANISM SPEC CULT: NORMAL

## 2024-08-25 PROCEDURE — 36415 COLL VENOUS BLD VENIPUNCTURE: CPT | Performed by: SURGERY

## 2024-08-25 PROCEDURE — 94761 N-INVAS EAR/PLS OXIMETRY MLT: CPT

## 2024-08-25 PROCEDURE — 25000003 PHARM REV CODE 250: Performed by: FAMILY MEDICINE

## 2024-08-25 PROCEDURE — 87040 BLOOD CULTURE FOR BACTERIA: CPT | Performed by: HOSPITALIST

## 2024-08-25 PROCEDURE — 82565 ASSAY OF CREATININE: CPT | Performed by: SURGERY

## 2024-08-25 PROCEDURE — 25000003 PHARM REV CODE 250: Performed by: HOSPITALIST

## 2024-08-25 PROCEDURE — 63600175 PHARM REV CODE 636 W HCPCS: Performed by: HOSPITALIST

## 2024-08-25 PROCEDURE — 36415 COLL VENOUS BLD VENIPUNCTURE: CPT | Performed by: HOSPITALIST

## 2024-08-25 PROCEDURE — 82962 GLUCOSE BLOOD TEST: CPT

## 2024-08-25 PROCEDURE — 63600175 PHARM REV CODE 636 W HCPCS: Performed by: SURGERY

## 2024-08-25 PROCEDURE — 99232 SBSQ HOSP IP/OBS MODERATE 35: CPT | Mod: ,,, | Performed by: HOSPITALIST

## 2024-08-25 PROCEDURE — 11000001 HC ACUTE MED/SURG PRIVATE ROOM

## 2024-08-25 PROCEDURE — 25000003 PHARM REV CODE 250: Performed by: SURGERY

## 2024-08-25 RX ORDER — ENOXAPARIN SODIUM 100 MG/ML
40 INJECTION SUBCUTANEOUS EVERY 24 HOURS
Status: DISCONTINUED | OUTPATIENT
Start: 2024-08-25 | End: 2024-08-26

## 2024-08-25 RX ADMIN — PIPERACILLIN SODIUM AND TAZOBACTAM SODIUM 4.5 G: 4; .5 INJECTION, POWDER, LYOPHILIZED, FOR SOLUTION INTRAVENOUS at 09:08

## 2024-08-25 RX ADMIN — VANCOMYCIN HYDROCHLORIDE 1750 MG: 500 INJECTION, POWDER, LYOPHILIZED, FOR SOLUTION INTRAVENOUS at 05:08

## 2024-08-25 RX ADMIN — PIPERACILLIN SODIUM AND TAZOBACTAM SODIUM 4.5 G: 4; .5 INJECTION, POWDER, LYOPHILIZED, FOR SOLUTION INTRAVENOUS at 02:08

## 2024-08-25 RX ADMIN — CHOLECALCIFEROL TAB 125 MCG (5000 UNIT) 5000 UNITS: 125 TAB at 08:08

## 2024-08-25 RX ADMIN — MUPIROCIN 1 G: 20 OINTMENT TOPICAL at 09:08

## 2024-08-25 RX ADMIN — MUPIROCIN 1 G: 20 OINTMENT TOPICAL at 08:08

## 2024-08-25 RX ADMIN — CLOPIDOGREL BISULFATE 75 MG: 75 TABLET ORAL at 08:08

## 2024-08-25 RX ADMIN — INSULIN GLARGINE 20 UNITS: 100 INJECTION, SOLUTION SUBCUTANEOUS at 09:08

## 2024-08-25 RX ADMIN — PANTOPRAZOLE SODIUM 40 MG: 40 TABLET, DELAYED RELEASE ORAL at 08:08

## 2024-08-25 RX ADMIN — INSULIN ASPART 4 UNITS: 100 INJECTION, SOLUTION INTRAVENOUS; SUBCUTANEOUS at 05:08

## 2024-08-25 RX ADMIN — PIPERACILLIN SODIUM AND TAZOBACTAM SODIUM 4.5 G: 4; .5 INJECTION, POWDER, LYOPHILIZED, FOR SOLUTION INTRAVENOUS at 07:08

## 2024-08-25 RX ADMIN — CLINDAMYCIN PHOSPHATE 600 MG: 600 INJECTION, SOLUTION INTRAVENOUS at 02:08

## 2024-08-25 RX ADMIN — CLINDAMYCIN PHOSPHATE 600 MG: 600 INJECTION, SOLUTION INTRAVENOUS at 11:08

## 2024-08-25 RX ADMIN — ENOXAPARIN SODIUM 40 MG: 100 INJECTION SUBCUTANEOUS at 04:08

## 2024-08-25 RX ADMIN — CLINDAMYCIN PHOSPHATE 600 MG: 600 INJECTION, SOLUTION INTRAVENOUS at 07:08

## 2024-08-25 NOTE — ASSESSMENT & PLAN NOTE
1 of 2 bottles positive for Gram-positive cocci  Will repeat  Maybe a contaminant, await ID  Continue vancomycin

## 2024-08-25 NOTE — PROGRESS NOTES
[facility] Medicine  Progress Note    Patient Name: Negro Hale  MRN: [id number]  Patient Class: IP- Inpatient   Admission Date: 8/22/2024  Length of Stay: 2 days  Attending Physician: Pop Butcher MD  Primary Care Provider: Smiley Trevino FNP        Subjective:     Principal Problem:Necrotizing soft tissue infection        HPI:  Thank you for consult    Chief complaint:  Rectal abscess    History of present illness:       Mr. Hale is a 84-year-old admitted 08/22 with rectal discomfort.  CT scan showed rectal abscess.  Patient admitted to Dr. Butcher.  Underwent surgery earlier 08/23 with I and D of perirectal region.   Patient did well with surgery.  Hospitalist service was asked to see patient postoperative.  Patient before admission had complained of rectal pain for proximally 1 week.  Had some subjective fever but no chills.  White blood cell count was noted to be elevated on admission.  Patient feels better after surgery.    Review of system:  See above.  Has had some past problems with constipation.  Has 2-3 episodes of nocturia nightly.  States weight is been stable.  Ambulates with cane and walker.  Review of systems otherwise negative.    Past medical history:  -hypertension greater than 20 years  -diabetes mellitus greater than 20 years.  States takes 1 shot of insulin 45 units daily.  -coronary arteriosclerosis has seen Dr. Bridges with BHAKTI snyder LCx 2/26/2007  (patient did not remember this and obtained from records)  -paroxysmal atrial fibrillation with rhythm being in normal sinus  -dyslipidemia  -chronic back pain with past vertebral fracture followed by Dr. Destin Leroy    Past surgical history:  Surgery this admission for perirectal abscess with I and D  Bilateral cataract surgery  Remote appendectomy  Tonsillectomy  Inguinal hernia repair    Allergy to Mayonnaise    Social history:  No history of tobacco alcohol use    Family history  Father with heart  disease    Health maintenance issues:  Primary care provider is Smiley Trevino NP  Had flu shots  Had previous Pneumovax  No known COVID-19 infection  COVID vaccination x2  Says no prior colonoscopy but looks to possibly have had 1 by Dr. Mendoza in 2019        Overview/Hospital Course:  08/24 No new complaints. Family in room say pt going back to surgery first of week. BP and BS generally good.  08/25 discussed with Dr. Butcher and patient be taken back to surgery tomorrow for additional debridement.  BP and blood sugar good  1 of 2 blood cultures positive for gram positive cocci, maybe a contaminate, will repeat blood culture.  Currently on vancomycin and which should cover    Past Medical History:   Diagnosis Date    Abnormal thyroid stimulating hormone (TSH) level 08/22/2019    Anemia 08/15/2019    Atrial fibrillation 09/07/2012    Bradycardia 05/23/2017    asymptomatic    Change in bowel habit 11/13/2018    Chronic kidney disease, unspecified 01/14/2019    Coronary arteriosclerosis 09/07/2012    Disease of skin and subcutaneous tissue 08/16/2017    medial aspect RLE- cystic structure seen on venous doppler US.    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    Hyperlipidemia 09/21/2012    Lower extremity edema 04/06/2017    Lumbar radiculopathy 01/26/2017    Obesity, unspecified 12/05/2016    Old myocardial infarction 12/06/2017    Other secondary pulmonary hypertension 05/23/2017    Other spondylosis, lumbosacral region 01/26/2017    Peripheral vascular disease     Personal history of tobacco use, presenting hazards to health 11/17/2016    Pulmonary hypertension     Type 2 diabetes mellitus with chronic kidney disease 01/14/2019    Type 2 diabetes mellitus  "with hyperglycemia 10/02/2011    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 Lewis Eye exam    Varicose veins of both lower extremities with pain 01/02/2019    Venous insufficiency 01/08/2019       Past Surgical History:   Procedure Laterality Date    APPENDECTOMY      CARDIAC CATHETERIZATION  2007    Bridges- Stent placement    CATARACT EXTRACTION, BILATERAL      COLONOSCOPY  11/06/2019    Dr. Mendoza    HERNIA REPAIR      Inguinal hernia repair    TONSILLECTOMY         Review of patient's allergies indicates:   Allergen Reactions    Mayonnaise      Upset stomach       No current facility-administered medications on file prior to encounter.     Current Outpatient Medications on File Prior to Encounter   Medication Sig    blood-glucose sensor (FREESTYLE ADORE 3 SENSOR) Giulia 1 each by Misc.(Non-Drug; Combo Route) route once.    cholecalciferol, vitamin D3, 125 mcg (5,000 unit) capsule Take 5,000 Units by mouth once daily.    clopidogreL (PLAVIX) 75 mg tablet Take 1 tablet (75 mg total) by mouth once daily.    coenzyme Q10 200 mg capsule Take 200 mg by mouth once daily.    cyanocobalamin (VITAMIN B-12) 1000 MCG tablet Take 100 mcg by mouth once daily.    dapagliflozin propanediol (FARXIGA) 10 mg tablet Take 1 tablet (10 mg total) by mouth once daily.    glipiZIDE 5 MG TR24 Take 1 tablet (5 mg total) by mouth daily with breakfast.    insulin glargine U-100, Lantus, (LANTUS SOLOSTAR U-100 INSULIN) 100 unit/mL (3 mL) InPn pen Inject 45 Units into the skin every evening.    pantoprazole (PROTONIX) 40 MG tablet Take 1 tablet (40 mg total) by mouth once daily.    rosuvastatin (CRESTOR) 40 MG Tab Take 1 tablet (40 mg total) by mouth every evening.    SURE COMFORT PEN NEEDLE 32 gauge x 5/32" Ndle AS DIRECTED    " nitroGLYCERIN (NITROSTAT) 0.4 MG SL tablet Place 1 tablet (0.4 mg total) under the tongue every 5 (five) minutes as needed for Chest pain.     Family History       Problem Relation (Age of Onset)    Diabetes Brother    Heart disease Father    Neurofibromatosis Brother    No Known Problems Mother, Brother, Maternal Grandmother, Maternal Grandfather, Paternal Grandmother, Paternal Grandfather    Throat cancer Sister          Tobacco Use    Smoking status: Former     Current packs/day: 0.00     Average packs/day: 0.1 packs/day for 3.0 years (0.3 ttl pk-yrs)     Types: Cigarettes     Start date:      Quit date:      Years since quittin.6     Passive exposure: Past    Smokeless tobacco: Never    Tobacco comments:     Quit 10/15/1976   Substance and Sexual Activity    Alcohol use: Not Currently     Comment: occasionally    Drug use: Never    Sexual activity: Not Currently     Review of Systems   Constitutional:  Negative for appetite change, fatigue and fever.   HENT:  Negative for congestion, hearing loss and trouble swallowing.    Respiratory:  Negative for chest tightness, shortness of breath and wheezing.    Cardiovascular:  Negative for chest pain and palpitations.   Gastrointestinal:  Positive for constipation and rectal pain. Negative for abdominal pain and nausea.   Genitourinary:  Negative for difficulty urinating and dysuria.   Musculoskeletal:  Positive for gait problem. Negative for back pain and neck stiffness.   Skin:  Negative for pallor and rash.   Neurological:  Negative for dizziness, speech difficulty and headaches.   Psychiatric/Behavioral:  Positive for confusion. Negative for suicidal ideas.      Objective:     Vital Signs (Most Recent):  Temp: 97.3 °F (36.3 °C) (24 1143)  Pulse: 62 (24 1143)  Resp: 16 (24 1143)  BP: (!) 121/56 (24 1143)  SpO2: 95 % (24 1143) Vital Signs (24h Range):  Temp:  [97.2 °F (36.2 °C)-99 °F (37.2 °C)] 97.3 °F (36.3 °C)  Pulse:   [62-72] 62  Resp:  [16-20] 16  SpO2:  [91 %-95 %] 95 %  BP: (107-128)/(53-72) 121/56     Weight: 81.6 kg (180 lb)  Body mass index is 28.19 kg/m².     Physical Exam  Vitals reviewed.   Constitutional:       General: He is awake. He is not in acute distress.     Appearance: He is well-developed. He is not toxic-appearing.   HENT:      Head: Normocephalic.      Nose: Nose normal.      Mouth/Throat:      Pharynx: Oropharynx is clear.   Eyes:      Extraocular Movements: Extraocular movements intact.      Pupils: Pupils are equal, round, and reactive to light.   Neck:      Thyroid: No thyroid mass.      Vascular: No carotid bruit.   Cardiovascular:      Rate and Rhythm: Normal rate and regular rhythm.      Pulses: Normal pulses.      Heart sounds: Normal heart sounds. No murmur heard.  Pulmonary:      Effort: Pulmonary effort is normal.      Breath sounds: Normal breath sounds and air entry. No wheezing.   Abdominal:      General: Bowel sounds are normal. There is no distension.      Palpations: Abdomen is soft.      Tenderness: There is no abdominal tenderness.   Musculoskeletal:         General: Normal range of motion.      Cervical back: Neck supple. No rigidity.   Skin:     General: Skin is warm.      Coloration: Skin is not jaundiced.      Findings: No lesion.      Comments: Dressing over surgical site to perirectal area   Neurological:      General: No focal deficit present.      Mental Status: He is alert and oriented to person, place, and time.      Cranial Nerves: No cranial nerve deficit.      Comments: Patient is oriented and was able to give me a fairly good history but less several details out and got mixed up on some things had to be straightened out from records   Psychiatric:         Attention and Perception: Attention normal.         Mood and Affect: Mood normal.         Behavior: Behavior normal. Behavior is cooperative.         Thought Content: Thought content normal.         Cognition and Memory:  Cognition normal.          Significant Labs: All pertinent labs within the past 24 hours have been reviewed.  BMP:   Recent Labs   Lab 08/24/24  0349 08/25/24  0740     --    *  --    K 4.0  --      --    CO2 25  --    BUN 39* 37*   CREATININE 1.31* 1.56*   CALCIUM 8.0*  --      CBC:   Recent Labs   Lab 08/24/24  0349   WBC 16.48*   HGB 11.0*   HCT 34.2*        CMP:   Recent Labs   Lab 08/24/24  0349 08/25/24  0740   *  --    K 4.0  --      --    CO2 25  --      --    BUN 39* 37*   CREATININE 1.31* 1.56*   CALCIUM 8.0*  --    ANIONGAP 8  --        Significant Imaging: I have reviewed all pertinent imaging results/findings within the past 24 hours.    Imaging Results              CT Chest Abdomen Pelvis Without Contrast (XPD) (Final result)  Result time 08/23/24 07:44:53      Final result by Donald Monreal MD (08/23/24 07:44:53)                   Impression:      There is evidence of necrotizing fasciitis of the posterior peroneal area extending into the soft tissues right lateral to the intergluteal cleft.  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  Verbal report to Dr. Loya at 07:30 is documented.    Cholelithiasis    Moderate retained stool in the colon    Moderate coronary artery calcification      Electronically signed by: Donald Monreal  Date:    08/23/2024  Time:    07:44               Narrative:    EXAMINATION:  CT chest abdomen pelvis without IV contrast    CLINICAL HISTORY:  Sepsis    TECHNIQUE:  Axial CT images were obtained from the lung apices through the abdomen and pelvis without IV contrast.  Oral contrast was not given.  Coronal and sagittal reconstructions submitted and interpreted.  Total DLP 1321.9 mGycm.  Automated exposure control utilized.    COMPARISON:  No previous similar    FINDINGS:  CT chest: No supraclavicular soft tissue mass is seen.  There is no mediastinal lymphadenopathy by short-axis diameter criteria.  There is  no mediastinal mass otherwise.  There is no focal aneurysm of the moderately calcified thoracic aorta.  There is moderate coronary artery calcification present.    There is no pericardial effusion.  There is trace bilateral pleural effusion.    There is mild dependent atelectasis in the lungs.    CT abdomen:    There is no evidence of pneumoperitoneum.    Liver, bile ducts, spleen, and adrenal glands are unremarkable in noncontrast CT appearance.    Gallbladder is mildly distended and contain some layering radiopaque gallstones.    There is some diffuse fatty infiltration of the pancreas.  Pancreas is otherwise unremarkable.    There is no hydronephrosis or abnormal perinephric fluid.  There is no radiopaque renal stone.    There is no aneurysm of the moderately calcified abdominal aorta.    Stomach is grossly unremarkable but is not well distended, limiting evaluation.    Appendix is not seen.    There is no rosalee bowel obstruction.  There is moderate retained stool in the colon.    There is no retroperitoneal lymphadenopathy by short-axis diameter criteria.    CT pelvis:    There is abnormal soft tissue emphysema in the right perineal soft tissues posteriorly along the right side of the intergluteal cleft.  This could represent necrotizing fasciitis.  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    There is no soft tissue mass within the pelvis itself.  Urinary bladder is moderately distended.    No acute osseous findings.  There is prominent degenerative disc narrowing and moderate spondylosis of the thoracolumbar spine                                       X-Ray Chest AP Portable (Final result)  Result time 08/23/24 07:36:09      Final result by Flako Perez MD (08/23/24 07:36:09)                   Impression:      No acute findings.      Electronically signed by: Flako Perez  Date:    08/23/2024  Time:    07:36               Narrative:    EXAMINATION:  XR CHEST AP PORTABLE    CLINICAL HISTORY:  Fever,  unspecified    TECHNIQUE:  Single frontal view of the chest was performed.    COMPARISON:  02/01/2024    FINDINGS:  Heart size normal. Lungs clear. No pneumothorax or pleural effusion.                                    Intake/Output - Last 3 Shifts         08/23 0700 08/24 0659 08/24 0700 08/25 0659 08/25 0700 08/26 0659    I.V. (mL/kg) 25 (0.3) 1705.9 (20.9)     IV Piggyback 550 1494.4     Total Intake(mL/kg) 575 (7) 3200.4 (39.2)     Urine (mL/kg/hr) 275 (0.1)      Stool 0      Blood 10      Total Output 285      Net +290 +3200.4            Urine Occurrence 4 x      Stool Occurrence 1 x            Microbiology Results (last 7 days)       Procedure Component Value Units Date/Time    Blood Culture #2 [[id number]]  (Abnormal) Collected: 08/23/24 0150    Order Status: Completed Specimen: Blood Updated: 08/25/24 1100     Gram Stain Result Gram positive cocci     Comment: From Aerobic Bottle       Culture, Wound [[id number]] Collected: 08/23/24 1245    Order Status: Completed Specimen: Wound from Perirectal Updated: 08/25/24 1026     Culture, Wound/Abscess No Growth at 2 Days    Blood Culture #1 [[id number]] Collected: 08/23/24 0146    Order Status: Completed Specimen: Blood Updated: 08/25/24 0632     Culture, Blood No Growth At 48 Hours    Culture, Anaerobe [[id number]] Collected: 08/23/24 1246    Order Status: Sent Specimen: Abscess from Perirectal Updated: 08/23/24 1246              Assessment/Plan:      * Necrotizing soft tissue infection    Surgery 08/22/24    Positive blood culture    1 of 2 bottles positive for Gram-positive cocci  Will repeat  Maybe a contaminant, await ID  Continue vancomycin    Paroxysmal atrial fibrillation  Patient with Paroxysmal (<7 days) atrial fibrillation which is controlled currently with Beta Blocker. Patient is currently in sinus rhythm.[id number] Score: 4. HASBLED Score: 2. Anticoagulation not indicated due to patient looks unsteady and will have increased risk from fall while  on anticoagulation.  Will monitor can defer to his outpatient providers .  Use antiplatelet.    Perirectal abscess    Surgery 08/22/24 08/24 family in room says pt to go back to surgery first week       08/25 clindamycin unlikely to be adding much with        good  anaerobic coverage with Zosyn.    Type 2 diabetes mellitus with hyperglycemia, with long-term current use of insulin    Cover with sliding scale and maintenance insulin  08.24 adjust insulin as needed    Atherosclerosis of native coronary artery of native heart without angina pectoris  Patient with known CAD s/p stent placement, which is controlled Will continue ASA and Statin and monitor for S/Sx of angina/ACS. Continue to monitor on telemetry.   BHAKTI mid LCx 2/26/2007  followed by Dr. Bridges; this history obtained from chart in patient did not remember    Cardiolite Impression:     1. Medium size, mild intensity [id number] inferolateral defect with decreased wall motion and thickening, suggestive of infarct.  2. The global left ventricular systolic function is normal with an LV ejection fraction of 70 % and no evidence of LV dilatation. Wall motion is impaired.  Electronically signed by:Gabriel Barahona  Date:                                            09/12/2023  Time:                                           14:33    Hypercholesterolemia  Statin    Primary hypertension  Chronic, controlled. Latest blood pressure and vitals reviewed-     Temp:  [97.2 °F (36.2 °C)-99 °F (37.2 °C)]   Pulse:  [62-72]   Resp:  [16-20]   BP: (107-128)/(53-72)   SpO2:  [91 %-95 %] .   Home meds for hypertension were reviewed and noted below.   Hypertension Medications               nitroGLYCERIN (NITROSTAT) 0.4 MG SL tablet Place 1 tablet (0.4 mg total) under the tongue every 5 (five) minutes as needed for Chest pain.            While in the hospital, will manage blood pressure as follows; Continue home antihypertensive regimen    Will utilize p.r.n. blood pressure  medication only if patient's blood pressure greater than 180/110 and he develops symptoms such as worsening chest pain or shortness of breath.    Type 2 diabetes mellitus with stage 3a chronic kidney disease, with long-term current use of insulin  Creatine stable for now. BMP reviewed- noted Estimated Creatinine Clearance: 35.2 mL/min (A) (based on SCr of 1.57 mg/dL (H)). according to latest data. Based on current GFR, CKD stage is stage 3 - GFR 30-59.  Monitor UOP and serial BMP and adjust therapy as needed. Renally dose meds. Avoid nephrotoxic medications and procedures.      VTE Risk Mitigation (From admission, onward)           Ordered     enoxaparin injection 40 mg  Daily         08/25/24 0713     IP VTE HIGH RISK PATIENT  Once         08/23/24 1342     Place sequential compression device  Until discontinued         08/23/24 1342                    Discharge Planning   RICKY:      Code Status: Prior   Is the patient medically ready for discharge?:     Reason for patient still in hospital (select all that apply): Laboratory test, Treatment, and Imaging                     Colin Lopez MD  Department of Hospital Medicine   [facility]

## 2024-08-25 NOTE — HOSPITAL COURSE
08/24 No new complaints. Family in room say pt going back to surgery first of week. BP and BS generally good.  08/25 discussed with Dr. Butcher and patient be taken back to surgery tomorrow for additional debridement.  BP and blood sugar good  1 of 2 blood cultures positive for gram positive cocci, maybe a contaminate, will repeat blood culture.  Currently on vancomycin and which should cover

## 2024-08-25 NOTE — PLAN OF CARE
Problem: Fall Injury Risk  Goal: Absence of Fall and Fall-Related Injury  Outcome: Progressing  Intervention: Identify and Manage Contributors  Flowsheets (Taken 8/24/2024 2038)  Self-Care Promotion:   independence encouraged   BADL personal objects within reach  Medication Review/Management: medications reviewed  Intervention: Promote Injury-Free Environment  Flowsheets (Taken 8/24/2024 2038)  Safety Promotion/Fall Prevention:   assistive device/personal item within reach   Fall Risk reviewed with patient/family   Fall Risk signage in place   side rails raised x 2     Problem: Adult Inpatient Plan of Care  Goal: Plan of Care Review  Outcome: Progressing  Goal: Patient-Specific Goal (Individualized)  Outcome: Progressing  Goal: Optimal Comfort and Wellbeing  Outcome: Progressing  Goal: Readiness for Transition of Care  Outcome: Progressing

## 2024-08-25 NOTE — PROGRESS NOTES
Pharmacist Renal Dose Adjustment Note    Negro Hale is a 84 y.o. male being treated with the medication enoxaparin    Patient Data:    Vital Signs (Most Recent):  Temp: 98 °F (36.7 °C) (08/25/24 0506)  Pulse: 71 (08/25/24 0506)  Resp: 18 (08/25/24 0506)  BP: 107/68 (08/25/24 0506)  SpO2: (!) 91 % (08/25/24 0506) Vital Signs (72h Range):  Temp:  [97.2 °F (36.2 °C)-102.7 °F (39.3 °C)]   Pulse:  []   Resp:  [11-20]   BP: ()/(49-72)   SpO2:  [85 %-99 %]      Recent Labs   Lab 08/23/24  0146 08/24/24  0349   CREATININE 1.57* 1.31*     Serum creatinine: 1.31 mg/dL (H) 08/24/24 0349  Estimated creatinine clearance: 42.2 mL/min (A)    Medication:enoxaparin dose: 30 mg frequency daily will be changed to medication:enoxaparin dose:40 mg frequency:daily    Pharmacist's Name: Melissa Woodard  Pharmacist's Extension: [phone]

## 2024-08-25 NOTE — ASSESSMENT & PLAN NOTE
Chronic, controlled. Latest blood pressure and vitals reviewed-     Temp:  [97.2 °F (36.2 °C)-99.3 °F (37.4 °C)]   Pulse:  [67-82]   Resp:  [16-20]   BP: ()/(50-58)   SpO2:  [94 %-95 %] .   Home meds for hypertension were reviewed and noted below.   Hypertension Medications               nitroGLYCERIN (NITROSTAT) 0.4 MG SL tablet Place 1 tablet (0.4 mg total) under the tongue every 5 (five) minutes as needed for Chest pain.            While in the hospital, will manage blood pressure as follows; Continue home antihypertensive regimen    Will utilize p.r.n. blood pressure medication only if patient's blood pressure greater than 180/110 and he develops symptoms such as worsening chest pain or shortness of breath.

## 2024-08-25 NOTE — ASSESSMENT & PLAN NOTE
Chronic, controlled. Latest blood pressure and vitals reviewed-     Temp:  [97.2 °F (36.2 °C)-99 °F (37.2 °C)]   Pulse:  [62-72]   Resp:  [16-20]   BP: (107-128)/(53-72)   SpO2:  [91 %-95 %] .   Home meds for hypertension were reviewed and noted below.   Hypertension Medications               nitroGLYCERIN (NITROSTAT) 0.4 MG SL tablet Place 1 tablet (0.4 mg total) under the tongue every 5 (five) minutes as needed for Chest pain.            While in the hospital, will manage blood pressure as follows; Continue home antihypertensive regimen    Will utilize p.r.n. blood pressure medication only if patient's blood pressure greater than 180/110 and he develops symptoms such as worsening chest pain or shortness of breath.

## 2024-08-25 NOTE — PROGRESS NOTES
[facility]  General Surgery  Progress Note    Subjective:     Interval History:  Patient doing well overnight.  No issues.    Post-Op Info:  Procedure(s) (LRB):  INCISION AND DRAINAGE, WITH DEBRIDEMENT PERIRECTAL REGION (Right)   2 Days Post-Op      Medications:  Continuous Infusions:   0.9% NaCl   Intravenous Continuous 75 mL/hr at 08/25/24 0646 Rate Verify at 08/25/24 0646     Scheduled Meds:   clindamycin IV (PEDS and ADULTS)  600 mg Intravenous Q8H    clopidogreL  75 mg Oral Daily    enoxparin  40 mg Subcutaneous Daily    insulin glargine U-100 (Lantus)  20 Units Subcutaneous QHS    mupirocin  1 g Nasal BID    pantoprazole  40 mg Oral Daily    piperacillin-tazobactam (Zosyn) IV (PEDS and ADULTS) (extended infusion is not appropriate)  4.5 g Intravenous Once    piperacillin-tazobactam (Zosyn) IV (PEDS and ADULTS) (extended infusion is not appropriate)  4.5 g Intravenous Q8H    vancomycin (VANCOCIN) IV (PEDS and ADULTS)  1,750 mg Intravenous Q24H    vitamin D  5,000 Units Oral Daily     PRN Meds:  Current Facility-Administered Medications:     dextrose 10%, 12.5 g, Intravenous, PRN    dextrose 10%, 25 g, Intravenous, PRN    glucagon (human recombinant), 1 mg, Intramuscular, PRN    glucose, 16 g, Oral, PRN    glucose, 24 g, Oral, PRN    HYDROcodone-acetaminophen, 1 tablet, Oral, Q4H PRN    HYDROcodone-acetaminophen, 1 tablet, Oral, Q4H PRN    insulin aspart U-100, 0-10 Units, Subcutaneous, QID (AC + HS) PRN    Pharmacy to dose Vancomycin consult, , , Once **AND** vancomycin - pharmacy to dose, , Intravenous, pharmacy to manage frequency     Objective:     Vital Signs (Most Recent):  Temp: 97.7 °F (36.5 °C) (08/25/24 0751)  Pulse: 62 (08/25/24 0751)  Resp: 18 (08/25/24 0751)  BP: 112/60 (08/25/24 0751)  SpO2: 95 % (08/25/24 0751) Vital Signs (24h Range):  Temp:  [97.2 °F (36.2 °C)-99 °F (37.2 °C)] 97.7 °F (36.5 °C)  Pulse:  [62-72] 62  Resp:  [16-20] 18  SpO2:  [91 %-95 %] 95 %  BP:  (100-128)/(52-72) 112/60       Intake/Output Summary (Last 24 hours) at 8/25/2024 0959  Last data filed at 8/25/2024 0646  Gross per 24 hour   Intake 3200.35 ml   Output --   Net 3200.35 ml       Physical Exam  Constitutional:       General: He is not in acute distress.  HENT:      Head: Normocephalic.   Cardiovascular:      Rate and Rhythm: Normal rate and regular rhythm.      Pulses: Normal pulses.   Pulmonary:      Effort: Pulmonary effort is normal. No respiratory distress.      Breath sounds: Normal breath sounds.   Abdominal:      General: Abdomen is flat. There is no distension.      Palpations: Abdomen is soft.      Tenderness: There is no abdominal tenderness.   Musculoskeletal:         General: Normal range of motion.   Skin:     General: Skin is warm.      Findings: Lesion (Areas of necrotic skin edges.  Otherwise healing pretty well.) present.   Neurological:      General: No focal deficit present.      Mental Status: He is oriented to person, place, and time.         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] 08/23/2024 Yes    Perirectal abscess [K61.1] 08/23/2024 Yes    Paroxysmal atrial fibrillation [I48.0] 08/23/2024 Yes    Type 2 diabetes mellitus with hyperglycemia, with long-term current use of insulin [E11.65, Z79.4] 02/01/2024 Not Applicable     Chronic    Atherosclerosis of native coronary artery of native heart without angina pectoris [I25.10] 03/17/2021 Yes     Chronic    Type 2 diabetes mellitus with stage 3a chronic kidney  disease, with long-term current use of insulin [E11.22, N18.31, Z79.4] 03/17/2021 Not Applicable     Chronic    Hypercholesterolemia [E78.00] 03/17/2021 Yes     Chronic    Primary hypertension [I10] 03/17/2021 Yes      Problems Resolved During this Admission:     OR tomorrow for further debridement of that perirectal Aries's.    Pop Butcher MD  General Surgery  [facility]

## 2024-08-25 NOTE — SUBJECTIVE & OBJECTIVE
Past Medical History:   Diagnosis Date    Abnormal thyroid stimulating hormone (TSH) level 08/22/2019    Anemia 08/15/2019    Atrial fibrillation 09/07/2012    Bradycardia 05/23/2017    asymptomatic    Change in bowel habit 11/13/2018    Chronic kidney disease, unspecified 01/14/2019    Coronary arteriosclerosis 09/07/2012    Disease of skin and subcutaneous tissue 08/16/2017    medial aspect RLE- cystic structure seen on venous doppler US.    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 Lewis Eye exam    Varicose veins of both lower extremities with pain 01/02/2019    Venous insufficiency 01/08/2019       Past Surgical History:   Procedure Laterality Date    APPENDECTOMY      CARDIAC CATHETERIZATION  2007    [facility]- Stent placement    CATARACT EXTRACTION, BILATERAL      COLONOSCOPY  11/06/2019    Dr. Mendoza    HERNIA REPAIR      Inguinal hernia repair     "TONSILLECTOMY         Review of patient's allergies indicates:   Allergen Reactions    Mayonnaise      Upset stomach       No current facility-administered medications on file prior to encounter.     Current Outpatient Medications on File Prior to Encounter   Medication Sig    blood-glucose sensor (FREESTYLE ADORE 3 SENSOR) Giulia 1 each by Misc.(Non-Drug; Combo Route) route once.    cholecalciferol, vitamin D3, 125 mcg (5,000 unit) capsule Take 5,000 Units by mouth once daily.    clopidogreL (PLAVIX) 75 mg tablet Take 1 tablet (75 mg total) by mouth once daily.    coenzyme Q10 200 mg capsule Take 200 mg by mouth once daily.    cyanocobalamin (VITAMIN B-12) 1000 MCG tablet Take 100 mcg by mouth once daily.    dapagliflozin propanediol (FARXIGA) 10 mg tablet Take 1 tablet (10 mg total) by mouth once daily.    glipiZIDE 5 MG TR24 Take 1 tablet (5 mg total) by mouth daily with breakfast.    insulin glargine U-100, Lantus, (LANTUS SOLOSTAR U-100 INSULIN) 100 unit/mL (3 mL) InPn pen Inject 45 Units into the skin every evening.    pantoprazole (PROTONIX) 40 MG tablet Take 1 tablet (40 mg total) by mouth once daily.    rosuvastatin (CRESTOR) 40 MG Tab Take 1 tablet (40 mg total) by mouth every evening.    SURE COMFORT PEN NEEDLE 32 gauge x 5/32" Ndle AS DIRECTED    nitroGLYCERIN (NITROSTAT) 0.4 MG SL tablet Place 1 tablet (0.4 mg total) under the tongue every 5 (five) minutes as needed for Chest pain.     Family History       Problem Relation (Age of Onset)    Diabetes Brother    Heart disease Father    Neurofibromatosis Brother    No Known Problems Mother, Brother, Maternal Grandmother, Maternal Grandfather, Paternal Grandmother, Paternal Grandfather    Throat cancer Sister          Tobacco Use    Smoking status: Former     Current packs/day: 0.00     Average packs/day: 0.1 packs/day for 3.0 years (0.3 ttl pk-yrs)     Types: Cigarettes     Start date:      Quit date:      Years since quittin.6     Passive " exposure: Past    Smokeless tobacco: Never    Tobacco comments:     Quit 10/15/1976   Substance and Sexual Activity    Alcohol use: Not Currently     Comment: occasionally    Drug use: Never    Sexual activity: Not Currently     Review of Systems   Constitutional:  Negative for appetite change, fatigue and fever.   HENT:  Negative for congestion, hearing loss and trouble swallowing.    Respiratory:  Negative for chest tightness, shortness of breath and wheezing.    Cardiovascular:  Negative for chest pain and palpitations.   Gastrointestinal:  Positive for constipation and rectal pain. Negative for abdominal pain and nausea.   Genitourinary:  Negative for difficulty urinating and dysuria.   Musculoskeletal:  Positive for gait problem. Negative for back pain and neck stiffness.   Skin:  Negative for pallor and rash.   Neurological:  Negative for dizziness, speech difficulty and headaches.   Psychiatric/Behavioral:  Positive for confusion. Negative for suicidal ideas.      Objective:     Vital Signs (Most Recent):  Temp: 97.3 °F (36.3 °C) (08/25/24 1143)  Pulse: 62 (08/25/24 1143)  Resp: 16 (08/25/24 1143)  BP: (!) 121/56 (08/25/24 1143)  SpO2: 95 % (08/25/24 1143) Vital Signs (24h Range):  Temp:  [97.2 °F (36.2 °C)-99 °F (37.2 °C)] 97.3 °F (36.3 °C)  Pulse:  [62-72] 62  Resp:  [16-20] 16  SpO2:  [91 %-95 %] 95 %  BP: (107-128)/(53-72) 121/56     Weight: 81.6 kg (180 lb)  Body mass index is 28.19 kg/m².     Physical Exam  Vitals reviewed.   Constitutional:       General: He is awake. He is not in acute distress.     Appearance: He is well-developed. He is not toxic-appearing.   HENT:      Head: Normocephalic.      Nose: Nose normal.      Mouth/Throat:      Pharynx: Oropharynx is clear.   Eyes:      Extraocular Movements: Extraocular movements intact.      Pupils: Pupils are equal, round, and reactive to light.   Neck:      Thyroid: No thyroid mass.      Vascular: No carotid bruit.   Cardiovascular:      Rate and  Rhythm: Normal rate and regular rhythm.      Pulses: Normal pulses.      Heart sounds: Normal heart sounds. No murmur heard.  Pulmonary:      Effort: Pulmonary effort is normal.      Breath sounds: Normal breath sounds and air entry. No wheezing.   Abdominal:      General: Bowel sounds are normal. There is no distension.      Palpations: Abdomen is soft.      Tenderness: There is no abdominal tenderness.   Musculoskeletal:         General: Normal range of motion.      Cervical back: Neck supple. No rigidity.   Skin:     General: Skin is warm.      Coloration: Skin is not jaundiced.      Findings: No lesion.      Comments: Dressing over surgical site to perirectal area   Neurological:      General: No focal deficit present.      Mental Status: He is alert and oriented to person, place, and time.      Cranial Nerves: No cranial nerve deficit.      Comments: Patient is oriented and was able to give me a fairly good history but less several details out and got mixed up on some things had to be straightened out from records   Psychiatric:         Attention and Perception: Attention normal.         Mood and Affect: Mood normal.         Behavior: Behavior normal. Behavior is cooperative.         Thought Content: Thought content normal.         Cognition and Memory: Cognition normal.          Significant Labs: All pertinent labs within the past 24 hours have been reviewed.  BMP:   Recent Labs   Lab 08/24/24  0349 08/25/24  0740     --    *  --    K 4.0  --      --    CO2 25  --    BUN 39* 37*   CREATININE 1.31* 1.56*   CALCIUM 8.0*  --      CBC:   Recent Labs   Lab 08/24/24 0349   WBC 16.48*   HGB 11.0*   HCT 34.2*        CMP:   Recent Labs   Lab 08/24/24  0349 08/25/24  0740   *  --    K 4.0  --      --    CO2 25  --      --    BUN 39* 37*   CREATININE 1.31* 1.56*   CALCIUM 8.0*  --    ANIONGAP 8  --        Significant Imaging: I have reviewed all pertinent imaging  results/findings within the past 24 hours.    Imaging Results              CT Chest Abdomen Pelvis Without Contrast (XPD) (Final result)  Result time 08/23/24 07:44:53      Final result by Donald Monreal MD (08/23/24 07:44:53)                   Impression:      There is evidence of necrotizing fasciitis of the posterior peroneal area extending into the soft tissues right lateral to the intergluteal cleft.  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  Verbal report to Dr. Loya at 07:30 is documented.    Cholelithiasis    Moderate retained stool in the colon    Moderate coronary artery calcification      Electronically signed by: Donald Monreal  Date:    08/23/2024  Time:    07:44               Narrative:    EXAMINATION:  CT chest abdomen pelvis without IV contrast    CLINICAL HISTORY:  Sepsis    TECHNIQUE:  Axial CT images were obtained from the lung apices through the abdomen and pelvis without IV contrast.  Oral contrast was not given.  Coronal and sagittal reconstructions submitted and interpreted.  Total DLP 1321.9 mGycm.  Automated exposure control utilized.    COMPARISON:  No previous similar    FINDINGS:  CT chest: No supraclavicular soft tissue mass is seen.  There is no mediastinal lymphadenopathy by short-axis diameter criteria.  There is no mediastinal mass otherwise.  There is no focal aneurysm of the moderately calcified thoracic aorta.  There is moderate coronary artery calcification present.    There is no pericardial effusion.  There is trace bilateral pleural effusion.    There is mild dependent atelectasis in the lungs.    CT abdomen:    There is no evidence of pneumoperitoneum.    Liver, bile ducts, spleen, and adrenal glands are unremarkable in noncontrast CT appearance.    Gallbladder is mildly distended and contain some layering radiopaque gallstones.    There is some diffuse fatty infiltration of the pancreas.  Pancreas is otherwise unremarkable.    There is no  hydronephrosis or abnormal perinephric fluid.  There is no radiopaque renal stone.    There is no aneurysm of the moderately calcified abdominal aorta.    Stomach is grossly unremarkable but is not well distended, limiting evaluation.    Appendix is not seen.    There is no rosalee bowel obstruction.  There is moderate retained stool in the colon.    There is no retroperitoneal lymphadenopathy by short-axis diameter criteria.    CT pelvis:    There is abnormal soft tissue emphysema in the right perineal soft tissues posteriorly along the right side of the intergluteal cleft.  This could represent necrotizing fasciitis.  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    There is no soft tissue mass within the pelvis itself.  Urinary bladder is moderately distended.    No acute osseous findings.  There is prominent degenerative disc narrowing and moderate spondylosis of the thoracolumbar spine                                       X-Ray Chest AP Portable (Final result)  Result time 08/23/24 07:36:09      Final result by Flako Perez MD (08/23/24 07:36:09)                   Impression:      No acute findings.      Electronically signed by: Flako Perez  Date:    08/23/2024  Time:    07:36               Narrative:    EXAMINATION:  XR CHEST AP PORTABLE    CLINICAL HISTORY:  Fever, unspecified    TECHNIQUE:  Single frontal view of the chest was performed.    COMPARISON:  02/01/2024    FINDINGS:  Heart size normal. Lungs clear. No pneumothorax or pleural effusion.                                    Intake/Output - Last 3 Shifts         08/23 0700 08/24 0659 08/24 0700 08/25 0659 08/25 0700 08/26 0659    I.V. (mL/kg) 25 (0.3) 1705.9 (20.9)     IV Piggyback 550 1494.4     Total Intake(mL/kg) 575 (7) 3200.4 (39.2)     Urine (mL/kg/hr) 275 (0.1)      Stool 0      Blood 10      Total Output 285      Net +290 +3200.4            Urine Occurrence 4 x      Stool Occurrence 1 x            Microbiology Results (last 7 days)        Procedure Component Value Units Date/Time    Blood Culture #2 [[id number]]  (Abnormal) Collected: 08/23/24 0150    Order Status: Completed Specimen: Blood Updated: 08/25/24 1100     Gram Stain Result Gram positive cocci     Comment: From Aerobic Bottle       Culture, Wound [[id number]] Collected: 08/23/24 1245    Order Status: Completed Specimen: Wound from Perirectal Updated: 08/25/24 1026     Culture, Wound/Abscess No Growth at 2 Days    Blood Culture #1 [[id number]] Collected: 08/23/24 0146    Order Status: Completed Specimen: Blood Updated: 08/25/24 0632     Culture, Blood No Growth At 48 Hours    Culture, Anaerobe [[id number]] Collected: 08/23/24 1246    Order Status: Sent Specimen: Abscess from Perirectal Updated: 08/23/24 1246

## 2024-08-25 NOTE — SUBJECTIVE & OBJECTIVE
Past Medical History:   Diagnosis Date    Abnormal thyroid stimulating hormone (TSH) level 08/22/2019    Anemia 08/15/2019    Atrial fibrillation 09/07/2012    Bradycardia 05/23/2017    asymptomatic    Change in bowel habit 11/13/2018    Chronic kidney disease, unspecified 01/14/2019    Coronary arteriosclerosis 09/07/2012    Disease of skin and subcutaneous tissue 08/16/2017    medial aspect RLE- cystic structure seen on venous doppler US.    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 Lewis Eye exam    Varicose veins of both lower extremities with pain 01/02/2019    Venous insufficiency 01/08/2019       Past Surgical History:   Procedure Laterality Date    APPENDECTOMY      CARDIAC CATHETERIZATION  2007    [facility]- Stent placement    CATARACT EXTRACTION, BILATERAL      COLONOSCOPY  11/06/2019    Dr. Mendoza    HERNIA REPAIR      Inguinal hernia repair     "TONSILLECTOMY         Review of patient's allergies indicates:   Allergen Reactions    Mayonnaise      Upset stomach       No current facility-administered medications on file prior to encounter.     Current Outpatient Medications on File Prior to Encounter   Medication Sig    blood-glucose sensor (FREESTYLE ADORE 3 SENSOR) Giulia 1 each by Misc.(Non-Drug; Combo Route) route once.    cholecalciferol, vitamin D3, 125 mcg (5,000 unit) capsule Take 5,000 Units by mouth once daily.    clopidogreL (PLAVIX) 75 mg tablet Take 1 tablet (75 mg total) by mouth once daily.    coenzyme Q10 200 mg capsule Take 200 mg by mouth once daily.    cyanocobalamin (VITAMIN B-12) 1000 MCG tablet Take 100 mcg by mouth once daily.    dapagliflozin propanediol (FARXIGA) 10 mg tablet Take 1 tablet (10 mg total) by mouth once daily.    glipiZIDE 5 MG TR24 Take 1 tablet (5 mg total) by mouth daily with breakfast.    insulin glargine U-100, Lantus, (LANTUS SOLOSTAR U-100 INSULIN) 100 unit/mL (3 mL) InPn pen Inject 45 Units into the skin every evening.    pantoprazole (PROTONIX) 40 MG tablet Take 1 tablet (40 mg total) by mouth once daily.    rosuvastatin (CRESTOR) 40 MG Tab Take 1 tablet (40 mg total) by mouth every evening.    SURE COMFORT PEN NEEDLE 32 gauge x 5/32" Ndle AS DIRECTED    nitroGLYCERIN (NITROSTAT) 0.4 MG SL tablet Place 1 tablet (0.4 mg total) under the tongue every 5 (five) minutes as needed for Chest pain.     Family History       Problem Relation (Age of Onset)    Diabetes Brother    Heart disease Father    Neurofibromatosis Brother    No Known Problems Mother, Brother, Maternal Grandmother, Maternal Grandfather, Paternal Grandmother, Paternal Grandfather    Throat cancer Sister          Tobacco Use    Smoking status: Former     Current packs/day: 0.00     Average packs/day: 0.1 packs/day for 3.0 years (0.3 ttl pk-yrs)     Types: Cigarettes     Start date:      Quit date:      Years since quittin.6     Passive " exposure: Past    Smokeless tobacco: Never    Tobacco comments:     Quit 10/15/1976   Substance and Sexual Activity    Alcohol use: Not Currently     Comment: occasionally    Drug use: Never    Sexual activity: Not Currently     Review of Systems   Constitutional:  Negative for appetite change, fatigue and fever.   HENT:  Negative for congestion, hearing loss and trouble swallowing.    Respiratory:  Negative for chest tightness, shortness of breath and wheezing.    Cardiovascular:  Negative for chest pain and palpitations.   Gastrointestinal:  Positive for constipation and rectal pain. Negative for abdominal pain and nausea.   Genitourinary:  Negative for difficulty urinating and dysuria.   Musculoskeletal:  Positive for gait problem. Negative for back pain and neck stiffness.   Skin:  Negative for pallor and rash.   Neurological:  Negative for dizziness, speech difficulty and headaches.   Psychiatric/Behavioral:  Positive for confusion. Negative for suicidal ideas.      Objective:     Vital Signs (Most Recent):  Temp: 97.2 °F (36.2 °C) (08/24/24 2024)  Pulse: 72 (08/24/24 2024)  Resp: 20 (08/24/24 2024)  BP: (!) 128/55 (08/24/24 2024)  SpO2: (!) 94 % (08/24/24 2024) Vital Signs (24h Range):  Temp:  [97.2 °F (36.2 °C)-99.3 °F (37.4 °C)] 97.2 °F (36.2 °C)  Pulse:  [67-82] 72  Resp:  [16-20] 20  SpO2:  [94 %-95 %] 94 %  BP: ()/(50-58) 128/55     Weight: 81.6 kg (180 lb)  Body mass index is 28.19 kg/m².     Physical Exam  Vitals reviewed.   Constitutional:       General: He is awake. He is not in acute distress.     Appearance: He is well-developed. He is not toxic-appearing.   HENT:      Head: Normocephalic.      Nose: Nose normal.      Mouth/Throat:      Pharynx: Oropharynx is clear.   Eyes:      Extraocular Movements: Extraocular movements intact.      Pupils: Pupils are equal, round, and reactive to light.   Neck:      Thyroid: No thyroid mass.      Vascular: No carotid bruit.   Cardiovascular:      Rate and  Rhythm: Normal rate and regular rhythm.      Pulses: Normal pulses.      Heart sounds: Normal heart sounds. No murmur heard.  Pulmonary:      Effort: Pulmonary effort is normal.      Breath sounds: Normal breath sounds and air entry. No wheezing.   Abdominal:      General: Bowel sounds are normal. There is no distension.      Palpations: Abdomen is soft.      Tenderness: There is no abdominal tenderness.   Musculoskeletal:         General: Normal range of motion.      Cervical back: Neck supple. No rigidity.   Skin:     General: Skin is warm.      Coloration: Skin is not jaundiced.      Findings: No lesion.      Comments: Dressing over surgical site to perirectal area   Neurological:      General: No focal deficit present.      Mental Status: He is alert and oriented to person, place, and time.      Cranial Nerves: No cranial nerve deficit.      Comments: Patient is oriented and was able to give me a fairly good history but less several details out and got mixed up on some things had to be straightened out from records   Psychiatric:         Attention and Perception: Attention normal.         Mood and Affect: Mood normal.         Behavior: Behavior normal. Behavior is cooperative.         Thought Content: Thought content normal.         Cognition and Memory: Cognition normal.          Significant Labs: All pertinent labs within the past 24 hours have been reviewed.  BMP:   Recent Labs   Lab 08/24/24  0349      *   K 4.0      CO2 25   BUN 39*   CREATININE 1.31*   CALCIUM 8.0*     CBC:   Recent Labs   Lab 08/23/24  0305 08/24/24 0349   WBC 21.10* 16.48*   HGB 11.5* 11.0*   HCT 36.0* 34.2*    160     CMP:   Recent Labs   Lab 08/23/24  0146 08/24/24  0349   * 134*   K 4.0 4.0    105   CO2 23 25   * 105   BUN 44* 39*   CREATININE 1.57* 1.31*   CALCIUM 8.1* 8.0*   PROT 6.1*  --    ALBUMIN 2.8*  --    BILITOT 0.8  --    ALKPHOS 66  --    AST 47*  --    ALT 27  --    ANIONGAP 9  8       Significant Imaging: I have reviewed all pertinent imaging results/findings within the past 24 hours.    Imaging Results              CT Chest Abdomen Pelvis Without Contrast (XPD) (Final result)  Result time 08/23/24 07:44:53      Final result by Donald Monreal MD (08/23/24 07:44:53)                   Impression:      There is evidence of necrotizing fasciitis of the posterior peroneal area extending into the soft tissues right lateral to the intergluteal cleft.  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  Verbal report to Dr. Loya at 07:30 is documented.    Cholelithiasis    Moderate retained stool in the colon    Moderate coronary artery calcification      Electronically signed by: Donald Monreal  Date:    08/23/2024  Time:    07:44               Narrative:    EXAMINATION:  CT chest abdomen pelvis without IV contrast    CLINICAL HISTORY:  Sepsis    TECHNIQUE:  Axial CT images were obtained from the lung apices through the abdomen and pelvis without IV contrast.  Oral contrast was not given.  Coronal and sagittal reconstructions submitted and interpreted.  Total DLP 1321.9 mGycm.  Automated exposure control utilized.    COMPARISON:  No previous similar    FINDINGS:  CT chest: No supraclavicular soft tissue mass is seen.  There is no mediastinal lymphadenopathy by short-axis diameter criteria.  There is no mediastinal mass otherwise.  There is no focal aneurysm of the moderately calcified thoracic aorta.  There is moderate coronary artery calcification present.    There is no pericardial effusion.  There is trace bilateral pleural effusion.    There is mild dependent atelectasis in the lungs.    CT abdomen:    There is no evidence of pneumoperitoneum.    Liver, bile ducts, spleen, and adrenal glands are unremarkable in noncontrast CT appearance.    Gallbladder is mildly distended and contain some layering radiopaque gallstones.    There is some diffuse fatty infiltration of the  pancreas.  Pancreas is otherwise unremarkable.    There is no hydronephrosis or abnormal perinephric fluid.  There is no radiopaque renal stone.    There is no aneurysm of the moderately calcified abdominal aorta.    Stomach is grossly unremarkable but is not well distended, limiting evaluation.    Appendix is not seen.    There is no rosalee bowel obstruction.  There is moderate retained stool in the colon.    There is no retroperitoneal lymphadenopathy by short-axis diameter criteria.    CT pelvis:    There is abnormal soft tissue emphysema in the right perineal soft tissues posteriorly along the right side of the intergluteal cleft.  This could represent necrotizing fasciitis.  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    There is no soft tissue mass within the pelvis itself.  Urinary bladder is moderately distended.    No acute osseous findings.  There is prominent degenerative disc narrowing and moderate spondylosis of the thoracolumbar spine                                       X-Ray Chest AP Portable (Final result)  Result time 08/23/24 07:36:09      Final result by Flako Perez MD (08/23/24 07:36:09)                   Impression:      No acute findings.      Electronically signed by: Flako Perez  Date:    08/23/2024  Time:    07:36               Narrative:    EXAMINATION:  XR CHEST AP PORTABLE    CLINICAL HISTORY:  Fever, unspecified    TECHNIQUE:  Single frontal view of the chest was performed.    COMPARISON:  02/01/2024    FINDINGS:  Heart size normal. Lungs clear. No pneumothorax or pleural effusion.                                    Intake/Output - Last 3 Shifts         08/23 0700 08/24 0659 08/24 0700 08/25 0659    I.V. (mL/kg) 25 (0.3) 1002.2 (12.3)    IV Piggyback 550 1266.1    Total Intake(mL/kg) 575 (7) 2268.3 (27.8)    Urine (mL/kg/hr) 275 (0.1)     Stool 0     Blood 10     Total Output 285     Net +290 +2268.3          Urine Occurrence 4 x     Stool Occurrence 1 x            Microbiology Results (last 7 days)       Procedure Component Value Units Date/Time    Culture, Wound [[id number]] Collected: 08/23/24 1245    Order Status: Completed Specimen: Wound from Perirectal Updated: 08/24/24 1110     Culture, Wound/Abscess No Growth To Date    Blood Culture #1 [[id number]] Collected: 08/23/24 0146    Order Status: Completed Specimen: Blood Updated: 08/24/24 0629     Culture, Blood No Growth At 24 Hours    Blood Culture #2 [[id number]] Collected: 08/23/24 0150    Order Status: Completed Specimen: Blood Updated: 08/24/24 0629     Culture, Blood No Growth At 24 Hours    Culture, Anaerobe [[id number]] Collected: 08/23/24 1246    Order Status: Sent Specimen: Abscess from Perirectal Updated: 08/23/24 1246

## 2024-08-25 NOTE — PROGRESS NOTES
[facility]  [facility] Medicine  Progress Note    Patient Name: Negro Hale  MRN: [id number]  Patient Class: IP- Inpatient   Admission Date: 8/22/2024  Length of Stay: 1 days  Attending Physician: No att. providers found  Primary Care Provider: Smiley Trevino FNP        Subjective:     Principal Problem:Necrotizing soft tissue infection        HPI:  Thank you for consult    Chief complaint:  Rectal abscess    History of present illness:       Mr. Hale is a 84-year-old admitted 08/22 with rectal discomfort.  CT scan showed rectal abscess.  Patient admitted to Dr. Butcher.  Underwent surgery earlier 08/23 with I and D of perirectal region.   Patient did well with surgery.  Hospitalist service was asked to see patient postoperative.  Patient before admission had complained of rectal pain for proximally 1 week.  Had some subjective fever but no chills.  White blood cell count was noted to be elevated on admission.  Patient feels better after surgery.    Review of system:  See above.  Has had some past problems with constipation.  Has 2-3 episodes of nocturia nightly.  States weight is been stable.  Ambulates with cane and walker.  Review of systems otherwise negative.    Past medical history:  -hypertension greater than 20 years  -diabetes mellitus greater than 20 years.  States takes 1 shot of insulin 45 units daily.  -coronary arteriosclerosis has seen Dr. Bridges with BHAKTI lillian LCx 2/26/2007  (patient did not remember this and obtained from records)  -paroxysmal atrial fibrillation with rhythm being in normal sinus  -dyslipidemia  -chronic back pain with past vertebral fracture followed by Dr. Destin Leroy    Past surgical history:  Surgery this admission for perirectal abscess with I and D  Bilateral cataract surgery  Remote appendectomy  Tonsillectomy  Inguinal hernia repair    Allergy to Mayonnaise    Social history:  No history of tobacco alcohol use    Family history  Father with heart  disease    Health maintenance issues:  Primary care provider is Smiley Trevino NP  Had flu shots  Had previous Pneumovax  No known COVID-19 infection  COVID vaccination x2  Says no prior colonoscopy but looks to possibly have had 1 by Dr. Mendoza in 2019        Overview/Hospital Course:  08/24 No new complaints. Family in room say pt going back to surgery first of week. BP and BS generally good.    Past Medical History:   Diagnosis Date    Abnormal thyroid stimulating hormone (TSH) level 08/22/2019    Anemia 08/15/2019    Atrial fibrillation 09/07/2012    Bradycardia 05/23/2017    asymptomatic    Change in bowel habit 11/13/2018    Chronic kidney disease, unspecified 01/14/2019    Coronary arteriosclerosis 09/07/2012    Disease of skin and subcutaneous tissue 08/16/2017    medial aspect RLE- cystic structure seen on venous doppler US.    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 Lewis    Type 2 diabetes mellitus with mild nonproliferative retinopathy of right eye without macular edema  "08/25/2023    See Dr. Lewis Eye exam    Varicose veins of both lower extremities with pain 01/02/2019    Venous insufficiency 01/08/2019       Past Surgical History:   Procedure Laterality Date    APPENDECTOMY      CARDIAC CATHETERIZATION  2007    Bridges- Stent placement    CATARACT EXTRACTION, BILATERAL      COLONOSCOPY  11/06/2019    Dr. Mendoza    HERNIA REPAIR      Inguinal hernia repair    TONSILLECTOMY         Review of patient's allergies indicates:   Allergen Reactions    Mayonnaise      Upset stomach       No current facility-administered medications on file prior to encounter.     Current Outpatient Medications on File Prior to Encounter   Medication Sig    blood-glucose sensor (FREESTYLE ADORE 3 SENSOR) Giulia 1 each by Misc.(Non-Drug; Combo Route) route once.    cholecalciferol, vitamin D3, 125 mcg (5,000 unit) capsule Take 5,000 Units by mouth once daily.    clopidogreL (PLAVIX) 75 mg tablet Take 1 tablet (75 mg total) by mouth once daily.    coenzyme Q10 200 mg capsule Take 200 mg by mouth once daily.    cyanocobalamin (VITAMIN B-12) 1000 MCG tablet Take 100 mcg by mouth once daily.    dapagliflozin propanediol (FARXIGA) 10 mg tablet Take 1 tablet (10 mg total) by mouth once daily.    glipiZIDE 5 MG TR24 Take 1 tablet (5 mg total) by mouth daily with breakfast.    insulin glargine U-100, Lantus, (LANTUS SOLOSTAR U-100 INSULIN) 100 unit/mL (3 mL) InPn pen Inject 45 Units into the skin every evening.    pantoprazole (PROTONIX) 40 MG tablet Take 1 tablet (40 mg total) by mouth once daily.    rosuvastatin (CRESTOR) 40 MG Tab Take 1 tablet (40 mg total) by mouth every evening.    SURE COMFORT PEN NEEDLE 32 gauge x 5/32" Ndle AS DIRECTED    nitroGLYCERIN (NITROSTAT) 0.4 MG SL tablet Place 1 tablet (0.4 mg total) under the tongue every 5 (five) minutes as needed for Chest pain.     Family History       Problem Relation (Age of Onset)    Diabetes Brother    Heart disease Father    Neurofibromatosis Brother    " No Known Problems Mother, Brother, Maternal Grandmother, Maternal Grandfather, Paternal Grandmother, Paternal Grandfather    Throat cancer Sister          Tobacco Use    Smoking status: Former     Current packs/day: 0.00     Average packs/day: 0.1 packs/day for 3.0 years (0.3 ttl pk-yrs)     Types: Cigarettes     Start date:      Quit date:      Years since quittin.6     Passive exposure: Past    Smokeless tobacco: Never    Tobacco comments:     Quit 10/15/1976   Substance and Sexual Activity    Alcohol use: Not Currently     Comment: occasionally    Drug use: Never    Sexual activity: Not Currently     Review of Systems   Constitutional:  Negative for appetite change, fatigue and fever.   HENT:  Negative for congestion, hearing loss and trouble swallowing.    Respiratory:  Negative for chest tightness, shortness of breath and wheezing.    Cardiovascular:  Negative for chest pain and palpitations.   Gastrointestinal:  Positive for constipation and rectal pain. Negative for abdominal pain and nausea.   Genitourinary:  Negative for difficulty urinating and dysuria.   Musculoskeletal:  Positive for gait problem. Negative for back pain and neck stiffness.   Skin:  Negative for pallor and rash.   Neurological:  Negative for dizziness, speech difficulty and headaches.   Psychiatric/Behavioral:  Positive for confusion. Negative for suicidal ideas.      Objective:     Vital Signs (Most Recent):  Temp: 97.2 °F (36.2 °C) (24)  Pulse: 72 (24)  Resp: 20 (24)  BP: (!) 128/55 (24)  SpO2: (!) 94 % (24) Vital Signs (24h Range):  Temp:  [97.2 °F (36.2 °C)-99.3 °F (37.4 °C)] 97.2 °F (36.2 °C)  Pulse:  [67-82] 72  Resp:  [16-20] 20  SpO2:  [94 %-95 %] 94 %  BP: ()/(50-58) 128/55     Weight: 81.6 kg (180 lb)  Body mass index is 28.19 kg/m².     Physical Exam  Vitals reviewed.   Constitutional:       General: He is awake. He is not in acute distress.      Appearance: He is well-developed. He is not toxic-appearing.   HENT:      Head: Normocephalic.      Nose: Nose normal.      Mouth/Throat:      Pharynx: Oropharynx is clear.   Eyes:      Extraocular Movements: Extraocular movements intact.      Pupils: Pupils are equal, round, and reactive to light.   Neck:      Thyroid: No thyroid mass.      Vascular: No carotid bruit.   Cardiovascular:      Rate and Rhythm: Normal rate and regular rhythm.      Pulses: Normal pulses.      Heart sounds: Normal heart sounds. No murmur heard.  Pulmonary:      Effort: Pulmonary effort is normal.      Breath sounds: Normal breath sounds and air entry. No wheezing.   Abdominal:      General: Bowel sounds are normal. There is no distension.      Palpations: Abdomen is soft.      Tenderness: There is no abdominal tenderness.   Musculoskeletal:         General: Normal range of motion.      Cervical back: Neck supple. No rigidity.   Skin:     General: Skin is warm.      Coloration: Skin is not jaundiced.      Findings: No lesion.      Comments: Dressing over surgical site to perirectal area   Neurological:      General: No focal deficit present.      Mental Status: He is alert and oriented to person, place, and time.      Cranial Nerves: No cranial nerve deficit.      Comments: Patient is oriented and was able to give me a fairly good history but less several details out and got mixed up on some things had to be straightened out from records   Psychiatric:         Attention and Perception: Attention normal.         Mood and Affect: Mood normal.         Behavior: Behavior normal. Behavior is cooperative.         Thought Content: Thought content normal.         Cognition and Memory: Cognition normal.          Significant Labs: All pertinent labs within the past 24 hours have been reviewed.  BMP:   Recent Labs   Lab 08/24/24  0349      *   K 4.0      CO2 25   BUN 39*   CREATININE 1.31*   CALCIUM 8.0*     CBC:   Recent Labs    Lab 08/23/24  0305 08/24/24  0349   WBC 21.10* 16.48*   HGB 11.5* 11.0*   HCT 36.0* 34.2*    160     CMP:   Recent Labs   Lab 08/23/24  0146 08/24/24  0349   * 134*   K 4.0 4.0    105   CO2 23 25   * 105   BUN 44* 39*   CREATININE 1.57* 1.31*   CALCIUM 8.1* 8.0*   PROT 6.1*  --    ALBUMIN 2.8*  --    BILITOT 0.8  --    ALKPHOS 66  --    AST 47*  --    ALT 27  --    ANIONGAP 9 8       Significant Imaging: I have reviewed all pertinent imaging results/findings within the past 24 hours.    Imaging Results              CT Chest Abdomen Pelvis Without Contrast (XPD) (Final result)  Result time 08/23/24 07:44:53      Final result by Donald Monreal MD (08/23/24 07:44:53)                   Impression:      There is evidence of necrotizing fasciitis of the posterior peroneal area extending into the soft tissues right lateral to the intergluteal cleft.  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  Verbal report to Dr. Loya at 07:30 is documented.    Cholelithiasis    Moderate retained stool in the colon    Moderate coronary artery calcification      Electronically signed by: Donald Monreal  Date:    08/23/2024  Time:    07:44               Narrative:    EXAMINATION:  CT chest abdomen pelvis without IV contrast    CLINICAL HISTORY:  Sepsis    TECHNIQUE:  Axial CT images were obtained from the lung apices through the abdomen and pelvis without IV contrast.  Oral contrast was not given.  Coronal and sagittal reconstructions submitted and interpreted.  Total DLP 1321.9 mGycm.  Automated exposure control utilized.    COMPARISON:  No previous similar    FINDINGS:  CT chest: No supraclavicular soft tissue mass is seen.  There is no mediastinal lymphadenopathy by short-axis diameter criteria.  There is no mediastinal mass otherwise.  There is no focal aneurysm of the moderately calcified thoracic aorta.  There is moderate coronary artery calcification present.    There is no  pericardial effusion.  There is trace bilateral pleural effusion.    There is mild dependent atelectasis in the lungs.    CT abdomen:    There is no evidence of pneumoperitoneum.    Liver, bile ducts, spleen, and adrenal glands are unremarkable in noncontrast CT appearance.    Gallbladder is mildly distended and contain some layering radiopaque gallstones.    There is some diffuse fatty infiltration of the pancreas.  Pancreas is otherwise unremarkable.    There is no hydronephrosis or abnormal perinephric fluid.  There is no radiopaque renal stone.    There is no aneurysm of the moderately calcified abdominal aorta.    Stomach is grossly unremarkable but is not well distended, limiting evaluation.    Appendix is not seen.    There is no rosalee bowel obstruction.  There is moderate retained stool in the colon.    There is no retroperitoneal lymphadenopathy by short-axis diameter criteria.    CT pelvis:    There is abnormal soft tissue emphysema in the right perineal soft tissues posteriorly along the right side of the intergluteal cleft.  This could represent necrotizing fasciitis.  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    There is no soft tissue mass within the pelvis itself.  Urinary bladder is moderately distended.    No acute osseous findings.  There is prominent degenerative disc narrowing and moderate spondylosis of the thoracolumbar spine                                       X-Ray Chest AP Portable (Final result)  Result time 08/23/24 07:36:09      Final result by Flako Perez MD (08/23/24 07:36:09)                   Impression:      No acute findings.      Electronically signed by: Flako Perez  Date:    08/23/2024  Time:    07:36               Narrative:    EXAMINATION:  XR CHEST AP PORTABLE    CLINICAL HISTORY:  Fever, unspecified    TECHNIQUE:  Single frontal view of the chest was performed.    COMPARISON:  02/01/2024    FINDINGS:  Heart size normal. Lungs clear. No pneumothorax or pleural  effusion.                                    Intake/Output - Last 3 Shifts         08/23 0700  08/24 0659 08/24 0700  08/25 0659    I.V. (mL/kg) 25 (0.3) 1002.2 (12.3)    IV Piggyback 550 1266.1    Total Intake(mL/kg) 575 (7) 2268.3 (27.8)    Urine (mL/kg/hr) 275 (0.1)     Stool 0     Blood 10     Total Output 285     Net +290 +2268.3          Urine Occurrence 4 x     Stool Occurrence 1 x           Microbiology Results (last 7 days)       Procedure Component Value Units Date/Time    Culture, Wound [[id number]] Collected: 08/23/24 1245    Order Status: Completed Specimen: Wound from Perirectal Updated: 08/24/24 1110     Culture, Wound/Abscess No Growth To Date    Blood Culture #1 [[id number]] Collected: 08/23/24 0146    Order Status: Completed Specimen: Blood Updated: 08/24/24 0629     Culture, Blood No Growth At 24 Hours    Blood Culture #2 [[id number]] Collected: 08/23/24 0150    Order Status: Completed Specimen: Blood Updated: 08/24/24 0629     Culture, Blood No Growth At 24 Hours    Culture, Anaerobe [[id number]] Collected: 08/23/24 1246    Order Status: Sent Specimen: Abscess from Perirectal Updated: 08/23/24 1246              Assessment/Plan:      * Necrotizing soft tissue infection    Surgery 08/22/24    Paroxysmal atrial fibrillation  Patient with Paroxysmal (<7 days) atrial fibrillation which is controlled currently with Beta Blocker. Patient is currently in sinus rhythm.[id number] Score: 4. HASBLED Score: 2. Anticoagulation not indicated due to patient looks unsteady and will have increased risk from fall while on anticoagulation.  Will monitor can defer to his outpatient providers .  Use antiplatelet.    Perirectal abscess    Surgery 08/22/24 08/24 family in room says pt to go back to surgery first week    Type 2 diabetes mellitus with hyperglycemia, with long-term current use of insulin    Cover with sliding scale and maintenance insulin  08.24 adjust insulin as needed    Atherosclerosis of native  coronary artery of native heart without angina pectoris  Patient with known CAD s/p stent placement, which is controlled Will continue ASA and Statin and monitor for S/Sx of angina/ACS. Continue to monitor on telemetry.   BHAKTI mid LCx 2/26/2007  followed by Dr. Bridges; this history obtained from chart in patient did not remember    Cardiolite Impression:     1. Medium size, mild intensity [id number] inferolateral defect with decreased wall motion and thickening, suggestive of infarct.  2. The global left ventricular systolic function is normal with an LV ejection fraction of 70 % and no evidence of LV dilatation. Wall motion is impaired.  Electronically signed by:Gabriel Barahona  Date:                                            09/12/2023  Time:                                           14:33    Hypercholesterolemia  Statin    Primary hypertension  Chronic, controlled. Latest blood pressure and vitals reviewed-     Temp:  [97.2 °F (36.2 °C)-99.3 °F (37.4 °C)]   Pulse:  [67-82]   Resp:  [16-20]   BP: ()/(50-58)   SpO2:  [94 %-95 %] .   Home meds for hypertension were reviewed and noted below.   Hypertension Medications               nitroGLYCERIN (NITROSTAT) 0.4 MG SL tablet Place 1 tablet (0.4 mg total) under the tongue every 5 (five) minutes as needed for Chest pain.            While in the hospital, will manage blood pressure as follows; Continue home antihypertensive regimen    Will utilize p.r.n. blood pressure medication only if patient's blood pressure greater than 180/110 and he develops symptoms such as worsening chest pain or shortness of breath.    Type 2 diabetes mellitus with stage 3a chronic kidney disease, with long-term current use of insulin  Creatine stable for now. BMP reviewed- noted Estimated Creatinine Clearance: 35.2 mL/min (A) (based on SCr of 1.57 mg/dL (H)). according to latest data. Based on current GFR, CKD stage is stage 3 - GFR 30-59.  Monitor UOP and serial BMP and adjust therapy  as needed. Renally dose meds. Avoid nephrotoxic medications and procedures.      VTE Risk Mitigation (From admission, onward)           Ordered     enoxaparin injection 30 mg  Daily         08/23/24 1342     IP VTE HIGH RISK PATIENT  Once         08/23/24 1342     Place sequential compression device  Until discontinued         08/23/24 1342                    Discharge Planning   RICKY:      Code Status: Prior   Is the patient medically ready for discharge?:     Reason for patient still in hospital (select all that apply): Laboratory test, Treatment, and Imaging                     Colin Lopez MD  Department of Hospital Medicine   [facility]

## 2024-08-26 ENCOUNTER — ANESTHESIA EVENT (OUTPATIENT)
Dept: SURGERY | Facility: HOSPITAL | Age: 84
DRG: 464 | End: 2024-08-26
Payer: MEDICARE

## 2024-08-26 ENCOUNTER — ANESTHESIA (OUTPATIENT)
Dept: SURGERY | Facility: HOSPITAL | Age: 84
DRG: 464 | End: 2024-08-26
Payer: MEDICARE

## 2024-08-26 LAB
ANION GAP SERPL CALCULATED.3IONS-SCNC: 9 MMOL/L (ref 7–16)
BASOPHILS # BLD AUTO: 0.08 K/UL (ref 0–0.2)
BASOPHILS NFR BLD AUTO: 0.9 % (ref 0–1)
BUN SERPL-MCNC: 37 MG/DL (ref 7–18)
BUN SERPL-MCNC: 37 MG/DL (ref 7–18)
BUN/CREAT SERPL: 19 (ref 6–20)
BUN/CREAT SERPL: 19 (ref 6–20)
CALCIUM SERPL-MCNC: 8.3 MG/DL (ref 8.5–10.1)
CHLORIDE SERPL-SCNC: 110 MMOL/L (ref 98–107)
CO2 SERPL-SCNC: 23 MMOL/L (ref 21–32)
CREAT SERPL-MCNC: 2 MG/DL (ref 0.7–1.3)
CREAT SERPL-MCNC: 2 MG/DL (ref 0.7–1.3)
DIFFERENTIAL METHOD BLD: ABNORMAL
EGFR (NO RACE VARIABLE) (RUSH/TITUS): 32 ML/MIN/1.73M2
EGFR (NO RACE VARIABLE) (RUSH/TITUS): 32 ML/MIN/1.73M2
EOSINOPHIL # BLD AUTO: 0.28 K/UL (ref 0–0.5)
EOSINOPHIL NFR BLD AUTO: 3.1 % (ref 1–4)
ERYTHROCYTE [DISTWIDTH] IN BLOOD BY AUTOMATED COUNT: 13.6 % (ref 11.5–14.5)
ESTROGEN SERPL-MCNC: NORMAL PG/ML
GLUCOSE SERPL-MCNC: 102 MG/DL (ref 70–105)
GLUCOSE SERPL-MCNC: 213 MG/DL (ref 70–105)
GLUCOSE SERPL-MCNC: 241 MG/DL (ref 70–105)
GLUCOSE SERPL-MCNC: 87 MG/DL (ref 70–105)
GLUCOSE SERPL-MCNC: 90 MG/DL (ref 70–105)
GLUCOSE SERPL-MCNC: 98 MG/DL (ref 74–106)
HCT VFR BLD AUTO: 32.5 % (ref 40–54)
HGB BLD-MCNC: 10.6 G/DL (ref 13.5–18)
IMM GRANULOCYTES # BLD AUTO: 0.05 K/UL (ref 0–0.04)
IMM GRANULOCYTES NFR BLD: 0.5 % (ref 0–0.4)
INSULIN SERPL-ACNC: NORMAL U[IU]/ML
LAB AP GROSS DESCRIPTION: NORMAL
LAB AP LABORATORY NOTES: NORMAL
LYMPHOCYTES # BLD AUTO: 0.82 K/UL (ref 1–4.8)
LYMPHOCYTES NFR BLD AUTO: 9 % (ref 27–41)
MCH RBC QN AUTO: 29.9 PG (ref 27–31)
MCHC RBC AUTO-ENTMCNC: 32.6 G/DL (ref 32–36)
MCV RBC AUTO: 91.5 FL (ref 80–96)
MONOCYTES # BLD AUTO: 0.77 K/UL (ref 0–0.8)
MONOCYTES NFR BLD AUTO: 8.5 % (ref 2–6)
MPC BLD CALC-MCNC: 11.2 FL (ref 9.4–12.4)
NEUTROPHILS # BLD AUTO: 7.1 K/UL (ref 1.8–7.7)
NEUTROPHILS NFR BLD AUTO: 78 % (ref 53–65)
NRBC # BLD AUTO: 0 X10E3/UL
NRBC, AUTO (.00): 0 %
PLATELET # BLD AUTO: 192 K/UL (ref 150–400)
POTASSIUM SERPL-SCNC: 3.7 MMOL/L (ref 3.5–5.1)
RBC # BLD AUTO: 3.55 M/UL (ref 4.6–6.2)
SODIUM SERPL-SCNC: 138 MMOL/L (ref 136–145)
T3RU NFR SERPL: NORMAL %
VANCOMYCIN TROUGH SERPL-MCNC: 19.6 ΜG/ML (ref 10–20)
WBC # BLD AUTO: 9.1 K/UL (ref 4.5–11)

## 2024-08-26 PROCEDURE — 99232 SBSQ HOSP IP/OBS MODERATE 35: CPT | Mod: ,,, | Performed by: FAMILY MEDICINE

## 2024-08-26 PROCEDURE — 85025 COMPLETE CBC W/AUTO DIFF WBC: CPT | Performed by: HOSPITALIST

## 2024-08-26 PROCEDURE — 71000033 HC RECOVERY, INTIAL HOUR: Performed by: SURGERY

## 2024-08-26 PROCEDURE — 27000716 HC OXISENSOR PROBE, ANY SIZE: Performed by: NURSE ANESTHETIST, CERTIFIED REGISTERED

## 2024-08-26 PROCEDURE — 63600175 PHARM REV CODE 636 W HCPCS: Performed by: ANESTHESIOLOGY

## 2024-08-26 PROCEDURE — 27000177 HC AIRWAY, LARYNGEAL MASK: Performed by: NURSE ANESTHETIST, CERTIFIED REGISTERED

## 2024-08-26 PROCEDURE — 11000001 HC ACUTE MED/SURG PRIVATE ROOM

## 2024-08-26 PROCEDURE — 27000221 HC OXYGEN, UP TO 24 HOURS

## 2024-08-26 PROCEDURE — 25000003 PHARM REV CODE 250: Performed by: HOSPITALIST

## 2024-08-26 PROCEDURE — 96372 THER/PROPH/DIAG INJ SC/IM: CPT

## 2024-08-26 PROCEDURE — 99900031 HC PATIENT EDUCATION (STAT)

## 2024-08-26 PROCEDURE — 99900035 HC TECH TIME PER 15 MIN (STAT)

## 2024-08-26 PROCEDURE — 80048 BASIC METABOLIC PNL TOTAL CA: CPT | Performed by: HOSPITALIST

## 2024-08-26 PROCEDURE — 97116 GAIT TRAINING THERAPY: CPT

## 2024-08-26 PROCEDURE — 25000003 PHARM REV CODE 250: Performed by: SURGERY

## 2024-08-26 PROCEDURE — 94761 N-INVAS EAR/PLS OXIMETRY MLT: CPT

## 2024-08-26 PROCEDURE — 0KBM0ZZ EXCISION OF PERINEUM MUSCLE, OPEN APPROACH: ICD-10-PCS | Performed by: SURGERY

## 2024-08-26 PROCEDURE — 25000003 PHARM REV CODE 250: Performed by: NURSE ANESTHETIST, CERTIFIED REGISTERED

## 2024-08-26 PROCEDURE — 36000704 HC OR TIME LEV I 1ST 15 MIN: Performed by: SURGERY

## 2024-08-26 PROCEDURE — 63600175 PHARM REV CODE 636 W HCPCS: Performed by: NURSE ANESTHETIST, CERTIFIED REGISTERED

## 2024-08-26 PROCEDURE — 82565 ASSAY OF CREATININE: CPT | Performed by: SURGERY

## 2024-08-26 PROCEDURE — 27000510 HC BLANKET BAIR HUGGER ANY SIZE: Performed by: NURSE ANESTHETIST, CERTIFIED REGISTERED

## 2024-08-26 PROCEDURE — 37000008 HC ANESTHESIA 1ST 15 MINUTES: Performed by: SURGERY

## 2024-08-26 PROCEDURE — 36415 COLL VENOUS BLD VENIPUNCTURE: CPT | Performed by: HOSPITALIST

## 2024-08-26 PROCEDURE — 37000009 HC ANESTHESIA EA ADD 15 MINS: Performed by: SURGERY

## 2024-08-26 PROCEDURE — 0D9P3ZZ DRAINAGE OF RECTUM, PERCUTANEOUS APPROACH: ICD-10-PCS | Performed by: SURGERY

## 2024-08-26 PROCEDURE — 80202 ASSAY OF VANCOMYCIN: CPT | Performed by: SURGERY

## 2024-08-26 PROCEDURE — 63600175 PHARM REV CODE 636 W HCPCS: Performed by: SURGERY

## 2024-08-26 PROCEDURE — 88304 TISSUE EXAM BY PATHOLOGIST: CPT | Mod: 26,,, | Performed by: PATHOLOGY

## 2024-08-26 PROCEDURE — D9220A PRA ANESTHESIA: Mod: ANES,,, | Performed by: ANESTHESIOLOGY

## 2024-08-26 PROCEDURE — 36000705 HC OR TIME LEV I EA ADD 15 MIN: Performed by: SURGERY

## 2024-08-26 PROCEDURE — 27000758 HC SPIROMETER

## 2024-08-26 PROCEDURE — D9220A PRA ANESTHESIA: Mod: CRNA,,, | Performed by: NURSE ANESTHETIST, CERTIFIED REGISTERED

## 2024-08-26 PROCEDURE — 82962 GLUCOSE BLOOD TEST: CPT

## 2024-08-26 PROCEDURE — 63600175 PHARM REV CODE 636 W HCPCS: Performed by: HOSPITALIST

## 2024-08-26 PROCEDURE — 88304 TISSUE EXAM BY PATHOLOGIST: CPT | Mod: TC,SUR | Performed by: SURGERY

## 2024-08-26 PROCEDURE — 25000003 PHARM REV CODE 250: Performed by: FAMILY MEDICINE

## 2024-08-26 PROCEDURE — 11004 DBRDMT SKIN XTRNL GENT&PER: CPT | Mod: 58,,, | Performed by: SURGERY

## 2024-08-26 RX ORDER — SODIUM CHLORIDE, SODIUM LACTATE, POTASSIUM CHLORIDE, CALCIUM CHLORIDE 600; 310; 30; 20 MG/100ML; MG/100ML; MG/100ML; MG/100ML
125 INJECTION, SOLUTION INTRAVENOUS CONTINUOUS
Status: DISCONTINUED | OUTPATIENT
Start: 2024-08-26 | End: 2024-08-26

## 2024-08-26 RX ORDER — SODIUM CHLORIDE, SODIUM LACTATE, POTASSIUM CHLORIDE, CALCIUM CHLORIDE 600; 310; 30; 20 MG/100ML; MG/100ML; MG/100ML; MG/100ML
INJECTION, SOLUTION INTRAVENOUS CONTINUOUS PRN
Status: DISCONTINUED | OUTPATIENT
Start: 2024-08-26 | End: 2024-08-26

## 2024-08-26 RX ORDER — GLUCAGON 1 MG
1 KIT INJECTION
Status: DISCONTINUED | OUTPATIENT
Start: 2024-08-26 | End: 2024-08-26 | Stop reason: HOSPADM

## 2024-08-26 RX ORDER — LIDOCAINE HYDROCHLORIDE 20 MG/ML
INJECTION, SOLUTION EPIDURAL; INFILTRATION; INTRACAUDAL; PERINEURAL
Status: DISCONTINUED | OUTPATIENT
Start: 2024-08-26 | End: 2024-08-26

## 2024-08-26 RX ORDER — MEPERIDINE HYDROCHLORIDE 25 MG/ML
25 INJECTION INTRAMUSCULAR; INTRAVENOUS; SUBCUTANEOUS EVERY 10 MIN PRN
Status: DISCONTINUED | OUTPATIENT
Start: 2024-08-26 | End: 2024-08-26 | Stop reason: HOSPADM

## 2024-08-26 RX ORDER — ENOXAPARIN SODIUM 100 MG/ML
30 INJECTION SUBCUTANEOUS EVERY 24 HOURS
Status: DISCONTINUED | OUTPATIENT
Start: 2024-08-26 | End: 2024-08-29 | Stop reason: HOSPADM

## 2024-08-26 RX ORDER — DIPHENHYDRAMINE HYDROCHLORIDE 50 MG/ML
25 INJECTION INTRAMUSCULAR; INTRAVENOUS EVERY 6 HOURS PRN
Status: DISCONTINUED | OUTPATIENT
Start: 2024-08-26 | End: 2024-08-26 | Stop reason: HOSPADM

## 2024-08-26 RX ORDER — ONDANSETRON HYDROCHLORIDE 2 MG/ML
4 INJECTION, SOLUTION INTRAVENOUS DAILY PRN
Status: DISCONTINUED | OUTPATIENT
Start: 2024-08-26 | End: 2024-08-26 | Stop reason: HOSPADM

## 2024-08-26 RX ORDER — OXYCODONE HYDROCHLORIDE 5 MG/1
5 TABLET ORAL
Status: DISCONTINUED | OUTPATIENT
Start: 2024-08-26 | End: 2024-08-26 | Stop reason: HOSPADM

## 2024-08-26 RX ORDER — HYDROMORPHONE HYDROCHLORIDE 2 MG/ML
0.5 INJECTION, SOLUTION INTRAMUSCULAR; INTRAVENOUS; SUBCUTANEOUS EVERY 5 MIN PRN
Status: DISCONTINUED | OUTPATIENT
Start: 2024-08-26 | End: 2024-08-26 | Stop reason: HOSPADM

## 2024-08-26 RX ORDER — MORPHINE SULFATE 10 MG/ML
4 INJECTION INTRAMUSCULAR; INTRAVENOUS; SUBCUTANEOUS EVERY 5 MIN PRN
Status: DISCONTINUED | OUTPATIENT
Start: 2024-08-26 | End: 2024-08-26 | Stop reason: HOSPADM

## 2024-08-26 RX ORDER — PIPERACILLIN SODIUM, TAZOBACTAM SODIUM 4; .5 G/20ML; G/20ML
INJECTION, POWDER, LYOPHILIZED, FOR SOLUTION INTRAVENOUS
Status: DISCONTINUED | OUTPATIENT
Start: 2024-08-26 | End: 2024-08-26

## 2024-08-26 RX ORDER — PROPOFOL 10 MG/ML
VIAL (ML) INTRAVENOUS
Status: DISCONTINUED | OUTPATIENT
Start: 2024-08-26 | End: 2024-08-26

## 2024-08-26 RX ORDER — FENTANYL CITRATE 50 UG/ML
INJECTION, SOLUTION INTRAMUSCULAR; INTRAVENOUS
Status: DISCONTINUED | OUTPATIENT
Start: 2024-08-26 | End: 2024-08-26

## 2024-08-26 RX ORDER — IPRATROPIUM BROMIDE AND ALBUTEROL SULFATE 2.5; .5 MG/3ML; MG/3ML
3 SOLUTION RESPIRATORY (INHALATION)
Status: DISCONTINUED | OUTPATIENT
Start: 2024-08-26 | End: 2024-08-26 | Stop reason: HOSPADM

## 2024-08-26 RX ADMIN — HYDROMORPHONE HYDROCHLORIDE 0.5 MG: 2 INJECTION, SOLUTION INTRAMUSCULAR; INTRAVENOUS; SUBCUTANEOUS at 10:08

## 2024-08-26 RX ADMIN — PIPERACILLIN SODIUM AND TAZOBACTAM SODIUM 4.5 G: 4; .5 INJECTION, POWDER, LYOPHILIZED, FOR SOLUTION INTRAVENOUS at 07:08

## 2024-08-26 RX ADMIN — SODIUM CHLORIDE, POTASSIUM CHLORIDE, SODIUM LACTATE AND CALCIUM CHLORIDE 125 ML/HR: 600; 310; 30; 20 INJECTION, SOLUTION INTRAVENOUS at 02:08

## 2024-08-26 RX ADMIN — PROPOFOL 100 MG: 10 INJECTION, EMULSION INTRAVENOUS at 09:08

## 2024-08-26 RX ADMIN — PIPERACILLIN AND TAZOBACTAM 4.5 G: 4; .5 INJECTION, POWDER, LYOPHILIZED, FOR SOLUTION INTRAVENOUS; PARENTERAL at 09:08

## 2024-08-26 RX ADMIN — LIDOCAINE HYDROCHLORIDE 80 MG: 20 INJECTION, SOLUTION INTRAVENOUS at 09:08

## 2024-08-26 RX ADMIN — VANCOMYCIN HYDROCHLORIDE 1750 MG: 500 INJECTION, POWDER, LYOPHILIZED, FOR SOLUTION INTRAVENOUS at 09:08

## 2024-08-26 RX ADMIN — ENOXAPARIN SODIUM 30 MG: 30 INJECTION SUBCUTANEOUS at 05:08

## 2024-08-26 RX ADMIN — FENTANYL CITRATE 100 MCG: 50 INJECTION, SOLUTION INTRAMUSCULAR; INTRAVENOUS at 09:08

## 2024-08-26 RX ADMIN — SODIUM CHLORIDE, POTASSIUM CHLORIDE, SODIUM LACTATE AND CALCIUM CHLORIDE: 600; 310; 30; 20 INJECTION, SOLUTION INTRAVENOUS at 09:08

## 2024-08-26 RX ADMIN — PIPERACILLIN SODIUM AND TAZOBACTAM SODIUM 4.5 G: 4; .5 INJECTION, POWDER, LYOPHILIZED, FOR SOLUTION INTRAVENOUS at 01:08

## 2024-08-26 RX ADMIN — MUPIROCIN 1 G: 20 OINTMENT TOPICAL at 01:08

## 2024-08-26 RX ADMIN — SODIUM CHLORIDE: 9 INJECTION, SOLUTION INTRAVENOUS at 12:08

## 2024-08-26 RX ADMIN — PIPERACILLIN SODIUM AND TAZOBACTAM SODIUM 4.5 G: 4; .5 INJECTION, POWDER, LYOPHILIZED, FOR SOLUTION INTRAVENOUS at 03:08

## 2024-08-26 RX ADMIN — PANTOPRAZOLE SODIUM 40 MG: 40 TABLET, DELAYED RELEASE ORAL at 01:08

## 2024-08-26 RX ADMIN — INSULIN ASPART 2 UNITS: 100 INJECTION, SOLUTION INTRAVENOUS; SUBCUTANEOUS at 09:08

## 2024-08-26 RX ADMIN — CLINDAMYCIN PHOSPHATE 600 MG: 600 INJECTION, SOLUTION INTRAVENOUS at 05:08

## 2024-08-26 RX ADMIN — HYDROCODONE BITARTRATE AND ACETAMINOPHEN 1 TABLET: 10; 325 TABLET ORAL at 09:08

## 2024-08-26 RX ADMIN — INSULIN GLARGINE 20 UNITS: 100 INJECTION, SOLUTION SUBCUTANEOUS at 09:08

## 2024-08-26 RX ADMIN — CHOLECALCIFEROL TAB 125 MCG (5000 UNIT) 5000 UNITS: 125 TAB at 01:08

## 2024-08-26 RX ADMIN — SODIUM CHLORIDE: 9 INJECTION, SOLUTION INTRAVENOUS at 05:08

## 2024-08-26 RX ADMIN — MUPIROCIN 1 G: 20 OINTMENT TOPICAL at 09:08

## 2024-08-26 RX ADMIN — CLINDAMYCIN PHOSPHATE 600 MG: 600 INJECTION, SOLUTION INTRAVENOUS at 11:08

## 2024-08-26 NOTE — TRANSFER OF CARE
"Anesthesia Transfer of Care Note    Patient: Negro Hale    Procedure(s) Performed: Procedure(s) (LRB):  INCISION AND DRAINAGE, PERIRECTAL REGION (Bilateral)    Patient location: PACU    Anesthesia Type: general    Transport from OR: Transported from OR on room air with adequate spontaneous ventilation    Post pain: adequate analgesia    Post assessment: no apparent anesthetic complications and tolerated procedure well    Post vital signs: stable    Level of consciousness: responds to stimulation    Nausea/Vomiting: no nausea/vomiting    Complications: none    Transfer of care protocol was followedComments: Report Given to PACU rn VSS      Last vitals: Visit Vitals  BP (!) 146/65 (BP Location: Left arm, Patient Position: Lying)   Pulse 66   Temp 36.6 °C (97.8 °F) (Oral)   Resp 15   Ht 5' 7" (1.702 m)   Wt 81.6 kg (180 lb)   SpO2 98%   BMI 28.19 kg/m²     "

## 2024-08-26 NOTE — INTERVAL H&P NOTE
The patient has been examined and the H&P has been reviewed:    I concur with the findings and no changes have occurred since H&P was written.    Surgery risks, benefits and alternative options discussed and understood by patient/family.      OR for additional debridement      Active Hospital Problems    Diagnosis  POA    *Necrotizing soft tissue infection [M79.89]  Yes    Positive blood culture [R78.81]  Yes    Perirectal abscess [K61.1]  Yes    Paroxysmal atrial fibrillation [I48.0]  Yes    Type 2 diabetes mellitus with hyperglycemia, with long-term current use of insulin [E11.65, Z79.4]  Not Applicable     Chronic    Atherosclerosis of native coronary artery of native heart without angina pectoris [I25.10]  Yes     Chronic     BHAKTI mid LCx 2/26/2007      Type 2 diabetes mellitus with stage 3a chronic kidney disease, with long-term current use of insulin [E11.22, N18.31, Z79.4]  Not Applicable     Chronic    Hypercholesterolemia [E78.00]  Yes     Chronic    Primary hypertension [I10]  Yes      Resolved Hospital Problems   No resolved problems to display.

## 2024-08-26 NOTE — INTERVAL H&P NOTE
The patient has been examined and the H&P has been reviewed:    I concur with the findings and no changes have occurred since H&P was written.    Surgery risks, benefits and alternative options discussed and understood by patient/family.      OR for additional debridement of infected area.    Active Hospital Problems    Diagnosis  POA    *Necrotizing soft tissue infection [M79.89]  Yes    Positive blood culture [R78.81]  Yes    Perirectal abscess [K61.1]  Yes    Paroxysmal atrial fibrillation [I48.0]  Yes    Type 2 diabetes mellitus with hyperglycemia, with long-term current use of insulin [E11.65, Z79.4]  Not Applicable     Chronic    Atherosclerosis of native coronary artery of native heart without angina pectoris [I25.10]  Yes     Chronic     BHAKTI mid LCx 2/26/2007      Type 2 diabetes mellitus with stage 3a chronic kidney disease, with long-term current use of insulin [E11.22, N18.31, Z79.4]  Not Applicable     Chronic    Hypercholesterolemia [E78.00]  Yes     Chronic    Primary hypertension [I10]  Yes      Resolved Hospital Problems   No resolved problems to display.

## 2024-08-26 NOTE — ANESTHESIA PROCEDURE NOTES
Intubation    Date/Time: 8/26/2024 9:34 AM    Performed by: Tiago Verma CRNA  Authorized by: Karson Childers DO    Intubation:     Induction:  Intravenous    Intubated:  Postinduction    Mask Ventilation:  Easy mask    Attempts:  1    Attempted By:  CRNA    Difficult Airway Encountered?: No      Complications:  None    Airway Device:  Supraglottic airway/LMA    Airway Device Size:  4.0    Style/Cuff Inflation:  Cuffed (inflated to minimal occlusive pressure)    Placement Verified By:  Capnometry    Complicating Factors:  None    Findings Post-Intubation:  BS equal bilateral and atraumatic/condition of teeth unchanged

## 2024-08-26 NOTE — PT/OT/SLP PROGRESS
"Physical Therapy Treatment    Patient Name:  Negro Hale   MRN:  [id number]    Recommendations:     Discharge Recommendations: Low Intensity Therapy  Discharge Equipment Recommendations: none  Barriers to discharge:  ongoing medical treatment    Assessment:     Negro Hale is a 84 y.o. male admitted with a medical diagnosis of Necrotizing soft tissue infection.  He presents with the following impairments/functional limitations: weakness, impaired endurance, impaired self care skills, impaired functional mobility     Doing well from physical therapy standpoint. Ambulated 210 ft with RW and CGA. Good balance shown    Rehab Prognosis: Good; patient would benefit from acute skilled PT services to address these deficits and reach maximum level of function.    Recent Surgery: Procedure(s) (LRB):  INCISION AND DRAINAGE, PERIRECTAL REGION (Bilateral) Day of Surgery    Plan:     During this hospitalization, patient to be seen 5 x/week to address the identified rehab impairments via gait training, therapeutic activities, therapeutic exercises, neuromuscular re-education and progress toward the following goals:    Plan of Care Expires:       Subjective     Chief Complaint: Soft tissue infection  Patient/Family Comments/goals: "this walking will make my bowels move"  Pain/Comfort:  Pain Rating 1: 0/10      Objective:     Communicated with nurse prior to session.  Patient found HOB elevated with peripheral IV upon PT entry to room.     General Precautions: Standard, fall  Orthopedic Precautions:    Braces:    Respiratory Status: Room air     Functional Mobility:  Bed Mobility:     Supine to Sit: contact guard assistance  Transfers:     Sit to Stand:  contact guard assistance with rolling walker  Gait: 210 ft with RW and CGA  Balance: good      AM-PAC 6 CLICK MOBILITY  Turning over in bed (including adjusting bedclothes, sheets and blankets)?: 4  Sitting down on and standing up from a chair with arms (e.g., wheelchair, " bedside commode, etc.): 3  Moving from lying on back to sitting on the side of the bed?: 4  Moving to and from a bed to a chair (including a wheelchair)?: 3  Need to walk in hospital room?: 3  Climbing 3-5 steps with a railing?: 3  Basic Mobility Total Score: 20       Treatment & Education:  Mobility as noted    Patient left  On bedside commode with nurse alerted  with all lines intact, call button in reach, and nurse notified..    GOALS:   Multidisciplinary Problems       Physical Therapy Goals          Problem: Physical Therapy    Goal Priority Disciplines Outcome Goal Variances Interventions   Physical Therapy Goal     PT, PT/OT Progressing     Description: Short term goals:  . Supine to sit with Contact Guard Assistance  . Sit to stand transfer with Contact Guard Assistance  . Gait  x 250 feet with Contact Guard Assistance using Rolling Walker.     Long term goals:  Patient will regain full independent functional mobility with lowest level of assistive device to return to desired living arrangement and prior ADL's.                          Time Tracking:     PT Received On: 08/26/24  PT Start Time: 1457     PT Stop Time: 1532  PT Total Time (min): 35 min     Billable Minutes: Gait Training 35    Treatment Type: Treatment  PT/PTA: PT     Number of PTA visits since last PT visit: 0     08/26/2024

## 2024-08-26 NOTE — PLAN OF CARE
[facility]  Initial Discharge Assessment       Primary Care Provider: Smiley Trevino FNP    Admission Diagnosis: Perirectal abscess [K61.1]  Hyperkalemia [E87.5]  Fever [R50.9]    Admission Date: 8/22/2024  Expected Discharge Date:     Transition of Care Barriers: None    Payor: MEDICARE / Plan: MEDICARE PART A & B / Product Type: Government /     Extended Emergency Contact Information  Primary Emergency Contact: Yareli Hale  Mobile Phone: [phone]  Relation: Spouse  Preferred language: English   needed? No    Discharge Plan A: Home Health, Home with family  Discharge Plan B: Home with family      [facility] - [place], [place] - [address]  [address] [address]  Phone: [phone] Fax: [phone]    [facility] - [place] - [address]  [address] [facility] [place] [address]  Phone: [phone] Fax: [phone]    [id number] - [place], [place] - [address]  [address]  [place] [address]  Phone: [phone] Fax: [phone]    [id number] - [place], [place] - [address] AT [facility] [address]  & [facility]  [address]  [place] [address]  Phone: [phone] Fax: [phone]    [facility] - [place], [place] - [address]  [address] [place] [address] [place] [address]  Phone: [phone] Fax: [phone]    [facility] [address] [place] [address], [place] - [address]  [address] [place] [address] [place] [address]  Phone: [phone] Fax: [phone]      Initial Assessment (most recent)       Adult Discharge Assessment - 08/26/24 1234          Discharge Assessment    Assessment Type Discharge Planning Assessment     Source of Information family     People in Home spouse     Do you expect to return to your current living situation? Yes     Do you have help at home or someone to help you manage your care at home? Yes     Who are your caregiver(s) and their  phone number(s)? Yareli Hale- Spouse [phone]  Marge Sanchez- Daughter [phone]     Prior to hospitilization cognitive status: Unable to Assess     Current cognitive status: Unable to Assess     Walking or Climbing Stairs Difficulty no     Dressing/Bathing Difficulty no     Equipment Currently Used at Home none     Readmission within 30 days? No     Patient currently being followed by outpatient case management? No     Do you currently have service(s) that help you manage your care at home? No     Do you take prescription medications? Yes     Do you have prescription coverage? Yes     Coverage Medicare     Do you have any problems affording any of your prescribed medications? No     Is the patient taking medications as prescribed? yes     Who is going to help you get home at discharge? Spouse     How do you get to doctors appointments? car, drives self     Are you on dialysis? No     Do you take coumadin? No     Discharge Plan A Home Health;Home with family     Discharge Plan B Home with family     DME Needed Upon Discharge  none     Discharge Plan discussed with: Adult children     Transition of Care Barriers None                   Pt in sx at time of dcp assessment. SW spoke with pts daughterMarge in waiting room. Pts spouse at a doctors appointment and will be to hospital after appointment. Pt lives at home with spouse, not current with hh and uses no dme. However, pt may need hh at dc. Per pts daughter, pts spouse will make that decision and sign I.M. BROOKE to speak with pts spouse, [place] once she makes it to the hospital to sign I.M. and get choice form signed for hh in case it is ordered. SDOH questions completed 2 days ago. BROOKE following.

## 2024-08-26 NOTE — PLAN OF CARE
Problem: Diabetes Comorbidity  Goal: Blood Glucose Level Within Targeted Range  Outcome: Progressing  Intervention: Monitor and Manage Glycemia  Flowsheets (Taken 8/26/2024 1810)  Glycemic Management:   blood glucose monitored   oral hydration promoted

## 2024-08-26 NOTE — OP NOTE
[facility]  Surgery Department  Operative Note    SUMMARY     Date of Procedure: 8/26/2024     Procedure: Procedure(s) (LRB):  INCISION AND DRAINAGE, PERIRECTAL REGION (Bilateral)     Surgeons and Role:     * Pop Butcher MD - Primary    Assisting Surgeon: None    Pre-Operative Diagnosis: Necrotizing soft tissue infection [M79.89]    Post-Operative Diagnosis: Post-Op Diagnosis Codes:     * Necrotizing soft tissue infection [M79.89]    Anesthesia: General    Procedures Performed: debridement of right perirectal necrotizing fasciitis.      Significant Findings of the Procedure: Additional mild amount of purulence tracking along the deep aspect of the right perirectal area.  This went about 13 cm deep total.  Edges and some of the deep muscle and fascia required some debridement on the posterior aspect    Procedure in Detail:  After informed consent patient brought to the OR prepped and draped in the usual sterile fashion.  He was placed lithotomy position.  We probed the wound and on the deep aspect along the right ramiro rectal area tracking superiorly for an additional 5 cm was a pocket of mild purulence which we expressed out and suctioned out.  This pocket total length was about 13 cm deep by about 6 cm x 3 cm wide.  Broke up all the loculations.  Again mild amount of pus.  On the posterior aspect of the wound some of the skin edges and some of the deep fascia and muscles requires a surgical excision and debridement.  This was an proximally a 6 x 3 x 1 cm area which we excised to good healthy tissues.  Otherwise the wound looks pretty good and hopefully the patient will not need any more debridements.  He tolerated the procedure well.  We irrigated the area out with some Dakin solution and then packed Kerlix in the deep wound in the superficial part as well.  With a wet-to-dry.    Complications: No    Estimated Blood Loss (EBL): 15 cc           Implants: * No implants in log  *    Specimens:   Specimen (24h ago, onward)       Start     Ordered    08/26/24 1000  Surgical Pathology  RELEASE UPON ORDERING         08/26/24 1000                            Condition: Good    Disposition: PACU - hemodynamically stable.    Attestation: I was present and scrubbed for the entire procedure.

## 2024-08-26 NOTE — PROGRESS NOTES
Pharmacist Renal Dose Adjustment Note    Negro Hale is a 84 y.o. male being treated with the medication enoxaparin    Patient Data:    Vital Signs (Most Recent):  Temp: 97.9 °F (36.6 °C) (08/26/24 0725)  Pulse: 60 (08/26/24 0725)  Resp: 16 (08/26/24 0725)  BP: 137/68 (08/26/24 0725)  SpO2: 97 % (08/26/24 0725) Vital Signs (72h Range):  Temp:  [97.2 °F (36.2 °C)-99.3 °F (37.4 °C)]   Pulse:  [60-88]   Resp:  [11-20]   BP: ()/(49-78)   SpO2:  [85 %-99 %]      Recent Labs   Lab 08/24/24  0349 08/25/24  0740 08/26/24  0438   CREATININE 1.31* 1.56* 2.00*  2.00*     Serum creatinine: 2 mg/dL (H) 08/26/24 0438  Estimated creatinine clearance: 27.6 mL/min (A)    Medication:enoxaparin dose: 40 mg frequency daily will be changed to medication:enoxaparin dose:30 mg frequency:daily    Pharmacist's Name: Melissa Woodard  Pharmacist's Extension: [phone]

## 2024-08-26 NOTE — ANESTHESIA PREPROCEDURE EVALUATION
08/26/2024  Negro Hale is a 84 y.o., male.      Pre-op Assessment    I have reviewed the Patient Summary Reports.     I have reviewed the Nursing Notes. I have reviewed the NPO Status.   I have reviewed the Medications.     Review of Systems  Anesthesia Hx:  No problems with previous Anesthesia                Social:  Non-Smoker, No Alcohol Use       Hematology/Oncology:  Hematology Normal   Oncology Normal                                   EENT/Dental:  EENT/Dental Normal           Cardiovascular:     Hypertension  Past MI CAD    Dysrhythmias atrial fibrillation Angina    PVD     ASCVD / coronary stent                         Pulmonary:   COPD   Shortness of breath                  Renal/:  Chronic Renal Disease, CKD  BPH              Hepatic/GI:  Hepatic/GI Normal                 Musculoskeletal:  Arthritis               Neurological:  Neurology Normal                                      Endocrine:  Diabetes, poorly controlled, type 2           Dermatological:  Skin Normal Perirectal abscess / necrotizing fasciitis   Psych:  Psychiatric Normal                    Physical Exam  General: Well nourished    Airway:  Mallampati: II / II  Mouth Opening: Normal  TM Distance: > 6 cm  Tongue: Normal  Neck ROM: Normal ROM    Chest/Lungs:  Clear to auscultation, Normal Respiratory Rate    Heart:  Rate: Normal  Rhythm: Regular Rhythm        Anesthesia Plan  Type of Anesthesia, risks & benefits discussed:    Anesthesia Type: Gen Supraglottic Airway  Intra-op Monitoring Plan: Standard ASA Monitors  Post Op Pain Control Plan: multimodal analgesia  Induction:  IV  Informed Consent: Informed consent signed with the Patient and all parties understand the risks and agree with anesthesia plan.  All questions answered. Patient consented to blood products? Yes  ASA Score: 3  Day of Surgery Review of History & Physical:  H&P Update referred to the surgeon/provider.I have interviewed and examined the patient. I have reviewed the patient's H&P dated:     Ready For Surgery From Anesthesia Perspective.     .

## 2024-08-26 NOTE — PLAN OF CARE
BROOKE spoke with pts spouse and she gave choice for [facility] and signed I.M. BROOKE following.

## 2024-08-26 NOTE — ANESTHESIA POSTPROCEDURE EVALUATION
Anesthesia Post Evaluation    Patient: Negro Hale    Procedure(s) Performed: Procedure(s) (LRB):  INCISION AND DRAINAGE, PERIRECTAL REGION (Bilateral)    Final Anesthesia Type: general      Patient location during evaluation: PACU  Patient participation: Yes- Able to Participate  Level of consciousness: awake and alert and oriented  Post-procedure vital signs: reviewed and stable  Pain management: adequate  Airway patency: patent  MARTHA mitigation strategies: Multimodal analgesia  PONV status at discharge: No PONV  Anesthetic complications: no      Cardiovascular status: hemodynamically stable  Respiratory status: unassisted and spontaneous ventilation  Hydration status: euvolemic  Follow-up not needed.              Vitals Value Taken Time   /65 08/26/24 1022   Temp 36.6 °C (97.8 °F) 08/26/24 1022   Pulse 67 08/26/24 1024   Resp 15 08/26/24 1024   SpO2 98 % 08/26/24 1024   Vitals shown include unfiled device data.      No case tracking events are documented in the log.      Pain/Suzie Score: No data recorded

## 2024-08-26 NOTE — OR NURSING
1018 PT REC'D TO PACU. VSS. SATS 98% ON 6L SIMPLE FACE MASK. WILL TITRATE ATOL. DENIES PAIN AT THIS TIME. DRSG TO RECTUM C/D/I. WILL CONT TO MONITOR.    1037 DILAUDID GIVEN FOR PAIN RATED 8/10.    1042 UPDATE GIVEN TO DAUGHTER VIA TEXT MESSAGE.     1045 DILAUDID GIVEN FOR PAIN RATED 7/10.    1115 TRANSFERRED  IN STABLE CONDITION. REPORT GIVEN TO OSMIN HODGES. /51 HR 74 O2 SAT 96% ON 2L NC TEMP 97.8. SURGICAL SITES ADDRESSED. DAUGHTER AT BEDSIDE.

## 2024-08-27 LAB
BACTERIA SPEC ANAEROBE CULT: ABNORMAL
BACTERIA SPEC ANAEROBE CULT: ABNORMAL
BUN SERPL-MCNC: 34 MG/DL (ref 7–18)
BUN/CREAT SERPL: 18 (ref 6–20)
CREAT SERPL-MCNC: 1.94 MG/DL (ref 0.7–1.3)
EGFR (NO RACE VARIABLE) (RUSH/TITUS): 33 ML/MIN/1.73M2
ESTROGEN SERPL-MCNC: NORMAL PG/ML
GLUCOSE SERPL-MCNC: 188 MG/DL (ref 70–105)
GLUCOSE SERPL-MCNC: 244 MG/DL (ref 70–105)
INSULIN SERPL-ACNC: NORMAL U[IU]/ML
LAB AP GROSS DESCRIPTION: NORMAL
LAB AP LABORATORY NOTES: NORMAL
T3RU NFR SERPL: NORMAL %

## 2024-08-27 PROCEDURE — 25000003 PHARM REV CODE 250: Performed by: SURGERY

## 2024-08-27 PROCEDURE — 99900035 HC TECH TIME PER 15 MIN (STAT)

## 2024-08-27 PROCEDURE — 82962 GLUCOSE BLOOD TEST: CPT

## 2024-08-27 PROCEDURE — 97530 THERAPEUTIC ACTIVITIES: CPT

## 2024-08-27 PROCEDURE — 63600175 PHARM REV CODE 636 W HCPCS: Performed by: HOSPITALIST

## 2024-08-27 PROCEDURE — 27000221 HC OXYGEN, UP TO 24 HOURS

## 2024-08-27 PROCEDURE — 82565 ASSAY OF CREATININE: CPT | Performed by: SURGERY

## 2024-08-27 PROCEDURE — 25000003 PHARM REV CODE 250: Performed by: FAMILY MEDICINE

## 2024-08-27 PROCEDURE — 94761 N-INVAS EAR/PLS OXIMETRY MLT: CPT

## 2024-08-27 PROCEDURE — 84520 ASSAY OF UREA NITROGEN: CPT | Performed by: SURGERY

## 2024-08-27 PROCEDURE — 25000003 PHARM REV CODE 250: Performed by: HOSPITALIST

## 2024-08-27 PROCEDURE — 36415 COLL VENOUS BLD VENIPUNCTURE: CPT | Performed by: SURGERY

## 2024-08-27 PROCEDURE — 11000001 HC ACUTE MED/SURG PRIVATE ROOM

## 2024-08-27 PROCEDURE — 97116 GAIT TRAINING THERAPY: CPT

## 2024-08-27 PROCEDURE — 63600175 PHARM REV CODE 636 W HCPCS: Mod: JZ,JG | Performed by: SURGERY

## 2024-08-27 RX ADMIN — CLINDAMYCIN PHOSPHATE 600 MG: 600 INJECTION, SOLUTION INTRAVENOUS at 06:08

## 2024-08-27 RX ADMIN — PIPERACILLIN SODIUM AND TAZOBACTAM SODIUM 4.5 G: 4; .5 INJECTION, POWDER, LYOPHILIZED, FOR SOLUTION INTRAVENOUS at 06:08

## 2024-08-27 RX ADMIN — CHOLECALCIFEROL TAB 125 MCG (5000 UNIT) 5000 UNITS: 125 TAB at 09:08

## 2024-08-27 RX ADMIN — INSULIN ASPART 1 UNITS: 100 INJECTION, SOLUTION INTRAVENOUS; SUBCUTANEOUS at 10:08

## 2024-08-27 RX ADMIN — PIPERACILLIN SODIUM AND TAZOBACTAM SODIUM 4.5 G: 4; .5 INJECTION, POWDER, LYOPHILIZED, FOR SOLUTION INTRAVENOUS at 09:08

## 2024-08-27 RX ADMIN — PIPERACILLIN SODIUM AND TAZOBACTAM SODIUM 4.5 G: 4; .5 INJECTION, POWDER, LYOPHILIZED, FOR SOLUTION INTRAVENOUS at 03:08

## 2024-08-27 RX ADMIN — MUPIROCIN 1 G: 20 OINTMENT TOPICAL at 09:08

## 2024-08-27 RX ADMIN — CLINDAMYCIN PHOSPHATE 600 MG: 600 INJECTION, SOLUTION INTRAVENOUS at 02:08

## 2024-08-27 RX ADMIN — MUPIROCIN 1 G: 20 OINTMENT TOPICAL at 10:08

## 2024-08-27 RX ADMIN — ENOXAPARIN SODIUM 30 MG: 30 INJECTION SUBCUTANEOUS at 05:08

## 2024-08-27 RX ADMIN — PANTOPRAZOLE SODIUM 40 MG: 40 TABLET, DELAYED RELEASE ORAL at 09:08

## 2024-08-27 RX ADMIN — HYDROCODONE BITARTRATE AND ACETAMINOPHEN 1 TABLET: 10; 325 TABLET ORAL at 10:08

## 2024-08-27 RX ADMIN — INSULIN GLARGINE 20 UNITS: 100 INJECTION, SOLUTION SUBCUTANEOUS at 10:08

## 2024-08-27 NOTE — PT/OT/SLP PROGRESS
Physical Therapy Treatment    Patient Name:  Negro Hale   MRN:  [id number]    Recommendations:     Discharge Recommendations: Low Intensity Therapy  Discharge Equipment Recommendations: none  Barriers to discharge:  ongoing medical treatment    Assessment:     Negro Hale is a 84 y.o. male admitted with a medical diagnosis of Necrotizing soft tissue infection.  He presents with the following impairments/functional limitations: weakness, impaired endurance, impaired functional mobility     Patient participating well with PT. Ambulating 250 ft with RW and CGA. Assisted patient to and from BSC, and helping with toileting.     Rehab Prognosis: Good; patient would benefit from acute skilled PT services to address these deficits and reach maximum level of function.    Recent Surgery: Procedure(s) (LRB):  INCISION AND DRAINAGE, PERIRECTAL REGION (Bilateral) 1 Day Post-Op    Plan:     During this hospitalization, patient to be seen 5 x/week to address the identified rehab impairments via gait training, therapeutic activities, therapeutic exercises, neuromuscular re-education and progress toward the following goals:    Plan of Care Expires:       Subjective     Chief Complaint: slight stinging when changing postion  Patient/Family Comments/goals: agreeable to tx  Pain/Comfort:  Pain Rating 1: 0/10      Objective:     Communicated with nurse prior to session.  Patient found HOB elevated with peripheral IV upon PT entry to room.     General Precautions: Standard, fall  Orthopedic Precautions:    Braces:    Respiratory Status: Room air     Functional Mobility:  Bed Mobility:     Supine to Sit: contact guard assistance  Transfers:     Sit to Stand:  contact guard assistance with rolling walker  Gait: 250 ft with RW and CGA  Balance: good  Assisted patient with multiple STS trials to and from bsc, patient stood so therapist could help with toileting      AM-PAC 6 CLICK MOBILITY  Turning over in bed (including adjusting  bedclothes, sheets and blankets)?: 4  Sitting down on and standing up from a chair with arms (e.g., wheelchair, bedside commode, etc.): 3  Moving from lying on back to sitting on the side of the bed?: 4  Moving to and from a bed to a chair (including a wheelchair)?: 3  Need to walk in hospital room?: 3  Climbing 3-5 steps with a railing?: 3  Basic Mobility Total Score: 20       Treatment & Education:  Mobility as noted    Patient left up in chair with all lines intact and nurse notified..    GOALS:   Multidisciplinary Problems       Physical Therapy Goals          Problem: Physical Therapy    Goal Priority Disciplines Outcome Goal Variances Interventions   Physical Therapy Goal     PT, PT/OT Progressing     Description: Short term goals:  . Supine to sit with Contact Guard Assistance  . Sit to stand transfer with Contact Guard Assistance  . Gait  x 250 feet with Contact Guard Assistance using Rolling Walker.     Long term goals:  Patient will regain full independent functional mobility with lowest level of assistive device to return to desired living arrangement and prior ADL's.                          Time Tracking:     PT Received On: 08/27/24  PT Start Time: 1045     PT Stop Time: 1124  PT Total Time (min): 39 min     Billable Minutes: Gait Training 12 and Therapeutic Activity 25    Treatment Type: Treatment  PT/PTA: PT     Number of PTA visits since last PT visit: 0     08/27/2024

## 2024-08-27 NOTE — ASSESSMENT & PLAN NOTE
Creatine stable for now. BMP reviewed- noted Estimated Creatinine Clearance: 27.6 mL/min (A) (based on SCr of 2 mg/dL (H)). according to latest data. Based on current GFR, CKD stage is stage 3 - GFR 30-59.  Monitor UOP and serial BMP and adjust therapy as needed. Renally dose meds. Avoid nephrotoxic medications and procedures.

## 2024-08-27 NOTE — ASSESSMENT & PLAN NOTE
"  Patient's FSGs are controlled on current medication regimen.  Last A1c reviewed-   Lab Results   Component Value Date    HGBA1C 6.7 (H) 08/23/2024     Most recent fingerstick glucose reviewed- No results for input(s): "POCTGLUCOSE" in the last 24 hours.  Current correctional scale  Medium  Maintain anti-hyperglycemic dose as follows-   Antihyperglycemics (From admission, onward)      Start     Stop Route Frequency Ordered    08/25/24 2100  insulin glargine U-100 (Lantus) injection 20 Units         -- SubQ Nightly 08/24/24 2123 08/23/24 1950  insulin aspart U-100 injection 0-10 Units         -- SubQ Before meals & nightly PRN 08/23/24 1851          Hold Oral hypoglycemics while patient is in the hospital.  "

## 2024-08-27 NOTE — PLAN OF CARE
[facility]  Discharge Reassessment    Primary Care Provider: Smiley Trevino FNP    Expected Discharge Date:     Reassessment (most recent)       Discharge Reassessment - 08/27/24 1217          Discharge Reassessment    Assessment Type Discharge Planning Reassessment     Discharge Plan discussed with: Spouse/sig other     Name(s) and Number(s) Yareli Hale- Spouse     Discharge Plan A Skilled Nursing Facility     Discharge Plan B Home with family;Home Health     DME Needed Upon Discharge  none     Transition of Care Barriers None        Post-Acute Status    Post-Acute Authorization Placement     Post-Acute Placement Status Referrals Sent     Patient choice form signed by patient/caregiver List with quality metrics by geographic area provided;List from CMS Compare     Discharge Delays None known at this time                     SW consulted for swb. SW spoke with pts spouse, via phone. Choice obtained verbally for [facility] or [facility]. Referrals faxed, SW following.

## 2024-08-27 NOTE — PLAN OF CARE
BROOKE spoke with Michelle at [facility] and pt was declined for [facility]. BROOKE awaiting decision for [facility]. SW following.

## 2024-08-27 NOTE — SUBJECTIVE & OBJECTIVE
Interval History:   Labs and vital signs stable.  Afebrile.  Postop day 1 following 2nd debridement of the left darron cleft.  Dressing changed, Vashe wet-to-dry with packing superiorly into area of tunneling.  No drainage or malodor.     Looking in to swing bed option versus home health.    Medications:  Continuous Infusions:   0.9% NaCl   Intravenous Continuous 75 mL/hr at 24 Rate Verify at 24     Scheduled Meds:   clindamycin IV (PEDS and ADULTS)  600 mg Intravenous Q8H    enoxparin  30 mg Subcutaneous Daily    insulin glargine U-100 (Lantus)  20 Units Subcutaneous QHS    mupirocin  1 g Nasal BID    pantoprazole  40 mg Oral Daily    piperacillin-tazobactam (Zosyn) IV (PEDS and ADULTS) (extended infusion is not appropriate)  4.5 g Intravenous Once    piperacillin-tazobactam (Zosyn) IV (PEDS and ADULTS) (extended infusion is not appropriate)  4.5 g Intravenous Q8H    vitamin D  5,000 Units Oral Daily     PRN Meds:  Current Facility-Administered Medications:     dextrose 10%, 12.5 g, Intravenous, PRN    dextrose 10%, 25 g, Intravenous, PRN    glucagon (human recombinant), 1 mg, Intramuscular, PRN    glucose, 16 g, Oral, PRN    glucose, 24 g, Oral, PRN    HYDROcodone-acetaminophen, 1 tablet, Oral, Q4H PRN    HYDROcodone-acetaminophen, 1 tablet, Oral, Q4H PRN    insulin aspart U-100, 0-10 Units, Subcutaneous, QID (AC + HS) PRN    Pharmacy to dose Vancomycin consult, , , Once **AND** vancomycin - pharmacy to dose, , Intravenous, pharmacy to manage frequency     Review of patient's allergies indicates:   Allergen Reactions    Mayonnaise      Upset stomach     Objective:     Vital Signs (Most Recent):  Temp: 97.6 °F (36.4 °C) (24)  Pulse: 69 (24)  Resp: 18 (24)  BP: 115/67 (24)  SpO2: 96 % (24) Vital Signs (24h Range):  Temp:  [97.3 °F (36.3 °C)-98.1 °F (36.7 °C)] 97.6 °F (36.4 °C)  Pulse:  [53-86] 69  Resp:  [14-18] 18  SpO2:  [78 %-98 %] 96  %  BP: (115-177)/(51-88) 115/67     Weight: 81.6 kg (180 lb)  Body mass index is 28.19 kg/m².    Intake/Output - Last 3 Shifts         08/25 0700 08/26 0659 08/26 0700 08/27 0659 08/27 0700 08/28 0659    I.V. (mL/kg) 294.1 (3.6) 1762.5 (21.6)     IV Piggyback 783.9 377     Total Intake(mL/kg) 1078 (13.2) 2139.5 (26.2)     Urine (mL/kg/hr)  800 (0.4)     Stool  0     Total Output  800     Net +1078 +1339.5            Stool Occurrence 4 x 5 x              Physical Exam  Vitals reviewed.   Cardiovascular:      Rate and Rhythm: Normal rate.   Pulmonary:      Effort: Pulmonary effort is normal. No respiratory distress.   Genitourinary:         Comments: Surgical incision post debridement with superior tunneling  Skin:     General: Skin is warm and dry.   Neurological:      Mental Status: He is alert. Mental status is at baseline.          Significant Labs:  I have reviewed all pertinent lab results within the past 24 hours.    Significant Diagnostics:  I have reviewed all pertinent imaging results/findings within the past 24 hours.

## 2024-08-27 NOTE — PROGRESS NOTES
[facility] Hospital Medicine  Progress Note    Patient Name: Negro Hale  MRN: [id number]  Patient Class: IP- Inpatient   Admission Date: 8/22/2024  Length of Stay: 3 days  Attending Physician: Pop Butcher MD  Primary Care Provider: Smiley Trevino FNP        Subjective:     Principal Problem:Necrotizing soft tissue infection        HPI:  Thank you for consult    Chief complaint:  Rectal abscess    History of present illness:       Mr. Hale is a 84-year-old admitted 08/22 with rectal discomfort.  CT scan showed rectal abscess.  Patient admitted to Dr. Butcher.  Underwent surgery earlier 08/23 with I and D of perirectal region.   Patient did well with surgery.  Hospitalist service was asked to see patient postoperative.  Patient before admission had complained of rectal pain for proximally 1 week.  Had some subjective fever but no chills.  White blood cell count was noted to be elevated on admission.  Patient feels better after surgery.    Review of system:  See above.  Has had some past problems with constipation.  Has 2-3 episodes of nocturia nightly.  States weight is been stable.  Ambulates with cane and walker.  Review of systems otherwise negative.    Past medical history:  -hypertension greater than 20 years  -diabetes mellitus greater than 20 years.  States takes 1 shot of insulin 45 units daily.  -coronary arteriosclerosis has seen Dr. Bridges with BHAKTI snyder LCx 2/26/2007  (patient did not remember this and obtained from records)  -paroxysmal atrial fibrillation with rhythm being in normal sinus  -dyslipidemia  -chronic back pain with past vertebral fracture followed by Dr. Destin Leroy    Past surgical history:  Surgery this admission for perirectal abscess with I and D  Bilateral cataract surgery  Remote appendectomy  Tonsillectomy  Inguinal hernia repair    Allergy to Mayonnaise    Social history:  No history of tobacco alcohol use    Family history  Father with heart  disease    Health maintenance issues:  Primary care provider is Smiley Trevino NP  Had flu shots  Had previous Pneumovax  No known COVID-19 infection  COVID vaccination x2  Says no prior colonoscopy but looks to possibly have had 1 by Dr. Mendoza in 2019        Overview/Hospital Course:  08/24 No new complaints. Family in room say pt going back to surgery first of week. BP and BS generally good.  08/25 discussed with Dr. Butcher and patient be taken back to surgery tomorrow for additional debridement.  BP and blood sugar good  1 of 2 blood cultures positive for gram positive cocci, maybe a contaminate, will repeat blood culture.  Currently on vancomycin and which should cover    Interval History:     No significant events overnight, no new complaints or concerns. To OR later this AM for debridement.     Review of Systems   Constitutional:  Negative for appetite change, fatigue and fever.   HENT:  Negative for congestion, hearing loss and trouble swallowing.    Respiratory:  Negative for chest tightness, shortness of breath and wheezing.    Cardiovascular:  Negative for chest pain and palpitations.   Gastrointestinal:  Positive for constipation and rectal pain. Negative for abdominal pain and nausea.   Genitourinary:  Negative for difficulty urinating and dysuria.   Musculoskeletal:  Negative for back pain and neck stiffness.   Skin:  Negative for pallor and rash.   Neurological:  Negative for dizziness, speech difficulty and headaches.   Psychiatric/Behavioral:  Positive for confusion. Negative for suicidal ideas.      Objective:     Vital Signs (Most Recent):  Temp: 97.8 °F (36.6 °C) (08/26/24 1119)  Pulse: 68 (08/26/24 1421)  Resp: 16 (08/26/24 1119)  BP: (!) 154/68 (08/26/24 1421)  SpO2: (!) 94 % (08/26/24 1421) Vital Signs (24h Range):  Temp:  [97.3 °F (36.3 °C)-97.9 °F (36.6 °C)] 97.8 °F (36.6 °C)  Pulse:  [59-78] 68  Resp:  [13-16] 16  SpO2:  [93 %-99 %] 94 %  BP: (125-173)/(51-83) 154/68     Weight: 81.6 kg  (180 lb)  Body mass index is 28.19 kg/m².    Intake/Output Summary (Last 24 hours) at 8/26/2024 1930  Last data filed at 8/26/2024 1859  Gross per 24 hour   Intake 1145.66 ml   Output 200 ml   Net 945.66 ml         Physical Exam  Constitutional:       General: He is awake. He is not in acute distress.     Appearance: He is well-developed.   HENT:      Head: Normocephalic.      Mouth/Throat:      Pharynx: Oropharynx is clear.   Eyes:      General: No scleral icterus.  Neck:      Thyroid: No thyroid mass.   Cardiovascular:      Rate and Rhythm: Normal rate and regular rhythm.   Pulmonary:      Effort: Pulmonary effort is normal. No respiratory distress.      Breath sounds: Normal air entry.   Abdominal:      General: There is no distension.      Palpations: Abdomen is soft.   Musculoskeletal:         General: Normal range of motion.   Skin:     General: Skin is warm.      Coloration: Skin is not jaundiced.      Findings: No lesion.      Comments: Dressing over surgical site to perirectal area   Neurological:      Mental Status: He is alert and oriented to person, place, and time.   Psychiatric:         Attention and Perception: Attention normal.         Behavior: Behavior normal. Behavior is cooperative.         Cognition and Memory: Cognition normal.             Significant Labs: All pertinent labs within the past 24 hours have been reviewed.    Significant Imaging: I have reviewed all pertinent imaging results/findings within the past 24 hours.    Assessment/Plan:      * Necrotizing soft tissue infection    Surgery 08/22/24    Positive blood culture    1 of 2 bottles positive for Gram-positive cocci  Will repeat  Maybe a contaminant, await ID  Continue vancomycin    Paroxysmal atrial fibrillation  Patient with Paroxysmal (<7 days) atrial fibrillation which is controlled currently with Beta Blocker. Patient is currently in sinus rhythm.[id number] Score: 4. HASBLED Score: 2. Anticoagulation not indicated due to  "surgery .      Perirectal abscess  To OR later today for further debridement.   ---  Surgery 08/22/24      Type 2 diabetes mellitus with hyperglycemia, with long-term current use of insulin    Patient's FSGs are controlled on current medication regimen.  Last A1c reviewed-   Lab Results   Component Value Date    HGBA1C 6.7 (H) 08/23/2024     Most recent fingerstick glucose reviewed- No results for input(s): "POCTGLUCOSE" in the last 24 hours.  Current correctional scale  Medium  Maintain anti-hyperglycemic dose as follows-   Antihyperglycemics (From admission, onward)      Start     Stop Route Frequency Ordered    08/25/24 2100  insulin glargine U-100 (Lantus) injection 20 Units         -- SubQ Nightly 08/24/24 2123 08/23/24 1950  insulin aspart U-100 injection 0-10 Units         -- SubQ Before meals & nightly PRN 08/23/24 1851          Hold Oral hypoglycemics while patient is in the hospital.    Atherosclerosis of native coronary artery of native heart without angina pectoris  Patient with known CAD s/p stent placement, which is controlled Will continue ASA and Statin and monitor for S/Sx of angina/ACS. Continue to monitor on telemetry.   BHAKTI mid LCx 2/26/2007  followed by Dr. Bridges; this history obtained from chart in patient did not remember    Cardiolite Impression:     1. Medium size, mild intensity [id number] inferolateral defect with decreased wall motion and thickening, suggestive of infarct.  2. The global left ventricular systolic function is normal with an LV ejection fraction of 70 % and no evidence of LV dilatation. Wall motion is impaired.  Electronically signed by:Gabriel Barahona  Date:                                            09/12/2023  Time:                                           14:33    Hypercholesterolemia  Statin    Primary hypertension  Chronic, controlled. Latest blood pressure and vitals reviewed-     Temp:  [97.3 °F (36.3 °C)-97.9 °F (36.6 °C)]   Pulse:  [59-78]   Resp:  [13-16] "   BP: (125-173)/(51-83)   SpO2:  [93 %-99 %] .   Home meds for hypertension were reviewed and noted below.   Hypertension Medications               nitroGLYCERIN (NITROSTAT) 0.4 MG SL tablet Place 1 tablet (0.4 mg total) under the tongue every 5 (five) minutes as needed for Chest pain.            While in the hospital, will manage blood pressure as follows; Continue home antihypertensive regimen    Will utilize p.r.n. blood pressure medication only if patient's blood pressure greater than 180/110 and he develops symptoms such as worsening chest pain or shortness of breath.    Type 2 diabetes mellitus with stage 3a chronic kidney disease, with long-term current use of insulin  Creatine stable for now. BMP reviewed- noted Estimated Creatinine Clearance: 27.6 mL/min (A) (based on SCr of 2 mg/dL (H)). according to latest data. Based on current GFR, CKD stage is stage 3 - GFR 30-59.  Monitor UOP and serial BMP and adjust therapy as needed. Renally dose meds. Avoid nephrotoxic medications and procedures.      VTE Risk Mitigation (From admission, onward)           Ordered     enoxaparin injection 30 mg  Daily         08/26/24 0845     IP VTE HIGH RISK PATIENT  Once         08/23/24 1342     Place sequential compression device  Until discontinued         08/23/24 1342                    Discharge Planning   RICKY:      Code Status: Prior   Is the patient medically ready for discharge?:     Reason for patient still in hospital (select all that apply): Treatment  Discharge Plan A: Home Health, Home with family                  Smiley Sanchez DO  Department of Hospital Medicine   [facility]

## 2024-08-27 NOTE — NURSING
In to assist pt to [facility]. No dressing present over wound due to multiple bm episodes last night. Area clean with scant amount of drainage noted. Pt denies pain

## 2024-08-27 NOTE — ASSESSMENT & PLAN NOTE
Chronic, controlled. Latest blood pressure and vitals reviewed-     Temp:  [97.3 °F (36.3 °C)-97.9 °F (36.6 °C)]   Pulse:  [59-78]   Resp:  [13-16]   BP: (125-173)/(51-83)   SpO2:  [93 %-99 %] .   Home meds for hypertension were reviewed and noted below.   Hypertension Medications               nitroGLYCERIN (NITROSTAT) 0.4 MG SL tablet Place 1 tablet (0.4 mg total) under the tongue every 5 (five) minutes as needed for Chest pain.            While in the hospital, will manage blood pressure as follows; Continue home antihypertensive regimen    Will utilize p.r.n. blood pressure medication only if patient's blood pressure greater than 180/110 and he develops symptoms such as worsening chest pain or shortness of breath.

## 2024-08-27 NOTE — SUBJECTIVE & OBJECTIVE
Interval History:     No significant events overnight, no new complaints or concerns. To OR later this AM for debridement.     Review of Systems   Constitutional:  Negative for appetite change, fatigue and fever.   HENT:  Negative for congestion, hearing loss and trouble swallowing.    Respiratory:  Negative for chest tightness, shortness of breath and wheezing.    Cardiovascular:  Negative for chest pain and palpitations.   Gastrointestinal:  Positive for constipation and rectal pain. Negative for abdominal pain and nausea.   Genitourinary:  Negative for difficulty urinating and dysuria.   Musculoskeletal:  Negative for back pain and neck stiffness.   Skin:  Negative for pallor and rash.   Neurological:  Negative for dizziness, speech difficulty and headaches.   Psychiatric/Behavioral:  Positive for confusion. Negative for suicidal ideas.      Objective:     Vital Signs (Most Recent):  Temp: 97.8 °F (36.6 °C) (08/26/24 1119)  Pulse: 68 (08/26/24 1421)  Resp: 16 (08/26/24 1119)  BP: (!) 154/68 (08/26/24 1421)  SpO2: (!) 94 % (08/26/24 1421) Vital Signs (24h Range):  Temp:  [97.3 °F (36.3 °C)-97.9 °F (36.6 °C)] 97.8 °F (36.6 °C)  Pulse:  [59-78] 68  Resp:  [13-16] 16  SpO2:  [93 %-99 %] 94 %  BP: (125-173)/(51-83) 154/68     Weight: 81.6 kg (180 lb)  Body mass index is 28.19 kg/m².    Intake/Output Summary (Last 24 hours) at 8/26/2024 1930  Last data filed at 8/26/2024 1859  Gross per 24 hour   Intake 1145.66 ml   Output 200 ml   Net 945.66 ml         Physical Exam  Constitutional:       General: He is awake. He is not in acute distress.     Appearance: He is well-developed.   HENT:      Head: Normocephalic.      Mouth/Throat:      Pharynx: Oropharynx is clear.   Eyes:      General: No scleral icterus.  Neck:      Thyroid: No thyroid mass.   Cardiovascular:      Rate and Rhythm: Normal rate and regular rhythm.   Pulmonary:      Effort: Pulmonary effort is normal. No respiratory distress.      Breath sounds: Normal  air entry.   Abdominal:      General: There is no distension.      Palpations: Abdomen is soft.   Musculoskeletal:         General: Normal range of motion.   Skin:     General: Skin is warm.      Coloration: Skin is not jaundiced.      Findings: No lesion.      Comments: Dressing over surgical site to perirectal area   Neurological:      Mental Status: He is alert and oriented to person, place, and time.   Psychiatric:         Attention and Perception: Attention normal.         Behavior: Behavior normal. Behavior is cooperative.         Cognition and Memory: Cognition normal.             Significant Labs: All pertinent labs within the past 24 hours have been reviewed.    Significant Imaging: I have reviewed all pertinent imaging results/findings within the past 24 hours.

## 2024-08-27 NOTE — ASSESSMENT & PLAN NOTE
Patient with Paroxysmal (<7 days) atrial fibrillation which is controlled currently with Beta Blocker. Patient is currently in sinus rhythm.[id number] Score: 4. HASBLED Score: 2. Anticoagulation not indicated due to surgery .

## 2024-08-27 NOTE — NURSING
Pt has had 2 Bms tonight.  He wants the dressing removed from his rectal abscess/surgical site, which I think is a great idea considering stool continues to get into his surgical site.  I have irrigated site with wound cleanser both times that pt has gotten up to the bedside commode. Pt doesn't want a dressing reapplied back to surgical site because he says the dressing goes right on top of his exit hole and it is pointless.

## 2024-08-27 NOTE — PROGRESS NOTES
[facility]  General Surgery  Progress Note    Subjective:     History of Present Illness:  No notes on file    Post-Op Info:  Procedure(s) (LRB):  INCISION AND DRAINAGE, PERIRECTAL REGION (Bilateral)   1 Day Post-Op     Interval History:   Labs and vital signs stable.  Afebrile.  Postop day 1 following 2nd debridement of the left darron cleft.  Dressing changed, Vashe wet-to-dry with packing superiorly into area of tunneling.  No drainage or malodor.     Looking in to swing bed option versus home health.    Medications:  Continuous Infusions:   0.9% NaCl   Intravenous Continuous 75 mL/hr at 24 0630 Rate Verify at 24 0630     Scheduled Meds:   clindamycin IV (PEDS and ADULTS)  600 mg Intravenous Q8H    enoxparin  30 mg Subcutaneous Daily    insulin glargine U-100 (Lantus)  20 Units Subcutaneous QHS    mupirocin  1 g Nasal BID    pantoprazole  40 mg Oral Daily    piperacillin-tazobactam (Zosyn) IV (PEDS and ADULTS) (extended infusion is not appropriate)  4.5 g Intravenous Once    piperacillin-tazobactam (Zosyn) IV (PEDS and ADULTS) (extended infusion is not appropriate)  4.5 g Intravenous Q8H    vitamin D  5,000 Units Oral Daily     PRN Meds:  Current Facility-Administered Medications:     dextrose 10%, 12.5 g, Intravenous, PRN    dextrose 10%, 25 g, Intravenous, PRN    glucagon (human recombinant), 1 mg, Intramuscular, PRN    glucose, 16 g, Oral, PRN    glucose, 24 g, Oral, PRN    HYDROcodone-acetaminophen, 1 tablet, Oral, Q4H PRN    HYDROcodone-acetaminophen, 1 tablet, Oral, Q4H PRN    insulin aspart U-100, 0-10 Units, Subcutaneous, QID (AC + HS) PRN    Pharmacy to dose Vancomycin consult, , , Once **AND** vancomycin - pharmacy to dose, , Intravenous, pharmacy to manage frequency     Review of patient's allergies indicates:   Allergen Reactions    Mayonnaise      Upset stomach     Objective:     Vital Signs (Most Recent):  Temp: 97.6 °F (36.4 °C) (24 0944)  Pulse: 69 (24  0944)  Resp: 18 (08/27/24 0944)  BP: 115/67 (08/27/24 0944)  SpO2: 96 % (08/27/24 0944) Vital Signs (24h Range):  Temp:  [97.3 °F (36.3 °C)-98.1 °F (36.7 °C)] 97.6 °F (36.4 °C)  Pulse:  [53-86] 69  Resp:  [14-18] 18  SpO2:  [78 %-98 %] 96 %  BP: (115-177)/(51-88) 115/67     Weight: 81.6 kg (180 lb)  Body mass index is 28.19 kg/m².    Intake/Output - Last 3 Shifts         08/25 0700 08/26 0659 08/26 0700 08/27 0659 08/27 0700 08/28 0659    I.V. (mL/kg) 294.1 (3.6) 1762.5 (21.6)     IV Piggyback 783.9 377     Total Intake(mL/kg) 1078 (13.2) 2139.5 (26.2)     Urine (mL/kg/hr)  800 (0.4)     Stool  0     Total Output  800     Net +1078 +1339.5            Stool Occurrence 4 x 5 x              Physical Exam  Vitals reviewed.   Cardiovascular:      Rate and Rhythm: Normal rate.   Pulmonary:      Effort: Pulmonary effort is normal. No respiratory distress.   Genitourinary:         Comments: Surgical incision post debridement with superior tunneling  Skin:     General: Skin is warm and dry.   Neurological:      Mental Status: He is alert. Mental status is at baseline.          Significant Labs:  I have reviewed all pertinent lab results within the past 24 hours.    Significant Diagnostics:  I have reviewed all pertinent imaging results/findings within the past 24 hours.  Assessment/Plan:     No notes have been filed under this hospital service.  Service: General Surgery      Omar Laboy, BARBARA  General Surgery  [facility]

## 2024-08-28 LAB
ANION GAP SERPL CALCULATED.3IONS-SCNC: 7 MMOL/L (ref 7–16)
BACTERIA BLD CULT: NORMAL
BASOPHILS # BLD AUTO: 0.08 K/UL (ref 0–0.2)
BASOPHILS NFR BLD AUTO: 1 % (ref 0–1)
BUN SERPL-MCNC: 33 MG/DL (ref 7–18)
BUN SERPL-MCNC: 33 MG/DL (ref 7–18)
BUN/CREAT SERPL: 15 (ref 6–20)
BUN/CREAT SERPL: 15 (ref 6–20)
CALCIUM SERPL-MCNC: 8.2 MG/DL (ref 8.5–10.1)
CHLORIDE SERPL-SCNC: 114 MMOL/L (ref 98–107)
CO2 SERPL-SCNC: 25 MMOL/L (ref 21–32)
CREAT SERPL-MCNC: 2.13 MG/DL (ref 0.7–1.3)
CREAT SERPL-MCNC: 2.13 MG/DL (ref 0.7–1.3)
DIFFERENTIAL METHOD BLD: ABNORMAL
EGFR (NO RACE VARIABLE) (RUSH/TITUS): 30 ML/MIN/1.73M2
EGFR (NO RACE VARIABLE) (RUSH/TITUS): 30 ML/MIN/1.73M2
EOSINOPHIL # BLD AUTO: 0.37 K/UL (ref 0–0.5)
EOSINOPHIL NFR BLD AUTO: 4.7 % (ref 1–4)
ERYTHROCYTE [DISTWIDTH] IN BLOOD BY AUTOMATED COUNT: 13.9 % (ref 11.5–14.5)
GLUCOSE SERPL-MCNC: 108 MG/DL (ref 70–105)
GLUCOSE SERPL-MCNC: 126 MG/DL (ref 74–106)
GLUCOSE SERPL-MCNC: 150 MG/DL (ref 70–105)
GLUCOSE SERPL-MCNC: 193 MG/DL (ref 70–105)
GLUCOSE SERPL-MCNC: 252 MG/DL (ref 70–105)
HCT VFR BLD AUTO: 35.1 % (ref 40–54)
HGB BLD-MCNC: 11 G/DL (ref 13.5–18)
IMM GRANULOCYTES # BLD AUTO: 0.09 K/UL (ref 0–0.04)
IMM GRANULOCYTES NFR BLD: 1.1 % (ref 0–0.4)
LYMPHOCYTES # BLD AUTO: 1.47 K/UL (ref 1–4.8)
LYMPHOCYTES NFR BLD AUTO: 18.6 % (ref 27–41)
MCH RBC QN AUTO: 29.3 PG (ref 27–31)
MCHC RBC AUTO-ENTMCNC: 31.3 G/DL (ref 32–36)
MCV RBC AUTO: 93.6 FL (ref 80–96)
MONOCYTES # BLD AUTO: 1.04 K/UL (ref 0–0.8)
MONOCYTES NFR BLD AUTO: 13.2 % (ref 2–6)
MPC BLD CALC-MCNC: 10.8 FL (ref 9.4–12.4)
NEUTROPHILS # BLD AUTO: 4.85 K/UL (ref 1.8–7.7)
NEUTROPHILS NFR BLD AUTO: 61.4 % (ref 53–65)
NRBC # BLD AUTO: 0 X10E3/UL
NRBC, AUTO (.00): 0 %
PLATELET # BLD AUTO: 249 K/UL (ref 150–400)
POTASSIUM SERPL-SCNC: 4 MMOL/L (ref 3.5–5.1)
RBC # BLD AUTO: 3.75 M/UL (ref 4.6–6.2)
SODIUM SERPL-SCNC: 142 MMOL/L (ref 136–145)
WBC # BLD AUTO: 7.9 K/UL (ref 4.5–11)

## 2024-08-28 PROCEDURE — 63600175 PHARM REV CODE 636 W HCPCS: Performed by: HOSPITALIST

## 2024-08-28 PROCEDURE — 25000003 PHARM REV CODE 250: Performed by: SURGERY

## 2024-08-28 PROCEDURE — 94761 N-INVAS EAR/PLS OXIMETRY MLT: CPT

## 2024-08-28 PROCEDURE — 82565 ASSAY OF CREATININE: CPT | Performed by: SURGERY

## 2024-08-28 PROCEDURE — 97116 GAIT TRAINING THERAPY: CPT

## 2024-08-28 PROCEDURE — 97530 THERAPEUTIC ACTIVITIES: CPT

## 2024-08-28 PROCEDURE — 63600175 PHARM REV CODE 636 W HCPCS: Performed by: SURGERY

## 2024-08-28 PROCEDURE — 25000003 PHARM REV CODE 250: Performed by: FAMILY MEDICINE

## 2024-08-28 PROCEDURE — 82962 GLUCOSE BLOOD TEST: CPT

## 2024-08-28 PROCEDURE — 25000003 PHARM REV CODE 250: Performed by: HOSPITALIST

## 2024-08-28 PROCEDURE — 80048 BASIC METABOLIC PNL TOTAL CA: CPT | Performed by: HOSPITALIST

## 2024-08-28 PROCEDURE — 99232 SBSQ HOSP IP/OBS MODERATE 35: CPT | Mod: ,,, | Performed by: FAMILY MEDICINE

## 2024-08-28 PROCEDURE — 85025 COMPLETE CBC W/AUTO DIFF WBC: CPT | Performed by: HOSPITALIST

## 2024-08-28 PROCEDURE — 96372 THER/PROPH/DIAG INJ SC/IM: CPT

## 2024-08-28 PROCEDURE — 36415 COLL VENOUS BLD VENIPUNCTURE: CPT | Performed by: HOSPITALIST

## 2024-08-28 PROCEDURE — 11000001 HC ACUTE MED/SURG PRIVATE ROOM

## 2024-08-28 RX ADMIN — CLINDAMYCIN PHOSPHATE 600 MG: 600 INJECTION, SOLUTION INTRAVENOUS at 04:08

## 2024-08-28 RX ADMIN — PIPERACILLIN SODIUM AND TAZOBACTAM SODIUM 4.5 G: 4; .5 INJECTION, POWDER, LYOPHILIZED, FOR SOLUTION INTRAVENOUS at 06:08

## 2024-08-28 RX ADMIN — CLINDAMYCIN PHOSPHATE 600 MG: 600 INJECTION, SOLUTION INTRAVENOUS at 12:08

## 2024-08-28 RX ADMIN — PIPERACILLIN SODIUM AND TAZOBACTAM SODIUM 4.5 G: 4; .5 INJECTION, POWDER, LYOPHILIZED, FOR SOLUTION INTRAVENOUS at 02:08

## 2024-08-28 RX ADMIN — SODIUM CHLORIDE: 9 INJECTION, SOLUTION INTRAVENOUS at 12:08

## 2024-08-28 RX ADMIN — PIPERACILLIN SODIUM AND TAZOBACTAM SODIUM 4.5 G: 4; .5 INJECTION, POWDER, LYOPHILIZED, FOR SOLUTION INTRAVENOUS at 10:08

## 2024-08-28 RX ADMIN — PANTOPRAZOLE SODIUM 40 MG: 40 TABLET, DELAYED RELEASE ORAL at 10:08

## 2024-08-28 RX ADMIN — INSULIN GLARGINE 20 UNITS: 100 INJECTION, SOLUTION SUBCUTANEOUS at 10:08

## 2024-08-28 RX ADMIN — ENOXAPARIN SODIUM 30 MG: 30 INJECTION SUBCUTANEOUS at 05:08

## 2024-08-28 RX ADMIN — CHOLECALCIFEROL TAB 125 MCG (5000 UNIT) 5000 UNITS: 125 TAB at 10:08

## 2024-08-28 RX ADMIN — INSULIN ASPART 1 UNITS: 100 INJECTION, SOLUTION INTRAVENOUS; SUBCUTANEOUS at 10:08

## 2024-08-28 RX ADMIN — MUPIROCIN 1 G: 20 OINTMENT TOPICAL at 10:08

## 2024-08-28 NOTE — NURSING
Dressing soiled with stool. Cleaned area. Wet to dry dressing Vashe soaked gauze covered with gauze and secured with silk tape.

## 2024-08-28 NOTE — PROGRESS NOTES
[facility] Hospital Medicine  Progress Note    Patient Name: Negro Hale  MRN: [id number]  Patient Class: IP- Inpatient   Admission Date: 8/22/2024  Length of Stay: 5 days  Attending Physician: Pop Butcher MD  Primary Care Provider: Smiley Trevino FNP        Subjective:     Principal Problem:Necrotizing soft tissue infection        HPI:  Thank you for consult    Chief complaint:  Rectal abscess    History of present illness:       Mr. Hale is a 84-year-old admitted 08/22 with rectal discomfort.  CT scan showed rectal abscess.  Patient admitted to Dr. Butcher.  Underwent surgery earlier 08/23 with I and D of perirectal region.   Patient did well with surgery.  Hospitalist service was asked to see patient postoperative.  Patient before admission had complained of rectal pain for proximally 1 week.  Had some subjective fever but no chills.  White blood cell count was noted to be elevated on admission.  Patient feels better after surgery.    Review of system:  See above.  Has had some past problems with constipation.  Has 2-3 episodes of nocturia nightly.  States weight is been stable.  Ambulates with cane and walker.  Review of systems otherwise negative.    Past medical history:  -hypertension greater than 20 years  -diabetes mellitus greater than 20 years.  States takes 1 shot of insulin 45 units daily.  -coronary arteriosclerosis has seen Dr. Bridges with BHAKTI lillian LCx 2/26/2007  (patient did not remember this and obtained from records)  -paroxysmal atrial fibrillation with rhythm being in normal sinus  -dyslipidemia  -chronic back pain with past vertebral fracture followed by Dr. Destin Leroy    Past surgical history:  Surgery this admission for perirectal abscess with I and D  Bilateral cataract surgery  Remote appendectomy  Tonsillectomy  Inguinal hernia repair    Allergy to Mayonnaise    Social history:  No history of tobacco alcohol use    Family history  Father with heart  disease    Health maintenance issues:  Primary care provider is Smiley Trevino NP  Had flu shots  Had previous Pneumovax  No known COVID-19 infection  COVID vaccination x2  Says no prior colonoscopy but looks to possibly have had 1 by Dr. Mendoza in 2019        Overview/Hospital Course:  08/24 No new complaints. Family in room say pt going back to surgery first of week. BP and BS generally good.  08/25 discussed with Dr. Butcher and patient be taken back to surgery tomorrow for additional debridement.  BP and blood sugar good  1 of 2 blood cultures positive for gram positive cocci, maybe a contaminate, will repeat blood culture.  Currently on vancomycin and which should cover    Interval History:     No significant events overnight, no new complaints or concerns. Seen during dressing change and tolerating well. Case management assisting with disposition, hopeful for SWB for continued antibiotics and wound care.     Review of Systems   Constitutional:  Negative for appetite change, fatigue and fever.   HENT:  Negative for congestion, hearing loss and trouble swallowing.    Respiratory:  Negative for chest tightness, shortness of breath and wheezing.    Cardiovascular:  Negative for chest pain and palpitations.   Gastrointestinal:  Positive for constipation and rectal pain. Negative for abdominal pain and nausea.   Genitourinary:  Negative for difficulty urinating and dysuria.   Musculoskeletal:  Negative for back pain and neck stiffness.   Skin:  Negative for pallor and rash.   Neurological:  Negative for dizziness, speech difficulty and headaches.   Psychiatric/Behavioral:  Positive for confusion. Negative for suicidal ideas.      Objective:     Vital Signs (Most Recent):  Temp: 97.8 °F (36.6 °C) (08/28/24 0731)  Pulse: 72 (08/28/24 0731)  Resp: 16 (08/28/24 0731)  BP: (!) 176/82 (08/28/24 0731)  SpO2: 96 % (08/28/24 0731) Vital Signs (24h Range):  Temp:  [97.5 °F (36.4 °C)-98.2 °F (36.8 °C)] 97.8 °F (36.6 °C)  Pulse:   [61-72] 72  Resp:  [16-18] 16  SpO2:  [94 %-99 %] 96 %  BP: (142-176)/(64-94) 176/82     Weight: 81.6 kg (180 lb)  Body mass index is 28.19 kg/m².    Intake/Output Summary (Last 24 hours) at 8/28/2024 1104  Last data filed at 8/27/2024 2010  Gross per 24 hour   Intake 237 ml   Output 400 ml   Net -163 ml         Physical Exam  Constitutional:       General: He is awake. He is not in acute distress.     Appearance: He is well-developed.   HENT:      Head: Normocephalic.      Mouth/Throat:      Pharynx: Oropharynx is clear.   Eyes:      General: No scleral icterus.  Neck:      Thyroid: No thyroid mass.   Cardiovascular:      Rate and Rhythm: Normal rate and regular rhythm.   Pulmonary:      Effort: Pulmonary effort is normal. No respiratory distress.      Breath sounds: Normal air entry.   Abdominal:      General: There is no distension.      Palpations: Abdomen is soft.   Musculoskeletal:         General: Normal range of motion.   Skin:     General: Skin is warm.      Coloration: Skin is not jaundiced.      Findings: No lesion.   Neurological:      Mental Status: He is alert and oriented to person, place, and time.   Psychiatric:         Attention and Perception: Attention normal.         Behavior: Behavior normal. Behavior is cooperative.         Cognition and Memory: Cognition normal.             Significant Labs: All pertinent labs within the past 24 hours have been reviewed.    Significant Imaging: I have reviewed all pertinent imaging results/findings within the past 24 hours.    Assessment/Plan:      * Necrotizing soft tissue infection  Most recent debridement 8/26, at present no plans for further surgery. Continue antibiotics and aggressive local wound care. Management per general surgery.    Positive blood culture  Likely contaminant.     Initial culture #1/2 with micrococcus species. Repeat cultures negative.       Paroxysmal atrial fibrillation  Patient with Paroxysmal (<7 days) atrial fibrillation which  "is controlled currently with Beta Blocker. Patient is currently in sinus rhythm.[id number] Score: 4. HASBLED Score: 2. Anticoagulation not indicated due to surgery .      Perirectal abscess  Most recent debridement 8/26, at present no plans for further surgery. Continue antibiotics and aggressive local wound care. Management per general surgery.  ---  Surgery 08/22/24      Type 2 diabetes mellitus with hyperglycemia, with long-term current use of insulin    Patient's FSGs are controlled on current medication regimen.  Last A1c reviewed-   Lab Results   Component Value Date    HGBA1C 6.7 (H) 08/23/2024     Most recent fingerstick glucose reviewed- No results for input(s): "POCTGLUCOSE" in the last 24 hours.  Current correctional scale  Medium  Maintain anti-hyperglycemic dose as follows-   Antihyperglycemics (From admission, onward)      Start     Stop Route Frequency Ordered    08/25/24 2100  insulin glargine U-100 (Lantus) injection 20 Units         -- SubQ Nightly 08/24/24 2123    08/23/24 1950  insulin aspart U-100 injection 0-10 Units         -- SubQ Before meals & nightly PRN 08/23/24 1851          Hold Oral hypoglycemics while patient is in the hospital.    Atherosclerosis of native coronary artery of native heart without angina pectoris  Patient with known CAD s/p stent placement, which is controlled Will continue ASA and Statin and monitor for S/Sx of angina/ACS. Continue to monitor on telemetry.   BHAKTI mid LCx 2/26/2007  followed by Dr. Bridges; this history obtained from chart in patient did not remember    Cardiolite Impression:     1. Medium size, mild intensity onj-dh-lfari inferolateral defect with decreased wall motion and thickening, suggestive of infarct.  2. The global left ventricular systolic function is normal with an LV ejection fraction of 70 % and no evidence of LV dilatation. Wall motion is impaired.  Electronically signed by:Gabriel Barahona  Date:                                            " 09/12/2023  Time:                                           14:33    Hypercholesterolemia  Statin    Primary hypertension  Chronic, controlled. Latest blood pressure and vitals reviewed-     Temp:  [97.5 °F (36.4 °C)-98.2 °F (36.8 °C)]   Pulse:  [61-72]   Resp:  [16-18]   BP: (142-176)/(64-94)   SpO2:  [94 %-99 %] .   Home meds for hypertension were reviewed and noted below.   Hypertension Medications               nitroGLYCERIN (NITROSTAT) 0.4 MG SL tablet Place 1 tablet (0.4 mg total) under the tongue every 5 (five) minutes as needed for Chest pain.            While in the hospital, will manage blood pressure as follows; Continue home antihypertensive regimen    Will utilize p.r.n. blood pressure medication only if patient's blood pressure greater than 180/110 and he develops symptoms such as worsening chest pain or shortness of breath.    Type 2 diabetes mellitus with stage 3a chronic kidney disease, with long-term current use of insulin  Creatine stable for now. BMP reviewed- noted Estimated Creatinine Clearance: 25.9 mL/min (A) (based on SCr of 2.13 mg/dL (H)). according to latest data. Based on current GFR, CKD stage is stage 3 - GFR 30-59.  Monitor UOP and serial BMP and adjust therapy as needed. Renally dose meds. Avoid nephrotoxic medications and procedures.      VTE Risk Mitigation (From admission, onward)           Ordered     enoxaparin injection 30 mg  Daily         08/26/24 0845     IP VTE HIGH RISK PATIENT  Once         08/23/24 1342     Place sequential compression device  Until discontinued         08/23/24 1342                    Discharge Planning   RICKY:      Code Status: Prior   Is the patient medically ready for discharge?:     Reason for patient still in hospital (select all that apply): Treatment  Discharge Plan A: Skilled Nursing Facility   Discharge Delays: None known at this time              Smiley Sanchez DO  Department of Hospital Medicine   [facility]

## 2024-08-28 NOTE — PROGRESS NOTES
[facility]  General Surgery  Progress Note    Subjective:     History of Present Illness:  No notes on file    Post-Op Info:  Procedure(s) (LRB):  INCISION AND DRAINAGE, PERIRECTAL REGION (Bilateral)   2 Days Post-Op     Interval History:   Labs and vital signs stable.  Afebrile.  Dressing changed, Vashe wet-to-dry with packing superiorly into area of tunneling.  No drainage or malodor.     Awaiting determination of swing bed versus home health.    Medications:  Continuous Infusions:   0.9% NaCl   Intravenous Continuous 75 mL/hr at 08/28/24 0054 New Bag at 08/28/24 0054     Scheduled Meds:   clindamycin IV (PEDS and ADULTS)  600 mg Intravenous Q8H    enoxparin  30 mg Subcutaneous Daily    insulin glargine U-100 (Lantus)  20 Units Subcutaneous QHS    pantoprazole  40 mg Oral Daily    piperacillin-tazobactam (Zosyn) IV (PEDS and ADULTS) (extended infusion is not appropriate)  4.5 g Intravenous Once    piperacillin-tazobactam (Zosyn) IV (PEDS and ADULTS) (extended infusion is not appropriate)  4.5 g Intravenous Q8H    vitamin D  5,000 Units Oral Daily     PRN Meds:  Current Facility-Administered Medications:     dextrose 10%, 12.5 g, Intravenous, PRN    dextrose 10%, 25 g, Intravenous, PRN    glucagon (human recombinant), 1 mg, Intramuscular, PRN    glucose, 16 g, Oral, PRN    glucose, 24 g, Oral, PRN    HYDROcodone-acetaminophen, 1 tablet, Oral, Q4H PRN    HYDROcodone-acetaminophen, 1 tablet, Oral, Q4H PRN    insulin aspart U-100, 0-10 Units, Subcutaneous, QID (AC + HS) PRN    Pharmacy to dose Vancomycin consult, , , Once **AND** vancomycin - pharmacy to dose, , Intravenous, pharmacy to manage frequency     Review of patient's allergies indicates:   Allergen Reactions    Mayonnaise      Upset stomach     Objective:     Vital Signs (Most Recent):  Temp: 97.8 °F (36.6 °C) (08/28/24 0731)  Pulse: 72 (08/28/24 0731)  Resp: 16 (08/28/24 0731)  BP: (!) 176/82 (08/28/24 0731)  SpO2: 96 % (08/28/24  0731) Vital Signs (24h Range):  Temp:  [97.5 °F (36.4 °C)-98.2 °F (36.8 °C)] 97.8 °F (36.6 °C)  Pulse:  [61-72] 72  Resp:  [16-18] 16  SpO2:  [94 %-99 %] 96 %  BP: (142-176)/(64-94) 176/82     Weight: 81.6 kg (180 lb)  Body mass index is 28.19 kg/m².    Intake/Output - Last 3 Shifts         08/26 0700 08/27 0659 08/27 0700 08/28 0659 08/28 0700 08/29 0659    P.O.  237     I.V. (mL/kg) 1762.5 (21.6)      IV Piggyback 377      Total Intake(mL/kg) 2139.5 (26.2) 237 (2.9)     Urine (mL/kg/hr) 800 (0.4) 400 (0.2)     Stool 0 0     Total Output 800 400     Net +1339.5 -163            Urine Occurrence  4 x     Stool Occurrence 5 x 5 x 1 x             Physical Exam  Vitals reviewed.   Cardiovascular:      Rate and Rhythm: Normal rate.   Pulmonary:      Effort: Pulmonary effort is normal. No respiratory distress.   Genitourinary:         Comments: Surgical incision post debridement with superior tunneling  Skin:     General: Skin is warm and dry.   Neurological:      Mental Status: He is alert. Mental status is at baseline.          Significant Labs:  I have reviewed all pertinent lab results within the past 24 hours.    Significant Diagnostics:  I have reviewed all pertinent imaging results/findings within the past 24 hours.  Assessment/Plan:     No notes have been filed under this hospital service.  Service: General Surgery      Omar Laboy, BARBARA  General Surgery  [facility]

## 2024-08-28 NOTE — SUBJECTIVE & OBJECTIVE
Interval History:   Labs and vital signs stable.  Afebrile.  Dressing changed, Vashe wet-to-dry with packing superiorly into area of tunneling.  No drainage or malodor.     Awaiting determination of swing bed versus home health.    Medications:  Continuous Infusions:   0.9% NaCl   Intravenous Continuous 75 mL/hr at 08/28/24 0054 New Bag at 08/28/24 0054     Scheduled Meds:   clindamycin IV (PEDS and ADULTS)  600 mg Intravenous Q8H    enoxparin  30 mg Subcutaneous Daily    insulin glargine U-100 (Lantus)  20 Units Subcutaneous QHS    pantoprazole  40 mg Oral Daily    piperacillin-tazobactam (Zosyn) IV (PEDS and ADULTS) (extended infusion is not appropriate)  4.5 g Intravenous Once    piperacillin-tazobactam (Zosyn) IV (PEDS and ADULTS) (extended infusion is not appropriate)  4.5 g Intravenous Q8H    vitamin D  5,000 Units Oral Daily     PRN Meds:  Current Facility-Administered Medications:     dextrose 10%, 12.5 g, Intravenous, PRN    dextrose 10%, 25 g, Intravenous, PRN    glucagon (human recombinant), 1 mg, Intramuscular, PRN    glucose, 16 g, Oral, PRN    glucose, 24 g, Oral, PRN    HYDROcodone-acetaminophen, 1 tablet, Oral, Q4H PRN    HYDROcodone-acetaminophen, 1 tablet, Oral, Q4H PRN    insulin aspart U-100, 0-10 Units, Subcutaneous, QID (AC + HS) PRN    Pharmacy to dose Vancomycin consult, , , Once **AND** vancomycin - pharmacy to dose, , Intravenous, pharmacy to manage frequency     Review of patient's allergies indicates:   Allergen Reactions    Mayonnaise      Upset stomach     Objective:     Vital Signs (Most Recent):  Temp: 97.8 °F (36.6 °C) (08/28/24 0731)  Pulse: 72 (08/28/24 0731)  Resp: 16 (08/28/24 0731)  BP: (!) 176/82 (08/28/24 0731)  SpO2: 96 % (08/28/24 0731) Vital Signs (24h Range):  Temp:  [97.5 °F (36.4 °C)-98.2 °F (36.8 °C)] 97.8 °F (36.6 °C)  Pulse:  [61-72] 72  Resp:  [16-18] 16  SpO2:  [94 %-99 %] 96 %  BP: (142-176)/(64-94) 176/82     Weight: 81.6 kg (180 lb)  Body mass index is 28.19  kg/m².    Intake/Output - Last 3 Shifts         08/26 0700 08/27 0659 08/27 0700 08/28 0659 08/28 0700 08/29 0659    P.O.  237     I.V. (mL/kg) 1762.5 (21.6)      IV Piggyback 377      Total Intake(mL/kg) 2139.5 (26.2) 237 (2.9)     Urine (mL/kg/hr) 800 (0.4) 400 (0.2)     Stool 0 0     Total Output 800 400     Net +1339.5 -163            Urine Occurrence  4 x     Stool Occurrence 5 x 5 x 1 x             Physical Exam  Vitals reviewed.   Cardiovascular:      Rate and Rhythm: Normal rate.   Pulmonary:      Effort: Pulmonary effort is normal. No respiratory distress.   Genitourinary:         Comments: Surgical incision post debridement with superior tunneling  Skin:     General: Skin is warm and dry.   Neurological:      Mental Status: He is alert. Mental status is at baseline.          Significant Labs:  I have reviewed all pertinent lab results within the past 24 hours.    Significant Diagnostics:  I have reviewed all pertinent imaging results/findings within the past 24 hours.

## 2024-08-28 NOTE — PLAN OF CARE
Problem: Fall Injury Risk  Goal: Absence of Fall and Fall-Related Injury  Outcome: Progressing     Problem: Adult Inpatient Plan of Care  Goal: Plan of Care Review  Outcome: Progressing  Goal: Patient-Specific Goal (Individualized)  Outcome: Progressing  Goal: Absence of Hospital-Acquired Illness or Injury  Outcome: Progressing  Goal: Optimal Comfort and Wellbeing  Outcome: Progressing  Goal: Readiness for Transition of Care  Outcome: Progressing     Problem: Diabetes Comorbidity  Goal: Blood Glucose Level Within Targeted Range  Outcome: Progressing     Problem: Wound  Goal: Optimal Coping  Outcome: Progressing  Goal: Optimal Functional Ability  Outcome: Progressing  Goal: Absence of Infection Signs and Symptoms  Outcome: Progressing  Goal: Improved Oral Intake  Outcome: Progressing  Goal: Optimal Pain Control and Function  Outcome: Progressing  Goal: Skin Health and Integrity  Outcome: Progressing  Goal: Optimal Wound Healing  Outcome: Progressing     Problem: Skin Injury Risk Increased  Goal: Skin Health and Integrity  Outcome: Progressing

## 2024-08-28 NOTE — PLAN OF CARE
BROOKE faxed updates to [facility] and is awaiting acceptance. BROOKE spoke with [facility] this a.m. and she was in a meeting but was going to speak with the DON for an update re: acceptance. RBOOKE awaiting call back.     1600: Peyton called back and pt has been denied for [facility] per their corporate office. BROOKE spoke with pt and spouse in room. Pts spouse, gave choice for [facility]. Referral called to Maria in [facility]. Pt is accepted for [facility] tomorrow, NP Tan informed. BROOKE following.

## 2024-08-28 NOTE — PT/OT/SLP PROGRESS
"Physical Therapy Treatment    Patient Name:  Negro Hale   MRN:  [id number]    Recommendations:     Discharge Recommendations: Low Intensity Therapy  Discharge Equipment Recommendations: none  Barriers to discharge:  ongoing medical treatment    Assessment:     Negro Hale is a 84 y.o. male admitted with a medical diagnosis of Necrotizing soft tissue infection.  He presents with the following impairments/functional limitations: weakness, impaired endurance     Participating well with PT. Ambulating 225 ft with RW and CGA. PT helped with bsc transfer and appropriate tolieting/hygiene     Rehab Prognosis: Good; patient would benefit from acute skilled PT services to address these deficits and reach maximum level of function.    Recent Surgery: Procedure(s) (LRB):  INCISION AND DRAINAGE, PERIRECTAL REGION (Bilateral) 2 Days Post-Op    Plan:     During this hospitalization, patient to be seen 5 x/week to address the identified rehab impairments via gait training, therapeutic activities, therapeutic exercises, neuromuscular re-education and progress toward the following goals:    Plan of Care Expires:       Subjective     Chief Complaint: NA  Patient/Family Comments/goals: "I am having to go to the bathroom a lot"  Pain/Comfort:  Pain Rating 1: 0/10      Objective:     Communicated with nurse prior to session.  Patient found supine with peripheral IV upon PT entry to room.     General Precautions: Standard, fall  Orthopedic Precautions:    Braces:    Respiratory Status: Room air     Functional Mobility:  Bed Mobility:     Supine to Sit: minimum assistance  Transfers:     Sit to Stand:  contact guard assistance with rolling walker  Gait: 225 ft with RW and CGA  Balance: good      AM-PAC 6 CLICK MOBILITY  Turning over in bed (including adjusting bedclothes, sheets and blankets)?: 4  Sitting down on and standing up from a chair with arms (e.g., wheelchair, bedside commode, etc.): 3  Moving from lying on back to " sitting on the side of the bed?: 4  Moving to and from a bed to a chair (including a wheelchair)?: 3  Need to walk in hospital room?: 3  Climbing 3-5 steps with a railing?: 3  Basic Mobility Total Score: 20       Treatment & Education:  Multiple STS trials on and off BSC  Standing for hygiene and tolieting  Mobility as noted     Patient left up in chair with all lines intact, call button in reach, and nurse notified..    GOALS:   Multidisciplinary Problems       Physical Therapy Goals          Problem: Physical Therapy    Goal Priority Disciplines Outcome Goal Variances Interventions   Physical Therapy Goal     PT, PT/OT Progressing     Description: Short term goals:  . Supine to sit with Contact Guard Assistance  . Sit to stand transfer with Contact Guard Assistance  . Gait  x 250 feet with Contact Guard Assistance using Rolling Walker.     Long term goals:  Patient will regain full independent functional mobility with lowest level of assistive device to return to desired living arrangement and prior ADL's.                          Time Tracking:     PT Received On: 08/28/24  PT Start Time: 1100     PT Stop Time: 1145  PT Total Time (min): 45 min     Billable Minutes: Gait Training 15 and Therapeutic Activity 25    Treatment Type: Treatment  PT/PTA: PT     Number of PTA visits since last PT visit: 0     08/28/2024

## 2024-08-28 NOTE — ASSESSMENT & PLAN NOTE
Chronic, controlled. Latest blood pressure and vitals reviewed-     Temp:  [97.5 °F (36.4 °C)-98.2 °F (36.8 °C)]   Pulse:  [61-72]   Resp:  [16-18]   BP: (142-176)/(64-94)   SpO2:  [94 %-99 %] .   Home meds for hypertension were reviewed and noted below.   Hypertension Medications               nitroGLYCERIN (NITROSTAT) 0.4 MG SL tablet Place 1 tablet (0.4 mg total) under the tongue every 5 (five) minutes as needed for Chest pain.            While in the hospital, will manage blood pressure as follows; Continue home antihypertensive regimen    Will utilize p.r.n. blood pressure medication only if patient's blood pressure greater than 180/110 and he develops symptoms such as worsening chest pain or shortness of breath.

## 2024-08-28 NOTE — ASSESSMENT & PLAN NOTE
Likely contaminant.     Initial culture #1/2 with micrococcus species. Repeat cultures negative.

## 2024-08-28 NOTE — SUBJECTIVE & OBJECTIVE
Interval History:     No significant events overnight, no new complaints or concerns. Seen during dressing change and tolerating well. Case management assisting with disposition, hopeful for SWB for continued antibiotics and wound care.     Review of Systems   Constitutional:  Negative for appetite change, fatigue and fever.   HENT:  Negative for congestion, hearing loss and trouble swallowing.    Respiratory:  Negative for chest tightness, shortness of breath and wheezing.    Cardiovascular:  Negative for chest pain and palpitations.   Gastrointestinal:  Positive for constipation and rectal pain. Negative for abdominal pain and nausea.   Genitourinary:  Negative for difficulty urinating and dysuria.   Musculoskeletal:  Negative for back pain and neck stiffness.   Skin:  Negative for pallor and rash.   Neurological:  Negative for dizziness, speech difficulty and headaches.   Psychiatric/Behavioral:  Positive for confusion. Negative for suicidal ideas.      Objective:     Vital Signs (Most Recent):  Temp: 97.8 °F (36.6 °C) (08/28/24 0731)  Pulse: 72 (08/28/24 0731)  Resp: 16 (08/28/24 0731)  BP: (!) 176/82 (08/28/24 0731)  SpO2: 96 % (08/28/24 0731) Vital Signs (24h Range):  Temp:  [97.5 °F (36.4 °C)-98.2 °F (36.8 °C)] 97.8 °F (36.6 °C)  Pulse:  [61-72] 72  Resp:  [16-18] 16  SpO2:  [94 %-99 %] 96 %  BP: (142-176)/(64-94) 176/82     Weight: 81.6 kg (180 lb)  Body mass index is 28.19 kg/m².    Intake/Output Summary (Last 24 hours) at 8/28/2024 1104  Last data filed at 8/27/2024 2010  Gross per 24 hour   Intake 237 ml   Output 400 ml   Net -163 ml         Physical Exam  Constitutional:       General: He is awake. He is not in acute distress.     Appearance: He is well-developed.   HENT:      Head: Normocephalic.      Mouth/Throat:      Pharynx: Oropharynx is clear.   Eyes:      General: No scleral icterus.  Neck:      Thyroid: No thyroid mass.   Cardiovascular:      Rate and Rhythm: Normal rate and regular rhythm.    Pulmonary:      Effort: Pulmonary effort is normal. No respiratory distress.      Breath sounds: Normal air entry.   Abdominal:      General: There is no distension.      Palpations: Abdomen is soft.   Musculoskeletal:         General: Normal range of motion.   Skin:     General: Skin is warm.      Coloration: Skin is not jaundiced.      Findings: No lesion.   Neurological:      Mental Status: He is alert and oriented to person, place, and time.   Psychiatric:         Attention and Perception: Attention normal.         Behavior: Behavior normal. Behavior is cooperative.         Cognition and Memory: Cognition normal.             Significant Labs: All pertinent labs within the past 24 hours have been reviewed.    Significant Imaging: I have reviewed all pertinent imaging results/findings within the past 24 hours.

## 2024-08-28 NOTE — ASSESSMENT & PLAN NOTE
Creatine stable for now. BMP reviewed- noted Estimated Creatinine Clearance: 25.9 mL/min (A) (based on SCr of 2.13 mg/dL (H)). according to latest data. Based on current GFR, CKD stage is stage 3 - GFR 30-59.  Monitor UOP and serial BMP and adjust therapy as needed. Renally dose meds. Avoid nephrotoxic medications and procedures.

## 2024-08-28 NOTE — ASSESSMENT & PLAN NOTE
Most recent debridement 8/26, at present no plans for further surgery. Continue antibiotics and aggressive local wound care. Management per general surgery.  ---  Surgery 08/22/24

## 2024-08-28 NOTE — ASSESSMENT & PLAN NOTE
Most recent debridement 8/26, at present no plans for further surgery. Continue antibiotics and aggressive local wound care. Management per general surgery.

## 2024-08-28 NOTE — NURSING
4:50am- Dressing soiled with stool. Cleaned area. Wet to dry dressing Vashe soaked gauze covered with dry gauze and secured in place with silk tape.    00:37am- Dressing soiled with stool. Cleaned area. Wet to dry dressing Vashe soaked gauze covered with dry gauze and secured in place with silk tape.    8/27/24 @ 2300- Dressing soiled with stool. Cleaned area. Wet to dry dressing Vashe soaked gauze covered with dry gauze and secured in place with silk tape.

## 2024-08-29 ENCOUNTER — HOSPITAL ENCOUNTER (INPATIENT)
Facility: HOSPITAL | Age: 84
LOS: 18 days | Discharge: HOME HEALTH | DRG: 603 | End: 2024-09-16
Attending: FAMILY MEDICINE | Admitting: FAMILY MEDICINE
Payer: MEDICARE

## 2024-08-29 VITALS
HEART RATE: 61 BPM | SYSTOLIC BLOOD PRESSURE: 164 MMHG | OXYGEN SATURATION: 96 % | RESPIRATION RATE: 15 BRPM | WEIGHT: 180 LBS | HEIGHT: 67 IN | DIASTOLIC BLOOD PRESSURE: 80 MMHG | TEMPERATURE: 98 F | BODY MASS INDEX: 28.25 KG/M2

## 2024-08-29 DIAGNOSIS — M79.89 NECROTIZING SOFT TISSUE INFECTION: ICD-10-CM

## 2024-08-29 DIAGNOSIS — I48.0 PAROXYSMAL ATRIAL FIBRILLATION: ICD-10-CM

## 2024-08-29 DIAGNOSIS — R26.9 ABNORMALITY OF GAIT AND MOBILITY: Primary | ICD-10-CM

## 2024-08-29 DIAGNOSIS — E11.65 TYPE 2 DIABETES MELLITUS WITH HYPERGLYCEMIA, WITH LONG-TERM CURRENT USE OF INSULIN: Chronic | ICD-10-CM

## 2024-08-29 DIAGNOSIS — Z79.4 TYPE 2 DIABETES MELLITUS WITH STAGE 3A CHRONIC KIDNEY DISEASE, WITH LONG-TERM CURRENT USE OF INSULIN: Chronic | ICD-10-CM

## 2024-08-29 DIAGNOSIS — E11.22 TYPE 2 DIABETES MELLITUS WITH STAGE 3A CHRONIC KIDNEY DISEASE, WITH LONG-TERM CURRENT USE OF INSULIN: Chronic | ICD-10-CM

## 2024-08-29 DIAGNOSIS — Z79.4 TYPE 2 DIABETES MELLITUS WITH HYPERGLYCEMIA, WITH LONG-TERM CURRENT USE OF INSULIN: Chronic | ICD-10-CM

## 2024-08-29 DIAGNOSIS — N18.31 TYPE 2 DIABETES MELLITUS WITH STAGE 3A CHRONIC KIDNEY DISEASE, WITH LONG-TERM CURRENT USE OF INSULIN: Chronic | ICD-10-CM

## 2024-08-29 DIAGNOSIS — I25.10 ATHEROSCLEROSIS OF NATIVE CORONARY ARTERY OF NATIVE HEART WITHOUT ANGINA PECTORIS: Chronic | ICD-10-CM

## 2024-08-29 DIAGNOSIS — I10 PRIMARY HYPERTENSION: ICD-10-CM

## 2024-08-29 DIAGNOSIS — F03.90 DEMENTIA, UNSPECIFIED DEMENTIA SEVERITY, UNSPECIFIED DEMENTIA TYPE, UNSPECIFIED WHETHER BEHAVIORAL, PSYCHOTIC, OR MOOD DISTURBANCE OR ANXIETY: ICD-10-CM

## 2024-08-29 DIAGNOSIS — K58.2 IRRITABLE BOWEL SYNDROME WITH BOTH CONSTIPATION AND DIARRHEA: ICD-10-CM

## 2024-08-29 DIAGNOSIS — N18.31 STAGE 3A CHRONIC KIDNEY DISEASE: Chronic | ICD-10-CM

## 2024-08-29 LAB
BUN SERPL-MCNC: 30 MG/DL (ref 7–18)
BUN/CREAT SERPL: 14 (ref 6–20)
CREAT SERPL-MCNC: 2.14 MG/DL (ref 0.7–1.3)
EGFR (NO RACE VARIABLE) (RUSH/TITUS): 30 ML/MIN/1.73M2
FECAL LEUKOCYTES: NORMAL /HPF
GLUCOSE SERPL-MCNC: 109 MG/DL (ref 70–105)
GLUCOSE SERPL-MCNC: 257 MG/DL (ref 70–105)
VANCOMYCIN SERPL-MCNC: 18.4 ΜG/ML (ref 0–20)

## 2024-08-29 PROCEDURE — 89055 LEUKOCYTE ASSESSMENT FECAL: CPT | Performed by: INTERNAL MEDICINE

## 2024-08-29 PROCEDURE — 25000003 PHARM REV CODE 250: Performed by: FAMILY MEDICINE

## 2024-08-29 PROCEDURE — 87506 IADNA-DNA/RNA PROBE TQ 6-11: CPT | Performed by: INTERNAL MEDICINE

## 2024-08-29 PROCEDURE — 36415 COLL VENOUS BLD VENIPUNCTURE: CPT | Performed by: SURGERY

## 2024-08-29 PROCEDURE — 82565 ASSAY OF CREATININE: CPT | Performed by: SURGERY

## 2024-08-29 PROCEDURE — 63600175 PHARM REV CODE 636 W HCPCS: Performed by: SURGERY

## 2024-08-29 PROCEDURE — 25000003 PHARM REV CODE 250: Performed by: SURGERY

## 2024-08-29 PROCEDURE — 82962 GLUCOSE BLOOD TEST: CPT

## 2024-08-29 PROCEDURE — 99223 1ST HOSP IP/OBS HIGH 75: CPT | Mod: AI,,, | Performed by: FAMILY MEDICINE

## 2024-08-29 PROCEDURE — 63600175 PHARM REV CODE 636 W HCPCS: Performed by: FAMILY MEDICINE

## 2024-08-29 PROCEDURE — 80202 ASSAY OF VANCOMYCIN: CPT | Performed by: SURGERY

## 2024-08-29 PROCEDURE — 25000003 PHARM REV CODE 250: Performed by: HOSPITALIST

## 2024-08-29 PROCEDURE — 11000008

## 2024-08-29 RX ORDER — PANTOPRAZOLE SODIUM 40 MG/1
40 TABLET, DELAYED RELEASE ORAL DAILY
Status: CANCELLED | OUTPATIENT
Start: 2024-08-30

## 2024-08-29 RX ORDER — PANTOPRAZOLE SODIUM 40 MG/1
40 TABLET, DELAYED RELEASE ORAL DAILY
Status: DISCONTINUED | OUTPATIENT
Start: 2024-08-30 | End: 2024-09-04

## 2024-08-29 RX ORDER — IBUPROFEN 200 MG
24 TABLET ORAL
Status: CANCELLED | OUTPATIENT
Start: 2024-08-29

## 2024-08-29 RX ORDER — HYDROCODONE BITARTRATE AND ACETAMINOPHEN 5; 325 MG/1; MG/1
1 TABLET ORAL EVERY 4 HOURS PRN
Status: CANCELLED | OUTPATIENT
Start: 2024-08-29

## 2024-08-29 RX ORDER — INSULIN ASPART 100 [IU]/ML
0-10 INJECTION, SOLUTION INTRAVENOUS; SUBCUTANEOUS
Status: CANCELLED | OUTPATIENT
Start: 2024-08-29

## 2024-08-29 RX ORDER — INSULIN GLARGINE 100 [IU]/ML
20 INJECTION, SOLUTION SUBCUTANEOUS NIGHTLY
Status: DISCONTINUED | OUTPATIENT
Start: 2024-08-29 | End: 2024-09-06

## 2024-08-29 RX ORDER — IBUPROFEN 200 MG
16 TABLET ORAL
Status: DISCONTINUED | OUTPATIENT
Start: 2024-08-29 | End: 2024-09-16 | Stop reason: HOSPADM

## 2024-08-29 RX ORDER — GLUCAGON 1 MG
1 KIT INJECTION
Status: DISCONTINUED | OUTPATIENT
Start: 2024-08-29 | End: 2024-09-16 | Stop reason: HOSPADM

## 2024-08-29 RX ORDER — ACETAMINOPHEN 500 MG
5000 TABLET ORAL DAILY
Status: DISCONTINUED | OUTPATIENT
Start: 2024-08-30 | End: 2024-09-16 | Stop reason: HOSPADM

## 2024-08-29 RX ORDER — INSULIN ASPART 100 [IU]/ML
0-10 INJECTION, SOLUTION INTRAVENOUS; SUBCUTANEOUS
Status: DISCONTINUED | OUTPATIENT
Start: 2024-08-29 | End: 2024-09-16 | Stop reason: HOSPADM

## 2024-08-29 RX ORDER — GLUCAGON 1 MG
1 KIT INJECTION
Status: CANCELLED | OUTPATIENT
Start: 2024-08-29

## 2024-08-29 RX ORDER — ENOXAPARIN SODIUM 100 MG/ML
30 INJECTION SUBCUTANEOUS EVERY 24 HOURS
Status: CANCELLED | OUTPATIENT
Start: 2024-08-29

## 2024-08-29 RX ORDER — CLINDAMYCIN PHOSPHATE 600 MG/50ML
600 INJECTION, SOLUTION INTRAVENOUS
Status: DISCONTINUED | OUTPATIENT
Start: 2024-08-29 | End: 2024-08-30

## 2024-08-29 RX ORDER — CLINDAMYCIN PHOSPHATE 600 MG/50ML
600 INJECTION, SOLUTION INTRAVENOUS
Status: CANCELLED | OUTPATIENT
Start: 2024-08-29

## 2024-08-29 RX ORDER — ASPIRIN 81 MG/1
81 TABLET ORAL DAILY
Status: DISCONTINUED | OUTPATIENT
Start: 2024-08-30 | End: 2024-09-16 | Stop reason: HOSPADM

## 2024-08-29 RX ORDER — HYDROCODONE BITARTRATE AND ACETAMINOPHEN 5; 325 MG/1; MG/1
1 TABLET ORAL EVERY 4 HOURS PRN
Status: DISCONTINUED | OUTPATIENT
Start: 2024-08-29 | End: 2024-09-16 | Stop reason: HOSPADM

## 2024-08-29 RX ORDER — ACETAMINOPHEN 500 MG
5000 TABLET ORAL DAILY
Status: CANCELLED | OUTPATIENT
Start: 2024-08-30

## 2024-08-29 RX ORDER — IBUPROFEN 200 MG
16 TABLET ORAL
Status: CANCELLED | OUTPATIENT
Start: 2024-08-29

## 2024-08-29 RX ORDER — ZINC SULFATE 50(220)MG
220 CAPSULE ORAL DAILY
Status: DISCONTINUED | OUTPATIENT
Start: 2024-08-30 | End: 2024-09-16 | Stop reason: HOSPADM

## 2024-08-29 RX ORDER — IBUPROFEN 200 MG
24 TABLET ORAL
Status: DISCONTINUED | OUTPATIENT
Start: 2024-08-29 | End: 2024-09-16 | Stop reason: HOSPADM

## 2024-08-29 RX ORDER — INSULIN GLARGINE 100 [IU]/ML
20 INJECTION, SOLUTION SUBCUTANEOUS NIGHTLY
Status: CANCELLED | OUTPATIENT
Start: 2024-08-29

## 2024-08-29 RX ORDER — SELENIUM 50 MCG
2 TABLET ORAL
Status: DISCONTINUED | OUTPATIENT
Start: 2024-08-29 | End: 2024-09-04

## 2024-08-29 RX ORDER — HYDROCODONE BITARTRATE AND ACETAMINOPHEN 10; 325 MG/1; MG/1
1 TABLET ORAL EVERY 4 HOURS PRN
Status: CANCELLED | OUTPATIENT
Start: 2024-08-29

## 2024-08-29 RX ORDER — PRAVASTATIN SODIUM 20 MG/1
80 TABLET ORAL NIGHTLY
Status: DISCONTINUED | OUTPATIENT
Start: 2024-08-29 | End: 2024-08-30

## 2024-08-29 RX ORDER — ASCORBIC ACID 500 MG
500 TABLET ORAL DAILY
Status: DISCONTINUED | OUTPATIENT
Start: 2024-08-30 | End: 2024-09-16 | Stop reason: HOSPADM

## 2024-08-29 RX ORDER — MUPIROCIN 20 MG/G
OINTMENT TOPICAL 2 TIMES DAILY
Status: COMPLETED | OUTPATIENT
Start: 2024-08-29 | End: 2024-09-03

## 2024-08-29 RX ORDER — HYDROCODONE BITARTRATE AND ACETAMINOPHEN 10; 325 MG/1; MG/1
1 TABLET ORAL EVERY 4 HOURS PRN
Status: DISCONTINUED | OUTPATIENT
Start: 2024-08-29 | End: 2024-09-16 | Stop reason: HOSPADM

## 2024-08-29 RX ORDER — ENOXAPARIN SODIUM 100 MG/ML
30 INJECTION SUBCUTANEOUS EVERY 24 HOURS
Status: DISCONTINUED | OUTPATIENT
Start: 2024-08-29 | End: 2024-09-16 | Stop reason: HOSPADM

## 2024-08-29 RX ADMIN — PRAVASTATIN SODIUM 80 MG: 20 TABLET ORAL at 08:08

## 2024-08-29 RX ADMIN — CHOLECALCIFEROL TAB 125 MCG (5000 UNIT) 5000 UNITS: 125 TAB at 11:08

## 2024-08-29 RX ADMIN — Medication 2 CAPSULE: at 05:08

## 2024-08-29 RX ADMIN — CLINDAMYCIN PHOSPHATE 600 MG: 600 INJECTION, SOLUTION INTRAVENOUS at 01:08

## 2024-08-29 RX ADMIN — INSULIN GLARGINE 20 UNITS: 100 INJECTION, SOLUTION SUBCUTANEOUS at 08:08

## 2024-08-29 RX ADMIN — MUPIROCIN: 20 OINTMENT TOPICAL at 08:08

## 2024-08-29 RX ADMIN — CLINDAMYCIN PHOSPHATE 600 MG: 600 INJECTION, SOLUTION INTRAVENOUS at 05:08

## 2024-08-29 RX ADMIN — CLINDAMYCIN PHOSPHATE 600 MG: 600 INJECTION, SOLUTION INTRAVENOUS at 11:08

## 2024-08-29 RX ADMIN — PIPERACILLIN AND TAZOBACTAM 4.5 G: 4; .5 INJECTION, POWDER, LYOPHILIZED, FOR SOLUTION INTRAVENOUS; PARENTERAL at 05:08

## 2024-08-29 RX ADMIN — PIPERACILLIN SODIUM AND TAZOBACTAM SODIUM 4.5 G: 4; .5 INJECTION, POWDER, LYOPHILIZED, FOR SOLUTION INTRAVENOUS at 03:08

## 2024-08-29 RX ADMIN — PANTOPRAZOLE SODIUM 40 MG: 40 TABLET, DELAYED RELEASE ORAL at 11:08

## 2024-08-29 RX ADMIN — ENOXAPARIN SODIUM 30 MG: 30 INJECTION SUBCUTANEOUS at 05:08

## 2024-08-29 NOTE — H&P
[facility] Medicine  History & Physical    Patient Name: Negro Hale  MRN: [id number]  Patient Class: IP- Inpatient  Admission Date: 8/29/2024  Attending Physician: Smiley Sanchez DO   Primary Care Provider: Smiley Trevino FNP         Patient information was obtained from patient, spouse/SO, past medical records, and ER records.     Subjective:     Principal Problem:Necrotizing soft tissue infection    Chief Complaint:   Chief Complaint   Patient presents with    Abscess        HPI:   Negro Hale is a 84 y.o. male with history of HTN, CAD, CKD, IBS, and a-fib who initially presented to [facility] with perirectal abscess. Surgical evaluation revealed necrotizing soft tissue infection requiring extensive debridement with revision (most recent procedure 8/26). Anaerobic cultures positive and patient placed on appropriate antibiotics. Initial blood culture with micrococcus species thought to be a contaminant, with repeat cultures negative. Disposition complicated by diarrhea requiring frequent wound care and dressing changes due to soiling, as well and deconditioning due to acute illness and hospitalization. Patient admitted to [facility] for continued antibiotics, rehabilitation, and aggressive wound care.       Past Medical History:   Diagnosis Date    Abnormal thyroid stimulating hormone (TSH) level 08/22/2019    Anemia 08/15/2019    Atrial fibrillation 09/07/2012    Bradycardia 05/23/2017    asymptomatic    Change in bowel habit 11/13/2018    Chronic kidney disease, unspecified 01/14/2019    Coronary arteriosclerosis 09/07/2012    Disease of skin and subcutaneous tissue 08/16/2017    medial aspect RLE- cystic structure seen on venous doppler US.    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    Hyperlipidemia 09/21/2012    Lower extremity edema 04/06/2017    Lumbar radiculopathy 01/26/2017    Obesity, unspecified 12/05/2016    Old myocardial infarction 12/06/2017    Other secondary pulmonary hypertension 05/23/2017    Other spondylosis, lumbosacral region 01/26/2017    Peripheral vascular disease     Personal history of tobacco use, presenting hazards to health 11/17/2016    Pulmonary hypertension     Type 2 diabetes mellitus with chronic kidney disease 01/14/2019    Type 2 diabetes mellitus with hyperglycemia 10/02/2011    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 Lewis Eye exam    Varicose veins of both lower extremities with pain 01/02/2019    Venous insufficiency 01/08/2019       Past Surgical History:   Procedure Laterality Date    APPENDECTOMY      CARDIAC CATHETERIZATION  2007    [facility]- Stent placement    CATARACT EXTRACTION, BILATERAL      COLONOSCOPY  11/06/2019    Dr. Mendoza    HERNIA REPAIR      Inguinal hernia repair    INCISION AND DRAINAGE OF PERIRECTAL REGION Right 8/23/2024    Procedure: INCISION AND DRAINAGE, WITH DEBRIDEMENT PERIRECTAL REGION;  Surgeon: Pop Butcher MD;  Location: [facility] OR;  Service: General;  Laterality: Right;    INCISION AND DRAINAGE OF PERIRECTAL REGION Bilateral 8/26/2024    Procedure: INCISION AND DRAINAGE, PERIRECTAL REGION;  Surgeon: Pop Butcher MD;  Location: [facility] OR;  Service: General;  Laterality: Bilateral;    TONSILLECTOMY         Review of patient's allergies indicates:   Allergen Reactions    Mayonnaise      Upset stomach       Current Facility-Administered Medications on File Prior to Encounter    Medication    [DISCONTINUED] 0.9%  NaCl infusion    [DISCONTINUED] cholecalciferol (vitamin D3) 125 mcg (5,000 unit) tablet 5,000 Units    [DISCONTINUED] clindamycin in D5W 600 mg/50 mL IVPB 600 mg    [DISCONTINUED] dextrose 10% bolus 125 mL 125 mL    [DISCONTINUED] dextrose 10% bolus 250 mL 250 mL    [DISCONTINUED] enoxaparin injection 30 mg    [DISCONTINUED] glucagon (human recombinant) injection 1 mg    [DISCONTINUED] glucose chewable tablet 16 g    [DISCONTINUED] glucose chewable tablet 24 g    [DISCONTINUED] HYDROcodone-acetaminophen  mg per tablet 1 tablet    [DISCONTINUED] HYDROcodone-acetaminophen 5-325 mg per tablet 1 tablet    [DISCONTINUED] insulin aspart U-100 injection 0-10 Units    [DISCONTINUED] insulin glargine U-100 (Lantus) injection 20 Units    [DISCONTINUED] pantoprazole EC tablet 40 mg    [DISCONTINUED] piperacillin-tazobactam (ZOSYN) 4.5 g in D5W 100 mL IVPB (MB+)    [DISCONTINUED] piperacillin-tazobactam (ZOSYN) 4.5 g in D5W 100 mL IVPB (MB+)     Current Outpatient Medications on File Prior to Encounter   Medication Sig    blood-glucose sensor (FREESTYLE ADORE 3 SENSOR) Giulia 1 each by Misc.(Non-Drug; Combo Route) route once.    cholecalciferol, vitamin D3, 125 mcg (5,000 unit) capsule Take 5,000 Units by mouth once daily.    clopidogreL (PLAVIX) 75 mg tablet Take 1 tablet (75 mg total) by mouth once daily.    coenzyme Q10 200 mg capsule Take 200 mg by mouth once daily.    cyanocobalamin (VITAMIN B-12) 1000 MCG tablet Take 100 mcg by mouth once daily.    dapagliflozin propanediol (FARXIGA) 10 mg tablet Take 1 tablet (10 mg total) by mouth once daily.    glipiZIDE 5 MG TR24 Take 1 tablet (5 mg total) by mouth daily with breakfast.    insulin glargine U-100, Lantus, (LANTUS SOLOSTAR U-100 INSULIN) 100 unit/mL (3 mL) InPn pen Inject 45 Units into the skin every evening.    nitroGLYCERIN (NITROSTAT) 0.4 MG SL tablet Place 1 tablet (0.4 mg total) under the tongue every 5 (five) minutes as  "needed for Chest pain.    pantoprazole (PROTONIX) 40 MG tablet Take 1 tablet (40 mg total) by mouth once daily.    rosuvastatin (CRESTOR) 40 MG Tab Take 1 tablet (40 mg total) by mouth every evening.    SURE COMFORT PEN NEEDLE 32 gauge x 32" Ndle AS DIRECTED     Family History       Problem Relation (Age of Onset)    Diabetes Brother    Heart disease Father    Neurofibromatosis Brother    No Known Problems Mother, Brother, Maternal Grandmother, Maternal Grandfather, Paternal Grandmother, Paternal Grandfather    Throat cancer Sister          Tobacco Use    Smoking status: Former     Current packs/day: 0.00     Average packs/day: 0.1 packs/day for 3.0 years (0.3 ttl pk-yrs)     Types: Cigarettes     Start date:      Quit date:      Years since quittin.7     Passive exposure: Past    Smokeless tobacco: Never    Tobacco comments:     Quit 10/15/1976   Substance and Sexual Activity    Alcohol use: Not Currently     Comment: occasionally    Drug use: Never    Sexual activity: Not Currently     Review of Systems   Constitutional:  Positive for fatigue. Negative for chills and fever.   Respiratory:  Negative for cough and shortness of breath.    Cardiovascular:  Negative for chest pain and palpitations.   Gastrointestinal:  Positive for constipation and diarrhea. Negative for abdominal pain, nausea and vomiting.   Genitourinary:  Negative for dysuria and flank pain.   Skin:  Positive for wound.   Neurological:  Positive for weakness. Negative for dizziness, syncope and headaches.   Psychiatric/Behavioral:  Negative for confusion.      Objective:     Vital Signs (Most Recent):  Temp: 98 °F (36.7 °C) (24 1420)  Pulse: 62 (24 1420)  Resp: (!) 22 (24 1420)  BP: (!) 192/74 (24 1420)  SpO2: 95 % (24 1420) Vital Signs (24h Range):  Temp:  [97.6 °F (36.4 °C)-98.5 °F (36.9 °C)] 98 °F (36.7 °C)  Pulse:  [60-73] 62  Resp:  [15-22] 22  SpO2:  [95 %-99 %] 95 %  BP: (161-193)/(59-84) 192/74 "     Weight: 81.1 kg (178 lb 14.4 oz)  Body mass index is 27.2 kg/m².     Physical Exam  Constitutional:       General: He is not in acute distress.     Appearance: Normal appearance. He is not ill-appearing.   HENT:      Head: Normocephalic and atraumatic.   Eyes:      General: No scleral icterus.     Conjunctiva/sclera: Conjunctivae normal.      Pupils: Pupils are equal, round, and reactive to light.   Cardiovascular:      Rate and Rhythm: Normal rate and regular rhythm.   Pulmonary:      Effort: Pulmonary effort is normal. No respiratory distress.   Abdominal:      Palpations: Abdomen is soft.      Tenderness: There is no abdominal tenderness.   Musculoskeletal:      Cervical back: Rigidity present.   Skin:     Coloration: Skin is not jaundiced or pale.      Findings: Wound (extensive sacral/perineal wound status-post debridement - see media) present. No rash.   Neurological:      Mental Status: He is alert.   Psychiatric:         Behavior: Behavior normal.         Thought Content: Thought content normal.              CRANIAL NERVES     CN III, IV, VI   Pupils are equal, round, and reactive to light.       Significant Labs: All pertinent labs within the past 24 hours have been reviewed.    Significant Imaging: I have reviewed all pertinent imaging results/findings within the past 24 hours.  Assessment/Plan:     * Necrotizing soft tissue infection  Perirectal abscess now status-post extensive debridement and revision. Subsequent large sacral and perineal wound now healing appropriately.     Anaerobic culture positive, currently receiving treatment with clindamycin and Zosyn. Will likely require extended course due to location of wound and risk of contamination.     - aggressive wound care including dressing changes daily and with each bowel movement  - antibiotics as below    Antibiotics (From admission, onward)      Start     Stop Route Frequency Ordered    08/29/24 2100  mupirocin 2 % ointment         09/03/24  "2059 Nasl 2 times daily 08/29/24 1559    08/29/24 1830  piperacillin-tazobactam (ZOSYN) 4.5 g in D5W 100 mL IVPB (MB+)         -- IV Every 8 hours (non-standard times) 08/29/24 1558    08/29/24 1730  clindamycin in D5W 600 mg/50 mL IVPB 600 mg         -- IV Every 8 hours (non-standard times) 08/29/24 1558               Irritable bowel syndrome with both constipation and diarrhea  Long history of alternating diarrhea and constipation. Currently having diarrhea likely exacerbated by antibiotics which unfortunately necessitates multiple daily dressing changes. Will add probiotics and psyllium.    Paroxysmal atrial fibrillation  Patient with Paroxysmal (<7 days) atrial fibrillation which is controlled currently with  no medications . Patient is currently in atrial fibrillation.[id number] Score: 4. HASBLED Score: 1. Anticoagulation not indicated due to extensive wound, risk of bleeding, possible need for further surgery .    Abnormality of gait and mobility  Continue PT/OT      Type 2 diabetes mellitus with hyperglycemia, with long-term current use of insulin  Patient's FSGs are controlled on current medication regimen.  Last A1c reviewed-   Lab Results   Component Value Date    HGBA1C 6.7 (H) 08/23/2024     Most recent fingerstick glucose reviewed- No results for input(s): "POCTGLUCOSE" in the last 24 hours.  Current correctional scale  Medium  Maintain anti-hyperglycemic dose as follows-   Antihyperglycemics (From admission, onward)      Start     Stop Route Frequency Ordered    08/29/24 2100  insulin glargine U-100 (Lantus) injection 20 Units         -- SubQ Nightly 08/29/24 1558    08/29/24 1556  insulin aspart U-100 injection 0-10 Units         -- SubQ Before meals & nightly PRN 08/29/24 1558          Hold Oral hypoglycemics while patient is in the hospital.      Atherosclerosis of native coronary artery of native heart without angina pectoris  Patient with known CAD s/p stent placement, which is controlled Will " continue ASA and Statin and monitor for S/Sx of angina/ACS. Continue to monitor on telemetry.     Holding Plavix at this time due to bleeding risk and potential need for further surgery.    Stage 3a chronic kidney disease  Creatine stable for now. BMP reviewed- noted Estimated Creatinine Clearance: 24.4 mL/min (A) (based on SCr of 2.14 mg/dL (H)). according to latest data. Based on current GFR, CKD stage is stage 3 - GFR 30-59.  Monitor UOP and serial BMP and adjust therapy as needed. Renally dose meds. Avoid nephrotoxic medications and procedures.    Primary hypertension  Chronic, controlled. Latest blood pressure and vitals reviewed-     Temp:  [97.6 °F (36.4 °C)-98.5 °F (36.9 °C)]   Pulse:  [60-73]   Resp:  [15-22]   BP: (161-193)/(59-84)   SpO2:  [95 %-99 %] .   Home meds for hypertension were reviewed and noted below.   Hypertension Medications               nitroGLYCERIN (NITROSTAT) 0.4 MG SL tablet Place 1 tablet (0.4 mg total) under the tongue every 5 (five) minutes as needed for Chest pain.            While in the hospital, will manage blood pressure as follows; Continue home antihypertensive regimen    Will utilize p.r.n. blood pressure medication only if patient's blood pressure greater than 180/110 and he develops symptoms such as worsening chest pain or shortness of breath.      VTE Risk Mitigation (From admission, onward)           Ordered     enoxaparin injection 30 mg  Every 24 hours         08/29/24 3568     Place sequential compression device  Until discontinued         08/29/24 7738                                    Smiley Sanchez DO  Department of Hospital Medicine  [facility]

## 2024-08-29 NOTE — PLAN OF CARE
[facility]  Discharge Final Note    Primary Care Provider: Smiley Trevino FNP    Expected Discharge Date: 8/29/2024    Final Discharge Note (most recent)       Final Note - 08/29/24 1004          Final Note    Assessment Type Final Discharge Note     Anticipated Discharge Disposition Long Term Acute Care     What phone number can be called within the next 1-3 days to see how you are doing after discharge? [phone]        Post-Acute Status    Post-Acute Authorization Placement     Post-Acute Placement Status Set-up Complete/Auth obtained     Patient choice form signed by patient/caregiver List with quality metrics by geographic area provided;List from CMS Compare     Discharge Delays None known at this time                     Important Message from Medicare  Important Message from Medicare regarding Discharge Appeal Rights: Explained to patient/caregiver, Signed/date by patient/caregiver     Date IMM was signed: 08/26/24  Time IMM was signed: 1250      Pt to dc to [facility] today. No other needs.

## 2024-08-29 NOTE — PLAN OF CARE
Problem: Adult Inpatient Plan of Care  Goal: Plan of Care Review  8/29/2024 1751 by Aysha Hallman RN  Outcome: Progressing  8/29/2024 1621 by Aysha Hallman RN  Outcome: Progressing  Goal: Patient-Specific Goal (Individualized)  8/29/2024 1751 by Aysha Hallman RN  Outcome: Progressing  8/29/2024 1621 by Aysha Hallman RN  Outcome: Progressing  Goal: Absence of Hospital-Acquired Illness or Injury  8/29/2024 1751 by Aysha Hallman RN  Outcome: Progressing  8/29/2024 1621 by Aysha Hallman RN  Outcome: Progressing  Goal: Optimal Comfort and Wellbeing  8/29/2024 1751 by Aysha Hallman RN  Outcome: Progressing  8/29/2024 1621 by Aysha Hallman RN  Outcome: Progressing  Goal: Readiness for Transition of Care  8/29/2024 1751 by Aysha Hallman RN  Outcome: Progressing  8/29/2024 1621 by Aysha Hallman RN  Outcome: Progressing     Problem: Diabetes Comorbidity  Goal: Blood Glucose Level Within Targeted Range  8/29/2024 1751 by Aysha Hallman RN  Outcome: Progressing  8/29/2024 1621 by Aysha Hallman RN  Outcome: Progressing     Problem: Fall Injury Risk  Goal: Absence of Fall and Fall-Related Injury  8/29/2024 1751 by Aysha Hallman RN  Outcome: Progressing  8/29/2024 1621 by Aysha Hallman RN  Outcome: Progressing     Problem: Wound  Goal: Optimal Coping  8/29/2024 1751 by Aysha Hallman RN  Outcome: Progressing  8/29/2024 1621 by Aysha Hallman RN  Outcome: Progressing  Goal: Optimal Functional Ability  8/29/2024 1751 by Aysha Hallman RN  Outcome: Progressing  8/29/2024 1621 by Aysha Hallman RN  Outcome: Progressing  Goal: Absence of Infection Signs and Symptoms  8/29/2024 1751 by Aysha Hallman RN  Outcome: Progressing  8/29/2024 1621 by Aysha Hallman RN  Outcome: Progressing  Goal: Improved Oral Intake  8/29/2024 1751 by Aysha Hallman RN  Outcome: Progressing  8/29/2024 1621 by Aysha Hallman RN  Outcome: Progressing  Goal: Optimal Pain Control and Function  8/29/2024 1751 by Aysha Hallman,  RN  Outcome: Progressing  8/29/2024 1621 by Aysha Hallman RN  Outcome: Progressing  Goal: Skin Health and Integrity  8/29/2024 1751 by Aysha Hallman RN  Outcome: Progressing  8/29/2024 1621 by Aysha Hallman RN  Outcome: Progressing  Goal: Optimal Wound Healing  8/29/2024 1751 by Aysha Hallman RN  Outcome: Progressing  8/29/2024 1621 by Aysha Hallman RN  Outcome: Progressing

## 2024-08-29 NOTE — PLAN OF CARE
Problem: Fall Injury Risk  Goal: Absence of Fall and Fall-Related Injury  8/29/2024 0029 by Teresita Kapoor RN  Outcome: Progressing  8/29/2024 0028 by Teresita Kapoor RN  Outcome: Progressing     Problem: Adult Inpatient Plan of Care  Goal: Plan of Care Review  8/29/2024 0029 by Teresita Kapoor RN  Outcome: Progressing  8/29/2024 0028 by Teresita Kapoor RN  Outcome: Progressing  Goal: Patient-Specific Goal (Individualized)  8/29/2024 0029 by Teresita Kapoor RN  Outcome: Progressing  8/29/2024 0028 by Teresita Kapoor RN  Outcome: Progressing  Goal: Absence of Hospital-Acquired Illness or Injury  8/29/2024 0029 by Teresita Kapoor RN  Outcome: Progressing  8/29/2024 0028 by Teresita Kapoor RN  Outcome: Progressing  Goal: Optimal Comfort and Wellbeing  8/29/2024 0029 by Teresita Kapoor RN  Outcome: Progressing  8/29/2024 0028 by Teresita Kapoor RN  Outcome: Progressing  Goal: Readiness for Transition of Care  8/29/2024 0029 by Teresita Kapoor RN  Outcome: Progressing  8/29/2024 0028 by Teresita Kapoor RN  Outcome: Progressing     Problem: Skin Injury Risk Increased  Goal: Skin Health and Integrity  Outcome: Progressing

## 2024-08-29 NOTE — ASSESSMENT & PLAN NOTE
Long history of alternating diarrhea and constipation. Currently having diarrhea likely exacerbated by antibiotics which unfortunately necessitates multiple daily dressing changes. Will add probiotics and psyllium.

## 2024-08-29 NOTE — HOSPITAL COURSE
Status post debridement right buttocks due to necrotizing soft tissue infection.  Wound without bleeding, drainage, or malodor.  Afebrile without leukocytosis.  Labs and vital signs stable.  [facility]  Wound care orders placed.

## 2024-08-29 NOTE — ASSESSMENT & PLAN NOTE
Chronic, controlled. Latest blood pressure and vitals reviewed-     Temp:  [97.6 °F (36.4 °C)-98.5 °F (36.9 °C)]   Pulse:  [60-73]   Resp:  [15-22]   BP: (161-193)/(59-84)   SpO2:  [95 %-99 %] .   Home meds for hypertension were reviewed and noted below.   Hypertension Medications               nitroGLYCERIN (NITROSTAT) 0.4 MG SL tablet Place 1 tablet (0.4 mg total) under the tongue every 5 (five) minutes as needed for Chest pain.            While in the hospital, will manage blood pressure as follows; Continue home antihypertensive regimen    Will utilize p.r.n. blood pressure medication only if patient's blood pressure greater than 180/110 and he develops symptoms such as worsening chest pain or shortness of breath.

## 2024-08-29 NOTE — ASSESSMENT & PLAN NOTE
Patient with known CAD s/p stent placement, which is controlled Will continue ASA and Statin and monitor for S/Sx of angina/ACS. Continue to monitor on telemetry.     Holding Plavix at this time due to bleeding risk and potential need for further surgery.

## 2024-08-29 NOTE — HPI
Negro Hale is a 84 y.o. male with history of HTN, CAD, CKD, IBS, and a-fib who initially presented to [facility] with perirectal abscess. Surgical evaluation revealed necrotizing soft tissue infection requiring extensive debridement with revision (most recent procedure 8/26). Anaerobic cultures positive and patient placed on appropriate antibiotics. Initial blood culture with micrococcus species thought to be a contaminant, with repeat cultures negative. Disposition complicated by diarrhea requiring frequent wound care and dressing changes due to soiling, as well and deconditioning due to acute illness and hospitalization. [facility], rehabilitation, and aggressive wound care.

## 2024-08-29 NOTE — ASSESSMENT & PLAN NOTE
Patient with Paroxysmal (<7 days) atrial fibrillation which is controlled currently with  no medications . Patient is currently in atrial fibrillation.[id number] Score: 4. HASBLED Score: 1. Anticoagulation not indicated due to extensive wound, risk of bleeding, possible need for further surgery .

## 2024-08-29 NOTE — ASSESSMENT & PLAN NOTE
"Patient's FSGs are controlled on current medication regimen.  Last A1c reviewed-   Lab Results   Component Value Date    HGBA1C 6.7 (H) 08/23/2024     Most recent fingerstick glucose reviewed- No results for input(s): "POCTGLUCOSE" in the last 24 hours.  Current correctional scale  Medium  Maintain anti-hyperglycemic dose as follows-   Antihyperglycemics (From admission, onward)      Start     Stop Route Frequency Ordered    08/29/24 2100  insulin glargine U-100 (Lantus) injection 20 Units         -- SubQ Nightly 08/29/24 1558    08/29/24 1556  insulin aspart U-100 injection 0-10 Units         -- SubQ Before meals & nightly PRN 08/29/24 1558          Hold Oral hypoglycemics while patient is in the hospital.    "

## 2024-08-29 NOTE — SUBJECTIVE & OBJECTIVE
Past Medical History:   Diagnosis Date    Abnormal thyroid stimulating hormone (TSH) level 08/22/2019    Anemia 08/15/2019    Atrial fibrillation 09/07/2012    Bradycardia 05/23/2017    asymptomatic    Change in bowel habit 11/13/2018    Chronic kidney disease, unspecified 01/14/2019    Coronary arteriosclerosis 09/07/2012    Disease of skin and subcutaneous tissue 08/16/2017    medial aspect RLE- cystic structure seen on venous doppler US.    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 Lewis Eye exam    Varicose veins of both lower extremities with pain 01/02/2019    Venous insufficiency 01/08/2019       Past Surgical History:   Procedure Laterality Date    APPENDECTOMY      CARDIAC CATHETERIZATION  2007    [facility]- Stent placement    CATARACT EXTRACTION, BILATERAL      COLONOSCOPY  11/06/2019    Dr. Mendoza    HERNIA REPAIR      Inguinal hernia repair    INCISION AND  DRAINAGE OF PERIRECTAL REGION Right 8/23/2024    Procedure: INCISION AND DRAINAGE, WITH DEBRIDEMENT PERIRECTAL REGION;  Surgeon: Pop Butcher MD;  Location: [facility];  Service: General;  Laterality: Right;    INCISION AND DRAINAGE OF PERIRECTAL REGION Bilateral 8/26/2024    Procedure: INCISION AND DRAINAGE, PERIRECTAL REGION;  Surgeon: Pop Butcher MD;  Location: [facility] OR;  Service: General;  Laterality: Bilateral;    TONSILLECTOMY         Review of patient's allergies indicates:   Allergen Reactions    Mayonnaise      Upset stomach       Current Facility-Administered Medications on File Prior to Encounter   Medication    [DISCONTINUED] 0.9%  NaCl infusion    [DISCONTINUED] cholecalciferol (vitamin D3) 125 mcg (5,000 unit) tablet 5,000 Units    [DISCONTINUED] clindamycin in D5W 600 mg/50 mL IVPB 600 mg    [DISCONTINUED] dextrose 10% bolus 125 mL 125 mL    [DISCONTINUED] dextrose 10% bolus 250 mL 250 mL    [DISCONTINUED] enoxaparin injection 30 mg    [DISCONTINUED] glucagon (human recombinant) injection 1 mg    [DISCONTINUED] glucose chewable tablet 16 g    [DISCONTINUED] glucose chewable tablet 24 g    [DISCONTINUED] HYDROcodone-acetaminophen  mg per tablet 1 tablet    [DISCONTINUED] HYDROcodone-acetaminophen 5-325 mg per tablet 1 tablet    [DISCONTINUED] insulin aspart U-100 injection 0-10 Units    [DISCONTINUED] insulin glargine U-100 (Lantus) injection 20 Units    [DISCONTINUED] pantoprazole EC tablet 40 mg    [DISCONTINUED] piperacillin-tazobactam (ZOSYN) 4.5 g in D5W 100 mL IVPB (MB+)    [DISCONTINUED] piperacillin-tazobactam (ZOSYN) 4.5 g in D5W 100 mL IVPB (MB+)     Current Outpatient Medications on File Prior to Encounter   Medication Sig    blood-glucose sensor (FREESTYLE ADORE 3 SENSOR) Giulia 1 each by Misc.(Non-Drug; Combo Route) route once.    cholecalciferol, vitamin D3, 125 mcg (5,000 unit) capsule Take 5,000 Units by mouth once daily.    clopidogreL (PLAVIX) 75 mg tablet Take 1  "tablet (75 mg total) by mouth once daily.    coenzyme Q10 200 mg capsule Take 200 mg by mouth once daily.    cyanocobalamin (VITAMIN B-12) 1000 MCG tablet Take 100 mcg by mouth once daily.    dapagliflozin propanediol (FARXIGA) 10 mg tablet Take 1 tablet (10 mg total) by mouth once daily.    glipiZIDE 5 MG TR24 Take 1 tablet (5 mg total) by mouth daily with breakfast.    insulin glargine U-100, Lantus, (LANTUS SOLOSTAR U-100 INSULIN) 100 unit/mL (3 mL) InPn pen Inject 45 Units into the skin every evening.    nitroGLYCERIN (NITROSTAT) 0.4 MG SL tablet Place 1 tablet (0.4 mg total) under the tongue every 5 (five) minutes as needed for Chest pain.    pantoprazole (PROTONIX) 40 MG tablet Take 1 tablet (40 mg total) by mouth once daily.    rosuvastatin (CRESTOR) 40 MG Tab Take 1 tablet (40 mg total) by mouth every evening.    SURE COMFORT PEN NEEDLE 32 gauge x 5/32" Ndle AS DIRECTED     Family History       Problem Relation (Age of Onset)    Diabetes Brother    Heart disease Father    Neurofibromatosis Brother    No Known Problems Mother, Brother, Maternal Grandmother, Maternal Grandfather, Paternal Grandmother, Paternal Grandfather    Throat cancer Sister          Tobacco Use    Smoking status: Former     Current packs/day: 0.00     Average packs/day: 0.1 packs/day for 3.0 years (0.3 ttl pk-yrs)     Types: Cigarettes     Start date:      Quit date: 1960     Years since quittin.7     Passive exposure: Past    Smokeless tobacco: Never    Tobacco comments:     Quit 10/15/1976   Substance and Sexual Activity    Alcohol use: Not Currently     Comment: occasionally    Drug use: Never    Sexual activity: Not Currently     Review of Systems   Constitutional:  Positive for fatigue. Negative for chills and fever.   Respiratory:  Negative for cough and shortness of breath.    Cardiovascular:  Negative for chest pain and palpitations.   Gastrointestinal:  Positive for constipation and diarrhea. Negative for abdominal pain, " nausea and vomiting.   Genitourinary:  Negative for dysuria and flank pain.   Skin:  Positive for wound.   Neurological:  Positive for weakness. Negative for dizziness, syncope and headaches.   Psychiatric/Behavioral:  Negative for confusion.      Objective:     Vital Signs (Most Recent):  Temp: 98 °F (36.7 °C) (08/29/24 1420)  Pulse: 62 (08/29/24 1420)  Resp: (!) 22 (08/29/24 1420)  BP: (!) 192/74 (08/29/24 1420)  SpO2: 95 % (08/29/24 1420) Vital Signs (24h Range):  Temp:  [97.6 °F (36.4 °C)-98.5 °F (36.9 °C)] 98 °F (36.7 °C)  Pulse:  [60-73] 62  Resp:  [15-22] 22  SpO2:  [95 %-99 %] 95 %  BP: (161-193)/(59-84) 192/74     Weight: 81.1 kg (178 lb 14.4 oz)  Body mass index is 27.2 kg/m².     Physical Exam  Constitutional:       General: He is not in acute distress.     Appearance: Normal appearance. He is not ill-appearing.   HENT:      Head: Normocephalic and atraumatic.   Eyes:      General: No scleral icterus.     Conjunctiva/sclera: Conjunctivae normal.      Pupils: Pupils are equal, round, and reactive to light.   Cardiovascular:      Rate and Rhythm: Normal rate and regular rhythm.   Pulmonary:      Effort: Pulmonary effort is normal. No respiratory distress.   Abdominal:      Palpations: Abdomen is soft.      Tenderness: There is no abdominal tenderness.   Musculoskeletal:      Cervical back: Rigidity present.   Skin:     Coloration: Skin is not jaundiced or pale.      Findings: Wound (extensive sacral/perineal wound status-post debridement - see media) present. No rash.   Neurological:      Mental Status: He is alert.   Psychiatric:         Behavior: Behavior normal.         Thought Content: Thought content normal.              CRANIAL NERVES     CN III, IV, VI   Pupils are equal, round, and reactive to light.       Significant Labs: All pertinent labs within the past 24 hours have been reviewed.    Significant Imaging: I have reviewed all pertinent imaging results/findings within the past 24 hours.

## 2024-08-29 NOTE — NURSING
Report called to Ani in specialty. Labs, vitals, and dx reviewed. No further questions at this time. Pending transport for moving down.

## 2024-08-29 NOTE — ASSESSMENT & PLAN NOTE
Perirectal abscess now status-post extensive debridement and revision. Subsequent large sacral and perineal wound now healing appropriately.     Anaerobic culture positive, currently receiving treatment with clindamycin and Zosyn. Will likely require extended course due to location of wound and risk of contamination.     - aggressive wound care including dressing changes daily and with each bowel movement  - antibiotics as below    Antibiotics (From admission, onward)      Start     Stop Route Frequency Ordered    08/29/24 2100  mupirocin 2 % ointment         09/03/24 2059 Nasl 2 times daily 08/29/24 1559    08/29/24 1830  piperacillin-tazobactam (ZOSYN) 4.5 g in D5W 100 mL IVPB (MB+)         -- IV Every 8 hours (non-standard times) 08/29/24 1558    08/29/24 1730  clindamycin in D5W 600 mg/50 mL IVPB 600 mg         -- IV Every 8 hours (non-standard times) 08/29/24 1558

## 2024-08-29 NOTE — DISCHARGE SUMMARY
[facility]  General Surgery  Discharge Summary      Patient Name: Negro Hale  MRN: [id number]  Admission Date: 8/22/2024  Hospital Length of Stay: 6 days  Discharge Date and Time:  08/29/2024 12:30 PM  Attending Physician: Adryan Butcher MD   Discharging Provider: BARBARA Arenas  Primary Care Provider: Smiley Trevino FNP    HPI:   No notes on file    Procedure(s) (LRB):  INCISION AND DRAINAGE, PERIRECTAL REGION (Bilateral)      Indwelling Lines/Drains at time of discharge:   Lines/Drains/Airways       None                 Hospital Course: Status post debridement right buttocks due to necrotizing soft tissue infection.  Wound without bleeding, drainage, or malodor.  Afebrile without leukocytosis.  Labs and vital signs stable.  [facility]  Wound care orders placed.    Goals of Care Treatment Preferences:  Code Status: Full Code      Consults:   Consults (From admission, onward)          Status Ordering Provider     Inpatient consult to Social Work  Once        Provider:  (Not yet assigned)    Completed NOHEMI YANEZ     Inpatient consult to Internal Medicine  Once        Provider:  Gavin Gusman Jr., MD    Completed ADRYAN BUTCHER            Significant Diagnostic Studies: Labs: All labs within the past 24 hours have been reviewed    Pending Diagnostic Studies:       None          Final Active Diagnoses:    Diagnosis Date Noted POA    PRINCIPAL PROBLEM:  Necrotizing soft tissue infection [M79.89] 08/23/2024 Yes    Positive blood culture [R78.81] 08/25/2024 Yes    Perirectal abscess [K61.1] 08/23/2024 Yes    Paroxysmal atrial fibrillation [I48.0] 08/23/2024 Yes    Type 2 diabetes mellitus with hyperglycemia, with long-term current use of insulin [E11.65, Z79.4] 02/01/2024 Not Applicable     Chronic    Atherosclerosis of native coronary artery of native heart without angina pectoris [I25.10] 03/17/2021 Yes     Chronic    Type 2  diabetes mellitus with stage 3a chronic kidney disease, with long-term current use of insulin [E11.22, N18.31, Z79.4] 03/17/2021 Not Applicable     Chronic    Hypercholesterolemia [E78.00] 03/17/2021 Yes     Chronic    Primary hypertension [I10] 03/17/2021 Yes      Problems Resolved During this Admission:      Discharged Condition: stable    Disposition: Long Term Care    Follow Up:    Patient Instructions:   No discharge procedures on file.  Medications:  Transfer Medications (for Discharge Readmit only):   Current Facility-Administered Medications   Medication Dose Route Frequency Provider Last Rate Last Admin    0.9%  NaCl infusion   Intravenous Continuous Colin Lopez MD 75 mL/hr at 08/28/24 0054 New Bag at 08/28/24 0054    cholecalciferol (vitamin D3) 125 mcg (5,000 unit) tablet 5,000 Units  5,000 Units Oral Daily Gavin Gusman Jr., MD   5,000 Units at 08/29/24 1111    clindamycin in D5W 600 mg/50 mL IVPB 600 mg  600 mg Intravenous Q8H Pop Butcher MD   Stopped at 08/29/24 1134    dextrose 10% bolus 125 mL 125 mL  12.5 g Intravenous PRN Bethel Yi MD        dextrose 10% bolus 250 mL 250 mL  25 g Intravenous PRN Bethel Yi MD        enoxaparin injection 30 mg  30 mg Subcutaneous Daily Pop Butcher MD   30 mg at 08/28/24 1700    glucagon (human recombinant) injection 1 mg  1 mg Intramuscular PRN Colin Lopez MD        glucose chewable tablet 16 g  16 g Oral PRN Colin Lopez MD        glucose chewable tablet 24 g  24 g Oral PRN Colin Lopez MD        HYDROcodone-acetaminophen  mg per tablet 1 tablet  1 tablet Oral Q4H PRN Pop Butcher MD   1 tablet at 08/27/24 2209    HYDROcodone-acetaminophen 5-325 mg per tablet 1 tablet  1 tablet Oral Q4H PRPop Pedraza MD        insulin aspart U-100 injection 0-10 Units  0-10 Units Subcutaneous QID (AC + HS) PRN Colin Lopez MD   1 Units at 08/28/24 2208    insulin glargine U-100 (Lantus) injection 20 Units  20 Units  Subcutaneous QHS Colin Lopez MD   20 Units at 08/28/24 2208    pantoprazole EC tablet 40 mg  40 mg Oral Daily Colin Lopez MD   40 mg at 08/29/24 1111    piperacillin-tazobactam (ZOSYN) 4.5 g in D5W 100 mL IVPB (MB+)  4.5 g Intravenous Once Pop Butcher MD        piperacillin-tazobactam (ZOSYN) 4.5 g in D5W 100 mL IVPB (MB+)  4.5 g Intravenous Q8H Pop Butcher MD   Stopped at 08/29/24 0715     Time spent on the discharge of patient: 15 minutes    BARBARA Arenas  General Surgery  [facility]

## 2024-08-29 NOTE — ASSESSMENT & PLAN NOTE
Creatine stable for now. BMP reviewed- noted Estimated Creatinine Clearance: 24.4 mL/min (A) (based on SCr of 2.14 mg/dL (H)). according to latest data. Based on current GFR, CKD stage is stage 3 - GFR 30-59.  Monitor UOP and serial BMP and adjust therapy as needed. Renally dose meds. Avoid nephrotoxic medications and procedures.

## 2024-08-30 LAB
ANION GAP SERPL CALCULATED.3IONS-SCNC: 8 MMOL/L (ref 7–16)
BASOPHILS # BLD AUTO: 0.06 K/UL (ref 0–0.2)
BASOPHILS NFR BLD AUTO: 0.8 % (ref 0–1)
BUN SERPL-MCNC: 29 MG/DL (ref 7–18)
BUN/CREAT SERPL: 12 (ref 6–20)
C COLI+JEJ+UPSA DNA STL QL NAA+NON-PROBE: NEGATIVE
CALCIUM SERPL-MCNC: 8.2 MG/DL (ref 8.5–10.1)
CHLORIDE SERPL-SCNC: 114 MMOL/L (ref 98–107)
CO2 SERPL-SCNC: 25 MMOL/L (ref 21–32)
CREAT SERPL-MCNC: 2.41 MG/DL (ref 0.7–1.3)
DIFFERENTIAL METHOD BLD: ABNORMAL
E COLI SXT1 STL QL IA: NEGATIVE
E COLI SXT2 STL QL IA: NEGATIVE
EGFR (NO RACE VARIABLE) (RUSH/TITUS): 26 ML/MIN/1.73M2
EOSINOPHIL # BLD AUTO: 0.27 K/UL (ref 0–0.5)
EOSINOPHIL NFR BLD AUTO: 3.6 % (ref 1–4)
ERYTHROCYTE [DISTWIDTH] IN BLOOD BY AUTOMATED COUNT: 14.1 % (ref 11.5–14.5)
GLUCOSE SERPL-MCNC: 149 MG/DL (ref 70–105)
GLUCOSE SERPL-MCNC: 164 MG/DL (ref 74–106)
GLUCOSE SERPL-MCNC: 178 MG/DL (ref 70–105)
GLUCOSE SERPL-MCNC: 178 MG/DL (ref 70–105)
GLUCOSE SERPL-MCNC: 186 MG/DL (ref 70–105)
HCT VFR BLD AUTO: 35.3 % (ref 40–54)
HGB BLD-MCNC: 11.1 G/DL (ref 13.5–18)
IMM GRANULOCYTES # BLD AUTO: 0.12 K/UL (ref 0–0.04)
IMM GRANULOCYTES NFR BLD: 1.6 % (ref 0–0.4)
LYMPHOCYTES # BLD AUTO: 1.08 K/UL (ref 1–4.8)
LYMPHOCYTES NFR BLD AUTO: 14.5 % (ref 27–41)
MCH RBC QN AUTO: 29.2 PG (ref 27–31)
MCHC RBC AUTO-ENTMCNC: 31.4 G/DL (ref 32–36)
MCV RBC AUTO: 92.9 FL (ref 80–96)
MONOCYTES # BLD AUTO: 0.84 K/UL (ref 0–0.8)
MONOCYTES NFR BLD AUTO: 11.3 % (ref 2–6)
MPC BLD CALC-MCNC: 10.8 FL (ref 9.4–12.4)
NEUTROPHILS # BLD AUTO: 5.06 K/UL (ref 1.8–7.7)
NEUTROPHILS NFR BLD AUTO: 68.2 % (ref 53–65)
NOROVIRUS GI+II RNA STL QL NAA+NON-PROBE: NEGATIVE
NRBC # BLD AUTO: 0 X10E3/UL
NRBC, AUTO (.00): 0 %
PLATELET # BLD AUTO: 292 K/UL (ref 150–400)
POTASSIUM SERPL-SCNC: 4 MMOL/L (ref 3.5–5.1)
RBC # BLD AUTO: 3.8 M/UL (ref 4.6–6.2)
RVA RNA STL QL NAA+NON-PROBE: NEGATIVE
S ENT+BONG DNA STL QL NAA+NON-PROBE: NEGATIVE
SHIGELLA SPECIES NAT: NEGATIVE
SODIUM SERPL-SCNC: 143 MMOL/L (ref 136–145)
V CHOL+PARA+VUL DNA STL QL NAA+NON-PROBE: NEGATIVE
WBC # BLD AUTO: 7.43 K/UL (ref 4.5–11)
Y ENTEROCOL DNA STL QL NAA+NON-PROBE: NEGATIVE

## 2024-08-30 PROCEDURE — 63600175 PHARM REV CODE 636 W HCPCS: Performed by: FAMILY MEDICINE

## 2024-08-30 PROCEDURE — 85025 COMPLETE CBC W/AUTO DIFF WBC: CPT | Performed by: FAMILY MEDICINE

## 2024-08-30 PROCEDURE — 25000003 PHARM REV CODE 250: Performed by: FAMILY MEDICINE

## 2024-08-30 PROCEDURE — 11000008

## 2024-08-30 PROCEDURE — 25000242 PHARM REV CODE 250 ALT 637 W/ HCPCS: Performed by: FAMILY MEDICINE

## 2024-08-30 PROCEDURE — 97530 THERAPEUTIC ACTIVITIES: CPT

## 2024-08-30 PROCEDURE — 36415 COLL VENOUS BLD VENIPUNCTURE: CPT | Performed by: FAMILY MEDICINE

## 2024-08-30 PROCEDURE — 82962 GLUCOSE BLOOD TEST: CPT

## 2024-08-30 PROCEDURE — 80048 BASIC METABOLIC PNL TOTAL CA: CPT | Performed by: FAMILY MEDICINE

## 2024-08-30 PROCEDURE — 97161 PT EVAL LOW COMPLEX 20 MIN: CPT

## 2024-08-30 PROCEDURE — A6212 FOAM DRG <=16 SQ IN W/BORDER: HCPCS

## 2024-08-30 PROCEDURE — 99233 SBSQ HOSP IP/OBS HIGH 50: CPT | Mod: ,,, | Performed by: FAMILY MEDICINE

## 2024-08-30 RX ORDER — DEXTROSE MONOHYDRATE 50 MG/ML
INJECTION, SOLUTION INTRAVENOUS
Status: DISCONTINUED | OUTPATIENT
Start: 2024-08-30 | End: 2024-09-16 | Stop reason: HOSPADM

## 2024-08-30 RX ORDER — PRAVASTATIN SODIUM 40 MG/1
80 TABLET ORAL NIGHTLY
Status: DISCONTINUED | OUTPATIENT
Start: 2024-08-30 | End: 2024-09-16 | Stop reason: HOSPADM

## 2024-08-30 RX ADMIN — PSYLLIUM HUSK 1 PACKET: 3.4 POWDER ORAL at 08:08

## 2024-08-30 RX ADMIN — CHOLECALCIFEROL TAB 125 MCG (5000 UNIT) 5000 UNITS: 125 TAB at 08:08

## 2024-08-30 RX ADMIN — INSULIN ASPART 2 UNITS: 100 INJECTION, SOLUTION INTRAVENOUS; SUBCUTANEOUS at 05:08

## 2024-08-30 RX ADMIN — INSULIN ASPART 2 UNITS: 100 INJECTION, SOLUTION INTRAVENOUS; SUBCUTANEOUS at 11:08

## 2024-08-30 RX ADMIN — PIPERACILLIN AND TAZOBACTAM 4.5 G: 4; .5 INJECTION, POWDER, LYOPHILIZED, FOR SOLUTION INTRAVENOUS; PARENTERAL at 11:08

## 2024-08-30 RX ADMIN — PRAVASTATIN SODIUM 80 MG: 40 TABLET ORAL at 08:08

## 2024-08-30 RX ADMIN — Medication 2 CAPSULE: at 11:08

## 2024-08-30 RX ADMIN — MUPIROCIN: 20 OINTMENT TOPICAL at 08:08

## 2024-08-30 RX ADMIN — INSULIN GLARGINE 20 UNITS: 100 INJECTION, SOLUTION SUBCUTANEOUS at 08:08

## 2024-08-30 RX ADMIN — CLINDAMYCIN PHOSPHATE 600 MG: 600 INJECTION, SOLUTION INTRAVENOUS at 09:08

## 2024-08-30 RX ADMIN — Medication 2 CAPSULE: at 08:08

## 2024-08-30 RX ADMIN — PIPERACILLIN AND TAZOBACTAM 4.5 G: 4; .5 INJECTION, POWDER, LYOPHILIZED, FOR SOLUTION INTRAVENOUS; PARENTERAL at 08:08

## 2024-08-30 RX ADMIN — OXYCODONE HYDROCHLORIDE AND ACETAMINOPHEN 500 MG: 500 TABLET ORAL at 08:08

## 2024-08-30 RX ADMIN — ENOXAPARIN SODIUM 30 MG: 30 INJECTION SUBCUTANEOUS at 05:08

## 2024-08-30 RX ADMIN — Medication 2 CAPSULE: at 05:08

## 2024-08-30 RX ADMIN — ZINC SULFATE 220 MG (50 MG) CAPSULE 220 MG: CAPSULE at 08:08

## 2024-08-30 RX ADMIN — CLINDAMYCIN PHOSPHATE 600 MG: 600 INJECTION, SOLUTION INTRAVENOUS at 12:08

## 2024-08-30 RX ADMIN — PANTOPRAZOLE SODIUM 40 MG: 40 TABLET, DELAYED RELEASE ORAL at 08:08

## 2024-08-30 RX ADMIN — PIPERACILLIN AND TAZOBACTAM 4.5 G: 4; .5 INJECTION, POWDER, LYOPHILIZED, FOR SOLUTION INTRAVENOUS; PARENTERAL at 01:08

## 2024-08-30 RX ADMIN — ASPIRIN 81 MG: 81 TABLET, COATED ORAL at 08:08

## 2024-08-30 RX ADMIN — DEXTROSE MONOHYDRATE: 12500 INJECTION, SOLUTION INTRAVENOUS at 09:08

## 2024-08-30 NOTE — ASSESSMENT & PLAN NOTE
Further discussed with surgery today, recommendations for wound care to manage and will re-consult if further surgical needs arise.   ---  Perirectal abscess now status-post extensive debridement and revision. Subsequent large sacral and perineal wound now healing appropriately.     Anaerobic culture positive, currently receiving treatment with clindamycin and Zosyn. Will likely require extended course due to location of wound and risk of contamination.     - aggressive wound care including dressing changes daily and with each bowel movement  - antibiotics as below    Antibiotics (From admission, onward)      Start     Stop Route Frequency Ordered    08/29/24 2100  mupirocin 2 % ointment         09/03/24 2059 Nasl 2 times daily 08/29/24 1559    08/29/24 1830  piperacillin-tazobactam (ZOSYN) 4.5 g in D5W 100 mL IVPB (MB+)         -- IV Every 8 hours (non-standard times) 08/29/24 1558    08/29/24 1730  clindamycin in D5W 600 mg/50 mL IVPB 600 mg         -- IV Every 8 hours (non-standard times) 08/29/24 1558

## 2024-08-30 NOTE — PLAN OF CARE
[facility]  Initial Discharge Assessment       Primary Care Provider: Smiley Trevino FNP    Admission Diagnosis: Necrotizing soft tissue infection [M79.89]    Admission Date: 8/29/2024  Expected Discharge Date: 9/12/2024         Payor: MEDICARE / Plan: MEDICARE PART A & B / Product Type: Government /     Extended Emergency Contact Information  Primary Emergency Contact: Yareli Hale  Mobile Phone: [phone]  Relation: Spouse  Preferred language: English   needed? No    Discharge Plan A: Home with family, Home Health  Discharge Plan B: Skilled Nursing Facility      [facility] - [place], [place] - [address]  [address] [address]  Phone: [phone] Fax: [phone]    [facility] - [place] - [address] [facility]  [address]  [place] [address]  Phone: [phone] Fax: [phone]    [id number] - [place], [place] - [address]  [address]  [facility] [address]  Phone: [phone] Fax: [phone]    [id number] - [place], [place] - [address] AT [facility] [address]  & [facility]  [address]  [place] [address]  Phone: [phone] Fax: [phone]    [facility], [place] - [address]  [address] [place] [address] [place] [address]  Phone: [phone] Fax: [phone]    [facility] [address] [place] [address], [place] - [address]  [address] [place] [address] [place] [address]  Phone: [phone] Fax: [phone]      Initial Assessment (most recent)       Adult Discharge Assessment - 08/30/24 1200          Discharge Assessment    Assessment Type Discharge Planning Assessment     Confirmed/corrected address, phone number and insurance Yes     Source of Information patient     Communicated RICKY with patient/caregiver Date not available/Unable to determine     Reason For Admission Necrotizing soft tissue infection     People in Home spouse     Do  you expect to return to your current living situation? No     Do you have help at home or someone to help you manage your care at home? No     Who are your caregiver(s) and their phone number(s)? Yareli Hale (Spouse)  [phone]     Prior to hospitilization cognitive status: Unable to Assess     Current cognitive status: Alert/Oriented     Walking or Climbing Stairs Difficulty no     Dressing/Bathing Difficulty no     Home Layout Able to live on 1st floor     Equipment Currently Used at Home cane, straight;rollator     Readmission within 30 days? No     Patient currently being followed by outpatient case management? No     Do you currently have service(s) that help you manage your care at home? No     Do you take prescription medications? Yes     Do you have prescription coverage? Yes     Do you have any problems affording any of your prescribed medications? Yes     Is the patient taking medications as prescribed? yes     Who is going to help you get home at discharge? Spouse     How do you get to doctors appointments? car, drives self;family or friend will provide     Are you on dialysis? No     Do you take coumadin? No     Discharge Plan A Home with family;Home Health     Discharge Plan B Skilled Nursing Facility     DME Needed Upon Discharge  none     Discharge Plan discussed with: Spouse/sig other;Patient     Name(s) and Number(s) Yareli Hale (Spouse)  [phone]        Physical Activity    On average, how many days per week do you engage in moderate to strenuous exercise (like a brisk walk)? 0 days     On average, how many minutes do you engage in exercise at this level? 0 min        Financial Resource Strain    How hard is it for you to pay for the very basics like food, housing, medical care, and heating? Not hard at all        Housing Stability    In the last 12 months, was there a time when you were not able to pay the mortgage or rent on time? No     At any time in the past 12 months, were you  homeless or living in a shelter (including now)? No        Transportation Needs    Has the lack of transportation kept you from medical appointments, meetings, work or from getting things needed for daily living? No        Food Insecurity    Within the past 12 months, you worried that your food would run out before you got the money to buy more. Never true     Within the past 12 months, the food you bought just didn't last and you didn't have money to get more. Never true        Stress    Do you feel stress - tense, restless, nervous, or anxious, or unable to sleep at night because your mind is troubled all the time - these days? Not at all        Social Isolation    How often do you feel lonely or isolated from those around you?  Never        Alcohol Use    Q1: How often do you have a drink containing alcohol? Never     Q2: How many drinks containing alcohol do you have on a typical day when you are drinking? Patient does not drink     Q3: How often do you have six or more drinks on one occasion? Never        Utilities    In the past 12 months has the electric, gas, oil, or water company threatened to shut off services in your home? No        Health Literacy    How often do you need to have someone help you when you read instructions, pamphlets, or other written material from your doctor or pharmacy? Never        OTHER    Name(s) of People in Home [place], [place] (Spouse)  [phone]                 SS spoke with pt and spouse at bedside. Pt lives at home with spouse, not current with HH and has a rollator and cane but does not use any of the DME. Pt was independent prior to this hospitalization. Pt and wife d/c plan is home with  with a verbal choice of [facility]. SS will follow as d/c needs arise. SDOH completed. IMM obtained.

## 2024-08-30 NOTE — PROGRESS NOTES
[facility]  Wound Care    Patient Name:  Negro Hale   MRN:  [id number]  Date: 8/30/2024  Diagnosis: Necrotizing soft tissue infection    History:     Past Medical History:   Diagnosis Date    Abnormal thyroid stimulating hormone (TSH) level 08/22/2019    Anemia 08/15/2019    Atrial fibrillation 09/07/2012    Bradycardia 05/23/2017    asymptomatic    Change in bowel habit 11/13/2018    Chronic kidney disease, unspecified 01/14/2019    Coronary arteriosclerosis 09/07/2012    Disease of skin and subcutaneous tissue 08/16/2017    medial aspect RLE- cystic structure seen on venous doppler US.    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 Lewis Eye exam    Varicose veins of both lower extremities with pain 01/02/2019    Venous insufficiency 01/08/2019       Social History     Socioeconomic History    Marital status:    Tobacco  Use    Smoking status: Former     Current packs/day: 0.00     Average packs/day: 0.1 packs/day for 3.0 years (0.3 ttl pk-yrs)     Types: Cigarettes     Start date:      Quit date: 1960     Years since quittin.7     Passive exposure: Past    Smokeless tobacco: Never    Tobacco comments:     Quit 10/15/1976   Substance and Sexual Activity    Alcohol use: Not Currently     Comment: occasionally    Drug use: Never    Sexual activity: Not Currently     Social Determinants of Health     Financial Resource Strain: Low Risk  (2024)    Overall Financial Resource Strain (CARDIA)     Difficulty of Paying Living Expenses: Not hard at all   Food Insecurity: No Food Insecurity (2024)    Hunger Vital Sign     Worried About Running Out of Food in the Last Year: Never true     Ran Out of Food in the Last Year: Never true   Transportation Needs: No Transportation Needs (2024)    TRANSPORTATION NEEDS     Transportation : No   Physical Activity: Inactive (2024)    Exercise Vital Sign     Days of Exercise per Week: 0 days     Minutes of Exercise per Session: 0 min   Stress: No Stress Concern Present (2024)    [place] [employer] - Occupational Stress Questionnaire     Feeling of Stress : Not at all   Housing Stability: Low Risk  (2024)    Housing Stability Vital Sign     Unable to Pay for Housing in the Last Year: No     Homeless in the Last Year: No       Precautions:     Allergies as of 2024 - Reviewed 2024   Allergen Reaction Noted    [place]  03/10/2021       [facility] Assessment Details/Treatment     Narrative:Seen patient for initiation of preventative skin care measures    Patient in bed, Alert, Wife at bedside.   Sacral and Bilateral heels with out pressure injury noted. Has Mepliex Foam border to all areas.   Right buttock wound with pale pink/tan slough noted. Outer edges dry, intact. Stool noted in wound bed. Cleaned with vashe moist gauze. Applied moist  vashe gauze to wound bed. Covered with dry dressing. Has undermining at 11 clock . Overall measurement is 7.0 cm x 2.0 cm x 2,0 cm. Daily and prn dressing.   Ambulates to bathroom.     Wound care team to follow prn        08/30/2024

## 2024-08-30 NOTE — PLAN OF CARE
Problem: Adult Inpatient Plan of Care  Goal: Plan of Care Review  Outcome: Progressing  Flowsheets (Taken 8/30/2024 1738)  Plan of Care Reviewed With:   patient   spouse     Problem: Diabetes Comorbidity  Goal: Blood Glucose Level Within Targeted Range  Outcome: Progressing  Intervention: Monitor and Manage Glycemia  Flowsheets (Taken 8/30/2024 1738)  Glycemic Management:   blood glucose monitored   supplemental insulin given     Problem: Fall Injury Risk  Goal: Absence of Fall and Fall-Related Injury  Outcome: Progressing  Intervention: Identify and Manage Contributors  Flowsheets (Taken 8/30/2024 1738)  Self-Care Promotion:   independence encouraged   meal set-up provided   BADL personal routines maintained   BADL personal objects within reach  Medication Review/Management: medications reviewed  Intervention: Promote Injury-Free Environment  Flowsheets (Taken 8/30/2024 1738)  Safety Promotion/Fall Prevention:   assistive device/personal item within reach   bed alarm set   chair alarm set   Fall Risk signage in place   Fall Risk reviewed with patient/family   instructed to call staff for mobility   medications reviewed   room near unit station   side rails raised x 2     Problem: Wound  Goal: Optimal Coping  Outcome: Progressing  Intervention: Support Patient and Family Response  Flowsheets (Taken 8/30/2024 1738)  Supportive Measures:   active listening utilized   counseling provided   decision-making supported   goal-setting facilitated   self-care encouraged   relaxation techniques promoted  Family/Support System Care: support provided  Goal: Optimal Functional Ability  Outcome: Progressing  Intervention: Optimize Functional Ability  Flowsheets (Taken 8/30/2024 1738)  Assistive Device Utilized: walker  Activity Management:   Ambulated -L4   Ambulated in room - L4   Ambulated to bathroom - L4   Up in chair - L3  Activity Assistance Provided: assistance, 1 person  Goal: Skin Health and Integrity  Outcome:  Progressing  Intervention: Optimize Skin Protection  Flowsheets (Taken 8/30/2024 7810)  Pressure Reduction Techniques:   frequent weight shift encouraged   heels elevated off bed   pressure points protected   weight shift assistance provided  Pressure Reduction Devices:   chair cushion utilized   foam padding utilized   positioning supports utilized   pressure-redistributing mattress utilized   specialty bed utilized  Skin Protection: incontinence pads utilized  Activity Management:   Ambulated -L4   Ambulated in room - L4   Ambulated to bathroom - L4   Up in chair - L3  Head of Bed (HOB) Positioning:   HOB elevated   HOB at 30-45 degrees

## 2024-08-30 NOTE — PROGRESS NOTES
[facility] Medicine  Progress Note    Patient Name: Negro Hale  MRN: [id number]  Patient Class: IP- Inpatient   Admission Date: 8/29/2024  Length of Stay: 1 days  Attending Physician: Smiley Sanchez DO  Primary Care Provider: Smiley Trevino FNP        Subjective:     Principal Problem:Necrotizing soft tissue infection        HPI:    Negro Hale is a 84 y.o. male with history of HTN, CAD, CKD, IBS, and a-fib who initially presented to [facility] with perirectal abscess. Surgical evaluation revealed necrotizing soft tissue infection requiring extensive debridement with revision (most recent procedure 8/26). Anaerobic cultures positive and patient placed on appropriate antibiotics. Initial blood culture with micrococcus species thought to be a contaminant, with repeat cultures negative. Disposition complicated by diarrhea requiring frequent wound care and dressing changes due to soiling, as well and deconditioning due to acute illness and hospitalization. Patient admitted to [facility] for continued antibiotics, rehabilitation, and aggressive wound care.       Overview/Hospital Course:  No notes on file    Interval History:     No significant events overnight, no new complaints or concerns. Continues to have diarrhea (again, with history of mixed IBS) so will make changes to therapy. Continue wound care.     Review of Systems   Constitutional:  Positive for fatigue. Negative for chills and fever.   Respiratory:  Negative for cough and shortness of breath.    Cardiovascular:  Negative for chest pain and palpitations.   Gastrointestinal:  Positive for constipation and diarrhea. Negative for abdominal pain, nausea and vomiting.   Genitourinary:  Negative for dysuria and flank pain.   Skin:  Positive for wound.   Neurological:  Positive for weakness. Negative for dizziness, syncope and headaches.   Psychiatric/Behavioral:  Negative for confusion.       Objective:     Vital Signs (Most Recent):  Temp: 97.2 °F (36.2 °C) (08/30/24 1145)  Pulse: 64 (08/30/24 0715)  Resp: 16 (08/30/24 0715)  BP: (!) 183/69 (08/30/24 0715)  SpO2: 96 % (08/30/24 0715) Vital Signs (24h Range):  Temp:  [97.2 °F (36.2 °C)-98.7 °F (37.1 °C)] 97.2 °F (36.2 °C)  Pulse:  [59-75] 64  Resp:  [12-20] 16  SpO2:  [96 %-98 %] 96 %  BP: (155-183)/(56-99) 183/69     Weight: 81.1 kg (178 lb 14.4 oz)  Body mass index is 27.2 kg/m².    Intake/Output Summary (Last 24 hours) at 8/30/2024 1541  Last data filed at 8/30/2024 0836  Gross per 24 hour   Intake 1532 ml   Output --   Net 1532 ml         Physical Exam  Constitutional:       General: He is not in acute distress.     Appearance: Normal appearance. He is not ill-appearing.   HENT:      Head: Normocephalic and atraumatic.   Eyes:      General: No scleral icterus.     Conjunctiva/sclera: Conjunctivae normal.      Pupils: Pupils are equal, round, and reactive to light.   Cardiovascular:      Rate and Rhythm: Normal rate and regular rhythm.   Pulmonary:      Effort: Pulmonary effort is normal. No respiratory distress.   Abdominal:      Palpations: Abdomen is soft.      Tenderness: There is no abdominal tenderness.   Musculoskeletal:      Cervical back: Rigidity present.   Skin:     Coloration: Skin is not jaundiced or pale.      Findings: Wound (extensive sacral/perineal wound status-post debridement - see media) present. No rash.   Neurological:      Mental Status: He is alert.   Psychiatric:         Behavior: Behavior normal.         Thought Content: Thought content normal.             Significant Labs: All pertinent labs within the past 24 hours have been reviewed.    Significant Imaging: I have reviewed all pertinent imaging results/findings within the past 24 hours.    Assessment/Plan:      * Necrotizing soft tissue infection  Further discussed with surgery today, recommendations for wound care to manage and will re-consult if further surgical  "needs arise.   ---  Perirectal abscess now status-post extensive debridement and revision. Subsequent large sacral and perineal wound now healing appropriately.     Anaerobic culture positive, currently receiving treatment with clindamycin and Zosyn. Will likely require extended course due to location of wound and risk of contamination.     - aggressive wound care including dressing changes daily and with each bowel movement  - antibiotics as below    Antibiotics (From admission, onward)      Start     Stop Route Frequency Ordered    08/29/24 2100  mupirocin 2 % ointment         09/03/24 2059 Nasl 2 times daily 08/29/24 1559    08/29/24 1830  piperacillin-tazobactam (ZOSYN) 4.5 g in D5W 100 mL IVPB (MB+)         -- IV Every 8 hours (non-standard times) 08/29/24 1558    08/29/24 1730  clindamycin in D5W 600 mg/50 mL IVPB 600 mg         -- IV Every 8 hours (non-standard times) 08/29/24 1558               Irritable bowel syndrome with both constipation and diarrhea  Long history of alternating diarrhea and constipation. Currently having diarrhea likely exacerbated by antibiotics which unfortunately necessitates multiple daily dressing changes. Will add probiotics and psyllium.    Paroxysmal atrial fibrillation  Patient with Paroxysmal (<7 days) atrial fibrillation which is controlled currently with  no medications . Patient is currently in atrial fibrillation.[id number] Score: 4. HASBLED Score: 1. Anticoagulation not indicated due to extensive wound, risk of bleeding, possible need for further surgery .    Abnormality of gait and mobility  Continue PT/OT      Type 2 diabetes mellitus with hyperglycemia, with long-term current use of insulin  Patient's FSGs are controlled on current medication regimen.  Last A1c reviewed-   Lab Results   Component Value Date    HGBA1C 6.7 (H) 08/23/2024     Most recent fingerstick glucose reviewed- No results for input(s): "POCTGLUCOSE" in the last 24 hours.  Current correctional scale  " Medium  Maintain anti-hyperglycemic dose as follows-   Antihyperglycemics (From admission, onward)      Start     Stop Route Frequency Ordered    08/29/24 2100  insulin glargine U-100 (Lantus) injection 20 Units         -- SubQ Nightly 08/29/24 1558    08/29/24 1556  insulin aspart U-100 injection 0-10 Units         -- SubQ Before meals & nightly PRN 08/29/24 1558          Hold Oral hypoglycemics while patient is in the hospital.      Atherosclerosis of native coronary artery of native heart without angina pectoris  Patient with known CAD s/p stent placement, which is controlled Will continue ASA and Statin and monitor for S/Sx of angina/ACS. Continue to monitor on telemetry.     Holding Plavix at this time due to bleeding risk and potential need for further surgery.    Stage 3a chronic kidney disease  Creatine stable for now. BMP reviewed- noted Estimated Creatinine Clearance: 24.4 mL/min (A) (based on SCr of 2.14 mg/dL (H)). according to latest data. Based on current GFR, CKD stage is stage 3 - GFR 30-59.  Monitor UOP and serial BMP and adjust therapy as needed. Renally dose meds. Avoid nephrotoxic medications and procedures.    Primary hypertension  Chronic, controlled. Latest blood pressure and vitals reviewed-     Temp:  [97.6 °F (36.4 °C)-98.5 °F (36.9 °C)]   Pulse:  [60-73]   Resp:  [15-22]   BP: (161-193)/(59-84)   SpO2:  [95 %-99 %] .   Home meds for hypertension were reviewed and noted below.   Hypertension Medications               nitroGLYCERIN (NITROSTAT) 0.4 MG SL tablet Place 1 tablet (0.4 mg total) under the tongue every 5 (five) minutes as needed for Chest pain.            While in the hospital, will manage blood pressure as follows; Continue home antihypertensive regimen    Will utilize p.r.n. blood pressure medication only if patient's blood pressure greater than 180/110 and he develops symptoms such as worsening chest pain or shortness of breath.      VTE Risk Mitigation (From admission, onward)            Ordered     enoxaparin injection 30 mg  Every 24 hours         08/29/24 1558     Place sequential compression device  Until discontinued         08/29/24 1558                    Discharge Planning   RICKY: 9/12/2024     Code Status: Full Code   Is the patient medically ready for discharge?:     Reason for patient still in hospital (select all that apply): Treatment  Discharge Plan A: Home with family, Home Health                  Smliey Sanchez DO  Department of Hospital Medicine   [facility]

## 2024-08-30 NOTE — SUBJECTIVE & OBJECTIVE
Interval History:     No significant events overnight, no new complaints or concerns. Continues to have diarrhea (again, with history of mixed IBS) so will make changes to therapy. Continue wound care.     Review of Systems   Constitutional:  Positive for fatigue. Negative for chills and fever.   Respiratory:  Negative for cough and shortness of breath.    Cardiovascular:  Negative for chest pain and palpitations.   Gastrointestinal:  Positive for constipation and diarrhea. Negative for abdominal pain, nausea and vomiting.   Genitourinary:  Negative for dysuria and flank pain.   Skin:  Positive for wound.   Neurological:  Positive for weakness. Negative for dizziness, syncope and headaches.   Psychiatric/Behavioral:  Negative for confusion.      Objective:     Vital Signs (Most Recent):  Temp: 97.2 °F (36.2 °C) (08/30/24 1145)  Pulse: 64 (08/30/24 0715)  Resp: 16 (08/30/24 0715)  BP: (!) 183/69 (08/30/24 0715)  SpO2: 96 % (08/30/24 0715) Vital Signs (24h Range):  Temp:  [97.2 °F (36.2 °C)-98.7 °F (37.1 °C)] 97.2 °F (36.2 °C)  Pulse:  [59-75] 64  Resp:  [12-20] 16  SpO2:  [96 %-98 %] 96 %  BP: (155-183)/(56-99) 183/69     Weight: 81.1 kg (178 lb 14.4 oz)  Body mass index is 27.2 kg/m².    Intake/Output Summary (Last 24 hours) at 8/30/2024 1541  Last data filed at 8/30/2024 0836  Gross per 24 hour   Intake 1532 ml   Output --   Net 1532 ml         Physical Exam  Constitutional:       General: He is not in acute distress.     Appearance: Normal appearance. He is not ill-appearing.   HENT:      Head: Normocephalic and atraumatic.   Eyes:      General: No scleral icterus.     Conjunctiva/sclera: Conjunctivae normal.      Pupils: Pupils are equal, round, and reactive to light.   Cardiovascular:      Rate and Rhythm: Normal rate and regular rhythm.   Pulmonary:      Effort: Pulmonary effort is normal. No respiratory distress.   Abdominal:      Palpations: Abdomen is soft.      Tenderness: There is no abdominal tenderness.    Musculoskeletal:      Cervical back: Rigidity present.   Skin:     Coloration: Skin is not jaundiced or pale.      Findings: Wound (extensive sacral/perineal wound status-post debridement - see media) present. No rash.   Neurological:      Mental Status: He is alert.   Psychiatric:         Behavior: Behavior normal.         Thought Content: Thought content normal.             Significant Labs: All pertinent labs within the past 24 hours have been reviewed.    Significant Imaging: I have reviewed all pertinent imaging results/findings within the past 24 hours.

## 2024-08-30 NOTE — NURSING
Patient has had a total of five bowel movement since shift has started a cdiff sent up to lab. Dressing has been changed a total of 5 times.

## 2024-08-31 LAB
BACTERIA BLD CULT: NORMAL
BACTERIA BLD CULT: NORMAL
GLUCOSE SERPL-MCNC: 141 MG/DL (ref 70–105)
GLUCOSE SERPL-MCNC: 171 MG/DL (ref 70–105)
GLUCOSE SERPL-MCNC: 212 MG/DL (ref 70–105)
GLUCOSE SERPL-MCNC: 88 MG/DL (ref 70–105)

## 2024-08-31 PROCEDURE — 11000008

## 2024-08-31 PROCEDURE — 25000242 PHARM REV CODE 250 ALT 637 W/ HCPCS: Performed by: FAMILY MEDICINE

## 2024-08-31 PROCEDURE — 97116 GAIT TRAINING THERAPY: CPT

## 2024-08-31 PROCEDURE — 99232 SBSQ HOSP IP/OBS MODERATE 35: CPT | Mod: ,,, | Performed by: FAMILY MEDICINE

## 2024-08-31 PROCEDURE — 25000003 PHARM REV CODE 250: Performed by: FAMILY MEDICINE

## 2024-08-31 PROCEDURE — 63600175 PHARM REV CODE 636 W HCPCS: Performed by: FAMILY MEDICINE

## 2024-08-31 PROCEDURE — A6212 FOAM DRG <=16 SQ IN W/BORDER: HCPCS

## 2024-08-31 PROCEDURE — 82962 GLUCOSE BLOOD TEST: CPT

## 2024-08-31 RX ADMIN — CHOLECALCIFEROL TAB 125 MCG (5000 UNIT) 5000 UNITS: 125 TAB at 08:08

## 2024-08-31 RX ADMIN — INSULIN ASPART 1 UNITS: 100 INJECTION, SOLUTION INTRAVENOUS; SUBCUTANEOUS at 08:08

## 2024-08-31 RX ADMIN — PSYLLIUM HUSK 1 PACKET: 3.4 POWDER ORAL at 08:08

## 2024-08-31 RX ADMIN — ENOXAPARIN SODIUM 30 MG: 30 INJECTION SUBCUTANEOUS at 05:08

## 2024-08-31 RX ADMIN — OXYCODONE HYDROCHLORIDE AND ACETAMINOPHEN 500 MG: 500 TABLET ORAL at 08:08

## 2024-08-31 RX ADMIN — DEXTROSE MONOHYDRATE: 12500 INJECTION, SOLUTION INTRAVENOUS at 11:08

## 2024-08-31 RX ADMIN — ASPIRIN 81 MG: 81 TABLET, COATED ORAL at 08:08

## 2024-08-31 RX ADMIN — ZINC SULFATE 220 MG (50 MG) CAPSULE 220 MG: CAPSULE at 08:08

## 2024-08-31 RX ADMIN — PIPERACILLIN AND TAZOBACTAM 4.5 G: 4; .5 INJECTION, POWDER, LYOPHILIZED, FOR SOLUTION INTRAVENOUS; PARENTERAL at 06:08

## 2024-08-31 RX ADMIN — Medication 2 CAPSULE: at 11:08

## 2024-08-31 RX ADMIN — PIPERACILLIN AND TAZOBACTAM 4.5 G: 4; .5 INJECTION, POWDER, LYOPHILIZED, FOR SOLUTION INTRAVENOUS; PARENTERAL at 03:08

## 2024-08-31 RX ADMIN — INSULIN ASPART 4 UNITS: 100 INJECTION, SOLUTION INTRAVENOUS; SUBCUTANEOUS at 04:08

## 2024-08-31 RX ADMIN — PRAVASTATIN SODIUM 80 MG: 40 TABLET ORAL at 08:08

## 2024-08-31 RX ADMIN — PANTOPRAZOLE SODIUM 40 MG: 40 TABLET, DELAYED RELEASE ORAL at 08:08

## 2024-08-31 RX ADMIN — Medication 2 CAPSULE: at 05:08

## 2024-08-31 RX ADMIN — DEXTROSE MONOHYDRATE: 12500 INJECTION, SOLUTION INTRAVENOUS at 03:08

## 2024-08-31 RX ADMIN — Medication 2 CAPSULE: at 08:08

## 2024-08-31 RX ADMIN — MUPIROCIN: 20 OINTMENT TOPICAL at 08:08

## 2024-08-31 RX ADMIN — INSULIN GLARGINE 20 UNITS: 100 INJECTION, SOLUTION SUBCUTANEOUS at 08:08

## 2024-08-31 NOTE — PT/OT/SLP PROGRESS
Physical Therapy Treatment    Patient Name:  Negro Hale   MRN:  [id number]    Recommendations:     Discharge Recommendations: Low Intensity Therapy  Discharge Equipment Recommendations: none  Barriers to discharge:  ongoing medical care    Assessment:     Negro Hale is a 84 y.o. male admitted with a medical diagnosis of Necrotizing soft tissue infection.  He presents with the following impairments/functional limitations: weakness, impaired endurance, gait instability, pain, impaired skin. Pt demo improved ambulation distance as compared to yesterday.     Rehab Prognosis: Good; patient would benefit from acute skilled PT services to address these deficits and reach maximum level of function.    Recent Surgery: * No surgery found *      Plan:     During this hospitalization, patient to be seen 5 x/week to address the identified rehab impairments via gait training, therapeutic activities, therapeutic exercises, neuromuscular re-education and progress toward the following goals:    Plan of Care Expires:  09/30/24    Subjective     Chief Complaint: necrotizing soft tissue infection  Patient/Family Comments/goals: agreeable   Pain/Comfort:  Pain Rating 1: 5/10  Location 1: gluteal  Pain Addressed 1: Pre-medicate for activity, Reposition  Pain Rating Post-Intervention 1: 5/10      Objective:     Communicated with RN prior to session.  Patient found up in chair with peripheral IV upon PT entry to room.     General Precautions: Standard, fall  Orthopedic Precautions: N/A  Braces: N/A  Respiratory Status: Room air     Functional Mobility:  Transfers:     Sit to Stand:  contact guard assistance with rolling walker  Toilet Transfer: stand by assistance with  rolling walker and grab bars  using  Step Transfer  Gait: 140ft with RW, CGA   Balance: Fair in stance       AM-PAC 6 CLICK MOBILITY  Turning over in bed (including adjusting bedclothes, sheets and blankets)?: 4  Sitting down on and standing up from a chair with  arms (e.g., wheelchair, bedside commode, etc.): 4  Moving from lying on back to sitting on the side of the bed?: 4  Moving to and from a bed to a chair (including a wheelchair)?: 3  Need to walk in hospital room?: 3  Climbing 3-5 steps with a railing?: 3  Basic Mobility Total Score: 21       Treatment & Education:  Mobility as stated above    Patient left  on toilet  with  RN notified..    GOALS:   Multidisciplinary Problems       Physical Therapy Goals          Problem: Physical Therapy    Goal Priority Disciplines Outcome Goal Variances Interventions   Physical Therapy Goal     PT, PT/OT Progressing     Description: Short Term Goals to be met by: 24    Patient will increase functional independence with mobility by performin. Gait  x 300 feet with Stand by assist using Rolling walker  2. Lower extremity exercise program x30 reps per handout, with assistance as needed  3. Pt to be indep in all functional transfers.  4. Pt to negotiate 3 steps with rail with SBA    Long Term Goals to be met by: 24    Pt will regain full independent functional mobility with Rolling walker to return to home situation and prior activities of daily living.                        Time Tracking:     PT Received On: 24  PT Start Time: 1040     PT Stop Time: 1055  PT Total Time (min): 15 min     Billable Minutes: Gait Training 10    Treatment Type: Evaluation  PT/PTA: PT     Number of PTA visits since last PT visit: 0     2024

## 2024-08-31 NOTE — ASSESSMENT & PLAN NOTE
Creatine stable for now. BMP reviewed- noted Estimated Creatinine Clearance: 21.7 mL/min (A) (based on SCr of 2.41 mg/dL (H)). according to latest data. Based on current GFR, CKD stage is stage 3 - GFR 30-59.  Monitor UOP and serial BMP and adjust therapy as needed. Renally dose meds. Avoid nephrotoxic medications and procedures.

## 2024-08-31 NOTE — PROGRESS NOTES
[facility] Medicine  Progress Note    Patient Name: Negro Hale  MRN: [id number]  Patient Class: IP- Inpatient   Admission Date: 8/29/2024  Length of Stay: 2 days  Attending Physician: Smiley Sanchez DO  Primary Care Provider: Smiley Trevino FNP        Subjective:     Principal Problem:Necrotizing soft tissue infection        HPI:    Negro Hale is a 84 y.o. male with history of HTN, CAD, CKD, IBS, and a-fib who initially presented to [facility] with perirectal abscess. Surgical evaluation revealed necrotizing soft tissue infection requiring extensive debridement with revision (most recent procedure 8/26). Anaerobic cultures positive and patient placed on appropriate antibiotics. Initial blood culture with micrococcus species thought to be a contaminant, with repeat cultures negative. Disposition complicated by diarrhea requiring frequent wound care and dressing changes due to soiling, as well and deconditioning due to acute illness and hospitalization. Patient admitted to [facility] for continued antibiotics, rehabilitation, and aggressive wound care.       Overview/Hospital Course:  No notes on file    Interval History:     No significant events overnight, no new complaints or concerns. Loose stool minimally improving with probiotics and bulking agent.     Review of Systems   Constitutional:  Positive for fatigue. Negative for chills and fever.   Respiratory:  Negative for cough and shortness of breath.    Cardiovascular:  Negative for chest pain and palpitations.   Gastrointestinal:  Positive for constipation and diarrhea. Negative for abdominal pain, nausea and vomiting.   Genitourinary:  Negative for dysuria and flank pain.   Skin:  Positive for wound.   Neurological:  Positive for weakness. Negative for dizziness, syncope and headaches.   Psychiatric/Behavioral:  Negative for confusion.      Objective:     Vital Signs (Most  Recent):  Temp: 98 °F (36.7 °C) (08/31/24 1138)  Pulse: 61 (08/31/24 1138)  Resp: 20 (08/31/24 1138)  BP: (!) 176/74 (08/31/24 1138)  SpO2: 97 % (08/31/24 1138) Vital Signs (24h Range):  Temp:  [97.2 °F (36.2 °C)-99 °F (37.2 °C)] 98 °F (36.7 °C)  Pulse:  [60-71] 61  Resp:  [12-20] 20  SpO2:  [95 %-97 %] 97 %  BP: (156-176)/(57-74) 176/74     Weight: 81.1 kg (178 lb 14.4 oz)  Body mass index is 27.2 kg/m².    Intake/Output Summary (Last 24 hours) at 8/31/2024 1141  Last data filed at 8/31/2024 1025  Gross per 24 hour   Intake 1495.69 ml   Output --   Net 1495.69 ml         Physical Exam  Constitutional:       General: He is not in acute distress.     Appearance: Normal appearance. He is not ill-appearing.   HENT:      Head: Normocephalic and atraumatic.   Eyes:      General: No scleral icterus.     Conjunctiva/sclera: Conjunctivae normal.      Pupils: Pupils are equal, round, and reactive to light.   Cardiovascular:      Rate and Rhythm: Normal rate and regular rhythm.   Pulmonary:      Effort: Pulmonary effort is normal. No respiratory distress.   Abdominal:      Palpations: Abdomen is soft.      Tenderness: There is no abdominal tenderness.   Musculoskeletal:      Cervical back: Rigidity present.   Skin:     Coloration: Skin is not jaundiced or pale.      Findings: Wound (extensive sacral/perineal wound status-post debridement - see media) present. No rash.   Neurological:      Mental Status: He is alert.   Psychiatric:         Behavior: Behavior normal.         Thought Content: Thought content normal.             Significant Labs: All pertinent labs within the past 24 hours have been reviewed.    Significant Imaging: I have reviewed all pertinent imaging results/findings within the past 24 hours.    Assessment/Plan:      * Necrotizing soft tissue infection  Further discussed with surgery upon admission to [facility], recommendations for wound care to manage and will re-consult if further surgical needs arise.  "    Continue antibiotics and dressing changes.   ---  Perirectal abscess now status-post extensive debridement and revision. Subsequent large sacral and perineal wound now healing appropriately.     Anaerobic culture positive, currently receiving treatment with clindamycin and Zosyn. Will likely require extended course due to location of wound and risk of contamination.     - aggressive wound care including dressing changes daily and with each bowel movement  - antibiotics as below    Antibiotics (From admission, onward)      Start     Stop Route Frequency Ordered    08/29/24 2100  mupirocin 2 % ointment         09/03/24 2059 Nasl 2 times daily 08/29/24 1559    08/29/24 1830  piperacillin-tazobactam (ZOSYN) 4.5 g in D5W 100 mL IVPB (MB+)         -- IV Every 8 hours (non-standard times) 08/29/24 1558               Irritable bowel syndrome with both constipation and diarrhea  Long history of alternating diarrhea and constipation. Currently having diarrhea likely exacerbated by antibiotics which unfortunately necessitates multiple daily dressing changes. Will add probiotics and psyllium.    Paroxysmal atrial fibrillation  Patient with Paroxysmal (<7 days) atrial fibrillation which is controlled currently with  no medications . Patient is currently in atrial fibrillation.[id number] Score: 4. HASBLED Score: 1. Anticoagulation not indicated due to extensive wound, risk of bleeding, possible need for further surgery .    Abnormality of gait and mobility  Continue PT/OT      Type 2 diabetes mellitus with hyperglycemia, with long-term current use of insulin  Patient's FSGs are controlled on current medication regimen.  Last A1c reviewed-   Lab Results   Component Value Date    HGBA1C 6.7 (H) 08/23/2024     Most recent fingerstick glucose reviewed- No results for input(s): "POCTGLUCOSE" in the last 24 hours.  Current correctional scale  Medium  Maintain anti-hyperglycemic dose as follows-   Antihyperglycemics (From admission, " onward)      Start     Stop Route Frequency Ordered    08/29/24 2100  insulin glargine U-100 (Lantus) injection 20 Units         -- SubQ Nightly 08/29/24 1558    08/29/24 1556  insulin aspart U-100 injection 0-10 Units         -- SubQ Before meals & nightly PRN 08/29/24 1558          Hold Oral hypoglycemics while patient is in the hospital.      Atherosclerosis of native coronary artery of native heart without angina pectoris  Patient with known CAD s/p stent placement, which is controlled Will continue ASA and Statin and monitor for S/Sx of angina/ACS. Continue to monitor on telemetry.     Holding Plavix at this time due to bleeding risk and potential need for further surgery.    Stage 3a chronic kidney disease  Creatine stable for now. BMP reviewed- noted Estimated Creatinine Clearance: 21.7 mL/min (A) (based on SCr of 2.41 mg/dL (H)). according to latest data. Based on current GFR, CKD stage is stage 3 - GFR 30-59.  Monitor UOP and serial BMP and adjust therapy as needed. Renally dose meds. Avoid nephrotoxic medications and procedures.    Primary hypertension  Chronic, controlled. Latest blood pressure and vitals reviewed-     Temp:  [97.2 °F (36.2 °C)-99 °F (37.2 °C)]   Pulse:  [60-71]   Resp:  [12-20]   BP: (156-176)/(57-74)   SpO2:  [95 %-97 %] .   Home meds for hypertension were reviewed and noted below.   Hypertension Medications               nitroGLYCERIN (NITROSTAT) 0.4 MG SL tablet Place 1 tablet (0.4 mg total) under the tongue every 5 (five) minutes as needed for Chest pain.            While in the hospital, will manage blood pressure as follows; Continue home antihypertensive regimen    Will utilize p.r.n. blood pressure medication only if patient's blood pressure greater than 180/110 and he develops symptoms such as worsening chest pain or shortness of breath.      VTE Risk Mitigation (From admission, onward)           Ordered     enoxaparin injection 30 mg  Every 24 hours         08/29/24 6234      Place sequential compression device  Until discontinued         08/29/24 1558                    Discharge Planning   RICKY: 9/12/2024     Code Status: Full Code   Is the patient medically ready for discharge?:     Reason for patient still in hospital (select all that apply): Treatment  Discharge Plan A: Home with family, Home Health                  Smiley Sanchez DO  Department of Hospital Medicine   [facility]

## 2024-08-31 NOTE — SUBJECTIVE & OBJECTIVE
Interval History:     No significant events overnight, no new complaints or concerns. Loose stool minimally improving with probiotics and bulking agent.     Review of Systems   Constitutional:  Positive for fatigue. Negative for chills and fever.   Respiratory:  Negative for cough and shortness of breath.    Cardiovascular:  Negative for chest pain and palpitations.   Gastrointestinal:  Positive for constipation and diarrhea. Negative for abdominal pain, nausea and vomiting.   Genitourinary:  Negative for dysuria and flank pain.   Skin:  Positive for wound.   Neurological:  Positive for weakness. Negative for dizziness, syncope and headaches.   Psychiatric/Behavioral:  Negative for confusion.      Objective:     Vital Signs (Most Recent):  Temp: 98 °F (36.7 °C) (08/31/24 1138)  Pulse: 61 (08/31/24 1138)  Resp: 20 (08/31/24 1138)  BP: (!) 176/74 (08/31/24 1138)  SpO2: 97 % (08/31/24 1138) Vital Signs (24h Range):  Temp:  [97.2 °F (36.2 °C)-99 °F (37.2 °C)] 98 °F (36.7 °C)  Pulse:  [60-71] 61  Resp:  [12-20] 20  SpO2:  [95 %-97 %] 97 %  BP: (156-176)/(57-74) 176/74     Weight: 81.1 kg (178 lb 14.4 oz)  Body mass index is 27.2 kg/m².    Intake/Output Summary (Last 24 hours) at 8/31/2024 1141  Last data filed at 8/31/2024 1025  Gross per 24 hour   Intake 1495.69 ml   Output --   Net 1495.69 ml         Physical Exam  Constitutional:       General: He is not in acute distress.     Appearance: Normal appearance. He is not ill-appearing.   HENT:      Head: Normocephalic and atraumatic.   Eyes:      General: No scleral icterus.     Conjunctiva/sclera: Conjunctivae normal.      Pupils: Pupils are equal, round, and reactive to light.   Cardiovascular:      Rate and Rhythm: Normal rate and regular rhythm.   Pulmonary:      Effort: Pulmonary effort is normal. No respiratory distress.   Abdominal:      Palpations: Abdomen is soft.      Tenderness: There is no abdominal tenderness.   Musculoskeletal:      Cervical back: Rigidity  present.   Skin:     Coloration: Skin is not jaundiced or pale.      Findings: Wound (extensive sacral/perineal wound status-post debridement - see media) present. No rash.   Neurological:      Mental Status: He is alert.   Psychiatric:         Behavior: Behavior normal.         Thought Content: Thought content normal.             Significant Labs: All pertinent labs within the past 24 hours have been reviewed.    Significant Imaging: I have reviewed all pertinent imaging results/findings within the past 24 hours.

## 2024-08-31 NOTE — PT/OT/SLP EVAL
Physical Therapy Evaluation and Treatment    Patient Name: Negro Hale   MRN: [id number]  Recent Surgery: * No surgery found *      Recommendations:     Discharge Recommendations: Low Intensity Therapy   Discharge Equipment Recommendations: none   Barriers to discharge: Ongoing medical treatment    Assessment:     Negro Hale is a 84 y.o. male admitted with a medical diagnosis of Necrotizing soft tissue infection. He presents with the following impairments/functional limitations: weakness, impaired endurance, gait instability, pain, impaired skin. Pt is able to ambulate with assistance in his room, but is slightly unsteady, having impaired balance.     Rehab Prognosis: Good; patient would benefit from acute PT services to address these deficits and reach maximum level of function.    Plan:     During this hospitalization, patient to be seen 5 x/week to address the above listed problems via gait training, therapeutic activities, therapeutic exercises, neuromuscular re-education    Plan of Care Expires: 09/30/24    Subjective     Chief Complaint: soft tissue infection  Patient Comments/Goals: agreeable to eval  Pain/Comfort:  Pain Rating 1: 5/10  Location 1: gluteal  Pain Addressed 1: Pre-medicate for activity, Reposition  Pain Rating Post-Intervention 1: 5/10    Social History:  Living Environment: Patient lives with their spouse in a single story home with number of outside stair(s): 1-2 and number of inside stair(s): 2  Prior Level of Function: Prior to admission, patient was modified independent  Equipment Used at Home: walker, rolling, rollator, cane, straight  DME owned (not currently used): rolling walker, single point cane, bedside commode, and shower chair  Assistance Upon Discharge: family    Objective:     Communicated with RN prior to session. Patient found HOB elevated with peripheral IV upon PT entry to room.    General Precautions: Standard, fall   Orthopedic Precautions: N/A   Braces: N/A     Respiratory Status: Room air    Exams:  RLE ROM: WFL  RLE Strength: WFL  LLE ROM: WFL  LLE Strength: WFL except knee flexion 4-/5  Cognitive: Patient is oriented to Person, Place, Time, Situation  Sensation:    -       Intact    Functional Mobility:  Gait belt applied - Yes  Bed Mobility  Supine to Sit: stand by assistance for safety  Transfers  Sit to Stand: contact guard assistance with rolling walker and with cues for hand placement and foot placement  Toilet Transfer: contact guard to standby assistance with rolling walker and with cues for hand placement and foot placement using Step Transfer  Gait  Patient ambulated 20ft x 2 with rolling walker and stand by assistance and contact guard assistance. Patient required cues for position in walker and upright posture to increase independence and safety. Patient required cues ~ 25% of the time.  Balance  Sitting: FAIR+: Maintains balance through MINIMAL excursions of active trunk motion  Standing: FAIR: Needs CONTACT GUARD during gait      Therapeutic Activities and Exercises:  Patient educated on role of acute care PT and PT POC, safety while in hospital including calling nurse for mobility, and call light usage.  Educated about importance of OOB mobility and remaining up in chair most of the day.  Patient educated in:  -PT role and POC  -safety with transfers including hand placement  -gait sequence and RW management      AM-PAC 6 CLICK MOBILITY  Total Score:21    Patient left up in chair with all lines intact, call button in reach, and RN present.    GOALS:   Multidisciplinary Problems       Physical Therapy Goals          Problem: Physical Therapy    Goal Priority Disciplines Outcome Goal Variances Interventions   Physical Therapy Goal     PT, PT/OT Progressing     Description: Short Term Goals to be met by: 24    Patient will increase functional independence with mobility by performin. Gait  x 300 feet with Stand by assist using Rolling walker  2.  Lower extremity exercise program x30 reps per handout, with assistance as needed  3. Pt to be indep in all functional transfers.  4. Pt to negotiate 3 steps with rail with SBA    Long Term Goals to be met by: 9/30/24    Pt will regain full independent functional mobility with Rolling walker to return to home situation and prior activities of daily living.                        History:     Past Medical History:   Diagnosis Date    Abnormal thyroid stimulating hormone (TSH) level 08/22/2019    Anemia 08/15/2019    Atrial fibrillation 09/07/2012    Bradycardia 05/23/2017    asymptomatic    Change in bowel habit 11/13/2018    Chronic kidney disease, unspecified 01/14/2019    Coronary arteriosclerosis 09/07/2012    Disease of skin and subcutaneous tissue 08/16/2017    medial aspect RLE- cystic structure seen on venous doppler US.    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 Lewis    Type 2 diabetes mellitus with mild nonproliferative retinopathy of right eye without macular edema 08/25/2023    See  Dr. Lewis Eye exam    Varicose veins of both lower extremities with pain 01/02/2019    Venous insufficiency 01/08/2019       Past Surgical History:   Procedure Laterality Date    APPENDECTOMY      CARDIAC CATHETERIZATION  2007    [facility]- Stent placement    CATARACT EXTRACTION, BILATERAL      COLONOSCOPY  11/06/2019    Dr. Mendoza    HERNIA REPAIR      Inguinal hernia repair    INCISION AND DRAINAGE OF PERIRECTAL REGION Right 8/23/2024    Procedure: INCISION AND DRAINAGE, WITH DEBRIDEMENT PERIRECTAL REGION;  Surgeon: Pop Butcher MD;  Location: [facility], [place];  Service: General;  Laterality: Right;    INCISION AND DRAINAGE OF PERIRECTAL REGION Bilateral 8/26/2024    Procedure: INCISION AND DRAINAGE, PERIRECTAL REGION;  Surgeon: Pop Butcher MD;  Location: [facility], [place];  Service: General;  Laterality: Bilateral;    TONSILLECTOMY         Time Tracking:     PT Received On: 08/30/24  PT Start Time: 1611  PT Stop Time: 1636  PT Total Time (min): 25 min     Billable Minutes: Evaluation 12 and Therapeutic Activity 12    8/31/2024 8/31/2024

## 2024-08-31 NOTE — PLAN OF CARE
Problem: Physical Therapy  Goal: Physical Therapy Goal  Description: Short Term Goals to be met by: 24    Patient will increase functional independence with mobility by performin. Gait  x 300 feet with Stand by assist using Rolling walker  2. Lower extremity exercise program x30 reps per handout, with assistance as needed  3. Pt to be indep in all functional transfers.  4. Pt to negotiate 3 steps with rail with SBA    Long Term Goals to be met by: 24    Pt will regain full independent functional mobility with Rolling walker to return to home situation and prior activities of daily living.   Outcome: Progressing     PT eval completed. Please see eval note for details.

## 2024-08-31 NOTE — PLAN OF CARE
Problem: Adult Inpatient Plan of Care  Goal: Plan of Care Review  Outcome: Progressing  Flowsheets (Taken 8/31/2024 1845)  Plan of Care Reviewed With:   patient   spouse  Goal: Optimal Comfort and Wellbeing  Outcome: Progressing  Intervention: Monitor Pain and Promote Comfort  Flowsheets (Taken 8/31/2024 1845)  Pain Management Interventions:   pillow support provided   position adjusted   quiet environment facilitated   relaxation techniques promoted  Intervention: Provide Person-Centered Care  Flowsheets (Taken 8/31/2024 1845)  Trust Relationship/Rapport:   care explained   choices provided   questions encouraged   reassurance provided   emotional support provided   thoughts/feelings acknowledged   empathic listening provided   questions answered     Problem: Diabetes Comorbidity  Goal: Blood Glucose Level Within Targeted Range  Outcome: Progressing  Intervention: Monitor and Manage Glycemia  Flowsheets (Taken 8/31/2024 1845)  Glycemic Management:   blood glucose monitored   supplemental insulin given     Problem: Fall Injury Risk  Goal: Absence of Fall and Fall-Related Injury  Outcome: Progressing  Intervention: Identify and Manage Contributors  Flowsheets (Taken 8/31/2024 1845)  Self-Care Promotion:   independence encouraged   meal set-up provided  Medication Review/Management: medications reviewed  Intervention: Promote Injury-Free Environment  Flowsheets (Taken 8/31/2024 1845)  Safety Promotion/Fall Prevention:   assistive device/personal item within reach   Fall Risk reviewed with patient/family   Fall Risk signage in place   family expresses understanding of fall risk and prevention   instructed to call staff for mobility   nonskid shoes/socks when out of bed   patient expresses understanding of fall risk and prevention   Supervised toileting - stay within arms reach   toileting scheduled

## 2024-08-31 NOTE — ASSESSMENT & PLAN NOTE
Chronic, controlled. Latest blood pressure and vitals reviewed-     Temp:  [97.2 °F (36.2 °C)-99 °F (37.2 °C)]   Pulse:  [60-71]   Resp:  [12-20]   BP: (156-176)/(57-74)   SpO2:  [95 %-97 %] .   Home meds for hypertension were reviewed and noted below.   Hypertension Medications               nitroGLYCERIN (NITROSTAT) 0.4 MG SL tablet Place 1 tablet (0.4 mg total) under the tongue every 5 (five) minutes as needed for Chest pain.            While in the hospital, will manage blood pressure as follows; Continue home antihypertensive regimen    Will utilize p.r.n. blood pressure medication only if patient's blood pressure greater than 180/110 and he develops symptoms such as worsening chest pain or shortness of breath.

## 2024-09-01 LAB
GLUCOSE SERPL-MCNC: 101 MG/DL (ref 70–105)
GLUCOSE SERPL-MCNC: 135 MG/DL (ref 70–105)
GLUCOSE SERPL-MCNC: 153 MG/DL (ref 70–105)
GLUCOSE SERPL-MCNC: 213 MG/DL (ref 70–105)

## 2024-09-01 PROCEDURE — 99232 SBSQ HOSP IP/OBS MODERATE 35: CPT | Mod: ,,, | Performed by: FAMILY MEDICINE

## 2024-09-01 PROCEDURE — 11000008

## 2024-09-01 PROCEDURE — 25000242 PHARM REV CODE 250 ALT 637 W/ HCPCS: Performed by: FAMILY MEDICINE

## 2024-09-01 PROCEDURE — 25000003 PHARM REV CODE 250: Performed by: FAMILY MEDICINE

## 2024-09-01 PROCEDURE — 82962 GLUCOSE BLOOD TEST: CPT

## 2024-09-01 PROCEDURE — 63600175 PHARM REV CODE 636 W HCPCS: Performed by: FAMILY MEDICINE

## 2024-09-01 RX ADMIN — Medication 2 CAPSULE: at 05:09

## 2024-09-01 RX ADMIN — PIPERACILLIN AND TAZOBACTAM 4.5 G: 4; .5 INJECTION, POWDER, LYOPHILIZED, FOR SOLUTION INTRAVENOUS; PARENTERAL at 06:09

## 2024-09-01 RX ADMIN — ZINC SULFATE 220 MG (50 MG) CAPSULE 220 MG: CAPSULE at 08:09

## 2024-09-01 RX ADMIN — OXYCODONE HYDROCHLORIDE AND ACETAMINOPHEN 500 MG: 500 TABLET ORAL at 08:09

## 2024-09-01 RX ADMIN — DEXTROSE MONOHYDRATE: 12500 INJECTION, SOLUTION INTRAVENOUS at 03:09

## 2024-09-01 RX ADMIN — INSULIN ASPART 4 UNITS: 100 INJECTION, SOLUTION INTRAVENOUS; SUBCUTANEOUS at 05:09

## 2024-09-01 RX ADMIN — PRAVASTATIN SODIUM 80 MG: 40 TABLET ORAL at 09:09

## 2024-09-01 RX ADMIN — Medication 2 CAPSULE: at 11:09

## 2024-09-01 RX ADMIN — DEXTROSE MONOHYDRATE: 12500 INJECTION, SOLUTION INTRAVENOUS at 10:09

## 2024-09-01 RX ADMIN — PANTOPRAZOLE SODIUM 40 MG: 40 TABLET, DELAYED RELEASE ORAL at 08:09

## 2024-09-01 RX ADMIN — ASPIRIN 81 MG: 81 TABLET, COATED ORAL at 08:09

## 2024-09-01 RX ADMIN — MUPIROCIN: 20 OINTMENT TOPICAL at 09:09

## 2024-09-01 RX ADMIN — ENOXAPARIN SODIUM 30 MG: 30 INJECTION SUBCUTANEOUS at 05:09

## 2024-09-01 RX ADMIN — CHOLECALCIFEROL TAB 125 MCG (5000 UNIT) 5000 UNITS: 125 TAB at 08:09

## 2024-09-01 RX ADMIN — INSULIN GLARGINE 20 UNITS: 100 INJECTION, SOLUTION SUBCUTANEOUS at 09:09

## 2024-09-01 RX ADMIN — PIPERACILLIN AND TAZOBACTAM 4.5 G: 4; .5 INJECTION, POWDER, LYOPHILIZED, FOR SOLUTION INTRAVENOUS; PARENTERAL at 10:09

## 2024-09-01 RX ADMIN — Medication 2 CAPSULE: at 08:09

## 2024-09-01 RX ADMIN — PSYLLIUM HUSK 1 PACKET: 3.4 POWDER ORAL at 08:09

## 2024-09-01 RX ADMIN — PIPERACILLIN AND TAZOBACTAM 4.5 G: 4; .5 INJECTION, POWDER, LYOPHILIZED, FOR SOLUTION INTRAVENOUS; PARENTERAL at 03:09

## 2024-09-01 RX ADMIN — PSYLLIUM HUSK 1 PACKET: 3.4 POWDER ORAL at 09:09

## 2024-09-01 RX ADMIN — MUPIROCIN: 20 OINTMENT TOPICAL at 08:09

## 2024-09-01 RX ADMIN — INSULIN ASPART 1 UNITS: 100 INJECTION, SOLUTION INTRAVENOUS; SUBCUTANEOUS at 09:09

## 2024-09-01 NOTE — PROGRESS NOTES
[facility] Medicine  Progress Note    Patient Name: Negro Hale  MRN: [id number]  Patient Class: IP- Inpatient   Admission Date: 8/29/2024  Length of Stay: 3 days  Attending Physician: Smiley Sanchez DO  Primary Care Provider: Smilye Trevino FNP        Subjective:     Principal Problem:Necrotizing soft tissue infection        HPI:    Negro Hale is a 84 y.o. male with history of HTN, CAD, CKD, IBS, and a-fib who initially presented to [facility] with perirectal abscess. Surgical evaluation revealed necrotizing soft tissue infection requiring extensive debridement with revision (most recent procedure 8/26). Anaerobic cultures positive and patient placed on appropriate antibiotics. Initial blood culture with micrococcus species thought to be a contaminant, with repeat cultures negative. Disposition complicated by diarrhea requiring frequent wound care and dressing changes due to soiling, as well and deconditioning due to acute illness and hospitalization. Patient admitted to [facility] for continued antibiotics, rehabilitation, and aggressive wound care.       Overview/Hospital Course:  No notes on file    Interval History:     No significant events overnight, no new complaints or concerns. Loose stool minimally improving with probiotics and bulking agent.     Review of Systems   Constitutional:  Positive for fatigue. Negative for chills and fever.   Respiratory:  Negative for cough and shortness of breath.    Cardiovascular:  Negative for chest pain and palpitations.   Gastrointestinal:  Positive for constipation and diarrhea. Negative for abdominal pain, nausea and vomiting.   Genitourinary:  Negative for dysuria and flank pain.   Skin:  Positive for wound.   Neurological:  Positive for weakness. Negative for dizziness, syncope and headaches.   Psychiatric/Behavioral:  Negative for confusion.      Objective:     Vital Signs (Most  Recent):  Temp: 97.6 °F (36.4 °C) (09/01/24 1111)  Pulse: (!) 59 (09/01/24 1111)  Resp: 17 (09/01/24 1111)  BP: (!) 151/65 (09/01/24 1111)  SpO2: 96 % (09/01/24 1111) Vital Signs (24h Range):  Temp:  [97.6 °F (36.4 °C)-98.7 °F (37.1 °C)] 97.6 °F (36.4 °C)  Pulse:  [59-77] 59  Resp:  [12-24] 17  SpO2:  [96 %-98 %] 96 %  BP: (145-177)/(57-78) 151/65     Weight: 81.1 kg (178 lb 14.4 oz)  Body mass index is 27.2 kg/m².    Intake/Output Summary (Last 24 hours) at 9/1/2024 1618  Last data filed at 9/1/2024 1552  Gross per 24 hour   Intake 2405.06 ml   Output 300 ml   Net 2105.06 ml         Physical Exam  Constitutional:       General: He is not in acute distress.     Appearance: Normal appearance. He is not ill-appearing.   HENT:      Head: Normocephalic and atraumatic.   Eyes:      General: No scleral icterus.     Conjunctiva/sclera: Conjunctivae normal.      Pupils: Pupils are equal, round, and reactive to light.   Cardiovascular:      Rate and Rhythm: Normal rate and regular rhythm.   Pulmonary:      Effort: Pulmonary effort is normal. No respiratory distress.   Abdominal:      Palpations: Abdomen is soft.      Tenderness: There is no abdominal tenderness.   Skin:     Coloration: Skin is not jaundiced or pale.      Findings: Wound (extensive sacral/perineal wound status-post debridement - see media) present. No rash.   Neurological:      Mental Status: He is alert.   Psychiatric:         Behavior: Behavior normal.         Thought Content: Thought content normal.             Significant Labs: All pertinent labs within the past 24 hours have been reviewed.    Significant Imaging: I have reviewed all pertinent imaging results/findings within the past 24 hours.    Assessment/Plan:      * Necrotizing soft tissue infection  Further discussed with surgery upon admission to [facility], recommendations for wound care to manage and will re-consult if further surgical needs arise.     Continue antibiotics and dressing changes.  "  ---  Perirectal abscess now status-post extensive debridement and revision. Subsequent large sacral and perineal wound now healing appropriately.     Anaerobic culture positive, currently receiving treatment with clindamycin and Zosyn. Will likely require extended course due to location of wound and risk of contamination.     - aggressive wound care including dressing changes daily and with each bowel movement  - antibiotics as below    Antibiotics (From admission, onward)      Start     Stop Route Frequency Ordered    08/29/24 2100  mupirocin 2 % ointment         09/03/24 2059 Nasl 2 times daily 08/29/24 1559    08/29/24 1830  piperacillin-tazobactam (ZOSYN) 4.5 g in D5W 100 mL IVPB (MB+)         -- IV Every 8 hours (non-standard times) 08/29/24 1558               Irritable bowel syndrome with both constipation and diarrhea  Long history of alternating diarrhea and constipation. Currently having diarrhea likely exacerbated by antibiotics which unfortunately necessitates multiple daily dressing changes. Will add probiotics and psyllium.    Paroxysmal atrial fibrillation  Patient with Paroxysmal (<7 days) atrial fibrillation which is controlled currently with  no medications . Patient is currently in atrial fibrillation.[id number] Score: 4. HASBLED Score: 1. Anticoagulation not indicated due to extensive wound, risk of bleeding, possible need for further surgery .    Abnormality of gait and mobility  Continue PT/OT      Type 2 diabetes mellitus with hyperglycemia, with long-term current use of insulin  Patient's FSGs are controlled on current medication regimen.  Last A1c reviewed-   Lab Results   Component Value Date    HGBA1C 6.7 (H) 08/23/2024     Most recent fingerstick glucose reviewed- No results for input(s): "POCTGLUCOSE" in the last 24 hours.  Current correctional scale  Medium  Maintain anti-hyperglycemic dose as follows-   Antihyperglycemics (From admission, onward)      Start     Stop Route Frequency " Ordered    08/29/24 2100  insulin glargine U-100 (Lantus) injection 20 Units         -- SubQ Nightly 08/29/24 1558    08/29/24 1556  insulin aspart U-100 injection 0-10 Units         -- SubQ Before meals & nightly PRN 08/29/24 1558          Hold Oral hypoglycemics while patient is in the hospital.      Atherosclerosis of native coronary artery of native heart without angina pectoris  Patient with known CAD s/p stent placement, which is controlled Will continue ASA and Statin and monitor for S/Sx of angina/ACS. Continue to monitor on telemetry.     Holding Plavix at this time due to bleeding risk and potential need for further surgery.    Stage 3a chronic kidney disease  Creatine stable for now. BMP reviewed- noted Estimated Creatinine Clearance: 21.7 mL/min (A) (based on SCr of 2.41 mg/dL (H)). according to latest data. Based on current GFR, CKD stage is stage 3 - GFR 30-59.  Monitor UOP and serial BMP and adjust therapy as needed. Renally dose meds. Avoid nephrotoxic medications and procedures.    Primary hypertension  Chronic, controlled. Latest blood pressure and vitals reviewed-     Temp:  [97.2 °F (36.2 °C)-99 °F (37.2 °C)]   Pulse:  [60-71]   Resp:  [12-20]   BP: (156-176)/(57-74)   SpO2:  [95 %-97 %] .   Home meds for hypertension were reviewed and noted below.   Hypertension Medications               nitroGLYCERIN (NITROSTAT) 0.4 MG SL tablet Place 1 tablet (0.4 mg total) under the tongue every 5 (five) minutes as needed for Chest pain.            While in the hospital, will manage blood pressure as follows; Continue home antihypertensive regimen    Will utilize p.r.n. blood pressure medication only if patient's blood pressure greater than 180/110 and he develops symptoms such as worsening chest pain or shortness of breath.      VTE Risk Mitigation (From admission, onward)           Ordered     enoxaparin injection 30 mg  Every 24 hours         08/29/24 1558     Place sequential compression device  Until  discontinued         08/29/24 1558                    Discharge Planning   RICKY: 9/12/2024     Code Status: Full Code   Is the patient medically ready for discharge?:     Reason for patient still in hospital (select all that apply): Treatment  Discharge Plan A: Home with family, Home Health                  Smiley Sanchez DO  Department of Hospital Medicine   [facility]

## 2024-09-01 NOTE — PLAN OF CARE
Problem: Adult Inpatient Plan of Care  Goal: Plan of Care Review  Outcome: Progressing  Flowsheets (Taken 9/1/2024 1203)  Plan of Care Reviewed With:   patient   spouse  Goal: Optimal Comfort and Wellbeing  Outcome: Progressing  Intervention: Monitor Pain and Promote Comfort  Flowsheets (Taken 9/1/2024 1203)  Pain Management Interventions:   care clustered   diversional activity provided   position adjusted   quiet environment facilitated   relaxation techniques promoted  Intervention: Provide Person-Centered Care  Flowsheets (Taken 9/1/2024 1203)  Trust Relationship/Rapport:   care explained   choices provided   emotional support provided   empathic listening provided   questions answered   questions encouraged   reassurance provided   thoughts/feelings acknowledged     Problem: Diabetes Comorbidity  Goal: Blood Glucose Level Within Targeted Range  Outcome: Progressing  Intervention: Monitor and Manage Glycemia  Flowsheets (Taken 9/1/2024 1203)  Glycemic Management:   blood glucose monitored   supplemental insulin given     Problem: Fall Injury Risk  Goal: Absence of Fall and Fall-Related Injury  Outcome: Progressing  Intervention: Identify and Manage Contributors  Flowsheets (Taken 9/1/2024 1203)  Self-Care Promotion:   independence encouraged   BADL personal objects within reach   BADL personal routines maintained   meal set-up provided   adaptive equipment use encouraged  Intervention: Promote Injury-Free Environment  Flowsheets (Taken 9/1/2024 1203)  Safety Promotion/Fall Prevention:   assistive device/personal item within reach   bed alarm set   diversional activities provided   Fall Risk signage in place   instructed to call staff for mobility   lighting adjusted   medications reviewed   muscle strengthening facilitated   nonskid shoes/socks when out of bed   room near unit station   side rails raised x 2     Problem: Wound  Goal: Optimal Coping  Outcome: Progressing  Intervention: Support Patient and  Family Response  Flowsheets (Taken 9/1/2024 1203)  Supportive Measures:   active listening utilized   goal-setting facilitated   positive reinforcement provided   relaxation techniques promoted   self-care encouraged   self-reflection promoted   self-responsibility promoted  Family/Support System Care:   involvement promoted   presence promoted   self-care encouraged  Goal: Optimal Functional Ability  Outcome: Progressing  Intervention: Optimize Functional Ability  Flowsheets (Taken 9/1/2024 1203)  Activity Management:   Ambulated -L4   Ambulated to bathroom - L4   Ambulated in room - L4   Arm raise - L1   Up in chair - L3  Activity Assistance Provided:   independent   assistance, 1 person  Goal: Skin Health and Integrity  Outcome: Progressing  Intervention: Optimize Skin Protection  Flowsheets (Taken 9/1/2024 1203)  Activity Management:   Ambulated -L4   Ambulated to bathroom - L4   Ambulated in room - L4   Arm raise - L1   Up in chair - L3

## 2024-09-01 NOTE — SUBJECTIVE & OBJECTIVE
Interval History:     No significant events overnight, no new complaints or concerns. Loose stool minimally improving with probiotics and bulking agent.     Review of Systems   Constitutional:  Positive for fatigue. Negative for chills and fever.   Respiratory:  Negative for cough and shortness of breath.    Cardiovascular:  Negative for chest pain and palpitations.   Gastrointestinal:  Positive for constipation and diarrhea. Negative for abdominal pain, nausea and vomiting.   Genitourinary:  Negative for dysuria and flank pain.   Skin:  Positive for wound.   Neurological:  Positive for weakness. Negative for dizziness, syncope and headaches.   Psychiatric/Behavioral:  Negative for confusion.      Objective:     Vital Signs (Most Recent):  Temp: 97.6 °F (36.4 °C) (09/01/24 1111)  Pulse: (!) 59 (09/01/24 1111)  Resp: 17 (09/01/24 1111)  BP: (!) 151/65 (09/01/24 1111)  SpO2: 96 % (09/01/24 1111) Vital Signs (24h Range):  Temp:  [97.6 °F (36.4 °C)-98.7 °F (37.1 °C)] 97.6 °F (36.4 °C)  Pulse:  [59-77] 59  Resp:  [12-24] 17  SpO2:  [96 %-98 %] 96 %  BP: (145-177)/(57-78) 151/65     Weight: 81.1 kg (178 lb 14.4 oz)  Body mass index is 27.2 kg/m².    Intake/Output Summary (Last 24 hours) at 9/1/2024 1618  Last data filed at 9/1/2024 1552  Gross per 24 hour   Intake 2405.06 ml   Output 300 ml   Net 2105.06 ml         Physical Exam  Constitutional:       General: He is not in acute distress.     Appearance: Normal appearance. He is not ill-appearing.   HENT:      Head: Normocephalic and atraumatic.   Eyes:      General: No scleral icterus.     Conjunctiva/sclera: Conjunctivae normal.      Pupils: Pupils are equal, round, and reactive to light.   Cardiovascular:      Rate and Rhythm: Normal rate and regular rhythm.   Pulmonary:      Effort: Pulmonary effort is normal. No respiratory distress.   Abdominal:      Palpations: Abdomen is soft.      Tenderness: There is no abdominal tenderness.   Skin:     Coloration: Skin is not  jaundiced or pale.      Findings: Wound (extensive sacral/perineal wound status-post debridement - see media) present. No rash.   Neurological:      Mental Status: He is alert.   Psychiatric:         Behavior: Behavior normal.         Thought Content: Thought content normal.             Significant Labs: All pertinent labs within the past 24 hours have been reviewed.    Significant Imaging: I have reviewed all pertinent imaging results/findings within the past 24 hours.

## 2024-09-02 PROBLEM — L30.9 DERMATITIS: Status: ACTIVE | Noted: 2024-09-02

## 2024-09-02 LAB
ANION GAP SERPL CALCULATED.3IONS-SCNC: 8 MMOL/L (ref 7–16)
BASOPHILS # BLD AUTO: 0.09 K/UL (ref 0–0.2)
BASOPHILS NFR BLD AUTO: 1.1 % (ref 0–1)
BUN SERPL-MCNC: 48 MG/DL (ref 7–18)
BUN/CREAT SERPL: 18 (ref 6–20)
CALCIUM SERPL-MCNC: 8.4 MG/DL (ref 8.5–10.1)
CHLORIDE SERPL-SCNC: 108 MMOL/L (ref 98–107)
CO2 SERPL-SCNC: 26 MMOL/L (ref 21–32)
CREAT SERPL-MCNC: 2.65 MG/DL (ref 0.7–1.3)
DIFFERENTIAL METHOD BLD: ABNORMAL
EGFR (NO RACE VARIABLE) (RUSH/TITUS): 23 ML/MIN/1.73M2
EOSINOPHIL # BLD AUTO: 0.25 K/UL (ref 0–0.5)
EOSINOPHIL NFR BLD AUTO: 3.1 % (ref 1–4)
ERYTHROCYTE [DISTWIDTH] IN BLOOD BY AUTOMATED COUNT: 13.9 % (ref 11.5–14.5)
GLUCOSE SERPL-MCNC: 109 MG/DL (ref 70–105)
GLUCOSE SERPL-MCNC: 111 MG/DL (ref 74–106)
GLUCOSE SERPL-MCNC: 166 MG/DL (ref 70–105)
GLUCOSE SERPL-MCNC: 172 MG/DL (ref 70–105)
GLUCOSE SERPL-MCNC: 197 MG/DL (ref 70–105)
HCT VFR BLD AUTO: 33.5 % (ref 40–54)
HGB BLD-MCNC: 10.7 G/DL (ref 13.5–18)
IMM GRANULOCYTES # BLD AUTO: 0.08 K/UL (ref 0–0.04)
IMM GRANULOCYTES NFR BLD: 1 % (ref 0–0.4)
LYMPHOCYTES # BLD AUTO: 1.21 K/UL (ref 1–4.8)
LYMPHOCYTES NFR BLD AUTO: 15 % (ref 27–41)
MCH RBC QN AUTO: 29.2 PG (ref 27–31)
MCHC RBC AUTO-ENTMCNC: 31.9 G/DL (ref 32–36)
MCV RBC AUTO: 91.5 FL (ref 80–96)
MONOCYTES # BLD AUTO: 0.74 K/UL (ref 0–0.8)
MONOCYTES NFR BLD AUTO: 9.2 % (ref 2–6)
MPC BLD CALC-MCNC: 10.4 FL (ref 9.4–12.4)
NEUTROPHILS # BLD AUTO: 5.71 K/UL (ref 1.8–7.7)
NEUTROPHILS NFR BLD AUTO: 70.6 % (ref 53–65)
NRBC # BLD AUTO: 0 X10E3/UL
NRBC, AUTO (.00): 0 %
PLATELET # BLD AUTO: 258 K/UL (ref 150–400)
POTASSIUM SERPL-SCNC: 3.8 MMOL/L (ref 3.5–5.1)
RBC # BLD AUTO: 3.66 M/UL (ref 4.6–6.2)
SODIUM SERPL-SCNC: 138 MMOL/L (ref 136–145)
WBC # BLD AUTO: 8.08 K/UL (ref 4.5–11)

## 2024-09-02 PROCEDURE — 11000008

## 2024-09-02 PROCEDURE — 99900035 HC TECH TIME PER 15 MIN (STAT)

## 2024-09-02 PROCEDURE — 80048 BASIC METABOLIC PNL TOTAL CA: CPT | Performed by: FAMILY MEDICINE

## 2024-09-02 PROCEDURE — 94761 N-INVAS EAR/PLS OXIMETRY MLT: CPT

## 2024-09-02 PROCEDURE — 25000003 PHARM REV CODE 250: Performed by: FAMILY MEDICINE

## 2024-09-02 PROCEDURE — 82962 GLUCOSE BLOOD TEST: CPT

## 2024-09-02 PROCEDURE — 36415 COLL VENOUS BLD VENIPUNCTURE: CPT | Performed by: FAMILY MEDICINE

## 2024-09-02 PROCEDURE — 99232 SBSQ HOSP IP/OBS MODERATE 35: CPT | Mod: ,,, | Performed by: FAMILY MEDICINE

## 2024-09-02 PROCEDURE — 25000242 PHARM REV CODE 250 ALT 637 W/ HCPCS: Performed by: FAMILY MEDICINE

## 2024-09-02 PROCEDURE — 85025 COMPLETE CBC W/AUTO DIFF WBC: CPT | Performed by: FAMILY MEDICINE

## 2024-09-02 PROCEDURE — 63600175 PHARM REV CODE 636 W HCPCS: Performed by: FAMILY MEDICINE

## 2024-09-02 RX ORDER — HYDROCORTISONE 1 %
CREAM (GRAM) TOPICAL 2 TIMES DAILY PRN
Status: DISCONTINUED | OUTPATIENT
Start: 2024-09-02 | End: 2024-09-16 | Stop reason: HOSPADM

## 2024-09-02 RX ADMIN — MUPIROCIN: 20 OINTMENT TOPICAL at 08:09

## 2024-09-02 RX ADMIN — PANTOPRAZOLE SODIUM 40 MG: 40 TABLET, DELAYED RELEASE ORAL at 08:09

## 2024-09-02 RX ADMIN — INSULIN ASPART 2 UNITS: 100 INJECTION, SOLUTION INTRAVENOUS; SUBCUTANEOUS at 06:09

## 2024-09-02 RX ADMIN — PIPERACILLIN AND TAZOBACTAM 4.5 G: 4; .5 INJECTION, POWDER, LYOPHILIZED, FOR SOLUTION INTRAVENOUS; PARENTERAL at 06:09

## 2024-09-02 RX ADMIN — ASPIRIN 81 MG: 81 TABLET, COATED ORAL at 08:09

## 2024-09-02 RX ADMIN — OXYCODONE HYDROCHLORIDE AND ACETAMINOPHEN 500 MG: 500 TABLET ORAL at 08:09

## 2024-09-02 RX ADMIN — INSULIN ASPART 1 UNITS: 100 INJECTION, SOLUTION INTRAVENOUS; SUBCUTANEOUS at 08:09

## 2024-09-02 RX ADMIN — INSULIN ASPART 2 UNITS: 100 INJECTION, SOLUTION INTRAVENOUS; SUBCUTANEOUS at 12:09

## 2024-09-02 RX ADMIN — PSYLLIUM HUSK 1 PACKET: 3.4 POWDER ORAL at 11:09

## 2024-09-02 RX ADMIN — Medication 2 CAPSULE: at 08:09

## 2024-09-02 RX ADMIN — Medication 2 CAPSULE: at 05:09

## 2024-09-02 RX ADMIN — ENOXAPARIN SODIUM 30 MG: 30 INJECTION SUBCUTANEOUS at 06:09

## 2024-09-02 RX ADMIN — PIPERACILLIN AND TAZOBACTAM 4.5 G: 4; .5 INJECTION, POWDER, LYOPHILIZED, FOR SOLUTION INTRAVENOUS; PARENTERAL at 11:09

## 2024-09-02 RX ADMIN — CHOLECALCIFEROL TAB 125 MCG (5000 UNIT) 5000 UNITS: 125 TAB at 08:09

## 2024-09-02 RX ADMIN — DEXTROSE MONOHYDRATE: 12500 INJECTION, SOLUTION INTRAVENOUS at 03:09

## 2024-09-02 RX ADMIN — PIPERACILLIN AND TAZOBACTAM 4.5 G: 4; .5 INJECTION, POWDER, LYOPHILIZED, FOR SOLUTION INTRAVENOUS; PARENTERAL at 03:09

## 2024-09-02 RX ADMIN — PRAVASTATIN SODIUM 80 MG: 40 TABLET ORAL at 08:09

## 2024-09-02 RX ADMIN — Medication 2 CAPSULE: at 12:09

## 2024-09-02 RX ADMIN — ZINC SULFATE 220 MG (50 MG) CAPSULE 220 MG: CAPSULE at 08:09

## 2024-09-02 RX ADMIN — INSULIN GLARGINE 20 UNITS: 100 INJECTION, SOLUTION SUBCUTANEOUS at 08:09

## 2024-09-02 NOTE — SUBJECTIVE & OBJECTIVE
Interval History:     No significant events overnight, no new complaints or concerns. Has some mild UE dermatitis, will order topical cortisone as this is what patient uses at home.    Review of Systems   Constitutional:  Positive for fatigue. Negative for chills and fever.   Respiratory:  Negative for cough and shortness of breath.    Cardiovascular:  Negative for chest pain and palpitations.   Gastrointestinal:  Positive for diarrhea. Negative for abdominal pain, nausea and vomiting.   Genitourinary:  Negative for dysuria and flank pain.   Skin:  Positive for wound.   Neurological:  Positive for weakness. Negative for dizziness, syncope and headaches.   Psychiatric/Behavioral:  Negative for confusion.      Objective:     Vital Signs (Most Recent):  Temp: 97.9 °F (36.6 °C) (09/02/24 1611)  Pulse: 63 (09/02/24 0400)  Resp: (!) 25 (09/02/24 0400)  BP: (!) 133/42 (09/02/24 0000)  SpO2: 98 % (09/02/24 0000) Vital Signs (24h Range):  Temp:  [97 °F (36.1 °C)-98.8 °F (37.1 °C)] 97.9 °F (36.6 °C)  Pulse:  [62-73] 63  Resp:  [15-25] 25  SpO2:  [98 %] 98 %  BP: (133-166)/(42-60) 133/42     Weight: 81.1 kg (178 lb 14.4 oz)  Body mass index is 27.2 kg/m².    Intake/Output Summary (Last 24 hours) at 9/2/2024 1653  Last data filed at 9/2/2024 0800  Gross per 24 hour   Intake 254.82 ml   Output 500 ml   Net -245.18 ml         Physical Exam  Constitutional:       General: He is not in acute distress.     Appearance: Normal appearance. He is not ill-appearing.   HENT:      Head: Normocephalic and atraumatic.   Eyes:      General: No scleral icterus.     Conjunctiva/sclera: Conjunctivae normal.      Pupils: Pupils are equal, round, and reactive to light.   Cardiovascular:      Rate and Rhythm: Normal rate and regular rhythm.   Pulmonary:      Effort: Pulmonary effort is normal. No respiratory distress.   Abdominal:      Palpations: Abdomen is soft.      Tenderness: There is no abdominal tenderness.   Skin:     Coloration: Skin is  not jaundiced or pale.      Findings: Wound (extensive sacral/perineal wound status-post debridement - see media) present. No rash.   Neurological:      Mental Status: He is alert.   Psychiatric:         Behavior: Behavior normal.         Thought Content: Thought content normal.             Significant Labs: All pertinent labs within the past 24 hours have been reviewed.    Significant Imaging: I have reviewed all pertinent imaging results/findings within the past 24 hours.

## 2024-09-02 NOTE — PROGRESS NOTES
[facility]  Progress Note    Patient Name: Negro Hale  MRN: [id number]  Patient Class: IP- Inpatient   Admission Date: 8/29/2024  Length of Stay: 4 days  Attending Physician: Smiley Sanchez DO  Primary Care Provider: Smiley Trevino FNP        Subjective:     Principal Problem:Necrotizing soft tissue infection        HPI:    Negro Hale is a 84 y.o. male with history of HTN, CAD, CKD, IBS, and a-fib who initially presented to [facility] with perirectal abscess. Surgical evaluation revealed necrotizing soft tissue infection requiring extensive debridement with revision (most recent procedure 8/26). Anaerobic cultures positive and patient placed on appropriate antibiotics. Initial blood culture with micrococcus species thought to be a contaminant, with repeat cultures negative. Disposition complicated by diarrhea requiring frequent wound care and dressing changes due to soiling, as well and deconditioning due to acute illness and hospitalization. Patient admitted to [facility] for continued antibiotics, rehabilitation, and aggressive wound care.       Overview/Hospital Course:  No notes on file    Interval History:     No significant events overnight, no new complaints or concerns. Has some mild UE dermatitis, will order topical cortisone as this is what patient uses at home.    Review of Systems   Constitutional:  Positive for fatigue. Negative for chills and fever.   Respiratory:  Negative for cough and shortness of breath.    Cardiovascular:  Negative for chest pain and palpitations.   Gastrointestinal:  Positive for diarrhea. Negative for abdominal pain, nausea and vomiting.   Genitourinary:  Negative for dysuria and flank pain.   Skin:  Positive for wound.   Neurological:  Positive for weakness. Negative for dizziness, syncope and headaches.   Psychiatric/Behavioral:  Negative for confusion.      Objective:     Vital Signs (Most  Recent):  Temp: 97.9 °F (36.6 °C) (09/02/24 1611)  Pulse: 63 (09/02/24 0400)  Resp: (!) 25 (09/02/24 0400)  BP: (!) 133/42 (09/02/24 0000)  SpO2: 98 % (09/02/24 0000) Vital Signs (24h Range):  Temp:  [97 °F (36.1 °C)-98.8 °F (37.1 °C)] 97.9 °F (36.6 °C)  Pulse:  [62-73] 63  Resp:  [15-25] 25  SpO2:  [98 %] 98 %  BP: (133-166)/(42-60) 133/42     Weight: 81.1 kg (178 lb 14.4 oz)  Body mass index is 27.2 kg/m².    Intake/Output Summary (Last 24 hours) at 9/2/2024 1653  Last data filed at 9/2/2024 0800  Gross per 24 hour   Intake 254.82 ml   Output 500 ml   Net -245.18 ml         Physical Exam  Constitutional:       General: He is not in acute distress.     Appearance: Normal appearance. He is not ill-appearing.   HENT:      Head: Normocephalic and atraumatic.   Eyes:      General: No scleral icterus.     Conjunctiva/sclera: Conjunctivae normal.      Pupils: Pupils are equal, round, and reactive to light.   Cardiovascular:      Rate and Rhythm: Normal rate and regular rhythm.   Pulmonary:      Effort: Pulmonary effort is normal. No respiratory distress.   Abdominal:      Palpations: Abdomen is soft.      Tenderness: There is no abdominal tenderness.   Skin:     Coloration: Skin is not jaundiced or pale.      Findings: Wound (extensive sacral/perineal wound status-post debridement - see media) present. No rash.   Neurological:      Mental Status: He is alert.   Psychiatric:         Behavior: Behavior normal.         Thought Content: Thought content normal.             Significant Labs: All pertinent labs within the past 24 hours have been reviewed.    Significant Imaging: I have reviewed all pertinent imaging results/findings within the past 24 hours.    Assessment/Plan:      * Necrotizing soft tissue infection  Further discussed with surgery upon admission to [facility], recommendations for wound care to manage and will re-consult if further surgical needs arise.     Continue antibiotics and dressing changes.   ---  Perirectal  "abscess now status-post extensive debridement and revision. Subsequent large sacral and perineal wound now healing appropriately.     Anaerobic culture positive, currently receiving treatment with clindamycin and Zosyn. Will likely require extended course due to location of wound and risk of contamination.     - aggressive wound care including dressing changes daily and with each bowel movement  - antibiotics as below    Antibiotics (From admission, onward)      Start     Stop Route Frequency Ordered    08/29/24 2100  mupirocin 2 % ointment         09/03/24 2059 Nasl 2 times daily 08/29/24 1559    08/29/24 1830  piperacillin-tazobactam (ZOSYN) 4.5 g in D5W 100 mL IVPB (MB+)         -- IV Every 8 hours (non-standard times) 08/29/24 1558               Dermatitis  Hydrocortisone PRN      Irritable bowel syndrome with both constipation and diarrhea  Long history of alternating diarrhea and constipation. Currently having diarrhea likely exacerbated by antibiotics which unfortunately necessitates multiple daily dressing changes. Will add probiotics and psyllium.    Paroxysmal atrial fibrillation  Patient with Paroxysmal (<7 days) atrial fibrillation which is controlled currently with  no medications . Patient is currently in atrial fibrillation.[id number] Score: 4. HASBLED Score: 1. Anticoagulation not indicated due to extensive wound, risk of bleeding, possible need for further surgery .    Abnormality of gait and mobility  Continue PT/OT      Type 2 diabetes mellitus with hyperglycemia, with long-term current use of insulin  Patient's FSGs are controlled on current medication regimen.  Last A1c reviewed-   Lab Results   Component Value Date    HGBA1C 6.7 (H) 08/23/2024     Most recent fingerstick glucose reviewed- No results for input(s): "POCTGLUCOSE" in the last 24 hours.  Current correctional scale  Medium  Maintain anti-hyperglycemic dose as follows-   Antihyperglycemics (From admission, onward)      Start     Stop " Route Frequency Ordered    08/29/24 2100  insulin glargine U-100 (Lantus) injection 20 Units         -- SubQ Nightly 08/29/24 1558    08/29/24 1556  insulin aspart U-100 injection 0-10 Units         -- SubQ Before meals & nightly PRN 08/29/24 1558          Hold Oral hypoglycemics while patient is in the hospital.      Atherosclerosis of native coronary artery of native heart without angina pectoris  Patient with known CAD s/p stent placement, which is controlled Will continue ASA and Statin and monitor for S/Sx of angina/ACS. Continue to monitor on telemetry.     Holding Plavix at this time due to bleeding risk and potential need for further surgery.    Stage 3a chronic kidney disease  Creatine stable for now. BMP reviewed- noted Estimated Creatinine Clearance: 19.7 mL/min (A) (based on SCr of 2.65 mg/dL (H)). according to latest data. Based on current GFR, CKD stage is stage 3 - GFR 30-59.  Monitor UOP and serial BMP and adjust therapy as needed. Renally dose meds. Avoid nephrotoxic medications and procedures.    Primary hypertension  Chronic, controlled. Latest blood pressure and vitals reviewed-     Temp:  [97.2 °F (36.2 °C)-99 °F (37.2 °C)]   Pulse:  [60-71]   Resp:  [12-20]   BP: (156-176)/(57-74)   SpO2:  [95 %-97 %] .   Home meds for hypertension were reviewed and noted below.   Hypertension Medications               nitroGLYCERIN (NITROSTAT) 0.4 MG SL tablet Place 1 tablet (0.4 mg total) under the tongue every 5 (five) minutes as needed for Chest pain.            While in the hospital, will manage blood pressure as follows; Continue home antihypertensive regimen    Will utilize p.r.n. blood pressure medication only if patient's blood pressure greater than 180/110 and he develops symptoms such as worsening chest pain or shortness of breath.      VTE Risk Mitigation (From admission, onward)           Ordered     enoxaparin injection 30 mg  Every 24 hours         08/29/24 1558     Place sequential compression  device  Until discontinued         08/29/24 1558                    Discharge Planning   RICKY: 9/12/2024     Code Status: Full Code   Is the patient medically ready for discharge?:     Reason for patient still in hospital (select all that apply): Treatment  Discharge Plan A: Home with family, Home Health                  Smiley Sanchez DO  Department of Hospital Medicine   [facility]

## 2024-09-02 NOTE — PLAN OF CARE
Problem: Adult Inpatient Plan of Care  Goal: Plan of Care Review  Outcome: Progressing  Goal: Patient-Specific Goal (Individualized)  Outcome: Progressing  Goal: Absence of Hospital-Acquired Illness or Injury  Outcome: Progressing  Goal: Optimal Comfort and Wellbeing  Outcome: Progressing  Goal: Readiness for Transition of Care  Outcome: Progressing     Problem: Diabetes Comorbidity  Goal: Blood Glucose Level Within Targeted Range  Outcome: Progressing     Problem: Fall Injury Risk  Goal: Absence of Fall and Fall-Related Injury  Outcome: Progressing     Problem: Wound  Goal: Optimal Coping  Outcome: Progressing  Goal: Optimal Functional Ability  Outcome: Progressing  Goal: Absence of Infection Signs and Symptoms  Outcome: Progressing  Goal: Improved Oral Intake  Outcome: Progressing  Goal: Optimal Pain Control and Function  Outcome: Progressing  Goal: Skin Health and Integrity  Outcome: Progressing  Goal: Optimal Wound Healing  Outcome: Progressing     Problem: Breathing Pattern Ineffective  Goal: Effective Breathing Pattern  Outcome: Progressing

## 2024-09-02 NOTE — ASSESSMENT & PLAN NOTE
Creatine stable for now. BMP reviewed- noted Estimated Creatinine Clearance: 19.7 mL/min (A) (based on SCr of 2.65 mg/dL (H)). according to latest data. Based on current GFR, CKD stage is stage 3 - GFR 30-59.  Monitor UOP and serial BMP and adjust therapy as needed. Renally dose meds. Avoid nephrotoxic medications and procedures.

## 2024-09-03 LAB
GLUCOSE SERPL-MCNC: 146 MG/DL (ref 70–105)
GLUCOSE SERPL-MCNC: 154 MG/DL (ref 70–105)
GLUCOSE SERPL-MCNC: 157 MG/DL (ref 70–105)
GLUCOSE SERPL-MCNC: 85 MG/DL (ref 70–105)

## 2024-09-03 PROCEDURE — 63600175 PHARM REV CODE 636 W HCPCS: Performed by: FAMILY MEDICINE

## 2024-09-03 PROCEDURE — 11000008

## 2024-09-03 PROCEDURE — 82962 GLUCOSE BLOOD TEST: CPT

## 2024-09-03 PROCEDURE — 99900035 HC TECH TIME PER 15 MIN (STAT)

## 2024-09-03 PROCEDURE — 99232 SBSQ HOSP IP/OBS MODERATE 35: CPT | Mod: ,,, | Performed by: FAMILY MEDICINE

## 2024-09-03 PROCEDURE — 97110 THERAPEUTIC EXERCISES: CPT

## 2024-09-03 PROCEDURE — 25000003 PHARM REV CODE 250: Performed by: FAMILY MEDICINE

## 2024-09-03 PROCEDURE — 97530 THERAPEUTIC ACTIVITIES: CPT

## 2024-09-03 PROCEDURE — 25000242 PHARM REV CODE 250 ALT 637 W/ HCPCS: Performed by: FAMILY MEDICINE

## 2024-09-03 RX ORDER — CLOPIDOGREL BISULFATE 75 MG/1
75 TABLET ORAL DAILY
Status: DISCONTINUED | OUTPATIENT
Start: 2024-09-03 | End: 2024-09-16 | Stop reason: HOSPADM

## 2024-09-03 RX ADMIN — MUPIROCIN: 20 OINTMENT TOPICAL at 08:09

## 2024-09-03 RX ADMIN — PANTOPRAZOLE SODIUM 40 MG: 40 TABLET, DELAYED RELEASE ORAL at 08:09

## 2024-09-03 RX ADMIN — PIPERACILLIN AND TAZOBACTAM 4.5 G: 4; .5 INJECTION, POWDER, LYOPHILIZED, FOR SOLUTION INTRAVENOUS; PARENTERAL at 11:09

## 2024-09-03 RX ADMIN — PSYLLIUM HUSK 1 PACKET: 3.4 POWDER ORAL at 09:09

## 2024-09-03 RX ADMIN — OXYCODONE HYDROCHLORIDE AND ACETAMINOPHEN 500 MG: 500 TABLET ORAL at 08:09

## 2024-09-03 RX ADMIN — Medication 2 CAPSULE: at 05:09

## 2024-09-03 RX ADMIN — CLOPIDOGREL BISULFATE 75 MG: 75 TABLET ORAL at 03:09

## 2024-09-03 RX ADMIN — Medication 2 CAPSULE: at 11:09

## 2024-09-03 RX ADMIN — PIPERACILLIN AND TAZOBACTAM 4.5 G: 4; .5 INJECTION, POWDER, LYOPHILIZED, FOR SOLUTION INTRAVENOUS; PARENTERAL at 03:09

## 2024-09-03 RX ADMIN — ASPIRIN 81 MG: 81 TABLET, COATED ORAL at 08:09

## 2024-09-03 RX ADMIN — PSYLLIUM HUSK 1 PACKET: 3.4 POWDER ORAL at 08:09

## 2024-09-03 RX ADMIN — PRAVASTATIN SODIUM 80 MG: 40 TABLET ORAL at 09:09

## 2024-09-03 RX ADMIN — DEXTROSE MONOHYDRATE: 12500 INJECTION, SOLUTION INTRAVENOUS at 06:09

## 2024-09-03 RX ADMIN — ENOXAPARIN SODIUM 30 MG: 30 INJECTION SUBCUTANEOUS at 05:09

## 2024-09-03 RX ADMIN — CHOLECALCIFEROL TAB 125 MCG (5000 UNIT) 5000 UNITS: 125 TAB at 08:09

## 2024-09-03 RX ADMIN — DEXTROSE MONOHYDRATE: 12500 INJECTION, SOLUTION INTRAVENOUS at 03:09

## 2024-09-03 RX ADMIN — INSULIN GLARGINE 20 UNITS: 100 INJECTION, SOLUTION SUBCUTANEOUS at 09:09

## 2024-09-03 RX ADMIN — INSULIN ASPART 2 UNITS: 100 INJECTION, SOLUTION INTRAVENOUS; SUBCUTANEOUS at 11:09

## 2024-09-03 RX ADMIN — Medication 2 CAPSULE: at 08:09

## 2024-09-03 RX ADMIN — ZINC SULFATE 220 MG (50 MG) CAPSULE 220 MG: CAPSULE at 08:09

## 2024-09-03 RX ADMIN — PIPERACILLIN AND TAZOBACTAM 4.5 G: 4; .5 INJECTION, POWDER, LYOPHILIZED, FOR SOLUTION INTRAVENOUS; PARENTERAL at 06:09

## 2024-09-03 NOTE — PLAN OF CARE
Problem: Adult Inpatient Plan of Care  Goal: Plan of Care Review  Outcome: Progressing  Flowsheets (Taken 9/3/2024 1108)  Plan of Care Reviewed With: patient  Goal: Absence of Hospital-Acquired Illness or Injury  Outcome: Progressing  Intervention: Identify and Manage Fall Risk  Flowsheets (Taken 9/3/2024 1108)  Safety Promotion/Fall Prevention:   assistive device/personal item within reach   medications reviewed   nonskid shoes/socks when out of bed   side rails raised x 3   bed alarm set  Intervention: Prevent Skin Injury  Flowsheets (Taken 9/3/2024 1108)  Body Position:   position changed independently   heels elevated   foot of bed elevated   legs elevated  Skin Protection: incontinence pads utilized  Device Skin Pressure Protection: absorbent pad utilized/changed  Intervention: Prevent and Manage VTE (Venous Thromboembolism) Risk  Flowsheets (Taken 9/3/2024 1108)  VTE Prevention/Management:   ambulation promoted   bleeding precations maintained   bleeding risk assessed   fluids promoted  Intervention: Prevent Infection  Flowsheets (Taken 9/3/2024 1108)  Infection Prevention: environmental surveillance performed  Goal: Optimal Comfort and Wellbeing  Outcome: Progressing  Intervention: Monitor Pain and Promote Comfort  Flowsheets (Taken 9/3/2024 1108)  Pain Management Interventions:   pillow support provided   quiet environment facilitated   care clustered  Intervention: Provide Person-Centered Care  Flowsheets (Taken 9/3/2024 1108)  Trust Relationship/Rapport:   care explained   choices provided   thoughts/feelings acknowledged     Problem: Diabetes Comorbidity  Goal: Blood Glucose Level Within Targeted Range  Outcome: Progressing  Intervention: Monitor and Manage Glycemia  Flowsheets (Taken 9/3/2024 1108)  Glycemic Management: blood glucose monitored     Problem: Fall Injury Risk  Goal: Absence of Fall and Fall-Related Injury  Outcome: Progressing  Intervention: Identify and Manage Contributors  Flowsheets  (Taken 9/3/2024 1108)  Self-Care Promotion:   independence encouraged   BADL personal objects within reach   BADL personal routines maintained   adaptive equipment use encouraged  Medication Review/Management: medications reviewed  Intervention: Promote Injury-Free Environment  Flowsheets (Taken 9/3/2024 1108)  Safety Promotion/Fall Prevention:   assistive device/personal item within reach   medications reviewed   nonskid shoes/socks when out of bed   side rails raised x 3   bed alarm set     Problem: Wound  Goal: Optimal Coping  Outcome: Progressing  Intervention: Support Patient and Family Response  Flowsheets (Taken 9/3/2024 1108)  Supportive Measures: active listening utilized  Family/Support System Care: involvement promoted  Goal: Optimal Functional Ability  Outcome: Progressing  Intervention: Optimize Functional Ability  Flowsheets (Taken 9/3/2024 1108)  Activity Management: Ambulated to bathroom - L4  Activity Assistance Provided: assistance, 1 person

## 2024-09-03 NOTE — PROGRESS NOTES
[facility]  Progress Note    Patient Name: Negro Hale  MRN: [id number]  Patient Class: IP- Inpatient   Admission Date: 8/29/2024  Length of Stay: 5 days  Attending Physician: Smiley Sanchez DO  Primary Care Provider: Smiley Trevino FNP        Subjective:     Principal Problem:Necrotizing soft tissue infection        HPI:    Negro Hale is a 84 y.o. male with history of HTN, CAD, CKD, IBS, and a-fib who initially presented to [facility] with perirectal abscess. Surgical evaluation revealed necrotizing soft tissue infection requiring extensive debridement with revision (most recent procedure 8/26). Anaerobic cultures positive and patient placed on appropriate antibiotics. Initial blood culture with micrococcus species thought to be a contaminant, with repeat cultures negative. Disposition complicated by diarrhea requiring frequent wound care and dressing changes due to soiling, as well and deconditioning due to acute illness and hospitalization. Patient admitted to [facility] for continued antibiotics, rehabilitation, and aggressive wound care.       Overview/Hospital Course:  No notes on file    Interval History:     No significant events overnight, no new complaints or concerns.     Review of Systems   Constitutional:  Positive for fatigue. Negative for chills and fever.   Respiratory:  Negative for cough and shortness of breath.    Cardiovascular:  Negative for chest pain and palpitations.   Gastrointestinal:  Positive for diarrhea. Negative for abdominal pain, nausea and vomiting.   Genitourinary:  Negative for dysuria and flank pain.   Skin:  Positive for wound.   Neurological:  Positive for weakness. Negative for dizziness, syncope and headaches.   Psychiatric/Behavioral:  Negative for confusion.      Objective:     Vital Signs (Most Recent):  Temp: 98.4 °F (36.9 °C) (09/03/24 1008)  Pulse: (!) 59 (09/03/24 1008)  Resp: (!) 21  (09/03/24 1008)  BP: (!) 138/56 (09/03/24 1008)  SpO2: 98 % (09/03/24 1008) Vital Signs (24h Range):  Temp:  [97.9 °F (36.6 °C)-98.4 °F (36.9 °C)] 98.4 °F (36.9 °C)  Pulse:  [57-75] 59  Resp:  [16-25] 21  SpO2:  [98 %] 98 %  BP: (135-172)/(42-94) 138/56     Weight: 81.1 kg (178 lb 14.4 oz)  Body mass index is 27.2 kg/m².    Intake/Output Summary (Last 24 hours) at 9/3/2024 1404  Last data filed at 9/3/2024 1300  Gross per 24 hour   Intake 1695.25 ml   Output 6 ml   Net 1689.25 ml         Physical Exam  Constitutional:       General: He is not in acute distress.     Appearance: Normal appearance. He is not ill-appearing.   HENT:      Head: Normocephalic and atraumatic.   Eyes:      General: No scleral icterus.     Conjunctiva/sclera: Conjunctivae normal.      Pupils: Pupils are equal, round, and reactive to light.   Cardiovascular:      Rate and Rhythm: Normal rate and regular rhythm.   Pulmonary:      Effort: Pulmonary effort is normal. No respiratory distress.   Abdominal:      Palpations: Abdomen is soft.      Tenderness: There is no abdominal tenderness.   Skin:     Coloration: Skin is not jaundiced or pale.      Findings: Wound (extensive sacral/perineal wound status-post debridement - see media) present. No rash.   Neurological:      Mental Status: He is alert.   Psychiatric:         Behavior: Behavior normal.         Thought Content: Thought content normal.             Significant Labs: All pertinent labs within the past 24 hours have been reviewed.    Significant Imaging: I have reviewed all pertinent imaging results/findings within the past 24 hours.    Assessment/Plan:      * Necrotizing soft tissue infection  Further discussed with surgery upon admission to [facility], recommendations for wound care to manage and will re-consult if further surgical needs arise.     Continue antibiotics and dressing changes.   ---  Perirectal abscess now status-post extensive debridement and revision. Subsequent large sacral and  "perineal wound now healing appropriately.     Anaerobic culture positive, currently receiving treatment with clindamycin and Zosyn. Will likely require extended course due to location of wound and risk of contamination.     - aggressive wound care including dressing changes daily and with each bowel movement  - antibiotics as below    Antibiotics (From admission, onward)      Start     Stop Route Frequency Ordered    08/29/24 2100  mupirocin 2 % ointment         09/03/24 2059 Nasl 2 times daily 08/29/24 1559    08/29/24 1830  piperacillin-tazobactam (ZOSYN) 4.5 g in D5W 100 mL IVPB (MB+)         -- IV Every 8 hours (non-standard times) 08/29/24 1558               Dermatitis  Hydrocortisone PRN      Irritable bowel syndrome with both constipation and diarrhea  Long history of alternating diarrhea and constipation. Currently having diarrhea likely exacerbated by antibiotics which unfortunately necessitates multiple daily dressing changes. Continue probiotics and psyllium.    Paroxysmal atrial fibrillation  Patient with Paroxysmal (<7 days) atrial fibrillation which is controlled currently with  no medications . Patient is currently in atrial fibrillation.[id number] Score: 4. HASBLED Score: 1. Anticoagulation not indicated due to extensive wound, risk of bleeding, possible need for further surgery .    Abnormality of gait and mobility  Continue PT/OT      Type 2 diabetes mellitus with hyperglycemia, with long-term current use of insulin  Patient's FSGs are controlled on current medication regimen.  Last A1c reviewed-   Lab Results   Component Value Date    HGBA1C 6.7 (H) 08/23/2024     Most recent fingerstick glucose reviewed- No results for input(s): "POCTGLUCOSE" in the last 24 hours.  Current correctional scale  Medium  Maintain anti-hyperglycemic dose as follows-   Antihyperglycemics (From admission, onward)      Start     Stop Route Frequency Ordered    08/29/24 2100  insulin glargine U-100 (Lantus) injection 20 " Units         -- SubQ Nightly 08/29/24 1558    08/29/24 1556  insulin aspart U-100 injection 0-10 Units         -- SubQ Before meals & nightly PRN 08/29/24 1558          Hold Oral hypoglycemics while patient is in the hospital.      Atherosclerosis of native coronary artery of native heart without angina pectoris  Patient with known CAD s/p stent placement, which is controlled Will continue ASA and Statin and monitor for S/Sx of angina/ACS. Continue to monitor on telemetry.     Resume Plavix 9/3 and monitor closely for signs of bleeding.     Stage 3a chronic kidney disease  Creatine stable for now. BMP reviewed- noted Estimated Creatinine Clearance: 19.7 mL/min (A) (based on SCr of 2.65 mg/dL (H)). according to latest data. Based on current GFR, CKD stage is stage 3 - GFR 30-59.  Monitor UOP and serial BMP and adjust therapy as needed. Renally dose meds. Avoid nephrotoxic medications and procedures.    Primary hypertension  Chronic, controlled. Latest blood pressure and vitals reviewed-     Temp:  [97.9 °F (36.6 °C)-98.4 °F (36.9 °C)]   Pulse:  [57-75]   Resp:  [16-25]   BP: (135-172)/(42-94)   SpO2:  [98 %] .   Home meds for hypertension were reviewed and noted below.   Hypertension Medications               nitroGLYCERIN (NITROSTAT) 0.4 MG SL tablet Place 1 tablet (0.4 mg total) under the tongue every 5 (five) minutes as needed for Chest pain.            While in the hospital, will manage blood pressure as follows; Continue home antihypertensive regimen    Will utilize p.r.n. blood pressure medication only if patient's blood pressure greater than 180/110 and he develops symptoms such as worsening chest pain or shortness of breath.      VTE Risk Mitigation (From admission, onward)           Ordered     enoxaparin injection 30 mg  Every 24 hours         08/29/24 1558     Place sequential compression device  Until discontinued         08/29/24 1558                    Discharge Planning   RICKY: 9/12/2024     Code  Status: Full Code   Is the patient medically ready for discharge?:     Reason for patient still in hospital (select all that apply): Treatment  Discharge Plan A: Home with family, Home Health                  Smiley Sanchez DO  Department of Hospital Medicine   [facility]

## 2024-09-03 NOTE — ASSESSMENT & PLAN NOTE
Chronic, controlled. Latest blood pressure and vitals reviewed-     Temp:  [97.9 °F (36.6 °C)-98.4 °F (36.9 °C)]   Pulse:  [57-75]   Resp:  [16-25]   BP: (135-172)/(42-94)   SpO2:  [98 %] .   Home meds for hypertension were reviewed and noted below.   Hypertension Medications               nitroGLYCERIN (NITROSTAT) 0.4 MG SL tablet Place 1 tablet (0.4 mg total) under the tongue every 5 (five) minutes as needed for Chest pain.            While in the hospital, will manage blood pressure as follows; Continue home antihypertensive regimen    Will utilize p.r.n. blood pressure medication only if patient's blood pressure greater than 180/110 and he develops symptoms such as worsening chest pain or shortness of breath.

## 2024-09-03 NOTE — SUBJECTIVE & OBJECTIVE
Interval History:     No significant events overnight, no new complaints or concerns.     Review of Systems   Constitutional:  Positive for fatigue. Negative for chills and fever.   Respiratory:  Negative for cough and shortness of breath.    Cardiovascular:  Negative for chest pain and palpitations.   Gastrointestinal:  Positive for diarrhea. Negative for abdominal pain, nausea and vomiting.   Genitourinary:  Negative for dysuria and flank pain.   Skin:  Positive for wound.   Neurological:  Positive for weakness. Negative for dizziness, syncope and headaches.   Psychiatric/Behavioral:  Negative for confusion.      Objective:     Vital Signs (Most Recent):  Temp: 98.4 °F (36.9 °C) (09/03/24 1008)  Pulse: (!) 59 (09/03/24 1008)  Resp: (!) 21 (09/03/24 1008)  BP: (!) 138/56 (09/03/24 1008)  SpO2: 98 % (09/03/24 1008) Vital Signs (24h Range):  Temp:  [97.9 °F (36.6 °C)-98.4 °F (36.9 °C)] 98.4 °F (36.9 °C)  Pulse:  [57-75] 59  Resp:  [16-25] 21  SpO2:  [98 %] 98 %  BP: (135-172)/(42-94) 138/56     Weight: 81.1 kg (178 lb 14.4 oz)  Body mass index is 27.2 kg/m².    Intake/Output Summary (Last 24 hours) at 9/3/2024 1404  Last data filed at 9/3/2024 1300  Gross per 24 hour   Intake 1695.25 ml   Output 6 ml   Net 1689.25 ml         Physical Exam  Constitutional:       General: He is not in acute distress.     Appearance: Normal appearance. He is not ill-appearing.   HENT:      Head: Normocephalic and atraumatic.   Eyes:      General: No scleral icterus.     Conjunctiva/sclera: Conjunctivae normal.      Pupils: Pupils are equal, round, and reactive to light.   Cardiovascular:      Rate and Rhythm: Normal rate and regular rhythm.   Pulmonary:      Effort: Pulmonary effort is normal. No respiratory distress.   Abdominal:      Palpations: Abdomen is soft.      Tenderness: There is no abdominal tenderness.   Skin:     Coloration: Skin is not jaundiced or pale.      Findings: Wound (extensive sacral/perineal wound status-post  debridement - see media) present. No rash.   Neurological:      Mental Status: He is alert.   Psychiatric:         Behavior: Behavior normal.         Thought Content: Thought content normal.             Significant Labs: All pertinent labs within the past 24 hours have been reviewed.    Significant Imaging: I have reviewed all pertinent imaging results/findings within the past 24 hours.

## 2024-09-03 NOTE — ASSESSMENT & PLAN NOTE
Long history of alternating diarrhea and constipation. Currently having diarrhea likely exacerbated by antibiotics which unfortunately necessitates multiple daily dressing changes. Continue probiotics and psyllium.

## 2024-09-03 NOTE — PLAN OF CARE
Problem: Adult Inpatient Plan of Care  Goal: Plan of Care Review  9/2/2024 1903 by Vanessa Beavers RN  Outcome: Progressing  9/2/2024 1903 by Vanessa Beavers RN  Outcome: Progressing  Goal: Patient-Specific Goal (Individualized)  9/2/2024 1903 by Vanessa Beavers RN  Outcome: Progressing  9/2/2024 1903 by Vanessa Beavers, RN  Outcome: Progressing  Goal: Absence of Hospital-Acquired Illness or Injury  9/2/2024 1903 by Vanessa Beavers, RN  Outcome: Progressing  9/2/2024 1903 by Vanessa Beavers, RN  Outcome: Progressing  Goal: Optimal Comfort and Wellbeing  9/2/2024 1903 by Vanessa Beavers RN  Outcome: Progressing  9/2/2024 1903 by Vanessa Beavers RN  Outcome: Progressing  Goal: Readiness for Transition of Care  9/2/2024 1903 by Vanessa Beavers, RN  Outcome: Progressing  9/2/2024 1903 by Vanessa Beavers RN  Outcome: Progressing     Problem: Diabetes Comorbidity  Goal: Blood Glucose Level Within Targeted Range  9/2/2024 1903 by Vanessa Beavers, RN  Outcome: Progressing  9/2/2024 1903 by Vanessa Beavers RN  Outcome: Progressing     Problem: Fall Injury Risk  Goal: Absence of Fall and Fall-Related Injury  9/2/2024 1903 by Vanessa Beavers, RN  Outcome: Progressing  9/2/2024 1903 by Vanessa Beavers, RN  Outcome: Progressing     Problem: Wound  Goal: Optimal Coping  9/2/2024 1903 by Vanessa Beavers, RN  Outcome: Progressing  9/2/2024 1903 by Vanessa Beavers, RN  Outcome: Progressing  Goal: Optimal Functional Ability  9/2/2024 1903 by Vanessa Beavers, RN  Outcome: Progressing  9/2/2024 1903 by Vanessa Beavers, RN  Outcome: Progressing  Goal: Absence of Infection Signs and Symptoms  9/2/2024 1903 by Vanessa Beavers, RN  Outcome: Progressing  9/2/2024 1903 by Vanessa Beavers, RN  Outcome: Progressing  Goal: Improved Oral Intake  9/2/2024 1903 by Vanessa Beavers, RN  Outcome: Progressing  9/2/2024 1903 by Vanessa Beavers, RN  Outcome: Progressing  Goal: Optimal Pain Control and Function  9/2/2024 1903 by Vanessa Beavers,  RN  Outcome: Progressing  9/2/2024 1903 by Vanessa Beavers RN  Outcome: Progressing  Goal: Skin Health and Integrity  9/2/2024 1903 by Vanessa Beavers, RN  Outcome: Progressing  9/2/2024 1903 by Vanessa Beavers, RN  Outcome: Progressing  Goal: Optimal Wound Healing  9/2/2024 1903 by Vanessa Beavers, RN  Outcome: Progressing  9/2/2024 1903 by Vanessa Beavers, RN  Outcome: Progressing     Problem: Breathing Pattern Ineffective  Goal: Effective Breathing Pattern  9/2/2024 1903 by Vanessa Beavers RN  Outcome: Progressing  9/2/2024 1903 by Vanessa Beavers, RN  Outcome: Progressing

## 2024-09-03 NOTE — PT/OT/SLP PROGRESS
Physical Therapy Treatment    Patient Name:  Negro Hale   MRN:  [id number]    Recommendations:     Discharge Recommendations: Low Intensity Therapy  Discharge Equipment Recommendations: none  Barriers to discharge:  ongoing mdical care    Assessment:     Negro Hale is a 84 y.o. male admitted with a medical diagnosis of Necrotizing soft tissue infection.  He presents with the following impairments/functional limitations: weakness, impaired endurance, gait instability, pain, impaired skin.     Rehab Prognosis: Good; patient would benefit from acute skilled PT services to address these deficits and reach maximum level of function.    Recent Surgery: * No surgery found *      Plan:     During this hospitalization, patient to be seen 5 x/week to address the identified rehab impairments via gait training, therapeutic activities, therapeutic exercises, neuromuscular re-education and progress toward the following goals:    Plan of Care Expires:  09/30/24    Subjective     Chief Complaint: necrotizing soft tissue infection  Patient/Family Comments/goals: agreeable  Pain/Comfort:         Objective:     Communicated with RN prior to session.  Patient found HOB elevated with peripheral IV upon PT entry to room.     General Precautions: Standard, fall  Orthopedic Precautions: N/A  Braces: N/A  Respiratory Status: Room air     Functional Mobility:  Bed Mobility:     Scooting: stand by assistance  Bridging: minimum assistance  Supine to Sit: stand by assistance  Transfers:     Sit to Stand:  contact guard assistance with rolling walker  Toilet Transfer: stand by assistance with  rolling walker  using  Step Transfer  Gait: 15ft, 5ft with RW, CGA to SBA   Balance: Fair+      AM-PAC 6 CLICK MOBILITY          Treatment & Education:  Bilateral lower extremity exercise x 20-30 reps: ankle pumps, heel slides, hip abduction/adduction, straight leg raises, and bridging with verbal cues for sequencing and safety     Patient left   in bathroom with nursing staff  with all lines intact and LPN present..    GOALS:   Multidisciplinary Problems       Physical Therapy Goals          Problem: Physical Therapy    Goal Priority Disciplines Outcome Goal Variances Interventions   Physical Therapy Goal     PT, PT/OT Progressing     Description: Short Term Goals to be met by: 24    Patient will increase functional independence with mobility by performin. Gait  x 300 feet with Stand by assist using Rolling walker  2. Lower extremity exercise program x30 reps per handout, with assistance as needed  3. Pt to be indep in all functional transfers.  4. Pt to negotiate 3 steps with rail with SBA    Long Term Goals to be met by: 24    Pt will regain full independent functional mobility with Rolling walker to return to home situation and prior activities of daily living.                        Time Tracking:     PT Received On: 24  PT Start Time: 1035     PT Stop Time: 1101  PT Total Time (min): 26 min     Billable Minutes: Therapeutic Activity 15 and Therapeutic Exercise 11    Treatment Type: Evaluation  PT/PTA: PT     Number of PTA visits since last PT visit: 0     2024

## 2024-09-03 NOTE — ASSESSMENT & PLAN NOTE
Patient with known CAD s/p stent placement, which is controlled Will continue ASA and Statin and monitor for S/Sx of angina/ACS. Continue to monitor on telemetry.     Resume Plavix 9/3 and monitor closely for signs of bleeding.

## 2024-09-04 LAB
ANION GAP SERPL CALCULATED.3IONS-SCNC: 10 MMOL/L (ref 7–16)
BASOPHILS # BLD AUTO: 0.08 K/UL (ref 0–0.2)
BASOPHILS NFR BLD AUTO: 0.8 % (ref 0–1)
BUN SERPL-MCNC: 43 MG/DL (ref 7–18)
BUN/CREAT SERPL: 15 (ref 6–20)
CALCIUM SERPL-MCNC: 8.4 MG/DL (ref 8.5–10.1)
CHLORIDE SERPL-SCNC: 113 MMOL/L (ref 98–107)
CO2 SERPL-SCNC: 24 MMOL/L (ref 21–32)
CREAT SERPL-MCNC: 2.8 MG/DL (ref 0.7–1.3)
DIFFERENTIAL METHOD BLD: ABNORMAL
EGFR (NO RACE VARIABLE) (RUSH/TITUS): 21 ML/MIN/1.73M2
EOSINOPHIL # BLD AUTO: 0.3 K/UL (ref 0–0.5)
EOSINOPHIL NFR BLD AUTO: 3 % (ref 1–4)
ERYTHROCYTE [DISTWIDTH] IN BLOOD BY AUTOMATED COUNT: 14.2 % (ref 11.5–14.5)
GLUCOSE SERPL-MCNC: 146 MG/DL (ref 70–105)
GLUCOSE SERPL-MCNC: 154 MG/DL (ref 70–105)
GLUCOSE SERPL-MCNC: 177 MG/DL (ref 70–105)
GLUCOSE SERPL-MCNC: 80 MG/DL (ref 70–105)
GLUCOSE SERPL-MCNC: 94 MG/DL (ref 74–106)
HCT VFR BLD AUTO: 34.3 % (ref 40–54)
HGB BLD-MCNC: 10.9 G/DL (ref 13.5–18)
IMM GRANULOCYTES # BLD AUTO: 0.05 K/UL (ref 0–0.04)
IMM GRANULOCYTES NFR BLD: 0.5 % (ref 0–0.4)
LYMPHOCYTES # BLD AUTO: 0.95 K/UL (ref 1–4.8)
LYMPHOCYTES NFR BLD AUTO: 9.6 % (ref 27–41)
MCH RBC QN AUTO: 29.5 PG (ref 27–31)
MCHC RBC AUTO-ENTMCNC: 31.8 G/DL (ref 32–36)
MCV RBC AUTO: 92.7 FL (ref 80–96)
MONOCYTES # BLD AUTO: 0.87 K/UL (ref 0–0.8)
MONOCYTES NFR BLD AUTO: 8.8 % (ref 2–6)
MPC BLD CALC-MCNC: 10.8 FL (ref 9.4–12.4)
NEUTROPHILS # BLD AUTO: 7.6 K/UL (ref 1.8–7.7)
NEUTROPHILS NFR BLD AUTO: 77.3 % (ref 53–65)
NRBC # BLD AUTO: 0 X10E3/UL
NRBC, AUTO (.00): 0 %
PLATELET # BLD AUTO: 273 K/UL (ref 150–400)
POTASSIUM SERPL-SCNC: 3.9 MMOL/L (ref 3.5–5.1)
RBC # BLD AUTO: 3.7 M/UL (ref 4.6–6.2)
SODIUM SERPL-SCNC: 143 MMOL/L (ref 136–145)
WBC # BLD AUTO: 9.85 K/UL (ref 4.5–11)

## 2024-09-04 PROCEDURE — 25000242 PHARM REV CODE 250 ALT 637 W/ HCPCS: Performed by: FAMILY MEDICINE

## 2024-09-04 PROCEDURE — 96372 THER/PROPH/DIAG INJ SC/IM: CPT

## 2024-09-04 PROCEDURE — 25000003 PHARM REV CODE 250: Performed by: FAMILY MEDICINE

## 2024-09-04 PROCEDURE — 99900035 HC TECH TIME PER 15 MIN (STAT)

## 2024-09-04 PROCEDURE — 63600175 PHARM REV CODE 636 W HCPCS: Performed by: FAMILY MEDICINE

## 2024-09-04 PROCEDURE — 11000008

## 2024-09-04 PROCEDURE — 25000003 PHARM REV CODE 250: Performed by: HOSPITALIST

## 2024-09-04 PROCEDURE — 99233 SBSQ HOSP IP/OBS HIGH 50: CPT | Mod: ,,, | Performed by: HOSPITALIST

## 2024-09-04 PROCEDURE — 63600175 PHARM REV CODE 636 W HCPCS: Performed by: HOSPITALIST

## 2024-09-04 PROCEDURE — 85025 COMPLETE CBC W/AUTO DIFF WBC: CPT | Performed by: FAMILY MEDICINE

## 2024-09-04 PROCEDURE — 82962 GLUCOSE BLOOD TEST: CPT

## 2024-09-04 PROCEDURE — 97116 GAIT TRAINING THERAPY: CPT | Mod: CQ

## 2024-09-04 PROCEDURE — 80048 BASIC METABOLIC PNL TOTAL CA: CPT | Performed by: FAMILY MEDICINE

## 2024-09-04 PROCEDURE — 36415 COLL VENOUS BLD VENIPUNCTURE: CPT | Performed by: FAMILY MEDICINE

## 2024-09-04 RX ADMIN — PIPERACILLIN AND TAZOBACTAM 4.5 G: 4; .5 INJECTION, POWDER, LYOPHILIZED, FOR SOLUTION INTRAVENOUS; PARENTERAL at 11:09

## 2024-09-04 RX ADMIN — INSULIN ASPART 2 UNITS: 100 INJECTION, SOLUTION INTRAVENOUS; SUBCUTANEOUS at 05:09

## 2024-09-04 RX ADMIN — INSULIN GLARGINE 20 UNITS: 100 INJECTION, SOLUTION SUBCUTANEOUS at 08:09

## 2024-09-04 RX ADMIN — ASPIRIN 81 MG: 81 TABLET, COATED ORAL at 08:09

## 2024-09-04 RX ADMIN — PIPERACILLIN SODIUM AND TAZOBACTAM SODIUM 4.5 G: 4; .5 INJECTION, POWDER, LYOPHILIZED, FOR SOLUTION INTRAVENOUS at 11:09

## 2024-09-04 RX ADMIN — ZINC SULFATE 220 MG (50 MG) CAPSULE 220 MG: CAPSULE at 08:09

## 2024-09-04 RX ADMIN — PSYLLIUM HUSK 1 PACKET: 3.4 POWDER ORAL at 08:09

## 2024-09-04 RX ADMIN — PSYLLIUM HUSK 1 PACKET: 3.4 POWDER ORAL at 09:09

## 2024-09-04 RX ADMIN — DEXTROSE MONOHYDRATE: 12500 INJECTION, SOLUTION INTRAVENOUS at 02:09

## 2024-09-04 RX ADMIN — CLOPIDOGREL BISULFATE 75 MG: 75 TABLET ORAL at 08:09

## 2024-09-04 RX ADMIN — CHOLECALCIFEROL TAB 125 MCG (5000 UNIT) 5000 UNITS: 125 TAB at 08:09

## 2024-09-04 RX ADMIN — PRAVASTATIN SODIUM 80 MG: 40 TABLET ORAL at 08:09

## 2024-09-04 RX ADMIN — DEXTROSE MONOHYDRATE: 12500 INJECTION, SOLUTION INTRAVENOUS at 11:09

## 2024-09-04 RX ADMIN — INSULIN ASPART 2 UNITS: 100 INJECTION, SOLUTION INTRAVENOUS; SUBCUTANEOUS at 11:09

## 2024-09-04 RX ADMIN — OXYCODONE HYDROCHLORIDE AND ACETAMINOPHEN 500 MG: 500 TABLET ORAL at 08:09

## 2024-09-04 RX ADMIN — PIPERACILLIN AND TAZOBACTAM 4.5 G: 4; .5 INJECTION, POWDER, LYOPHILIZED, FOR SOLUTION INTRAVENOUS; PARENTERAL at 02:09

## 2024-09-04 RX ADMIN — ENOXAPARIN SODIUM 30 MG: 30 INJECTION SUBCUTANEOUS at 05:09

## 2024-09-04 NOTE — PLAN OF CARE
Problem: Physical Therapy  Goal: Physical Therapy Goal  Description: Short Term Goals to be met by: 24    Patient will increase functional independence with mobility by performin. Gait  x 300 feet with Stand by assist using Rolling walker  2. Lower extremity exercise program x30 reps per handout, with assistance as needed  3. Pt to be indep in all functional transfers.  4. Pt to negotiate 3 steps with rail with SBA    Long Term Goals to be met by: 24    Pt will regain full independent functional mobility with Rolling walker to return to home situation and prior activities of daily living.   Outcome: Progressing     PT recently evaluated for PT and Is progressing well towards set goals.

## 2024-09-04 NOTE — PROGRESS NOTES
[facility]  Wound Care    Patient Name:  Negro Hale   MRN:  [id number]  Date: 9/4/2024  Diagnosis: Necrotizing soft tissue infection    History:     Past Medical History:   Diagnosis Date    Abnormal thyroid stimulating hormone (TSH) level 08/22/2019    Anemia 08/15/2019    Atrial fibrillation 09/07/2012    Bradycardia 05/23/2017    asymptomatic    Change in bowel habit 11/13/2018    Chronic kidney disease, unspecified 01/14/2019    Coronary arteriosclerosis 09/07/2012    Disease of skin and subcutaneous tissue 08/16/2017    medial aspect RLE- cystic structure seen on venous doppler US.    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 Lewis Eye exam    Varicose veins of both lower extremities with pain 01/02/2019    Venous insufficiency 01/08/2019       Social History     Socioeconomic History    Marital status:    Tobacco Use     Smoking status: Former     Current packs/day: 0.00     Average packs/day: 0.1 packs/day for 3.0 years (0.3 ttl pk-yrs)     Types: Cigarettes     Start date:      Quit date:      Years since quittin.7     Passive exposure: Past    Smokeless tobacco: Never    Tobacco comments:     Quit 10/15/1976   Substance and Sexual Activity    Alcohol use: Not Currently     Comment: occasionally    Drug use: Never    Sexual activity: Not Currently     Social Determinants of Health     Financial Resource Strain: Low Risk  (2024)    Overall Financial Resource Strain (CARDIA)     Difficulty of Paying Living Expenses: Not hard at all   Food Insecurity: No Food Insecurity (2024)    Hunger Vital Sign     Worried About Running Out of Food in the Last Year: Never true     Ran Out of Food in the Last Year: Never true   Transportation Needs: No Transportation Needs (2024)    TRANSPORTATION NEEDS     Transportation : No   Physical Activity: Inactive (2024)    Exercise Vital Sign     Days of Exercise per Week: 0 days     Minutes of Exercise per Session: 0 min   Stress: No Stress Concern Present (2024)    [employer] [place] [employer] - Occupational Stress Questionnaire     Feeling of Stress : Not at all   Housing Stability: Low Risk  (2024)    Housing Stability Vital Sign     Unable to Pay for Housing in the Last Year: No     Homeless in the Last Year: No       Precautions:     Allergies as of 2024 - Reviewed 2024   Allergen Reaction Noted    [facility]       [facility] Assessment Details/Treatment     Narrative: Seen patient for initiation of preventative skin care measures and dressing change to buttock.    Patient assisted back to bed from bathroom. Wife at bedside. Right perirectal area wound with pink/tan full thickness skin loss noted. Outer edges dry, pink. Slowly improving, Cont Vashe moist gauze and secured with Mepliex foam border.  Measures 8.0 cm x 2.0 cm x  3.0 cm.  Change daily and prn per nursing staff. Has abrasion/skin tears to Left AC and Right posterior upper arm, cleaned with vashe moist gauze and applied Mepliex Foam and AG borders to area. Change q 3 to 5 days prn.  Overall buttock with dry patchy skin noted, ? Tape tear or ? Yeast formation. Applied Remedy Z-guard cream. Mepliex foam borders to sacral and Bilateral heels. Cont turn protocol. On low air loss mattress .   Photos placed on chart.    Wound care team to follow prn        09/04/2024

## 2024-09-04 NOTE — PT/OT/SLP PROGRESS
Physical Therapy Treatment    Patient Name:  Negro Hale   MRN:  [id number]    Recommendations:     Discharge Recommendations: Low Intensity Therapy  Discharge Equipment Recommendations: none  Barriers to discharge: None    Assessment:     Negro Hale is a 84 y.o. male admitted with a medical diagnosis of Necrotizing soft tissue infection.  He presents with the following impairments/functional limitations: weakness, impaired skin .    Pt did well with toileting and ambulation (180ft with rolling walker) today.    Rehab Prognosis: Good; patient would benefit from acute skilled PT services to address these deficits and reach maximum level of function.    Recent Surgery: * No surgery found *      Plan:     During this hospitalization, patient to be seen 5 x/week to address the identified rehab impairments via gait training, therapeutic activities, therapeutic exercises, neuromuscular re-education and progress toward the following goals:    Plan of Care Expires:  09/30/24    Received Plan of Care per Destiny Mitchell PT     Subjective     Chief Complaint: none; in the bathroom on arrival  Patient/Family Comments/goals: agrees to PT Tx   Pain/Comfort:  Pain Rating 1: 0/10      Objective:     Communicated with RN, patient prior to session.  Patient found  on commode  with peripheral IV upon PT entry to room.     General Precautions: Standard, fall  Orthopedic Precautions: N/A  Braces: N/A  Respiratory Status: Room air     Functional Mobility:  Transfers:     Sit to Stand:  contact guard assistance with rolling walker  Toilet Transfer: modified independence with  grab bars  using  Step Transfer  Gait: 180ft with rolling walker, CGA, vc's for safety and proper technique       AM-PAC 6 CLICK MOBILITY  Turning over in bed (including adjusting bedclothes, sheets and blankets)?: 4  Sitting down on and standing up from a chair with arms (e.g., wheelchair, bedside commode, etc.): 4  Moving from lying on back to  sitting on the side of the bed?: 4  Moving to and from a bed to a chair (including a wheelchair)?: 3  Need to walk in hospital room?: 3  Climbing 3-5 steps with a railing?: 3  Basic Mobility Total Score: 21       Treatment & Education:  Toileting with some assist to make sure he was clean, tie gown  Gait training/ambulation as noted above  Return to chair in reclined post treatment with all lines connected    Patient left up in chair with all lines intact, call button in reach, and RN notified..    GOALS:   Multidisciplinary Problems       Physical Therapy Goals          Problem: Physical Therapy    Goal Priority Disciplines Outcome Goal Variances Interventions   Physical Therapy Goal     PT, PT/OT Progressing     Description: Short Term Goals to be met by: 24    Patient will increase functional independence with mobility by performin. Gait  x 300 feet with Stand by assist using Rolling walker  2. Lower extremity exercise program x30 reps per handout, with assistance as needed  3. Pt to be indep in all functional transfers.  4. Pt to negotiate 3 steps with rail with SBA    Long Term Goals to be met by: 24    Pt will regain full independent functional mobility with Rolling walker to return to home situation and prior activities of daily living.                        Time Tracking:     PT Received On: 24  PT Start Time: 1500     PT Stop Time: 1520  PT Total Time (min): 20 min     Billable Minutes: Gait Training 20    Treatment Type: Treatment  PT/PTA: PTA     Number of PTA visits since last PT visit: 2024

## 2024-09-05 LAB
GLUCOSE SERPL-MCNC: 158 MG/DL (ref 70–105)
GLUCOSE SERPL-MCNC: 159 MG/DL (ref 70–105)
GLUCOSE SERPL-MCNC: 170 MG/DL (ref 70–105)
GLUCOSE SERPL-MCNC: 71 MG/DL (ref 70–105)

## 2024-09-05 PROCEDURE — 99233 SBSQ HOSP IP/OBS HIGH 50: CPT | Mod: ,,, | Performed by: HOSPITALIST

## 2024-09-05 PROCEDURE — 25000003 PHARM REV CODE 250: Performed by: HOSPITALIST

## 2024-09-05 PROCEDURE — 82962 GLUCOSE BLOOD TEST: CPT

## 2024-09-05 PROCEDURE — 97116 GAIT TRAINING THERAPY: CPT | Mod: CQ

## 2024-09-05 PROCEDURE — 25000242 PHARM REV CODE 250 ALT 637 W/ HCPCS: Performed by: FAMILY MEDICINE

## 2024-09-05 PROCEDURE — 99900035 HC TECH TIME PER 15 MIN (STAT)

## 2024-09-05 PROCEDURE — 25000003 PHARM REV CODE 250: Performed by: FAMILY MEDICINE

## 2024-09-05 PROCEDURE — 94761 N-INVAS EAR/PLS OXIMETRY MLT: CPT

## 2024-09-05 PROCEDURE — 11000008

## 2024-09-05 PROCEDURE — 63600175 PHARM REV CODE 636 W HCPCS: Performed by: FAMILY MEDICINE

## 2024-09-05 PROCEDURE — 63600175 PHARM REV CODE 636 W HCPCS: Performed by: HOSPITALIST

## 2024-09-05 RX ADMIN — DEXTROSE MONOHYDRATE: 12500 INJECTION, SOLUTION INTRAVENOUS at 11:09

## 2024-09-05 RX ADMIN — CLOPIDOGREL BISULFATE 75 MG: 75 TABLET ORAL at 09:09

## 2024-09-05 RX ADMIN — INSULIN GLARGINE 20 UNITS: 100 INJECTION, SOLUTION SUBCUTANEOUS at 09:09

## 2024-09-05 RX ADMIN — ASPIRIN 81 MG: 81 TABLET, COATED ORAL at 09:09

## 2024-09-05 RX ADMIN — PIPERACILLIN SODIUM AND TAZOBACTAM SODIUM 4.5 G: 4; .5 INJECTION, POWDER, LYOPHILIZED, FOR SOLUTION INTRAVENOUS at 11:09

## 2024-09-05 RX ADMIN — CHOLECALCIFEROL TAB 125 MCG (5000 UNIT) 5000 UNITS: 125 TAB at 09:09

## 2024-09-05 RX ADMIN — INSULIN ASPART 2 UNITS: 100 INJECTION, SOLUTION INTRAVENOUS; SUBCUTANEOUS at 04:09

## 2024-09-05 RX ADMIN — OXYCODONE HYDROCHLORIDE AND ACETAMINOPHEN 500 MG: 500 TABLET ORAL at 09:09

## 2024-09-05 RX ADMIN — PRAVASTATIN SODIUM 80 MG: 40 TABLET ORAL at 09:09

## 2024-09-05 RX ADMIN — INSULIN ASPART 1 UNITS: 100 INJECTION, SOLUTION INTRAVENOUS; SUBCUTANEOUS at 09:09

## 2024-09-05 RX ADMIN — PSYLLIUM HUSK 1 PACKET: 3.4 POWDER ORAL at 09:09

## 2024-09-05 RX ADMIN — ENOXAPARIN SODIUM 30 MG: 30 INJECTION SUBCUTANEOUS at 04:09

## 2024-09-05 RX ADMIN — ZINC SULFATE 220 MG (50 MG) CAPSULE 220 MG: CAPSULE at 09:09

## 2024-09-05 NOTE — PLAN OF CARE
"[facility]   Interdisciplinary Team Meeting    Patient: Negro Hale   Today's Date: 9/5/2024   Estimated D/C Date: 9/12/2024        Physician:  Dr. Szymanski Unit Director:  N/A   Pharmacy: Feli Sanchez PharmD Case Management:  Maria Reyes RN   Physical/Occupational Therapy:  Marge Bianchi Speech Therapy:  Marge Bianchi   Respiratory:  Cassie Villagran Nursing: Jenn Osman RN   Wound Care:  N/A   Utilization Management: Maria Reyes RN     Nursing  New Symptoms/Problems: N/A  Bowel: continent Urine: continent [place]: No   Constipated: No    Lab Concerns: N/A  Lab Reviewed: Yes  New Lab Orders: Yes    Nutrition:  Mechanical soft diet w/Tito  Intake percentage: %   Weight: Weight: 81.1 kg (178 lb 14.4 oz)   Height: Height: 5' 8" (172.7 cm)  BMI: BMI (Calculated): 27.2    Speech/Swallowing: No current speech or swallowing issues  Aspiration Precautions: No  Cognition: WNL Diarrhea: No      Respiratory Therapy  Isolation: No  Last Bowel Movement: 09/05/24     O2 Device: Room Air  Vent: No  Comment(s): Incentive Spirometry q4 W/A   O2 Flow: 0  SpO2: 98 %       Physical Therapy  Physical Therapy/Gait: 180-300ft with RW ELOS: Plan to DC 9/12/2024    Transfers: Contact Guard Assistance  Bed Mobility: Contact Guard Assistance Range of Motion/Restrictions: N/A  Comment(s): N/A     Occupational Therapy  Eating/Grooming:  No OT Toileting: Activity did not occur   Bathing: Activity did not occur Dressing (Upper Body): Activity did not occur   Dressing (Lower Body): Activity did not occur      Wound Care: (L) buttocks- Moisten vashe gauze, dry 4x4 and paper tape daily/prn      Tx Plan/Recommendations reviewed with family and/or patient on (date) Yes.  Additional family Conference/Training: N/A  D/C Plan/Recommendations: Home with   RICKY: 9/12/2024    Pharmacy  Medication Changes (see MD orders in chart): No  Pharmacy comments: No  IV Medications: Zosyn " q12

## 2024-09-05 NOTE — PROGRESS NOTES
[facility]  Adult Nutrition  Progress Note         Reason for Assessment  Reason For Assessment: length of stay   Nutrition Risk Screen: large or nonhealing wound, burn or pressure injury    Assessment and Plan    Patient admitted to LTAC 8/29 from [facility] with a dx of necrotizing soft tissue infection, perirectal abscess s/p debridement, and IBS. Patient is ordered a MCS diet. Po intakes on average 75% per flowsheets. Tito ordered to aid wound healing.     Patient is 81.1 kg with a BMI of 27.20 which is WNL. Diet adequate to meet estimated energy needs. Continue current plan of care as tolerated. RD Following.       Learning Needs/Social Determinants of Health  Learning Assessment       08/29/2024 1624 [facility] (8/29/2024 - Present)   Created by Aysha Hallman RN - RN (Nurse) Status: Complete                 PRIMARY LEARNER     Primary Learner Name:  patient TT - 08/29/2024 1624    Relationship:  Patient TT - 08/29/2024 1624    Does the primary learner have any barriers to learning?:  No Barriers TT - 08/29/2024 1624    What is the preferred language of the primary learner?:  English TT - 08/29/2024 1624    Is an  required?:  No TT - 08/29/2024 1624    How does the primary learner prefer to learn new concepts?:  Listening TT - 08/29/2024 1624    How often do you need to have someone help you read instructions, pamphlets, or written material from your doctor or pharmacy?:  Never TT - 08/29/2024 1624        CO-LEARNER #1     No question answered        CO-LEARNER #2     No question answered        SPECIAL TOPICS     No question answered        ANSWERED BY:     No question answered        Comments         Edit History       Aysha Hallman, RN - RN (Nurse)   08/29/2024 1624                           Social Determinants of Health     Tobacco Use: Medium Risk (8/26/2024)    Patient History     Smoking Tobacco Use: Former     Smokeless Tobacco  Use: Never     Passive Exposure: Past   Alcohol Use: Not At Risk (8/30/2024)    AUDIT-C     Frequency of Alcohol Consumption: Never     Average Number of Drinks: Patient does not drink     Frequency of Binge Drinking: Never   Financial Resource Strain: Low Risk  (8/30/2024)    Overall Financial Resource Strain (CARDIA)     Difficulty of Paying Living Expenses: Not hard at all   Food Insecurity: No Food Insecurity (8/30/2024)    Hunger Vital Sign     Worried About Running Out of Food in the Last Year: Never true     Ran Out of Food in the Last Year: Never true   Transportation Needs: No Transportation Needs (8/30/2024)    TRANSPORTATION NEEDS     Transportation : No   Physical Activity: Inactive (8/30/2024)    Exercise Vital Sign     Days of Exercise per Week: 0 days     Minutes of Exercise per Session: 0 min   Stress: No Stress Concern Present (8/30/2024)    [employer] - Occupational Stress Questionnaire     Feeling of Stress : Not at all   Housing Stability: Low Risk  (8/30/2024)    Housing Stability Vital Sign     Unable to Pay for Housing in the Last Year: No     Homeless in the Last Year: No   Depression: Low Risk  (6/3/2024)    Depression     Last PHQ-4: Flowsheet Data: 0   Utilities: Not At Risk (8/30/2024)    [facility] Utilities     Threatened with loss of utilities: No   Health Literacy: Adequate Health Literacy (8/30/2024)     Health Literacy     Frequency of need for help with medical instructions: Never   Social Isolation: Socially Integrated (8/30/2024)    Social Isolation     Social Isolation: 1            Malnutrition  Is Patient Malnourished: No    Nutrition Diagnosis  Increased Protein Needs related to Wound healing as evidenced by soft tissue infection  Comments: ordered Tito BID    Recent Labs   Lab 09/04/24  0248 09/04/24  0431 09/05/24  0603   GLU 94  --   --    POCGLU  --    < > 71    < > = values in this interval not displayed.     Comments on Glucose:  WNL    Nutrition Prescription / Recommendations  Recommendation/Intervention: Continue diet and Tito as tolerated  Goals: po intakes 75% remainder of admission  Nutrition Goal Status: new  Communication of RD Recs: discussed on rounds  Current Diet Order: dysphagia MCS  Oral Nutrition Supplement: Tito BID to aid wound healing  Chewing or Swallowing Difficulty?: Poor Dentition  Recommended Diet: Mechanical Soft with chopped meats  Recommended Oral Supplement: Tito [90 kcals, 2.5g Protein, 10g Carbs(3g Sugar), 7g L-Arginine, 7g L-Glutamine, Vitamin C 300mg, 9.5mg Zinc] 2 times a day  Is Nutrition Support Recommended: [facility] Nutrition Support: No  Is Nutrition Education Recommended: No    Monitor and Evaluation  % current Intake: P.O. intake of 50 - 75 %  % intake to meet estimated needs: 50 - 75 %  Food and Nutrient Intake: energy intake, food and beverage intake  Food and Nutrient Adminstration: diet order  Anthropometric Measurements: height/length, weight, weight change, body mass index  Biochemical Data, Medical Tests and Procedures: electrolyte and renal panel, lipid profile, gastrointestinal profile, glucose/endocrine profile, inflammatory profile  Tolerance: tolerating    Current Medical Diagnosis and Past Medical History     Past Medical History:   Diagnosis Date    Abnormal thyroid stimulating hormone (TSH) level 08/22/2019    Anemia 08/15/2019    Atrial fibrillation 09/07/2012    Bradycardia 05/23/2017    asymptomatic    Change in bowel habit 11/13/2018    Chronic kidney disease, unspecified 01/14/2019    Coronary arteriosclerosis 09/07/2012    Disease of skin and subcutaneous tissue 08/16/2017    medial aspect RLE- cystic structure seen on venous doppler US.    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    Hyperlipidemia 09/21/2012    Lower extremity edema 04/06/2017    Lumbar radiculopathy 01/26/2017    Obesity, unspecified 12/05/2016    Old myocardial infarction 12/06/2017    Other secondary pulmonary hypertension 05/23/2017    Other spondylosis, lumbosacral region 01/26/2017    Peripheral vascular disease     Personal history of tobacco use, presenting hazards to health 11/17/2016    Pulmonary hypertension     Type 2 diabetes mellitus with chronic kidney disease 01/14/2019    Type 2 diabetes mellitus with hyperglycemia 10/02/2011    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 Lewis Eye exam    Varicose veins of both lower extremities with pain 01/02/2019    Venous insufficiency 01/08/2019       Nutrition/Diet History  Spiritual, Cultural Beliefs, Rastafarian Practices, Values that Affect Care: no  Food Allergies: other (see comments) ([facility])  Factors Affecting Nutritional Intake: chewing difficulties/inability to chew food    Lab/Procedures/Meds  Recent Labs   Lab 09/04/24  0248      K 3.9   BUN 43*   CREATININE 2.80*   CALCIUM 8.4*   *   Hx CKD  Last A1c:   Lab Results   Component Value Date    HGBA1C 6.7 (H) 08/23/2024     Lab Results   Component Value Date    RBC 3.70 (L) 09/04/2024    HGB 10.9 (L) 09/04/2024    HCT 34.3 (L) 09/04/2024    MCV 92.7 09/04/2024    MCH 29.5 09/04/2024    MCHC 31.8 (L) 09/04/2024     Pertinent Labs Reviewed: reviewed  Pertinent Medications Reviewed: reviewed  Scheduled Meds:   ascorbic acid (vitamin C)  500 mg Oral Daily    aspirin  81 mg Oral Daily    cholecalciferol (vitamin D3)  5,000 Units Oral Daily    clopidogreL  75 mg Oral Daily    enoxparin  30 mg Subcutaneous Q24H  "(prophylaxis, 1700)    insulin glargine U-100 (Lantus)  20 Units Subcutaneous QHS    piperacillin-tazobactam (Zosyn) IV (PEDS and ADULTS) (extended infusion is not appropriate)  4.5 g Intravenous Q12H    pravastatin  80 mg Oral QHS    psyllium husk (aspartame)  1 packet Oral BID    zinc sulfate  220 mg Oral Daily     Continuous Infusions:  PRN Meds:.  Current Facility-Administered Medications:     D5W, , Intravenous, PRN    dextrose 10%, 12.5 g, Intravenous, PRN    dextrose 10%, 25 g, Intravenous, PRN    glucagon (human recombinant), 1 mg, Intramuscular, PRN    glucose, 16 g, Oral, PRN    glucose, 24 g, Oral, PRN    HYDROcodone-acetaminophen, 1 tablet, Oral, Q4H PRN    HYDROcodone-acetaminophen, 1 tablet, Oral, Q4H PRN    hydrocortisone, , Topical (Top), BID PRN    insulin aspart U-100, 0-10 Units, Subcutaneous, QID (AC + HS) PRN    Anthropometrics  Temp: 97.8 °F (36.6 °C)  Height: 5' 8" (172.7 cm)  Height (inches): 68 in  Weight Method: Bed Scale  Weight: 81.1 kg (178 lb 14.4 oz)  Weight (lb): 178.9 lb  Ideal Body Weight (IBW), Male: 154 lb  % Ideal Body Weight, Male (lb): 116.17 %  BMI (Calculated): 27.2       Estimated/Assessed Needs      Temp: 97.8 °F (36.6 °C)Oral  Weight Used For Calorie Calculations: 81.1 kg (178 lb 12.7 oz)   Energy Need Method: Kcal/kg Energy Calorie Requirements (kcal): 7556-2782  Weight Used For Protein Calculations: 81.1 kg (178 lb 12.7 oz)  Protein Requirements: 65-81  Estimated Fluid Requirement Method: RDA Method    RDA Method (mL): 1622       Nutrition by Nursing  Diet/Nutrition Received: consistent carb/diabetic diet  Intake (%): 50%  Diet/Feeding Assistance: tray set-up  Diet/Feeding Tolerance: good  Last Bowel Movement: 09/05/24                Nutrition Follow-Up  RD Follow-up?: Yes      Nutrition Discharge Planning: admission to LTAC; RD following for d/c needs            Available via Secure Chat  "

## 2024-09-05 NOTE — PT/OT/SLP PROGRESS
Physical Therapy Treatment    Patient Name:  Negro Hale   MRN:  [id number]    Recommendations:     Discharge Recommendations: Low Intensity Therapy  Discharge Equipment Recommendations: none  Barriers to discharge: None    Assessment:     Negro Hale is a 84 y.o. male admitted with a medical diagnosis of Necrotizing soft tissue infection.  He presents with the following impairments/functional limitations: weakness .    Pt pleasant, wife present; walked the same distance today as yesterday.    Rehab Prognosis: Good; patient would benefit from acute skilled PT services to address these deficits and reach maximum level of function.    Recent Surgery: * No surgery found *      Plan:     During this hospitalization, patient to be seen 5 x/week to address the identified rehab impairments via gait training, therapeutic activities, therapeutic exercises, neuromuscular re-education and progress toward the following goals:    Plan of Care Expires:  09/30/24    Subjective     Chief Complaint: none  Patient/Family Comments/goals: agrees to PT tx; wife encouraging  Pain/Comfort:  Pain Rating 1: 0/10      Objective:     Communicated with patient prior to session.  Patient found HOB elevated with peripheral IV upon PT entry to room.     General Precautions: Standard, fall  Orthopedic Precautions: N/A  Braces: N/A  Respiratory Status: Room air     Functional Mobility:  Bed Mobility:     Supine to Sit: contact guard assistance  Transfers:     Sit to Stand:  modified independence with rolling walker  Gait: 180ft with rolling walker, CGA and vc's for safety awareness and proper distance from walker, posture correction; assist for IV pole       AM-PAC 6 CLICK MOBILITY  Turning over in bed (including adjusting bedclothes, sheets and blankets)?: 4  Sitting down on and standing up from a chair with arms (e.g., wheelchair, bedside commode, etc.): 4  Moving from lying on back to sitting on the side of the bed?: 3  Moving to and  from a bed to a chair (including a wheelchair)?: 3  Need to walk in hospital room?: 3  Climbing 3-5 steps with a railing?: 3  Basic Mobility Total Score: 20       Treatment & Education:  Bed mobility, transfers, gait as noted    Patient left up in chair with all lines intact, call button in reach, and wife present..    GOALS:   Multidisciplinary Problems       Physical Therapy Goals          Problem: Physical Therapy    Goal Priority Disciplines Outcome Goal Variances Interventions   Physical Therapy Goal     PT, PT/OT Progressing     Description: Short Term Goals to be met by: 24    Patient will increase functional independence with mobility by performin. Gait  x 300 feet with Stand by assist using Rolling walker  2. Lower extremity exercise program x30 reps per handout, with assistance as needed  3. Pt to be indep in all functional transfers.  4. Pt to negotiate 3 steps with rail with SBA    Long Term Goals to be met by: 24    Pt will regain full independent functional mobility with Rolling walker to return to home situation and prior activities of daily living.                        Time Tracking:     PT Received On: 24  PT Start Time: 1445     PT Stop Time: 1505  PT Total Time (min): 20 min     Billable Minutes: Gait Training 20    Treatment Type: Treatment  PT/PTA: PTA     Number of PTA visits since last PT visit: 2     2024

## 2024-09-06 LAB
ANION GAP SERPL CALCULATED.3IONS-SCNC: 9 MMOL/L (ref 7–16)
BASOPHILS # BLD AUTO: 0.1 K/UL (ref 0–0.2)
BASOPHILS NFR BLD AUTO: 1.3 % (ref 0–1)
BUN SERPL-MCNC: 53 MG/DL (ref 7–18)
BUN/CREAT SERPL: 18 (ref 6–20)
CALCIUM SERPL-MCNC: 8.8 MG/DL (ref 8.5–10.1)
CHLORIDE SERPL-SCNC: 110 MMOL/L (ref 98–107)
CO2 SERPL-SCNC: 26 MMOL/L (ref 21–32)
CREAT SERPL-MCNC: 3.01 MG/DL (ref 0.7–1.3)
DIFFERENTIAL METHOD BLD: ABNORMAL
EGFR (NO RACE VARIABLE) (RUSH/TITUS): 20 ML/MIN/1.73M2
EOSINOPHIL # BLD AUTO: 0.31 K/UL (ref 0–0.5)
EOSINOPHIL NFR BLD AUTO: 4.1 % (ref 1–4)
ERYTHROCYTE [DISTWIDTH] IN BLOOD BY AUTOMATED COUNT: 14.2 % (ref 11.5–14.5)
GLUCOSE SERPL-MCNC: 123 MG/DL (ref 70–105)
GLUCOSE SERPL-MCNC: 178 MG/DL (ref 70–105)
GLUCOSE SERPL-MCNC: 182 MG/DL (ref 70–105)
GLUCOSE SERPL-MCNC: 62 MG/DL (ref 74–106)
GLUCOSE SERPL-MCNC: 69 MG/DL (ref 70–105)
HCT VFR BLD AUTO: 32.5 % (ref 40–54)
HGB BLD-MCNC: 10.3 G/DL (ref 13.5–18)
IMM GRANULOCYTES # BLD AUTO: 0.03 K/UL (ref 0–0.04)
IMM GRANULOCYTES NFR BLD: 0.4 % (ref 0–0.4)
LYMPHOCYTES # BLD AUTO: 0.96 K/UL (ref 1–4.8)
LYMPHOCYTES NFR BLD AUTO: 12.6 % (ref 27–41)
MCH RBC QN AUTO: 29.3 PG (ref 27–31)
MCHC RBC AUTO-ENTMCNC: 31.7 G/DL (ref 32–36)
MCV RBC AUTO: 92.6 FL (ref 80–96)
MONOCYTES # BLD AUTO: 0.72 K/UL (ref 0–0.8)
MONOCYTES NFR BLD AUTO: 9.5 % (ref 2–6)
MPC BLD CALC-MCNC: 11.3 FL (ref 9.4–12.4)
NEUTROPHILS # BLD AUTO: 5.49 K/UL (ref 1.8–7.7)
NEUTROPHILS NFR BLD AUTO: 72.1 % (ref 53–65)
NRBC # BLD AUTO: 0 X10E3/UL
NRBC, AUTO (.00): 0 %
PLATELET # BLD AUTO: 245 K/UL (ref 150–400)
POTASSIUM SERPL-SCNC: 3.6 MMOL/L (ref 3.5–5.1)
RBC # BLD AUTO: 3.51 M/UL (ref 4.6–6.2)
SODIUM SERPL-SCNC: 141 MMOL/L (ref 136–145)
WBC # BLD AUTO: 7.61 K/UL (ref 4.5–11)

## 2024-09-06 PROCEDURE — 99233 SBSQ HOSP IP/OBS HIGH 50: CPT | Mod: ,,, | Performed by: HOSPITALIST

## 2024-09-06 PROCEDURE — 80048 BASIC METABOLIC PNL TOTAL CA: CPT | Performed by: FAMILY MEDICINE

## 2024-09-06 PROCEDURE — 85025 COMPLETE CBC W/AUTO DIFF WBC: CPT | Performed by: FAMILY MEDICINE

## 2024-09-06 PROCEDURE — 97116 GAIT TRAINING THERAPY: CPT | Mod: CQ

## 2024-09-06 PROCEDURE — 25000003 PHARM REV CODE 250: Performed by: HOSPITALIST

## 2024-09-06 PROCEDURE — 99900035 HC TECH TIME PER 15 MIN (STAT)

## 2024-09-06 PROCEDURE — 63600175 PHARM REV CODE 636 W HCPCS: Performed by: FAMILY MEDICINE

## 2024-09-06 PROCEDURE — 82962 GLUCOSE BLOOD TEST: CPT

## 2024-09-06 PROCEDURE — 25000242 PHARM REV CODE 250 ALT 637 W/ HCPCS: Performed by: FAMILY MEDICINE

## 2024-09-06 PROCEDURE — 36415 COLL VENOUS BLD VENIPUNCTURE: CPT | Performed by: FAMILY MEDICINE

## 2024-09-06 PROCEDURE — 25000003 PHARM REV CODE 250: Performed by: FAMILY MEDICINE

## 2024-09-06 PROCEDURE — 11000008

## 2024-09-06 PROCEDURE — 63600175 PHARM REV CODE 636 W HCPCS: Performed by: HOSPITALIST

## 2024-09-06 RX ADMIN — OXYCODONE HYDROCHLORIDE AND ACETAMINOPHEN 500 MG: 500 TABLET ORAL at 09:09

## 2024-09-06 RX ADMIN — ENOXAPARIN SODIUM 30 MG: 30 INJECTION SUBCUTANEOUS at 04:09

## 2024-09-06 RX ADMIN — PRAVASTATIN SODIUM 80 MG: 40 TABLET ORAL at 09:09

## 2024-09-06 RX ADMIN — DEXTROSE MONOHYDRATE 30 ML: 12500 INJECTION, SOLUTION INTRAVENOUS at 11:09

## 2024-09-06 RX ADMIN — ASPIRIN 81 MG: 81 TABLET, COATED ORAL at 09:09

## 2024-09-06 RX ADMIN — CLOPIDOGREL BISULFATE 75 MG: 75 TABLET ORAL at 09:09

## 2024-09-06 RX ADMIN — PSYLLIUM HUSK 1 PACKET: 3.4 POWDER ORAL at 09:09

## 2024-09-06 RX ADMIN — DEXTROSE MONOHYDRATE 35 ML/HR: 12500 INJECTION, SOLUTION INTRAVENOUS at 10:09

## 2024-09-06 RX ADMIN — ZINC SULFATE 220 MG (50 MG) CAPSULE 220 MG: CAPSULE at 09:09

## 2024-09-06 RX ADMIN — CHOLECALCIFEROL TAB 125 MCG (5000 UNIT) 5000 UNITS: 125 TAB at 09:09

## 2024-09-06 RX ADMIN — PIPERACILLIN SODIUM AND TAZOBACTAM SODIUM 4.5 G: 4; .5 INJECTION, POWDER, LYOPHILIZED, FOR SOLUTION INTRAVENOUS at 11:09

## 2024-09-06 RX ADMIN — PIPERACILLIN SODIUM AND TAZOBACTAM SODIUM 4.5 G: 4; .5 INJECTION, POWDER, LYOPHILIZED, FOR SOLUTION INTRAVENOUS at 10:09

## 2024-09-06 RX ADMIN — INSULIN ASPART 2 UNITS: 100 INJECTION, SOLUTION INTRAVENOUS; SUBCUTANEOUS at 04:09

## 2024-09-06 NOTE — ASSESSMENT & PLAN NOTE
Further discussed with surgery upon admission to [facility], recommendations for wound care to manage and will re-consult if further surgical needs arise.     Continue antibiotics and dressing changes.   ---  Perirectal abscess now status-post extensive debridement and revision. Subsequent large sacral and perineal wound now healing appropriately.     Anaerobic culture positive, currently receiving treatment with clindamycin and Zosyn. Will likely require extended course due to location of wound and risk of contamination.     - aggressive wound care including dressing changes daily and with each bowel movement  - antibiotics as below    9/4: continue with aggressive wound care and antibiotics for now.     Antibiotics (From admission, onward)      Start     Stop Route Frequency Ordered    09/04/24 2230  piperacillin-tazobactam (ZOSYN) 4.5 g in D5W 100 mL IVPB (MB+)         -- IV Every 12 hours (non-standard times) 09/04/24 1220

## 2024-09-06 NOTE — PROGRESS NOTES
[facility]  Progress Note    Patient Name: Negro Hale  MRN: [id number]  Patient Class: IP- Inpatient   Admission Date: 8/29/2024  Length of Stay: 7 days  Attending Physician: Ashleigh Szymanski MD  Primary Care Provider: Smiley Trevino FNP        Subjective:     Principal Problem:Necrotizing soft tissue infection        HPI:    Negro Hale is a 84 y.o. male with history of HTN, CAD, CKD, IBS, and a-fib who initially presented to [facility] with perirectal abscess. Surgical evaluation revealed necrotizing soft tissue infection requiring extensive debridement with revision (most recent procedure 8/26). Anaerobic cultures positive and patient placed on appropriate antibiotics. Initial blood culture with micrococcus species thought to be a contaminant, with repeat cultures negative. Disposition complicated by diarrhea requiring frequent wound care and dressing changes due to soiling, as well and deconditioning due to acute illness and hospitalization. Patient admitted to [facility] for continued antibiotics, rehabilitation, and aggressive wound care.       Overview/Hospital Course:  No notes on file    Interval History:     Review of Systems   Constitutional:  Positive for fatigue. Negative for chills and fever.   Respiratory:  Negative for cough and shortness of breath.    Cardiovascular:  Negative for chest pain and palpitations.   Gastrointestinal:  Positive for diarrhea. Negative for abdominal pain, nausea and vomiting.   Genitourinary:  Negative for dysuria and flank pain.   Skin:  Positive for wound.   Neurological:  Positive for weakness. Negative for dizziness, syncope and headaches.   Psychiatric/Behavioral:  Negative for confusion.      Objective:     Vital Signs (Most Recent):  Temp: 97.8 °F (36.6 °C) (09/05/24 0745)  Pulse: 74 (09/05/24 0850)  Resp: 11 (09/05/24 0850)  BP: (!) 101/47 (09/05/24 0850)  SpO2: 97 % (09/05/24  0925) Vital Signs (24h Range):  Temp:  [97.8 °F (36.6 °C)-98.7 °F (37.1 °C)] 97.8 °F (36.6 °C)  Pulse:  [55-74] 74  Resp:  [11-30] 11  SpO2:  [97 %-98 %] 97 %  BP: ()/(45-49) 101/47     Weight: 81.1 kg (178 lb 14.4 oz)  Body mass index is 27.2 kg/m².    Intake/Output Summary (Last 24 hours) at 9/5/2024 1924  Last data filed at 9/5/2024 1740  Gross per 24 hour   Intake 1097.5 ml   Output --   Net 1097.5 ml         Physical Exam  Constitutional:       General: He is not in acute distress.     Appearance: Normal appearance. He is not ill-appearing.   HENT:      Head: Normocephalic and atraumatic.   Eyes:      General: No scleral icterus.     Conjunctiva/sclera: Conjunctivae normal.      Pupils: Pupils are equal, round, and reactive to light.   Cardiovascular:      Rate and Rhythm: Normal rate and regular rhythm.   Pulmonary:      Effort: Pulmonary effort is normal. No respiratory distress.   Abdominal:      Palpations: Abdomen is soft.      Tenderness: There is no abdominal tenderness.   Skin:     Coloration: Skin is not jaundiced or pale.      Findings: Wound (extensive sacral/perineal wound status-post debridement - see media) present. No rash.   Neurological:      Mental Status: He is alert.   Psychiatric:         Behavior: Behavior normal.         Thought Content: Thought content normal.             Significant Labs: All pertinent labs within the past 24 hours have been reviewed.    Significant Imaging: I have reviewed all pertinent imaging results/findings within the past 24 hours.    Assessment/Plan:      * Necrotizing soft tissue infection  Further discussed with surgery upon admission to [facility], recommendations for wound care to manage and will re-consult if further surgical needs arise.     Continue antibiotics and dressing changes.   ---  Perirectal abscess now status-post extensive debridement and revision. Subsequent large sacral and perineal wound now healing appropriately.     Anaerobic culture  "positive, currently receiving treatment with clindamycin and Zosyn. Will likely require extended course due to location of wound and risk of contamination.     - aggressive wound care including dressing changes daily and with each bowel movement  - antibiotics as below    9/4: continue with aggressive wound care and antibiotics for now.     Antibiotics (From admission, onward)      Start     Stop Route Frequency Ordered    09/04/24 2230  piperacillin-tazobactam (ZOSYN) 4.5 g in D5W 100 mL IVPB (MB+)         -- IV Every 12 hours (non-standard times) 09/04/24 1220               Dermatitis  Hydrocortisone PRN      Irritable bowel syndrome with both constipation and diarrhea  Long history of alternating diarrhea and constipation. Currently having diarrhea likely exacerbated by antibiotics which unfortunately necessitates multiple daily dressing changes. Continue probiotics and psyllium.    Paroxysmal atrial fibrillation  Patient with Paroxysmal (<7 days) atrial fibrillation which is controlled currently with  no medications . Patient is currently in atrial fibrillation.[id number] Score: 4. HASBLED Score: 1. Anticoagulation not indicated due to extensive wound, risk of bleeding, possible need for further surgery .    Abnormality of gait and mobility  Continue PT/OT      Type 2 diabetes mellitus with hyperglycemia, with long-term current use of insulin  Patient's FSGs are controlled on current medication regimen.  Last A1c reviewed-   Lab Results   Component Value Date    HGBA1C 6.7 (H) 08/23/2024     Most recent fingerstick glucose reviewed- No results for input(s): "POCTGLUCOSE" in the last 24 hours.  Current correctional scale  Medium  Maintain anti-hyperglycemic dose as follows-   Antihyperglycemics (From admission, onward)      Start     Stop Route Frequency Ordered    08/29/24 2100  insulin glargine U-100 (Lantus) injection 20 Units         -- SubQ Nightly 08/29/24 1558    08/29/24 1556  insulin aspart U-100 " injection 0-10 Units         -- SubQ Before meals & nightly PRN 08/29/24 1558          Hold Oral hypoglycemics while patient is in the hospital.      Atherosclerosis of native coronary artery of native heart without angina pectoris  Patient with known CAD s/p stent placement, which is controlled Will continue ASA and Statin and monitor for S/Sx of angina/ACS. Continue to monitor on telemetry.     Resume Plavix 9/3 and monitor closely for signs of bleeding.     Stage 3a chronic kidney disease  Creatine stable for now. BMP reviewed- noted Estimated Creatinine Clearance: 19.7 mL/min (A) (based on SCr of 2.65 mg/dL (H)). according to latest data. Based on current GFR, CKD stage is stage 3 - GFR 30-59.  Monitor UOP and serial BMP and adjust therapy as needed. Renally dose meds. Avoid nephrotoxic medications and procedures.    Primary hypertension  Chronic, controlled. Latest blood pressure and vitals reviewed-     Temp:  [97.9 °F (36.6 °C)-98.4 °F (36.9 °C)]   Pulse:  [57-75]   Resp:  [16-25]   BP: (135-172)/(42-94)   SpO2:  [98 %] .   Home meds for hypertension were reviewed and noted below.   Hypertension Medications               nitroGLYCERIN (NITROSTAT) 0.4 MG SL tablet Place 1 tablet (0.4 mg total) under the tongue every 5 (five) minutes as needed for Chest pain.            While in the hospital, will manage blood pressure as follows; Continue home antihypertensive regimen    Will utilize p.r.n. blood pressure medication only if patient's blood pressure greater than 180/110 and he develops symptoms such as worsening chest pain or shortness of breath.      VTE Risk Mitigation (From admission, onward)           Ordered     enoxaparin injection 30 mg  Every 24 hours         08/29/24 1558     Place sequential compression device  Until discontinued         08/29/24 1558                    Discharge Planning   RICKY: 9/12/2024     Code Status: Full Code   Is the patient medically ready for discharge?:     Reason for  patient still in hospital (select all that apply): Treatment  Discharge Plan A: Home with family, Home Health                  Ashleigh Szymanski MD  Department of Hospital Medicine   [facility]

## 2024-09-06 NOTE — SUBJECTIVE & OBJECTIVE
Interval History:     Review of Systems   Constitutional:  Positive for fatigue. Negative for chills and fever.   Respiratory:  Negative for cough and shortness of breath.    Cardiovascular:  Negative for chest pain and palpitations.   Gastrointestinal:  Positive for diarrhea. Negative for abdominal pain, nausea and vomiting.   Genitourinary:  Negative for dysuria and flank pain.   Skin:  Positive for wound.   Neurological:  Positive for weakness. Negative for dizziness, syncope and headaches.   Psychiatric/Behavioral:  Negative for confusion.      Objective:     Vital Signs (Most Recent):  Temp: 97.8 °F (36.6 °C) (09/05/24 0745)  Pulse: 74 (09/05/24 0850)  Resp: 11 (09/05/24 0850)  BP: (!) 101/47 (09/05/24 0850)  SpO2: 97 % (09/05/24 0925) Vital Signs (24h Range):  Temp:  [97.8 °F (36.6 °C)-98.7 °F (37.1 °C)] 97.8 °F (36.6 °C)  Pulse:  [55-74] 74  Resp:  [11-30] 11  SpO2:  [97 %-98 %] 97 %  BP: ()/(45-49) 101/47     Weight: 81.1 kg (178 lb 14.4 oz)  Body mass index is 27.2 kg/m².    Intake/Output Summary (Last 24 hours) at 9/5/2024 1924  Last data filed at 9/5/2024 1740  Gross per 24 hour   Intake 1097.5 ml   Output --   Net 1097.5 ml         Physical Exam  Constitutional:       General: He is not in acute distress.     Appearance: Normal appearance. He is not ill-appearing.   HENT:      Head: Normocephalic and atraumatic.   Eyes:      General: No scleral icterus.     Conjunctiva/sclera: Conjunctivae normal.      Pupils: Pupils are equal, round, and reactive to light.   Cardiovascular:      Rate and Rhythm: Normal rate and regular rhythm.   Pulmonary:      Effort: Pulmonary effort is normal. No respiratory distress.   Abdominal:      Palpations: Abdomen is soft.      Tenderness: There is no abdominal tenderness.   Skin:     Coloration: Skin is not jaundiced or pale.      Findings: Wound (extensive sacral/perineal wound status-post debridement - see media) present. No rash.   Neurological:      Mental Status:  He is alert.   Psychiatric:         Behavior: Behavior normal.         Thought Content: Thought content normal.             Significant Labs: All pertinent labs within the past 24 hours have been reviewed.    Significant Imaging: I have reviewed all pertinent imaging results/findings within the past 24 hours.

## 2024-09-06 NOTE — SUBJECTIVE & OBJECTIVE
Interval History:     Review of Systems   Constitutional:  Positive for fatigue. Negative for chills and fever.   Respiratory:  Negative for cough and shortness of breath.    Cardiovascular:  Negative for chest pain and palpitations.   Gastrointestinal:  Positive for diarrhea. Negative for abdominal pain, nausea and vomiting.   Genitourinary:  Negative for dysuria and flank pain.   Skin:  Positive for wound.   Neurological:  Positive for weakness. Negative for dizziness, syncope and headaches.   Psychiatric/Behavioral:  Negative for confusion.      Objective:     Vital Signs (Most Recent):  Temp: 98 °F (36.7 °C) (09/06/24 1325)  Pulse: (!) 58 (09/06/24 1325)  Resp: 14 (09/06/24 1325)  BP: (!) 137/57 (09/06/24 1325)  SpO2: 96 % (09/06/24 1325) Vital Signs (24h Range):  Temp:  [97.8 °F (36.6 °C)-99.9 °F (37.7 °C)] 98 °F (36.7 °C)  Pulse:  [58-66] 58  Resp:  [14-23] 14  SpO2:  [96 %-97 %] 96 %  BP: (137-140)/(52-64) 137/57     Weight: 81.1 kg (178 lb 14.4 oz)  Body mass index is 27.2 kg/m².    Intake/Output Summary (Last 24 hours) at 9/6/2024 1625  Last data filed at 9/6/2024 0800  Gross per 24 hour   Intake 930.98 ml   Output --   Net 930.98 ml         Physical Exam  Constitutional:       General: He is not in acute distress.     Appearance: Normal appearance. He is not ill-appearing.   HENT:      Head: Normocephalic and atraumatic.   Eyes:      General: No scleral icterus.     Conjunctiva/sclera: Conjunctivae normal.      Pupils: Pupils are equal, round, and reactive to light.   Cardiovascular:      Rate and Rhythm: Normal rate and regular rhythm.   Pulmonary:      Effort: Pulmonary effort is normal. No respiratory distress.   Abdominal:      Palpations: Abdomen is soft.      Tenderness: There is no abdominal tenderness.   Skin:     Coloration: Skin is not jaundiced or pale.      Findings: Wound (extensive sacral/perineal wound status-post debridement - see media) present. No rash.   Neurological:      Mental Status:  He is alert.   Psychiatric:         Behavior: Behavior normal.         Thought Content: Thought content normal.             Significant Labs: All pertinent labs within the past 24 hours have been reviewed.    Significant Imaging: I have reviewed all pertinent imaging results/findings within the past 24 hours.

## 2024-09-06 NOTE — PT/OT/SLP PROGRESS
Physical Therapy Treatment    Patient Name:  Negro Hale   MRN:  [id number]    Recommendations:     Discharge Recommendations: Low Intensity Therapy  Discharge Equipment Recommendations: none  Barriers to discharge: None    Assessment:     Negro Hale is a 84 y.o. male admitted with a medical diagnosis of Necrotizing soft tissue infection.  He presents with the following impairments/functional limitations: weakness .    Pt continues to do well with all mobility, transfers and gait; wife very attentive to his needs when she is here    Rehab Prognosis: Good; patient would benefit from acute skilled PT services to address these deficits and reach maximum level of function.    Recent Surgery: * No surgery found *      Plan:     During this hospitalization, patient to be seen 5 x/week to address the identified rehab impairments via gait training, therapeutic activities, therapeutic exercises, neuromuscular re-education and progress toward the following goals:    Plan of Care Expires:  09/30/24    Subjective     Chief Complaint: none  Patient/Family Comments/goals: agrees to PT tx   Pain/Comfort:  Pain Rating 1: 0/10      Objective:     Communicated with CARROLL Perdue prior to session.  Patient found ambulatory in room/thomas with peripheral IV upon PT entry to room.     General Precautions: Standard, fall  Orthopedic Precautions: N/A  Braces: N/A  Respiratory Status: Room air     Functional Mobility:  Transfers:     Sit to Stand:  modified independence with rolling walker  Gait: 200ft with rolling walker, SVN, VC's prn; assist for IV pole      AM-PAC 6 CLICK MOBILITY  Turning over in bed (including adjusting bedclothes, sheets and blankets)?: 4  Sitting down on and standing up from a chair with arms (e.g., wheelchair, bedside commode, etc.): 4  Moving from lying on back to sitting on the side of the bed?: 4  Moving to and from a bed to a chair (including a wheelchair)?: 4  Need to walk in hospital room?: 4  Climbing  3-5 steps with a railing?: 3  Basic Mobility Total Score: 23       Treatment & Education:  Safety awareness    Patient left up in chair with all lines intact, call button in reach, and wife present..    GOALS:   Multidisciplinary Problems       Physical Therapy Goals          Problem: Physical Therapy    Goal Priority Disciplines Outcome Goal Variances Interventions   Physical Therapy Goal     PT, PT/OT Progressing     Description: Short Term Goals to be met by: 24    Patient will increase functional independence with mobility by performin. Gait  x 300 feet with Stand by assist using Rolling walker  2. Lower extremity exercise program x30 reps per handout, with assistance as needed  3. Pt to be indep in all functional transfers.  4. Pt to negotiate 3 steps with rail with SBA    Long Term Goals to be met by: 24    Pt will regain full independent functional mobility with Rolling walker to return to home situation and prior activities of daily living.                        Time Tracking:     PT Received On: 24  PT Start Time: 1435     PT Stop Time: 1445  PT Total Time (min): 10 min     Billable Minutes: Gait Training 10    Treatment Type: Treatment  PT/PTA: PTA     Number of PTA visits since last PT visit: 3     2024

## 2024-09-06 NOTE — PROGRESS NOTES
[facility] Medicine  Progress Note    Patient Name: Negro Hale  MRN: [id number]  Patient Class: IP- Inpatient   Admission Date: 8/29/2024  Length of Stay: 7 days  Attending Physician: Ashleigh Szymanski MD  Primary Care Provider: Smiley Trevino FNP        Subjective:     Principal Problem:Necrotizing soft tissue infection    HPI:    Negro Hale is a 84 y.o. male with history of HTN, CAD, CKD, IBS, and a-fib who initially presented to [facility] with perirectal abscess. Surgical evaluation revealed necrotizing soft tissue infection requiring extensive debridement with revision (most recent procedure 8/26). Anaerobic cultures positive and patient placed on appropriate antibiotics. Initial blood culture with micrococcus species thought to be a contaminant, with repeat cultures negative. Disposition complicated by diarrhea requiring frequent wound care and dressing changes due to soiling, as well and deconditioning due to acute illness and hospitalization. Patient admitted to [facility] for continued antibiotics, rehabilitation, and aggressive wound care.       Overview/Hospital Course:  No notes on file    Interval History:   Review of Systems   Constitutional:  Positive for fatigue. Negative for chills and fever.   Respiratory:  Negative for cough and shortness of breath.    Cardiovascular:  Negative for chest pain and palpitations.   Gastrointestinal:  Positive for diarrhea. Negative for abdominal pain, nausea and vomiting.   Genitourinary:  Negative for dysuria and flank pain.   Skin:  Positive for wound.   Neurological:  Positive for weakness. Negative for dizziness, syncope and headaches.   Psychiatric/Behavioral:  Negative for confusion.      Objective:     Vital Signs (Most Recent):  Temp: 97.8 °F (36.6 °C) (09/05/24 0745)  Pulse: 74 (09/05/24 0850)  Resp: 11 (09/05/24 0850)  BP: (!) 101/47 (09/05/24 0850)  SpO2: 97 % (09/05/24 0925)  Vital Signs (24h Range):  Temp:  [97.8 °F (36.6 °C)-98.7 °F (37.1 °C)] 97.8 °F (36.6 °C)  Pulse:  [55-74] 74  Resp:  [11-30] 11  SpO2:  [97 %-98 %] 97 %  BP: ()/(45-49) 101/47     Weight: 81.1 kg (178 lb 14.4 oz)  Body mass index is 27.2 kg/m².    Intake/Output Summary (Last 24 hours) at 9/5/2024 1926  Last data filed at 9/5/2024 1740  Gross per 24 hour   Intake 1097.5 ml   Output --   Net 1097.5 ml         Physical Exam  Constitutional:       General: He is not in acute distress.     Appearance: Normal appearance. He is not ill-appearing.   HENT:      Head: Normocephalic and atraumatic.   Eyes:      General: No scleral icterus.     Conjunctiva/sclera: Conjunctivae normal.      Pupils: Pupils are equal, round, and reactive to light.   Cardiovascular:      Rate and Rhythm: Normal rate and regular rhythm.   Pulmonary:      Effort: Pulmonary effort is normal. No respiratory distress.   Abdominal:      Palpations: Abdomen is soft.      Tenderness: There is no abdominal tenderness.   Skin:     Coloration: Skin is not jaundiced or pale.      Findings: Wound (extensive sacral/perineal wound status-post debridement - see media) present. No rash.   Neurological:      Mental Status: He is alert.   Psychiatric:         Behavior: Behavior normal.         Thought Content: Thought content normal.             Significant Labs: All pertinent labs within the past 24 hours have been reviewed.    Significant Imaging: I have reviewed all pertinent imaging results/findings within the past 24 hours.    Assessment/Plan:      * Necrotizing soft tissue infection  Further discussed with surgery upon admission to [facility], recommendations for wound care to manage and will re-consult if further surgical needs arise.     Continue antibiotics and dressing changes.   ---  Perirectal abscess now status-post extensive debridement and revision. Subsequent large sacral and perineal wound now healing appropriately.     Anaerobic culture positive,  "currently receiving treatment with clindamycin and Zosyn. Will likely require extended course due to location of wound and risk of contamination.     - aggressive wound care including dressing changes daily and with each bowel movement  - antibiotics as below    9/4: continue with aggressive wound care and antibiotics for now.     9/5:  Without complaints today although he had couple episodes of diarrhea.  Continue with current care.    Antibiotics (From admission, onward)      Start     Stop Route Frequency Ordered    09/04/24 2230  piperacillin-tazobactam (ZOSYN) 4.5 g in D5W 100 mL IVPB (MB+)         -- IV Every 12 hours (non-standard times) 09/04/24 1220               Dermatitis  Hydrocortisone PRN      Irritable bowel syndrome with both constipation and diarrhea  Long history of alternating diarrhea and constipation. Currently having diarrhea likely exacerbated by antibiotics which unfortunately necessitates multiple daily dressing changes. Continue probiotics and psyllium.    Paroxysmal atrial fibrillation  Patient with Paroxysmal (<7 days) atrial fibrillation which is controlled currently with  no medications . Patient is currently in atrial fibrillation.[id number] Score: 4. HASBLED Score: 1. Anticoagulation not indicated due to extensive wound, risk of bleeding, possible need for further surgery .    Abnormality of gait and mobility  Continue PT/OT      Type 2 diabetes mellitus with hyperglycemia, with long-term current use of insulin  Patient's FSGs are controlled on current medication regimen.  Last A1c reviewed-   Lab Results   Component Value Date    HGBA1C 6.7 (H) 08/23/2024     Most recent fingerstick glucose reviewed- No results for input(s): "POCTGLUCOSE" in the last 24 hours.  Current correctional scale  Medium  Maintain anti-hyperglycemic dose as follows-   Antihyperglycemics (From admission, onward)      Start     Stop Route Frequency Ordered    08/29/24 2100  insulin glargine U-100 (Lantus) " injection 20 Units         -- SubQ Nightly 08/29/24 1558    08/29/24 1556  insulin aspart U-100 injection 0-10 Units         -- SubQ Before meals & nightly PRN 08/29/24 1558          Hold Oral hypoglycemics while patient is in the hospital.      Atherosclerosis of native coronary artery of native heart without angina pectoris  Patient with known CAD s/p stent placement, which is controlled Will continue ASA and Statin and monitor for S/Sx of angina/ACS. Continue to monitor on telemetry.     Resume Plavix 9/3 and monitor closely for signs of bleeding.     Stage 3a chronic kidney disease  Creatine stable for now. BMP reviewed- noted Estimated Creatinine Clearance: 19.7 mL/min (A) (based on SCr of 2.65 mg/dL (H)). according to latest data. Based on current GFR, CKD stage is stage 3 - GFR 30-59.  Monitor UOP and serial BMP and adjust therapy as needed. Renally dose meds. Avoid nephrotoxic medications and procedures.    Primary hypertension  Chronic, controlled. Latest blood pressure and vitals reviewed-     Temp:  [97.9 °F (36.6 °C)-98.4 °F (36.9 °C)]   Pulse:  [57-75]   Resp:  [16-25]   BP: (135-172)/(42-94)   SpO2:  [98 %] .   Home meds for hypertension were reviewed and noted below.   Hypertension Medications               nitroGLYCERIN (NITROSTAT) 0.4 MG SL tablet Place 1 tablet (0.4 mg total) under the tongue every 5 (five) minutes as needed for Chest pain.            While in the hospital, will manage blood pressure as follows; Continue home antihypertensive regimen    Will utilize p.r.n. blood pressure medication only if patient's blood pressure greater than 180/110 and he develops symptoms such as worsening chest pain or shortness of breath.      VTE Risk Mitigation (From admission, onward)           Ordered     enoxaparin injection 30 mg  Every 24 hours         08/29/24 1558     Place sequential compression device  Until discontinued         08/29/24 1558                    Discharge Planning   RICKY: 9/12/2024      Code Status: Full Code   Is the patient medically ready for discharge?:     Reason for patient still in hospital (select all that apply): Treatment  Discharge Plan A: Home with family, Home Health                  Ashleigh Szymanski MD  Department of Hospital Medicine   [facility]

## 2024-09-06 NOTE — SUBJECTIVE & OBJECTIVE
Interval History:   Review of Systems   Constitutional:  Positive for fatigue. Negative for chills and fever.   Respiratory:  Negative for cough and shortness of breath.    Cardiovascular:  Negative for chest pain and palpitations.   Gastrointestinal:  Positive for diarrhea. Negative for abdominal pain, nausea and vomiting.   Genitourinary:  Negative for dysuria and flank pain.   Skin:  Positive for wound.   Neurological:  Positive for weakness. Negative for dizziness, syncope and headaches.   Psychiatric/Behavioral:  Negative for confusion.      Objective:     Vital Signs (Most Recent):  Temp: 97.8 °F (36.6 °C) (09/05/24 0745)  Pulse: 74 (09/05/24 0850)  Resp: 11 (09/05/24 0850)  BP: (!) 101/47 (09/05/24 0850)  SpO2: 97 % (09/05/24 0925) Vital Signs (24h Range):  Temp:  [97.8 °F (36.6 °C)-98.7 °F (37.1 °C)] 97.8 °F (36.6 °C)  Pulse:  [55-74] 74  Resp:  [11-30] 11  SpO2:  [97 %-98 %] 97 %  BP: ()/(45-49) 101/47     Weight: 81.1 kg (178 lb 14.4 oz)  Body mass index is 27.2 kg/m².    Intake/Output Summary (Last 24 hours) at 9/5/2024 1926  Last data filed at 9/5/2024 1740  Gross per 24 hour   Intake 1097.5 ml   Output --   Net 1097.5 ml         Physical Exam  Constitutional:       General: He is not in acute distress.     Appearance: Normal appearance. He is not ill-appearing.   HENT:      Head: Normocephalic and atraumatic.   Eyes:      General: No scleral icterus.     Conjunctiva/sclera: Conjunctivae normal.      Pupils: Pupils are equal, round, and reactive to light.   Cardiovascular:      Rate and Rhythm: Normal rate and regular rhythm.   Pulmonary:      Effort: Pulmonary effort is normal. No respiratory distress.   Abdominal:      Palpations: Abdomen is soft.      Tenderness: There is no abdominal tenderness.   Skin:     Coloration: Skin is not jaundiced or pale.      Findings: Wound (extensive sacral/perineal wound status-post debridement - see media) present. No rash.   Neurological:      Mental Status: He  is alert.   Psychiatric:         Behavior: Behavior normal.         Thought Content: Thought content normal.             Significant Labs: All pertinent labs within the past 24 hours have been reviewed.    Significant Imaging: I have reviewed all pertinent imaging results/findings within the past 24 hours.

## 2024-09-06 NOTE — PROGRESS NOTES
[facility]  Progress Note    Patient Name: Negro Hale  MRN: [id number]  Patient Class: IP- Inpatient   Admission Date: 8/29/2024  Length of Stay: 8 days  Attending Physician: Ashleigh Szymanski MD  Primary Care Provider: Smiley Trevino FNP        Subjective:     Principal Problem:Necrotizing soft tissue infection    HPI:    Negro Hale is a 84 y.o. male with history of HTN, CAD, CKD, IBS, and a-fib who initially presented to [facility] with perirectal abscess. Surgical evaluation revealed necrotizing soft tissue infection requiring extensive debridement with revision (most recent procedure 8/26). Anaerobic cultures positive and patient placed on appropriate antibiotics. Initial blood culture with micrococcus species thought to be a contaminant, with repeat cultures negative. Disposition complicated by diarrhea requiring frequent wound care and dressing changes due to soiling, as well and deconditioning due to acute illness and hospitalization. Patient admitted to [facility] for continued antibiotics, rehabilitation, and aggressive wound care.       Overview/Hospital Course:  No notes on file    Interval History:     Review of Systems   Constitutional:  Positive for fatigue. Negative for chills and fever.   Respiratory:  Negative for cough and shortness of breath.    Cardiovascular:  Negative for chest pain and palpitations.   Gastrointestinal:  Positive for diarrhea. Negative for abdominal pain, nausea and vomiting.   Genitourinary:  Negative for dysuria and flank pain.   Skin:  Positive for wound.   Neurological:  Positive for weakness. Negative for dizziness, syncope and headaches.   Psychiatric/Behavioral:  Negative for confusion.      Objective:     Vital Signs (Most Recent):  Temp: 98 °F (36.7 °C) (09/06/24 1325)  Pulse: (!) 58 (09/06/24 1325)  Resp: 14 (09/06/24 1325)  BP: (!) 137/57 (09/06/24 1325)  SpO2: 96 % (09/06/24 1325)  Vital Signs (24h Range):  Temp:  [97.8 °F (36.6 °C)-99.9 °F (37.7 °C)] 98 °F (36.7 °C)  Pulse:  [58-66] 58  Resp:  [14-23] 14  SpO2:  [96 %-97 %] 96 %  BP: (137-140)/(52-64) 137/57     Weight: 81.1 kg (178 lb 14.4 oz)  Body mass index is 27.2 kg/m².    Intake/Output Summary (Last 24 hours) at 9/6/2024 1625  Last data filed at 9/6/2024 0800  Gross per 24 hour   Intake 930.98 ml   Output --   Net 930.98 ml         Physical Exam  Constitutional:       General: He is not in acute distress.     Appearance: Normal appearance. He is not ill-appearing.   HENT:      Head: Normocephalic and atraumatic.   Eyes:      General: No scleral icterus.     Conjunctiva/sclera: Conjunctivae normal.      Pupils: Pupils are equal, round, and reactive to light.   Cardiovascular:      Rate and Rhythm: Normal rate and regular rhythm.   Pulmonary:      Effort: Pulmonary effort is normal. No respiratory distress.   Abdominal:      Palpations: Abdomen is soft.      Tenderness: There is no abdominal tenderness.   Skin:     Coloration: Skin is not jaundiced or pale.      Findings: Wound (extensive sacral/perineal wound status-post debridement - see media) present. No rash.   Neurological:      Mental Status: He is alert.   Psychiatric:         Behavior: Behavior normal.         Thought Content: Thought content normal.             Significant Labs: All pertinent labs within the past 24 hours have been reviewed.    Significant Imaging: I have reviewed all pertinent imaging results/findings within the past 24 hours.    Assessment/Plan:      * Necrotizing soft tissue infection  Further discussed with surgery upon admission to [facility], recommendations for wound care to manage and will re-consult if further surgical needs arise.     Continue antibiotics and dressing changes.   ---  Perirectal abscess now status-post extensive debridement and revision. Subsequent large sacral and perineal wound now healing appropriately.     Anaerobic culture positive,  "currently receiving treatment with clindamycin and Zosyn. Will likely require extended course due to location of wound and risk of contamination.     - aggressive wound care including dressing changes daily and with each bowel movement  - antibiotics as below    9/4: continue with aggressive wound care and antibiotics for now.     9/5:  Without complaints today although he had couple episodes of diarrhea.  Continue with current care.    9/6:  Continue with IV antibiotics aggressive wound care.    Antibiotics (From admission, onward)      Start     Stop Route Frequency Ordered    09/04/24 2230  piperacillin-tazobactam (ZOSYN) 4.5 g in D5W 100 mL IVPB (MB+)         -- IV Every 12 hours (non-standard times) 09/04/24 1220               Dermatitis  Hydrocortisone PRN      Irritable bowel syndrome with both constipation and diarrhea  Long history of alternating diarrhea and constipation. Currently having diarrhea likely exacerbated by antibiotics which unfortunately necessitates multiple daily dressing changes. Continue probiotics and psyllium.    Paroxysmal atrial fibrillation  Patient with Paroxysmal (<7 days) atrial fibrillation which is controlled currently with  no medications . Patient is currently in atrial fibrillation.[id number] Score: 4. HASBLED Score: 1. Anticoagulation not indicated due to extensive wound, risk of bleeding, possible need for further surgery .    Abnormality of gait and mobility  Continue PT/OT      Type 2 diabetes mellitus with hyperglycemia, with long-term current use of insulin  Patient's FSGs are controlled on current medication regimen.  Last A1c reviewed-   Lab Results   Component Value Date    HGBA1C 6.7 (H) 08/23/2024     Most recent fingerstick glucose reviewed- No results for input(s): "POCTGLUCOSE" in the last 24 hours.  Current correctional scale  Medium  Maintain anti-hyperglycemic dose as follows-   Antihyperglycemics (From admission, onward)      Start     Stop Route Frequency " Ordered    08/29/24 1556  insulin aspart U-100 injection 0-10 Units         -- SubQ Before meals & nightly PRN 08/29/24 1558          Hold Oral hypoglycemics while patient is in the hospital.    9/6:  Episode of hypoglycemia to 69.  Will discontinue 20 units of Lantus and just use the sliding scale.  May discharge with 10 units of Lantus as able.      Atherosclerosis of native coronary artery of native heart without angina pectoris  Patient with known CAD s/p stent placement, which is controlled Will continue ASA and Statin and monitor for S/Sx of angina/ACS. Continue to monitor on telemetry.     Resume Plavix 9/3 and monitor closely for signs of bleeding.     Stage 3a chronic kidney disease  Creatine stable for now. BMP reviewed- noted Estimated Creatinine Clearance: 19.7 mL/min (A) (based on SCr of 2.65 mg/dL (H)). according to latest data. Based on current GFR, CKD stage is stage 3 - GFR 30-59.  Monitor UOP and serial BMP and adjust therapy as needed. Renally dose meds. Avoid nephrotoxic medications and procedures.    Primary hypertension  Chronic, controlled. Latest blood pressure and vitals reviewed-     Temp:  [97.9 °F (36.6 °C)-98.4 °F (36.9 °C)]   Pulse:  [57-75]   Resp:  [16-25]   BP: (135-172)/(42-94)   SpO2:  [98 %] .   Home meds for hypertension were reviewed and noted below.   Hypertension Medications               nitroGLYCERIN (NITROSTAT) 0.4 MG SL tablet Place 1 tablet (0.4 mg total) under the tongue every 5 (five) minutes as needed for Chest pain.            While in the hospital, will manage blood pressure as follows; Continue home antihypertensive regimen    Will utilize p.r.n. blood pressure medication only if patient's blood pressure greater than 180/110 and he develops symptoms such as worsening chest pain or shortness of breath.      VTE Risk Mitigation (From admission, onward)           Ordered     enoxaparin injection 30 mg  Every 24 hours         08/29/24 1558     Place sequential  compression device  Until discontinued         08/29/24 1558                    Discharge Planning   RICKY: 9/12/2024     Code Status: Full Code   Is the patient medically ready for discharge?:     Reason for patient still in hospital (select all that apply): Treatment  Discharge Plan A: Home with family, Home Health                  Ashleigh Szymanski MD  Department of Hospital Medicine   [facility]

## 2024-09-06 NOTE — ASSESSMENT & PLAN NOTE
"Patient's FSGs are controlled on current medication regimen.  Last A1c reviewed-   Lab Results   Component Value Date    HGBA1C 6.7 (H) 08/23/2024     Most recent fingerstick glucose reviewed- No results for input(s): "POCTGLUCOSE" in the last 24 hours.  Current correctional scale  Medium  Maintain anti-hyperglycemic dose as follows-   Antihyperglycemics (From admission, onward)      Start     Stop Route Frequency Ordered    08/29/24 1556  insulin aspart U-100 injection 0-10 Units         -- SubQ Before meals & nightly PRN 08/29/24 1558          Hold Oral hypoglycemics while patient is in the hospital.    9/6:  Episode of hypoglycemia to 69.  Will discontinue 20 units of Lantus and just use the sliding scale.  May discharge with 10 units of Lantus as able.    "

## 2024-09-06 NOTE — ASSESSMENT & PLAN NOTE
Further discussed with surgery upon admission to [facility], recommendations for wound care to manage and will re-consult if further surgical needs arise.     Continue antibiotics and dressing changes.   ---  Perirectal abscess now status-post extensive debridement and revision. Subsequent large sacral and perineal wound now healing appropriately.     Anaerobic culture positive, currently receiving treatment with clindamycin and Zosyn. Will likely require extended course due to location of wound and risk of contamination.     - aggressive wound care including dressing changes daily and with each bowel movement  - antibiotics as below    9/4: continue with aggressive wound care and antibiotics for now.     9/5:  Without complaints today although he had couple episodes of diarrhea.  Continue with current care.    Antibiotics (From admission, onward)      Start     Stop Route Frequency Ordered    09/04/24 2230  piperacillin-tazobactam (ZOSYN) 4.5 g in D5W 100 mL IVPB (MB+)         -- IV Every 12 hours (non-standard times) 09/04/24 1220

## 2024-09-07 LAB
GLUCOSE SERPL-MCNC: 144 MG/DL (ref 70–105)
GLUCOSE SERPL-MCNC: 161 MG/DL (ref 70–105)
GLUCOSE SERPL-MCNC: 186 MG/DL (ref 70–105)
GLUCOSE SERPL-MCNC: 86 MG/DL (ref 70–105)
GLUCOSE SERPL-MCNC: 89 MG/DL (ref 70–105)

## 2024-09-07 PROCEDURE — 63600175 PHARM REV CODE 636 W HCPCS: Performed by: HOSPITALIST

## 2024-09-07 PROCEDURE — 82962 GLUCOSE BLOOD TEST: CPT

## 2024-09-07 PROCEDURE — 99233 SBSQ HOSP IP/OBS HIGH 50: CPT | Mod: ,,, | Performed by: HOSPITALIST

## 2024-09-07 PROCEDURE — 11000008

## 2024-09-07 PROCEDURE — 25000242 PHARM REV CODE 250 ALT 637 W/ HCPCS: Performed by: FAMILY MEDICINE

## 2024-09-07 PROCEDURE — 25000003 PHARM REV CODE 250: Performed by: FAMILY MEDICINE

## 2024-09-07 PROCEDURE — 25000003 PHARM REV CODE 250: Performed by: HOSPITALIST

## 2024-09-07 PROCEDURE — 96372 THER/PROPH/DIAG INJ SC/IM: CPT

## 2024-09-07 PROCEDURE — 63600175 PHARM REV CODE 636 W HCPCS: Performed by: FAMILY MEDICINE

## 2024-09-07 RX ORDER — LOPERAMIDE HYDROCHLORIDE 2 MG/1
2 CAPSULE ORAL 4 TIMES DAILY PRN
Status: DISCONTINUED | OUTPATIENT
Start: 2024-09-07 | End: 2024-09-16 | Stop reason: HOSPADM

## 2024-09-07 RX ADMIN — ZINC SULFATE 220 MG (50 MG) CAPSULE 220 MG: CAPSULE at 09:09

## 2024-09-07 RX ADMIN — PRAVASTATIN SODIUM 80 MG: 40 TABLET ORAL at 08:09

## 2024-09-07 RX ADMIN — CHOLECALCIFEROL TAB 125 MCG (5000 UNIT) 5000 UNITS: 125 TAB at 09:09

## 2024-09-07 RX ADMIN — DEXTROSE MONOHYDRATE: 12500 INJECTION, SOLUTION INTRAVENOUS at 09:09

## 2024-09-07 RX ADMIN — DEXTROSE MONOHYDRATE 30 ML: 12500 INJECTION, SOLUTION INTRAVENOUS at 11:09

## 2024-09-07 RX ADMIN — ENOXAPARIN SODIUM 30 MG: 30 INJECTION SUBCUTANEOUS at 06:09

## 2024-09-07 RX ADMIN — INSULIN ASPART 2 UNITS: 100 INJECTION, SOLUTION INTRAVENOUS; SUBCUTANEOUS at 06:09

## 2024-09-07 RX ADMIN — OXYCODONE HYDROCHLORIDE AND ACETAMINOPHEN 500 MG: 500 TABLET ORAL at 09:09

## 2024-09-07 RX ADMIN — PIPERACILLIN SODIUM AND TAZOBACTAM SODIUM 4.5 G: 4; .5 INJECTION, POWDER, LYOPHILIZED, FOR SOLUTION INTRAVENOUS at 11:09

## 2024-09-07 RX ADMIN — CLOPIDOGREL BISULFATE 75 MG: 75 TABLET ORAL at 09:09

## 2024-09-07 RX ADMIN — LOPERAMIDE HYDROCHLORIDE 2 MG: 2 CAPSULE ORAL at 08:09

## 2024-09-07 RX ADMIN — PIPERACILLIN SODIUM AND TAZOBACTAM SODIUM 4.5 G: 4; .5 INJECTION, POWDER, LYOPHILIZED, FOR SOLUTION INTRAVENOUS at 09:09

## 2024-09-07 RX ADMIN — PSYLLIUM HUSK 1 PACKET: 3.4 POWDER ORAL at 09:09

## 2024-09-07 RX ADMIN — ASPIRIN 81 MG: 81 TABLET, COATED ORAL at 09:09

## 2024-09-07 NOTE — SUBJECTIVE & OBJECTIVE
Interval History:     Review of Systems   Constitutional:  Positive for fatigue. Negative for chills and fever.   Respiratory:  Negative for cough and shortness of breath.    Cardiovascular:  Negative for chest pain and palpitations.   Gastrointestinal:  Positive for diarrhea. Negative for abdominal pain, nausea and vomiting.   Genitourinary:  Negative for dysuria and flank pain.   Skin:  Positive for wound.   Neurological:  Positive for weakness. Negative for dizziness, syncope and headaches.   Psychiatric/Behavioral:  Negative for confusion.      Objective:     Vital Signs (Most Recent):  Temp: 97.4 °F (36.3 °C) (09/07/24 1523)  Pulse: (!) 56 (09/07/24 1523)  Resp: 19 (09/07/24 1115)  BP: (!) 138/56 (09/07/24 1523)  SpO2: 97 % (09/07/24 1523) Vital Signs (24h Range):  Temp:  [97.4 °F (36.3 °C)-98.3 °F (36.8 °C)] 97.4 °F (36.3 °C)  Pulse:  [56-63] 56  Resp:  [14-19] 19  SpO2:  [95 %-100 %] 97 %  BP: (128-152)/(56-72) 138/56     Weight: 81.1 kg (178 lb 14.4 oz)  Body mass index is 27.2 kg/m².    Intake/Output Summary (Last 24 hours) at 9/7/2024 1722  Last data filed at 9/7/2024 0800  Gross per 24 hour   Intake 371.68 ml   Output --   Net 371.68 ml         Physical Exam  Constitutional:       General: He is not in acute distress.     Appearance: Normal appearance. He is not ill-appearing.   HENT:      Head: Normocephalic and atraumatic.   Eyes:      General: No scleral icterus.     Conjunctiva/sclera: Conjunctivae normal.      Pupils: Pupils are equal, round, and reactive to light.   Cardiovascular:      Rate and Rhythm: Normal rate and regular rhythm.   Pulmonary:      Effort: Pulmonary effort is normal. No respiratory distress.   Abdominal:      Palpations: Abdomen is soft.      Tenderness: There is no abdominal tenderness.   Skin:     Coloration: Skin is not jaundiced or pale.      Findings: Wound (extensive sacral/perineal wound status-post debridement - see media) present. No rash.   Neurological:      Mental  Status: He is alert.   Psychiatric:         Behavior: Behavior normal.         Thought Content: Thought content normal.             Significant Labs: All pertinent labs within the past 24 hours have been reviewed.    Significant Imaging: I have reviewed all pertinent imaging results/findings within the past 24 hours.

## 2024-09-07 NOTE — ASSESSMENT & PLAN NOTE
Further discussed with surgery upon admission to [facility], recommendations for wound care to manage and will re-consult if further surgical needs arise.     Continue antibiotics and dressing changes.   ---  Perirectal abscess now status-post extensive debridement and revision. Subsequent large sacral and perineal wound now healing appropriately.     Anaerobic culture positive, currently receiving treatment with clindamycin and Zosyn. Will likely require extended course due to location of wound and risk of contamination.     - aggressive wound care including dressing changes daily and with each bowel movement  - antibiotics as below    9/4: continue with aggressive wound care and antibiotics for now.     9/5:  Without complaints today although he had couple episodes of diarrhea.  Continue with current care.    9/6:  Continue with IV antibiotics aggressive wound care.    9/7:  Continue with IV antibiotics and aggressive wound care.  Patient is having some diarrhea and Imodium was ordered.  This is a chronic issue for patient.      Antibiotics (From admission, onward)      Start     Stop Route Frequency Ordered    09/04/24 2230  piperacillin-tazobactam (ZOSYN) 4.5 g in D5W 100 mL IVPB (MB+)         -- IV Every 12 hours (non-standard times) 09/04/24 1220

## 2024-09-07 NOTE — PROGRESS NOTES
[facility] Medicine  Progress Note    Patient Name: Negro Hale  MRN: [id number]  Patient Class: IP- Inpatient   Admission Date: 8/29/2024  Length of Stay: 9 days  Attending Physician: Ashleigh Szymanski MD  Primary Care Provider: Smiley Trevino FNP        Subjective:     Principal Problem:Necrotizing soft tissue infection    HPI:    Negro Hale is a 84 y.o. male with history of HTN, CAD, CKD, IBS, and a-fib who initially presented to [facility] with perirectal abscess. Surgical evaluation revealed necrotizing soft tissue infection requiring extensive debridement with revision (most recent procedure 8/26). Anaerobic cultures positive and patient placed on appropriate antibiotics. Initial blood culture with micrococcus species thought to be a contaminant, with repeat cultures negative. Disposition complicated by diarrhea requiring frequent wound care and dressing changes due to soiling, as well and deconditioning due to acute illness and hospitalization. Patient admitted to [facility] for continued antibiotics, rehabilitation, and aggressive wound care.       Overview/Hospital Course:  No notes on file    Interval History:     Review of Systems   Constitutional:  Positive for fatigue. Negative for chills and fever.   Respiratory:  Negative for cough and shortness of breath.    Cardiovascular:  Negative for chest pain and palpitations.   Gastrointestinal:  Positive for diarrhea. Negative for abdominal pain, nausea and vomiting.   Genitourinary:  Negative for dysuria and flank pain.   Skin:  Positive for wound.   Neurological:  Positive for weakness. Negative for dizziness, syncope and headaches.   Psychiatric/Behavioral:  Negative for confusion.      Objective:     Vital Signs (Most Recent):  Temp: 97.4 °F (36.3 °C) (09/07/24 1523)  Pulse: (!) 56 (09/07/24 1523)  Resp: 19 (09/07/24 1115)  BP: (!) 138/56 (09/07/24 1523)  SpO2: 97 % (09/07/24  1523) Vital Signs (24h Range):  Temp:  [97.4 °F (36.3 °C)-98.3 °F (36.8 °C)] 97.4 °F (36.3 °C)  Pulse:  [56-63] 56  Resp:  [14-19] 19  SpO2:  [95 %-100 %] 97 %  BP: (128-152)/(56-72) 138/56     Weight: 81.1 kg (178 lb 14.4 oz)  Body mass index is 27.2 kg/m².    Intake/Output Summary (Last 24 hours) at 9/7/2024 1722  Last data filed at 9/7/2024 0800  Gross per 24 hour   Intake 371.68 ml   Output --   Net 371.68 ml         Physical Exam  Constitutional:       General: He is not in acute distress.     Appearance: Normal appearance. He is not ill-appearing.   HENT:      Head: Normocephalic and atraumatic.   Eyes:      General: No scleral icterus.     Conjunctiva/sclera: Conjunctivae normal.      Pupils: Pupils are equal, round, and reactive to light.   Cardiovascular:      Rate and Rhythm: Normal rate and regular rhythm.   Pulmonary:      Effort: Pulmonary effort is normal. No respiratory distress.   Abdominal:      Palpations: Abdomen is soft.      Tenderness: There is no abdominal tenderness.   Skin:     Coloration: Skin is not jaundiced or pale.      Findings: Wound (extensive sacral/perineal wound status-post debridement - see media) present. No rash.   Neurological:      Mental Status: He is alert.   Psychiatric:         Behavior: Behavior normal.         Thought Content: Thought content normal.             Significant Labs: All pertinent labs within the past 24 hours have been reviewed.    Significant Imaging: I have reviewed all pertinent imaging results/findings within the past 24 hours.    Assessment/Plan:      * Necrotizing soft tissue infection  Further discussed with surgery upon admission to [facility], recommendations for wound care to manage and will re-consult if further surgical needs arise.     Continue antibiotics and dressing changes.   ---  Perirectal abscess now status-post extensive debridement and revision. Subsequent large sacral and perineal wound now healing appropriately.     Anaerobic culture  "positive, currently receiving treatment with clindamycin and Zosyn. Will likely require extended course due to location of wound and risk of contamination.     - aggressive wound care including dressing changes daily and with each bowel movement  - antibiotics as below    9/4: continue with aggressive wound care and antibiotics for now.     9/5:  Without complaints today although he had couple episodes of diarrhea.  Continue with current care.    9/6:  Continue with IV antibiotics aggressive wound care.    9/7:  Continue with IV antibiotics and aggressive wound care.  Patient is having some diarrhea and Imodium was ordered.  This is a chronic issue for patient.      Antibiotics (From admission, onward)      Start     Stop Route Frequency Ordered    09/04/24 2230  piperacillin-tazobactam (ZOSYN) 4.5 g in D5W 100 mL IVPB (MB+)         -- IV Every 12 hours (non-standard times) 09/04/24 1220               Dermatitis  Hydrocortisone PRN      Irritable bowel syndrome with both constipation and diarrhea  Long history of alternating diarrhea and constipation. Currently having diarrhea likely exacerbated by antibiotics which unfortunately necessitates multiple daily dressing changes. Continue probiotics and psyllium.    Paroxysmal atrial fibrillation  Patient with Paroxysmal (<7 days) atrial fibrillation which is controlled currently with  no medications . Patient is currently in atrial fibrillation.[id number] Score: 4. HASBLED Score: 1. Anticoagulation not indicated due to extensive wound, risk of bleeding, possible need for further surgery .    Abnormality of gait and mobility  Continue PT/OT      Type 2 diabetes mellitus with hyperglycemia, with long-term current use of insulin  Patient's FSGs are controlled on current medication regimen.  Last A1c reviewed-   Lab Results   Component Value Date    HGBA1C 6.7 (H) 08/23/2024     Most recent fingerstick glucose reviewed- No results for input(s): "POCTGLUCOSE" in the last 24 " hours.  Current correctional scale  Medium  Maintain anti-hyperglycemic dose as follows-   Antihyperglycemics (From admission, onward)      Start     Stop Route Frequency Ordered    08/29/24 1556  insulin aspart U-100 injection 0-10 Units         -- SubQ Before meals & nightly PRN 08/29/24 1558          Hold Oral hypoglycemics while patient is in the hospital.    9/6:  Episode of hypoglycemia to 69.  Will discontinue 20 units of Lantus and just use the sliding scale.  May discharge with 10 units of Lantus as able.      Atherosclerosis of native coronary artery of native heart without angina pectoris  Patient with known CAD s/p stent placement, which is controlled Will continue ASA and Statin and monitor for S/Sx of angina/ACS. Continue to monitor on telemetry.     Resume Plavix 9/3 and monitor closely for signs of bleeding.     Stage 3a chronic kidney disease  Creatine stable for now. BMP reviewed- noted Estimated Creatinine Clearance: 19.7 mL/min (A) (based on SCr of 2.65 mg/dL (H)). according to latest data. Based on current GFR, CKD stage is stage 3 - GFR 30-59.  Monitor UOP and serial BMP and adjust therapy as needed. Renally dose meds. Avoid nephrotoxic medications and procedures.    Primary hypertension  Chronic, controlled. Latest blood pressure and vitals reviewed-     Temp:  [97.9 °F (36.6 °C)-98.4 °F (36.9 °C)]   Pulse:  [57-75]   Resp:  [16-25]   BP: (135-172)/(42-94)   SpO2:  [98 %] .   Home meds for hypertension were reviewed and noted below.   Hypertension Medications               nitroGLYCERIN (NITROSTAT) 0.4 MG SL tablet Place 1 tablet (0.4 mg total) under the tongue every 5 (five) minutes as needed for Chest pain.            While in the hospital, will manage blood pressure as follows; Continue home antihypertensive regimen    Will utilize p.r.n. blood pressure medication only if patient's blood pressure greater than 180/110 and he develops symptoms such as worsening chest pain or shortness of  breath.      VTE Risk Mitigation (From admission, onward)           Ordered     enoxaparin injection 30 mg  Every 24 hours         08/29/24 1558     Place sequential compression device  Until discontinued         08/29/24 1558                    Discharge Planning   RICKY: 9/12/2024     Code Status: Full Code   Is the patient medically ready for discharge?:     Reason for patient still in hospital (select all that apply): Treatment  Discharge Plan A: Home with family, Home Health                  Ashleigh Szymanski MD  Department of Hospital Medicine   [facility]

## 2024-09-08 PROBLEM — N17.9 AKI (ACUTE KIDNEY INJURY): Status: ACTIVE | Noted: 2024-09-08

## 2024-09-08 LAB
ALBUMIN SERPL BCP-MCNC: 2.7 G/DL (ref 3.5–5)
ANION GAP SERPL CALCULATED.3IONS-SCNC: 8 MMOL/L (ref 7–16)
BUN SERPL-MCNC: 53 MG/DL (ref 7–18)
BUN/CREAT SERPL: 18 (ref 6–20)
CALCIUM SERPL-MCNC: 8.5 MG/DL (ref 8.5–10.1)
CHLORIDE SERPL-SCNC: 109 MMOL/L (ref 98–107)
CO2 SERPL-SCNC: 29 MMOL/L (ref 21–32)
CREAT SERPL-MCNC: 2.99 MG/DL (ref 0.7–1.3)
EGFR (NO RACE VARIABLE) (RUSH/TITUS): 20 ML/MIN/1.73M2
GLUCOSE SERPL-MCNC: 101 MG/DL (ref 70–105)
GLUCOSE SERPL-MCNC: 165 MG/DL (ref 70–105)
GLUCOSE SERPL-MCNC: 180 MG/DL (ref 70–105)
GLUCOSE SERPL-MCNC: 192 MG/DL (ref 70–105)
GLUCOSE SERPL-MCNC: 99 MG/DL (ref 74–106)
PHOSPHATE SERPL-MCNC: 3.3 MG/DL (ref 2.5–4.5)
POTASSIUM SERPL-SCNC: 4.3 MMOL/L (ref 3.5–5.1)
SODIUM SERPL-SCNC: 142 MMOL/L (ref 136–145)

## 2024-09-08 PROCEDURE — 99900035 HC TECH TIME PER 15 MIN (STAT)

## 2024-09-08 PROCEDURE — 25000003 PHARM REV CODE 250: Performed by: FAMILY MEDICINE

## 2024-09-08 PROCEDURE — 11000008

## 2024-09-08 PROCEDURE — 80069 RENAL FUNCTION PANEL: CPT | Performed by: HOSPITALIST

## 2024-09-08 PROCEDURE — 63600175 PHARM REV CODE 636 W HCPCS: Performed by: FAMILY MEDICINE

## 2024-09-08 PROCEDURE — 36415 COLL VENOUS BLD VENIPUNCTURE: CPT | Performed by: HOSPITALIST

## 2024-09-08 PROCEDURE — 99233 SBSQ HOSP IP/OBS HIGH 50: CPT | Mod: ,,, | Performed by: HOSPITALIST

## 2024-09-08 PROCEDURE — 25000003 PHARM REV CODE 250: Performed by: HOSPITALIST

## 2024-09-08 PROCEDURE — 63600175 PHARM REV CODE 636 W HCPCS: Performed by: HOSPITALIST

## 2024-09-08 PROCEDURE — 96372 THER/PROPH/DIAG INJ SC/IM: CPT

## 2024-09-08 PROCEDURE — 82962 GLUCOSE BLOOD TEST: CPT

## 2024-09-08 PROCEDURE — 97116 GAIT TRAINING THERAPY: CPT

## 2024-09-08 RX ORDER — SODIUM CHLORIDE 9 MG/ML
INJECTION, SOLUTION INTRAVENOUS CONTINUOUS
Status: DISCONTINUED | OUTPATIENT
Start: 2024-09-08 | End: 2024-09-12

## 2024-09-08 RX ORDER — DEXTROSE MONOHYDRATE AND SODIUM CHLORIDE 5; .9 G/100ML; G/100ML
INJECTION, SOLUTION INTRAVENOUS CONTINUOUS
Status: DISCONTINUED | OUTPATIENT
Start: 2024-09-08 | End: 2024-09-08

## 2024-09-08 RX ADMIN — OXYCODONE HYDROCHLORIDE AND ACETAMINOPHEN 500 MG: 500 TABLET ORAL at 08:09

## 2024-09-08 RX ADMIN — ENOXAPARIN SODIUM 30 MG: 30 INJECTION SUBCUTANEOUS at 06:09

## 2024-09-08 RX ADMIN — PRAVASTATIN SODIUM 80 MG: 40 TABLET ORAL at 08:09

## 2024-09-08 RX ADMIN — SODIUM CHLORIDE: 9 INJECTION, SOLUTION INTRAVENOUS at 08:09

## 2024-09-08 RX ADMIN — CLOPIDOGREL BISULFATE 75 MG: 75 TABLET ORAL at 08:09

## 2024-09-08 RX ADMIN — PIPERACILLIN SODIUM AND TAZOBACTAM SODIUM 4.5 G: 4; .5 INJECTION, POWDER, LYOPHILIZED, FOR SOLUTION INTRAVENOUS at 09:09

## 2024-09-08 RX ADMIN — INSULIN ASPART 2 UNITS: 100 INJECTION, SOLUTION INTRAVENOUS; SUBCUTANEOUS at 12:09

## 2024-09-08 RX ADMIN — PIPERACILLIN SODIUM AND TAZOBACTAM SODIUM 4.5 G: 4; .5 INJECTION, POWDER, LYOPHILIZED, FOR SOLUTION INTRAVENOUS at 10:09

## 2024-09-08 RX ADMIN — INSULIN ASPART 1 UNITS: 100 INJECTION, SOLUTION INTRAVENOUS; SUBCUTANEOUS at 08:09

## 2024-09-08 RX ADMIN — ZINC SULFATE 220 MG (50 MG) CAPSULE 220 MG: CAPSULE at 08:09

## 2024-09-08 RX ADMIN — ASPIRIN 81 MG: 81 TABLET, COATED ORAL at 08:09

## 2024-09-08 RX ADMIN — LOPERAMIDE HYDROCHLORIDE 2 MG: 2 CAPSULE ORAL at 06:09

## 2024-09-08 RX ADMIN — CHOLECALCIFEROL TAB 125 MCG (5000 UNIT) 5000 UNITS: 125 TAB at 08:09

## 2024-09-08 RX ADMIN — INSULIN ASPART 2 UNITS: 100 INJECTION, SOLUTION INTRAVENOUS; SUBCUTANEOUS at 06:09

## 2024-09-08 NOTE — PT/OT/SLP PROGRESS
Physical Therapy Treatment    Patient Name:  Negro Hale   MRN:  [id number]    Recommendations:     Discharge Recommendations: Low Intensity Therapy  Discharge Equipment Recommendations: none  Barriers to discharge:  Iv abx    Assessment:     Negro Hale is a 84 y.o. male admitted with a medical diagnosis of Necrotizing soft tissue infection.  He presents with the following impairments/functional limitations: weakness .    Rehab Prognosis: Good; patient would benefit from acute skilled PT services to address these deficits and reach maximum level of function.    Recent Surgery: * No surgery found *      Plan:     During this hospitalization, patient to be seen 5 x/week to address the identified rehab impairments via gait training, therapeutic activities, therapeutic exercises, neuromuscular re-education and progress toward the following goals:    Plan of Care Expires:  09/30/24    Subjective     Chief Complaint: NA  Patient/Family Comments/goals: agreeable to tx  Pain/Comfort:  Pain Rating 1: 0/10      Objective:     Communicated with nurse prior to session.  Patient found up in chair with peripheral IV upon PT entry to room.     General Precautions: Standard, fall  Orthopedic Precautions: N/A  Braces: N/A  Respiratory Status: Room air     Functional Mobility:  Transfers:     Sit to Stand:  modified independence with rolling walker  Gait: 250 ft with RW and CGA  Balance: good      AM-PAC 6 CLICK MOBILITY  Turning over in bed (including adjusting bedclothes, sheets and blankets)?: 4  Sitting down on and standing up from a chair with arms (e.g., wheelchair, bedside commode, etc.): 4  Moving from lying on back to sitting on the side of the bed?: 4  Moving to and from a bed to a chair (including a wheelchair)?: 4  Need to walk in hospital room?: 4  Climbing 3-5 steps with a railing?: 3  Basic Mobility Total Score: 23       Treatment & Education:  Mobility as noted    Patient left up in chair with all lines  intact and call button in reach..    GOALS:   Multidisciplinary Problems       Physical Therapy Goals          Problem: Physical Therapy    Goal Priority Disciplines Outcome Goal Variances Interventions   Physical Therapy Goal     PT, PT/OT Progressing     Description: Short Term Goals to be met by: 24    Patient will increase functional independence with mobility by performin. Gait  x 300 feet with Stand by assist using Rolling walker  2. Lower extremity exercise program x30 reps per handout, with assistance as needed  3. Pt to be indep in all functional transfers.  4. Pt to negotiate 3 steps with rail with SBA    Long Term Goals to be met by: 24    Pt will regain full independent functional mobility with Rolling walker to return to home situation and prior activities of daily living.                        Time Tracking:     PT Received On: 24  PT Start Time: 1505     PT Stop Time: 1521  PT Total Time (min): 16 min     Billable Minutes: Gait Training 16    Treatment Type: Treatment  PT/PTA: PT     Number of PTA visits since last PT visit: 0     2024

## 2024-09-08 NOTE — SUBJECTIVE & OBJECTIVE
Interval History:     Review of Systems   Constitutional:  Positive for fatigue. Negative for chills and fever.   Respiratory:  Negative for cough and shortness of breath.    Cardiovascular:  Negative for chest pain and palpitations.   Gastrointestinal:  Positive for diarrhea. Negative for abdominal pain, nausea and vomiting.   Genitourinary:  Negative for dysuria and flank pain.   Skin:  Positive for wound.   Neurological:  Positive for weakness. Negative for dizziness, syncope and headaches.   Psychiatric/Behavioral:  Negative for confusion.      Objective:     Vital Signs (Most Recent):  Temp: 98.7 °F (37.1 °C) (09/07/24 2030)  Pulse: 67 (09/07/24 1915)  Resp: 19 (09/07/24 1915)  BP: (!) 145/39 (09/07/24 1915)  SpO2: 100 % (09/07/24 1915) Vital Signs (24h Range):  Temp:  [97.4 °F (36.3 °C)-98.7 °F (37.1 °C)] 98.7 °F (37.1 °C)  Pulse:  [56-67] 67  Resp:  [19] 19  SpO2:  [95 %-100 %] 100 %  BP: (128-145)/(39-72) 145/39     Weight: 81.1 kg (178 lb 14.4 oz)  Body mass index is 27.2 kg/m².    Intake/Output Summary (Last 24 hours) at 9/8/2024 0717  Last data filed at 9/8/2024 0453  Gross per 24 hour   Intake 826.03 ml   Output --   Net 826.03 ml         Physical Exam  Constitutional:       General: He is not in acute distress.     Appearance: Normal appearance. He is not ill-appearing.   HENT:      Head: Normocephalic and atraumatic.   Eyes:      General: No scleral icterus.     Conjunctiva/sclera: Conjunctivae normal.      Pupils: Pupils are equal, round, and reactive to light.   Cardiovascular:      Rate and Rhythm: Normal rate and regular rhythm.   Pulmonary:      Effort: Pulmonary effort is normal. No respiratory distress.   Abdominal:      Palpations: Abdomen is soft.      Tenderness: There is no abdominal tenderness.   Skin:     Coloration: Skin is not jaundiced or pale.      Findings: Wound (extensive sacral/perineal wound status-post debridement - see media) present. No rash.   Neurological:      Mental Status:  He is alert.   Psychiatric:         Behavior: Behavior normal.         Thought Content: Thought content normal.             Significant Labs: All pertinent labs within the past 24 hours have been reviewed.    Significant Imaging: I have reviewed all pertinent imaging results/findings within the past 24 hours.

## 2024-09-08 NOTE — ASSESSMENT & PLAN NOTE
JANET is likely due to pre-renal azotemia due to dehydration. Baseline creatinine is  GFR >50 . Most recent creatinine and eGFR are listed below.  Recent Labs     09/06/24  0414   CREATININE 3.01*   EGFRNORACEVR 20*      Plan  - JANET is worsening. Will adjust treatment as follows: IV fluids  - Avoid nephrotoxins and renally dose meds for GFR listed above  - Monitor urine output, serial BMP, and adjust therapy as needed    Renal function panel and added IV fluids.    DISPLAY PLAN FREE TEXT

## 2024-09-08 NOTE — PROGRESS NOTES
[facility] Medicine  Progress Note    Patient Name: Negro Hale  MRN: [id number]  Patient Class: IP- Inpatient   Admission Date: 8/29/2024  Length of Stay: 10 days  Attending Physician: Ashleigh Szymanski MD  Primary Care Provider: Smiley Trevino FNP        Subjective:     Principal Problem:Necrotizing soft tissue infection    HPI:    Negro Hale is a 84 y.o. male with history of HTN, CAD, CKD, IBS, and a-fib who initially presented to [facility] with perirectal abscess. Surgical evaluation revealed necrotizing soft tissue infection requiring extensive debridement with revision (most recent procedure 8/26). Anaerobic cultures positive and patient placed on appropriate antibiotics. Initial blood culture with micrococcus species thought to be a contaminant, with repeat cultures negative. Disposition complicated by diarrhea requiring frequent wound care and dressing changes due to soiling, as well and deconditioning due to acute illness and hospitalization. Patient admitted to [facility] for continued antibiotics, rehabilitation, and aggressive wound care.       Overview/Hospital Course:  No notes on file    Interval History:     Review of Systems   Constitutional:  Positive for fatigue. Negative for chills and fever.   Respiratory:  Negative for cough and shortness of breath.    Cardiovascular:  Negative for chest pain and palpitations.   Gastrointestinal:  Positive for diarrhea. Negative for abdominal pain, nausea and vomiting.   Genitourinary:  Negative for dysuria and flank pain.   Skin:  Positive for wound.   Neurological:  Positive for weakness. Negative for dizziness, syncope and headaches.   Psychiatric/Behavioral:  Negative for confusion.      Objective:     Vital Signs (Most Recent):  Temp: 98.7 °F (37.1 °C) (09/07/24 2030)  Pulse: 67 (09/07/24 1915)  Resp: 19 (09/07/24 1915)  BP: (!) 145/39 (09/07/24 1915)  SpO2: 100 % (09/07/24 1915)  Vital Signs (24h Range):  Temp:  [97.4 °F (36.3 °C)-98.7 °F (37.1 °C)] 98.7 °F (37.1 °C)  Pulse:  [56-67] 67  Resp:  [19] 19  SpO2:  [95 %-100 %] 100 %  BP: (128-145)/(39-72) 145/39     Weight: 81.1 kg (178 lb 14.4 oz)  Body mass index is 27.2 kg/m².    Intake/Output Summary (Last 24 hours) at 9/8/2024 0717  Last data filed at 9/8/2024 0453  Gross per 24 hour   Intake 826.03 ml   Output --   Net 826.03 ml         Physical Exam  Constitutional:       General: He is not in acute distress.     Appearance: Normal appearance. He is not ill-appearing.   HENT:      Head: Normocephalic and atraumatic.   Eyes:      General: No scleral icterus.     Conjunctiva/sclera: Conjunctivae normal.      Pupils: Pupils are equal, round, and reactive to light.   Cardiovascular:      Rate and Rhythm: Normal rate and regular rhythm.   Pulmonary:      Effort: Pulmonary effort is normal. No respiratory distress.   Abdominal:      Palpations: Abdomen is soft.      Tenderness: There is no abdominal tenderness.   Skin:     Coloration: Skin is not jaundiced or pale.      Findings: Wound (extensive sacral/perineal wound status-post debridement - see media) present. No rash.   Neurological:      Mental Status: He is alert.   Psychiatric:         Behavior: Behavior normal.         Thought Content: Thought content normal.             Significant Labs: All pertinent labs within the past 24 hours have been reviewed.    Significant Imaging: I have reviewed all pertinent imaging results/findings within the past 24 hours.    Assessment/Plan:      * Necrotizing soft tissue infection  Further discussed with surgery upon admission to [facility], recommendations for wound care to manage and will re-consult if further surgical needs arise.     Continue antibiotics and dressing changes.   ---  Perirectal abscess now status-post extensive debridement and revision. Subsequent large sacral and perineal wound now healing appropriately.     Anaerobic culture positive,  currently receiving treatment with clindamycin and Zosyn. Will likely require extended course due to location of wound and risk of contamination.     - aggressive wound care including dressing changes daily and with each bowel movement  - antibiotics as below    9/4: continue with aggressive wound care and antibiotics for now.     9/5:  Without complaints today although he had couple episodes of diarrhea.  Continue with current care.    9/6:  Continue with IV antibiotics aggressive wound care.    9/7:  Continue with IV antibiotics and aggressive wound care.  Patient is having some diarrhea and Imodium was ordered.  This is a chronic issue for patient.    9/8:  Will discuss with General surgery but may be able to stop IV antibiotics in the coming days.     Antibiotics (From admission, onward)      Start     Stop Route Frequency Ordered    09/04/24 2230  piperacillin-tazobactam (ZOSYN) 4.5 g in D5W 100 mL IVPB (MB+)         -- IV Every 12 hours (non-standard times) 09/04/24 1220               JANET (acute kidney injury)  JANET is likely due to pre-renal azotemia due to dehydration. Baseline creatinine is  GFR >50 . Most recent creatinine and eGFR are listed below.  Recent Labs     09/06/24  0414   CREATININE 3.01*   EGFRNORACEVR 20*      Plan  - JANET is worsening. Will adjust treatment as follows: IV fluids  - Avoid nephrotoxins and renally dose meds for GFR listed above  - Monitor urine output, serial BMP, and adjust therapy as needed    Renal function panel and added IV fluids.     Dermatitis  Hydrocortisone PRN      Irritable bowel syndrome with both constipation and diarrhea  Long history of alternating diarrhea and constipation. Currently having diarrhea likely exacerbated by antibiotics which unfortunately necessitates multiple daily dressing changes. Continue probiotics and psyllium.    Paroxysmal atrial fibrillation  Patient with Paroxysmal (<7 days) atrial fibrillation which is controlled currently with  no  "medications . Patient is currently in atrial fibrillation.[id number] Score: 4. HASBLED Score: 1. Anticoagulation not indicated due to extensive wound, risk of bleeding, possible need for further surgery .    Abnormality of gait and mobility  Continue PT/OT      Type 2 diabetes mellitus with hyperglycemia, with long-term current use of insulin  Patient's FSGs are controlled on current medication regimen.  Last A1c reviewed-   Lab Results   Component Value Date    HGBA1C 6.7 (H) 08/23/2024     Most recent fingerstick glucose reviewed- No results for input(s): "POCTGLUCOSE" in the last 24 hours.  Current correctional scale  Medium  Maintain anti-hyperglycemic dose as follows-   Antihyperglycemics (From admission, onward)      Start     Stop Route Frequency Ordered    08/29/24 1556  insulin aspart U-100 injection 0-10 Units         -- SubQ Before meals & nightly PRN 08/29/24 1558          Hold Oral hypoglycemics while patient is in the hospital.    9/6:  Episode of hypoglycemia to 69.  Will discontinue 20 units of Lantus and just use the sliding scale.  May discharge with 10 units of Lantus as able.      Atherosclerosis of native coronary artery of native heart without angina pectoris  Patient with known CAD s/p stent placement, which is controlled Will continue ASA and Statin and monitor for S/Sx of angina/ACS. Continue to monitor on telemetry.     Resume Plavix 9/3 and monitor closely for signs of bleeding.     Stage 3a chronic kidney disease  Creatine stable for now. BMP reviewed- noted Estimated Creatinine Clearance: 19.7 mL/min (A) (based on SCr of 2.65 mg/dL (H)). according to latest data. Based on current GFR, CKD stage is stage 3 - GFR 30-59.  Monitor UOP and serial BMP and adjust therapy as needed. Renally dose meds. Avoid nephrotoxic medications and procedures.    Primary hypertension  Chronic, controlled. Latest blood pressure and vitals reviewed-     Temp:  [97.9 °F (36.6 °C)-98.4 °F (36.9 °C)]   Pulse:  " [57-75]   Resp:  [16-25]   BP: (135-172)/(42-94)   SpO2:  [98 %] .   Home meds for hypertension were reviewed and noted below.   Hypertension Medications               nitroGLYCERIN (NITROSTAT) 0.4 MG SL tablet Place 1 tablet (0.4 mg total) under the tongue every 5 (five) minutes as needed for Chest pain.            While in the hospital, will manage blood pressure as follows; Continue home antihypertensive regimen    Will utilize p.r.n. blood pressure medication only if patient's blood pressure greater than 180/110 and he develops symptoms such as worsening chest pain or shortness of breath.      VTE Risk Mitigation (From admission, onward)           Ordered     enoxaparin injection 30 mg  Every 24 hours         08/29/24 1558     Place sequential compression device  Until discontinued         08/29/24 1558                    Discharge Planning   RICKY: 9/12/2024     Code Status: Full Code   Is the patient medically ready for discharge?:     Reason for patient still in hospital (select all that apply): Treatment  Discharge Plan A: Home with family, Home Health                  Ashleigh Szymanski MD  Department of Hospital Medicine   [facility]

## 2024-09-08 NOTE — ASSESSMENT & PLAN NOTE
Further discussed with surgery upon admission to [facility], recommendations for wound care to manage and will re-consult if further surgical needs arise.     Continue antibiotics and dressing changes.   ---  Perirectal abscess now status-post extensive debridement and revision. Subsequent large sacral and perineal wound now healing appropriately.     Anaerobic culture positive, currently receiving treatment with clindamycin and Zosyn. Will likely require extended course due to location of wound and risk of contamination.     - aggressive wound care including dressing changes daily and with each bowel movement  - antibiotics as below    9/4: continue with aggressive wound care and antibiotics for now.     9/5:  Without complaints today although he had couple episodes of diarrhea.  Continue with current care.    9/6:  Continue with IV antibiotics aggressive wound care.    9/7:  Continue with IV antibiotics and aggressive wound care.  Patient is having some diarrhea and Imodium was ordered.  This is a chronic issue for patient.    9/8:  Will discuss with General surgery but may be able to stop IV antibiotics in the coming days.     Antibiotics (From admission, onward)      Start     Stop Route Frequency Ordered    09/04/24 2230  piperacillin-tazobactam (ZOSYN) 4.5 g in D5W 100 mL IVPB (MB+)         -- IV Every 12 hours (non-standard times) 09/04/24 1220

## 2024-09-09 LAB
ANION GAP SERPL CALCULATED.3IONS-SCNC: 8 MMOL/L (ref 7–16)
BASOPHILS # BLD AUTO: 0.1 K/UL (ref 0–0.2)
BASOPHILS NFR BLD AUTO: 1.9 % (ref 0–1)
BUN SERPL-MCNC: 45 MG/DL (ref 7–18)
BUN/CREAT SERPL: 16 (ref 6–20)
CALCIUM SERPL-MCNC: 8.4 MG/DL (ref 8.5–10.1)
CHLORIDE SERPL-SCNC: 114 MMOL/L (ref 98–107)
CO2 SERPL-SCNC: 25 MMOL/L (ref 21–32)
CREAT SERPL-MCNC: 2.76 MG/DL (ref 0.7–1.3)
DIFFERENTIAL METHOD BLD: ABNORMAL
EGFR (NO RACE VARIABLE) (RUSH/TITUS): 22 ML/MIN/1.73M2
EOSINOPHIL # BLD AUTO: 0.39 K/UL (ref 0–0.5)
EOSINOPHIL NFR BLD AUTO: 7.5 % (ref 1–4)
ERYTHROCYTE [DISTWIDTH] IN BLOOD BY AUTOMATED COUNT: 14.1 % (ref 11.5–14.5)
GLUCOSE SERPL-MCNC: 129 MG/DL (ref 70–105)
GLUCOSE SERPL-MCNC: 146 MG/DL (ref 70–105)
GLUCOSE SERPL-MCNC: 156 MG/DL (ref 70–105)
GLUCOSE SERPL-MCNC: 89 MG/DL (ref 70–105)
GLUCOSE SERPL-MCNC: 97 MG/DL (ref 74–106)
HCT VFR BLD AUTO: 30.8 % (ref 40–54)
HGB BLD-MCNC: 9.8 G/DL (ref 13.5–18)
IMM GRANULOCYTES # BLD AUTO: 0.01 K/UL (ref 0–0.04)
IMM GRANULOCYTES NFR BLD: 0.2 % (ref 0–0.4)
LYMPHOCYTES # BLD AUTO: 0.99 K/UL (ref 1–4.8)
LYMPHOCYTES NFR BLD AUTO: 19.1 % (ref 27–41)
MCH RBC QN AUTO: 29.7 PG (ref 27–31)
MCHC RBC AUTO-ENTMCNC: 31.8 G/DL (ref 32–36)
MCV RBC AUTO: 93.3 FL (ref 80–96)
MONOCYTES # BLD AUTO: 0.66 K/UL (ref 0–0.8)
MONOCYTES NFR BLD AUTO: 12.7 % (ref 2–6)
MPC BLD CALC-MCNC: 11.3 FL (ref 9.4–12.4)
NEUTROPHILS # BLD AUTO: 3.03 K/UL (ref 1.8–7.7)
NEUTROPHILS NFR BLD AUTO: 58.6 % (ref 53–65)
NRBC # BLD AUTO: 0 X10E3/UL
NRBC, AUTO (.00): 0 %
PLATELET # BLD AUTO: 217 K/UL (ref 150–400)
POTASSIUM SERPL-SCNC: 4.2 MMOL/L (ref 3.5–5.1)
RBC # BLD AUTO: 3.3 M/UL (ref 4.6–6.2)
SODIUM SERPL-SCNC: 143 MMOL/L (ref 136–145)
WBC # BLD AUTO: 5.18 K/UL (ref 4.5–11)

## 2024-09-09 PROCEDURE — 36415 COLL VENOUS BLD VENIPUNCTURE: CPT | Performed by: HOSPITALIST

## 2024-09-09 PROCEDURE — 36415 COLL VENOUS BLD VENIPUNCTURE: CPT | Performed by: FAMILY MEDICINE

## 2024-09-09 PROCEDURE — 25000003 PHARM REV CODE 250: Performed by: FAMILY MEDICINE

## 2024-09-09 PROCEDURE — 99900031 HC PATIENT EDUCATION (STAT)

## 2024-09-09 PROCEDURE — 99900035 HC TECH TIME PER 15 MIN (STAT)

## 2024-09-09 PROCEDURE — 63600175 PHARM REV CODE 636 W HCPCS: Performed by: HOSPITALIST

## 2024-09-09 PROCEDURE — 99233 SBSQ HOSP IP/OBS HIGH 50: CPT | Mod: ,,, | Performed by: HOSPITALIST

## 2024-09-09 PROCEDURE — 94761 N-INVAS EAR/PLS OXIMETRY MLT: CPT

## 2024-09-09 PROCEDURE — 11000008

## 2024-09-09 PROCEDURE — 80048 BASIC METABOLIC PNL TOTAL CA: CPT | Performed by: HOSPITALIST

## 2024-09-09 PROCEDURE — 63600175 PHARM REV CODE 636 W HCPCS: Performed by: FAMILY MEDICINE

## 2024-09-09 PROCEDURE — 85025 COMPLETE CBC W/AUTO DIFF WBC: CPT | Performed by: FAMILY MEDICINE

## 2024-09-09 PROCEDURE — 97116 GAIT TRAINING THERAPY: CPT | Mod: CQ

## 2024-09-09 PROCEDURE — 82962 GLUCOSE BLOOD TEST: CPT

## 2024-09-09 PROCEDURE — 25000003 PHARM REV CODE 250: Performed by: HOSPITALIST

## 2024-09-09 PROCEDURE — 97110 THERAPEUTIC EXERCISES: CPT | Mod: CQ

## 2024-09-09 RX ADMIN — DEXTROSE MONOHYDRATE: 12500 INJECTION, SOLUTION INTRAVENOUS at 10:09

## 2024-09-09 RX ADMIN — PRAVASTATIN SODIUM 80 MG: 40 TABLET ORAL at 08:09

## 2024-09-09 RX ADMIN — CHOLECALCIFEROL TAB 125 MCG (5000 UNIT) 5000 UNITS: 125 TAB at 09:09

## 2024-09-09 RX ADMIN — SODIUM CHLORIDE: 9 INJECTION, SOLUTION INTRAVENOUS at 10:09

## 2024-09-09 RX ADMIN — ZINC SULFATE 220 MG (50 MG) CAPSULE 220 MG: CAPSULE at 09:09

## 2024-09-09 RX ADMIN — ASPIRIN 81 MG: 81 TABLET, COATED ORAL at 09:09

## 2024-09-09 RX ADMIN — CLOPIDOGREL BISULFATE 75 MG: 75 TABLET ORAL at 09:09

## 2024-09-09 RX ADMIN — ENOXAPARIN SODIUM 30 MG: 30 INJECTION SUBCUTANEOUS at 05:09

## 2024-09-09 RX ADMIN — SODIUM CHLORIDE: 9 INJECTION, SOLUTION INTRAVENOUS at 06:09

## 2024-09-09 RX ADMIN — PIPERACILLIN SODIUM AND TAZOBACTAM SODIUM 4.5 G: 4; .5 INJECTION, POWDER, LYOPHILIZED, FOR SOLUTION INTRAVENOUS at 10:09

## 2024-09-09 RX ADMIN — INSULIN ASPART 2 UNITS: 100 INJECTION, SOLUTION INTRAVENOUS; SUBCUTANEOUS at 05:09

## 2024-09-09 RX ADMIN — OXYCODONE HYDROCHLORIDE AND ACETAMINOPHEN 500 MG: 500 TABLET ORAL at 09:09

## 2024-09-09 NOTE — PLAN OF CARE
Problem: Adult Inpatient Plan of Care  Goal: Plan of Care Review  Outcome: Progressing  Flowsheets (Taken 9/9/2024 1244)  Plan of Care Reviewed With: patient  Goal: Absence of Hospital-Acquired Illness or Injury  Outcome: Progressing  Intervention: Identify and Manage Fall Risk  Flowsheets (Taken 9/9/2024 1244)  Safety Promotion/Fall Prevention:   assistive device/personal item within reach   bed alarm set   medications reviewed   side rails raised x 3   nonskid shoes/socks when out of bed   Fall Risk signage in place  Intervention: Prevent Skin Injury  Flowsheets (Taken 9/9/2024 1244)  Body Position: position changed independently  Skin Protection: incontinence pads utilized  Device Skin Pressure Protection: absorbent pad utilized/changed  Intervention: Prevent and Manage VTE (Venous Thromboembolism) Risk  Flowsheets (Taken 9/9/2024 1244)  VTE Prevention/Management: fluids promoted  Intervention: Prevent Infection  Flowsheets (Taken 9/9/2024 1244)  Infection Prevention:   cohorting utilized   environmental surveillance performed   equipment surfaces disinfected   hand hygiene promoted   single patient room provided   rest/sleep promoted   personal protective equipment utilized  Goal: Optimal Comfort and Wellbeing  Outcome: Progressing  Intervention: Monitor Pain and Promote Comfort  Flowsheets (Taken 9/9/2024 1244)  Pain Management Interventions:   care clustered   quiet environment facilitated  Intervention: Provide Person-Centered Care  Flowsheets (Taken 9/9/2024 1244)  Trust Relationship/Rapport:   care explained   choices provided   thoughts/feelings acknowledged     Problem: Diabetes Comorbidity  Goal: Blood Glucose Level Within Targeted Range  Outcome: Progressing  Intervention: Monitor and Manage Glycemia  Flowsheets (Taken 9/9/2024 1244)  Glycemic Management: blood glucose monitored     Problem: Fall Injury Risk  Goal: Absence of Fall and Fall-Related Injury  Outcome: Progressing  Intervention: Identify  and Manage Contributors  Flowsheets (Taken 9/9/2024 1244)  Self-Care Promotion: BADL personal objects within reach  Medication Review/Management: medications reviewed  Intervention: Promote Injury-Free Environment  Flowsheets (Taken 9/9/2024 1244)  Safety Promotion/Fall Prevention:   assistive device/personal item within reach   bed alarm set   medications reviewed   side rails raised x 3   nonskid shoes/socks when out of bed   Fall Risk signage in place     Problem: Wound  Goal: Optimal Coping  Outcome: Progressing  Intervention: Support Patient and Family Response  Flowsheets (Taken 9/9/2024 1244)  Supportive Measures: active listening utilized  Family/Support System Care: self-care encouraged  Goal: Optimal Functional Ability  Outcome: Progressing  Intervention: Optimize Functional Ability  Flowsheets (Taken 9/9/2024 1244)  Activity Management: Ambulated -L4  Activity Assistance Provided:   independent   assistance, stand-by  Goal: Absence of Infection Signs and Symptoms  Outcome: Progressing  Intervention: Prevent or Manage Infection  Flowsheets (Taken 9/9/2024 1244)  Fever Reduction/Comfort Measures: lightweight bedding  Infection Management: aseptic technique maintained  Isolation Precautions: precautions maintained  Goal: Improved Oral Intake  Outcome: Progressing

## 2024-09-09 NOTE — PLAN OF CARE
Per chart review, pt remain on Iv Zosyn q12, receiving PT/OT and wound care. Per PT , pt would benefit from home PT. SS spoke with pt and wife and received a choice for [facility]. Referral faxed. SS following.

## 2024-09-09 NOTE — ASSESSMENT & PLAN NOTE
Further discussed with surgery upon admission to [facility], recommendations for wound care to manage and will re-consult if further surgical needs arise.     Continue antibiotics and dressing changes.   ---  Perirectal abscess now status-post extensive debridement and revision. Subsequent large sacral and perineal wound now healing appropriately.     Anaerobic culture positive, currently receiving treatment with clindamycin and Zosyn. Will likely require extended course due to location of wound and risk of contamination.     - aggressive wound care including dressing changes daily and with each bowel movement  - antibiotics as below    9/4: continue with aggressive wound care and antibiotics for now.     9/5:  Without complaints today although he had couple episodes of diarrhea.  Continue with current care.    9/6:  Continue with IV antibiotics aggressive wound care.    9/7:  Continue with IV antibiotics and aggressive wound care.  Patient is having some diarrhea and Imodium was ordered.  This is a chronic issue for patient.    9/8:  Will discuss with General surgery but may be able to stop IV antibiotics in the coming days.     9/9:  Will continue antibiotics for now but will monitor renal function as well.    Antibiotics (From admission, onward)      Start     Stop Route Frequency Ordered    09/04/24 2230  piperacillin-tazobactam (ZOSYN) 4.5 g in D5W 100 mL IVPB (MB+)         -- IV Every 12 hours (non-standard times) 09/04/24 1220

## 2024-09-09 NOTE — PT/OT/SLP PROGRESS
Physical Therapy Treatment    Patient Name:  Negro Hale   MRN:  [id number]    Recommendations:     Discharge Recommendations: Low Intensity Therapy  Discharge Equipment Recommendations: none  Barriers to discharge: None    Assessment:     Negro Hale is a 84 y.o. male admitted with a medical diagnosis of Necrotizing soft tissue infection.  He presents with the following impairments/functional limitations: weakness .    Improved gait distance; added exercises in sitting and standing    Rehab Prognosis: Good; patient would benefit from acute skilled PT services to address these deficits and reach maximum level of function.    Recent Surgery: * No surgery found *      Plan:     During this hospitalization, patient to be seen 5 x/week to address the identified rehab impairments via gait training, therapeutic activities, therapeutic exercises, neuromuscular re-education and progress toward the following goals:    Plan of Care Expires:  09/30/24    Subjective     Chief Complaint: ready to go home  Patient/Family Comments/goals: wife present; pt agrees to Pt Tx   Pain/Comfort:  Pain Rating 1: 0/10      Objective:     Communicated with CARROLL Perdue prior to session.  Patient found sitting edge of bed with peripheral IV upon PT entry to room.     General Precautions: Standard, fall  Orthopedic Precautions: N/A  Braces: N/A  Respiratory Status: Room air     Functional Mobility:  Bed Mobility:     Supine to Sit: modified independence  Transfers:     Sit to Stand:  modified independence with rolling walker  Toilet Transfer: modified independence and supervision with  rolling walker  using  Step Transfer  Gait: 380ft with rolling walker with SVN-CGA, assist for IV pole; vc's for posture correction and proper distance from walker  Balance: good       AM-PAC 6 CLICK MOBILITY  Turning over in bed (including adjusting bedclothes, sheets and blankets)?: 4  Sitting down on and standing up from a chair with arms (e.g.,  wheelchair, bedside commode, etc.): 4  Moving from lying on back to sitting on the side of the bed?: 4  Moving to and from a bed to a chair (including a wheelchair)?: 3 (due to IV)  Need to walk in hospital room?: 3 (due to IV)  Climbing 3-5 steps with a railing?: 3 (not assessed today)  Basic Mobility Total Score: 21       Treatment & Education:  Sitting BLE exercises x 10 repetitions each: marching, long arc quads, hip abduction, ankle rocking  Standing BLE exercises at walker for BUE support: marching, mini squats, heel/toe raises, hip abduction    Patient left up in chair with all lines intact, call button in reach, and wife present..    GOALS:   Multidisciplinary Problems       Physical Therapy Goals          Problem: Physical Therapy    Goal Priority Disciplines Outcome Goal Variances Interventions   Physical Therapy Goal     PT, PT/OT Progressing     Description: Short Term Goals to be met by: 24    Patient will increase functional independence with mobility by performin. Gait  x 300 feet with Stand by assist using Rolling walker  2. Lower extremity exercise program x30 reps per handout, with assistance as needed  3. Pt to be indep in all functional transfers.  4. Pt to negotiate 3 steps with rail with SBA    Long Term Goals to be met by: 24    Pt will regain full independent functional mobility with Rolling walker to return to home situation and prior activities of daily living.                        Time Tracking:     PT Received On: 24  PT Start Time: 1055     PT Stop Time: 1118  PT Total Time (min): 23 min     Billable Minutes: Gait Training 13 and Therapeutic Exercise 10    Treatment Type: Treatment  PT/PTA: PTA     Number of PTA visits since last PT visit: 2024

## 2024-09-09 NOTE — PROGRESS NOTES
[facility] Medicine  Progress Note    Patient Name: Negro Hale  MRN: [id number]  Patient Class: IP- Inpatient   Admission Date: 8/29/2024  Length of Stay: 11 days  Attending Physician: Ashleigh Szymanski MD  Primary Care Provider: Smiely Trevino FNP        Subjective:     Principal Problem:Necrotizing soft tissue infection    HPI:    Negro Hale is a 84 y.o. male with history of HTN, CAD, CKD, IBS, and a-fib who initially presented to [facility] with perirectal abscess. Surgical evaluation revealed necrotizing soft tissue infection requiring extensive debridement with revision (most recent procedure 8/26). Anaerobic cultures positive and patient placed on appropriate antibiotics. Initial blood culture with micrococcus species thought to be a contaminant, with repeat cultures negative. Disposition complicated by diarrhea requiring frequent wound care and dressing changes due to soiling, as well and deconditioning due to acute illness and hospitalization. Patient admitted to [facility] for continued antibiotics, rehabilitation, and aggressive wound care.       Overview/Hospital Course:  No notes on file    Interval History:     Review of Systems   Constitutional:  Positive for fatigue. Negative for chills and fever.   Respiratory:  Negative for cough and shortness of breath.    Cardiovascular:  Negative for chest pain and palpitations.   Gastrointestinal:  Positive for diarrhea. Negative for abdominal pain, nausea and vomiting.   Genitourinary:  Negative for dysuria and flank pain.   Skin:  Positive for wound.   Neurological:  Positive for weakness. Negative for dizziness, syncope and headaches.   Psychiatric/Behavioral:  Negative for confusion.      Objective:     Vital Signs (Most Recent):  Temp: 98.1 °F (36.7 °C) (09/08/24 1920)  Pulse: (!) 58 (09/08/24 1920)  Resp: 20 (09/08/24 1920)  BP: (!) 138/54 (09/08/24 1920)  SpO2: 100 % (09/09/24  0000) Vital Signs (24h Range):  Temp:  [97.2 °F (36.2 °C)-98.1 °F (36.7 °C)] 98.1 °F (36.7 °C)  Pulse:  [52-58] 58  Resp:  [20-22] 20  SpO2:  [95 %-100 %] 100 %  BP: (138)/(54) 138/54     Weight: 81.1 kg (178 lb 14.4 oz)  Body mass index is 27.2 kg/m².  No intake or output data in the 24 hours ending 09/09/24 0711      Physical Exam  Constitutional:       General: He is not in acute distress.     Appearance: Normal appearance. He is not ill-appearing.   HENT:      Head: Normocephalic and atraumatic.   Eyes:      General: No scleral icterus.     Conjunctiva/sclera: Conjunctivae normal.      Pupils: Pupils are equal, round, and reactive to light.   Cardiovascular:      Rate and Rhythm: Normal rate and regular rhythm.   Pulmonary:      Effort: Pulmonary effort is normal. No respiratory distress.   Abdominal:      Palpations: Abdomen is soft.      Tenderness: There is no abdominal tenderness.   Skin:     Coloration: Skin is not jaundiced or pale.      Findings: Wound (extensive sacral/perineal wound status-post debridement - see media) present. No rash.   Neurological:      Mental Status: He is alert.   Psychiatric:         Behavior: Behavior normal.         Thought Content: Thought content normal.             Significant Labs: All pertinent labs within the past 24 hours have been reviewed.    Significant Imaging: I have reviewed all pertinent imaging results/findings within the past 24 hours.    Assessment/Plan:      * Necrotizing soft tissue infection  Further discussed with surgery upon admission to [facility], recommendations for wound care to manage and will re-consult if further surgical needs arise.     Continue antibiotics and dressing changes.   ---  Perirectal abscess now status-post extensive debridement and revision. Subsequent large sacral and perineal wound now healing appropriately.     Anaerobic culture positive, currently receiving treatment with clindamycin and Zosyn. Will likely require extended course due  to location of wound and risk of contamination.     - aggressive wound care including dressing changes daily and with each bowel movement  - antibiotics as below    9/4: continue with aggressive wound care and antibiotics for now.     9/5:  Without complaints today although he had couple episodes of diarrhea.  Continue with current care.    9/6:  Continue with IV antibiotics aggressive wound care.    9/7:  Continue with IV antibiotics and aggressive wound care.  Patient is having some diarrhea and Imodium was ordered.  This is a chronic issue for patient.    9/8:  Will discuss with General surgery but may be able to stop IV antibiotics in the coming days.     9/9:  Will continue antibiotics for now but will monitor renal function as well.    Antibiotics (From admission, onward)      Start     Stop Route Frequency Ordered    09/04/24 2230  piperacillin-tazobactam (ZOSYN) 4.5 g in D5W 100 mL IVPB (MB+)         -- IV Every 12 hours (non-standard times) 09/04/24 1220               JANET (acute kidney injury)  JANET is likely due to pre-renal azotemia due to dehydration. Baseline creatinine is  GFR >50 . Most recent creatinine and eGFR are listed below.  Recent Labs     09/08/24  0742   CREATININE 2.99*   EGFRNORACEVR 20*        Plan  - JANET is worsening. Will adjust treatment as follows: IV fluids  - Avoid nephrotoxins and renally dose meds for GFR listed above  - Monitor urine output, serial BMP, and adjust therapy as needed    Renal function panel and added IV fluids.     9/9:  Continue with IV fluids encourage patient to take p.o..  Follow up BNP.    Dermatitis  Hydrocortisone PRN      Irritable bowel syndrome with both constipation and diarrhea  Long history of alternating diarrhea and constipation. Currently having diarrhea likely exacerbated by antibiotics which unfortunately necessitates multiple daily dressing changes. Continue probiotics and psyllium.    Paroxysmal atrial fibrillation  Patient with Paroxysmal (<7  "days) atrial fibrillation which is controlled currently with  no medications . Patient is currently in atrial fibrillation.[id number] Score: 4. HASBLED Score: 1. Anticoagulation not indicated due to extensive wound, risk of bleeding, possible need for further surgery .    Abnormality of gait and mobility  Continue PT/OT      Type 2 diabetes mellitus with hyperglycemia, with long-term current use of insulin  Patient's FSGs are controlled on current medication regimen.  Last A1c reviewed-   Lab Results   Component Value Date    HGBA1C 6.7 (H) 08/23/2024     Most recent fingerstick glucose reviewed- No results for input(s): "POCTGLUCOSE" in the last 24 hours.  Current correctional scale  Medium  Maintain anti-hyperglycemic dose as follows-   Antihyperglycemics (From admission, onward)      Start     Stop Route Frequency Ordered    08/29/24 1556  insulin aspart U-100 injection 0-10 Units         -- SubQ Before meals & nightly PRN 08/29/24 1558          Hold Oral hypoglycemics while patient is in the hospital.    9/6:  Episode of hypoglycemia to 69.  Will discontinue 20 units of Lantus and just use the sliding scale.  May discharge with 10 units of Lantus as able.      Atherosclerosis of native coronary artery of native heart without angina pectoris  Patient with known CAD s/p stent placement, which is controlled Will continue ASA and Statin and monitor for S/Sx of angina/ACS. Continue to monitor on telemetry.     Resume Plavix 9/3 and monitor closely for signs of bleeding.     Stage 3a chronic kidney disease  Creatine stable for now. BMP reviewed- noted Estimated Creatinine Clearance: 19.7 mL/min (A) (based on SCr of 2.65 mg/dL (H)). according to latest data. Based on current GFR, CKD stage is stage 3 - GFR 30-59.  Monitor UOP and serial BMP and adjust therapy as needed. Renally dose meds. Avoid nephrotoxic medications and procedures.    Primary hypertension  Chronic, controlled. Latest blood pressure and vitals " reviewed-     Temp:  [97.9 °F (36.6 °C)-98.4 °F (36.9 °C)]   Pulse:  [57-75]   Resp:  [16-25]   BP: (135-172)/(42-94)   SpO2:  [98 %] .   Home meds for hypertension were reviewed and noted below.   Hypertension Medications               nitroGLYCERIN (NITROSTAT) 0.4 MG SL tablet Place 1 tablet (0.4 mg total) under the tongue every 5 (five) minutes as needed for Chest pain.            While in the hospital, will manage blood pressure as follows; Continue home antihypertensive regimen    Will utilize p.r.n. blood pressure medication only if patient's blood pressure greater than 180/110 and he develops symptoms such as worsening chest pain or shortness of breath.      VTE Risk Mitigation (From admission, onward)           Ordered     enoxaparin injection 30 mg  Every 24 hours         08/29/24 1558     Place sequential compression device  Until discontinued         08/29/24 1558                    Discharge Planning   RICKY: 9/12/2024     Code Status: Full Code   Is the patient medically ready for discharge?:     Reason for patient still in hospital (select all that apply): Treatment  Discharge Plan A: Home with family, Home Health                  Ashleigh Szymanski MD  Department of Hospital Medicine   [facility]

## 2024-09-09 NOTE — SUBJECTIVE & OBJECTIVE
Interval History:     Review of Systems   Constitutional:  Positive for fatigue. Negative for chills and fever.   Respiratory:  Negative for cough and shortness of breath.    Cardiovascular:  Negative for chest pain and palpitations.   Gastrointestinal:  Positive for diarrhea. Negative for abdominal pain, nausea and vomiting.   Genitourinary:  Negative for dysuria and flank pain.   Skin:  Positive for wound.   Neurological:  Positive for weakness. Negative for dizziness, syncope and headaches.   Psychiatric/Behavioral:  Negative for confusion.      Objective:     Vital Signs (Most Recent):  Temp: 98.1 °F (36.7 °C) (09/08/24 1920)  Pulse: (!) 58 (09/08/24 1920)  Resp: 20 (09/08/24 1920)  BP: (!) 138/54 (09/08/24 1920)  SpO2: 100 % (09/09/24 0000) Vital Signs (24h Range):  Temp:  [97.2 °F (36.2 °C)-98.1 °F (36.7 °C)] 98.1 °F (36.7 °C)  Pulse:  [52-58] 58  Resp:  [20-22] 20  SpO2:  [95 %-100 %] 100 %  BP: (138)/(54) 138/54     Weight: 81.1 kg (178 lb 14.4 oz)  Body mass index is 27.2 kg/m².  No intake or output data in the 24 hours ending 09/09/24 0711      Physical Exam  Constitutional:       General: He is not in acute distress.     Appearance: Normal appearance. He is not ill-appearing.   HENT:      Head: Normocephalic and atraumatic.   Eyes:      General: No scleral icterus.     Conjunctiva/sclera: Conjunctivae normal.      Pupils: Pupils are equal, round, and reactive to light.   Cardiovascular:      Rate and Rhythm: Normal rate and regular rhythm.   Pulmonary:      Effort: Pulmonary effort is normal. No respiratory distress.   Abdominal:      Palpations: Abdomen is soft.      Tenderness: There is no abdominal tenderness.   Skin:     Coloration: Skin is not jaundiced or pale.      Findings: Wound (extensive sacral/perineal wound status-post debridement - see media) present. No rash.   Neurological:      Mental Status: He is alert.   Psychiatric:         Behavior: Behavior normal.         Thought Content: Thought  content normal.             Significant Labs: All pertinent labs within the past 24 hours have been reviewed.    Significant Imaging: I have reviewed all pertinent imaging results/findings within the past 24 hours.

## 2024-09-09 NOTE — ASSESSMENT & PLAN NOTE
JANET is likely due to pre-renal azotemia due to dehydration. Baseline creatinine is  GFR >50 . Most recent creatinine and eGFR are listed below.  Recent Labs     09/08/24  0742   CREATININE 2.99*   EGFRNORACEVR 20*        Plan  - JANET is worsening. Will adjust treatment as follows: IV fluids  - Avoid nephrotoxins and renally dose meds for GFR listed above  - Monitor urine output, serial BMP, and adjust therapy as needed    Renal function panel and added IV fluids.     9/9:  Continue with IV fluids encourage patient to take p.o..  Follow up BNP.

## 2024-09-10 LAB
GLUCOSE SERPL-MCNC: 156 MG/DL (ref 70–105)
GLUCOSE SERPL-MCNC: 170 MG/DL (ref 70–105)
GLUCOSE SERPL-MCNC: 99 MG/DL (ref 70–105)
GLUCOSE SERPL-MCNC: 99 MG/DL (ref 70–105)

## 2024-09-10 PROCEDURE — 25000003 PHARM REV CODE 250: Performed by: HOSPITALIST

## 2024-09-10 PROCEDURE — 97116 GAIT TRAINING THERAPY: CPT | Mod: CQ

## 2024-09-10 PROCEDURE — 97530 THERAPEUTIC ACTIVITIES: CPT | Mod: CQ

## 2024-09-10 PROCEDURE — 99900035 HC TECH TIME PER 15 MIN (STAT)

## 2024-09-10 PROCEDURE — 82962 GLUCOSE BLOOD TEST: CPT

## 2024-09-10 PROCEDURE — 94761 N-INVAS EAR/PLS OXIMETRY MLT: CPT

## 2024-09-10 PROCEDURE — 63600175 PHARM REV CODE 636 W HCPCS: Performed by: HOSPITALIST

## 2024-09-10 PROCEDURE — 11000008

## 2024-09-10 PROCEDURE — 99233 SBSQ HOSP IP/OBS HIGH 50: CPT | Mod: ,,, | Performed by: HOSPITALIST

## 2024-09-10 PROCEDURE — 25000003 PHARM REV CODE 250: Performed by: FAMILY MEDICINE

## 2024-09-10 PROCEDURE — 63600175 PHARM REV CODE 636 W HCPCS: Performed by: FAMILY MEDICINE

## 2024-09-10 RX ORDER — HYDRALAZINE HYDROCHLORIDE 25 MG/1
25 TABLET, FILM COATED ORAL EVERY 8 HOURS PRN
Status: DISCONTINUED | OUTPATIENT
Start: 2024-09-10 | End: 2024-09-16 | Stop reason: HOSPADM

## 2024-09-10 RX ADMIN — OXYCODONE HYDROCHLORIDE AND ACETAMINOPHEN 500 MG: 500 TABLET ORAL at 09:09

## 2024-09-10 RX ADMIN — ZINC SULFATE 220 MG (50 MG) CAPSULE 220 MG: CAPSULE at 09:09

## 2024-09-10 RX ADMIN — ENOXAPARIN SODIUM 30 MG: 30 INJECTION SUBCUTANEOUS at 04:09

## 2024-09-10 RX ADMIN — PRAVASTATIN SODIUM 80 MG: 40 TABLET ORAL at 08:09

## 2024-09-10 RX ADMIN — SODIUM CHLORIDE: 9 INJECTION, SOLUTION INTRAVENOUS at 09:09

## 2024-09-10 RX ADMIN — CLOPIDOGREL BISULFATE 75 MG: 75 TABLET ORAL at 09:09

## 2024-09-10 RX ADMIN — PIPERACILLIN SODIUM AND TAZOBACTAM SODIUM 4.5 G: 4; .5 INJECTION, POWDER, LYOPHILIZED, FOR SOLUTION INTRAVENOUS at 09:09

## 2024-09-10 RX ADMIN — SODIUM CHLORIDE: 9 INJECTION, SOLUTION INTRAVENOUS at 05:09

## 2024-09-10 RX ADMIN — DEXTROSE MONOHYDRATE: 12500 INJECTION, SOLUTION INTRAVENOUS at 09:09

## 2024-09-10 RX ADMIN — ASPIRIN 81 MG: 81 TABLET, COATED ORAL at 09:09

## 2024-09-10 RX ADMIN — INSULIN ASPART 2 UNITS: 100 INJECTION, SOLUTION INTRAVENOUS; SUBCUTANEOUS at 12:09

## 2024-09-10 RX ADMIN — INSULIN ASPART 2 UNITS: 100 INJECTION, SOLUTION INTRAVENOUS; SUBCUTANEOUS at 04:09

## 2024-09-10 RX ADMIN — CHOLECALCIFEROL TAB 125 MCG (5000 UNIT) 5000 UNITS: 125 TAB at 09:09

## 2024-09-10 NOTE — PLAN OF CARE
Ss spoke with Michelle at [facility] to f/u with referral. Referral was received and pending. SS following.

## 2024-09-10 NOTE — PROGRESS NOTES
[facility] Medicine  Progress Note    Patient Name: Negro Hale  MRN: [id number]  Patient Class: IP- Inpatient   Admission Date: 8/29/2024  Length of Stay: 12 days  Attending Physician: Ashleigh Szymanski MD  Primary Care Provider: Smiley Trevino FNP        Subjective:     Principal Problem:Necrotizing soft tissue infection    HPI:    Negro Hale is a 84 y.o. male with history of HTN, CAD, CKD, IBS, and a-fib who initially presented to [facility] with perirectal abscess. Surgical evaluation revealed necrotizing soft tissue infection requiring extensive debridement with revision (most recent procedure 8/26). Anaerobic cultures positive and patient placed on appropriate antibiotics. Initial blood culture with micrococcus species thought to be a contaminant, with repeat cultures negative. Disposition complicated by diarrhea requiring frequent wound care and dressing changes due to soiling, as well and deconditioning due to acute illness and hospitalization. Patient admitted to [facility] for continued antibiotics, rehabilitation, and aggressive wound care.       Overview/Hospital Course:  No notes on file    Interval History:     Review of Systems   Constitutional:  Positive for fatigue. Negative for chills and fever.   Respiratory:  Negative for cough and shortness of breath.    Cardiovascular:  Negative for chest pain and palpitations.   Gastrointestinal:  Positive for diarrhea. Negative for abdominal pain, nausea and vomiting.   Genitourinary:  Negative for dysuria and flank pain.   Skin:  Positive for wound.   Neurological:  Positive for weakness. Negative for dizziness, syncope and headaches.   Psychiatric/Behavioral:  Negative for confusion.      Objective:     Vital Signs (Most Recent):  Temp: 97.7 °F (36.5 °C) (09/09/24 2030)  Pulse: (!) 59 (09/09/24 2130)  Resp: 16 (09/09/24 2130)  BP: (!) 147/54 (09/09/24 2115)  SpO2: 98 % (09/09/24  2158) Vital Signs (24h Range):  Temp:  [97.4 °F (36.3 °C)-97.9 °F (36.6 °C)] 97.7 °F (36.5 °C)  Pulse:  [56-61] 59  Resp:  [14-22] 16  SpO2:  [97 %-98 %] 98 %  BP: (112-147)/(33-54) 147/54     Weight: 78.9 kg (173 lb 15.1 oz)  Body mass index is 26.45 kg/m².    Intake/Output Summary (Last 24 hours) at 9/10/2024 0719  Last data filed at 9/10/2024 0028  Gross per 24 hour   Intake 4049.74 ml   Output --   Net 4049.74 ml         Physical Exam  Constitutional:       General: He is not in acute distress.     Appearance: Normal appearance. He is not ill-appearing.   HENT:      Head: Normocephalic and atraumatic.   Eyes:      General: No scleral icterus.     Conjunctiva/sclera: Conjunctivae normal.      Pupils: Pupils are equal, round, and reactive to light.   Cardiovascular:      Rate and Rhythm: Normal rate and regular rhythm.   Pulmonary:      Effort: Pulmonary effort is normal. No respiratory distress.   Abdominal:      Palpations: Abdomen is soft.      Tenderness: There is no abdominal tenderness.   Skin:     Coloration: Skin is not jaundiced or pale.      Findings: Wound (extensive sacral/perineal wound status-post debridement - see media) present. No rash.   Neurological:      Mental Status: He is alert.   Psychiatric:         Behavior: Behavior normal.         Thought Content: Thought content normal.             Significant Labs: All pertinent labs within the past 24 hours have been reviewed.    Significant Imaging: I have reviewed all pertinent imaging results/findings within the past 24 hours.    Assessment/Plan:      * Necrotizing soft tissue infection  Further discussed with surgery upon admission to [facility], recommendations for wound care to manage and will re-consult if further surgical needs arise.     Continue antibiotics and dressing changes.   ---  Perirectal abscess now status-post extensive debridement and revision. Subsequent large sacral and perineal wound now healing appropriately.     Anaerobic culture  positive, currently receiving treatment with clindamycin and Zosyn. Will likely require extended course due to location of wound and risk of contamination.     - aggressive wound care including dressing changes daily and with each bowel movement  - antibiotics as below    9/4: continue with aggressive wound care and antibiotics for now.     9/5:  Without complaints today although he had couple episodes of diarrhea.  Continue with current care.    9/6:  Continue with IV antibiotics aggressive wound care.    9/7:  Continue with IV antibiotics and aggressive wound care.  Patient is having some diarrhea and Imodium was ordered.  This is a chronic issue for patient.    9/8:  Will discuss with General surgery but may be able to stop IV antibiotics in the coming days.     9/9:  Will continue antibiotics for now but will monitor renal function as well.    9/10: Discuss wound with general surgery.  Anticipate discharge home in next 5-7 days.  Wife stated the wound is challenging for her to manage given its location.     Antibiotics (From admission, onward)      Start     Stop Route Frequency Ordered    09/04/24 2230  piperacillin-tazobactam (ZOSYN) 4.5 g in D5W 100 mL IVPB (MB+)         -- IV Every 12 hours (non-standard times) 09/04/24 1220               JANET (acute kidney injury)  JANET is likely due to pre-renal azotemia due to dehydration. Baseline creatinine is  GFR >50 . Most recent creatinine and eGFR are listed below.  Recent Labs     09/08/24  0742   CREATININE 2.99*   EGFRNORACEVR 20*        Plan  - JANET is worsening. Will adjust treatment as follows: IV fluids  - Avoid nephrotoxins and renally dose meds for GFR listed above  - Monitor urine output, serial BMP, and adjust therapy as needed    Renal function panel and added IV fluids.     9/9:  Continue with IV fluids encourage patient to take p.o..  Follow up BNP.    Dermatitis  Hydrocortisone PRN      Irritable bowel syndrome with both constipation and diarrhea  Long  "history of alternating diarrhea and constipation. Currently having diarrhea likely exacerbated by antibiotics which unfortunately necessitates multiple daily dressing changes. Continue probiotics and psyllium.    Paroxysmal atrial fibrillation  Patient with Paroxysmal (<7 days) atrial fibrillation which is controlled currently with  no medications . Patient is currently in atrial fibrillation.[id number] Score: 4. HASBLED Score: 1. Anticoagulation not indicated due to extensive wound, risk of bleeding, possible need for further surgery .    Abnormality of gait and mobility  Continue PT/OT      Type 2 diabetes mellitus with hyperglycemia, with long-term current use of insulin  Patient's FSGs are controlled on current medication regimen.  Last A1c reviewed-   Lab Results   Component Value Date    HGBA1C 6.7 (H) 08/23/2024     Most recent fingerstick glucose reviewed- No results for input(s): "POCTGLUCOSE" in the last 24 hours.  Current correctional scale  Medium  Maintain anti-hyperglycemic dose as follows-   Antihyperglycemics (From admission, onward)      Start     Stop Route Frequency Ordered    08/29/24 1556  insulin aspart U-100 injection 0-10 Units         -- SubQ Before meals & nightly PRN 08/29/24 1558          Hold Oral hypoglycemics while patient is in the hospital.    9/6:  Episode of hypoglycemia to 69.  Will discontinue 20 units of Lantus and just use the sliding scale.  May discharge with 10 units of Lantus as able.      Atherosclerosis of native coronary artery of native heart without angina pectoris  Patient with known CAD s/p stent placement, which is controlled Will continue ASA and Statin and monitor for S/Sx of angina/ACS. Continue to monitor on telemetry.     Resume Plavix 9/3 and monitor closely for signs of bleeding.     Stage 3a chronic kidney disease  Creatine stable for now. BMP reviewed- noted Estimated Creatinine Clearance: 19.7 mL/min (A) (based on SCr of 2.65 mg/dL (H)). according to " latest data. Based on current GFR, CKD stage is stage 3 - GFR 30-59.  Monitor UOP and serial BMP and adjust therapy as needed. Renally dose meds. Avoid nephrotoxic medications and procedures.    Primary hypertension  Chronic, controlled. Latest blood pressure and vitals reviewed-     Temp:  [97.9 °F (36.6 °C)-98.4 °F (36.9 °C)]   Pulse:  [57-75]   Resp:  [16-25]   BP: (135-172)/(42-94)   SpO2:  [98 %] .   Home meds for hypertension were reviewed and noted below.   Hypertension Medications               nitroGLYCERIN (NITROSTAT) 0.4 MG SL tablet Place 1 tablet (0.4 mg total) under the tongue every 5 (five) minutes as needed for Chest pain.            While in the hospital, will manage blood pressure as follows; Continue home antihypertensive regimen    Will utilize p.r.n. blood pressure medication only if patient's blood pressure greater than 180/110 and he develops symptoms such as worsening chest pain or shortness of breath.      VTE Risk Mitigation (From admission, onward)           Ordered     enoxaparin injection 30 mg  Every 24 hours         08/29/24 1558     Place sequential compression device  Until discontinued         08/29/24 1558                    Discharge Planning   RICKY: 9/12/2024     Code Status: Full Code   Is the patient medically ready for discharge?:     Reason for patient still in hospital (select all that apply): Treatment  Discharge Plan A: Home with family, Home Health                  Ashleigh Szymanski MD  Department of Hospital Medicine   [facility]

## 2024-09-10 NOTE — SUBJECTIVE & OBJECTIVE
Interval History:     Review of Systems   Constitutional:  Positive for fatigue. Negative for chills and fever.   Respiratory:  Negative for cough and shortness of breath.    Cardiovascular:  Negative for chest pain and palpitations.   Gastrointestinal:  Positive for diarrhea. Negative for abdominal pain, nausea and vomiting.   Genitourinary:  Negative for dysuria and flank pain.   Skin:  Positive for wound.   Neurological:  Positive for weakness. Negative for dizziness, syncope and headaches.   Psychiatric/Behavioral:  Negative for confusion.      Objective:     Vital Signs (Most Recent):  Temp: 97.7 °F (36.5 °C) (09/09/24 2030)  Pulse: (!) 59 (09/09/24 2130)  Resp: 16 (09/09/24 2130)  BP: (!) 147/54 (09/09/24 2115)  SpO2: 98 % (09/09/24 2154) Vital Signs (24h Range):  Temp:  [97.4 °F (36.3 °C)-97.9 °F (36.6 °C)] 97.7 °F (36.5 °C)  Pulse:  [56-61] 59  Resp:  [14-22] 16  SpO2:  [97 %-98 %] 98 %  BP: (112-147)/(33-54) 147/54     Weight: 78.9 kg (173 lb 15.1 oz)  Body mass index is 26.45 kg/m².    Intake/Output Summary (Last 24 hours) at 9/10/2024 0719  Last data filed at 9/10/2024 0028  Gross per 24 hour   Intake 4049.74 ml   Output --   Net 4049.74 ml         Physical Exam  Constitutional:       General: He is not in acute distress.     Appearance: Normal appearance. He is not ill-appearing.   HENT:      Head: Normocephalic and atraumatic.   Eyes:      General: No scleral icterus.     Conjunctiva/sclera: Conjunctivae normal.      Pupils: Pupils are equal, round, and reactive to light.   Cardiovascular:      Rate and Rhythm: Normal rate and regular rhythm.   Pulmonary:      Effort: Pulmonary effort is normal. No respiratory distress.   Abdominal:      Palpations: Abdomen is soft.      Tenderness: There is no abdominal tenderness.   Skin:     Coloration: Skin is not jaundiced or pale.      Findings: Wound (extensive sacral/perineal wound status-post debridement - see media) present. No rash.   Neurological:       Mental Status: He is alert.   Psychiatric:         Behavior: Behavior normal.         Thought Content: Thought content normal.             Significant Labs: All pertinent labs within the past 24 hours have been reviewed.    Significant Imaging: I have reviewed all pertinent imaging results/findings within the past 24 hours.

## 2024-09-10 NOTE — PLAN OF CARE
Problem: Adult Inpatient Plan of Care  Goal: Plan of Care Review  Outcome: Progressing  Flowsheets (Taken 9/10/2024 1123)  Plan of Care Reviewed With: patient  Goal: Absence of Hospital-Acquired Illness or Injury  Outcome: Progressing  Intervention: Identify and Manage Fall Risk  Flowsheets (Taken 9/10/2024 1123)  Safety Promotion/Fall Prevention:   assistive device/personal item within reach   bed alarm set   nonskid shoes/socks when out of bed   medications reviewed   side rails raised x 3  Intervention: Prevent Skin Injury  Flowsheets (Taken 9/10/2024 1123)  Body Position: position changed independently  Intervention: Prevent and Manage VTE (Venous Thromboembolism) Risk  Flowsheets (Taken 9/10/2024 1123)  VTE Prevention/Management: (pt refused SCD) fluids promoted  Intervention: Prevent Infection  Flowsheets (Taken 9/10/2024 1123)  Infection Prevention:   rest/sleep promoted   personal protective equipment utilized   environmental surveillance performed   cohorting utilized   single patient room provided   equipment surfaces disinfected   hand hygiene promoted  Goal: Optimal Comfort and Wellbeing  Outcome: Progressing  Intervention: Monitor Pain and Promote Comfort  Flowsheets (Taken 9/10/2024 1123)  Pain Management Interventions:   care clustered   pillow support provided  Intervention: Provide Person-Centered Care  Flowsheets (Taken 9/10/2024 1123)  Trust Relationship/Rapport:   care explained   choices provided   thoughts/feelings acknowledged     Problem: Diabetes Comorbidity  Goal: Blood Glucose Level Within Targeted Range  Outcome: Progressing  Intervention: Monitor and Manage Glycemia  Flowsheets (Taken 9/10/2024 1123)  Glycemic Management: blood glucose monitored     Problem: Fall Injury Risk  Goal: Absence of Fall and Fall-Related Injury  Outcome: Progressing  Intervention: Identify and Manage Contributors  Flowsheets (Taken 9/10/2024 1123)  Self-Care Promotion: BADL personal objects within  reach  Medication Review/Management: medications reviewed  Intervention: Promote Injury-Free Environment  Flowsheets (Taken 9/10/2024 1123)  Safety Promotion/Fall Prevention:   assistive device/personal item within reach   bed alarm set   nonskid shoes/socks when out of bed   medications reviewed   side rails raised x 3     Problem: Wound  Goal: Optimal Coping  Outcome: Progressing  Intervention: Support Patient and Family Response  Flowsheets (Taken 9/10/2024 1123)  Supportive Measures: active listening utilized  Family/Support System Care: presence promoted  Goal: Optimal Functional Ability  Outcome: Progressing  Intervention: Optimize Functional Ability  Flowsheets (Taken 9/10/2024 1123)  Activity Assistance Provided: independent

## 2024-09-10 NOTE — PT/OT/SLP PROGRESS
Physical Therapy Treatment    Patient Name:  Negro Hale   MRN:  [id number]    Recommendations:     Discharge Recommendations: Moderate Intensity Therapy  Discharge Equipment Recommendations: none  Barriers to discharge: None    Assessment:     Negro Hale is a 84 y.o. male admitted with a medical diagnosis of Necrotizing soft tissue infection.  He presents with the following impairments/functional limitations: weakness, impaired endurance, impaired balance, gait instability, pain, decreased safety awareness, impaired skin .    Pt tolerated his ADLs and gait training fairly well today. Improved distance using RW. Occasional cueing for posture control. Pt is highly motivated to improve his overall strength,endurance, and mobility for d/c home when able.   Case conference with Ha Garcia PT.  Rehab Prognosis: Good; patient would benefit from acute skilled PT services to address these deficits and reach maximum level of function.    Recent Surgery: * No surgery found *      Plan:     During this hospitalization, patient to be seen 5 x/week to address the identified rehab impairments via gait training, therapeutic activities and progress toward the following goals:    Plan of Care Expires:  09/30/24    Subjective     Chief Complaint: ready to go home  Patient/Family Comments/goals: pt agreeable for PT today  Pain/Comfort:  Pain Rating 1:  (did not rate)  Location - Orientation 1: medial  Location 1: perirectal  Pain Addressed 1: Pre-medicate for activity, Reposition, Distraction, Nurse notified      Objective:     Communicated with RN and PT prior to session.  Patient found asleep in bed with telemetry, pulse ox (continuous), peripheral IV, bed alarm upon PT entry to room.     General Precautions: Standard, contact, fall  Orthopedic Precautions: N/A  Braces: N/A  Respiratory Status: Room air     Functional Mobility: increased time to complete during today's tx  Bed Mobility:     Supine to Sit: modified  independence  Sit to Supine- Jules  Transfers:     Sit to Stand from bed x 2 trials:  modified independence with rolling walker  Sit to stand from toilet x 2 trials: Jules/SUP  Toilet Transfer: modified independence and supervision with  rolling walker  using  Step Transfer  Gait: 400ft with rolling walker with SVN-CGA, assist for IV pole; vc's for posture correction and proper distance from walker  Balance: good       AM-PAC 6 CLICK MOBILITY  Turning over in bed (including adjusting bedclothes, sheets and blankets)?: 4  Sitting down on and standing up from a chair with arms (e.g., wheelchair, bedside commode, etc.): 4  Moving from lying on back to sitting on the side of the bed?: 4  Moving to and from a bed to a chair (including a wheelchair)?: 4  Need to walk in hospital room?: 3  Climbing 3-5 steps with a railing?: 1  Basic Mobility Total Score: 20       Treatment & Education:  Mobility and Gait listed above    Seated EOB unsupported up to 15 min for donning/doffing gown and taking medication SUP no LOB noted     Patient left HOB elevated with all lines intact, call button in reach, and wife present.    GOALS:   Multidisciplinary Problems       Physical Therapy Goals          Problem: Physical Therapy    Goal Priority Disciplines Outcome Goal Variances Interventions   Physical Therapy Goal     PT, PT/OT Progressing     Description: Short Term Goals to be met by: 24    Patient will increase functional independence with mobility by performin. Gait  x 300 feet with Stand by assist using Rolling walker  2. Lower extremity exercise program x30 reps per handout, with assistance as needed  3. Pt to be indep in all functional transfers.  4. Pt to negotiate 3 steps with rail with SBA    Long Term Goals to be met by: 24    Pt will regain full independent functional mobility with Rolling walker to return to home situation and prior activities of daily living.                        Time Tracking:     PT  Received On: 09/10/24  PT Start Time: 0918     PT Stop Time: 1000  PT Total Time (min): 42 min     Billable Minutes: Gait Training 15 and Therapeutic Activity 23  38 total min    Treatment Type: Treatment  PT/PTA: PTA     Number of PTA visits since last PT visit: 2     09/10/2024

## 2024-09-10 NOTE — ASSESSMENT & PLAN NOTE
Further discussed with surgery upon admission to [facility], recommendations for wound care to manage and will re-consult if further surgical needs arise.     Continue antibiotics and dressing changes.   ---  Perirectal abscess now status-post extensive debridement and revision. Subsequent large sacral and perineal wound now healing appropriately.     Anaerobic culture positive, currently receiving treatment with clindamycin and Zosyn. Will likely require extended course due to location of wound and risk of contamination.     - aggressive wound care including dressing changes daily and with each bowel movement  - antibiotics as below    9/4: continue with aggressive wound care and antibiotics for now.     9/5:  Without complaints today although he had couple episodes of diarrhea.  Continue with current care.    9/6:  Continue with IV antibiotics aggressive wound care.    9/7:  Continue with IV antibiotics and aggressive wound care.  Patient is having some diarrhea and Imodium was ordered.  This is a chronic issue for patient.    9/8:  Will discuss with General surgery but may be able to stop IV antibiotics in the coming days.     9/9:  Will continue antibiotics for now but will monitor renal function as well.    9/10: Discuss wound with general surgery.  Anticipate discharge home in next 5-7 days.  Wife stated the wound is challenging for her to manage given its location.     Antibiotics (From admission, onward)      Start     Stop Route Frequency Ordered    09/04/24 2230  piperacillin-tazobactam (ZOSYN) 4.5 g in D5W 100 mL IVPB (MB+)         -- IV Every 12 hours (non-standard times) 09/04/24 1220

## 2024-09-11 LAB
BASOPHILS # BLD AUTO: 0.11 K/UL (ref 0–0.2)
BASOPHILS NFR BLD AUTO: 2.2 % (ref 0–1)
DIFFERENTIAL METHOD BLD: ABNORMAL
EOSINOPHIL # BLD AUTO: 0.27 K/UL (ref 0–0.5)
EOSINOPHIL NFR BLD AUTO: 5.3 % (ref 1–4)
ERYTHROCYTE [DISTWIDTH] IN BLOOD BY AUTOMATED COUNT: 13.8 % (ref 11.5–14.5)
GLUCOSE SERPL-MCNC: 137 MG/DL (ref 70–105)
GLUCOSE SERPL-MCNC: 186 MG/DL (ref 70–105)
GLUCOSE SERPL-MCNC: 209 MG/DL (ref 70–105)
GLUCOSE SERPL-MCNC: 82 MG/DL (ref 70–105)
HCT VFR BLD AUTO: 31.9 % (ref 40–54)
HGB BLD-MCNC: 9.9 G/DL (ref 13.5–18)
IMM GRANULOCYTES # BLD AUTO: 0.01 K/UL (ref 0–0.04)
IMM GRANULOCYTES NFR BLD: 0.2 % (ref 0–0.4)
LYMPHOCYTES # BLD AUTO: 1.15 K/UL (ref 1–4.8)
LYMPHOCYTES NFR BLD AUTO: 22.8 % (ref 27–41)
MCH RBC QN AUTO: 28.8 PG (ref 27–31)
MCHC RBC AUTO-ENTMCNC: 31 G/DL (ref 32–36)
MCV RBC AUTO: 92.7 FL (ref 80–96)
MONOCYTES # BLD AUTO: 0.65 K/UL (ref 0–0.8)
MONOCYTES NFR BLD AUTO: 12.9 % (ref 2–6)
MPC BLD CALC-MCNC: 11.2 FL (ref 9.4–12.4)
NEUTROPHILS # BLD AUTO: 2.86 K/UL (ref 1.8–7.7)
NEUTROPHILS NFR BLD AUTO: 56.6 % (ref 53–65)
NRBC # BLD AUTO: 0 X10E3/UL
NRBC, AUTO (.00): 0 %
PLATELET # BLD AUTO: 211 K/UL (ref 150–400)
RBC # BLD AUTO: 3.44 M/UL (ref 4.6–6.2)
WBC # BLD AUTO: 5.05 K/UL (ref 4.5–11)

## 2024-09-11 PROCEDURE — 63600175 PHARM REV CODE 636 W HCPCS: Performed by: FAMILY MEDICINE

## 2024-09-11 PROCEDURE — 85025 COMPLETE CBC W/AUTO DIFF WBC: CPT | Performed by: FAMILY MEDICINE

## 2024-09-11 PROCEDURE — 25000003 PHARM REV CODE 250: Performed by: HOSPITALIST

## 2024-09-11 PROCEDURE — 82962 GLUCOSE BLOOD TEST: CPT

## 2024-09-11 PROCEDURE — 96372 THER/PROPH/DIAG INJ SC/IM: CPT

## 2024-09-11 PROCEDURE — 36415 COLL VENOUS BLD VENIPUNCTURE: CPT | Performed by: FAMILY MEDICINE

## 2024-09-11 PROCEDURE — 11000008

## 2024-09-11 PROCEDURE — 25000003 PHARM REV CODE 250: Performed by: FAMILY MEDICINE

## 2024-09-11 PROCEDURE — 99233 SBSQ HOSP IP/OBS HIGH 50: CPT | Mod: ,,, | Performed by: HOSPITALIST

## 2024-09-11 PROCEDURE — 63600175 PHARM REV CODE 636 W HCPCS: Performed by: HOSPITALIST

## 2024-09-11 PROCEDURE — 99900035 HC TECH TIME PER 15 MIN (STAT)

## 2024-09-11 RX ADMIN — SODIUM CHLORIDE: 9 INJECTION, SOLUTION INTRAVENOUS at 02:09

## 2024-09-11 RX ADMIN — OXYCODONE HYDROCHLORIDE AND ACETAMINOPHEN 500 MG: 500 TABLET ORAL at 09:09

## 2024-09-11 RX ADMIN — INSULIN ASPART 4 UNITS: 100 INJECTION, SOLUTION INTRAVENOUS; SUBCUTANEOUS at 11:09

## 2024-09-11 RX ADMIN — ZINC SULFATE 220 MG (50 MG) CAPSULE 220 MG: CAPSULE at 09:09

## 2024-09-11 RX ADMIN — ENOXAPARIN SODIUM 30 MG: 30 INJECTION SUBCUTANEOUS at 06:09

## 2024-09-11 RX ADMIN — LOPERAMIDE HYDROCHLORIDE 2 MG: 2 CAPSULE ORAL at 11:09

## 2024-09-11 RX ADMIN — CHOLECALCIFEROL TAB 125 MCG (5000 UNIT) 5000 UNITS: 125 TAB at 09:09

## 2024-09-11 RX ADMIN — LOPERAMIDE HYDROCHLORIDE 2 MG: 2 CAPSULE ORAL at 08:09

## 2024-09-11 RX ADMIN — CLOPIDOGREL BISULFATE 75 MG: 75 TABLET ORAL at 09:09

## 2024-09-11 RX ADMIN — PIPERACILLIN SODIUM AND TAZOBACTAM SODIUM 4.5 G: 4; .5 INJECTION, POWDER, LYOPHILIZED, FOR SOLUTION INTRAVENOUS at 11:09

## 2024-09-11 RX ADMIN — ASPIRIN 81 MG: 81 TABLET, COATED ORAL at 09:09

## 2024-09-11 RX ADMIN — PIPERACILLIN SODIUM AND TAZOBACTAM SODIUM 4.5 G: 4; .5 INJECTION, POWDER, LYOPHILIZED, FOR SOLUTION INTRAVENOUS at 09:09

## 2024-09-11 RX ADMIN — SODIUM CHLORIDE: 9 INJECTION, SOLUTION INTRAVENOUS at 10:09

## 2024-09-11 RX ADMIN — PRAVASTATIN SODIUM 80 MG: 40 TABLET ORAL at 08:09

## 2024-09-11 RX ADMIN — SODIUM CHLORIDE: 9 INJECTION, SOLUTION INTRAVENOUS at 11:09

## 2024-09-11 NOTE — PROGRESS NOTES
[facility] HOW  Wound Care    Patient Name:  Negro Hale   MRN:  [id number]  Date: 9/11/2024  Diagnosis: Necrotizing soft tissue infection    History:     Past Medical History:   Diagnosis Date    Abnormal thyroid stimulating hormone (TSH) level 08/22/2019    Anemia 08/15/2019    Atrial fibrillation 09/07/2012    Bradycardia 05/23/2017    asymptomatic    Change in bowel habit 11/13/2018    Chronic kidney disease, unspecified 01/14/2019    Coronary arteriosclerosis 09/07/2012    Disease of skin and subcutaneous tissue 08/16/2017    medial aspect RLE- cystic structure seen on venous doppler US.    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 Lewis Eye exam    Varicose veins of both lower extremities with pain 01/02/2019    Venous insufficiency 01/08/2019       Social History     Socioeconomic History    Marital status:    Tobacco  Use    Smoking status: Former     Current packs/day: 0.00     Average packs/day: 0.1 packs/day for 3.0 years (0.3 ttl pk-yrs)     Types: Cigarettes     Start date:      Quit date: 1960     Years since quittin.7     Passive exposure: Past    Smokeless tobacco: Never    Tobacco comments:     Quit 10/15/1976   Substance and Sexual Activity    Alcohol use: Not Currently     Comment: occasionally    Drug use: Never    Sexual activity: Not Currently     Social Determinants of Health     Financial Resource Strain: Low Risk  (2024)    Overall Financial Resource Strain (CARDIA)     Difficulty of Paying Living Expenses: Not hard at all   Food Insecurity: No Food Insecurity (2024)    Hunger Vital Sign     Worried About Running Out of Food in the Last Year: Never true     Ran Out of Food in the Last Year: Never true   Transportation Needs: No Transportation Needs (2024)    TRANSPORTATION NEEDS     Transportation : No   Physical Activity: Inactive (2024)    Exercise Vital Sign     Days of Exercise per Week: 0 days     Minutes of Exercise per Session: 0 min   Stress: No Stress Concern Present (2024)    [employer] - Occupational Stress Questionnaire     Feeling of Stress : Not at all   Housing Stability: Low Risk  (2024)    Housing Stability Vital Sign     Unable to Pay for Housing in the Last Year: No     Homeless in the Last Year: No       Precautions:     Allergies as of 2024 - Reviewed 2024   Allergen Reaction Noted    Mayonnaise  03/10/2021       [facility] Assessment Details/Treatment       Narrative: Seen patient for follow up of wound care    Patient on side of bed, Alert. Wife present.  Perirectal wound improving, no odor or purulent drainage noted. Wound slowly granulating inward. Measurements are 7.5 cm x 3.0 cm x 1.5 cm.  Cont vashe moist gauze.   Bilateral heel and buttock with out pressure injury. Has Mepliex Foam to heels. On low air loss  mattress. Left AC and Right posterior arm skin tears improving. Cont Mepliex Foam border to both areas. Right medial lower leg has purple bruising noted and 2 areas of black dry scab formation. Cont to encourage turning protocol, Pillows to offload heels and on low air loss mattress.     Wound care team to follow prn      09/11/2024

## 2024-09-11 NOTE — SUBJECTIVE & OBJECTIVE
Interval History:     Review of Systems   Constitutional:  Positive for fatigue. Negative for chills and fever.   Respiratory:  Negative for cough and shortness of breath.    Cardiovascular:  Negative for chest pain and palpitations.   Gastrointestinal:  Positive for diarrhea. Negative for abdominal pain, nausea and vomiting.   Genitourinary:  Negative for dysuria and flank pain.   Skin:  Positive for wound.   Neurological:  Positive for weakness. Negative for dizziness, syncope and headaches.   Psychiatric/Behavioral:  Negative for confusion.      Objective:     Vital Signs (Most Recent):  Temp: 98.1 °F (36.7 °C) (09/11/24 1651)  Pulse: 64 (09/11/24 1651)  Resp: 16 (09/11/24 0600)  BP: (!) 160/63 (09/10/24 1915)  SpO2: 98 % (09/11/24 1651) Vital Signs (24h Range):  Temp:  [97 °F (36.1 °C)-98.1 °F (36.7 °C)] 98.1 °F (36.7 °C)  Pulse:  [31-72] 64  Resp:  [12-26] 16  SpO2:  [96 %-99 %] 98 %  BP: (160-183)/(63-67) 160/63     Weight: 78.9 kg (173 lb 15.1 oz)  Body mass index is 26.45 kg/m².    Intake/Output Summary (Last 24 hours) at 9/11/2024 1658  Last data filed at 9/11/2024 1537  Gross per 24 hour   Intake 4845.71 ml   Output --   Net 4845.71 ml         Physical Exam  Constitutional:       General: He is not in acute distress.     Appearance: Normal appearance. He is not ill-appearing.   HENT:      Head: Normocephalic and atraumatic.   Eyes:      General: No scleral icterus.     Conjunctiva/sclera: Conjunctivae normal.      Pupils: Pupils are equal, round, and reactive to light.   Cardiovascular:      Rate and Rhythm: Normal rate and regular rhythm.   Pulmonary:      Effort: Pulmonary effort is normal. No respiratory distress.   Abdominal:      Palpations: Abdomen is soft.      Tenderness: There is no abdominal tenderness.   Skin:     Coloration: Skin is not jaundiced or pale.      Findings: Wound (extensive sacral/perineal wound status-post debridement - see media) present. No rash.   Neurological:      Mental  Status: He is alert.   Psychiatric:         Behavior: Behavior normal.         Thought Content: Thought content normal.             Significant Labs: All pertinent labs within the past 24 hours have been reviewed.    Significant Imaging: I have reviewed all pertinent imaging results/findings within the past 24 hours.

## 2024-09-11 NOTE — PLAN OF CARE
Problem: Physical Therapy  Goal: Physical Therapy Goal  Description: Short Term Goals to be met by: 24    Patient will increase functional independence with mobility by performin. Gait  x 300 feet with Stand by assist using Rolling walker  2. Lower extremity exercise program x30 reps per handout, with assistance as needed  3. Pt to be indep in all functional transfers.  4. Pt to negotiate 3 steps with rail with SBA    Long Term Goals to be met by: 24    Pt will regain full independent functional mobility with Rolling walker to return to home situation and prior activities of daily living.     Patient has met all goals with PT. Will dc from PT   Outcome: Met

## 2024-09-11 NOTE — PLAN OF CARE
Problem: Adult Inpatient Plan of Care  Goal: Plan of Care Review  Outcome: Progressing  Goal: Patient-Specific Goal (Individualized)  Outcome: Progressing  Goal: Absence of Hospital-Acquired Illness or Injury  Outcome: Progressing  Goal: Optimal Comfort and Wellbeing  Outcome: Progressing  Goal: Readiness for Transition of Care  Outcome: Progressing     Problem: Diabetes Comorbidity  Goal: Blood Glucose Level Within Targeted Range  Outcome: Progressing     Problem: Fall Injury Risk  Goal: Absence of Fall and Fall-Related Injury  Outcome: Progressing     Problem: Wound  Goal: Optimal Coping  Outcome: Progressing  Goal: Absence of Infection Signs and Symptoms  Outcome: Progressing  Goal: Optimal Pain Control and Function  Outcome: Progressing  Goal: Skin Health and Integrity  Outcome: Progressing  Goal: Optimal Wound Healing  Outcome: Progressing     Problem: Breathing Pattern Ineffective  Goal: Effective Breathing Pattern  Outcome: Progressing

## 2024-09-11 NOTE — ASSESSMENT & PLAN NOTE
Further discussed with surgery upon admission to [facility], recommendations for wound care to manage and will re-consult if further surgical needs arise.     Continue antibiotics and dressing changes.   ---  Perirectal abscess now status-post extensive debridement and revision. Subsequent large sacral and perineal wound now healing appropriately.     Anaerobic culture positive, currently receiving treatment with clindamycin and Zosyn. Will likely require extended course due to location of wound and risk of contamination.     - aggressive wound care including dressing changes daily and with each bowel movement  - antibiotics as below    9/4: continue with aggressive wound care and antibiotics for now.     9/5:  Without complaints today although he had couple episodes of diarrhea.  Continue with current care.    9/6:  Continue with IV antibiotics aggressive wound care.    9/7:  Continue with IV antibiotics and aggressive wound care.  Patient is having some diarrhea and Imodium was ordered.  This is a chronic issue for patient.    9/8:  Will discuss with General surgery but may be able to stop IV antibiotics in the coming days.     9/9:  Will continue antibiotics for now but will monitor renal function as well.    9/10: Discuss wound with general surgery.  Anticipate discharge home in next 5-7 days.  Wife stated the wound is challenging for her to manage given its location.     9/11:  Preparing dressings for wound changes and other items in family will be able to take patient home on Saturday on Monday.  Will discuss with Dr. Lopez who will take over care.  Clear to leave from surgery standpoint per Dr. Butcher.     Antibiotics (From admission, onward)      Start     Stop Route Frequency Ordered    09/04/24 2230  piperacillin-tazobactam (ZOSYN) 4.5 g in D5W 100 mL IVPB (MB+)         -- IV Every 12 hours (non-standard times) 09/04/24 1220

## 2024-09-11 NOTE — PROGRESS NOTES
[facility] Medicine  Progress Note    Patient Name: Negro Hale  MRN: [id number]  Patient Class: IP- Inpatient   Admission Date: 8/29/2024  Length of Stay: 13 days  Attending Physician: Ashleigh Szymanski MD  Primary Care Provider: Smiley Trevino FNP        Subjective:     Principal Problem:Necrotizing soft tissue infection    HPI:    Negro Hale is a 84 y.o. male with history of HTN, CAD, CKD, IBS, and a-fib who initially presented to [facility] with perirectal abscess. Surgical evaluation revealed necrotizing soft tissue infection requiring extensive debridement with revision (most recent procedure 8/26). Anaerobic cultures positive and patient placed on appropriate antibiotics. Initial blood culture with micrococcus species thought to be a contaminant, with repeat cultures negative. Disposition complicated by diarrhea requiring frequent wound care and dressing changes due to soiling, as well and deconditioning due to acute illness and hospitalization. Patient admitted to [facility] for continued antibiotics, rehabilitation, and aggressive wound care.       Overview/Hospital Course:  No notes on file    Interval History:     Review of Systems   Constitutional:  Positive for fatigue. Negative for chills and fever.   Respiratory:  Negative for cough and shortness of breath.    Cardiovascular:  Negative for chest pain and palpitations.   Gastrointestinal:  Positive for diarrhea. Negative for abdominal pain, nausea and vomiting.   Genitourinary:  Negative for dysuria and flank pain.   Skin:  Positive for wound.   Neurological:  Positive for weakness. Negative for dizziness, syncope and headaches.   Psychiatric/Behavioral:  Negative for confusion.      Objective:     Vital Signs (Most Recent):  Temp: 98.1 °F (36.7 °C) (09/11/24 1651)  Pulse: 64 (09/11/24 1651)  Resp: 16 (09/11/24 0600)  BP: (!) 160/63 (09/10/24 1915)  SpO2: 98 % (09/11/24 1651)  Vital Signs (24h Range):  Temp:  [97 °F (36.1 °C)-98.1 °F (36.7 °C)] 98.1 °F (36.7 °C)  Pulse:  [31-72] 64  Resp:  [12-26] 16  SpO2:  [96 %-99 %] 98 %  BP: (160-183)/(63-67) 160/63     Weight: 78.9 kg (173 lb 15.1 oz)  Body mass index is 26.45 kg/m².    Intake/Output Summary (Last 24 hours) at 9/11/2024 1658  Last data filed at 9/11/2024 1537  Gross per 24 hour   Intake 4845.71 ml   Output --   Net 4845.71 ml         Physical Exam  Constitutional:       General: He is not in acute distress.     Appearance: Normal appearance. He is not ill-appearing.   HENT:      Head: Normocephalic and atraumatic.   Eyes:      General: No scleral icterus.     Conjunctiva/sclera: Conjunctivae normal.      Pupils: Pupils are equal, round, and reactive to light.   Cardiovascular:      Rate and Rhythm: Normal rate and regular rhythm.   Pulmonary:      Effort: Pulmonary effort is normal. No respiratory distress.   Abdominal:      Palpations: Abdomen is soft.      Tenderness: There is no abdominal tenderness.   Skin:     Coloration: Skin is not jaundiced or pale.      Findings: Wound (extensive sacral/perineal wound status-post debridement - see media) present. No rash.   Neurological:      Mental Status: He is alert.   Psychiatric:         Behavior: Behavior normal.         Thought Content: Thought content normal.             Significant Labs: All pertinent labs within the past 24 hours have been reviewed.    Significant Imaging: I have reviewed all pertinent imaging results/findings within the past 24 hours.    Assessment/Plan:      * Necrotizing soft tissue infection  Further discussed with surgery upon admission to [facility], recommendations for wound care to manage and will re-consult if further surgical needs arise.     Continue antibiotics and dressing changes.   ---  Perirectal abscess now status-post extensive debridement and revision. Subsequent large sacral and perineal wound now healing appropriately.     Anaerobic culture positive,  currently receiving treatment with clindamycin and Zosyn. Will likely require extended course due to location of wound and risk of contamination.     - aggressive wound care including dressing changes daily and with each bowel movement  - antibiotics as below    9/4: continue with aggressive wound care and antibiotics for now.     9/5:  Without complaints today although he had couple episodes of diarrhea.  Continue with current care.    9/6:  Continue with IV antibiotics aggressive wound care.    9/7:  Continue with IV antibiotics and aggressive wound care.  Patient is having some diarrhea and Imodium was ordered.  This is a chronic issue for patient.    9/8:  Will discuss with General surgery but may be able to stop IV antibiotics in the coming days.     9/9:  Will continue antibiotics for now but will monitor renal function as well.    9/10: Discuss wound with general surgery.  Anticipate discharge home in next 5-7 days.  Wife stated the wound is challenging for her to manage given its location.     9/11:  Preparing dressings for wound changes and other items in family will be able to take patient home on Saturday on Monday.  Will discuss with Dr. Lopez who will take over care.  Clear to leave from surgery standpoint per Dr. Butcher.     Antibiotics (From admission, onward)      Start     Stop Route Frequency Ordered    09/04/24 2230  piperacillin-tazobactam (ZOSYN) 4.5 g in D5W 100 mL IVPB (MB+)         -- IV Every 12 hours (non-standard times) 09/04/24 1220               JANET (acute kidney injury)  JANET is likely due to pre-renal azotemia due to dehydration. Baseline creatinine is  GFR >50 . Most recent creatinine and eGFR are listed below.  Recent Labs     09/08/24  0742   CREATININE 2.99*   EGFRNORACEVR 20*        Plan  - JANET is worsening. Will adjust treatment as follows: IV fluids  - Avoid nephrotoxins and renally dose meds for GFR listed above  - Monitor urine output, serial BMP, and adjust therapy as  "needed    Renal function panel and added IV fluids.     9/9:  Continue with IV fluids encourage patient to take p.o..  Follow up BNP.    Dermatitis  Hydrocortisone PRN      Irritable bowel syndrome with both constipation and diarrhea  Long history of alternating diarrhea and constipation. Currently having diarrhea likely exacerbated by antibiotics which unfortunately necessitates multiple daily dressing changes. Continue probiotics and psyllium.    Paroxysmal atrial fibrillation  Patient with Paroxysmal (<7 days) atrial fibrillation which is controlled currently with  no medications . Patient is currently in atrial fibrillation.[id number] Score: 4. HASBLED Score: 1. Anticoagulation not indicated due to extensive wound, risk of bleeding, possible need for further surgery .    Abnormality of gait and mobility  Continue PT/OT      Type 2 diabetes mellitus with hyperglycemia, with long-term current use of insulin  Patient's FSGs are controlled on current medication regimen.  Last A1c reviewed-   Lab Results   Component Value Date    HGBA1C 6.7 (H) 08/23/2024     Most recent fingerstick glucose reviewed- No results for input(s): "POCTGLUCOSE" in the last 24 hours.  Current correctional scale  Medium  Maintain anti-hyperglycemic dose as follows-   Antihyperglycemics (From admission, onward)      Start     Stop Route Frequency Ordered    08/29/24 1556  insulin aspart U-100 injection 0-10 Units         -- SubQ Before meals & nightly PRN 08/29/24 1558          Hold Oral hypoglycemics while patient is in the hospital.    9/6:  Episode of hypoglycemia to 69.  Will discontinue 20 units of Lantus and just use the sliding scale.  May discharge with 10 units of Lantus as able.      Atherosclerosis of native coronary artery of native heart without angina pectoris  Patient with known CAD s/p stent placement, which is controlled Will continue ASA and Statin and monitor for S/Sx of angina/ACS. Continue to monitor on telemetry. "     Resume Plavix 9/3 and monitor closely for signs of bleeding.     Stage 3a chronic kidney disease  Creatine stable for now. BMP reviewed- noted Estimated Creatinine Clearance: 19.7 mL/min (A) (based on SCr of 2.65 mg/dL (H)). according to latest data. Based on current GFR, CKD stage is stage 3 - GFR 30-59.  Monitor UOP and serial BMP and adjust therapy as needed. Renally dose meds. Avoid nephrotoxic medications and procedures.    Primary hypertension  Chronic, controlled. Latest blood pressure and vitals reviewed-     Temp:  [97.9 °F (36.6 °C)-98.4 °F (36.9 °C)]   Pulse:  [57-75]   Resp:  [16-25]   BP: (135-172)/(42-94)   SpO2:  [98 %] .   Home meds for hypertension were reviewed and noted below.   Hypertension Medications               nitroGLYCERIN (NITROSTAT) 0.4 MG SL tablet Place 1 tablet (0.4 mg total) under the tongue every 5 (five) minutes as needed for Chest pain.            While in the hospital, will manage blood pressure as follows; Continue home antihypertensive regimen    Will utilize p.r.n. blood pressure medication only if patient's blood pressure greater than 180/110 and he develops symptoms such as worsening chest pain or shortness of breath.      VTE Risk Mitigation (From admission, onward)           Ordered     enoxaparin injection 30 mg  Every 24 hours         08/29/24 1558     Place sequential compression device  Until discontinued         08/29/24 7248                    Discharge Planning   RICKY: 9/12/2024     Code Status: Full Code   Is the patient medically ready for discharge?:     Reason for patient still in hospital (select all that apply): Treatment  Discharge Plan A: Home with family, Home Health                  Ashleigh Szymanski MD  Department of Hospital Medicine   [facility] HOW

## 2024-09-11 NOTE — PROGRESS NOTES
[facility] \[facility] [place] [facility]\""  Adult Nutrition  Progress Note         Reason for Assessment  Reason For Assessment: length of stay   Nutrition Risk Screen: large or nonhealing wound, burn or pressure injury    Assessment and Plan  9/11/2024 RD Follow up: Patient continues on a MCS diet with Tito BID. Po intakes decreased to 25-50% over the last few days. Noted patient with increased diarrhea per flowsheets.    Recommend to add Banatrol TID to aid in loose stools. Consider addition of lactobacillus probiotic. RD Following.     Patient admitted to LTAC 8/29 from [facility] with a dx of necrotizing soft tissue infection, perirectal abscess s/p debridement, and IBS. Patient is ordered a MCS diet. Po intakes on average 75% per flowsheets. Tito ordered to aid wound healing.     Patient is 81.1 kg with a BMI of 27.20 which is WNL. Diet adequate to meet estimated energy needs. Continue current plan of care as tolerated. RD Following.       Learning Needs/Social Determinants of Health  Learning Assessment       08/29/2024 1624 [facility] (8/29/2024 - Present)   Created by Aysha Hallman, RN - RN (Nurse) Status: Complete                 PRIMARY LEARNER     Primary Learner Name:  patient TT - 08/29/2024 1624    Relationship:  Patient TT - 08/29/2024 1624    Does the primary learner have any barriers to learning?:  No Barriers TT - 08/29/2024 1624    What is the preferred language of the primary learner?:  English TT - 08/29/2024 1624    Is an  required?:  No TT - 08/29/2024 1624    How does the primary learner prefer to learn new concepts?:  Listening TT - 08/29/2024 1624    How often do you need to have someone help you read instructions, pamphlets, or written material from your doctor or pharmacy?:  Never TT - 08/29/2024 1624        CO-LEARNER #1     No question answered        CO-LEARNER #2     No question answered        SPECIAL TOPICS     No question answered         ANSWERED BY:     No question answered        Comments         Edit History       Aysha Hallman, RN - RN (Nurse)   08/29/2024 1624                           Social Determinants of Health     Tobacco Use: Medium Risk (8/26/2024)    Patient History     Smoking Tobacco Use: Former     Smokeless Tobacco Use: Never     Passive Exposure: Past   Alcohol Use: Not At Risk (8/30/2024)    AUDIT-C     Frequency of Alcohol Consumption: Never     Average Number of Drinks: Patient does not drink     Frequency of Binge Drinking: Never   Financial Resource Strain: Low Risk  (8/30/2024)    Overall Financial Resource Strain (CARDIA)     Difficulty of Paying Living Expenses: Not hard at all   Food Insecurity: No Food Insecurity (8/30/2024)    Hunger Vital Sign     Worried About Running Out of Food in the Last Year: Never true     Ran Out of Food in the Last Year: Never true   Transportation Needs: No Transportation Needs (8/30/2024)    TRANSPORTATION NEEDS     Transportation : No   Physical Activity: Inactive (8/30/2024)    Exercise Vital Sign     Days of Exercise per Week: 0 days     Minutes of Exercise per Session: 0 min   Stress: No Stress Concern Present (8/30/2024)    [employer] - Occupational Stress Questionnaire     Feeling of Stress : Not at all   Housing Stability: Low Risk  (8/30/2024)    Housing Stability Vital Sign     Unable to Pay for Housing in the Last Year: No     Homeless in the Last Year: No   Depression: Low Risk  (6/3/2024)    Depression     Last PHQ-4: Flowsheet Data: 0   Utilities: Not At Risk (8/30/2024)    [facility] Utilities     Threatened with loss of utilities: No   Health Literacy: Adequate Health Literacy (8/30/2024)     Health Literacy     Frequency of need for help with medical instructions: Never   Social Isolation: Socially Integrated (8/30/2024)    Social Isolation     Social Isolation: 1            Malnutrition  Is Patient Malnourished: No    Nutrition  Diagnosis  Increased Protein Needs related to Wound healing as evidenced by soft tissue infection  Comments: ordered Tito BID    Recent Labs   Lab 09/09/24  0725 09/09/24  1120 09/11/24  1107   GLU 97  --   --    POCGLU  --    < > 209*    < > = values in this interval not displayed.     Comments on Glucose: WNL    Nutrition Prescription / Recommendations  Recommendation/Intervention: Continue diet and Tito as tolerated  Goals: po intakes 75% remainder of admission  Nutrition Goal Status: new  Communication of RD Recs: discussed on rounds  Current Diet Order: dysphagia MCS  Oral Nutrition Supplement: Tito BID to aid wound healing  Chewing or Swallowing Difficulty?: Poor Dentition  Recommended Diet: Mechanical Soft with chopped meats  Recommended Oral Supplement: Tito [90 kcals, 2.5g Protein, 10g Carbs(3g Sugar), 7g L-Arginine, 7g L-Glutamine, Vitamin C 300mg, 9.5mg Zinc] 2 times a day  Is Nutrition Support Recommended: [facility] Support: No  Is Nutrition Education Recommended: No    Monitor and Evaluation  % current Intake: P.O. intake of 25 - 50 %  % intake to meet estimated needs: 50 - 75 %  Food and Nutrient Intake: energy intake, food and beverage intake  Food and Nutrient Adminstration: diet order  Anthropometric Measurements: height/length, weight, weight change, body mass index  Biochemical Data, Medical Tests and Procedures: electrolyte and renal panel, lipid profile, gastrointestinal profile, glucose/endocrine profile, inflammatory profile  Tolerance: tolerating    Current Medical Diagnosis and Past Medical History     Past Medical History:   Diagnosis Date    Abnormal thyroid stimulating hormone (TSH) level 08/22/2019    Anemia 08/15/2019    Atrial fibrillation 09/07/2012    Bradycardia 05/23/2017    asymptomatic    Change in bowel habit 11/13/2018    Chronic kidney disease, unspecified 01/14/2019    Coronary arteriosclerosis 09/07/2012    Disease of skin and subcutaneous tissue 08/16/2017     medial aspect RLE- cystic structure seen on venous doppler US.    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 Lewis Eye exam    Varicose veins of both lower extremities with pain 01/02/2019    Venous insufficiency 01/08/2019       Nutrition/Diet History  Spiritual, Cultural Beliefs, Druze Practices, Values that Affect Care: no  Food Allergies: other (see comments) ([facility])  Factors Affecting Nutritional Intake: chewing difficulties/inability to chew food    Lab/Procedures/Meds  Recent Labs   Lab 09/09/24  0725      K 4.2   BUN 45*   CREATININE 2.76*   CALCIUM 8.4*   *   Hx CKD  Last A1c:   Lab Results   Component Value Date    HGBA1C 6.7 (H) 08/23/2024     Lab Results   Component Value Date    RBC 3.44 (L) 09/11/2024    HGB 9.9 (L) 09/11/2024    HCT 31.9 (L) 09/11/2024    MCV 92.7 09/11/2024    MCH 28.8 09/11/2024    MCHC 31.0 (L) 09/11/2024  "    Pertinent Labs Reviewed: reviewed  Pertinent Medications Reviewed: reviewed  Scheduled Meds:   ascorbic acid (vitamin C)  500 mg Oral Daily    aspirin  81 mg Oral Daily    cholecalciferol (vitamin D3)  5,000 Units Oral Daily    clopidogreL  75 mg Oral Daily    enoxparin  30 mg Subcutaneous Q24H (prophylaxis, 1700)    piperacillin-tazobactam (Zosyn) IV (PEDS and ADULTS) (extended infusion is not appropriate)  4.5 g Intravenous Q12H    pravastatin  80 mg Oral QHS    zinc sulfate  220 mg Oral Daily     Continuous Infusions:   0.9% NaCl   Intravenous Continuous 125 mL/hr at 09/11/24 1112 New Bag at 09/11/24 1112     PRN Meds:.  Current Facility-Administered Medications:     D5W, , Intravenous, PRN    dextrose 10%, 12.5 g, Intravenous, PRN    dextrose 10%, 25 g, Intravenous, PRN    glucagon (human recombinant), 1 mg, Intramuscular, PRN    glucose, 16 g, Oral, PRN    glucose, 24 g, Oral, PRN    hydrALAZINE, 25 mg, Oral, Q8H PRN    HYDROcodone-acetaminophen, 1 tablet, Oral, Q4H PRN    HYDROcodone-acetaminophen, 1 tablet, Oral, Q4H PRN    hydrocortisone, , Topical (Top), BID PRN    insulin aspart U-100, 0-10 Units, Subcutaneous, QID (AC + HS) PRN    loperamide, 2 mg, Oral, QID PRN    Anthropometrics  Temp: 97.4 °F (36.3 °C)  Height: 5' 8" (172.7 cm)  Height (inches): 68 in  Weight Method: Bed Scale  Weight: 78.9 kg (173 lb 15.1 oz)  Weight (lb): 173.94 lb  Ideal Body Weight (IBW), Male: 154 lb  % Ideal Body Weight, Male (lb): 116.17 %  BMI (Calculated): 26.5       Estimated/Assessed Needs      Temp: 97.4 °F (36.3 °C)Oral  Weight Used For Calorie Calculations: 81.1 kg (178 lb 12.7 oz)   Energy Need Method: Kcal/kg Energy Calorie Requirements (kcal): 4382-5406  Weight Used For Protein Calculations: 81.1 kg (178 lb 12.7 oz)  Protein Requirements: 65-81  Estimated Fluid Requirement Method: RDA Method    RDA Method (mL): 1622       Nutrition by Nursing  Diet/Nutrition Received: consistent carb/diabetic diet  Intake (%): " 25%  Diet/Feeding Assistance: none  Diet/Feeding Tolerance: fair  Last Bowel Movement: 09/11/24                Nutrition Follow-Up  RD Follow-up?: Yes      Nutrition Discharge Planning: admission to LTAC; RD following for d/c needs            Available via Secure Chat

## 2024-09-12 LAB
GLUCOSE SERPL-MCNC: 110 MG/DL (ref 70–105)
GLUCOSE SERPL-MCNC: 130 MG/DL (ref 70–105)
GLUCOSE SERPL-MCNC: 166 MG/DL (ref 70–105)
GLUCOSE SERPL-MCNC: 188 MG/DL (ref 70–105)

## 2024-09-12 PROCEDURE — 25000003 PHARM REV CODE 250: Performed by: FAMILY MEDICINE

## 2024-09-12 PROCEDURE — 63600175 PHARM REV CODE 636 W HCPCS: Performed by: FAMILY MEDICINE

## 2024-09-12 PROCEDURE — 25000003 PHARM REV CODE 250: Performed by: HOSPITALIST

## 2024-09-12 PROCEDURE — 99233 SBSQ HOSP IP/OBS HIGH 50: CPT | Mod: ,,, | Performed by: HOSPITALIST

## 2024-09-12 PROCEDURE — 82962 GLUCOSE BLOOD TEST: CPT

## 2024-09-12 PROCEDURE — 11000008

## 2024-09-12 PROCEDURE — 63600175 PHARM REV CODE 636 W HCPCS: Performed by: HOSPITALIST

## 2024-09-12 RX ADMIN — ASPIRIN 81 MG: 81 TABLET, COATED ORAL at 09:09

## 2024-09-12 RX ADMIN — CLOPIDOGREL BISULFATE 75 MG: 75 TABLET ORAL at 09:09

## 2024-09-12 RX ADMIN — PIPERACILLIN SODIUM AND TAZOBACTAM SODIUM 4.5 G: 4; .5 INJECTION, POWDER, LYOPHILIZED, FOR SOLUTION INTRAVENOUS at 12:09

## 2024-09-12 RX ADMIN — LOPERAMIDE HYDROCHLORIDE 2 MG: 2 CAPSULE ORAL at 09:09

## 2024-09-12 RX ADMIN — SODIUM CHLORIDE: 9 INJECTION, SOLUTION INTRAVENOUS at 09:09

## 2024-09-12 RX ADMIN — ZINC SULFATE 220 MG (50 MG) CAPSULE 220 MG: CAPSULE at 09:09

## 2024-09-12 RX ADMIN — CHOLECALCIFEROL TAB 125 MCG (5000 UNIT) 5000 UNITS: 125 TAB at 09:09

## 2024-09-12 RX ADMIN — OXYCODONE HYDROCHLORIDE AND ACETAMINOPHEN 500 MG: 500 TABLET ORAL at 09:09

## 2024-09-12 RX ADMIN — LOPERAMIDE HYDROCHLORIDE 2 MG: 2 CAPSULE ORAL at 08:09

## 2024-09-12 RX ADMIN — DEXTROSE MONOHYDRATE: 12500 INJECTION, SOLUTION INTRAVENOUS at 09:09

## 2024-09-12 RX ADMIN — PRAVASTATIN SODIUM 80 MG: 40 TABLET ORAL at 08:09

## 2024-09-12 RX ADMIN — PIPERACILLIN SODIUM AND TAZOBACTAM SODIUM 4.5 G: 4; .5 INJECTION, POWDER, LYOPHILIZED, FOR SOLUTION INTRAVENOUS at 09:09

## 2024-09-12 RX ADMIN — ENOXAPARIN SODIUM 30 MG: 30 INJECTION SUBCUTANEOUS at 06:09

## 2024-09-12 NOTE — PLAN OF CARE
Problem: Adult Inpatient Plan of Care  Goal: Plan of Care Review  9/11/2024 1927 by Ewa Larsen RN  Outcome: Progressing  9/11/2024 1927 by Ewa Larsen RN  Outcome: Progressing  Goal: Patient-Specific Goal (Individualized)  9/11/2024 1927 by Ewa Larsen RN  Outcome: Progressing  9/11/2024 1927 by Ewa Larsen RN  Outcome: Progressing  Goal: Absence of Hospital-Acquired Illness or Injury  9/11/2024 1927 by Ewa Larsen RN  Outcome: Progressing  9/11/2024 1927 by Ewa Larsen RN  Outcome: Progressing  Goal: Optimal Comfort and Wellbeing  9/11/2024 1927 by Ewa Larsen RN  Outcome: Progressing  9/11/2024 1927 by Ewa Larsen RN  Outcome: Progressing  Goal: Readiness for Transition of Care  9/11/2024 1927 by Ewa Larsen RN  Outcome: Progressing  9/11/2024 1927 by Ewa Larsen RN  Outcome: Progressing     Problem: Wound  Goal: Optimal Coping  9/11/2024 1927 by Ewa Larsen RN  Outcome: Progressing  9/11/2024 1927 by Ewa Larsen RN  Outcome: Progressing  Goal: Optimal Functional Ability  9/11/2024 1927 by Ewa Larsen RN  Outcome: Progressing  9/11/2024 1927 by Ewa Larsen RN  Outcome: Progressing  Goal: Absence of Infection Signs and Symptoms  9/11/2024 1927 by Ewa Larsen RN  Outcome: Progressing  9/11/2024 1927 by Ewa Larsen RN  Outcome: Progressing  Goal: Improved Oral Intake  9/11/2024 1927 by Ewa Larsen RN  Outcome: Progressing  9/11/2024 1927 by Ewa Larsen RN  Outcome: Progressing  Goal: Optimal Pain Control and Function  9/11/2024 1927 by Ewa Larsen RN  Outcome: Progressing  9/11/2024 1927 by Ewa Larsen RN  Outcome: Progressing  Goal: Skin Health and Integrity  9/11/2024 1927 by Ewa Larsen RN  Outcome: Progressing  9/11/2024 1927 by Ewa Larsen RN  Outcome: Progressing  Goal: Optimal Wound Healing  9/11/2024 1927 by Ewa Larsen,  RN  Outcome: Progressing  9/11/2024 1927 by Ewa Larsen RN  Outcome: Progressing     Problem: Fall Injury Risk  Goal: Absence of Fall and Fall-Related Injury  9/11/2024 1927 by Ewa Larsen RN  Outcome: Progressing  9/11/2024 1927 by Ewa Larsen RN  Outcome: Progressing     Problem: Acute Kidney Injury/Impairment  Goal: Fluid and Electrolyte Balance  9/11/2024 1927 by Ewa Larsen RN  Outcome: Progressing  9/11/2024 1927 by Ewa Larsen RN  Outcome: Progressing  Goal: Improved Oral Intake  9/11/2024 1927 by Ewa Larsen RN  Outcome: Progressing  9/11/2024 1927 by Ewa Larsen RN  Outcome: Progressing  Goal: Effective Renal Function  9/11/2024 1927 by Ewa Larsen RN  Outcome: Progressing  9/11/2024 1927 by Ewa Larsen RN  Outcome: Progressing     Problem: Skin Injury Risk Increased  Goal: Skin Health and Integrity  9/11/2024 1927 by Ewa Larsen RN  Outcome: Progressing  9/11/2024 1927 by Ewa Larsen RN  Outcome: Progressing

## 2024-09-12 NOTE — PROGRESS NOTES
[facility] Medicine  Progress Note    Patient Name: Negro Hale  MRN: [id number]  Patient Class: IP- Inpatient   Admission Date: 8/29/2024  Length of Stay: 14 days  Attending Physician: Colin Lopez MD  Primary Care Provider: Smiley Trevino FNP        Subjective:     Principal Problem:Necrotizing soft tissue infection        HPI:    Negro Hale is a 84 y.o. male with history of HTN, CAD, CKD, IBS, and a-fib who initially presented to [facility] with perirectal abscess. Surgical evaluation revealed necrotizing soft tissue infection requiring extensive debridement with revision (most recent procedure 8/26). Anaerobic cultures positive and patient placed on appropriate antibiotics. Initial blood culture with micrococcus species thought to be a contaminant, with repeat cultures negative. Disposition complicated by diarrhea requiring frequent wound care and dressing changes due to soiling, as well and deconditioning due to acute illness and hospitalization. [facility], rehabilitation, and aggressive wound care.       Overview/Hospital Course:  09/12 - records reviewed.  Patient initially admitted to [facility] 08/22 with rectal discomfort.  Diagnosed initially with rectal abscess and underwent surgery 08/23 by .  Found to have necrotizing soft tissue infection.  One blood culture then positive for Micrococcus which could be a contaminate.  Has been treated with intravenous antibiotics.  Has improved and mobile independently now with walker.  Discussed with surgery and they are going to review patient's needs before discharge.      Past medical history:  -hypertension greater than 20 years  -diabetes mellitus greater than 20 years.  States takes 1 shot of insulin 45 units daily.  -coronary arteriosclerosis has seen Dr. Bridges with [facility] lillian LCx 2/26/2007  (patient did not remember this and obtained from  records)  -paroxysmal atrial fibrillation with rhythm being in normal sinus  -dyslipidemia  -chronic back pain with past vertebral fracture followed by Dr. Destin Leroy    Past Medical History:   Diagnosis Date    Abnormal thyroid stimulating hormone (TSH) level 08/22/2019    Anemia 08/15/2019    Atrial fibrillation 09/07/2012    Bradycardia 05/23/2017    asymptomatic    Change in bowel habit 11/13/2018    Chronic kidney disease, unspecified 01/14/2019    Coronary arteriosclerosis 09/07/2012    Disease of skin and subcutaneous tissue 08/16/2017    medial aspect RLE- cystic structure seen on venous doppler US.    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 Lewis Eye exam    Varicose veins of both lower extremities with pain 01/02/2019    Venous insufficiency 01/08/2019       Past Surgical History:   Procedure Laterality Date    APPENDECTOMY      CARDIAC CATHETERIZATION  2007     Yuliana- Stent placement    CATARACT EXTRACTION, BILATERAL      COLONOSCOPY  11/06/2019    Dr. Mendoza    HERNIA REPAIR      Inguinal hernia repair    INCISION AND DRAINAGE OF PERIRECTAL REGION Right 8/23/2024    Procedure: INCISION AND DRAINAGE, WITH DEBRIDEMENT PERIRECTAL REGION;  Surgeon: Pop Butcher MD;  Location: [facility];  Service: General;  Laterality: Right;    INCISION AND DRAINAGE OF PERIRECTAL REGION Bilateral 8/26/2024    Procedure: INCISION AND DRAINAGE, PERIRECTAL REGION;  Surgeon: Pop Butcher MD;  Location: [facility] OR;  Service: General;  Laterality: Bilateral;    TONSILLECTOMY         Review of patient's allergies indicates:   Allergen Reactions    Mayonnaise      Upset stomach       No current facility-administered medications on file prior to encounter.     Current Outpatient Medications on File Prior to Encounter   Medication Sig    blood-glucose sensor (FREESTYLE ADORE 3 SENSOR) Giulia 1 each by Misc.(Non-Drug; Combo Route) route once.    cholecalciferol, vitamin D3, 125 mcg (5,000 unit) capsule Take 5,000 Units by mouth once daily.    clopidogreL (PLAVIX) 75 mg tablet Take 1 tablet (75 mg total) by mouth once daily.    coenzyme Q10 200 mg capsule Take 200 mg by mouth once daily.    cyanocobalamin (VITAMIN B-12) 1000 MCG tablet Take 100 mcg by mouth once daily.    dapagliflozin propanediol (FARXIGA) 10 mg tablet Take 1 tablet (10 mg total) by mouth once daily.    glipiZIDE 5 MG TR24 Take 1 tablet (5 mg total) by mouth daily with breakfast.    insulin glargine U-100, Lantus, (LANTUS SOLOSTAR U-100 INSULIN) 100 unit/mL (3 mL) InPn pen Inject 45 Units into the skin every evening.    nitroGLYCERIN (NITROSTAT) 0.4 MG SL tablet Place 1 tablet (0.4 mg total) under the tongue every 5 (five) minutes as needed for Chest pain.    pantoprazole (PROTONIX) 40 MG tablet Take 1 tablet (40 mg total) by mouth once daily.    rosuvastatin (CRESTOR) 40 MG Tab Take 1 tablet (40 mg total) by mouth  "every evening.    SURE COMFORT PEN NEEDLE 32 gauge x " Ndle AS DIRECTED     Family History       Problem Relation (Age of Onset)    Diabetes Brother    Heart disease Father    Neurofibromatosis Brother    No Known Problems Mother, Brother, Maternal Grandmother, Maternal Grandfather, Paternal Grandmother, Paternal Grandfather    Throat cancer Sister          Tobacco Use    Smoking status: Former     Current packs/day: 0.00     Average packs/day: 0.1 packs/day for 3.0 years (0.3 ttl pk-yrs)     Types: Cigarettes     Start date:      Quit date:      Years since quittin.7     Passive exposure: Past    Smokeless tobacco: Never    Tobacco comments:     Quit 10/15/1976   Substance and Sexual Activity    Alcohol use: Not Currently     Comment: occasionally    Drug use: Never    Sexual activity: Not Currently     Review of Systems   Constitutional:  Negative for appetite change, fatigue and fever.   HENT:  Negative for congestion, hearing loss and trouble swallowing.    Respiratory:  Negative for chest tightness, shortness of breath and wheezing.    Cardiovascular:  Negative for chest pain and palpitations.   Gastrointestinal:  Positive for constipation and rectal pain. Negative for abdominal pain and nausea.   Genitourinary:  Negative for difficulty urinating and dysuria.   Musculoskeletal:  Positive for gait problem. Negative for back pain and neck stiffness.   Skin:  Negative for pallor and rash.   Neurological:  Negative for dizziness, speech difficulty and headaches.   Psychiatric/Behavioral:  Positive for confusion. Negative for suicidal ideas.      Objective:     Vital Signs (Most Recent):  Temp: 97.4 °F (36.3 °C) (24 1119)  Pulse: 63 (24 1245)  Resp: 19 (24 1245)  BP: (!) 147/59 (24 1245)  SpO2: 99 % (24 0741) Vital Signs (24h Range):  Temp:  [97.4 °F (36.3 °C)-98.1 °F (36.7 °C)] 97.4 °F (36.3 °C)  Pulse:  [61-74] 63  Resp:  [14-21] 19  SpO2:  [99 %] 99 %  BP: " (147-168)/(59-64) 147/59     Weight: 78.9 kg (173 lb 15.1 oz)  Body mass index is 26.45 kg/m².     Physical Exam  Vitals reviewed.   Constitutional:       General: He is awake. He is not in acute distress.     Appearance: He is well-developed. He is not toxic-appearing.   HENT:      Head: Normocephalic.      Nose: Nose normal.      Mouth/Throat:      Pharynx: Oropharynx is clear.   Eyes:      Extraocular Movements: Extraocular movements intact.      Pupils: Pupils are equal, round, and reactive to light.   Neck:      Thyroid: No thyroid mass.      Vascular: No carotid bruit.   Cardiovascular:      Rate and Rhythm: Normal rate and regular rhythm.      Pulses: Normal pulses.      Heart sounds: Normal heart sounds. No murmur heard.  Pulmonary:      Effort: Pulmonary effort is normal.      Breath sounds: Normal breath sounds and air entry. No wheezing.   Abdominal:      General: Bowel sounds are normal. There is no distension.      Palpations: Abdomen is soft.      Tenderness: There is no abdominal tenderness.   Musculoskeletal:         General: Normal range of motion.      Cervical back: Neck supple. No rigidity.   Skin:     General: Skin is warm.      Coloration: Skin is not jaundiced.      Findings: Lesion present.      Comments: Dressing over surgical site to perirectal area    Sacral / perirectal wound she is clear and being taking care of/ reviewed photos   Neurological:      General: No focal deficit present.      Mental Status: He is alert and oriented to person, place, and time.      Cranial Nerves: No cranial nerve deficit.      Comments: Patient is oriented and was able to give me a fairly good history but less several details out and got mixed up on some things had to be straightened out from records   Psychiatric:         Attention and Perception: Attention normal.         Mood and Affect: Mood normal.         Behavior: Behavior normal. Behavior is cooperative.         Thought Content: Thought content  "normal.         Cognition and Memory: Cognition normal.          Significant Labs: All pertinent labs within the past 24 hours have been reviewed.  BMP:   No results for input(s): "GLU", "NA", "K", "CL", "CO2", "BUN", "CREATININE", "CALCIUM", "MG" in the last 48 hours.    CBC:   Recent Labs   Lab 09/11/24  0339   WBC 5.05   HGB 9.9*   HCT 31.9*        CMP:   No results for input(s): "NA", "K", "CL", "CO2", "GLU", "BUN", "CREATININE", "CALCIUM", "PROT", "ALBUMIN", "BILITOT", "ALKPHOS", "AST", "ALT", "ANIONGAP", "EGFRNONAA" in the last 48 hours.    Invalid input(s): "ESTGFAFRICA"      Significant Imaging: I have reviewed all pertinent imaging results/findings within the past 24 hours.        Intake/Output - Last 3 Shifts         09/10 0700 09/11 0659 09/11 0700 09/12 0659 09/12 0700 09/13 0659    P.O. 1360 600     I.V. (mL/kg) 2490.5 (31.6) 1134.1 (14.4) 2189.7 (27.8)    IV Piggyback 241.3 99.8 198.5    Total Intake(mL/kg) 4091.8 (51.9) 1833.9 (23.2) 2388.2 (30.3)    Urine (mL/kg/hr)  0 (0)     Other  0     Stool  1     Total Output  1     Net +4091.8 +1832.9 +2388.2           Urine Occurrence 7 x 2 x     Stool Occurrence 6 x 1 x           Microbiology Results (last 7 days)       ** No results found for the last 168 hours. **              Assessment/Plan:      * Necrotizing soft tissue infection  Further discussed with surgery upon admission to [facility], recommendations for wound care to manage and will re-consult if further surgical needs arise.     Continue antibiotics and dressing changes.   ---  Perirectal abscess now status-post extensive debridement and revision. Subsequent large sacral and perineal wound now healing appropriately.     Anaerobic culture positive, currently receiving treatment with clindamycin and Zosyn. Will likely require extended course due to location of wound and risk of contamination.     - aggressive wound care including dressing changes daily and with each bowel movement  - " "antibiotics as below    9/4: continue with aggressive wound care and antibiotics for now.     9/5:  Without complaints today although he had couple episodes of diarrhea.  Continue with current care.    9/6:  Continue with IV antibiotics aggressive wound care.    9/7:  Continue with IV antibiotics and aggressive wound care.  Patient is having some diarrhea and Imodium was ordered.  This is a chronic issue for patient.    9/8:  Will discuss with General surgery but may be able to stop IV antibiotics in the coming days.     9/9:  Will continue antibiotics for now but will monitor renal function as well.    9/10: Discuss wound with general surgery.  Anticipate discharge home in next 5-7 days.  Wife stated the wound is challenging for her to manage given its location.     9/11:  Preparing dressings for wound changes and other items in family will be able to take patient home on Saturday on Monday.  Will discuss with Dr. Lopez who will take over care.  Clear to leave from surgery standpoint per Dr. Butcher.     Antibiotics (From admission, onward)      Start     Stop Route Frequency Ordered    09/04/24 2230  piperacillin-tazobactam (ZOSYN) 4.5 g in D5W 100 mL IVPB (MB+)         -- IV Every 12 hours (non-standard times) 09/04/24 1220             09/12  Surgery to review and look at discharge needs    JANET (acute kidney injury)  JANET is likely due to pre-renal azotemia due to dehydration. Baseline creatinine is  GFR >50 . Most recent creatinine and eGFR are listed below.  No results for input(s): "CREATININE", "EGFRNORACEVR" in the last 72 hours.     Plan  - JANET is worsening. Will adjust treatment as follows: IV fluids  - Avoid nephrotoxins and renally dose meds for GFR listed above  - Monitor urine output, serial BMP, and adjust therapy as needed    Renal function panel and added IV fluids.     9/9:  Continue with IV fluids encourage patient to take p.o..  Follow up BNP.    Dermatitis  Hydrocortisone PRN      Irritable bowel " "syndrome with both constipation and diarrhea  Long history of alternating diarrhea and constipation. Currently having diarrhea likely exacerbated by antibiotics which unfortunately necessitates multiple daily dressing changes. Continue probiotics and psyllium.    Paroxysmal atrial fibrillation  Patient with Paroxysmal (<7 days) atrial fibrillation which is controlled currently with  no medications . Patient is currently in atrial fibrillation.[id number] Score: 4. HASBLED Score: 1. Anticoagulation not indicated due to extensive wound, risk of bleeding, possible need for further surgery .    Abnormality of gait and mobility  Continue PT/OT      Type 2 diabetes mellitus with hyperglycemia, with long-term current use of insulin  Patient's FSGs are controlled on current medication regimen.  Last A1c reviewed-   Lab Results   Component Value Date    HGBA1C 6.7 (H) 08/23/2024     Most recent fingerstick glucose reviewed- No results for input(s): "POCTGLUCOSE" in the last 24 hours.  Current correctional scale  Medium  Maintain anti-hyperglycemic dose as follows-   Antihyperglycemics (From admission, onward)      Start     Stop Route Frequency Ordered    08/29/24 1556  insulin aspart U-100 injection 0-10 Units         -- SubQ Before meals & nightly PRN 08/29/24 1558          Hold Oral hypoglycemics while patient is in the hospital.    9/6:  Episode of hypoglycemia to 69.  Will discontinue 20 units of Lantus and just use the sliding scale.  May discharge with 10 units of Lantus as able.      Atherosclerosis of native coronary artery of native heart without angina pectoris  Patient with known CAD s/p stent placement, which is controlled Will continue ASA and Statin and monitor for S/Sx of angina/ACS. Continue to monitor on telemetry.     Resume Plavix 9/3 and monitor closely for signs of bleeding.     Stage 3a chronic kidney disease  Creatine stable for now. BMP reviewed- noted Estimated Creatinine Clearance: 18.9 mL/min (A) " (based on SCr of 2.76 mg/dL (H)). according to latest data. Based on current GFR, CKD stage is stage 3 - GFR 30-59.  Monitor UOP and serial BMP and adjust therapy as needed. Renally dose meds. Avoid nephrotoxic medications and procedures.    Primary hypertension  Chronic, controlled. Latest blood pressure and vitals reviewed-     Temp:  [97.4 °F (36.3 °C)-98.1 °F (36.7 °C)]   Pulse:  [61-74]   Resp:  [14-21]   BP: (147-168)/(59-64)   SpO2:  [99 %] .   Home meds for hypertension were reviewed and noted below.   Hypertension Medications               nitroGLYCERIN (NITROSTAT) 0.4 MG SL tablet Place 1 tablet (0.4 mg total) under the tongue every 5 (five) minutes as needed for Chest pain.            While in the hospital, will manage blood pressure as follows; Continue home antihypertensive regimen    Will utilize p.r.n. blood pressure medication only if patient's blood pressure greater than 180/110 and he develops symptoms such as worsening chest pain or shortness of breath.      VTE Risk Mitigation (From admission, onward)           Ordered     enoxaparin injection 30 mg  Every 24 hours         08/29/24 1558     Place sequential compression device  Until discontinued         08/29/24 1558                    Discharge Planning   RICKY: 9/12/2024     Code Status: Full Code   Is the patient medically ready for discharge?:     Reason for patient still in hospital (select all that apply): Laboratory test, Treatment, Imaging, Consult recommendations, and Pending disposition  Discharge Plan A: Home with family, Home Health                  Colin oLpez MD  Department of Hospital Medicine   [facility]

## 2024-09-12 NOTE — PLAN OF CARE
"[facility]   Interdisciplinary Team Meeting    Patient: Negro Hale   Today's Date: 9/12/2024   Estimated D/C Date: 9/12/2024        Physician:  Dr. Lopez Unit Director:  N/A   Pharmacy: Feli Sanchez PharmD Case Management:  Maria Reyes RN   Physical/Occupational Therapy: July Georges OT Speech Therapy: July Georges OT   Respiratory:  N/A Nursing:  Mai Franklin RN   Wound Care:  Mai Franklin RN   Utilization Management: Maria Reyes RN     Nursing  New Symptoms/Problems: N/A  Bowel: continent Urine: continent Moura: No   Constipated: No    Lab Concerns: N/A  Lab Reviewed: Yes  New Lab Orders: No    Nutrition:  Mechanical soft diet  w/ Tito. Boost and Bannitrol to be added  Intake percentage: 25-50%   Weight: Weight: 78.9 kg (173 lb 15.1 oz)   Height: Height: 5' 8" (172.7 cm)  BMI: BMI (Calculated): 26.5    Speech/Swallowing: No current speech or swallowing issues  Aspiration Precautions: No  Cognition: WNL Diarrhea: Yes      Respiratory Therapy  Isolation: No  Last Bowel Movement: 09/12/24     O2 Device: Room Air  Vent: No  Comment(s): N/A   O2 Flow: 0  SpO2: 95 %       Physical Therapy  Physical Therapy/Gait: Pt has been d/c from PT/OT ELOS: Plan to DC 9/12/2024    Transfers: Pt has been d/c from PT/OT  Bed Mobility: Pt has been d/c from PT/OT Range of Motion/Restrictions: N/A  Comment(s): Pt has been d/c from PT/OT     Occupational Therapy  Eating/Grooming: Pt has been d/c from PT/OT Toileting: Pt has been d/c from PT/OT   Bathing: Pt has been d/c from PT/OT Dressing (Upper Body): Pt has been d/c from PT/OT   Dressing (Lower Body): Pt has been d/c from PT/OT      Wound Care: Angelica-rectal wound - Vashe moisten gauze, dry 4x4s and paper tape. Change daily.      Tx Plan/Recommendations reviewed with family and/or patient on (date) Yes.  Additional family Conference/Training: Yes  D/C Plan/Recommendations: Home with   RICKY: " 09/17/24    Pharmacy  Medication Changes (see MD orders in chart): No  Pharmacy comments: N/A  IV Medications: Zosyn q 12

## 2024-09-12 NOTE — ASSESSMENT & PLAN NOTE
Patient with Paroxysmal (<7 days) atrial fibrillation which is controlled currently with  no medications . Patient is currently in atrial fibrillation.[id number] Score: 4. HASBLED Score: 1. Anticoagulation not indicated due to extensive wound, risk of bleeding, possible need for further surgery .

## 2024-09-12 NOTE — HOSPITAL COURSE
09/12 - records reviewed.  Patient initially admitted to [facility] 08/22 with rectal discomfort.  Diagnosed initially with rectal abscess and underwent surgery 08/23 by .  Found to have necrotizing soft tissue infection.  One blood culture then positive for Micrococcus which could be a contaminate.  Has been treated with intravenous antibiotics.  Has improved and mobile independently now with walker.  Discussed with surgery and they are going to review patient's needs before discharge.      Past medical history:  -hypertension greater than 20 years  -diabetes mellitus greater than 20 years.  States takes 1 shot of insulin 45 units daily.  -coronary arteriosclerosis has seen Dr. Bridges with [facility] mid LCx 2/26/2007  (patient did not remember this and obtained from records)  -paroxysmal atrial fibrillation with rhythm being in normal sinus  -dyslipidemia  -chronic back pain with past vertebral fracture followed by Dr. Destin Leroy    09/13 Looks good sitting up and no complaints. Talked with surgery. Plan home when family ready first of week.  09/14 in room watching TV without complaints.  Family at ask nurse about his longstanding slight dementia.  Can see neurology outpatient to evaluate for medication  09/15 family in room.  No new issues.  Plan for discharge in a.m.    09/16 no new issues.  Plan for discharge home today.  Checked with surgery and recommended wet-to-dry dressings until seen in wound care.  [facility]  With issues of decreased memory will also have follow up with Neurology.  Discharged.    Patient counseled on the importance of keeping all hospital follow up appointments. Stressed compliance with medications, therapies, or devices as prescribed in order to provide for the best health outcomes and decrease the risk of reoccurrence or further progression of issues.    Additionally, patient given written literature regarding their current disease processes and home care  recommendations.   Patient given opportunity to ask questions and verbalized understanding of all information discussed.  Reinforced patient's primary care provider can be a big assets in monitoring and organizing issues after discharge.

## 2024-09-12 NOTE — ASSESSMENT & PLAN NOTE
"JANET is likely due to pre-renal azotemia due to dehydration. Baseline creatinine is  GFR >50 . Most recent creatinine and eGFR are listed below.  No results for input(s): "CREATININE", "EGFRNORACEVR" in the last 72 hours.     Plan  - JANET is worsening. Will adjust treatment as follows: IV fluids  - Avoid nephrotoxins and renally dose meds for GFR listed above  - Monitor urine output, serial BMP, and adjust therapy as needed    Renal function panel and added IV fluids.     9/9:  Continue with IV fluids encourage patient to take p.o..  Follow up BNP.  "

## 2024-09-12 NOTE — ASSESSMENT & PLAN NOTE
Chronic, controlled. Latest blood pressure and vitals reviewed-     Temp:  [97.4 °F (36.3 °C)-98.1 °F (36.7 °C)]   Pulse:  [61-74]   Resp:  [14-21]   BP: (147-168)/(59-64)   SpO2:  [99 %] .   Home meds for hypertension were reviewed and noted below.   Hypertension Medications               nitroGLYCERIN (NITROSTAT) 0.4 MG SL tablet Place 1 tablet (0.4 mg total) under the tongue every 5 (five) minutes as needed for Chest pain.            While in the hospital, will manage blood pressure as follows; Continue home antihypertensive regimen    Will utilize p.r.n. blood pressure medication only if patient's blood pressure greater than 180/110 and he develops symptoms such as worsening chest pain or shortness of breath.

## 2024-09-12 NOTE — ASSESSMENT & PLAN NOTE
Creatine stable for now. BMP reviewed- noted Estimated Creatinine Clearance: 18.9 mL/min (A) (based on SCr of 2.76 mg/dL (H)). according to latest data. Based on current GFR, CKD stage is stage 3 - GFR 30-59.  Monitor UOP and serial BMP and adjust therapy as needed. Renally dose meds. Avoid nephrotoxic medications and procedures.

## 2024-09-12 NOTE — SUBJECTIVE & OBJECTIVE
Past Medical History:   Diagnosis Date    Abnormal thyroid stimulating hormone (TSH) level 08/22/2019    Anemia 08/15/2019    Atrial fibrillation 09/07/2012    Bradycardia 05/23/2017    asymptomatic    Change in bowel habit 11/13/2018    Chronic kidney disease, unspecified 01/14/2019    Coronary arteriosclerosis 09/07/2012    Disease of skin and subcutaneous tissue 08/16/2017    medial aspect RLE- cystic structure seen on venous doppler US.    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 Lewis Eye exam    Varicose veins of both lower extremities with pain 01/02/2019    Venous insufficiency 01/08/2019       Past Surgical History:   Procedure Laterality Date    APPENDECTOMY      CARDIAC CATHETERIZATION  2007    [facility]- Stent placement    CATARACT EXTRACTION, BILATERAL      COLONOSCOPY  11/06/2019    Dr. Mendoza    HERNIA REPAIR      Inguinal hernia repair    INCISION AND  "DRAINAGE OF PERIRECTAL REGION Right 8/23/2024    Procedure: INCISION AND DRAINAGE, WITH DEBRIDEMENT PERIRECTAL REGION;  Surgeon: Pop Butcher MD;  Location: [facility] OR;  Service: General;  Laterality: Right;    INCISION AND DRAINAGE OF PERIRECTAL REGION Bilateral 8/26/2024    Procedure: INCISION AND DRAINAGE, PERIRECTAL REGION;  Surgeon: Pop Butcher MD;  Location: [facility] OR;  Service: General;  Laterality: Bilateral;    TONSILLECTOMY         Review of patient's allergies indicates:   Allergen Reactions    Mayonnaise      Upset stomach       No current facility-administered medications on file prior to encounter.     Current Outpatient Medications on File Prior to Encounter   Medication Sig    blood-glucose sensor (FREESTYLE ADORE 3 SENSOR) Giulia 1 each by Misc.(Non-Drug; Combo Route) route once.    cholecalciferol, vitamin D3, 125 mcg (5,000 unit) capsule Take 5,000 Units by mouth once daily.    clopidogreL (PLAVIX) 75 mg tablet Take 1 tablet (75 mg total) by mouth once daily.    coenzyme Q10 200 mg capsule Take 200 mg by mouth once daily.    cyanocobalamin (VITAMIN B-12) 1000 MCG tablet Take 100 mcg by mouth once daily.    dapagliflozin propanediol (FARXIGA) 10 mg tablet Take 1 tablet (10 mg total) by mouth once daily.    glipiZIDE 5 MG TR24 Take 1 tablet (5 mg total) by mouth daily with breakfast.    insulin glargine U-100, Lantus, (LANTUS SOLOSTAR U-100 INSULIN) 100 unit/mL (3 mL) InPn pen Inject 45 Units into the skin every evening.    nitroGLYCERIN (NITROSTAT) 0.4 MG SL tablet Place 1 tablet (0.4 mg total) under the tongue every 5 (five) minutes as needed for Chest pain.    pantoprazole (PROTONIX) 40 MG tablet Take 1 tablet (40 mg total) by mouth once daily.    rosuvastatin (CRESTOR) 40 MG Tab Take 1 tablet (40 mg total) by mouth every evening.    SURE COMFORT PEN NEEDLE 32 gauge x 5/32" Ndle AS DIRECTED     Family History       Problem Relation (Age of Onset)    Diabetes Brother    Heart disease " Father    Neurofibromatosis Brother    No Known Problems Mother, Brother, Maternal Grandmother, Maternal Grandfather, Paternal Grandmother, Paternal Grandfather    Throat cancer Sister          Tobacco Use    Smoking status: Former     Current packs/day: 0.00     Average packs/day: 0.1 packs/day for 3.0 years (0.3 ttl pk-yrs)     Types: Cigarettes     Start date:      Quit date:      Years since quittin.7     Passive exposure: Past    Smokeless tobacco: Never    Tobacco comments:     Quit 10/15/1976   Substance and Sexual Activity    Alcohol use: Not Currently     Comment: occasionally    Drug use: Never    Sexual activity: Not Currently     Review of Systems   Constitutional:  Negative for appetite change, fatigue and fever.   HENT:  Negative for congestion, hearing loss and trouble swallowing.    Respiratory:  Negative for chest tightness, shortness of breath and wheezing.    Cardiovascular:  Negative for chest pain and palpitations.   Gastrointestinal:  Positive for constipation and rectal pain. Negative for abdominal pain and nausea.   Genitourinary:  Negative for difficulty urinating and dysuria.   Musculoskeletal:  Positive for gait problem. Negative for back pain and neck stiffness.   Skin:  Negative for pallor and rash.   Neurological:  Negative for dizziness, speech difficulty and headaches.   Psychiatric/Behavioral:  Positive for confusion. Negative for suicidal ideas.      Objective:     Vital Signs (Most Recent):  Temp: 97.4 °F (36.3 °C) (24 1119)  Pulse: 63 (24 1245)  Resp: 19 (24 1245)  BP: (!) 147/59 (24 1245)  SpO2: 99 % (24 0741) Vital Signs (24h Range):  Temp:  [97.4 °F (36.3 °C)-98.1 °F (36.7 °C)] 97.4 °F (36.3 °C)  Pulse:  [61-74] 63  Resp:  [14-21] 19  SpO2:  [99 %] 99 %  BP: (147-168)/(59-64) 147/59     Weight: 78.9 kg (173 lb 15.1 oz)  Body mass index is 26.45 kg/m².     Physical Exam  Vitals reviewed.   Constitutional:       General: He is awake. He  is not in acute distress.     Appearance: He is well-developed. He is not toxic-appearing.   HENT:      Head: Normocephalic.      Nose: Nose normal.      Mouth/Throat:      Pharynx: Oropharynx is clear.   Eyes:      Extraocular Movements: Extraocular movements intact.      Pupils: Pupils are equal, round, and reactive to light.   Neck:      Thyroid: No thyroid mass.      Vascular: No carotid bruit.   Cardiovascular:      Rate and Rhythm: Normal rate and regular rhythm.      Pulses: Normal pulses.      Heart sounds: Normal heart sounds. No murmur heard.  Pulmonary:      Effort: Pulmonary effort is normal.      Breath sounds: Normal breath sounds and air entry. No wheezing.   Abdominal:      General: Bowel sounds are normal. There is no distension.      Palpations: Abdomen is soft.      Tenderness: There is no abdominal tenderness.   Musculoskeletal:         General: Normal range of motion.      Cervical back: Neck supple. No rigidity.   Skin:     General: Skin is warm.      Coloration: Skin is not jaundiced.      Findings: Lesion present.      Comments: Dressing over surgical site to perirectal area    Sacral / perirectal wound she is clear and being taking care of/ reviewed photos   Neurological:      General: No focal deficit present.      Mental Status: He is alert and oriented to person, place, and time.      Cranial Nerves: No cranial nerve deficit.      Comments: Patient is oriented and was able to give me a fairly good history but less several details out and got mixed up on some things had to be straightened out from records   Psychiatric:         Attention and Perception: Attention normal.         Mood and Affect: Mood normal.         Behavior: Behavior normal. Behavior is cooperative.         Thought Content: Thought content normal.         Cognition and Memory: Cognition normal.          Significant Labs: All pertinent labs within the past 24 hours have been reviewed.  BMP:   No results for input(s):  ""GLU", "NA", "K", "CL", "CO2", "BUN", "CREATININE", "CALCIUM", "MG" in the last 48 hours.    CBC:   Recent Labs   Lab 09/11/24  0339   WBC 5.05   HGB 9.9*   HCT 31.9*        CMP:   No results for input(s): "NA", "K", "CL", "CO2", "GLU", "BUN", "CREATININE", "CALCIUM", "PROT", "ALBUMIN", "BILITOT", "ALKPHOS", "AST", "ALT", "ANIONGAP", "EGFRNONAA" in the last 48 hours.    Invalid input(s): "ESTGFAFRICA"      Significant Imaging: I have reviewed all pertinent imaging results/findings within the past 24 hours.        Intake/Output - Last 3 Shifts         09/10 0700  09/11 0659 09/11 0700 09/12 0659 09/12 0700  09/13 0659    P.O. 1360 600     I.V. (mL/kg) 2490.5 (31.6) 1134.1 (14.4) 2189.7 (27.8)    IV Piggyback 241.3 99.8 198.5    Total Intake(mL/kg) 4091.8 (51.9) 1833.9 (23.2) 2388.2 (30.3)    Urine (mL/kg/hr)  0 (0)     Other  0     Stool  1     Total Output  1     Net +4091.8 +1832.9 +2388.2           Urine Occurrence 7 x 2 x     Stool Occurrence 6 x 1 x           Microbiology Results (last 7 days)       ** No results found for the last 168 hours. **            "

## 2024-09-12 NOTE — ASSESSMENT & PLAN NOTE
Further discussed with surgery upon admission to [facility], recommendations for wound care to manage and will re-consult if further surgical needs arise.     Continue antibiotics and dressing changes.   ---  Perirectal abscess now status-post extensive debridement and revision. Subsequent large sacral and perineal wound now healing appropriately.     Anaerobic culture positive, currently receiving treatment with clindamycin and Zosyn. Will likely require extended course due to location of wound and risk of contamination.     - aggressive wound care including dressing changes daily and with each bowel movement  - antibiotics as below    9/4: continue with aggressive wound care and antibiotics for now.     9/5:  Without complaints today although he had couple episodes of diarrhea.  Continue with current care.    9/6:  Continue with IV antibiotics aggressive wound care.    9/7:  Continue with IV antibiotics and aggressive wound care.  Patient is having some diarrhea and Imodium was ordered.  This is a chronic issue for patient.    9/8:  Will discuss with General surgery but may be able to stop IV antibiotics in the coming days.     9/9:  Will continue antibiotics for now but will monitor renal function as well.    9/10: Discuss wound with general surgery.  Anticipate discharge home in next 5-7 days.  Wife stated the wound is challenging for her to manage given its location.     9/11:  Preparing dressings for wound changes and other items in family will be able to take patient home on Saturday on Monday.  Will discuss with Dr. Lopez who will take over care.  Clear to leave from surgery standpoint per Dr. Butcher.     Antibiotics (From admission, onward)      Start     Stop Route Frequency Ordered    09/04/24 2230  piperacillin-tazobactam (ZOSYN) 4.5 g in D5W 100 mL IVPB (MB+)         -- IV Every 12 hours (non-standard times) 09/04/24 1220             09/12  Surgery to review and look at discharge needs

## 2024-09-13 LAB
ANION GAP SERPL CALCULATED.3IONS-SCNC: 7 MMOL/L (ref 7–16)
BASOPHILS # BLD AUTO: 0.1 K/UL (ref 0–0.2)
BASOPHILS NFR BLD AUTO: 1.9 % (ref 0–1)
BUN SERPL-MCNC: 30 MG/DL (ref 7–18)
BUN/CREAT SERPL: 15 (ref 6–20)
CALCIUM SERPL-MCNC: 8.2 MG/DL (ref 8.5–10.1)
CHLORIDE SERPL-SCNC: 114 MMOL/L (ref 98–107)
CO2 SERPL-SCNC: 26 MMOL/L (ref 21–32)
CREAT SERPL-MCNC: 1.98 MG/DL (ref 0.7–1.3)
DIFFERENTIAL METHOD BLD: ABNORMAL
EGFR (NO RACE VARIABLE) (RUSH/TITUS): 32 ML/MIN/1.73M2
EOSINOPHIL # BLD AUTO: 0.4 K/UL (ref 0–0.5)
EOSINOPHIL NFR BLD AUTO: 7.5 % (ref 1–4)
ERYTHROCYTE [DISTWIDTH] IN BLOOD BY AUTOMATED COUNT: 14.1 % (ref 11.5–14.5)
GLUCOSE SERPL-MCNC: 146 MG/DL (ref 70–105)
GLUCOSE SERPL-MCNC: 157 MG/DL (ref 74–106)
GLUCOSE SERPL-MCNC: 183 MG/DL (ref 70–105)
GLUCOSE SERPL-MCNC: 188 MG/DL (ref 70–105)
GLUCOSE SERPL-MCNC: 201 MG/DL (ref 70–105)
HCT VFR BLD AUTO: 31.8 % (ref 40–54)
HGB BLD-MCNC: 10.2 G/DL (ref 13.5–18)
IMM GRANULOCYTES # BLD AUTO: 0.02 K/UL (ref 0–0.04)
IMM GRANULOCYTES NFR BLD: 0.4 % (ref 0–0.4)
LYMPHOCYTES # BLD AUTO: 1.12 K/UL (ref 1–4.8)
LYMPHOCYTES NFR BLD AUTO: 21 % (ref 27–41)
MCH RBC QN AUTO: 29.8 PG (ref 27–31)
MCHC RBC AUTO-ENTMCNC: 32.1 G/DL (ref 32–36)
MCV RBC AUTO: 93 FL (ref 80–96)
MONOCYTES # BLD AUTO: 0.69 K/UL (ref 0–0.8)
MONOCYTES NFR BLD AUTO: 12.9 % (ref 2–6)
MPC BLD CALC-MCNC: 11.6 FL (ref 9.4–12.4)
NEUTROPHILS # BLD AUTO: 3 K/UL (ref 1.8–7.7)
NEUTROPHILS NFR BLD AUTO: 56.3 % (ref 53–65)
NRBC # BLD AUTO: 0 X10E3/UL
NRBC, AUTO (.00): 0 %
PLATELET # BLD AUTO: 218 K/UL (ref 150–400)
POTASSIUM SERPL-SCNC: 4.3 MMOL/L (ref 3.5–5.1)
RBC # BLD AUTO: 3.42 M/UL (ref 4.6–6.2)
SODIUM SERPL-SCNC: 143 MMOL/L (ref 136–145)
WBC # BLD AUTO: 5.33 K/UL (ref 4.5–11)

## 2024-09-13 PROCEDURE — 25000003 PHARM REV CODE 250: Performed by: FAMILY MEDICINE

## 2024-09-13 PROCEDURE — 85025 COMPLETE CBC W/AUTO DIFF WBC: CPT | Performed by: HOSPITALIST

## 2024-09-13 PROCEDURE — 99232 SBSQ HOSP IP/OBS MODERATE 35: CPT | Mod: ,,, | Performed by: HOSPITALIST

## 2024-09-13 PROCEDURE — 63600175 PHARM REV CODE 636 W HCPCS: Performed by: FAMILY MEDICINE

## 2024-09-13 PROCEDURE — 25000003 PHARM REV CODE 250: Performed by: HOSPITALIST

## 2024-09-13 PROCEDURE — 80048 BASIC METABOLIC PNL TOTAL CA: CPT | Performed by: HOSPITALIST

## 2024-09-13 PROCEDURE — 36415 COLL VENOUS BLD VENIPUNCTURE: CPT | Performed by: HOSPITALIST

## 2024-09-13 PROCEDURE — 63600175 PHARM REV CODE 636 W HCPCS: Performed by: HOSPITALIST

## 2024-09-13 PROCEDURE — 82962 GLUCOSE BLOOD TEST: CPT

## 2024-09-13 PROCEDURE — 11000008

## 2024-09-13 RX ADMIN — CHOLECALCIFEROL TAB 125 MCG (5000 UNIT) 5000 UNITS: 125 TAB at 08:09

## 2024-09-13 RX ADMIN — ZINC SULFATE 220 MG (50 MG) CAPSULE 220 MG: CAPSULE at 08:09

## 2024-09-13 RX ADMIN — ASPIRIN 81 MG: 81 TABLET, COATED ORAL at 08:09

## 2024-09-13 RX ADMIN — OXYCODONE HYDROCHLORIDE AND ACETAMINOPHEN 500 MG: 500 TABLET ORAL at 08:09

## 2024-09-13 RX ADMIN — INSULIN ASPART 1 UNITS: 100 INJECTION, SOLUTION INTRAVENOUS; SUBCUTANEOUS at 09:09

## 2024-09-13 RX ADMIN — ENOXAPARIN SODIUM 30 MG: 30 INJECTION SUBCUTANEOUS at 04:09

## 2024-09-13 RX ADMIN — PIPERACILLIN AND TAZOBACTAM 4.5 G: 4; .5 INJECTION, POWDER, LYOPHILIZED, FOR SOLUTION INTRAVENOUS; PARENTERAL at 09:09

## 2024-09-13 RX ADMIN — INSULIN ASPART 2 UNITS: 100 INJECTION, SOLUTION INTRAVENOUS; SUBCUTANEOUS at 05:09

## 2024-09-13 RX ADMIN — DEXTROSE MONOHYDRATE: 12500 INJECTION, SOLUTION INTRAVENOUS at 09:09

## 2024-09-13 RX ADMIN — CLOPIDOGREL BISULFATE 75 MG: 75 TABLET ORAL at 08:09

## 2024-09-13 RX ADMIN — PRAVASTATIN SODIUM 80 MG: 40 TABLET ORAL at 09:09

## 2024-09-13 NOTE — PLAN OF CARE
Problem: Adult Inpatient Plan of Care  Goal: Plan of Care Review  Outcome: Progressing  Goal: Patient-Specific Goal (Individualized)  Outcome: Progressing  Goal: Absence of Hospital-Acquired Illness or Injury  Outcome: Progressing  Goal: Optimal Comfort and Wellbeing  Outcome: Progressing  Goal: Readiness for Transition of Care  Outcome: Progressing     Problem: Diabetes Comorbidity  Goal: Blood Glucose Level Within Targeted Range  Outcome: Progressing     Problem: Fall Injury Risk  Goal: Absence of Fall and Fall-Related Injury  Outcome: Progressing     Problem: Wound  Goal: Optimal Coping  Outcome: Progressing  Goal: Optimal Functional Ability  Outcome: Progressing  Goal: Absence of Infection Signs and Symptoms  Outcome: Progressing  Goal: Improved Oral Intake  Outcome: Progressing  Goal: Optimal Pain Control and Function  Outcome: Progressing  Goal: Skin Health and Integrity  Outcome: Progressing  Goal: Optimal Wound Healing  Outcome: Progressing     Problem: Skin Injury Risk Increased  Goal: Skin Health and Integrity  Outcome: Progressing

## 2024-09-13 NOTE — PLAN OF CARE
Problem: Adult Inpatient Plan of Care  Goal: Plan of Care Review  Outcome: Progressing  Goal: Patient-Specific Goal (Individualized)  Outcome: Progressing  Goal: Absence of Hospital-Acquired Illness or Injury  Outcome: Progressing  Goal: Optimal Comfort and Wellbeing  Outcome: Progressing  Goal: Readiness for Transition of Care  Outcome: Progressing     Problem: Diabetes Comorbidity  Goal: Blood Glucose Level Within Targeted Range  Outcome: Progressing     Problem: Fall Injury Risk  Goal: Absence of Fall and Fall-Related Injury  Outcome: Progressing     Problem: Wound  Goal: Optimal Coping  Outcome: Progressing  Goal: Optimal Functional Ability  Outcome: Progressing  Goal: Absence of Infection Signs and Symptoms  Outcome: Progressing  Goal: Improved Oral Intake  Outcome: Progressing  Goal: Optimal Pain Control and Function  Outcome: Progressing  Goal: Skin Health and Integrity  Outcome: Progressing  Goal: Optimal Wound Healing  Outcome: Progressing     Problem: Breathing Pattern Ineffective  Goal: Effective Breathing Pattern  Outcome: Progressing     Problem: Acute Kidney Injury/Impairment  Goal: Fluid and Electrolyte Balance  Outcome: Progressing  Goal: Improved Oral Intake  Outcome: Progressing  Goal: Effective Renal Function  Outcome: Progressing     Problem: Skin Injury Risk Increased  Goal: Skin Health and Integrity  Outcome: Progressing

## 2024-09-14 LAB
GLUCOSE SERPL-MCNC: 115 MG/DL (ref 70–105)
GLUCOSE SERPL-MCNC: 182 MG/DL (ref 70–105)
GLUCOSE SERPL-MCNC: 183 MG/DL (ref 70–105)
GLUCOSE SERPL-MCNC: 211 MG/DL (ref 70–105)

## 2024-09-14 PROCEDURE — 82962 GLUCOSE BLOOD TEST: CPT

## 2024-09-14 PROCEDURE — 25000003 PHARM REV CODE 250: Performed by: FAMILY MEDICINE

## 2024-09-14 PROCEDURE — 63600175 PHARM REV CODE 636 W HCPCS: Performed by: FAMILY MEDICINE

## 2024-09-14 PROCEDURE — 11000008

## 2024-09-14 PROCEDURE — 99232 SBSQ HOSP IP/OBS MODERATE 35: CPT | Mod: GC,,, | Performed by: HOSPITALIST

## 2024-09-14 RX ADMIN — CHOLECALCIFEROL TAB 125 MCG (5000 UNIT) 5000 UNITS: 125 TAB at 09:09

## 2024-09-14 RX ADMIN — ZINC SULFATE 220 MG (50 MG) CAPSULE 220 MG: CAPSULE at 09:09

## 2024-09-14 RX ADMIN — PRAVASTATIN SODIUM 80 MG: 40 TABLET ORAL at 09:09

## 2024-09-14 RX ADMIN — OXYCODONE HYDROCHLORIDE AND ACETAMINOPHEN 500 MG: 500 TABLET ORAL at 09:09

## 2024-09-14 RX ADMIN — ENOXAPARIN SODIUM 30 MG: 30 INJECTION SUBCUTANEOUS at 04:09

## 2024-09-14 RX ADMIN — INSULIN ASPART 2 UNITS: 100 INJECTION, SOLUTION INTRAVENOUS; SUBCUTANEOUS at 04:09

## 2024-09-14 RX ADMIN — INSULIN ASPART 2 UNITS: 100 INJECTION, SOLUTION INTRAVENOUS; SUBCUTANEOUS at 09:09

## 2024-09-14 RX ADMIN — CLOPIDOGREL BISULFATE 75 MG: 75 TABLET ORAL at 09:09

## 2024-09-14 RX ADMIN — INSULIN ASPART 2 UNITS: 100 INJECTION, SOLUTION INTRAVENOUS; SUBCUTANEOUS at 12:09

## 2024-09-14 RX ADMIN — ASPIRIN 81 MG: 81 TABLET, COATED ORAL at 09:09

## 2024-09-14 NOTE — SUBJECTIVE & OBJECTIVE
Past Medical History:   Diagnosis Date    Abnormal thyroid stimulating hormone (TSH) level 08/22/2019    Anemia 08/15/2019    Atrial fibrillation 09/07/2012    Bradycardia 05/23/2017    asymptomatic    Change in bowel habit 11/13/2018    Chronic kidney disease, unspecified 01/14/2019    Coronary arteriosclerosis 09/07/2012    Disease of skin and subcutaneous tissue 08/16/2017    medial aspect RLE- cystic structure seen on venous doppler US.    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 Lewis Eye exam    Varicose veins of both lower extremities with pain 01/02/2019    Venous insufficiency 01/08/2019       Past Surgical History:   Procedure Laterality Date    APPENDECTOMY      CARDIAC CATHETERIZATION  2007    [facility]- Stent placement    CATARACT EXTRACTION, BILATERAL      COLONOSCOPY  11/06/2019    Dr. Mendoza    HERNIA REPAIR      Inguinal hernia repair    INCISION AND  "DRAINAGE OF PERIRECTAL REGION Right 8/23/2024    Procedure: INCISION AND DRAINAGE, WITH DEBRIDEMENT PERIRECTAL REGION;  Surgeon: Pop Butcher MD;  Location: [facility] OR;  Service: General;  Laterality: Right;    INCISION AND DRAINAGE OF PERIRECTAL REGION Bilateral 8/26/2024    Procedure: INCISION AND DRAINAGE, PERIRECTAL REGION;  Surgeon: Pop Butcher MD;  Location: [facility] OR;  Service: General;  Laterality: Bilateral;    TONSILLECTOMY         Review of patient's allergies indicates:   Allergen Reactions    Mayonnaise      Upset stomach       No current facility-administered medications on file prior to encounter.     Current Outpatient Medications on File Prior to Encounter   Medication Sig    blood-glucose sensor (FREESTYLE ADORE 3 SENSOR) Giulia 1 each by Misc.(Non-Drug; Combo Route) route once.    cholecalciferol, vitamin D3, 125 mcg (5,000 unit) capsule Take 5,000 Units by mouth once daily.    clopidogreL (PLAVIX) 75 mg tablet Take 1 tablet (75 mg total) by mouth once daily.    coenzyme Q10 200 mg capsule Take 200 mg by mouth once daily.    cyanocobalamin (VITAMIN B-12) 1000 MCG tablet Take 100 mcg by mouth once daily.    dapagliflozin propanediol (FARXIGA) 10 mg tablet Take 1 tablet (10 mg total) by mouth once daily.    glipiZIDE 5 MG TR24 Take 1 tablet (5 mg total) by mouth daily with breakfast.    insulin glargine U-100, Lantus, (LANTUS SOLOSTAR U-100 INSULIN) 100 unit/mL (3 mL) InPn pen Inject 45 Units into the skin every evening.    nitroGLYCERIN (NITROSTAT) 0.4 MG SL tablet Place 1 tablet (0.4 mg total) under the tongue every 5 (five) minutes as needed for Chest pain.    pantoprazole (PROTONIX) 40 MG tablet Take 1 tablet (40 mg total) by mouth once daily.    rosuvastatin (CRESTOR) 40 MG Tab Take 1 tablet (40 mg total) by mouth every evening.    SURE COMFORT PEN NEEDLE 32 gauge x 5/32" Ndle AS DIRECTED     Family History       Problem Relation (Age of Onset)    Diabetes Brother    Heart disease " Father    Neurofibromatosis Brother    No Known Problems Mother, Brother, Maternal Grandmother, Maternal Grandfather, Paternal Grandmother, Paternal Grandfather    Throat cancer Sister          Tobacco Use    Smoking status: Former     Current packs/day: 0.00     Average packs/day: 0.1 packs/day for 3.0 years (0.3 ttl pk-yrs)     Types: Cigarettes     Start date:      Quit date:      Years since quittin.7     Passive exposure: Past    Smokeless tobacco: Never    Tobacco comments:     Quit 10/15/1976   Substance and Sexual Activity    Alcohol use: Not Currently     Comment: occasionally    Drug use: Never    Sexual activity: Not Currently     Review of Systems   Constitutional:  Negative for appetite change, fatigue and fever.   HENT:  Negative for congestion, hearing loss and trouble swallowing.    Respiratory:  Negative for chest tightness, shortness of breath and wheezing.    Cardiovascular:  Negative for chest pain and palpitations.   Gastrointestinal:  Positive for constipation and rectal pain. Negative for abdominal pain and nausea.   Genitourinary:  Negative for difficulty urinating and dysuria.   Musculoskeletal:  Positive for gait problem. Negative for back pain and neck stiffness.   Skin:  Negative for pallor and rash.   Neurological:  Negative for dizziness, speech difficulty and headaches.   Psychiatric/Behavioral:  Positive for confusion. Negative for suicidal ideas.      Objective:     Vital Signs (Most Recent):  Temp: 97.3 °F (36.3 °C) (24 1226)  Pulse: (!) 54 (24 1115)  Resp: 19 (24 1115)  BP: (!) 135/55 (24 0945)  SpO2: 99 % (24 0701) Vital Signs (24h Range):  Temp:  [97.3 °F (36.3 °C)-98.5 °F (36.9 °C)] 97.3 °F (36.3 °C)  Pulse:  [50-76] 54  Resp:  [14-28] 19  SpO2:  [99 %] 99 %  BP: (135-163)/(52-58) 135/55     Weight: 78.9 kg (173 lb 15.1 oz)  Body mass index is 26.45 kg/m².     Physical Exam  Vitals reviewed.   Constitutional:       General: He is awake.  He is not in acute distress.     Appearance: He is well-developed. He is not toxic-appearing.   HENT:      Head: Normocephalic.      Nose: Nose normal.      Mouth/Throat:      Pharynx: Oropharynx is clear.   Eyes:      Extraocular Movements: Extraocular movements intact.      Pupils: Pupils are equal, round, and reactive to light.   Neck:      Thyroid: No thyroid mass.      Vascular: No carotid bruit.   Cardiovascular:      Rate and Rhythm: Normal rate and regular rhythm.      Pulses: Normal pulses.      Heart sounds: Normal heart sounds. No murmur heard.  Pulmonary:      Effort: Pulmonary effort is normal.      Breath sounds: Normal breath sounds and air entry. No wheezing.   Abdominal:      General: Bowel sounds are normal. There is no distension.      Palpations: Abdomen is soft.      Tenderness: There is no abdominal tenderness.   Musculoskeletal:         General: Normal range of motion.      Cervical back: Neck supple. No rigidity.   Skin:     General: Skin is warm.      Coloration: Skin is not jaundiced.      Findings: Lesion present.      Comments: Dressing over surgical site to perirectal area    Sacral / perirectal wound she is clear and being taking care of/ reviewed photos   Neurological:      General: No focal deficit present.      Mental Status: He is alert and oriented to person, place, and time.      Cranial Nerves: No cranial nerve deficit.      Comments: Patient is oriented and was able to give me a fairly good history but less several details out and got mixed up on some things had to be straightened out from records   Psychiatric:         Attention and Perception: Attention normal.         Mood and Affect: Mood normal.         Behavior: Behavior normal. Behavior is cooperative.         Thought Content: Thought content normal.         Cognition and Memory: Cognition normal.          Significant Labs: All pertinent labs within the past 24 hours have been reviewed.  BMP:   Recent Labs   Lab  09/13/24  0420   *      K 4.3   *   CO2 26   BUN 30*   CREATININE 1.98*   CALCIUM 8.2*       CBC:   Recent Labs   Lab 09/13/24  0420   WBC 5.33   HGB 10.2*   HCT 31.8*        CMP:   Recent Labs   Lab 09/13/24  0420      K 4.3   *   CO2 26   *   BUN 30*   CREATININE 1.98*   CALCIUM 8.2*   ANIONGAP 7         Significant Imaging: I have reviewed all pertinent imaging results/findings within the past 24 hours.        Intake/Output - Last 3 Shifts         09/12 0700  09/13 0659 09/13 0700 09/14 0659 09/14 0700  09/15 0659    P.O. 120 1900     I.V. (mL/kg) 2189.7 (27.8)      IV Piggyback 198.5      Total Intake(mL/kg) 2508.2 (31.8) 1900 (24.1)     Urine (mL/kg/hr)       Other       Stool       Total Output       Net +2508.2 +1900            Urine Occurrence  7 x     Stool Occurrence 4 x 6 x 1 x          Microbiology Results (last 7 days)       ** No results found for the last 168 hours. **

## 2024-09-14 NOTE — PROGRESS NOTES
[facility] Medicine  Progress Note    Patient Name: Negro Hale  MRN: [id number]  Patient Class: IP- Inpatient   Admission Date: 8/29/2024  Length of Stay: 15 days  Attending Physician: Colin Lopez MD  Primary Care Provider: Smiley Trevino FNP        Subjective:     Principal Problem:Necrotizing soft tissue infection        HPI:    Negro Hale is a 84 y.o. male with history of HTN, CAD, CKD, IBS, and a-fib who initially presented to [facility] with perirectal abscess. Surgical evaluation revealed necrotizing soft tissue infection requiring extensive debridement with revision (most recent procedure 8/26). Anaerobic cultures positive and patient placed on appropriate antibiotics. Initial blood culture with micrococcus species thought to be a contaminant, with repeat cultures negative. Disposition complicated by diarrhea requiring frequent wound care and dressing changes due to soiling, as well and deconditioning due to acute illness and hospitalization. [facility], rehabilitation, and aggressive wound care.       Overview/Hospital Course:  09/12 - records reviewed.  Patient initially admitted to [facility] 08/22 with rectal discomfort.  Diagnosed initially with rectal abscess and underwent surgery 08/23 by .  Found to have necrotizing soft tissue infection.  One blood culture then positive for Micrococcus which could be a contaminate.  Has been treated with intravenous antibiotics.  Has improved and mobile independently now with walker.  Discussed with surgery and they are going to review patient's needs before discharge.      Past medical history:  -hypertension greater than 20 years  -diabetes mellitus greater than 20 years.  States takes 1 shot of insulin 45 units daily.  -coronary arteriosclerosis has seen Dr. Bridges with [facility] l[facility] LCx 2/26/2007  (patient did not remember this and obtained from  records)  -paroxysmal atrial fibrillation with rhythm being in normal sinus  -dyslipidemia  -chronic back pain with past vertebral fracture followed by Dr. Destin Leroy    09/13 Looks good sitting up and no complaints. Talked with surgery. Plan home when family ready first of week.    Past Medical History:   Diagnosis Date    Abnormal thyroid stimulating hormone (TSH) level 08/22/2019    Anemia 08/15/2019    Atrial fibrillation 09/07/2012    Bradycardia 05/23/2017    asymptomatic    Change in bowel habit 11/13/2018    Chronic kidney disease, unspecified 01/14/2019    Coronary arteriosclerosis 09/07/2012    Disease of skin and subcutaneous tissue 08/16/2017    medial aspect RLE- cystic structure seen on venous doppler US.    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 Lewis Eye exam    Varicose veins of both lower extremities with pain 01/02/2019    Venous insufficiency  01/08/2019       Past Surgical History:   Procedure Laterality Date    APPENDECTOMY      CARDIAC CATHETERIZATION  2007    Bridges- Stent placement    CATARACT EXTRACTION, BILATERAL      COLONOSCOPY  11/06/2019    Dr. Mendoza    HERNIA REPAIR      Inguinal hernia repair    INCISION AND DRAINAGE OF PERIRECTAL REGION Right 8/23/2024    Procedure: INCISION AND DRAINAGE, WITH DEBRIDEMENT PERIRECTAL REGION;  Surgeon: Pop Butcher MD;  Location: [facility] OR;  Service: General;  Laterality: Right;    INCISION AND DRAINAGE OF PERIRECTAL REGION Bilateral 8/26/2024    Procedure: INCISION AND DRAINAGE, PERIRECTAL REGION;  Surgeon: Pop Butcher MD;  Location: [facility] OR;  Service: General;  Laterality: Bilateral;    TONSILLECTOMY         Review of patient's allergies indicates:   Allergen Reactions    Mayonnaise      Upset stomach       No current facility-administered medications on file prior to encounter.     Current Outpatient Medications on File Prior to Encounter   Medication Sig    blood-glucose sensor (FREESTYLE ADORE 3 SENSOR) Giulia 1 each by Misc.(Non-Drug; Combo Route) route once.    cholecalciferol, vitamin D3, 125 mcg (5,000 unit) capsule Take 5,000 Units by mouth once daily.    clopidogreL (PLAVIX) 75 mg tablet Take 1 tablet (75 mg total) by mouth once daily.    coenzyme Q10 200 mg capsule Take 200 mg by mouth once daily.    cyanocobalamin (VITAMIN B-12) 1000 MCG tablet Take 100 mcg by mouth once daily.    dapagliflozin propanediol (FARXIGA) 10 mg tablet Take 1 tablet (10 mg total) by mouth once daily.    glipiZIDE 5 MG TR24 Take 1 tablet (5 mg total) by mouth daily with breakfast.    insulin glargine U-100, Lantus, (LANTUS SOLOSTAR U-100 INSULIN) 100 unit/mL (3 mL) InPn pen Inject 45 Units into the skin every evening.    nitroGLYCERIN (NITROSTAT) 0.4 MG SL tablet Place 1 tablet (0.4 mg total) under the tongue every 5 (five) minutes as needed for Chest pain.    pantoprazole (PROTONIX) 40 MG tablet Take  "1 tablet (40 mg total) by mouth once daily.    rosuvastatin (CRESTOR) 40 MG Tab Take 1 tablet (40 mg total) by mouth every evening.    SURE COMFORT PEN NEEDLE 32 gauge x " Ndle AS DIRECTED     Family History       Problem Relation (Age of Onset)    Diabetes Brother    Heart disease Father    Neurofibromatosis Brother    No Known Problems Mother, Brother, Maternal Grandmother, Maternal Grandfather, Paternal Grandmother, Paternal Grandfather    Throat cancer Sister          Tobacco Use    Smoking status: Former     Current packs/day: 0.00     Average packs/day: 0.1 packs/day for 3.0 years (0.3 ttl pk-yrs)     Types: Cigarettes     Start date:      Quit date:      Years since quittin.7     Passive exposure: Past    Smokeless tobacco: Never    Tobacco comments:     Quit 10/15/1976   Substance and Sexual Activity    Alcohol use: Not Currently     Comment: occasionally    Drug use: Never    Sexual activity: Not Currently     Review of Systems   Constitutional:  Negative for appetite change, fatigue and fever.   HENT:  Negative for congestion, hearing loss and trouble swallowing.    Respiratory:  Negative for chest tightness, shortness of breath and wheezing.    Cardiovascular:  Negative for chest pain and palpitations.   Gastrointestinal:  Positive for constipation and rectal pain. Negative for abdominal pain and nausea.   Genitourinary:  Negative for difficulty urinating and dysuria.   Musculoskeletal:  Positive for gait problem. Negative for back pain and neck stiffness.   Skin:  Negative for pallor and rash.   Neurological:  Negative for dizziness, speech difficulty and headaches.   Psychiatric/Behavioral:  Positive for confusion. Negative for suicidal ideas.      Objective:     Vital Signs (Most Recent):  Temp: 97.2 °F (36.2 °C) (24 1000)  Pulse: (!) 56 (24 1000)  Resp: 17 (24 1000)  BP: (!) 157/61 (24 0701)  SpO2: 99 % (24 1000) Vital Signs (24h Range):  Temp:  [97 °F " (36.1 °C)-97.2 °F (36.2 °C)] 97.2 °F (36.2 °C)  Pulse:  [56-77] 56  Resp:  [10-28] 17  SpO2:  [95 %-100 %] 99 %  BP: (157)/(61) 157/61     Weight: 78.9 kg (173 lb 15.1 oz)  Body mass index is 26.45 kg/m².     Physical Exam  Vitals reviewed.   Constitutional:       General: He is awake. He is not in acute distress.     Appearance: He is well-developed. He is not toxic-appearing.   HENT:      Head: Normocephalic.      Nose: Nose normal.      Mouth/Throat:      Pharynx: Oropharynx is clear.   Eyes:      Extraocular Movements: Extraocular movements intact.      Pupils: Pupils are equal, round, and reactive to light.   Neck:      Thyroid: No thyroid mass.      Vascular: No carotid bruit.   Cardiovascular:      Rate and Rhythm: Normal rate and regular rhythm.      Pulses: Normal pulses.      Heart sounds: Normal heart sounds. No murmur heard.  Pulmonary:      Effort: Pulmonary effort is normal.      Breath sounds: Normal breath sounds and air entry. No wheezing.   Abdominal:      General: Bowel sounds are normal. There is no distension.      Palpations: Abdomen is soft.      Tenderness: There is no abdominal tenderness.   Musculoskeletal:         General: Normal range of motion.      Cervical back: Neck supple. No rigidity.   Skin:     General: Skin is warm.      Coloration: Skin is not jaundiced.      Findings: Lesion present.      Comments: Dressing over surgical site to perirectal area    Sacral / perirectal wound she is clear and being taking care of/ reviewed photos   Neurological:      General: No focal deficit present.      Mental Status: He is alert and oriented to person, place, and time.      Cranial Nerves: No cranial nerve deficit.      Comments: Patient is oriented and was able to give me a fairly good history but less several details out and got mixed up on some things had to be straightened out from records   Psychiatric:         Attention and Perception: Attention normal.         Mood and Affect: Mood  normal.         Behavior: Behavior normal. Behavior is cooperative.         Thought Content: Thought content normal.         Cognition and Memory: Cognition normal.          Significant Labs: All pertinent labs within the past 24 hours have been reviewed.  BMP:   Recent Labs   Lab 09/13/24  0420   *      K 4.3   *   CO2 26   BUN 30*   CREATININE 1.98*   CALCIUM 8.2*       CBC:   Recent Labs   Lab 09/13/24  0420   WBC 5.33   HGB 10.2*   HCT 31.8*        CMP:   Recent Labs   Lab 09/13/24  0420      K 4.3   *   CO2 26   *   BUN 30*   CREATININE 1.98*   CALCIUM 8.2*   ANIONGAP 7         Significant Imaging: I have reviewed all pertinent imaging results/findings within the past 24 hours.        Intake/Output - Last 3 Shifts         09/12 0700  09/13 0659 09/13 0700 09/14 0659    P.O. 120 840    I.V. (mL/kg) 2189.7 (27.8)     IV Piggyback 198.5     Total Intake(mL/kg) 2508.2 (31.8) 840 (10.6)    Urine (mL/kg/hr)      Other      Stool      Total Output      Net +2508.2 +840          Stool Occurrence 4 x 2 x          Microbiology Results (last 7 days)       ** No results found for the last 168 hours. **              Assessment/Plan:      * Necrotizing soft tissue infection  Further discussed with surgery upon admission to [facility], recommendations for wound care to manage and will re-consult if further surgical needs arise.     Continue antibiotics and dressing changes.   ---  Perirectal abscess now status-post extensive debridement and revision. Subsequent large sacral and perineal wound now healing appropriately.     Anaerobic culture positive, currently receiving treatment with clindamycin and Zosyn. Will likely require extended course due to location of wound and risk of contamination.     - aggressive wound care including dressing changes daily and with each bowel movement  - antibiotics as below    9/4: continue with aggressive wound care and antibiotics for now.     9/5:   Without complaints today although he had couple episodes of diarrhea.  Continue with current care.    9/6:  Continue with IV antibiotics aggressive wound care.    9/7:  Continue with IV antibiotics and aggressive wound care.  Patient is having some diarrhea and Imodium was ordered.  This is a chronic issue for patient.    9/8:  Will discuss with General surgery but may be able to stop IV antibiotics in the coming days.     9/9:  Will continue antibiotics for now but will monitor renal function as well.    9/10: Discuss wound with general surgery.  Anticipate discharge home in next 5-7 days.  Wife stated the wound is challenging for her to manage given its location.     9/11:  Preparing dressings for wound changes and other items in family will be able to take patient home on Saturday on Monday.  Will discuss with Dr. Lopez who will take over care.  Clear to leave from surgery standpoint per Dr. Butcher.     Antibiotics (From admission, onward)      Start     Stop Route Frequency Ordered    09/04/24 2230  piperacillin-tazobactam (ZOSYN) 4.5 g in D5W 100 mL IVPB (MB+)         -- IV Every 12 hours (non-standard times) 09/04/24 1220             09/12  Surgery to review and look at discharge needs    JANET (acute kidney injury)  JANET is likely due to pre-renal azotemia due to dehydration. Baseline creatinine is  GFR >50 . Most recent creatinine and eGFR are listed below.  Recent Labs     09/13/24  0420   CREATININE 1.98*   EGFRNORACEVR 32*        Plan  - JANET is worsening. Will adjust treatment as follows: IV fluids  - Avoid nephrotoxins and renally dose meds for GFR listed above  - Monitor urine output, serial BMP, and adjust therapy as needed    Renal function panel and added IV fluids.     9/9:  Continue with IV fluids encourage patient to take p.o..  Follow up BNP.    Dermatitis  Hydrocortisone PRN      Irritable bowel syndrome with both constipation and diarrhea  Long history of alternating diarrhea and constipation.  "Currently having diarrhea likely exacerbated by antibiotics which unfortunately necessitates multiple daily dressing changes. Continue probiotics and psyllium.    Paroxysmal atrial fibrillation  Patient with Paroxysmal (<7 days) atrial fibrillation which is controlled currently with  no medications . Patient is currently in atrial fibrillation.[id number] Score: 4. HASBLED Score: 1. Anticoagulation not indicated due to extensive wound, risk of bleeding, possible need for further surgery .    Abnormality of gait and mobility  Continue PT/OT    09/13 independent with walker    Type 2 diabetes mellitus with hyperglycemia, with long-term current use of insulin  Patient's FSGs are controlled on current medication regimen.  Last A1c reviewed-   Lab Results   Component Value Date    HGBA1C 6.7 (H) 08/23/2024     Most recent fingerstick glucose reviewed- No results for input(s): "POCTGLUCOSE" in the last 24 hours.  Current correctional scale  Medium  Maintain anti-hyperglycemic dose as follows-   Antihyperglycemics (From admission, onward)      Start     Stop Route Frequency Ordered    08/29/24 1556  insulin aspart U-100 injection 0-10 Units         -- SubQ Before meals & nightly PRN 08/29/24 1558          Hold Oral hypoglycemics while patient is in the hospital.    9/6:  Episode of hypoglycemia to 69.  Will discontinue 20 units of Lantus and just use the sliding scale.  May discharge with 10 units of Lantus as able.  09/13 BS fair.      Atherosclerosis of native coronary artery of native heart without angina pectoris  Patient with known CAD s/p stent placement, which is controlled Will continue ASA and Statin and monitor for S/Sx of angina/ACS. Continue to monitor on telemetry.     Resume Plavix 9/3 and monitor closely for signs of bleeding.     Stage 3a chronic kidney disease  Creatine stable for now. BMP reviewed- noted Estimated Creatinine Clearance: 18.9 mL/min (A) (based on SCr of 2.76 mg/dL (H)). according to latest " data. Based on current GFR, CKD stage is stage 3 - GFR 30-59.  Monitor UOP and serial BMP and adjust therapy as needed. Renally dose meds. Avoid nephrotoxic medications and procedures.    Primary hypertension  Chronic, controlled. Latest blood pressure and vitals reviewed-     Temp:  [97.4 °F (36.3 °C)-98.1 °F (36.7 °C)]   Pulse:  [61-74]   Resp:  [14-21]   BP: (147-168)/(59-64)   SpO2:  [99 %] .   Home meds for hypertension were reviewed and noted below.   Hypertension Medications               nitroGLYCERIN (NITROSTAT) 0.4 MG SL tablet Place 1 tablet (0.4 mg total) under the tongue every 5 (five) minutes as needed for Chest pain.            While in the hospital, will manage blood pressure as follows; Continue home antihypertensive regimen    Will utilize p.r.n. blood pressure medication only if patient's blood pressure greater than 180/110 and he develops symptoms such as worsening chest pain or shortness of breath.      VTE Risk Mitigation (From admission, onward)           Ordered     enoxaparin injection 30 mg  Every 24 hours         08/29/24 1558     Place sequential compression device  Until discontinued         08/29/24 1558                    Discharge Planning   RICKY: 9/12/2024     Code Status: Full Code   Is the patient medically ready for discharge?:     Reason for patient still in hospital (select all that apply): Laboratory test, Treatment, and Imaging  Discharge Plan A: Home with family, Home Health                  Colin Lopez MD  Department of Hospital Medicine   [facility]

## 2024-09-14 NOTE — ASSESSMENT & PLAN NOTE
JANET is likely due to pre-renal azotemia due to dehydration. Baseline creatinine is  GFR >50 . Most recent creatinine and eGFR are listed below.  Recent Labs     09/13/24  0420   CREATININE 1.98*   EGFRNORACEVR 32*        Plan  - JANET is worsening. Will adjust treatment as follows: IV fluids  - Avoid nephrotoxins and renally dose meds for GFR listed above  - Monitor urine output, serial BMP, and adjust therapy as needed    Renal function panel and added IV fluids.     9/9:  Continue with IV fluids encourage patient to take p.o..  Follow up BNP.

## 2024-09-14 NOTE — SUBJECTIVE & OBJECTIVE
Past Medical History:   Diagnosis Date    Abnormal thyroid stimulating hormone (TSH) level 08/22/2019    Anemia 08/15/2019    Atrial fibrillation 09/07/2012    Bradycardia 05/23/2017    asymptomatic    Change in bowel habit 11/13/2018    Chronic kidney disease, unspecified 01/14/2019    Coronary arteriosclerosis 09/07/2012    Disease of skin and subcutaneous tissue 08/16/2017    medial aspect RLE- cystic structure seen on venous doppler US.    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 Lewis Eye exam    Varicose veins of both lower extremities with pain 01/02/2019    Venous insufficiency 01/08/2019       Past Surgical History:   Procedure Laterality Date    APPENDECTOMY      CARDIAC CATHETERIZATION  2007    [facility]- Stent placement    CATARACT EXTRACTION, BILATERAL      COLONOSCOPY  11/06/2019    Dr. Mendoza    HERNIA REPAIR      Inguinal hernia repair    INCISION AND  "DRAINAGE OF PERIRECTAL REGION Right 8/23/2024    Procedure: INCISION AND DRAINAGE, WITH DEBRIDEMENT PERIRECTAL REGION;  Surgeon: Pop Butcher MD;  Location: [facility] OR;  Service: General;  Laterality: Right;    INCISION AND DRAINAGE OF PERIRECTAL REGION Bilateral 8/26/2024    Procedure: INCISION AND DRAINAGE, PERIRECTAL REGION;  Surgeon: Pop Butcher MD;  Location: [facility] OR;  Service: General;  Laterality: Bilateral;    TONSILLECTOMY         Review of patient's allergies indicates:   Allergen Reactions    Mayonnaise      Upset stomach       No current facility-administered medications on file prior to encounter.     Current Outpatient Medications on File Prior to Encounter   Medication Sig    blood-glucose sensor (FREESTYLE ADORE 3 SENSOR) Giulia 1 each by Misc.(Non-Drug; Combo Route) route once.    cholecalciferol, vitamin D3, 125 mcg (5,000 unit) capsule Take 5,000 Units by mouth once daily.    clopidogreL (PLAVIX) 75 mg tablet Take 1 tablet (75 mg total) by mouth once daily.    coenzyme Q10 200 mg capsule Take 200 mg by mouth once daily.    cyanocobalamin (VITAMIN B-12) 1000 MCG tablet Take 100 mcg by mouth once daily.    dapagliflozin propanediol (FARXIGA) 10 mg tablet Take 1 tablet (10 mg total) by mouth once daily.    glipiZIDE 5 MG TR24 Take 1 tablet (5 mg total) by mouth daily with breakfast.    insulin glargine U-100, Lantus, (LANTUS SOLOSTAR U-100 INSULIN) 100 unit/mL (3 mL) InPn pen Inject 45 Units into the skin every evening.    nitroGLYCERIN (NITROSTAT) 0.4 MG SL tablet Place 1 tablet (0.4 mg total) under the tongue every 5 (five) minutes as needed for Chest pain.    pantoprazole (PROTONIX) 40 MG tablet Take 1 tablet (40 mg total) by mouth once daily.    rosuvastatin (CRESTOR) 40 MG Tab Take 1 tablet (40 mg total) by mouth every evening.    SURE COMFORT PEN NEEDLE 32 gauge x 5/32" Ndle AS DIRECTED     Family History       Problem Relation (Age of Onset)    Diabetes Brother    Heart disease " Father    Neurofibromatosis Brother    No Known Problems Mother, Brother, Maternal Grandmother, Maternal Grandfather, Paternal Grandmother, Paternal Grandfather    Throat cancer Sister          Tobacco Use    Smoking status: Former     Current packs/day: 0.00     Average packs/day: 0.1 packs/day for 3.0 years (0.3 ttl pk-yrs)     Types: Cigarettes     Start date:      Quit date:      Years since quittin.7     Passive exposure: Past    Smokeless tobacco: Never    Tobacco comments:     Quit 10/15/1976   Substance and Sexual Activity    Alcohol use: Not Currently     Comment: occasionally    Drug use: Never    Sexual activity: Not Currently     Review of Systems   Constitutional:  Negative for appetite change, fatigue and fever.   HENT:  Negative for congestion, hearing loss and trouble swallowing.    Respiratory:  Negative for chest tightness, shortness of breath and wheezing.    Cardiovascular:  Negative for chest pain and palpitations.   Gastrointestinal:  Positive for constipation and rectal pain. Negative for abdominal pain and nausea.   Genitourinary:  Negative for difficulty urinating and dysuria.   Musculoskeletal:  Positive for gait problem. Negative for back pain and neck stiffness.   Skin:  Negative for pallor and rash.   Neurological:  Negative for dizziness, speech difficulty and headaches.   Psychiatric/Behavioral:  Positive for confusion. Negative for suicidal ideas.      Objective:     Vital Signs (Most Recent):  Temp: 97.2 °F (36.2 °C) (24 1000)  Pulse: (!) 56 (24 1000)  Resp: 17 (24 1000)  BP: (!) 157/61 (24 0701)  SpO2: 99 % (24 1000) Vital Signs (24h Range):  Temp:  [97 °F (36.1 °C)-97.2 °F (36.2 °C)] 97.2 °F (36.2 °C)  Pulse:  [56-77] 56  Resp:  [10-28] 17  SpO2:  [95 %-100 %] 99 %  BP: (157)/(61) 157/61     Weight: 78.9 kg (173 lb 15.1 oz)  Body mass index is 26.45 kg/m².     Physical Exam  Vitals reviewed.   Constitutional:       General: He is awake. He  is not in acute distress.     Appearance: He is well-developed. He is not toxic-appearing.   HENT:      Head: Normocephalic.      Nose: Nose normal.      Mouth/Throat:      Pharynx: Oropharynx is clear.   Eyes:      Extraocular Movements: Extraocular movements intact.      Pupils: Pupils are equal, round, and reactive to light.   Neck:      Thyroid: No thyroid mass.      Vascular: No carotid bruit.   Cardiovascular:      Rate and Rhythm: Normal rate and regular rhythm.      Pulses: Normal pulses.      Heart sounds: Normal heart sounds. No murmur heard.  Pulmonary:      Effort: Pulmonary effort is normal.      Breath sounds: Normal breath sounds and air entry. No wheezing.   Abdominal:      General: Bowel sounds are normal. There is no distension.      Palpations: Abdomen is soft.      Tenderness: There is no abdominal tenderness.   Musculoskeletal:         General: Normal range of motion.      Cervical back: Neck supple. No rigidity.   Skin:     General: Skin is warm.      Coloration: Skin is not jaundiced.      Findings: Lesion present.      Comments: Dressing over surgical site to perirectal area    Sacral / perirectal wound she is clear and being taking care of/ reviewed photos   Neurological:      General: No focal deficit present.      Mental Status: He is alert and oriented to person, place, and time.      Cranial Nerves: No cranial nerve deficit.      Comments: Patient is oriented and was able to give me a fairly good history but less several details out and got mixed up on some things had to be straightened out from records   Psychiatric:         Attention and Perception: Attention normal.         Mood and Affect: Mood normal.         Behavior: Behavior normal. Behavior is cooperative.         Thought Content: Thought content normal.         Cognition and Memory: Cognition normal.          Significant Labs: All pertinent labs within the past 24 hours have been reviewed.  BMP:   Recent Labs   Lab  09/13/24  0420   *      K 4.3   *   CO2 26   BUN 30*   CREATININE 1.98*   CALCIUM 8.2*       CBC:   Recent Labs   Lab 09/13/24 0420   WBC 5.33   HGB 10.2*   HCT 31.8*        CMP:   Recent Labs   Lab 09/13/24  0420      K 4.3   *   CO2 26   *   BUN 30*   CREATININE 1.98*   CALCIUM 8.2*   ANIONGAP 7         Significant Imaging: I have reviewed all pertinent imaging results/findings within the past 24 hours.        Intake/Output - Last 3 Shifts         09/12 0700 09/13 0659 09/13 0700 09/14 0659    P.O. 120 840    I.V. (mL/kg) 2189.7 (27.8)     IV Piggyback 198.5     Total Intake(mL/kg) 2508.2 (31.8) 840 (10.6)    Urine (mL/kg/hr)      Other      Stool      Total Output      Net +2508.2 +840          Stool Occurrence 4 x 2 x          Microbiology Results (last 7 days)       ** No results found for the last 168 hours. **

## 2024-09-14 NOTE — ASSESSMENT & PLAN NOTE
Chronic, controlled. Latest blood pressure and vitals reviewed-     Temp:  [97.3 °F (36.3 °C)-98.5 °F (36.9 °C)]   Pulse:  [50-76]   Resp:  [14-28]   BP: (135-163)/(52-58)   SpO2:  [99 %] .   Home meds for hypertension were reviewed and noted below.   Hypertension Medications               nitroGLYCERIN (NITROSTAT) 0.4 MG SL tablet Place 1 tablet (0.4 mg total) under the tongue every 5 (five) minutes as needed for Chest pain.            While in the hospital, will manage blood pressure as follows; Continue home antihypertensive regimen    Will utilize p.r.n. blood pressure medication only if patient's blood pressure greater than 180/110 and he develops symptoms such as worsening chest pain or shortness of breath.

## 2024-09-14 NOTE — ASSESSMENT & PLAN NOTE
"Patient's FSGs are controlled on current medication regimen.  Last A1c reviewed-   Lab Results   Component Value Date    HGBA1C 6.7 (H) 08/23/2024     Most recent fingerstick glucose reviewed- No results for input(s): "POCTGLUCOSE" in the last 24 hours.  Current correctional scale  Medium  Maintain anti-hyperglycemic dose as follows-   Antihyperglycemics (From admission, onward)    Start     Stop Route Frequency Ordered    08/29/24 1556  insulin aspart U-100 injection 0-10 Units         -- SubQ Before meals & nightly PRN 08/29/24 1558        Hold Oral hypoglycemics while patient is in the hospital.    9/6:  Episode of hypoglycemia to 69.  Will discontinue 20 units of Lantus and just use the sliding scale.  May discharge with 10 units of Lantus as able.  09/13 BS fair.    "

## 2024-09-14 NOTE — PROGRESS NOTES
[facility] [employer] Medicine  Progress Note    Patient Name: Negro Hale  MRN: [id number]  Patient Class: IP- Inpatient   Admission Date: 8/29/2024  Length of Stay: 16 days  Attending Physician: Colin Lopez MD  Primary Care Provider: Smiley Trevino FNP        Subjective:     Principal Problem:Necrotizing soft tissue infection        HPI:    Negro Hale is a 84 y.o. male with history of HTN, CAD, CKD, IBS, and a-fib who initially presented to [facility] with perirectal abscess. Surgical evaluation revealed necrotizing soft tissue infection requiring extensive debridement with revision (most recent procedure 8/26). Anaerobic cultures positive and patient placed on appropriate antibiotics. Initial blood culture with micrococcus species thought to be a contaminant, with repeat cultures negative. Disposition complicated by diarrhea requiring frequent wound care and dressing changes due to soiling, as well and deconditioning due to acute illness and hospitalization. [facility], rehabilitation, and aggressive wound care.       Overview/Hospital Course:  09/12 - records reviewed.  Patient initially admitted to [facility] 08/22 with rectal discomfort.  Diagnosed initially with rectal abscess and underwent surgery 08/23 by .  Found to have necrotizing soft tissue infection.  One blood culture then positive for Micrococcus which could be a contaminate.  Has been treated with intravenous antibiotics.  Has improved and mobile independently now with walker.  Discussed with surgery and they are going to review patient's needs before discharge.      Past medical history:  -hypertension greater than 20 years  -diabetes mellitus greater than 20 years.  States takes 1 shot of insulin 45 units daily.  -coronary arteriosclerosis has seen Dr. Bridges with [facility] 2/26/2007  (patient did not remember this and obtained from  records)  -paroxysmal atrial fibrillation with rhythm being in normal sinus  -dyslipidemia  -chronic back pain with past vertebral fracture followed by Dr. Destin Leroy    09/13 Looks good sitting up and no complaints. Talked with surgery. Plan home when family ready first of week.  09/14 in room watching TV without complaints.  Family at ask nurse about his longstanding slight dementia.  Can see neurology outpatient to evaluate for medication    Past Medical History:   Diagnosis Date    Abnormal thyroid stimulating hormone (TSH) level 08/22/2019    Anemia 08/15/2019    Atrial fibrillation 09/07/2012    Bradycardia 05/23/2017    asymptomatic    Change in bowel habit 11/13/2018    Chronic kidney disease, unspecified 01/14/2019    Coronary arteriosclerosis 09/07/2012    Disease of skin and subcutaneous tissue 08/16/2017    medial aspect RLE- cystic structure seen on venous doppler US.    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 Lewis    Type 2 diabetes mellitus with mild nonproliferative retinopathy  of right eye without macular edema 08/25/2023    See Dr. Lewis Eye exam    Varicose veins of both lower extremities with pain 01/02/2019    Venous insufficiency 01/08/2019       Past Surgical History:   Procedure Laterality Date    APPENDECTOMY      CARDIAC CATHETERIZATION  2007    Bridges- Stent placement    CATARACT EXTRACTION, BILATERAL      COLONOSCOPY  11/06/2019    Dr. Mendoza    HERNIA REPAIR      Inguinal hernia repair    INCISION AND DRAINAGE OF PERIRECTAL REGION Right 8/23/2024    Procedure: INCISION AND DRAINAGE, WITH DEBRIDEMENT PERIRECTAL REGION;  Surgeon: Pop Butcher MD;  Location: [facility] OR;  Service: General;  Laterality: Right;    INCISION AND DRAINAGE OF PERIRECTAL REGION Bilateral 8/26/2024    Procedure: INCISION AND DRAINAGE, PERIRECTAL REGION;  Surgeon: Pop Butcher MD;  Location: [facility] OR;  Service: General;  Laterality: Bilateral;    TONSILLECTOMY         Review of patient's allergies indicates:   Allergen Reactions    Mayonnaise      Upset stomach       No current facility-administered medications on file prior to encounter.     Current Outpatient Medications on File Prior to Encounter   Medication Sig    blood-glucose sensor (FREESTYLE ADORE 3 SENSOR) Giulia 1 each by Misc.(Non-Drug; Combo Route) route once.    cholecalciferol, vitamin D3, 125 mcg (5,000 unit) capsule Take 5,000 Units by mouth once daily.    clopidogreL (PLAVIX) 75 mg tablet Take 1 tablet (75 mg total) by mouth once daily.    coenzyme Q10 200 mg capsule Take 200 mg by mouth once daily.    cyanocobalamin (VITAMIN B-12) 1000 MCG tablet Take 100 mcg by mouth once daily.    dapagliflozin propanediol (FARXIGA) 10 mg tablet Take 1 tablet (10 mg total) by mouth once daily.    glipiZIDE 5 MG TR24 Take 1 tablet (5 mg total) by mouth daily with breakfast.    insulin glargine U-100, Lantus, (LANTUS SOLOSTAR U-100 INSULIN) 100 unit/mL (3 mL) InPn pen Inject 45 Units into the skin every evening.    nitroGLYCERIN  "(NITROSTAT) 0.4 MG SL tablet Place 1 tablet (0.4 mg total) under the tongue every 5 (five) minutes as needed for Chest pain.    pantoprazole (PROTONIX) 40 MG tablet Take 1 tablet (40 mg total) by mouth once daily.    rosuvastatin (CRESTOR) 40 MG Tab Take 1 tablet (40 mg total) by mouth every evening.    SURE COMFORT PEN NEEDLE 32 gauge x 5/32" Ndle AS DIRECTED     Family History       Problem Relation (Age of Onset)    Diabetes Brother    Heart disease Father    Neurofibromatosis Brother    No Known Problems Mother, Brother, Maternal Grandmother, Maternal Grandfather, Paternal Grandmother, Paternal Grandfather    Throat cancer Sister          Tobacco Use    Smoking status: Former     Current packs/day: 0.00     Average packs/day: 0.1 packs/day for 3.0 years (0.3 ttl pk-yrs)     Types: Cigarettes     Start date:      Quit date:      Years since quittin.7     Passive exposure: Past    Smokeless tobacco: Never    Tobacco comments:     Quit 10/15/1976   Substance and Sexual Activity    Alcohol use: Not Currently     Comment: occasionally    Drug use: Never    Sexual activity: Not Currently     Review of Systems   Constitutional:  Negative for appetite change, fatigue and fever.   HENT:  Negative for congestion, hearing loss and trouble swallowing.    Respiratory:  Negative for chest tightness, shortness of breath and wheezing.    Cardiovascular:  Negative for chest pain and palpitations.   Gastrointestinal:  Positive for constipation and rectal pain. Negative for abdominal pain and nausea.   Genitourinary:  Negative for difficulty urinating and dysuria.   Musculoskeletal:  Positive for gait problem. Negative for back pain and neck stiffness.   Skin:  Negative for pallor and rash.   Neurological:  Negative for dizziness, speech difficulty and headaches.   Psychiatric/Behavioral:  Positive for confusion. Negative for suicidal ideas.      Objective:     Vital Signs (Most Recent):  Temp: 97.3 °F (36.3 °C) " (09/14/24 1226)  Pulse: (!) 54 (09/14/24 1115)  Resp: 19 (09/14/24 1115)  BP: (!) 135/55 (09/14/24 0945)  SpO2: 99 % (09/14/24 0701) Vital Signs (24h Range):  Temp:  [97.3 °F (36.3 °C)-98.5 °F (36.9 °C)] 97.3 °F (36.3 °C)  Pulse:  [50-76] 54  Resp:  [14-28] 19  SpO2:  [99 %] 99 %  BP: (135-163)/(52-58) 135/55     Weight: 78.9 kg (173 lb 15.1 oz)  Body mass index is 26.45 kg/m².     Physical Exam  Vitals reviewed.   Constitutional:       General: He is awake. He is not in acute distress.     Appearance: He is well-developed. He is not toxic-appearing.   HENT:      Head: Normocephalic.      Nose: Nose normal.      Mouth/Throat:      Pharynx: Oropharynx is clear.   Eyes:      Extraocular Movements: Extraocular movements intact.      Pupils: Pupils are equal, round, and reactive to light.   Neck:      Thyroid: No thyroid mass.      Vascular: No carotid bruit.   Cardiovascular:      Rate and Rhythm: Normal rate and regular rhythm.      Pulses: Normal pulses.      Heart sounds: Normal heart sounds. No murmur heard.  Pulmonary:      Effort: Pulmonary effort is normal.      Breath sounds: Normal breath sounds and air entry. No wheezing.   Abdominal:      General: Bowel sounds are normal. There is no distension.      Palpations: Abdomen is soft.      Tenderness: There is no abdominal tenderness.   Musculoskeletal:         General: Normal range of motion.      Cervical back: Neck supple. No rigidity.   Skin:     General: Skin is warm.      Coloration: Skin is not jaundiced.      Findings: Lesion present.      Comments: Dressing over surgical site to perirectal area    Sacral / perirectal wound she is clear and being taking care of/ reviewed photos   Neurological:      General: No focal deficit present.      Mental Status: He is alert and oriented to person, place, and time.      Cranial Nerves: No cranial nerve deficit.      Comments: Patient is oriented and was able to give me a fairly good history but less several details  out and got mixed up on some things had to be straightened out from records   Psychiatric:         Attention and Perception: Attention normal.         Mood and Affect: Mood normal.         Behavior: Behavior normal. Behavior is cooperative.         Thought Content: Thought content normal.         Cognition and Memory: Cognition normal.          Significant Labs: All pertinent labs within the past 24 hours have been reviewed.  BMP:   Recent Labs   Lab 09/13/24  0420   *      K 4.3   *   CO2 26   BUN 30*   CREATININE 1.98*   CALCIUM 8.2*       CBC:   Recent Labs   Lab 09/13/24  0420   WBC 5.33   HGB 10.2*   HCT 31.8*        CMP:   Recent Labs   Lab 09/13/24  0420      K 4.3   *   CO2 26   *   BUN 30*   CREATININE 1.98*   CALCIUM 8.2*   ANIONGAP 7         Significant Imaging: I have reviewed all pertinent imaging results/findings within the past 24 hours.        Intake/Output - Last 3 Shifts         09/12 0700 09/13 0659 09/13 0700 09/14 0659 09/14 0700  09/15 0659    P.O. 120 1900     I.V. (mL/kg) 2189.7 (27.8)      IV Piggyback 198.5      Total Intake(mL/kg) 2508.2 (31.8) 1900 (24.1)     Urine (mL/kg/hr)       Other       Stool       Total Output       Net +2508.2 +1900            Urine Occurrence  7 x     Stool Occurrence 4 x 6 x 1 x          Microbiology Results (last 7 days)       ** No results found for the last 168 hours. **              Assessment/Plan:      * Necrotizing soft tissue infection  Further discussed with surgery upon admission to [facility], recommendations for wound care to manage and will re-consult if further surgical needs arise.     Continue antibiotics and dressing changes.   ---  Perirectal abscess now status-post extensive debridement and revision. Subsequent large sacral and perineal wound now healing appropriately.     Anaerobic culture positive, currently receiving treatment with clindamycin and Zosyn. Will likely require extended course due to  location of wound and risk of contamination.     - aggressive wound care including dressing changes daily and with each bowel movement  - antibiotics as below    9/4: continue with aggressive wound care and antibiotics for now.     9/5:  Without complaints today although he had couple episodes of diarrhea.  Continue with current care.    9/6:  Continue with IV antibiotics aggressive wound care.    9/7:  Continue with IV antibiotics and aggressive wound care.  Patient is having some diarrhea and Imodium was ordered.  This is a chronic issue for patient.    9/8:  Will discuss with General surgery but may be able to stop IV antibiotics in the coming days.     9/9:  Will continue antibiotics for now but will monitor renal function as well.    9/10: Discuss wound with general surgery.  Anticipate discharge home in next 5-7 days.  Wife stated the wound is challenging for her to manage given its location.     9/11:  Preparing dressings for wound changes and other items in family will be able to take patient home on Saturday on Monday.  Will discuss with Dr. Lopez who will take over care.  Clear to leave from surgery standpoint per Dr. Butcher.     Antibiotics (From admission, onward)      Start     Stop Route Frequency Ordered    09/04/24 2230  piperacillin-tazobactam (ZOSYN) 4.5 g in D5W 100 mL IVPB (MB+)         -- IV Every 12 hours (non-standard times) 09/04/24 1220             09/12  Surgery to review and look at discharge needs    JANET (acute kidney injury)  JANET is likely due to pre-renal azotemia due to dehydration. Baseline creatinine is  GFR >50 . Most recent creatinine and eGFR are listed below.  Recent Labs     09/13/24  0420   CREATININE 1.98*   EGFRNORACEVR 32*        Plan  - JANET is worsening. Will adjust treatment as follows: IV fluids  - Avoid nephrotoxins and renally dose meds for GFR listed above  - Monitor urine output, serial BMP, and adjust therapy as needed    Renal function panel and added IV fluids.  "    9/9:  Continue with IV fluids encourage patient to take p.o..  Follow up BNP.    Dermatitis  Hydrocortisone PRN      Irritable bowel syndrome with both constipation and diarrhea  Long history of alternating diarrhea and constipation. Currently having diarrhea likely exacerbated by antibiotics which unfortunately necessitates multiple daily dressing changes. Continue probiotics and psyllium.    Paroxysmal atrial fibrillation  Patient with Paroxysmal (<7 days) atrial fibrillation which is controlled currently with  no medications . Patient is currently in atrial fibrillation.[id number] Score: 4. HASBLED Score: 1. Anticoagulation not indicated due to extensive wound, risk of bleeding, possible need for further surgery .    Abnormality of gait and mobility  Continue PT/OT    09/13 independent with walker    Type 2 diabetes mellitus with hyperglycemia, with long-term current use of insulin  Patient's FSGs are controlled on current medication regimen.  Last A1c reviewed-   Lab Results   Component Value Date    HGBA1C 6.7 (H) 08/23/2024     Most recent fingerstick glucose reviewed- No results for input(s): "POCTGLUCOSE" in the last 24 hours.  Current correctional scale  Medium  Maintain anti-hyperglycemic dose as follows-   Antihyperglycemics (From admission, onward)      Start     Stop Route Frequency Ordered    08/29/24 1556  insulin aspart U-100 injection 0-10 Units         -- SubQ Before meals & nightly PRN 08/29/24 1558          Hold Oral hypoglycemics while patient is in the hospital.    9/6:  Episode of hypoglycemia to 69.  Will discontinue 20 units of Lantus and just use the sliding scale.  May discharge with 10 units of Lantus as able.  09/13 BS fair.      Atherosclerosis of native coronary artery of native heart without angina pectoris  Patient with known CAD s/p stent placement, which is controlled Will continue ASA and Statin and monitor for S/Sx of angina/ACS. Continue to monitor on telemetry.     Resume " Plavix 9/3 and monitor closely for signs of bleeding.     Stage 3a chronic kidney disease  Creatine stable for now. BMP reviewed- noted Estimated Creatinine Clearance: 18.9 mL/min (A) (based on SCr of 2.76 mg/dL (H)). according to latest data. Based on current GFR, CKD stage is stage 3 - GFR 30-59.  Monitor UOP and serial BMP and adjust therapy as needed. Renally dose meds. Avoid nephrotoxic medications and procedures.    Primary hypertension  Chronic, controlled. Latest blood pressure and vitals reviewed-     Temp:  [97.3 °F (36.3 °C)-98.5 °F (36.9 °C)]   Pulse:  [50-76]   Resp:  [14-28]   BP: (135-163)/(52-58)   SpO2:  [99 %] .   Home meds for hypertension were reviewed and noted below.   Hypertension Medications               nitroGLYCERIN (NITROSTAT) 0.4 MG SL tablet Place 1 tablet (0.4 mg total) under the tongue every 5 (five) minutes as needed for Chest pain.            While in the hospital, will manage blood pressure as follows; Continue home antihypertensive regimen    Will utilize p.r.n. blood pressure medication only if patient's blood pressure greater than 180/110 and he develops symptoms such as worsening chest pain or shortness of breath.      VTE Risk Mitigation (From admission, onward)           Ordered     enoxaparin injection 30 mg  Every 24 hours         08/29/24 1558     Place sequential compression device  Until discontinued         08/29/24 1558                    Discharge Planning   RICKY: 9/12/2024     Code Status: Full Code   Is the patient medically ready for discharge?:     Reason for patient still in hospital (select all that apply): Laboratory test, Treatment, and Imaging  Discharge Plan A: Home with family, Home Health                  Colin Lopez MD  Department of Hospital Medicine   [facility]

## 2024-09-15 LAB
GLUCOSE SERPL-MCNC: 114 MG/DL (ref 70–105)
GLUCOSE SERPL-MCNC: 137 MG/DL (ref 70–105)
GLUCOSE SERPL-MCNC: 184 MG/DL (ref 70–105)
GLUCOSE SERPL-MCNC: 212 MG/DL (ref 70–105)

## 2024-09-15 PROCEDURE — 63600175 PHARM REV CODE 636 W HCPCS: Performed by: FAMILY MEDICINE

## 2024-09-15 PROCEDURE — 25000003 PHARM REV CODE 250: Performed by: FAMILY MEDICINE

## 2024-09-15 PROCEDURE — 99232 SBSQ HOSP IP/OBS MODERATE 35: CPT | Mod: ,,, | Performed by: HOSPITALIST

## 2024-09-15 PROCEDURE — 99900035 HC TECH TIME PER 15 MIN (STAT)

## 2024-09-15 PROCEDURE — 82962 GLUCOSE BLOOD TEST: CPT

## 2024-09-15 PROCEDURE — 99900031 HC PATIENT EDUCATION (STAT)

## 2024-09-15 PROCEDURE — 11000008

## 2024-09-15 RX ADMIN — ZINC SULFATE 220 MG (50 MG) CAPSULE 220 MG: CAPSULE at 09:09

## 2024-09-15 RX ADMIN — PRAVASTATIN SODIUM 80 MG: 40 TABLET ORAL at 08:09

## 2024-09-15 RX ADMIN — ENOXAPARIN SODIUM 30 MG: 30 INJECTION SUBCUTANEOUS at 05:09

## 2024-09-15 RX ADMIN — OXYCODONE HYDROCHLORIDE AND ACETAMINOPHEN 500 MG: 500 TABLET ORAL at 09:09

## 2024-09-15 RX ADMIN — CHOLECALCIFEROL TAB 125 MCG (5000 UNIT) 5000 UNITS: 125 TAB at 09:09

## 2024-09-15 RX ADMIN — ASPIRIN 81 MG: 81 TABLET, COATED ORAL at 09:09

## 2024-09-15 RX ADMIN — INSULIN ASPART 4 UNITS: 100 INJECTION, SOLUTION INTRAVENOUS; SUBCUTANEOUS at 12:09

## 2024-09-15 RX ADMIN — INSULIN ASPART 2 UNITS: 100 INJECTION, SOLUTION INTRAVENOUS; SUBCUTANEOUS at 05:09

## 2024-09-15 RX ADMIN — CLOPIDOGREL BISULFATE 75 MG: 75 TABLET ORAL at 09:09

## 2024-09-15 NOTE — PROGRESS NOTES
[facility] Medicine  Progress Note    Patient Name: Negro Hale  MRN: [id number]  Patient Class: IP- Inpatient   Admission Date: 8/29/2024  Length of Stay: 17 days  Attending Physician: Colin Lopez MD  Primary Care Provider: Smiley Trevino FNP        Subjective:     Principal Problem:Necrotizing soft tissue infection        HPI:    Negro Hale is a 84 y.o. male with history of HTN, CAD, CKD, IBS, and a-fib who initially presented to [facility] with perirectal abscess. Surgical evaluation revealed necrotizing soft tissue infection requiring extensive debridement with revision (most recent procedure 8/26). Anaerobic cultures positive and patient placed on appropriate antibiotics. Initial blood culture with micrococcus species thought to be a contaminant, with repeat cultures negative. Disposition complicated by diarrhea requiring frequent wound care and dressing changes due to soiling, as well and deconditioning due to acute illness and hospitalization. [facility], rehabilitation, and aggressive wound care.       Overview/Hospital Course:  09/12 - records reviewed.  Patient initially admitted to [facility] 08/22 with rectal discomfort.  Diagnosed initially with rectal abscess and underwent surgery 08/23 by .  Found to have necrotizing soft tissue infection.  One blood culture then positive for Micrococcus which could be a contaminate.  Has been treated with intravenous antibiotics.  Has improved and mobile independently now with walker.  Discussed with surgery and they are going to review patient's needs before discharge.      Past medical history:  -hypertension greater than 20 years  -diabetes mellitus greater than 20 years.  States takes 1 shot of insulin 45 units daily.  -coronary arteriosclerosis has seen Dr. Bridges with [facility] 2/26/2007  (patient did not remember this and obtained from  records)  -paroxysmal atrial fibrillation with rhythm being in normal sinus  -dyslipidemia  -chronic back pain with past vertebral fracture followed by Dr. Destin Leroy    09/13 Looks good sitting up and no complaints. Talked with surgery. Plan home when family ready first of week.  09/14 in room watching TV without complaints.  Family at ask nurse about his longstanding slight dementia.  Can see neurology outpatient to evaluate for medication  09/15 family in room.  No new issues.  Plan for discharge in a.m.    Past Medical History:   Diagnosis Date    Abnormal thyroid stimulating hormone (TSH) level 08/22/2019    Anemia 08/15/2019    Atrial fibrillation 09/07/2012    Bradycardia 05/23/2017    asymptomatic    Change in bowel habit 11/13/2018    Chronic kidney disease, unspecified 01/14/2019    Coronary arteriosclerosis 09/07/2012    Disease of skin and subcutaneous tissue 08/16/2017    medial aspect RLE- cystic structure seen on venous doppler US.    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 Lewis     Type 2 diabetes mellitus with mild nonproliferative retinopathy of right eye without macular edema 08/25/2023    See Dr. Lewis Eye exam    Varicose veins of both lower extremities with pain 01/02/2019    Venous insufficiency 01/08/2019       Past Surgical History:   Procedure Laterality Date    APPENDECTOMY      CARDIAC CATHETERIZATION  2007    Bridges- Stent placement    CATARACT EXTRACTION, BILATERAL      COLONOSCOPY  11/06/2019    Dr. Mendoza    HERNIA REPAIR      Inguinal hernia repair    INCISION AND DRAINAGE OF PERIRECTAL REGION Right 8/23/2024    Procedure: INCISION AND DRAINAGE, WITH DEBRIDEMENT PERIRECTAL REGION;  Surgeon: Pop Butcher MD;  Location: [facility] OR;  Service: General;  Laterality: Right;    INCISION AND DRAINAGE OF PERIRECTAL REGION Bilateral 8/26/2024    Procedure: INCISION AND DRAINAGE, PERIRECTAL REGION;  Surgeon: Pop Butcher MD;  Location: [facility] OR;  Service: General;  Laterality: Bilateral;    TONSILLECTOMY         Review of patient's allergies indicates:   Allergen Reactions    Mayonnaise      Upset stomach       No current facility-administered medications on file prior to encounter.     Current Outpatient Medications on File Prior to Encounter   Medication Sig    blood-glucose sensor (FREESTYLE ADORE 3 SENSOR) Giuila 1 each by Misc.(Non-Drug; Combo Route) route once.    cholecalciferol, vitamin D3, 125 mcg (5,000 unit) capsule Take 5,000 Units by mouth once daily.    clopidogreL (PLAVIX) 75 mg tablet Take 1 tablet (75 mg total) by mouth once daily.    coenzyme Q10 200 mg capsule Take 200 mg by mouth once daily.    cyanocobalamin (VITAMIN B-12) 1000 MCG tablet Take 100 mcg by mouth once daily.    dapagliflozin propanediol (FARXIGA) 10 mg tablet Take 1 tablet (10 mg total) by mouth once daily.    glipiZIDE 5 MG TR24 Take 1 tablet (5 mg total) by mouth daily with breakfast.    insulin glargine U-100, Lantus, (LANTUS SOLOSTAR U-100 INSULIN) 100 unit/mL (3 mL) InPn pen  Quality 111:Pneumonia Vaccination Status For Older Adults: Patient received any pneumococcal conjugate or polysaccharide vaccine on or after their 60th birthday and before the end of the measurement period "Inject 45 Units into the skin every evening.    nitroGLYCERIN (NITROSTAT) 0.4 MG SL tablet Place 1 tablet (0.4 mg total) under the tongue every 5 (five) minutes as needed for Chest pain.    pantoprazole (PROTONIX) 40 MG tablet Take 1 tablet (40 mg total) by mouth once daily.    rosuvastatin (CRESTOR) 40 MG Tab Take 1 tablet (40 mg total) by mouth every evening.    SURE COMFORT PEN NEEDLE 32 gauge x 5/32" Ndle AS DIRECTED     Family History       Problem Relation (Age of Onset)    Diabetes Brother    Heart disease Father    Neurofibromatosis Brother    No Known Problems Mother, Brother, Maternal Grandmother, Maternal Grandfather, Paternal Grandmother, Paternal Grandfather    Throat cancer Sister          Tobacco Use    Smoking status: Former     Current packs/day: 0.00     Average packs/day: 0.1 packs/day for 3.0 years (0.3 ttl pk-yrs)     Types: Cigarettes     Start date:      Quit date:      Years since quittin.7     Passive exposure: Past    Smokeless tobacco: Never    Tobacco comments:     Quit 10/15/1976   Substance and Sexual Activity    Alcohol use: Not Currently     Comment: occasionally    Drug use: Never    Sexual activity: Not Currently     Review of Systems   Constitutional:  Negative for appetite change, fatigue and fever.   HENT:  Negative for congestion, hearing loss and trouble swallowing.    Respiratory:  Negative for chest tightness, shortness of breath and wheezing.    Cardiovascular:  Negative for chest pain and palpitations.   Gastrointestinal:  Positive for constipation and rectal pain. Negative for abdominal pain and nausea.   Genitourinary:  Negative for difficulty urinating and dysuria.   Musculoskeletal:  Positive for gait problem. Negative for back pain and neck stiffness.   Skin:  Negative for pallor and rash.   Neurological:  Negative for dizziness, speech difficulty and headaches.   Psychiatric/Behavioral:  Positive for confusion. Negative for suicidal ideas.      Objective: " Detail Level: Detailed     Vital Signs (Most Recent):  Temp: 97.4 °F (36.3 °C) (09/15/24 1214)  Pulse: 82 (09/15/24 0000)  Resp: (!) 21 (09/15/24 0000)  BP: (!) 95/50 (09/15/24 0000)  SpO2: 100 % (09/14/24 1604) Vital Signs (24h Range):  Temp:  [97.4 °F (36.3 °C)-98 °F (36.7 °C)] 97.4 °F (36.3 °C)  Pulse:  [82] 82  Resp:  [21] 21  BP: ()/(50-59) 95/50     Weight: 78.9 kg (173 lb 15.1 oz)  Body mass index is 26.45 kg/m².     Physical Exam  Vitals reviewed.   Constitutional:       General: He is awake. He is not in acute distress.     Appearance: He is well-developed. He is not toxic-appearing.   HENT:      Head: Normocephalic.      Nose: Nose normal.      Mouth/Throat:      Pharynx: Oropharynx is clear.   Eyes:      Extraocular Movements: Extraocular movements intact.      Pupils: Pupils are equal, round, and reactive to light.   Neck:      Thyroid: No thyroid mass.      Vascular: No carotid bruit.   Cardiovascular:      Rate and Rhythm: Normal rate and regular rhythm.      Pulses: Normal pulses.      Heart sounds: Normal heart sounds. No murmur heard.  Pulmonary:      Effort: Pulmonary effort is normal.      Breath sounds: Normal breath sounds and air entry. No wheezing.   Abdominal:      General: Bowel sounds are normal. There is no distension.      Palpations: Abdomen is soft.      Tenderness: There is no abdominal tenderness.   Musculoskeletal:         General: Normal range of motion.      Cervical back: Neck supple. No rigidity.   Skin:     General: Skin is warm.      Coloration: Skin is not jaundiced.      Findings: Lesion present.      Comments: Dressing over surgical site to perirectal area    Sacral / perirectal wound she is clear and being taking care of/ reviewed photos   Neurological:      General: No focal deficit present.      Mental Status: He is alert and oriented to person, place, and time.      Cranial Nerves: No cranial nerve deficit.      Comments: Patient is oriented and was able to give me a fairly good history  Quality 110: Preventive Care And Screening: Influenza Immunization: Influenza Immunization previously received during influenza season "but less several details out and got mixed up on some things had to be straightened out from records   Psychiatric:         Attention and Perception: Attention normal.         Mood and Affect: Mood normal.         Behavior: Behavior normal. Behavior is cooperative.         Thought Content: Thought content normal.         Cognition and Memory: Cognition normal.          Significant Labs: All pertinent labs within the past 24 hours have been reviewed.  BMP:   No results for input(s): "GLU", "NA", "K", "CL", "CO2", "BUN", "CREATININE", "CALCIUM", "MG" in the last 48 hours.      CBC:   No results for input(s): "WBC", "HGB", "HCT", "PLT" in the last 48 hours.    CMP:   No results for input(s): "NA", "K", "CL", "CO2", "GLU", "BUN", "CREATININE", "CALCIUM", "PROT", "ALBUMIN", "BILITOT", "ALKPHOS", "AST", "ALT", "ANIONGAP", "EGFRNONAA" in the last 48 hours.    Invalid input(s): "ESTGFAFRICA"        Significant Imaging: I have reviewed all pertinent imaging results/findings within the past 24 hours.        Intake/Output - Last 3 Shifts         09/13 0700  09/14 0659 09/14 0700  09/15 0659 09/15 0700  09/16 0659    P.O. 1900 1320     I.V. (mL/kg)       IV Piggyback       Total Intake(mL/kg) 1900 (24.1) 1320 (16.7)     Net +1900 +1320            Urine Occurrence 7 x 4 x     Stool Occurrence 6 x 3 x           Microbiology Results (last 7 days)       ** No results found for the last 168 hours. **              Assessment/Plan:      * Necrotizing soft tissue infection  Further discussed with surgery upon admission to [facility], recommendations for wound care to manage and will re-consult if further surgical needs arise.     Continue antibiotics and dressing changes.   ---  Perirectal abscess now status-post extensive debridement and revision. Subsequent large sacral and perineal wound now healing appropriately.     Anaerobic culture positive, currently receiving treatment with clindamycin and Zosyn. Will likely require extended course " due to location of wound and risk of contamination.     - aggressive wound care including dressing changes daily and with each bowel movement  - antibiotics as below    9/4: continue with aggressive wound care and antibiotics for now.     9/5:  Without complaints today although he had couple episodes of diarrhea.  Continue with current care.    9/6:  Continue with IV antibiotics aggressive wound care.    9/7:  Continue with IV antibiotics and aggressive wound care.  Patient is having some diarrhea and Imodium was ordered.  This is a chronic issue for patient.    9/8:  Will discuss with General surgery but may be able to stop IV antibiotics in the coming days.     9/9:  Will continue antibiotics for now but will monitor renal function as well.    9/10: Discuss wound with general surgery.  Anticipate discharge home in next 5-7 days.  Wife stated the wound is challenging for her to manage given its location.     9/11:  Preparing dressings for wound changes and other items in family will be able to take patient home on Saturday on Monday.  Will discuss with Dr. Lopez who will take over care.  Clear to leave from surgery standpoint per Dr. Butcher.     Antibiotics (From admission, onward)      Start     Stop Route Frequency Ordered    09/04/24 2230  piperacillin-tazobactam (ZOSYN) 4.5 g in D5W 100 mL IVPB (MB+)         -- IV Every 12 hours (non-standard times) 09/04/24 1220             09/12  Surgery to review and look at discharge needs    JANET (acute kidney injury)  JANET is likely due to pre-renal azotemia due to dehydration. Baseline creatinine is  GFR >50 . Most recent creatinine and eGFR are listed below.  Recent Labs     09/13/24  0420   CREATININE 1.98*   EGFRNORACEVR 32*        Plan  - JANET is worsening. Will adjust treatment as follows: IV fluids  - Avoid nephrotoxins and renally dose meds for GFR listed above  - Monitor urine output, serial BMP, and adjust therapy as needed    Renal function panel and added IV  "fluids.     9/9:  Continue with IV fluids encourage patient to take p.o..  Follow up BNP.    Dermatitis  Hydrocortisone PRN      Irritable bowel syndrome with both constipation and diarrhea  Long history of alternating diarrhea and constipation. Currently having diarrhea likely exacerbated by antibiotics which unfortunately necessitates multiple daily dressing changes. Continue probiotics and psyllium.    Paroxysmal atrial fibrillation  Patient with Paroxysmal (<7 days) atrial fibrillation which is controlled currently with  no medications . Patient is currently in atrial fibrillation.[id number] Score: 4. HASBLED Score: 1. Anticoagulation not indicated due to extensive wound, risk of bleeding, possible need for further surgery .    Abnormality of gait and mobility  Continue PT/OT    09/13 independent with walker    Type 2 diabetes mellitus with hyperglycemia, with long-term current use of insulin  Patient's FSGs are controlled on current medication regimen.  Last A1c reviewed-   Lab Results   Component Value Date    HGBA1C 6.7 (H) 08/23/2024     Most recent fingerstick glucose reviewed- No results for input(s): "POCTGLUCOSE" in the last 24 hours.  Current correctional scale  Medium  Maintain anti-hyperglycemic dose as follows-   Antihyperglycemics (From admission, onward)      Start     Stop Route Frequency Ordered    08/29/24 1556  insulin aspart U-100 injection 0-10 Units         -- SubQ Before meals & nightly PRN 08/29/24 1558          Hold Oral hypoglycemics while patient is in the hospital.    9/6:  Episode of hypoglycemia to 69.  Will discontinue 20 units of Lantus and just use the sliding scale.  May discharge with 10 units of Lantus as able.  09/13 BS fair.      Atherosclerosis of native coronary artery of native heart without angina pectoris  Patient with known CAD s/p stent placement, which is controlled Will continue ASA and Statin and monitor for S/Sx of angina/ACS. Continue to monitor on telemetry. "     Resume Plavix 9/3 and monitor closely for signs of bleeding.     Stage 3a chronic kidney disease  Creatine stable for now. BMP reviewed- noted Estimated Creatinine Clearance: 18.9 mL/min (A) (based on SCr of 2.76 mg/dL (H)). according to latest data. Based on current GFR, CKD stage is stage 3 - GFR 30-59.  Monitor UOP and serial BMP and adjust therapy as needed. Renally dose meds. Avoid nephrotoxic medications and procedures.    Primary hypertension  Chronic, controlled. Latest blood pressure and vitals reviewed-     Temp:  [97.4 °F (36.3 °C)-98 °F (36.7 °C)]   Pulse:  [82]   Resp:  [21]   BP: ()/(50-59) .   Home meds for hypertension were reviewed and noted below.   Hypertension Medications               nitroGLYCERIN (NITROSTAT) 0.4 MG SL tablet Place 1 tablet (0.4 mg total) under the tongue every 5 (five) minutes as needed for Chest pain.            While in the hospital, will manage blood pressure as follows; Continue home antihypertensive regimen    Will utilize p.r.n. blood pressure medication only if patient's blood pressure greater than 180/110 and he develops symptoms such as worsening chest pain or shortness of breath.      VTE Risk Mitigation (From admission, onward)           Ordered     enoxaparin injection 30 mg  Every 24 hours         08/29/24 1558     Place sequential compression device  Until discontinued         08/29/24 1558                    Discharge Planning   RICKY: 9/12/2024     Code Status: Full Code   Is the patient medically ready for discharge?:     Reason for patient still in hospital (select all that apply): Laboratory test, Treatment, Imaging, and Pending disposition  Discharge Plan A: Home with family, Home Health                  Colin Lopez MD  Department of Hospital Medicine   [facility]

## 2024-09-15 NOTE — ASSESSMENT & PLAN NOTE
Chronic, controlled. Latest blood pressure and vitals reviewed-     Temp:  [97.4 °F (36.3 °C)-98 °F (36.7 °C)]   Pulse:  [82]   Resp:  [21]   BP: ()/(50-59) .   Home meds for hypertension were reviewed and noted below.   Hypertension Medications               nitroGLYCERIN (NITROSTAT) 0.4 MG SL tablet Place 1 tablet (0.4 mg total) under the tongue every 5 (five) minutes as needed for Chest pain.            While in the hospital, will manage blood pressure as follows; Continue home antihypertensive regimen    Will utilize p.r.n. blood pressure medication only if patient's blood pressure greater than 180/110 and he develops symptoms such as worsening chest pain or shortness of breath.

## 2024-09-15 NOTE — SUBJECTIVE & OBJECTIVE
Past Medical History:   Diagnosis Date    Abnormal thyroid stimulating hormone (TSH) level 08/22/2019    Anemia 08/15/2019    Atrial fibrillation 09/07/2012    Bradycardia 05/23/2017    asymptomatic    Change in bowel habit 11/13/2018    Chronic kidney disease, unspecified 01/14/2019    Coronary arteriosclerosis 09/07/2012    Disease of skin and subcutaneous tissue 08/16/2017    medial aspect RLE- cystic structure seen on venous doppler US.    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 Lewis Eye exam    Varicose veins of both lower extremities with pain 01/02/2019    Venous insufficiency 01/08/2019       Past Surgical History:   Procedure Laterality Date    APPENDECTOMY      CARDIAC CATHETERIZATION  2007    [facility]- Stent placement    CATARACT EXTRACTION, BILATERAL      COLONOSCOPY  11/06/2019    Dr. Mendoza    HERNIA REPAIR      Inguinal hernia repair    INCISION AND  "DRAINAGE OF PERIRECTAL REGION Right 8/23/2024    Procedure: INCISION AND DRAINAGE, WITH DEBRIDEMENT PERIRECTAL REGION;  Surgeon: Pop Butcher MD;  Location: [facility] OR;  Service: General;  Laterality: Right;    INCISION AND DRAINAGE OF PERIRECTAL REGION Bilateral 8/26/2024    Procedure: INCISION AND DRAINAGE, PERIRECTAL REGION;  Surgeon: Pop Butcher MD;  Location: [facility] OR;  Service: General;  Laterality: Bilateral;    TONSILLECTOMY         Review of patient's allergies indicates:   Allergen Reactions    Mayonnaise      Upset stomach       No current facility-administered medications on file prior to encounter.     Current Outpatient Medications on File Prior to Encounter   Medication Sig    blood-glucose sensor (FREESTYLE ADORE 3 SENSOR) Giulia 1 each by Misc.(Non-Drug; Combo Route) route once.    cholecalciferol, vitamin D3, 125 mcg (5,000 unit) capsule Take 5,000 Units by mouth once daily.    clopidogreL (PLAVIX) 75 mg tablet Take 1 tablet (75 mg total) by mouth once daily.    coenzyme Q10 200 mg capsule Take 200 mg by mouth once daily.    cyanocobalamin (VITAMIN B-12) 1000 MCG tablet Take 100 mcg by mouth once daily.    dapagliflozin propanediol (FARXIGA) 10 mg tablet Take 1 tablet (10 mg total) by mouth once daily.    glipiZIDE 5 MG TR24 Take 1 tablet (5 mg total) by mouth daily with breakfast.    insulin glargine U-100, Lantus, (LANTUS SOLOSTAR U-100 INSULIN) 100 unit/mL (3 mL) InPn pen Inject 45 Units into the skin every evening.    nitroGLYCERIN (NITROSTAT) 0.4 MG SL tablet Place 1 tablet (0.4 mg total) under the tongue every 5 (five) minutes as needed for Chest pain.    pantoprazole (PROTONIX) 40 MG tablet Take 1 tablet (40 mg total) by mouth once daily.    rosuvastatin (CRESTOR) 40 MG Tab Take 1 tablet (40 mg total) by mouth every evening.    SURE COMFORT PEN NEEDLE 32 gauge x 5/32" Ndle AS DIRECTED     Family History       Problem Relation (Age of Onset)    Diabetes Brother    Heart disease " Father    Neurofibromatosis Brother    No Known Problems Mother, Brother, Maternal Grandmother, Maternal Grandfather, Paternal Grandmother, Paternal Grandfather    Throat cancer Sister          Tobacco Use    Smoking status: Former     Current packs/day: 0.00     Average packs/day: 0.1 packs/day for 3.0 years (0.3 ttl pk-yrs)     Types: Cigarettes     Start date:      Quit date:      Years since quittin.7     Passive exposure: Past    Smokeless tobacco: Never    Tobacco comments:     Quit 10/15/1976   Substance and Sexual Activity    Alcohol use: Not Currently     Comment: occasionally    Drug use: Never    Sexual activity: Not Currently     Review of Systems   Constitutional:  Negative for appetite change, fatigue and fever.   HENT:  Negative for congestion, hearing loss and trouble swallowing.    Respiratory:  Negative for chest tightness, shortness of breath and wheezing.    Cardiovascular:  Negative for chest pain and palpitations.   Gastrointestinal:  Positive for constipation and rectal pain. Negative for abdominal pain and nausea.   Genitourinary:  Negative for difficulty urinating and dysuria.   Musculoskeletal:  Positive for gait problem. Negative for back pain and neck stiffness.   Skin:  Negative for pallor and rash.   Neurological:  Negative for dizziness, speech difficulty and headaches.   Psychiatric/Behavioral:  Positive for confusion. Negative for suicidal ideas.      Objective:     Vital Signs (Most Recent):  Temp: 97.4 °F (36.3 °C) (09/15/24 1214)  Pulse: 82 (09/15/24 0000)  Resp: (!) 21 (09/15/24 0000)  BP: (!) 95/50 (09/15/24 0000)  SpO2: 100 % (24 1604) Vital Signs (24h Range):  Temp:  [97.4 °F (36.3 °C)-98 °F (36.7 °C)] 97.4 °F (36.3 °C)  Pulse:  [82] 82  Resp:  [21] 21  BP: ()/(50-59) 95/50     Weight: 78.9 kg (173 lb 15.1 oz)  Body mass index is 26.45 kg/m².     Physical Exam  Vitals reviewed.   Constitutional:       General: He is awake. He is not in acute  "distress.     Appearance: He is well-developed. He is not toxic-appearing.   HENT:      Head: Normocephalic.      Nose: Nose normal.      Mouth/Throat:      Pharynx: Oropharynx is clear.   Eyes:      Extraocular Movements: Extraocular movements intact.      Pupils: Pupils are equal, round, and reactive to light.   Neck:      Thyroid: No thyroid mass.      Vascular: No carotid bruit.   Cardiovascular:      Rate and Rhythm: Normal rate and regular rhythm.      Pulses: Normal pulses.      Heart sounds: Normal heart sounds. No murmur heard.  Pulmonary:      Effort: Pulmonary effort is normal.      Breath sounds: Normal breath sounds and air entry. No wheezing.   Abdominal:      General: Bowel sounds are normal. There is no distension.      Palpations: Abdomen is soft.      Tenderness: There is no abdominal tenderness.   Musculoskeletal:         General: Normal range of motion.      Cervical back: Neck supple. No rigidity.   Skin:     General: Skin is warm.      Coloration: Skin is not jaundiced.      Findings: Lesion present.      Comments: Dressing over surgical site to perirectal area    Sacral / perirectal wound she is clear and being taking care of/ reviewed photos   Neurological:      General: No focal deficit present.      Mental Status: He is alert and oriented to person, place, and time.      Cranial Nerves: No cranial nerve deficit.      Comments: Patient is oriented and was able to give me a fairly good history but less several details out and got mixed up on some things had to be straightened out from records   Psychiatric:         Attention and Perception: Attention normal.         Mood and Affect: Mood normal.         Behavior: Behavior normal. Behavior is cooperative.         Thought Content: Thought content normal.         Cognition and Memory: Cognition normal.          Significant Labs: All pertinent labs within the past 24 hours have been reviewed.  BMP:   No results for input(s): "GLU", "NA", "K", " ""CL", "CO2", "BUN", "CREATININE", "CALCIUM", "MG" in the last 48 hours.      CBC:   No results for input(s): "WBC", "HGB", "HCT", "PLT" in the last 48 hours.    CMP:   No results for input(s): "NA", "K", "CL", "CO2", "GLU", "BUN", "CREATININE", "CALCIUM", "PROT", "ALBUMIN", "BILITOT", "ALKPHOS", "AST", "ALT", "ANIONGAP", "EGFRNONAA" in the last 48 hours.    Invalid input(s): "ESTGFAFRICA"        Significant Imaging: I have reviewed all pertinent imaging results/findings within the past 24 hours.        Intake/Output - Last 3 Shifts         09/13 0700  09/14 0659 09/14 0700  09/15 0659 09/15 0700  09/16 0659    P.O. 1900 1320     I.V. (mL/kg)       IV Piggyback       Total Intake(mL/kg) 1900 (24.1) 1320 (16.7)     Net +1900 +1320            Urine Occurrence 7 x 4 x     Stool Occurrence 6 x 3 x           Microbiology Results (last 7 days)       ** No results found for the last 168 hours. **            "

## 2024-09-15 NOTE — PLAN OF CARE
Problem: Adult Inpatient Plan of Care  Goal: Plan of Care Review  Outcome: Progressing  Flowsheets (Taken 9/15/2024 1847)  Plan of Care Reviewed With:   patient   spouse  Goal: Optimal Comfort and Wellbeing  Outcome: Progressing     Problem: Diabetes Comorbidity  Goal: Blood Glucose Level Within Targeted Range  Outcome: Progressing  Intervention: Monitor and Manage Glycemia  Flowsheets (Taken 9/15/2024 1847)  Glycemic Management:   blood glucose monitored   supplemental insulin given     Problem: Fall Injury Risk  Goal: Absence of Fall and Fall-Related Injury  Outcome: Progressing

## 2024-09-16 VITALS
HEART RATE: 64 BPM | SYSTOLIC BLOOD PRESSURE: 142 MMHG | DIASTOLIC BLOOD PRESSURE: 59 MMHG | TEMPERATURE: 97 F | HEIGHT: 68 IN | OXYGEN SATURATION: 100 % | BODY MASS INDEX: 26.36 KG/M2 | RESPIRATION RATE: 15 BRPM | WEIGHT: 173.94 LBS

## 2024-09-16 PROBLEM — N17.9 AKI (ACUTE KIDNEY INJURY): Status: RESOLVED | Noted: 2024-09-08 | Resolved: 2024-09-16

## 2024-09-16 PROBLEM — L30.9 DERMATITIS: Status: RESOLVED | Noted: 2024-09-02 | Resolved: 2024-09-16

## 2024-09-16 LAB
ANION GAP SERPL CALCULATED.3IONS-SCNC: 11 MMOL/L (ref 7–16)
BASOPHILS # BLD AUTO: 0.17 K/UL (ref 0–0.2)
BASOPHILS NFR BLD AUTO: 2.6 % (ref 0–1)
BUN SERPL-MCNC: 44 MG/DL (ref 7–18)
BUN/CREAT SERPL: 24 (ref 6–20)
CALCIUM SERPL-MCNC: 8.8 MG/DL (ref 8.5–10.1)
CHLORIDE SERPL-SCNC: 109 MMOL/L (ref 98–107)
CO2 SERPL-SCNC: 25 MMOL/L (ref 21–32)
CREAT SERPL-MCNC: 1.85 MG/DL (ref 0.7–1.3)
DIFFERENTIAL METHOD BLD: ABNORMAL
EGFR (NO RACE VARIABLE) (RUSH/TITUS): 35 ML/MIN/1.73M2
EOSINOPHIL # BLD AUTO: 0.5 K/UL (ref 0–0.5)
EOSINOPHIL NFR BLD AUTO: 7.6 % (ref 1–4)
ERYTHROCYTE [DISTWIDTH] IN BLOOD BY AUTOMATED COUNT: 14.5 % (ref 11.5–14.5)
GLUCOSE SERPL-MCNC: 112 MG/DL (ref 70–105)
GLUCOSE SERPL-MCNC: 123 MG/DL (ref 74–106)
GLUCOSE SERPL-MCNC: 258 MG/DL (ref 70–105)
HCT VFR BLD AUTO: 34 % (ref 40–54)
HGB BLD-MCNC: 10.5 G/DL (ref 13.5–18)
IMM GRANULOCYTES # BLD AUTO: 0.02 K/UL (ref 0–0.04)
IMM GRANULOCYTES NFR BLD: 0.3 % (ref 0–0.4)
LYMPHOCYTES # BLD AUTO: 1.76 K/UL (ref 1–4.8)
LYMPHOCYTES NFR BLD AUTO: 26.8 % (ref 27–41)
MCH RBC QN AUTO: 29 PG (ref 27–31)
MCHC RBC AUTO-ENTMCNC: 30.9 G/DL (ref 32–36)
MCV RBC AUTO: 93.9 FL (ref 80–96)
MONOCYTES # BLD AUTO: 0.85 K/UL (ref 0–0.8)
MONOCYTES NFR BLD AUTO: 13 % (ref 2–6)
MPC BLD CALC-MCNC: 11.4 FL (ref 9.4–12.4)
NEUTROPHILS # BLD AUTO: 3.26 K/UL (ref 1.8–7.7)
NEUTROPHILS NFR BLD AUTO: 49.7 % (ref 53–65)
NRBC # BLD AUTO: 0 X10E3/UL
NRBC, AUTO (.00): 0 %
PLATELET # BLD AUTO: 278 K/UL (ref 150–400)
POTASSIUM SERPL-SCNC: 4.5 MMOL/L (ref 3.5–5.1)
RBC # BLD AUTO: 3.62 M/UL (ref 4.6–6.2)
SODIUM SERPL-SCNC: 140 MMOL/L (ref 136–145)
WBC # BLD AUTO: 6.56 K/UL (ref 4.5–11)

## 2024-09-16 PROCEDURE — 36415 COLL VENOUS BLD VENIPUNCTURE: CPT | Performed by: HOSPITALIST

## 2024-09-16 PROCEDURE — 80048 BASIC METABOLIC PNL TOTAL CA: CPT | Performed by: HOSPITALIST

## 2024-09-16 PROCEDURE — 25000003 PHARM REV CODE 250: Performed by: FAMILY MEDICINE

## 2024-09-16 PROCEDURE — 63600175 PHARM REV CODE 636 W HCPCS: Performed by: FAMILY MEDICINE

## 2024-09-16 PROCEDURE — 85025 COMPLETE CBC W/AUTO DIFF WBC: CPT | Performed by: HOSPITALIST

## 2024-09-16 PROCEDURE — 99239 HOSP IP/OBS DSCHRG MGMT >30: CPT | Mod: GC,,, | Performed by: HOSPITALIST

## 2024-09-16 PROCEDURE — 82962 GLUCOSE BLOOD TEST: CPT

## 2024-09-16 RX ORDER — ASPIRIN 81 MG/1
81 TABLET ORAL DAILY
Qty: 30 TABLET | Refills: 3 | Status: SHIPPED | OUTPATIENT
Start: 2024-09-17 | End: 2025-09-17

## 2024-09-16 RX ADMIN — ZINC SULFATE 220 MG (50 MG) CAPSULE 220 MG: CAPSULE at 09:09

## 2024-09-16 RX ADMIN — OXYCODONE HYDROCHLORIDE AND ACETAMINOPHEN 500 MG: 500 TABLET ORAL at 09:09

## 2024-09-16 RX ADMIN — CHOLECALCIFEROL TAB 125 MCG (5000 UNIT) 5000 UNITS: 125 TAB at 09:09

## 2024-09-16 RX ADMIN — INSULIN ASPART 6 UNITS: 100 INJECTION, SOLUTION INTRAVENOUS; SUBCUTANEOUS at 12:09

## 2024-09-16 RX ADMIN — CLOPIDOGREL BISULFATE 75 MG: 75 TABLET ORAL at 09:09

## 2024-09-16 RX ADMIN — ASPIRIN 81 MG: 81 TABLET, COATED ORAL at 09:09

## 2024-09-16 NOTE — ASSESSMENT & PLAN NOTE
"Patient's FSGs are controlled on current medication regimen.  Last A1c reviewed-   Lab Results   Component Value Date    HGBA1C 6.7 (H) 08/23/2024     Most recent fingerstick glucose reviewed- No results for input(s): "POCTGLUCOSE" in the last 24 hours.  Current correctional scale  Medium  Maintain anti-hyperglycemic dose as follows-   Antihyperglycemics (From admission, onward)      Start     Stop Route Frequency Ordered    08/29/24 1556  insulin aspart U-100 injection 0-10 Units         -- SubQ Before meals & nightly PRN 08/29/24 1558          Hold Oral hypoglycemics while patient is in the hospital.    9/6:  Episode of hypoglycemia to 69.  Will discontinue 20 units of Lantus and just use the sliding scale.  May discharge with 10 units of Lantus as able.  09/13 BS fair.    09/16 blood sugars doing well off of medication with slightly high sugars in the afternoon, will continue Farxiga    "

## 2024-09-16 NOTE — ASSESSMENT & PLAN NOTE
Creatine stable for now. BMP reviewed- noted Estimated Creatinine Clearance: 28.2 mL/min (A) (based on SCr of 1.85 mg/dL (H)). according to latest data. Based on current GFR, CKD stage is stage 3 - GFR 30-59.  Monitor UOP and serial BMP and adjust therapy as needed. Renally dose meds. Avoid nephrotoxic medications and procedures.

## 2024-09-16 NOTE — ASSESSMENT & PLAN NOTE
Patient with Paroxysmal (<7 days) atrial fibrillation which is controlled currently with  no medications . Patient is currently in atrial fibrillation.[id number] Score: 4. HASBLED Score: 1. Anticoagulation not indicated due to extensive wound, risk of bleeding, possible need for further surgery .

## 2024-09-16 NOTE — ASSESSMENT & PLAN NOTE
Further discussed with surgery upon admission to [facility], recommendations for wound care to manage and will re-consult if further surgical needs arise.     Continue antibiotics and dressing changes.   ---  Perirectal abscess now status-post extensive debridement and revision. Subsequent large sacral and perineal wound now healing appropriately.     Anaerobic culture positive, currently receiving treatment with clindamycin and Zosyn. Will likely require extended course due to location of wound and risk of contamination.     - aggressive wound care including dressing changes daily and with each bowel movement  - antibiotics as below    9/4: continue with aggressive wound care and antibiotics for now.     9/5:  Without complaints today although he had couple episodes of diarrhea.  Continue with current care.    9/6:  Continue with IV antibiotics aggressive wound care.    9/7:  Continue with IV antibiotics and aggressive wound care.  Patient is having some diarrhea and Imodium was ordered.  This is a chronic issue for patient.    9/8:  Will discuss with General surgery but may be able to stop IV antibiotics in the coming days.     9/9:  Will continue antibiotics for now but will monitor renal function as well.    9/10: Discuss wound with general surgery.  Anticipate discharge home in next 5-7 days.  Wife stated the wound is challenging for her to manage given its location.     9/11:  Preparing dressings for wound changes and other items in family will be able to take patient home on Saturday on Monday.  Will discuss with Dr. Lopez who will take over care.  Clear to leave from surgery standpoint per Dr. Butcher.     Antibiotics (From admission, onward)      None             09/12  Surgery to review and look at discharge needs    09/16 talked with surgery and to use wet-to-dry dressings on wound and have seen at wound care in 1 week

## 2024-09-16 NOTE — ASSESSMENT & PLAN NOTE
Chronic, controlled. Latest blood pressure and vitals reviewed-     Temp:  [97.4 °F (36.3 °C)-98.2 °F (36.8 °C)]   Pulse:  [56-69]   Resp:  [16-20]   BP: (146)/(55)   SpO2:  [100 %] .   Home meds for hypertension were reviewed and noted below.   Hypertension Medications               nitroGLYCERIN (NITROSTAT) 0.4 MG SL tablet Place 1 tablet (0.4 mg total) under the tongue every 5 (five) minutes as needed for Chest pain.            While in the hospital, will manage blood pressure as follows; Continue home antihypertensive regimen    Will utilize p.r.n. blood pressure medication only if patient's blood pressure greater than 180/110 and he develops symptoms such as worsening chest pain or shortness of breath.

## 2024-09-16 NOTE — DISCHARGE SUMMARY
[facility] Medicine  Discharge Summary      Patient Name: Negro Hale  MRN: [id number]  CHRISTY: [id number]  Patient Class: IP- Inpatient  Admission Date: 8/29/2024  Hospital Length of Stay: 18 days  Discharge Date and Time:  09/16/2024 9:49 AM  Attending Physician: Colin Lopez MD   Discharging Provider: Colin Lopez MD  Primary Care Provider: Smiley Trevino FNP    Primary Care Team: Networked reference to record PCT     HPI:     Negro Hale is a 84 y.o. male with history of HTN, CAD, CKD, IBS, and a-fib who initially presented to [facility] with perirectal abscess. Surgical evaluation revealed necrotizing soft tissue infection requiring extensive debridement with revision (most recent procedure 8/26). Anaerobic cultures positive and patient placed on appropriate antibiotics. Initial blood culture with micrococcus species thought to be a contaminant, with repeat cultures negative. Disposition complicated by diarrhea requiring frequent wound care and dressing changes due to soiling, as well and deconditioning due to acute illness and hospitalization. Patient admitted to [facility] for continued antibiotics, rehabilitation, and aggressive wound care.       * No surgery found *      Hospital Course:   09/12 - records reviewed.  Patient initially admitted to [facility] 08/22 with rectal discomfort.  Diagnosed initially with rectal abscess and underwent surgery 08/23 by .  Found to have necrotizing soft tissue infection.  One blood culture then positive for Micrococcus which could be a contaminate.  Has been treated with intravenous antibiotics.  Has improved and mobile independently now with walker.  Discussed with surgery and they are going to review patient's needs before discharge.      Past medical history:  -hypertension greater than 20 years  -diabetes mellitus greater than 20 years.  States takes 1 shot of insulin 45 units daily.  -coronary  arteriosclerosis has seen Dr. Bridges with [facility] mid LCx 2/26/2007  (patient did not remember this and obtained from records)  -paroxysmal atrial fibrillation with rhythm being in normal sinus  -dyslipidemia  -chronic back pain with past vertebral fracture followed by Dr. Destin Leroy    09/13 Looks good sitting up and no complaints. Talked with surgery. Plan home when family ready first of week.  09/14 in room watching TV without complaints.  Family at ask nurse about his longstanding slight dementia.  Can see neurology outpatient to evaluate for medication  09/15 family in room.  No new issues.  Plan for discharge in a.m.    09/16 no new issues.  Plan for discharge home today.  Checked with surgery and recommended wet-to-dry dressings until seen in wound care.  [facility]  With issues of decreased memory will also have follow up with Neurology.  Discharged.    Patient counseled on the importance of keeping all hospital follow up appointments. Stressed compliance with medications, therapies, or devices as prescribed in order to provide for the best health outcomes and decrease the risk of reoccurrence or further progression of issues.    Additionally, patient given written literature regarding their current disease processes and home care recommendations.   Patient given opportunity to ask questions and verbalized understanding of all information discussed.  Reinforced patient's primary care provider can be a big assets in monitoring and organizing issues after discharge.       Goals of Care Treatment Preferences:  Code Status: Full Code         Consults:     Cardiac/Vascular  Paroxysmal atrial fibrillation  Patient with Paroxysmal (<7 days) atrial fibrillation which is controlled currently with  no medications . Patient is currently in atrial fibrillation.[id number] Score: 4. HASBLED Score: 1. Anticoagulation not indicated due to extensive wound, risk of bleeding, possible need for further  surgery .    Atherosclerosis of native coronary artery of native heart without angina pectoris  Patient with known CAD s/p stent placement, which is controlled Will continue ASA and Statin and monitor for S/Sx of angina/ACS. Continue to monitor on telemetry.     Resume Plavix 9/3 and monitor closely for signs of bleeding.     Primary hypertension  Chronic, controlled. Latest blood pressure and vitals reviewed-     Temp:  [97.4 °F (36.3 °C)-98.2 °F (36.8 °C)]   Pulse:  [56-69]   Resp:  [16-20]   BP: (146)/(55)   SpO2:  [100 %] .   Home meds for hypertension were reviewed and noted below.   Hypertension Medications               nitroGLYCERIN (NITROSTAT) 0.4 MG SL tablet Place 1 tablet (0.4 mg total) under the tongue every 5 (five) minutes as needed for Chest pain.            While in the hospital, will manage blood pressure as follows; Continue home antihypertensive regimen    Will utilize p.r.n. blood pressure medication only if patient's blood pressure greater than 180/110 and he develops symptoms such as worsening chest pain or shortness of breath.    Renal/  Stage 3a chronic kidney disease  Creatine stable for now. BMP reviewed- noted Estimated Creatinine Clearance: 28.2 mL/min (A) (based on SCr of 1.85 mg/dL (H)). according to latest data. Based on current GFR, CKD stage is stage 3 - GFR 30-59.  Monitor UOP and serial BMP and adjust therapy as needed. Renally dose meds. Avoid nephrotoxic medications and procedures.    JANET (acute kidney injury)-resolved as of 9/16/2024  JANET is likely due to pre-renal azotemia due to dehydration. Baseline creatinine is  GFR >50 . Most recent creatinine and eGFR are listed below.  Recent Labs     09/16/24  0417   CREATININE 1.85*   EGFRNORACEVR 35*        Plan  - JANET is worsening. Will adjust treatment as follows: IV fluids  - Avoid nephrotoxins and renally dose meds for GFR listed above  - Monitor urine output, serial BMP, and adjust therapy as needed    Renal function panel and  added IV fluids.     9/9:  Continue with IV fluids encourage patient to take p.o..  Follow up BNP.    ID  * Necrotizing soft tissue infection  Further discussed with surgery upon admission to [facility], recommendations for wound care to manage and will re-consult if further surgical needs arise.     Continue antibiotics and dressing changes.   ---  Perirectal abscess now status-post extensive debridement and revision. Subsequent large sacral and perineal wound now healing appropriately.     Anaerobic culture positive, currently receiving treatment with clindamycin and Zosyn. Will likely require extended course due to location of wound and risk of contamination.     - aggressive wound care including dressing changes daily and with each bowel movement  - antibiotics as below    9/4: continue with aggressive wound care and antibiotics for now.     9/5:  Without complaints today although he had couple episodes of diarrhea.  Continue with current care.    9/6:  Continue with IV antibiotics aggressive wound care.    9/7:  Continue with IV antibiotics and aggressive wound care.  Patient is having some diarrhea and Imodium was ordered.  This is a chronic issue for patient.    9/8:  Will discuss with General surgery but may be able to stop IV antibiotics in the coming days.     9/9:  Will continue antibiotics for now but will monitor renal function as well.    9/10: Discuss wound with general surgery.  Anticipate discharge home in next 5-7 days.  Wife stated the wound is challenging for her to manage given its location.     9/11:  Preparing dressings for wound changes and other items in family will be able to take patient home on Saturday on Monday.  Will discuss with Dr. Lopez who will take over care.  Clear to leave from surgery standpoint per Dr. Butcher.     Antibiotics (From admission, onward)      None             09/12  Surgery to review and look at discharge needs    09/16 talked with surgery and to use wet-to-dry dressings  "on wound and have seen at wound care in 1 week    Endocrine  Type 2 diabetes mellitus with hyperglycemia, with long-term current use of insulin  Patient's FSGs are controlled on current medication regimen.  Last A1c reviewed-   Lab Results   Component Value Date    HGBA1C 6.7 (H) 08/23/2024     Most recent fingerstick glucose reviewed- No results for input(s): "POCTGLUCOSE" in the last 24 hours.  Current correctional scale  Medium  Maintain anti-hyperglycemic dose as follows-   Antihyperglycemics (From admission, onward)      Start     Stop Route Frequency Ordered    08/29/24 1556  insulin aspart U-100 injection 0-10 Units         -- SubQ Before meals & nightly PRN 08/29/24 1558          Hold Oral hypoglycemics while patient is in the hospital.    9/6:  Episode of hypoglycemia to 69.  Will discontinue 20 units of Lantus and just use the sliding scale.  May discharge with 10 units of Lantus as able.  09/13 BS fair.    09/16 blood sugars doing well off of medication with slightly high sugars in the afternoon, will continue Farxiga      GI  Irritable bowel syndrome with both constipation and diarrhea  Long history of alternating diarrhea and constipation. Currently having diarrhea likely exacerbated by antibiotics which unfortunately necessitates multiple daily dressing changes. Continue probiotics and psyllium.    Other  Abnormality of gait and mobility  Continue PT/OT    09/13 independent with walker      Final Active Diagnoses:    Diagnosis Date Noted POA    PRINCIPAL PROBLEM:  Necrotizing soft tissue infection [M79.89] 08/23/2024 Yes    Irritable bowel syndrome with both constipation and diarrhea [K58.2] 08/29/2024 Yes    Paroxysmal atrial fibrillation [I48.0] 08/23/2024 Yes    Abnormality of gait and mobility [R26.9] 02/21/2024 Yes    Type 2 diabetes mellitus with hyperglycemia, with long-term current use of insulin [E11.65, Z79.4] 02/01/2024 Not Applicable     Chronic    Atherosclerosis of native coronary artery " of native heart without angina pectoris [I25.10] 03/17/2021 Yes     Chronic    Stage 3a chronic kidney disease [N18.31] 03/17/2021 Yes     Chronic    Primary hypertension [I10] 03/17/2021 Yes      Problems Resolved During this Admission:    Diagnosis Date Noted Date Resolved POA    JANET (acute kidney injury) [N17.9] 09/08/2024 09/16/2024 Yes    Dermatitis [L30.9] 09/02/2024 09/16/2024 No       Discharged Condition: good    Disposition: Home-Health Care [facility]    Follow Up:   Follow-up Information       Smiley Trevino FNP. Schedule an appointment as soon as possible for a visit in 1 week(s).    Specialty: Family Medicine  Contact information:  [address]  [place] [facility] [address]  [phone]               Donald Justice MD. Schedule an appointment as soon as possible for a visit in 3 week(s).    Specialty: Neurology  Contact information:  [address] [facility] [address]  [phone]               [facility]. Schedule an appointment as soon as possible for a visit in 1 week(s).    Specialty: Wound Care  Contact information:  [address] [address] [phone]                         Patient Instructions:      Ambulatory referral/consult to Neurology   Standing Status: Future   Referral Priority: Routine Referral Type: Consultation   Referral Reason: Specialty Services Required   Requested Specialty: Neurology   Number of Visits Requested: 1     Diet diabetic     Activity as tolerated       Significant Diagnostic Studies: Labs: BMP:   Recent Labs   Lab 09/16/24 0417   *      K 4.5   *   CO2 25   BUN 44*   CREATININE 1.85*   CALCIUM 8.8   , CMP   Recent Labs   Lab 09/16/24 0417      K 4.5   *   CO2 25   *   BUN 44*   CREATININE 1.85*   CALCIUM 8.8   ANIONGAP 11   , and CBC   Recent Labs   Lab 09/16/24 0417   WBC 6.56   HGB 10.5*   HCT 34.0*          Intake/Output - Last 3  "Shifts         09/14 0700  09/15 0659 09/15 0700  09/16 0659 09/16 0700  09/17 0659    P.O. 1320      Total Intake(mL/kg) 1320 (16.7)      Net +1320             Urine Occurrence 4 x 6 x     Stool Occurrence 3 x 6 x           Microbiology Results (last 7 days)       ** No results found for the last 168 hours. **                  Pending Diagnostic Studies:       None           Medications:  Reconciled Home Medications:      Medication List        START taking these medications      aspirin 81 MG EC tablet  Commonly known as: ECOTRIN  Take 1 tablet (81 mg total) by mouth once daily.  Start taking on: September 17, 2024            CONTINUE taking these medications      cholecalciferol (vitamin D3) 125 mcg (5,000 unit) capsule  Take 5,000 Units by mouth once daily.     clopidogreL 75 mg tablet  Commonly known as: PLAVIX  Take 1 tablet (75 mg total) by mouth once daily.     coenzyme Q10 200 mg capsule  Take 200 mg by mouth once daily.     dapagliflozin propanediol 10 mg tablet  Commonly known as: FARXIGA  Take 1 tablet (10 mg total) by mouth once daily.     FREESTYLE ADORE 3 SENSOR Giulia  Generic drug: blood-glucose sensor  1 each by Misc.(Non-Drug; Combo Route) route once.     nitroGLYCERIN 0.4 MG SL tablet  Commonly known as: NITROSTAT  Place 1 tablet (0.4 mg total) under the tongue every 5 (five) minutes as needed for Chest pain.     pantoprazole 40 MG tablet  Commonly known as: PROTONIX  Take 1 tablet (40 mg total) by mouth once daily.     rosuvastatin 40 MG Tab  Commonly known as: CRESTOR  Take 1 tablet (40 mg total) by mouth every evening.     VITAMIN B-12 1000 MCG tablet  Generic drug: cyanocobalamin  Take 100 mcg by mouth once daily.            STOP taking these medications      glipiZIDE 5 MG Tr24     LANTUS SOLOSTAR U-100 INSULIN 100 unit/mL (3 mL) Inpn pen  Generic drug: insulin glargine U-100 (Lantus)            ASK your doctor about these medications      SURE COMFORT PEN NEEDLE 32 gauge x 5/32" Ndle  Generic " drug: pen needle, diabetic  AS DIRECTED              Indwelling Lines/Drains at time of discharge:   Lines/Drains/Airways       None                   Time spent on the discharge of patient: 35 minutes         Colin Lopez MD  Department of Hospital Medicine  [facility]

## 2024-09-16 NOTE — ASSESSMENT & PLAN NOTE
JANET is likely due to pre-renal azotemia due to dehydration. Baseline creatinine is  GFR >50 . Most recent creatinine and eGFR are listed below.  Recent Labs     09/16/24  0417   CREATININE 1.85*   EGFRNORACEVR 35*        Plan  - JANET is worsening. Will adjust treatment as follows: IV fluids  - Avoid nephrotoxins and renally dose meds for GFR listed above  - Monitor urine output, serial BMP, and adjust therapy as needed    Renal function panel and added IV fluids.     9/9:  Continue with IV fluids encourage patient to take p.o..  Follow up BNP.

## 2024-09-16 NOTE — DISCHARGE INSTRUCTIONS
Home Health to do wet-to-dry dressings on wound in buttocks area until follows up in wound care in 1 week

## 2024-09-16 NOTE — PLAN OF CARE
[facility]  Discharge Final Note    Primary Care Provider: Smiley Trevino FNP    Expected Discharge Date: 9/16/2024    Final Discharge Note (most recent)       Final Note - 09/16/24 1201          Final Note    Assessment Type Final Discharge Note     Anticipated Discharge Disposition Home-Health Care [facility]    What phone number can be called within the next 1-3 days to see how you are doing after discharge? [phone]   Simultaneous filing. User may not have seen previous data.    Hospital Resources/Appts/Education Provided Provided patient/caregiver with written discharge plan information;Appointments scheduled and added to AVS        Post-Acute Status    Post-Acute Authorization [facility]     Home Health Status Referrals Sent   [facility]    Coverage Medicare     Patient choice form signed by patient/caregiver List with quality metrics by geographic area provided                 Pt. Will d/c home today with spouse. Scheduled appt. With [facility], Smiley Trevino, NP, and PCP, and with Dr. Donald Justice, Neurology. Spoke with Michelle at [facility] and faxed d/c orders and d/c summary to them with plans for them to come out to admit pt. Tomorrow. Discussed d/c with spouse, Virginia, via phone and she was in room with pt. She voiced understanding of appointments and that HH would be coming out tomorrow. No other d/c needs were identified.    Important Message from Medicare  Important Message from Medicare regarding Discharge Appeal Rights: Given to patient/caregiver, Explained to patient/caregiver, Signed/date by patient/caregiver     Date IMM was signed: 09/13/24  Time IMM was signed: 1100 (received consent from spouse, Yareli Hale via phone call)    Contact Info       Smiley Trevino FNP   Specialty: Family Medicine   Relationship: PCP - General    [address] [place]  [place] [address]   Phone:  [phone]       Next Steps: Schedule an appointment as soon as possible for a visit in 1 week(s)    Donald Justice MD   Specialty: Neurology    [address] [facility] [address]   Phone: [phone]       Next Steps: Schedule an appointment as soon as possible for a visit in 3 week(s)    [facility]   Specialty: Wound Care    [address] [facility] [address]   Phone: [phone]       Next Steps: Schedule an appointment as soon as possible for a visit in 1 week(s)

## 2024-09-16 NOTE — PROGRESS NOTES
[facility]  Wound Care    Patient Name:  Negro Hale   MRN:  [id number]  Date: 9/16/2024  Diagnosis: Necrotizing soft tissue infection    History:     Past Medical History:   Diagnosis Date    Abnormal thyroid stimulating hormone (TSH) level 08/22/2019    Anemia 08/15/2019    Atrial fibrillation 09/07/2012    Bradycardia 05/23/2017    asymptomatic    Change in bowel habit 11/13/2018    Chronic kidney disease, unspecified 01/14/2019    Coronary arteriosclerosis 09/07/2012    Disease of skin and subcutaneous tissue 08/16/2017    medial aspect RLE- cystic structure seen on venous doppler US.    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 Lewis Eye exam    Varicose veins of both lower extremities with pain 01/02/2019    Venous insufficiency 01/08/2019       Social History     Socioeconomic History    Marital status:    Tobacco  Use    Smoking status: Former     Current packs/day: 0.00     Average packs/day: 0.1 packs/day for 3.0 years (0.3 ttl pk-yrs)     Types: Cigarettes     Start date:      Quit date: 1960     Years since quittin.7     Passive exposure: Past    Smokeless tobacco: Never    Tobacco comments:     Quit 10/15/1976   Substance and Sexual Activity    Alcohol use: Not Currently     Comment: occasionally    Drug use: Never    Sexual activity: Not Currently     Social Determinants of Health     Financial Resource Strain: Low Risk  (2024)    Overall Financial Resource Strain (CARDIA)     Difficulty of Paying Living Expenses: Not hard at all   Food Insecurity: No Food Insecurity (2024)    Hunger Vital Sign     Worried About Running Out of Food in the Last Year: Never true     Ran Out of Food in the Last Year: Never true   Transportation Needs: No Transportation Needs (2024)    TRANSPORTATION NEEDS     Transportation : No   Physical Activity: Inactive (2024)    Exercise Vital Sign     Days of Exercise per Week: 0 days     Minutes of Exercise per Session: 0 min   Stress: No Stress Concern Present (2024)    [place] [employer] - Occupational Stress Questionnaire     Feeling of Stress : Not at all   Housing Stability: Low Risk  (2024)    Housing Stability Vital Sign     Unable to Pay for Housing in the Last Year: No     Homeless in the Last Year: No       Precautions:     Allergies as of 2024 - Reviewed 2024   Allergen Reaction Noted    Mayonnaise  03/10/2021       [facility] Assessment Details/Treatment     Narrative: Seen patient for evaluation of wounds    Patient in bed, Alert. Wife at bedside. Perirectal wound improving, epithelization noted to outer edges. Pink moist tissue noted. 1 clock undermining improving.Measurement 7.0 cm x 3.0 cm x1,0 cm.  Applied moist vashe gauze and covered with dry dressing. Left AC and Left posterior upper arm skin tears are improving.  Applied Mepliex Foam border. Has Mepliex foam border to heels. Instructed and demonstrated to wife how to clean and dress perirectal wound.   Wife states d/c home today.   Follow up with Dr Butcher    09/16/2024

## 2024-09-17 ENCOUNTER — PATIENT OUTREACH (OUTPATIENT)
Dept: ADMINISTRATIVE | Facility: CLINIC | Age: 84
End: 2024-09-17
Payer: MEDICARE

## 2024-09-17 NOTE — PROGRESS NOTES
C3 nurse attempted to contact patient. The following occurred:   C3 nurse attempted to contact Negro Hale  for a TCC post hospital discharge follow up call. The patient is unable to conduct the call @ this time. The patient requested a callback.    The patient has a scheduled HOSFU appointment with Smiley Trevino FNP  on 9/18/24 @ 1000.

## 2024-09-18 ENCOUNTER — OFFICE VISIT (OUTPATIENT)
Dept: FAMILY MEDICINE | Facility: CLINIC | Age: 84
End: 2024-09-18
Payer: MEDICARE

## 2024-09-18 VITALS
BODY MASS INDEX: 25.73 KG/M2 | DIASTOLIC BLOOD PRESSURE: 53 MMHG | HEIGHT: 68 IN | SYSTOLIC BLOOD PRESSURE: 130 MMHG | OXYGEN SATURATION: 98 % | WEIGHT: 169.81 LBS | RESPIRATION RATE: 18 BRPM | TEMPERATURE: 98 F | HEART RATE: 62 BPM

## 2024-09-18 DIAGNOSIS — N18.32 STAGE 3B CHRONIC KIDNEY DISEASE: ICD-10-CM

## 2024-09-18 DIAGNOSIS — Z79.4 TYPE 2 DIABETES MELLITUS WITH HYPERGLYCEMIA, WITH LONG-TERM CURRENT USE OF INSULIN: Chronic | ICD-10-CM

## 2024-09-18 DIAGNOSIS — M79.89 NECROTIZING SOFT TISSUE INFECTION: Primary | ICD-10-CM

## 2024-09-18 DIAGNOSIS — E11.65 TYPE 2 DIABETES MELLITUS WITH HYPERGLYCEMIA, WITH LONG-TERM CURRENT USE OF INSULIN: Chronic | ICD-10-CM

## 2024-09-18 PROBLEM — N18.31 STAGE 3A CHRONIC KIDNEY DISEASE: Chronic | Status: RESOLVED | Noted: 2021-03-17 | Resolved: 2024-09-18

## 2024-09-18 PROCEDURE — 99496 TRANSJ CARE MGMT HIGH F2F 7D: CPT | Mod: ,,, | Performed by: NURSE PRACTITIONER

## 2024-09-18 NOTE — PROGRESS NOTES
[facility] CARE [place] - FAMILY MEDICINE       PATIENT NAME: Negro Hale   : 1940    AGE: 84 y.o. DATE OF ENCOUNTER: 24    MRN: [id number]      PCP: Smiley Trevino FNP    Reason for Visit / Chief Complaint:  Transitional Care (Patient reports to the clinic for [facility] follow up.) and Health Maintenance (Care gaps addressed, patient had his eye exam with Dr Lewis this year, declines all vaccines today.//Namita Gallardo CMA)         274}    Subjective:     HPI:    Negro Hale presents for a Transitional Care Management hospital discharge follow-up visit. He was admitted to [facility] on 24 for necrotizing soft tissue infection of a perirectal abscess then was transferred to [facility] 24.  He was discharged home on 24.     [facility].   [facility]    Wife reports glucose has been up and down, 199 now per [facility] [facility].  He isn't sure when he saw Nicole Dewey so will have Apurva call her office to find out and schedule a f/u appt.  Also to let her know, he was hospitalized and advised to stop glipizide and his basal insulin, lantus, with glucose running high.  He is currently only taking Farxiga for DM control.  Maybe she can reach out to them by phone or get him in sooner to decide about resuming insulin.    Is seeing Neuro, Dr. Justice, in November for memory issues and balance problems.    Family and/or Caretaker present at visit?  Yes, wife.  Diagnostic tests reviewed/disposition: No diagnosic tests pending after this hospitalization.  Home health/community services discussion/referrals: Patient has home health established at [facility] .   Establishment or re-establishment of referral orders for community resources: No other necessary community resources.   Discussion with other health care providers: No discussion with other health care providers necessary.  "    Discharge and current medications have been reconciled.    Review of Systems:   Review of Systems   Constitutional: Negative.    HENT: Negative.     Eyes: Negative.    Respiratory: Negative.     Cardiovascular: Negative.    Gastrointestinal: Negative.    Endocrine: Negative.    Genitourinary: Negative.    Musculoskeletal: Negative.    Skin:  Positive for wound.   Allergic/Immunologic: Negative.    Neurological: Negative.    Hematological: Negative.    Psychiatric/Behavioral: Negative.         Allergies and Meds: 274}     Review of patient's allergies indicates:   Allergen Reactions    Mayonnaise      Upset stomach        Current Outpatient Medications:     aspirin (ECOTRIN) 81 MG EC tablet, Take 1 tablet (81 mg total) by mouth once daily., Disp: 30 tablet, Rfl: 3    blood-glucose sensor (FREESTYLE ADORE 3 SENSOR) Giulia, 1 each by Misc.(Non-Drug; Combo Route) route once., Disp: , Rfl:     cholecalciferol, vitamin D3, 125 mcg (5,000 unit) capsule, Take 5,000 Units by mouth once daily., Disp: , Rfl:     clopidogreL (PLAVIX) 75 mg tablet, Take 1 tablet (75 mg total) by mouth once daily., Disp: 90 tablet, Rfl: 3    coenzyme Q10 200 mg capsule, Take 200 mg by mouth once daily., Disp: , Rfl:     cyanocobalamin (VITAMIN B-12) 1000 MCG tablet, Take 100 mcg by mouth once daily., Disp: , Rfl:     dapagliflozin propanediol (FARXIGA) 10 mg tablet, Take 1 tablet (10 mg total) by mouth once daily., Disp: 90 tablet, Rfl: 3    nitroGLYCERIN (NITROSTAT) 0.4 MG SL tablet, Place 1 tablet (0.4 mg total) under the tongue every 5 (five) minutes as needed for Chest pain., Disp: 25 tablet, Rfl: 1    pantoprazole (PROTONIX) 40 MG tablet, Take 1 tablet (40 mg total) by mouth once daily., Disp: 90 tablet, Rfl: 3    rosuvastatin (CRESTOR) 40 MG Tab, Take 1 tablet (40 mg total) by mouth every evening., Disp: 90 tablet, Rfl: 3    SURE COMFORT PEN NEEDLE 32 gauge x 5/32" Ndle, AS DIRECTED, Disp: , Rfl:     Labs:274}    I have reviewed old labs " below:  Lab Results   Component Value Date    WBC 6.56 09/16/2024    RBC 3.62 (L) 09/16/2024    HGB 10.5 (L) 09/16/2024    HCT 34.0 (L) 09/16/2024     09/16/2024     09/16/2024    K 4.5 09/16/2024     (H) 09/16/2024    CALCIUM 8.8 09/16/2024     (H) 09/16/2024    BUN 44 (H) 09/16/2024    CREATININE 1.85 (H) 09/16/2024    ESTGFRAFRICA 64 03/19/2021    EGFRNONAA 45 (L) 05/19/2022    ALT 27 08/23/2024    AST 47 (H) 08/23/2024    INR 1.07 02/01/2024    CHOL 166 05/21/2024    TRIG 83 05/21/2024    HDL 50 05/21/2024    LDLCALC 99 05/21/2024    TSH 2.780 05/21/2024    PSA 0.636 03/19/2021    HGBA1C 6.7 (H) 08/23/2024    MICROALBUR 2.0 05/29/2024       Medical History: 274}     Past Medical History:   Diagnosis Date    Abnormal thyroid stimulating hormone (TSH) level 08/22/2019    Anemia 08/15/2019    Atrial fibrillation 09/07/2012    Bradycardia 05/23/2017    asymptomatic    Change in bowel habit 11/13/2018    Chronic kidney disease, unspecified 01/14/2019    Coronary arteriosclerosis 09/07/2012    Disease of skin and subcutaneous tissue 08/16/2017    medial aspect RLE- cystic structure seen on venous doppler US.    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 Leiws    Type 2 diabetes mellitus with mild nonproliferative retinopathy of right eye without macular edema 2023    See Dr. Lewis Eye exam    Varicose veins of both lower extremities with pain 2019    Venous insufficiency 2019      Social History     Tobacco Use   Smoking Status Former    Current packs/day: 0.00    Average packs/day: 0.1 packs/day for 3.0 years (0.3 ttl pk-yrs)    Types: Cigarettes    Start date:     Quit date:     Years since quittin.7    Passive exposure: Past   Smokeless Tobacco Never   Tobacco Comments    Quit 10/15/1976      Past Surgical History:   Procedure Laterality Date    APPENDECTOMY      CARDIAC CATHETERIZATION      [facility]- Stent placement    CATARACT EXTRACTION, BILATERAL      COLONOSCOPY  2019    Dr. Mendoza    HERNIA REPAIR      Inguinal hernia repair    INCISION AND DRAINAGE OF PERIRECTAL REGION Right 2024    Procedure: INCISION AND DRAINAGE, WITH DEBRIDEMENT PERIRECTAL REGION;  Surgeon: Pop Butcher MD;  Location: [facility];  Service: General;  Laterality: Right;    INCISION AND DRAINAGE OF PERIRECTAL REGION Bilateral 2024    Procedure: INCISION AND DRAINAGE, PERIRECTAL REGION;  Surgeon: Pop Butcher MD;  Location: [facility] [place] OR;  Service: General;  Laterality: Bilateral;    TONSILLECTOMY          Health Maintenance: 274}     Health Maintenance         Date Due Completion Date    Shingles Vaccine (1 of 2) Never done ---    RSV Vaccine (Age 60+ and Pregnant patients) (1 - 1-dose 60+ series) Never done ---    Eye Exam 2024    Override on 2022: Done ([facility])    Override on 2020: Done ([facility]. Scanned into documents.)    Influenza Vaccine (1) Never done ---    COVID-19 Vaccine (2 -  "2023-24 season) 09/01/2024 2/21/2021    Hemoglobin A1c 11/23/2024 8/23/2024    Lipid Panel 05/21/2025 5/21/2024    Diabetes Urine Screening 05/29/2025 5/29/2024    TETANUS VACCINE 01/21/2034 1/21/2024            Objective:  274}   Vital Signs  Temp: 98.1 °F (36.7 °C)  Temp Source: Oral  Pulse: 62  Resp: 18  SpO2: 98 %  BP: (!) 130/53  BP Location: Left arm  Patient Position: Sitting  Pain Score: 0-No pain  Height and Weight  Height: 5' 8" (172.7 cm)  Weight: 77 kg (169 lb 12.8 oz)  BSA (Calculated - sq m): 1.92 sq meters  BMI (Calculated): 25.8  Weight in (lb) to have BMI = 25: 164.1    Over the last two weeks how often have you been bothered by little interest or pleasure in doing things: 0  Over the last two weeks how often have you been bothered by feeling down, depressed or hopeless: 0  PHQ-2 Total Score: 0  PHQ-9 Score: 0  PHQ-9 Interpretation: Minimal or None    Wt Readings from Last 3 Encounters:   09/18/24 77 kg (169 lb 12.8 oz)   09/10/24 78.9 kg (173 lb 15.1 oz)   08/23/24 81.6 kg (180 lb)       Physical Exam  Vitals and nursing note reviewed.   Constitutional:       General: He is not in acute distress.     Appearance: Normal appearance. He is not ill-appearing.   HENT:      Head: Normocephalic.   Eyes:      Conjunctiva/sclera: Conjunctivae normal.   Neck:      Trachea: Trachea normal.   Cardiovascular:      Rate and Rhythm: Normal rate and regular rhythm.      Pulses: Normal pulses.      Heart sounds: Normal heart sounds.   Pulmonary:      Effort: Pulmonary effort is normal. No respiratory distress.      Breath sounds: Normal breath sounds. No wheezing, rhonchi or rales.   Musculoskeletal:      Cervical back: Neck supple.      Right lower leg: No edema.      Left lower leg: No edema.   Lymphadenopathy:      Cervical: No cervical adenopathy.   Skin:     General: Skin is warm and dry.      Coloration: Skin is not jaundiced or pale.   Neurological:      Mental Status: He is alert and oriented to person, " place, and time.      Gait: Gait normal.   Psychiatric:         Attention and Perception: Attention normal.         Mood and Affect: Mood normal.         Speech: Speech normal.         Behavior: Behavior normal. Behavior is cooperative.         Thought Content: Thought content normal.         Cognition and Memory: Memory is impaired.          Assessment and Plan: 274}     1. Necrotizing soft tissue infection  Assessment & Plan:  Improving with persistent wound.    Will be followed by [facility].  Wife performing dressing changes  Wound care appt next week.      2. Stage 3b chronic kidney disease  Assessment & Plan:  Stable, continue monitoring.  Avoid nephrotoxic agents.  Continue Farxiga 10 mg daily.      3. Type 2 diabetes mellitus with hyperglycemia, with long-term current use of insulin  Assessment & Plan:  Lab Results   Component Value Date    HGBA1C 6.7 (H) 08/23/2024     Had hypoglycemia while hospitalized so glipizide was held then lantus was weaned and discontinued.    Currently taking Farxiga 10 mg only.  Will have Apurva reach to Nicole Dewey's office to let her know and plan for f/u visit.        Diagnosis, risks, benefits, and side effects of meds and treatment plan were discussed with the patient.  Patient was advised to call or f/u with any new or worsening symptoms or problems prior to next appointment.  Go to ER for any urgent complications.  All questions were answered to the satisfaction of the patient, and pt verbalized understanding and agreement with treatment plan.      Follow up for as scheduled and as needed.    Signature:  BARBARA Green    Future Appointments   Date Time Provider Department Center   9/24/2024  1:00 PM Citlaly Eubanks FNP [facility]   10/17/2024  1:40 PM Smiley Trevino FNP [facility] [place]   10/25/2024 10:30 AM Nora Moreno MD [facility] [place]   11/8/2024 10:15 AM Donald Justice MD [facility] NEURO [facility]    2/26/2025  3:00 PM AWV NURSE, [facility] FAMILY MEDICINE [facility] [place]

## 2024-09-18 NOTE — PATIENT INSTRUCTIONS
Patient Education       Preventing Falls   The Basics   Written by the doctors and editors at [facility]   Am I at risk of falling? -- Your risk of falling increases as you grow older. That's because getting older can make it harder to walk steadily and keep your balance. Also, the effects of falls are more serious in older people.  Overall, 3 to 4 out of every 10 people over the age of 65 fall each year. Up to 75 percent of people who fracture a hip never recover to the point they were before they had their fracture. If you have fallen in the past, you are at higher risk of falling again.  Several things can increase your risk of a fall, including:  Illness  A change in the medicines you take  An unsafe or unfamiliar setting (for example, a room with rugs or furniture that might trip you, or an area you don't know well)  How can my doctor help me to avoid falling? -- Your doctor can talk to you about the following things:  Past falls - It is important to tell your doctor about any times you have fallen or almost fallen. He or she can then suggest ways to prevent another fall.  Your health conditions - Some health problems can put you at risk of falling. These include conditions that affect eyesight, hearing, muscle strength, or balance.  The medicines you take - Certain medicines can increase the risk of falling. These include some medicines that are used for sleeping problems, anxiety, high blood pressure, or depression. Adding new medicines, or changing doses of some medicines, can also affect your risk of falling.  The more your doctor knows about your situation, the better he or she will be able to help you. For example, if you fell because you have a condition that causes pain, your doctor might suggest treatments to deal with the pain. Or if one of your medicines is making you dizzy and more likely to fall, your doctor might switch you to a different medicine.  Is there anything I can do on my own? -- Yes. To  help keep from falling, you can:  Make your home safer - To avoid falling at home, get rid of things that might make you trip or slip. This might include furniture, electrical cords, clutter, and loose rugs (figure 1). Keep your home well-lit so that you can easily see where you are going. Avoid storing things in high places so you don't have to reach or climb.  Wear sturdy shoes that fit well - Wearing shoes with high heels or slippery soles, or shoes that are too loose, can lead to falls. Walking around in bare feet, or only socks, can also increase your risk of falling.  Take vitamin D pills - Taking vitamin D might lower the risk of falls in older people. This is because vitamin D helps make bones and muscles stronger. Your doctor can talk to you about whether you should take extra vitamin D, and how much.  Stay active - Exercising on a regular basis can help lower your risk of falling. It might also help prevent you from getting hurt if you do fall. It is best to do a few different activities that help with both strength and balance. There are many kinds of exercise that can be safe for older people. These include walking, swimming, and Flynn Chi (a Chinese martial art that involves slow, gentle movements).  Use a cane, walker, and other safety devices - If your doctor recommends that you use a cane or walker, be sure that it's the right size and you know how to use it. There are other devices that might help you avoid falling, too. These include grab bars or a sturdy seat for the shower, non-slip bath mats, and hand rails or treads for the stairs (to prevent slipping).  If you worry that you could fall, there are also alarm buttons that let you call for help if you fall and can't get up.  What should I do if I fall? -- If you fall, see your doctor right away, even if you aren't hurt. Your doctor can try to figure out what caused you to fall, and how likely you are to fall again. He or she will do an exam and  talk to you about your health problems, medicines, and activities. Then he or she can suggest things you can do to avoid falling again.  Many older people have a hard time recovering after a fall. Doing things to prevent falling can help you to protect your health and independence.  All topics are updated as new evidence becomes available and our peer review process is complete.  This topic retrieved from [employer] on: Sep 21, 2021.  Topic 88346 Version 18.0  Release: 29.4.2 - C29.263  © 2021 UpToDate, Inc. and/or its affiliates. All rights reserved.  figure 1: How to avoid falling at home     This picture shows some of the things that can cause a fall in your home. Look around and remove any loose rugs, electrical cords, clutter, or furniture that could trip you.  Graphic 74906 Version 1.0    Consumer Information Use and Disclaimer   This information is not specific medical advice and does not replace information you receive from your health care provider. This is only a brief summary of general information. It does NOT include all information about conditions, illnesses, injuries, tests, procedures, treatments, therapies, discharge instructions or life-style choices that may apply to you. You must talk with your health care provider for complete information about your health and treatment options. This information should not be used to decide whether or not to accept your health care provider's advice, instructions or recommendations. Only your health care provider has the knowledge and training to provide advice that is right for you. The use of this information is governed by the [employer] End User License Agreement, available at [email or web address]://[email or web address]/[email or web address]/solutions/[email or web address]/nicolle.The use of [employer] content is governed by the [employer] Terms of Use. ©2021 UpToDate, Inc. All rights reserved.  Copyright   © 2021 UpToDate, Inc. and/or its affiliates. All rights reserved.

## 2024-09-18 NOTE — ASSESSMENT & PLAN NOTE
Lab Results   Component Value Date    HGBA1C 6.7 (H) 08/23/2024     Had hypoglycemia while hospitalized so glipizide was held then lantus was weaned and discontinued.    Currently taking Farxiga 10 mg only.  Will have Apurva reach to Nicole Dewey's office to let her know and plan for f/u visit.

## 2024-09-18 NOTE — ASSESSMENT & PLAN NOTE
Improving with persistent wound.    Will be followed by [facility].  Wife performing dressing changes  Wound care appt next week.

## 2024-09-18 NOTE — PROGRESS NOTES
C3 nurse attempted to contact Negro Hale  for a TCC post hospital discharge follow up call. No answer. Left voicemail with callback information. The patient has a scheduled HOSFU appointment with Smiley Trevino FNP  on 9/18/24 @ 1000.

## 2024-09-24 ENCOUNTER — OFFICE VISIT (OUTPATIENT)
Dept: WOUND CARE | Facility: CLINIC | Age: 84
End: 2024-09-24
Attending: FAMILY MEDICINE
Payer: MEDICARE

## 2024-09-24 VITALS
SYSTOLIC BLOOD PRESSURE: 136 MMHG | RESPIRATION RATE: 19 BRPM | DIASTOLIC BLOOD PRESSURE: 94 MMHG | TEMPERATURE: 98 F | HEART RATE: 67 BPM

## 2024-09-24 DIAGNOSIS — L98.412 NON-PRESSURE CHRONIC ULCER OF BUTTOCK WITH FAT LAYER EXPOSED: Primary | ICD-10-CM

## 2024-09-24 PROCEDURE — 99999 PR PBB SHADOW E&M-EST. PATIENT-LVL IV: CPT | Mod: PBBFAC,,, | Performed by: NURSE PRACTITIONER

## 2024-09-24 PROCEDURE — 99214 OFFICE O/P EST MOD 30 MIN: CPT | Mod: PBBFAC | Performed by: NURSE PRACTITIONER

## 2024-09-24 PROCEDURE — 99213 OFFICE O/P EST LOW 20 MIN: CPT | Mod: S$PBB,24,, | Performed by: NURSE PRACTITIONER

## 2024-09-24 NOTE — PATIENT INSTRUCTIONS
Clean wounds with vashe.  Apply maxorb to wound. May cover with foam bordered dressing as needed.  Change daily and as needed for soilage.    Diabetes:  Monitor glucose closely. Check fasting glucose and 2 hours after meals. HgA1C goal <7, fasting glucose , and 2 hours after meals <180  Hypertension:  Check blood pressure twice daily, goal <120/80  Diet:   Increase protein intake, avoid fried, fatty foods and foods high in simple carbs.   Vitamins:  Take vitamin C 1000 mg, zinc 50mg, vitamin d 5000 units, and a daily multivitamin.   Tito is a good source of protein and nutrients to aid in wound healing.     Monitor closely for s/s of infection including fever, chills, increase in pain, odor from wound, and increased redness from foot. Go to ER if any complications develop.     Home Health to change dressings twice a week

## 2024-09-24 NOTE — PROGRESS NOTES
BARBARA Alvarado   [facility]  [address] [facility] [place] [address]  [phone]      PATIENT NAME: Negro Hale  : 1940  DATE: 24  MRN: [id number]      Billing Provider: BARBARA Alvarado  Level of Service:   Patient PCP Information       Provider PCP Type    BARBARA Green [facility]            Reason for Visit / Chief Complaint: Non-healing Wound and Wound Check (Right perirectal)       History of Present Illness / Problem Focused Workflow     Negro Hale is a 83 yo male presents with complaints of open wound to perirectal region. He is status post I&D per Dr. Butcher on  and . Granulation tissue to wound bed. Wound continues to drain yellow drainage. Wife reports she has been cleaning with vashe and applying mepilex foam as needed. Reports mepilex foam irritates skin. Discussed using maxsorb to absorb drainage and leaving off foam border to reduce irritation. Pertinent PMH includes diabetes, CKD, hypertension, anemia, hypothyroidism, COPD, a-fib, and PVD. Wound healing is complicated by multiple co-morbidities, poor vascular supply, immunosuppression, diabetes, edema, heavy drainage, large volume, advanced age, fragile skin, and infection.            Review of Systems     Review of Systems   Constitutional:  Positive for activity change. Negative for chills and fever.   Respiratory:  Negative for chest tightness and shortness of breath.    Cardiovascular:  Positive for leg swelling. Negative for chest pain and palpitations.   Musculoskeletal:  Positive for arthralgias and joint swelling.   Skin:  Positive for wound.        wound   Neurological:  Positive for weakness and numbness.   Psychiatric/Behavioral:  Negative for agitation, behavioral problems, confusion and self-injury.        Medical / Social / Family History     Past Medical History:   Diagnosis Date    Abnormal thyroid stimulating hormone (TSH) level  08/22/2019    Anemia 08/15/2019    Atrial fibrillation 09/07/2012    Bradycardia 05/23/2017    asymptomatic    Change in bowel habit 11/13/2018    Chronic kidney disease, unspecified 01/14/2019    Coronary arteriosclerosis 09/07/2012    Disease of skin and subcutaneous tissue 08/16/2017    medial aspect RLE- cystic structure seen on venous doppler US.   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 Lewis    Type 2 diabetes mellitus with mild nonproliferative retinopathy of right eye without macular edema 08/25/2023    See Dr. Lewis Eye exam    Varicose veins of both lower extremities with pain 01/02/2019    Venous insufficiency 01/08/2019       Past Surgical History:   Procedure Laterality Date    APPENDECTOMY      CARDIAC CATHETERIZATION  2007    [facility]- Stent placement    CATARACT EXTRACTION, BILATERAL      COLONOSCOPY  11/06/2019    Dr. Mendoza    HERNIA REPAIR      Inguinal hernia repair    INCISION AND DRAINAGE OF PERIRECTAL REGION Right 8/23/2024     Procedure: INCISION AND DRAINAGE, WITH DEBRIDEMENT PERIRECTAL REGION;  Surgeon: Pop Butcher MD;  Location[facility];  Service: General;  Laterality: Right;    INCISION AND DRAINAGE OF PERIRECTAL REGION Bilateral 8/26/2024    Procedure: INCISION AND DRAINAGE, PERIRECTAL REGION;  Surgeon: Pop uBtcher MD;  Location: [facility];  Service: General;  Laterality: Bilateral;    TONSILLECTOMY         Social History  Mr. Negro Hale  reports that he quit smoking about 64 years ago. His smoking use included cigarettes. He started smoking about 67 years ago. He has a 0.3 pack-year smoking history. He has been exposed to tobacco smoke. He has never used smokeless tobacco. He reports that he does not currently use alcohol. He reports that he does not use drugs.    Family History  Mr.'s Negro Hale family history includes Diabetes in his brother; Heart disease in his father; Neurofibromatosis in his brother; No Known Problems in his brother, maternal grandfather, maternal grandmother, mother, paternal grandfather, and paternal grandmother; Throat cancer in his sister.    Medications and Allergies     Medications  Outpatient Medications Marked as Taking for the 9/24/24 encounter (Office Visit) with Citlaly Eubanks FNP   Medication Sig Dispense Refill    aspirin (ECOTRIN) 81 MG EC tablet Take 1 tablet (81 mg total) by mouth once daily. 30 tablet 3    blood-glucose sensor (FREESTYLE ADORE 3 SENSOR) Giulia 1 each by Misc.(Non-Drug; Combo Route) route once.      cholecalciferol, vitamin D3, 125 mcg (5,000 unit) capsule Take 5,000 Units by mouth once daily.      clopidogreL (PLAVIX) 75 mg tablet Take 1 tablet (75 mg total) by mouth once daily. 90 tablet 3    coenzyme Q10 200 mg capsule Take 200 mg by mouth once daily.      cyanocobalamin (VITAMIN B-12) 1000 MCG tablet Take 100 mcg by mouth once daily.      dapagliflozin propanediol (FARXIGA) 10 mg tablet Take 1 tablet (10 mg total) by mouth once  "daily. 90 tablet 3    nitroGLYCERIN (NITROSTAT) 0.4 MG SL tablet Place 1 tablet (0.4 mg total) under the tongue every 5 (five) minutes as needed for Chest pain. 25 tablet 1    pantoprazole (PROTONIX) 40 MG tablet Take 1 tablet (40 mg total) by mouth once daily. 90 tablet 3    rosuvastatin (CRESTOR) 40 MG Tab Take 1 tablet (40 mg total) by mouth every evening. 90 tablet 3    SURE COMFORT PEN NEEDLE 32 gauge x 5/32" Ndle AS DIRECTED         Allergies  Review of patient's allergies indicates:   Allergen Reactions    Mayonnaise      Upset stomach       Physical Examination     Vitals:    09/24/24 1307   BP: (!) 136/94   Pulse: 67   Resp: 19   Temp: 97.7 °F (36.5 °C)     Physical Exam  Vitals and nursing note reviewed.   Constitutional:       Appearance: Normal appearance.   HENT:      Head: Normocephalic.   Cardiovascular:      Rate and Rhythm: Normal rate.      Pulses: Normal pulses.      Heart sounds: Normal heart sounds.   Pulmonary:      Effort: Pulmonary effort is normal. No respiratory distress.   Chest:      Chest wall: No tenderness.   Musculoskeletal:         General: Swelling and tenderness present.   Skin:     General: Skin is warm and dry.      Comments: See LDA for photos/measurements   Neurological:      General: No focal deficit present.      Mental Status: He is alert and oriented to person, place, and time. Mental status is at baseline.   Psychiatric:         Mood and Affect: Mood normal.         Behavior: Behavior normal.         Thought Content: Thought content normal.         Judgment: Judgment normal.       Assessment and Plan             Wound 08/26/24 1115 Abscess Right Perirectal (Active)   08/26/24 1115   Present on Original Admission: N   Primary Wound Type: Abscess   Side: Right   Orientation:    Location: Perirectal   Wound Approximate Age at First Assessment (Weeks):    Wound Number:    Is this injury device related?:    Incision Type:    Closure Method:    Wound Description (Comments): "    Type:    Additional Comments:    Ankle-Brachial Index:    Pulses:    Removal Indication and Assessment:    Wound Outcome:    Wound Image    09/24/24 1311   Dressing Appearance Intact;Moist drainage 09/24/24 1311   Drainage Amount Small 09/24/24 1311   Drainage Characteristics/Odor Serosanguineous 09/24/24 1311   Appearance Red;Moist;Granulating 09/24/24 1311   Tissue loss description Full thickness 09/24/24 1311   Black (%), Wound Tissue Color 0 % 09/24/24 1311   Red (%), Wound Tissue Color 100 % 09/24/24 1311   Yellow (%), Wound Tissue Color 0 % 09/24/24 1311   Periwound Area Intact;Moist 09/24/24 1311   Wound Edges Irregular 09/24/24 1311   Wound Length (cm) 5.5 cm 09/24/24 1311   Wound Width (cm) 2 cm 09/24/24 1311   Wound Depth (cm) 0.5 cm 09/24/24 1311   Wound Volume (cm^3) 5.5 cm^3 09/24/24 1311   Wound Surface Area (cm^2) 11 cm^2 09/24/24 1311   Care Cleansed with:;Antimicrobial agent 09/24/24 1311   Dressing Applied;Hydrofiber 09/24/24 1311     Problem List Items Addressed This Visit          Orthopedic    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  Apply maxorb to wound. May cover with foam bordered dressing as needed.  Change daily and as needed for soilage.    Diabetes:  Monitor glucose closely. Check fasting glucose and 2 hours after meals. HgA1C goal <7, fasting glucose , and 2 hours after meals <180  Hypertension:  Check blood pressure twice daily, goal <120/80  Diet:   Increase protein intake, avoid fried, fatty foods and foods high in simple carbs.   Vitamins:  Take vitamin C 1000 mg, zinc 50mg, vitamin d 5000 units, and a daily multivitamin.   Tito is a good source of protein and nutrients to aid in wound healing.     Monitor closely for s/s of infection including fever, chills, increase in pain, odor from wound, and increased redness from foot. Go to ER if any complications develop.      Home Health to change dressings twice a week             Future Appointments   Date Time Provider Department Center   10/9/2024  1:00 PM Citlaly Eubanks FNP [facility]   10/17/2024  1:40 PM Smiley Trevino FNP [facility] DAHIANA Bright   10/25/2024 10:30 AM Nora Moreno MD [facility] DERM [place]   11/8/2024 10:15 AM Donald Justice MD [facility] NEURO [facility]   2/26/2025  3:00 PM AWV NURSE, [facility] FAMILY MEDICINE [facility] DAHIANA Bright            Signature:  BARBARA Alvarado  [facility]  [address]  [place] [address]  [phone]    Date of encounter: 9/24/24

## 2024-09-30 NOTE — PATIENT INSTRUCTIONS
Clean wounds with vashe.  Apply nystatin cream mixed with Calmoseptine to rash daily  Apply maxorb to wound. May cover with foam bordered dressing as needed.  Change daily and as needed for soilage.    Diabetes:  Monitor glucose closely. Check fasting glucose and 2 hours after meals. HgA1C goal <7, fasting glucose , and 2 hours after meals <180  Hypertension:  Check blood pressure twice daily, goal <120/80  Diet:   Increase protein intake, avoid fried, fatty foods and foods high in simple carbs.   Vitamins:  Take vitamin C 1000 mg, zinc 50mg, vitamin d 5000 units, and a daily multivitamin.   Tito is a good source of protein and nutrients to aid in wound healing.     Monitor closely for s/s of infection including fever, chills, increase in pain, odor from wound, and increased redness from foot. Go to ER if any complications develop.     Home Health to change dressings twice a week

## 2024-09-30 NOTE — PROGRESS NOTES
BARBARA Alvarado   [facility]  [address] [facility] [place] [address]  [phone]      PATIENT NAME: Negro Hale  : 1940  DATE: 10/9/24  MRN: [id number]      Billing Provider: BARBARA Alvarado  Level of Service:   Patient PCP Information       Provider PCP Type    BARBARA [facility]            Reason for Visit / Chief Complaint: Non-healing Wound (Dermatitis of buttocks)       History of Present Illness / Problem Focused Workflow     Negro Hale is a 83 yo male presents for follow up on chronic non healing wound to perirectal region. Wound continues to improve. He was seen by PCP and diagnosed with c-diff. He has a rash from incontinence. Calmoseptine and nystatin to pharmacy. Continue current plan of care.       2024  83 yo male presents with complaints of open wound to perirectal region. He is status post I&D per Dr. Butcher on  and . Granulation tissue to wound bed. Wound continues to drain yellow drainage. Wife reports she has been cleaning with vashe and applying mepilex foam as needed. Reports mepilex foam irritates skin. Discussed using maxsorb to absorb drainage and leaving off foam border to reduce irritation. Pertinent PMH includes diabetes, CKD, hypertension, anemia, hypothyroidism, COPD, a-fib, and PVD. Wound healing is complicated by multiple co-morbidities, poor vascular supply, immunosuppression, diabetes, edema, heavy drainage, large volume, advanced age, fragile skin, and infection.            Review of Systems     Review of Systems   Constitutional:  Positive for activity change. Negative for chills and fever.   Respiratory:  Negative for chest tightness and shortness of breath.    Cardiovascular:  Positive for leg swelling. Negative for chest pain and palpitations.   Musculoskeletal:  Positive for arthralgias and joint swelling.   Skin:  Positive for wound.        wound   Neurological:  Positive  for weakness and numbness.   Psychiatric/Behavioral:  Negative for agitation, behavioral problems, confusion and self-injury.        Medical / Social / Family History     Past Medical History:   Diagnosis Date    Abnormal thyroid stimulating hormone (TSH) level 08/22/2019    Anemia 08/15/2019    Atrial fibrillation 09/07/2012    Bradycardia 05/23/2017    asymptomatic    Change in bowel habit 11/13/2018    Chronic kidney disease, unspecified 01/14/2019    Coronary arteriosclerosis 09/07/2012    Disease of skin and subcutaneous tissue 08/16/2017    medial aspect RLE- cystic structure seen on venous doppler US.   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 Lewis    Type 2 diabetes mellitus with mild nonproliferative retinopathy of right eye without macular edema 08/25/2023    See Dr. Lewis Eye exam    Varicose veins of both lower extremities with pain 01/02/2019    Venous insufficiency 01/08/2019       Past Surgical History:   Procedure Laterality Date    APPENDECTOMY       CARDIAC CATHETERIZATION  2007    [facility]- Stent placement    CATARACT EXTRACTION, BILATERAL      COLONOSCOPY  11/06/2019    Dr. Mendoza    HERNIA REPAIR      Inguinal hernia repair    INCISION AND DRAINAGE OF PERIRECTAL REGION Right 8/23/2024    Procedure: INCISION AND DRAINAGE, WITH DEBRIDEMENT PERIRECTAL REGION;  Surgeon: Pop Butcher MD;  Location: [facility];  Service: General;  Laterality: Right;    INCISION AND DRAINAGE OF PERIRECTAL REGION Bilateral 8/26/2024    Procedure: INCISION AND DRAINAGE, PERIRECTAL REGION;  Surgeon: Pop Butcher MD;  Location: [facility];  Service: General;  Laterality: Bilateral;    TONSILLECTOMY         Social History  Mr. Negro Hale  reports that he quit smoking about 64 years ago. His smoking use included cigarettes. He started smoking about 67 years ago. He has a 0.3 pack-year smoking history. He has been exposed to tobacco smoke. He has never used smokeless tobacco. He reports that he does not currently use alcohol. He reports that he does not use drugs.    Family History  Mr.'s Negro Hale family history includes Diabetes in his brother; Heart disease in his father; Neurofibromatosis in his brother; No Known Problems in his brother, maternal grandfather, maternal grandmother, mother, paternal grandfather, and paternal grandmother; Throat cancer in his sister.    Medications and Allergies     Medications  No outpatient medications have been marked as taking for the 10/9/24 encounter (Office Visit) with Citally Eubanks FNP.       Allergies  Review of patient's allergies indicates:   Allergen Reactions    Mayonnaise      Upset stomach       Physical Examination     Vitals:    10/09/24 1338   BP: (!) 112/58   Pulse: 68   Resp: 18   Temp: 98 °F (36.7 °C)       Physical Exam  Vitals and nursing note reviewed.   Constitutional:       Appearance: Normal appearance.   HENT:      Head: Normocephalic.   Cardiovascular:      Rate and Rhythm:  Normal rate.      Pulses: Normal pulses.      Heart sounds: Normal heart sounds.   Pulmonary:      Effort: Pulmonary effort is normal. No respiratory distress.   Chest:      Chest wall: No tenderness.   Musculoskeletal:         General: Swelling and tenderness present.   Skin:     General: Skin is warm and dry.      Comments: See LDA for photos/measurements   Neurological:      General: No focal deficit present.      Mental Status: He is alert and oriented to person, place, and time. Mental status is at baseline.   Psychiatric:         Mood and Affect: Mood normal.         Behavior: Behavior normal.         Thought Content: Thought content normal.         Judgment: Judgment normal.       Assessment and Plan             Wound 08/26/24 1115 Abscess Right Perirectal (Active)   08/26/24 1115   Present on Original Admission: N   Primary Wound Type: Abscess   Side: Right   Orientation:    Location: Perirectal   Wound Approximate Age at First Assessment (Weeks):    Wound Number:    Is this injury device related?:    Incision Type:    Closure Method:    Wound Description (Comments):    Type:    Additional Comments:    Ankle-Brachial Index:    Pulses:    Removal Indication and Assessment:    Wound Outcome:    Wound Image    10/09/24 1316   Dressing Appearance Moist drainage 10/09/24 1316   Drainage Amount Scant 10/09/24 1316   Drainage Characteristics/Odor Serosanguineous;No odor 10/09/24 1316   Appearance Pink;Moist;Granulating 10/09/24 1316   Tissue loss description Full thickness 10/09/24 1316   Black (%), Wound Tissue Color 0 % 10/09/24 1316   Red (%), Wound Tissue Color 100 % 10/09/24 1316   Yellow (%), Wound Tissue Color 0 % 10/09/24 1316   Periwound Area Moist 10/09/24 1316   Wound Edges Defined 10/09/24 1316   Wound Length (cm) 4.5 cm 10/09/24 1316   Wound Width (cm) 0.6 cm 10/09/24 1316   Wound Depth (cm) 0.2 cm 10/09/24 1316   Wound Volume (cm^3) 0.54 cm^3 10/09/24 1316   Wound Surface Area (cm^2) 2.7 cm^2 10/09/24  1316   Care Cleansed with:;Wound cleanser;Moisturizing agent 10/09/24 1316   Dressing Removed;Applied;Changed 10/09/24 1316   Periwound Care Moisture barrier applied 10/09/24 1316            Wound 09/04/24 1056 Moisture associated dermatitis posterior Buttocks (Active)   09/04/24 1056   Present on Original Admission: N   Primary Wound Type: Moisture ass   Side:    Orientation: posterior   Location: Buttocks   Wound Approximate Age at First Assessment (Weeks):    Wound Number:    Is this injury device related?:    Incision Type:    Closure Method:    Wound Description (Comments):    Type:    Additional Comments:    Ankle-Brachial Index:    Pulses:    Removal Indication and Assessment:    Wound Outcome:    Wound Image    10/09/24 1316   Dressing Appearance Moist drainage;other (see comments) 10/09/24 1316   Drainage Amount Scant 10/09/24 1316   Appearance Pink;Moist 10/09/24 1316   Black (%), Wound Tissue Color 0 % 10/09/24 1316   Red (%), Wound Tissue Color 100 % 10/09/24 1316   Yellow (%), Wound Tissue Color 0 % 10/09/24 1316   Periwound Area Moist;Satellite lesion 10/09/24 1316   Wound Edges Undefined 10/09/24 1316   Care Cleansed with:;Wound cleanser 10/09/24 1316       Problem List Items Addressed This Visit          Derm    Incontinence associated dermatitis    Overview              Current Assessment & Plan     Clean wounds with vashe.  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  Apply nystatin cream mixed with Calmoseptine to rash daily  Apply maxorb to wound. May cover with foam bordered dressing as needed.  Change daily and as needed for soilage.    Diabetes:  Monitor glucose closely. Check fasting glucose and 2 hours after meals. HgA1C goal <7, fasting glucose , and 2 hours after meals  <180  Hypertension:  Check blood pressure twice daily, goal <120/80  Diet:   Increase protein intake, avoid fried, fatty foods and foods high in simple carbs.   Vitamins:  Take vitamin C 1000 mg, zinc 50mg, vitamin d 5000 units, and a daily multivitamin.   Tito is a good source of protein and nutrients to aid in wound healing.     Monitor closely for s/s of infection including fever, chills, increase in pain, odor from wound, and increased redness from foot. Go to ER if any complications develop.     Home Health to change dressings twice a week             Future Appointments   Date Time Provider Department Center   10/17/2024  1:40 PM Smiley Trevino FNP [facility] DAHIANA Bright   10/25/2024 10:30 AM Nora Moreno MD [facility] DERM [place]   10/30/2024  1:00 PM Citlaly Eubanks F[facility]   11/8/2024 10:15 AM Donald Justice MD [facility] NEURO [facility]   2/26/2025  3:00 PM AWV NURSE, [facility] FAMILY MEDICINE [facility] DAHIANA Bright            Signature:  BARBARA Alvarado  [facility]  [address] [facility] [place] [address]  [phone]    Date of encounter: 10/9/24

## 2024-10-03 ENCOUNTER — OFFICE VISIT (OUTPATIENT)
Dept: FAMILY MEDICINE | Facility: CLINIC | Age: 84
End: 2024-10-03
Payer: MEDICARE

## 2024-10-03 VITALS
BODY MASS INDEX: 24.52 KG/M2 | HEIGHT: 68 IN | TEMPERATURE: 98 F | OXYGEN SATURATION: 98 % | DIASTOLIC BLOOD PRESSURE: 82 MMHG | HEART RATE: 71 BPM | WEIGHT: 161.81 LBS | SYSTOLIC BLOOD PRESSURE: 128 MMHG | RESPIRATION RATE: 18 BRPM

## 2024-10-03 DIAGNOSIS — Z79.4 TYPE 2 DIABETES MELLITUS WITH STAGE 3A CHRONIC KIDNEY DISEASE, WITH LONG-TERM CURRENT USE OF INSULIN: Chronic | ICD-10-CM

## 2024-10-03 DIAGNOSIS — R19.7 DIARRHEA, UNSPECIFIED TYPE: Primary | ICD-10-CM

## 2024-10-03 DIAGNOSIS — N18.31 TYPE 2 DIABETES MELLITUS WITH STAGE 3A CHRONIC KIDNEY DISEASE, WITH LONG-TERM CURRENT USE OF INSULIN: Chronic | ICD-10-CM

## 2024-10-03 DIAGNOSIS — Z79.4 TYPE 2 DIABETES MELLITUS WITH HYPERGLYCEMIA, WITH LONG-TERM CURRENT USE OF INSULIN: Chronic | ICD-10-CM

## 2024-10-03 DIAGNOSIS — N18.32 STAGE 3B CHRONIC KIDNEY DISEASE: Chronic | ICD-10-CM

## 2024-10-03 DIAGNOSIS — E11.65 TYPE 2 DIABETES MELLITUS WITH HYPERGLYCEMIA, WITH LONG-TERM CURRENT USE OF INSULIN: Chronic | ICD-10-CM

## 2024-10-03 DIAGNOSIS — R63.4 WEIGHT LOSS: ICD-10-CM

## 2024-10-03 DIAGNOSIS — E11.22 TYPE 2 DIABETES MELLITUS WITH STAGE 3A CHRONIC KIDNEY DISEASE, WITH LONG-TERM CURRENT USE OF INSULIN: Chronic | ICD-10-CM

## 2024-10-03 LAB
ANION GAP SERPL CALCULATED.3IONS-SCNC: 13 MMOL/L (ref 7–16)
BASOPHILS # BLD AUTO: 0.12 K/UL (ref 0–0.2)
BASOPHILS NFR BLD AUTO: 1 % (ref 0–1)
BUN SERPL-MCNC: 59 MG/DL (ref 7–18)
BUN/CREAT SERPL: 29 (ref 6–20)
CALCIUM SERPL-MCNC: 9.5 MG/DL (ref 8.5–10.1)
CHLORIDE SERPL-SCNC: 106 MMOL/L (ref 98–107)
CO2 SERPL-SCNC: 24 MMOL/L (ref 21–32)
CREAT SERPL-MCNC: 2.04 MG/DL (ref 0.7–1.3)
DIFFERENTIAL METHOD BLD: ABNORMAL
EGFR (NO RACE VARIABLE) (RUSH/TITUS): 31 ML/MIN/1.73M2
EOSINOPHIL # BLD AUTO: 0.25 K/UL (ref 0–0.5)
EOSINOPHIL NFR BLD AUTO: 2.1 % (ref 1–4)
ERYTHROCYTE [DISTWIDTH] IN BLOOD BY AUTOMATED COUNT: 14.1 % (ref 11.5–14.5)
GLUCOSE SERPL-MCNC: 143 MG/DL (ref 74–106)
HCT VFR BLD AUTO: 42.1 % (ref 40–54)
HGB BLD-MCNC: 13.2 G/DL (ref 13.5–18)
IMM GRANULOCYTES # BLD AUTO: 0.08 K/UL (ref 0–0.04)
IMM GRANULOCYTES NFR BLD: 0.7 % (ref 0–0.4)
LYMPHOCYTES # BLD AUTO: 2.05 K/UL (ref 1–4.8)
LYMPHOCYTES NFR BLD AUTO: 17.5 % (ref 27–41)
MCH RBC QN AUTO: 29 PG (ref 27–31)
MCHC RBC AUTO-ENTMCNC: 31.4 G/DL (ref 32–36)
MCV RBC AUTO: 92.5 FL (ref 80–96)
MONOCYTES # BLD AUTO: 0.85 K/UL (ref 0–0.8)
MONOCYTES NFR BLD AUTO: 7.3 % (ref 2–6)
MPC BLD CALC-MCNC: 10.8 FL (ref 9.4–12.4)
NEUTROPHILS # BLD AUTO: 8.34 K/UL (ref 1.8–7.7)
NEUTROPHILS NFR BLD AUTO: 71.4 % (ref 53–65)
NRBC # BLD AUTO: 0 X10E3/UL
NRBC, AUTO (.00): 0 %
PLATELET # BLD AUTO: 397 K/UL (ref 150–400)
POTASSIUM SERPL-SCNC: 4.9 MMOL/L (ref 3.5–5.1)
RBC # BLD AUTO: 4.55 M/UL (ref 4.6–6.2)
SODIUM SERPL-SCNC: 138 MMOL/L (ref 136–145)
WBC # BLD AUTO: 11.69 K/UL (ref 4.5–11)

## 2024-10-03 PROCEDURE — 99214 OFFICE O/P EST MOD 30 MIN: CPT | Mod: ,,, | Performed by: NURSE PRACTITIONER

## 2024-10-03 NOTE — ASSESSMENT & PLAN NOTE
Hx IBS with diarrhea and constipation, but c/o diarrhea since hospital discharge with foul odor noted.    Suspect C diff given his antibiotic treatment for necrotizing skin infection.  Advised to obtain and return stool specimen ASAP then will treat accordingly.  Check CBC, and BMP to assess for any electrolyte abnormalities.

## 2024-10-03 NOTE — ASSESSMENT & PLAN NOTE
Lab Results   Component Value Date    HGBA1C 6.7 (H) 08/23/2024     Had hypoglycemia while hospitalized so glipizide was held then lantus was weaned and discontinued.  T2DM management per Nicole Dewey NP.  Has not taken Lantus 5 units x2 days.  Continue Farxiga 10 mg.  Continue glucose monitoring and f/u with Nicole Dewey as advised.

## 2024-10-03 NOTE — ASSESSMENT & PLAN NOTE
BMP  Lab Results   Component Value Date     09/16/2024    K 4.5 09/16/2024     (H) 09/16/2024    CO2 25 09/16/2024    BUN 44 (H) 09/16/2024    CREATININE 1.85 (H) 09/16/2024    CALCIUM 8.8 09/16/2024    ANIONGAP 11 09/16/2024    EGFRNORACEVR 35 (L) 09/16/2024     Check BMP today to re-eval given prolonged diarrhea.  Stable, continue monitoring.  Avoid nephrotoxic agents.  Continue Farxiga 10 mg daily.

## 2024-10-03 NOTE — PROGRESS NOTES
[facility]  [address] [place] [address]  Phone: [phone]  Fax: [phone]       PATIENT NAME: Negro Hale   : 1940    AGE: 84 y.o. DATE OF ENCOUNTER: 10/3/24    MRN: [id number]      PCP: Smiley Trevino FNP    Subjective:     Reason for Visit / Chief Complaint:     274}  Chief Complaint   Patient presents with    Losing Weight     Patient c/o losing weight and not eating well.    Diarrhea     Patient c/o diarrhea for the past several weeks and states that everything goes right through him.    Health Maintenance     Care gaps not addressed, patient ill today. Had his eye exam earlier this year with Dr Lewis.    Namita Gallardo, [facility]     Here with wife c/o poor appetite and weight loss.  Per our scales is down 8 lb in the past 2 weeks.  Was hospitalized 24-24 at [facility] with antibiotic treatment for necrotizing soft tissue infection of a perirectal abscess.    Has 1 more wound care f/u visit next week because wound is much improved and smaller. Is also being followed by [facility] for wound care.    Per wife after last visit here 24, Nicole Dewey NP reviewed glucose levels and she said to take 5 units of Lantus but he reports he hasn't taken any insulin x2 days.    Review of Systems:     Review of Systems   Constitutional:  Positive for appetite change and unexpected weight change. Negative for chills and fever.   Respiratory: Negative.     Cardiovascular: Negative.    Gastrointestinal:  Positive for diarrhea. Negative for abdominal pain, nausea and vomiting.   Neurological:  Positive for weakness. Negative for headaches.       Allergies and Meds: 274}     Review of patient's allergies indicates:   Allergen Reactions    Mayonnaise      Upset stomach        Current Outpatient Medications   Medication Sig Dispense Refill    aspirin (ECOTRIN) 81 MG EC tablet Take 1 tablet (81 mg total) by mouth once daily.  "30 tablet 3    blood-glucose sensor (FREESTYLE ADORE 3 SENSOR) Giulia 1 each by Misc.(Non-Drug; Combo Route) route once.      cholecalciferol, vitamin D3, 125 mcg (5,000 unit) capsule Take 5,000 Units by mouth once daily.      clopidogreL (PLAVIX) 75 mg tablet Take 1 tablet (75 mg total) by mouth once daily. 90 tablet 3    coenzyme Q10 200 mg capsule Take 200 mg by mouth once daily.      cyanocobalamin (VITAMIN B-12) 1000 MCG tablet Take 100 mcg by mouth once daily.      dapagliflozin propanediol (FARXIGA) 10 mg tablet Take 1 tablet (10 mg total) by mouth once daily. 90 tablet 3    nitroGLYCERIN (NITROSTAT) 0.4 MG SL tablet Place 1 tablet (0.4 mg total) under the tongue every 5 (five) minutes as needed for Chest pain. 25 tablet 1    pantoprazole (PROTONIX) 40 MG tablet Take 1 tablet (40 mg total) by mouth once daily. 90 tablet 3    rosuvastatin (CRESTOR) 40 MG Tab Take 1 tablet (40 mg total) by mouth every evening. 90 tablet 3    SURE COMFORT PEN NEEDLE 32 gauge x 5/32" Ndle AS DIRECTED       No current facility-administered medications for this visit.       Labs:274}   I have reviewed labs below:    Lab Results   Component Value Date    WBC 6.56 09/16/2024    RBC 3.62 (L) 09/16/2024    HGB 10.5 (L) 09/16/2024    HCT 34.0 (L) 09/16/2024     09/16/2024     09/16/2024    K 4.5 09/16/2024     (H) 09/16/2024    CALCIUM 8.8 09/16/2024     (H) 09/16/2024    BUN 44 (H) 09/16/2024    CREATININE 1.85 (H) 09/16/2024    ESTGFRAFRICA 64 03/19/2021    EGFRNONAA 45 (L) 05/19/2022    ALT 27 08/23/2024    AST 47 (H) 08/23/2024    INR 1.07 02/01/2024    CHOL 166 05/21/2024    TRIG 83 05/21/2024    HDL 50 05/21/2024    LDLCALC 99 05/21/2024    TSH 2.780 05/21/2024    PSA 0.636 03/19/2021    HGBA1C 6.7 (H) 08/23/2024    MICROALBUR 2.0 05/29/2024     Medical History: 274}     Past Medical History:   Diagnosis Date    Abnormal thyroid stimulating hormone (TSH) level 08/22/2019    Anemia 08/15/2019    Atrial " fibrillation 2012    Bradycardia 2017    asymptomatic    Change in bowel habit 2018    Chronic kidney disease, unspecified 2019    Coronary arteriosclerosis 2012    Disease of skin and subcutaneous tissue 2017    medial aspect RLE- cystic structure seen on venous doppler US.    Dyspnea on exertion 2016    Elevated serum creatinine 2017    Encounter for long-term (current) use of other medications 2016    Essential (primary) hypertension 2017    Essential hypertension 2021    Heart disease, hypertensive 2019    Hereditary lymphedema 2019    Hyperlipidemia 2012    Lower extremity edema 2017    Lumbar radiculopathy 2017    Obesity, unspecified 2016    Old myocardial infarction 2017    Other secondary pulmonary hypertension 2017    Other spondylosis, lumbosacral region 2017    Peripheral vascular disease     Personal history of tobacco use, presenting hazards to health 2016    Pulmonary hypertension     Type 2 diabetes mellitus with chronic kidney disease 2019    Type 2 diabetes mellitus with hyperglycemia 10/02/2011    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 Lewis    Type 2 diabetes mellitus with mild nonproliferative retinopathy of right eye without macular edema 2023    See Dr. Lewis Eye exam    Varicose veins of both lower extremities with pain 2019    Venous insufficiency 2019      Social History     Tobacco Use   Smoking Status Former    Current packs/day: 0.00    Average packs/day: 0.1 packs/day for 3.0 years (0.3 ttl pk-yrs)    Types: Cigarettes    Start date:     Quit date: 1960    Years since quittin.8    Passive exposure: Past   Smokeless Tobacco Never   Tobacco Comments    Quit 10/15/1976      Past Surgical History:   Procedure Laterality Date    APPENDECTOMY    "   CARDIAC CATHETERIZATION  2007    [facility]- Stent placement    CATARACT EXTRACTION, BILATERAL      COLONOSCOPY  11/06/2019    Dr. Mendoza    HERNIA REPAIR      Inguinal hernia repair    INCISION AND DRAINAGE OF PERIRECTAL REGION Right 8/23/2024    Procedure: INCISION AND DRAINAGE, WITH DEBRIDEMENT PERIRECTAL REGION;  Surgeon: Pop Butcher MD;  Location: [facility];  Service: General;  Laterality: Right;    INCISION AND DRAINAGE OF PERIRECTAL REGION Bilateral 8/26/2024    Procedure: INCISION AND DRAINAGE, PERIRECTAL REGION;  Surgeon: Pop Butcher MD;  Location: [facility];  Service: General;  Laterality: Bilateral;    TONSILLECTOMY       Objective:  274}   Vital Signs  Temp: 98 °F (36.7 °C)  Temp Source: Oral  Pulse: 71  Resp: 18  SpO2: 98 %  BP: 128/82  BP Location: Left arm  Patient Position: Sitting  Height and Weight  Height: 5' 8" (172.7 cm)  Weight: 73.4 kg (161 lb 12.8 oz)  BSA (Calculated - sq m): 1.88 sq meters  BMI (Calculated): 24.6  Weight in (lb) to have BMI = 25: 164.1    Over the last two weeks how often have you been bothered by little interest or pleasure in doing things: 0  Over the last two weeks how often have you been bothered by feeling down, depressed or hopeless: 0  PHQ-2 Total Score: 0  PHQ-9 Score: 0  PHQ-9 Interpretation: Minimal or None    Wt Readings from Last 3 Encounters:   10/03/24 73.4 kg (161 lb 12.8 oz)   09/18/24 77 kg (169 lb 12.8 oz)   09/10/24 78.9 kg (173 lb 15.1 oz)     Physical Exam  Vitals and nursing note reviewed.   Constitutional:       General: He is not in acute distress.     Appearance: Normal appearance. He is not toxic-appearing or diaphoretic.   HENT:      Head: Normocephalic.   Eyes:      General: No scleral icterus.     Conjunctiva/sclera: Conjunctivae normal.   Neck:      Trachea: Trachea normal.   Cardiovascular:      Rate and Rhythm: Normal rate and regular rhythm.      Pulses: Normal pulses.      Heart sounds: Normal heart sounds.   Pulmonary: "      Effort: Pulmonary effort is normal. No respiratory distress.      Breath sounds: Normal breath sounds. No wheezing, rhonchi or rales.   Abdominal:      General: Bowel sounds are normal.      Palpations: Abdomen is soft. There is no mass.      Tenderness: There is no abdominal tenderness. There is no guarding.   Musculoskeletal:      Cervical back: Neck supple.      Right lower leg: No edema.      Left lower leg: No edema.   Lymphadenopathy:      Cervical: No cervical adenopathy.   Skin:     General: Skin is warm and dry.      Coloration: Skin is not jaundiced or pale.   Neurological:      Mental Status: He is alert and oriented to person, place, and time.      Motor: Weakness (generalized) present.      Gait: Gait abnormal (using walker).   Psychiatric:         Attention and Perception: Attention normal.         Mood and Affect: Mood normal.         Speech: Speech normal.         Behavior: Behavior normal. Behavior is cooperative.         Thought Content: Thought content normal.         Cognition and Memory: Memory is impaired.          Assessment and Plan: 274}     1. Diarrhea, unspecified type  Assessment & Plan:  Hx IBS with diarrhea and constipation, but c/o diarrhea since hospital discharge with foul odor noted.    Suspect C diff given his antibiotic treatment for necrotizing skin infection.  Advised to obtain and return stool specimen ASAP then will treat accordingly.  Check CBC, and BMP to assess for any electrolyte abnormalities.    Orders:  -     Basic Metabolic Panel; Future; Expected date: 10/03/2024  -     CBC Auto Differential; Future; Expected date: 10/03/2024  -     Stool culture; Future; Expected date: 10/03/2024  -     C Diff Toxin by PCR; Future; Expected date: 10/03/2024    2. Weight loss  Assessment & Plan:  Choose foods with higher protein content given his decreased appetite.    Orders:  -     Basic Metabolic Panel; Future; Expected date: 10/03/2024  -     CBC Auto Differential; Future;  Expected date: 10/03/2024    3. Stage 3b chronic kidney disease  Assessment & Plan:  BMP  Lab Results   Component Value Date     09/16/2024    K 4.5 09/16/2024     (H) 09/16/2024    CO2 25 09/16/2024    BUN 44 (H) 09/16/2024    CREATININE 1.85 (H) 09/16/2024    CALCIUM 8.8 09/16/2024    ANIONGAP 11 09/16/2024    EGFRNORACEVR 35 (L) 09/16/2024     Check BMP today to re-eval given prolonged diarrhea.  Stable, continue monitoring.  Avoid nephrotoxic agents.  Continue Farxiga 10 mg daily.    Orders:  -     Basic Metabolic Panel; Future; Expected date: 10/03/2024  -     CBC Auto Differential; Future; Expected date: 10/03/2024    4. Type 2 diabetes mellitus with stage 3a chronic kidney disease, with long-term current use of insulin  -     Basic Metabolic Panel; Future; Expected date: 10/03/2024    5. Type 2 diabetes mellitus with hyperglycemia, with long-term current use of insulin  Assessment & Plan:  Lab Results   Component Value Date    HGBA1C 6.7 (H) 08/23/2024     Had hypoglycemia while hospitalized so glipizide was held then lantus was weaned and discontinued.  T2DM management per Nicole Dewey NP.  Has not taken Lantus 5 units x2 days.  Continue Farxiga 10 mg.  Continue glucose monitoring and f/u with Nicole Dewey as advised.        Diagnosis, risks, benefits, and side effects of any meds and treatment plan were discussed with the patient.  Patient to call or follow-up with any new or worsening symptoms or problems prior to next appointment.  Go to ER for any urgent complications.  All questions were answered to the satisfaction of the patient, and pt verbalized understanding and agreement to treatment plan.      Follow up for as scheduled and as needed.    Signature:  BARBARA Green-BC    Future Appointments   Date Time Provider Department Center   10/9/2024  1:00 PM Citlaly Eubanks FNP [facility]   10/17/2024  1:40 PM Smiley Trevino FNP [facility] [place]    10/25/2024 10:30 AM Nora Moreno MD [facility]   11/8/2024 10:15 AM Donald Justice MD [facility] NEURO [facility]   2/26/2025  3:00 PM AWV NURSE, [facility] [place]

## 2024-10-05 LAB
CAMPYLOBACTER ANTIGEN: NEGATIVE
EHEC (SHIGA TOXIN 1): NEGATIVE
EHEC (SHIGA TOXIN 2): NEGATIVE
SALM+SHIG+E COLI ETEC STL CULT: NORMAL

## 2024-10-06 DIAGNOSIS — A04.72 C. DIFFICILE DIARRHEA: Primary | ICD-10-CM

## 2024-10-06 RX ORDER — VANCOMYCIN HYDROCHLORIDE 125 MG/1
125 CAPSULE ORAL 4 TIMES DAILY
Qty: 56 CAPSULE | Refills: 0 | Status: SHIPPED | OUTPATIENT
Start: 2024-10-06 | End: 2024-10-20

## 2024-10-06 NOTE — PROGRESS NOTES
BARBARA Green  10/6/2024  3:04 PM CDT Back to Top      3:00 pm spoke with patient's wife and advised of positive c diff results and to begin oral antibiotic asap.  CKD stable,  anemia improving.  Keep appt with me 10/17/24 as scheduled or sooner if needed.  Voiced understanding.    BARBARA Green  10/6/2024 12:44 PM CDT       12:30 pm I attempted to call from my cell phone at home to inform of results, but didn't get an answer on cell and busy # on home phone.  Rx for vancomycin sent to [facility]'s pharmacy and will attempt to call again in a little while.

## 2024-10-09 ENCOUNTER — OFFICE VISIT (OUTPATIENT)
Dept: WOUND CARE | Facility: CLINIC | Age: 84
End: 2024-10-09
Attending: FAMILY MEDICINE
Payer: MEDICARE

## 2024-10-09 VITALS
SYSTOLIC BLOOD PRESSURE: 112 MMHG | DIASTOLIC BLOOD PRESSURE: 58 MMHG | HEART RATE: 68 BPM | TEMPERATURE: 98 F | RESPIRATION RATE: 18 BRPM

## 2024-10-09 DIAGNOSIS — R32 INCONTINENCE ASSOCIATED DERMATITIS: ICD-10-CM

## 2024-10-09 DIAGNOSIS — L98.412 NON-PRESSURE CHRONIC ULCER OF BUTTOCK WITH FAT LAYER EXPOSED: Primary | ICD-10-CM

## 2024-10-09 DIAGNOSIS — L25.8 INCONTINENCE ASSOCIATED DERMATITIS: ICD-10-CM

## 2024-10-09 PROCEDURE — 99214 OFFICE O/P EST MOD 30 MIN: CPT | Mod: PBBFAC | Performed by: NURSE PRACTITIONER

## 2024-10-09 PROCEDURE — 99999 PR PBB SHADOW E&M-EST. PATIENT-LVL IV: CPT | Mod: PBBFAC,,, | Performed by: NURSE PRACTITIONER

## 2024-10-09 RX ORDER — MENTHOL AND ZINC OXIDE .44; 20.625 G/100G; G/100G
OINTMENT TOPICAL DAILY
Qty: 100 G | Refills: 1 | Status: SHIPPED | OUTPATIENT
Start: 2024-10-09

## 2024-10-09 RX ORDER — NYSTATIN 100000 U/G
CREAM TOPICAL 2 TIMES DAILY
Qty: 30 G | Refills: 2 | Status: SHIPPED | OUTPATIENT
Start: 2024-10-09

## 2024-10-10 ENCOUNTER — EXTERNAL HOME HEALTH (OUTPATIENT)
Dept: HOME HEALTH SERVICES | Facility: HOSPITAL | Age: 84
End: 2024-10-10
Payer: MEDICARE

## 2024-10-17 ENCOUNTER — OFFICE VISIT (OUTPATIENT)
Dept: FAMILY MEDICINE | Facility: CLINIC | Age: 84
End: 2024-10-17
Payer: MEDICARE

## 2024-10-17 VITALS
BODY MASS INDEX: 24.52 KG/M2 | SYSTOLIC BLOOD PRESSURE: 112 MMHG | OXYGEN SATURATION: 98 % | DIASTOLIC BLOOD PRESSURE: 74 MMHG | TEMPERATURE: 98 F | HEART RATE: 62 BPM | WEIGHT: 161.81 LBS | HEIGHT: 68 IN | RESPIRATION RATE: 18 BRPM

## 2024-10-17 DIAGNOSIS — Z79.4 TYPE 2 DIABETES MELLITUS WITH HYPERGLYCEMIA, WITH LONG-TERM CURRENT USE OF INSULIN: Primary | Chronic | ICD-10-CM

## 2024-10-17 DIAGNOSIS — Z23 FLU VACCINE NEED: ICD-10-CM

## 2024-10-17 DIAGNOSIS — E11.65 TYPE 2 DIABETES MELLITUS WITH HYPERGLYCEMIA, WITH LONG-TERM CURRENT USE OF INSULIN: Primary | Chronic | ICD-10-CM

## 2024-10-17 DIAGNOSIS — A04.72 C. DIFFICILE DIARRHEA: ICD-10-CM

## 2024-10-17 PROBLEM — S80.811A: Status: RESOLVED | Noted: 2024-05-29 | Resolved: 2024-10-17

## 2024-10-17 PROBLEM — R78.81 POSITIVE BLOOD CULTURE: Status: RESOLVED | Noted: 2024-08-25 | Resolved: 2024-10-17

## 2024-10-17 PROBLEM — L08.9: Status: RESOLVED | Noted: 2024-05-29 | Resolved: 2024-10-17

## 2024-10-17 PROCEDURE — G0008 ADMIN INFLUENZA VIRUS VAC: HCPCS | Mod: ,,, | Performed by: NURSE PRACTITIONER

## 2024-10-17 PROCEDURE — 90653 IIV ADJUVANT VACCINE IM: CPT | Mod: ,,, | Performed by: NURSE PRACTITIONER

## 2024-10-17 PROCEDURE — 99213 OFFICE O/P EST LOW 20 MIN: CPT | Mod: ,,, | Performed by: NURSE PRACTITIONER

## 2024-10-17 RX ORDER — INSULIN GLARGINE 100 [IU]/ML
10 INJECTION, SOLUTION SUBCUTANEOUS NIGHTLY
COMMUNITY

## 2024-10-17 NOTE — PROGRESS NOTES
[facility]  [address] [place] [address]  Phone: [phone]  Fax: [phone]       PATIENT NAME: Negro Hale   : 1940    AGE: 84 y.o. DATE OF ENCOUNTER: 10/17/24    MRN: [id number]      PCP: Smiley Trevino FNP    Subjective:     Reason for Visit / Chief Complaint:     274}  Chief Complaint   Patient presents with    Diabetes     Patient reports to the clinic for 4 month follow up.    Chronic Kidney Disease    Health Maintenance     Care gaps addressed, patient would like his Flu vaccine today.    Namita Gallardo CMA     Presents with wife for f/u decreased appetite, weight loss, and Clostridium difficile diarrhea.  Feeling much better.  No longer requiring walker with weakness much improved.  Diarrhea much improved, completing vancomycin.  No further weight loss.  Perirectal wound resolving well.    He is taking Lantus 5 units daily.  Wife reports glucose has been in the 200s for the past 2 wks - 151-316.    Review of Systems:     Review of Systems   Constitutional: Negative.  Negative for activity change.   HENT: Negative.     Eyes: Negative.    Respiratory: Negative.     Cardiovascular: Negative.    Gastrointestinal:  Positive for diarrhea. Negative for abdominal pain, blood in stool, nausea and vomiting.   Endocrine: Negative.    Genitourinary: Negative.    Musculoskeletal: Negative.    Skin:  Positive for wound.   Allergic/Immunologic: Negative.    Neurological: Negative.    Hematological: Negative.    Psychiatric/Behavioral: Negative.         Allergies and Meds: 274}     Review of patient's allergies indicates:   Allergen Reactions    Mayonnaise      Upset stomach        Current Outpatient Medications   Medication Sig Dispense Refill    aspirin (ECOTRIN) 81 MG EC tablet Take 1 tablet (81 mg total) by mouth once daily. 30 tablet 3    blood-glucose sensor (FREESTYLE ADORE 3 SENSOR) Giulia 1 each by Misc.(Non-Drug; Combo Route) route once.       "cholecalciferol, vitamin D3, 125 mcg (5,000 unit) capsule Take 5,000 Units by mouth once daily.      clopidogreL (PLAVIX) 75 mg tablet Take 1 tablet (75 mg total) by mouth once daily. 90 tablet 3    coenzyme Q10 200 mg capsule Take 200 mg by mouth once daily.      cyanocobalamin (VITAMIN B-12) 1000 MCG tablet Take 100 mcg by mouth once daily.      dapagliflozin propanediol (FARXIGA) 10 mg tablet Take 1 tablet (10 mg total) by mouth once daily. 90 tablet 3    insulin glargine U-100, Lantus, (LANTUS SOLOSTAR U-100 INSULIN) 100 unit/mL (3 mL) InPn pen Inject 10 Units into the skin every evening.      menthol-zinc oxide (CALMOSEPTINE) 0.44-20.6 % Oint Apply topically Daily. 100 g 1    nitroGLYCERIN (NITROSTAT) 0.4 MG SL tablet Place 1 tablet (0.4 mg total) under the tongue every 5 (five) minutes as needed for Chest pain. 25 tablet 1    nystatin (MYCOSTATIN) cream Apply topically 2 (two) times daily. 30 g 2    pantoprazole (PROTONIX) 40 MG tablet Take 1 tablet (40 mg total) by mouth once daily. 90 tablet 3    rosuvastatin (CRESTOR) 40 MG Tab Take 1 tablet (40 mg total) by mouth every evening. 90 tablet 3    SURE COMFORT PEN NEEDLE 32 gauge x 5/32" Ndle AS DIRECTED      vancomycin (VANCOCIN) 125 MG capsule Take 1 capsule (125 mg total) by mouth 4 (four) times daily. for clostridium difficile for 14 days 56 capsule 0     No current facility-administered medications for this visit.       Labs:274}   I have reviewed labs below:    Lab Results   Component Value Date    WBC 11.69 (H) 10/03/2024    RBC 4.55 (L) 10/03/2024    HGB 13.2 (L) 10/03/2024    HCT 42.1 10/03/2024     10/03/2024     10/03/2024    K 4.9 10/03/2024     10/03/2024    CALCIUM 9.5 10/03/2024     (H) 10/03/2024    BUN 59 (H) 10/03/2024    CREATININE 2.04 (H) 10/03/2024    ESTGFRAFRICA 64 03/19/2021    EGFRNONAA 45 (L) 05/19/2022    ALT 27 08/23/2024    AST 47 (H) 08/23/2024    INR 1.07 02/01/2024    CHOL 166 05/21/2024    TRIG 83 " 05/21/2024    HDL 50 05/21/2024    LDLCALC 99 05/21/2024    TSH 2.780 05/21/2024    PSA 0.636 03/19/2021    HGBA1C 6.7 (H) 08/23/2024    MICROALBUR 2.0 05/29/2024     Medical History: 274}     Past Medical History:   Diagnosis Date    Abnormal thyroid stimulating hormone (TSH) level 08/22/2019    Anemia 08/15/2019    Atrial fibrillation 09/07/2012    Bradycardia 05/23/2017    asymptomatic    Change in bowel habit 11/13/2018    Chronic kidney disease, unspecified 01/14/2019    Coronary arteriosclerosis 09/07/2012    Disease of skin and subcutaneous tissue 08/16/2017    medial aspect RLE- cystic structure seen on venous doppler US.    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 Lewis Eye exam    Varicose veins of both lower extremities with pain 01/02/2019    Venous insufficiency 01/08/2019      Social History     Tobacco Use   Smoking Status Former    Current packs/day:  "0.00    Average packs/day: 0.1 packs/day for 3.0 years (0.3 ttl pk-yrs)    Types: Cigarettes    Start date:     Quit date:     Years since quittin.8    Passive exposure: Past   Smokeless Tobacco Never   Tobacco Comments    Quit 10/15/1976      Past Surgical History:   Procedure Laterality Date    APPENDECTOMY      CARDIAC CATHETERIZATION      [facility]- Stent placement    CATARACT EXTRACTION, BILATERAL      COLONOSCOPY  2019    Dr. Mednoza    HERNIA REPAIR      Inguinal hernia repair    INCISION AND DRAINAGE OF PERIRECTAL REGION Right 2024    Procedure: INCISION AND DRAINAGE, WITH DEBRIDEMENT PERIRECTAL REGION;  Surgeon: Pop Butcher MD;  Location: [facility];  Service: General;  Laterality: Right;    INCISION AND DRAINAGE OF PERIRECTAL REGION Bilateral 2024    Procedure: INCISION AND DRAINAGE, PERIRECTAL REGION;  Surgeon: Pop Butcher MD;  Location: [facility];  Service: General;  Laterality: Bilateral;    TONSILLECTOMY          Health Maintenance:      Health Maintenance Topics with due status: Not Due       Topic Last Completion Date    TETANUS VACCINE 2024    Lipid Panel 2024    Diabetes Urine Screening 2024    Hemoglobin A1c 2024       Objective:  274}   Vital Signs  Temp: 97.6 °F (36.4 °C)  Temp Source: Oral  Pulse: 62  Resp: 18  SpO2: 98 %  BP: 112/74  BP Location: Left arm  Patient Position: Sitting  Pain Score: 0-No pain  Height and Weight  Height: 5' 8" (172.7 cm)  Weight: 73.4 kg (161 lb 12.8 oz)  BSA (Calculated - sq m): 1.88 sq meters  BMI (Calculated): 24.6  Weight in (lb) to have BMI = 25: 164.1    Over the last two weeks how often have you been bothered by little interest or pleasure in doing things: 0  Over the last two weeks how often have you been bothered by feeling down, depressed or hopeless: 0  PHQ-2 Total Score: 0  PHQ-9 Score: 0  PHQ-9 Interpretation: Minimal or None    Wt Readings from Last 3 Encounters:   10/17/24 73.4 " kg (161 lb 12.8 oz)   10/03/24 73.4 kg (161 lb 12.8 oz)   09/18/24 77 kg (169 lb 12.8 oz)     Physical Exam  Vitals and nursing note reviewed.   Constitutional:       General: He is not in acute distress.     Appearance: Normal appearance. He is not ill-appearing.   HENT:      Head: Normocephalic.   Eyes:      Conjunctiva/sclera: Conjunctivae normal.      Pupils: Pupils are equal, round, and reactive to light.   Neck:      Trachea: Trachea normal.   Cardiovascular:      Rate and Rhythm: Normal rate and regular rhythm.      Pulses: Normal pulses.      Heart sounds: Normal heart sounds.   Pulmonary:      Effort: Pulmonary effort is normal. No respiratory distress.      Breath sounds: Normal breath sounds. No wheezing, rhonchi or rales.   Abdominal:      Palpations: Abdomen is soft.      Tenderness: There is no abdominal tenderness.   Musculoskeletal:      Cervical back: Neck supple.      Right lower leg: No edema.      Left lower leg: No edema.   Lymphadenopathy:      Cervical: No cervical adenopathy.   Skin:     General: Skin is warm and dry.      Coloration: Skin is not jaundiced or pale.   Neurological:      General: No focal deficit present.      Mental Status: He is alert and oriented to person, place, and time.      Gait: Gait normal.   Psychiatric:         Attention and Perception: Attention normal.         Mood and Affect: Mood normal.         Speech: Speech normal.         Behavior: Behavior normal. Behavior is cooperative.         Thought Content: Thought content normal.         Cognition and Memory: Memory is impaired.          Assessment and Plan: 274}     1. Type 2 diabetes mellitus with hyperglycemia, with long-term current use of insulin  Assessment & Plan:  Lab Results   Component Value Date    HGBA1C 6.7 (H) 08/23/2024     Had hypoglycemia while hospitalized 8/22/24-9/16/24 so glipizide was held then lantus was weaned and discontinued, but was advised to continue 5 units Lantus daily per DORETHA Dewey  NP.  T2DM management per Nicole Dewey NP, not time for f/u A1c.    Continue Farxiga 10 mg.  Wife reports increased glucose x2 wks, 150-low 300s on David.  Advised to increase Lantus to 10 units and schedule f/u visit with DORETHA Dewey NP; pt & wife voiced understanding.      2. Flu vaccine need  -     influenza (adjuvanted) (Fluad) 45 mcg/0.5 mL IM vaccine (> or = 66 yo) 0.5 mL    3. C. difficile diarrhea  Assessment & Plan:  Positive C diff 10/04/2024, started on vancomycin.  Much improved but mild, occasional diarrhea persists.  Advised to complete vancomycin as prescribed and f/u if any further complications.  Stressed precautions to prevent spread of infection.        Diagnosis, risks, benefits, and side effects of any meds and treatment plan were discussed with the patient.  Patient to call or follow-up with any new or worsening symptoms or problems prior to next appointment.  Go to ER for any urgent complications.  All questions were answered to the satisfaction of the patient, and pt verbalized understanding and agreement to treatment plan.      Follow up in about 3 months (around 1/17/2025) for CKD, T2DM, with nonfasting lab.    Signature:  BARBARA Green-BC    Future Appointments   Date Time Provider Department Center   10/25/2024 10:30 AM Nora Moreno MD [facility] DERM [place]   10/30/2024  1:00 PM Citlaly Eubanks FNP [facility]   11/8/2024 10:15 AM Donald Justice MD [facility] NEURO [facility]   2/26/2025  3:00 PM AWV NURSE, [facility] Bright

## 2024-10-17 NOTE — ASSESSMENT & PLAN NOTE
Positive C diff 10/04/2024, started on vancomycin.  Much improved but mild, occasional diarrhea persists.  Advised to complete vancomycin as prescribed and f/u if any further complications.  Stressed precautions to prevent spread of infection.

## 2024-10-30 ENCOUNTER — OFFICE VISIT (OUTPATIENT)
Dept: WOUND CARE | Facility: CLINIC | Age: 84
End: 2024-10-30
Attending: FAMILY MEDICINE
Payer: MEDICARE

## 2024-10-30 VITALS
RESPIRATION RATE: 18 BRPM | HEART RATE: 64 BPM | SYSTOLIC BLOOD PRESSURE: 112 MMHG | TEMPERATURE: 97 F | DIASTOLIC BLOOD PRESSURE: 74 MMHG

## 2024-10-30 DIAGNOSIS — L98.412 NON-PRESSURE CHRONIC ULCER OF BUTTOCK WITH FAT LAYER EXPOSED: Primary | ICD-10-CM

## 2024-10-30 PROCEDURE — 99212 OFFICE O/P EST SF 10 MIN: CPT | Mod: S$PBB,24,, | Performed by: NURSE PRACTITIONER

## 2024-10-30 PROCEDURE — 99215 OFFICE O/P EST HI 40 MIN: CPT | Mod: PBBFAC | Performed by: NURSE PRACTITIONER

## 2024-10-30 PROCEDURE — 99999 PR PBB SHADOW E&M-EST. PATIENT-LVL V: CPT | Mod: PBBFAC,,, | Performed by: NURSE PRACTITIONER

## 2024-11-05 ENCOUNTER — OFFICE VISIT (OUTPATIENT)
Dept: DERMATOLOGY | Facility: CLINIC | Age: 84
End: 2024-11-05
Payer: MEDICARE

## 2024-11-05 DIAGNOSIS — D69.2 SOLAR PURPURA: Primary | ICD-10-CM

## 2024-11-05 DIAGNOSIS — L82.1 SEBORRHEIC KERATOSES: ICD-10-CM

## 2024-11-05 PROCEDURE — 99203 OFFICE O/P NEW LOW 30 MIN: CPT | Mod: ,,, | Performed by: STUDENT IN AN ORGANIZED HEALTH CARE EDUCATION/TRAINING PROGRAM

## 2024-11-05 NOTE — PROGRESS NOTES
[facility]  Brian Moreno MD    [facility] [place] [address]  [phone]    Fax: [phone]    Patient Name: Negro Hale  Medical Record Number: [id number]  PCP: Smiley Trevino FNP  Age: 84 y.o. : 1940  Contact: [phone] (home)     CC: hyperpigmentation and bruising   History of Present Illness:     Negro Hlae is a 84 y.o.  male with no history of skin cancer  who presents to clinic today for hyperpigmentation and bruising. It has been present 1 year. Symptoms include bruising and hyperpigmentation.     The patient has no other concerns today.    Review of Systems:     Unremarkable other than mentioned above.     Physical Exam:     General: Relaxed, oriented, alert    Skin examination of the scalp, face, neck, chest, back, abdomen, upper extremities and lower extremities were normal except for as listed below      Assessment and Plan:     1. Solar purpura   - ecchymoses on bilateral forearms     Plan:   - discussed this is due to thinning of the skin and subcutaneous fat in the forearms, in addition to increasingly fragile vasculature and often is exacerbated by anticoagulant use   - Arnica gel can help resolve the bruising if desired       2. Seborrheic keratoses   - brown stuck on appearing papules/plaques  - patient educated on benign nature. No treatment necessary unless they become irritated or inflamed       Brian Moreno MD   Mohs Surgery/Dermatologic Oncology  Dermatology

## 2024-11-08 ENCOUNTER — OFFICE VISIT (OUTPATIENT)
Dept: NEUROLOGY | Facility: CLINIC | Age: 84
End: 2024-11-08
Payer: MEDICARE

## 2024-11-08 VITALS
RESPIRATION RATE: 18 BRPM | DIASTOLIC BLOOD PRESSURE: 58 MMHG | HEART RATE: 78 BPM | WEIGHT: 160 LBS | SYSTOLIC BLOOD PRESSURE: 108 MMHG | BODY MASS INDEX: 24.25 KG/M2 | HEIGHT: 68 IN | OXYGEN SATURATION: 99 %

## 2024-11-08 DIAGNOSIS — F03.90 DEMENTIA, UNSPECIFIED DEMENTIA SEVERITY, UNSPECIFIED DEMENTIA TYPE, UNSPECIFIED WHETHER BEHAVIORAL, PSYCHOTIC, OR MOOD DISTURBANCE OR ANXIETY: Primary | ICD-10-CM

## 2024-11-08 PROCEDURE — 99999 PR PBB SHADOW E&M-EST. PATIENT-LVL V: CPT | Mod: PBBFAC,,, | Performed by: PSYCHIATRY & NEUROLOGY

## 2024-11-08 PROCEDURE — 99204 OFFICE O/P NEW MOD 45 MIN: CPT | Mod: S$PBB,,, | Performed by: PSYCHIATRY & NEUROLOGY

## 2024-11-08 PROCEDURE — 99215 OFFICE O/P EST HI 40 MIN: CPT | Mod: PBBFAC | Performed by: PSYCHIATRY & NEUROLOGY

## 2024-11-08 RX ORDER — GLIPIZIDE 5 MG/1
TABLET, EXTENDED RELEASE ORAL
COMMUNITY
Start: 2024-11-06

## 2024-11-08 RX ORDER — DONEPEZIL HYDROCHLORIDE 5 MG/1
5 TABLET, FILM COATED ORAL DAILY
Qty: 90 TABLET | Refills: 3 | Status: SHIPPED | OUTPATIENT
Start: 2024-11-08

## 2024-11-08 NOTE — PROGRESS NOTES
"    Subjective:       Patient ID: Negro Hale is a 84 y.o. male     Chief Complaint:    Chief Complaint   Patient presents with    Dementia     Pt. Wife states memory worse.        Allergies:  Mayonnaise    Current Medications:    Outpatient Encounter Medications as of 2024   Medication Sig Dispense Refill    aspirin (ECOTRIN) 81 MG EC tablet Take 1 tablet (81 mg total) by mouth once daily. 30 tablet 3    blood-glucose sensor (FREESTYLE ADORE 3 SENSOR) Giulia 1 each by Misc.(Non-Drug; Combo Route) route once.      cholecalciferol, vitamin D3, 125 mcg (5,000 unit) capsule Take 5,000 Units by mouth once daily.      clopidogreL (PLAVIX) 75 mg tablet Take 1 tablet (75 mg total) by mouth once daily. 90 tablet 3    coenzyme Q10 200 mg capsule Take 200 mg by mouth once daily.      cyanocobalamin (VITAMIN B-12) 1000 MCG tablet Take 100 mcg by mouth once daily.      dapagliflozin propanediol (FARXIGA) 10 mg tablet Take 1 tablet (10 mg total) by mouth once daily. 90 tablet 3    GLUCOTROL XL 5 mg TR24       insulin glargine U-100, Lantus, (LANTUS SOLOSTAR U-100 INSULIN) 100 unit/mL (3 mL) InPn pen Inject 10 Units into the skin every evening.      menthol-zinc oxide (CALMOSEPTINE) 0.44-20.6 % Oint Apply topically Daily. 100 g 1    nitroGLYCERIN (NITROSTAT) 0.4 MG SL tablet Place 1 tablet (0.4 mg total) under the tongue every 5 (five) minutes as needed for Chest pain. 25 tablet 1    nystatin (MYCOSTATIN) cream Apply topically 2 (two) times daily. 30 g 2    pantoprazole (PROTONIX) 40 MG tablet Take 1 tablet (40 mg total) by mouth once daily. 90 tablet 3    rosuvastatin (CRESTOR) 40 MG Tab Take 1 tablet (40 mg total) by mouth every evening. 90 tablet 3    SURE COMFORT PEN NEEDLE 32 gauge x 5/32" Ndle AS DIRECTED      donepeziL (ARICEPT) 5 MG tablet Take 1 tablet (5 mg total) by mouth once daily. 90 tablet 3    [] vancomycin (VANCOCIN) 125 MG capsule Take 1 capsule (125 mg total) by mouth 4 (four) times daily. for " clostridium difficile for 14 days 56 capsule 0     No facility-administered encounter medications on file as of 11/8/2024.       History of Present Illness  85 yo WM w/ few yrs hx of signif memory issues c/w dementia   Still driving w/o getting lost but some difficulty w/ ADL's                  Past Medical History:   Diagnosis Date    Abnormal thyroid stimulating hormone (TSH) level 08/22/2019    Anemia 08/15/2019    Atrial fibrillation 09/07/2012    Bradycardia 05/23/2017    asymptomatic    Change in bowel habit 11/13/2018    Chronic kidney disease, unspecified 01/14/2019    Coronary arteriosclerosis 09/07/2012    Disease of skin and subcutaneous tissue 08/16/2017    medial aspect RLE- cystic structure seen on venous doppler US.    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 Lewis Eye exam    Varicose veins of both lower extremities with pain 01/02/2019    Venous  "insufficiency 01/08/2019       Past Surgical History:   Procedure Laterality Date    APPENDECTOMY      CARDIAC CATHETERIZATION  2007    [facility]- Stent placement    CATARACT EXTRACTION, BILATERAL      COLONOSCOPY  11/06/2019    Dr. Mendoza    HERNIA REPAIR      Inguinal hernia repair    INCISION AND DRAINAGE OF PERIRECTAL REGION Right 8/23/2024    Procedure: INCISION AND DRAINAGE, WITH DEBRIDEMENT PERIRECTAL REGION;  Surgeon: Pop Butcher MD;  Location: [facility] [place];  Service: General;  Laterality: Right;    INCISION AND DRAINAGE OF PERIRECTAL REGION Bilateral 8/26/2024    Procedure: INCISION AND DRAINAGE, PERIRECTAL REGION;  Surgeon: Pop Butcher MD;  Location: [facility] OR;  Service: General;  Laterality: Bilateral;    TONSILLECTOMY         Social History  Mr. Hale  reports that he quit smoking about 64 years ago. His smoking use included cigarettes. He started smoking about 67 years ago. He has a 0.3 pack-year smoking history. He has been exposed to tobacco smoke. He has never used smokeless tobacco. He reports that he does not currently use alcohol. He reports that he does not use drugs.    Family History  Mr.'s Hale family history includes Diabetes in his brother; Heart disease in his father; Neurofibromatosis in his brother; No Known Problems in his brother, maternal grandfather, maternal grandmother, mother, paternal grandfather, and paternal grandmother; Throat cancer in his sister.    Review of Systems  Review of Systems   Psychiatric/Behavioral:  Positive for memory loss.    All other systems reviewed and are negative.     Objective:   BP (!) 108/58 (BP Location: Right arm, Patient Position: Sitting)   Pulse 78   Resp 18   Ht 5' 8" (1.727 m)   Wt 72.6 kg (160 lb)   SpO2 99%   BMI 24.33 kg/m²    NEUROLOGICAL EXAMINATION:     MENTAL STATUS   Oriented to person, place, and time.   Oriented to year, month and date.   Registration: recalls 1 of 3 objects.   Level of consciousness: " alert  Knowledge: good.        Good serial 7's   Knew POPTUS      CRANIAL NERVES   Cranial nerves II through XII intact.     MOTOR EXAM     Strength   Strength 5/5 throughout.     GAIT AND COORDINATION     Gait  Gait: normal       Physical Exam  Vitals reviewed.   Constitutional:       Appearance: He is normal weight.   Neurological:      General: No focal deficit present.      Mental Status: He is alert and oriented to person, place, and time. Mental status is at baseline.      Cranial Nerves: Cranial nerves 2-12 are intact.      Motor: Motor strength is normal.     Gait: Gait is intact.          Assessment:     Dementia, unspecified dementia severity, unspecified dementia type, unspecified whether behavioral, psychotic, or mood disturbance or anxiety  Comments:  signif  Orders:  -     Ambulatory referral/consult to Neurology    Other orders  -     donepeziL (ARICEPT) 5 MG tablet; Take 1 tablet (5 mg total) by mouth once daily.  Dispense: 90 tablet; Refill: 3         Primary Diagnosis and ICD10  Dementia, unspecified dementia severity, unspecified dementia type, unspecified whether behavioral, psychotic, or mood disturbance or anxiety [F03.90]    Plan:     Patient Instructions   Will trial memory aid Aricept 5 mg daily  Good family support network from wife   F/u 6 months     There are no discontinued medications.    Requested Prescriptions     Signed Prescriptions Disp Refills    donepeziL (ARICEPT) 5 MG tablet 90 tablet 3     Sig: Take 1 tablet (5 mg total) by mouth once daily.

## 2024-11-25 ENCOUNTER — EXTERNAL HOME HEALTH (OUTPATIENT)
Dept: HOME HEALTH SERVICES | Facility: HOSPITAL | Age: 84
End: 2024-11-25
Payer: MEDICARE

## 2025-01-17 ENCOUNTER — HOSPITAL ENCOUNTER (EMERGENCY)
Facility: HOSPITAL | Age: 85
Discharge: HOME | End: 2025-01-17
Attending: FAMILY MEDICINE
Payer: MEDICARE

## 2025-01-17 VITALS
RESPIRATION RATE: 19 BRPM | HEIGHT: 68 IN | WEIGHT: 166 LBS | SYSTOLIC BLOOD PRESSURE: 161 MMHG | TEMPERATURE: 98 F | OXYGEN SATURATION: 100 % | BODY MASS INDEX: 25.16 KG/M2 | HEART RATE: 66 BPM | DIASTOLIC BLOOD PRESSURE: 62 MMHG

## 2025-01-17 DIAGNOSIS — R07.9 CHEST PAIN, UNSPECIFIED TYPE: Primary | ICD-10-CM

## 2025-01-17 DIAGNOSIS — R07.9 CHEST PAIN: ICD-10-CM

## 2025-01-17 LAB
ALBUMIN SERPL BCP-MCNC: 3.4 G/DL (ref 3.4–4.8)
ALBUMIN/GLOB SERPL: 1.1 {RATIO}
ALP SERPL-CCNC: 72 U/L (ref 40–150)
ALT SERPL W P-5'-P-CCNC: 12 U/L
ANION GAP SERPL CALCULATED.3IONS-SCNC: 11 MMOL/L (ref 7–16)
AST SERPL W P-5'-P-CCNC: 18 U/L (ref 5–34)
BASOPHILS # BLD AUTO: 0.08 K/UL (ref 0–0.2)
BASOPHILS NFR BLD AUTO: 0.8 % (ref 0–1)
BILIRUB SERPL-MCNC: 0.3 MG/DL
BUN SERPL-MCNC: 36 MG/DL (ref 8–26)
BUN/CREAT SERPL: 40 (ref 6–20)
CALCIUM SERPL-MCNC: 8.6 MG/DL (ref 8.8–10)
CHLORIDE SERPL-SCNC: 103 MMOL/L (ref 98–107)
CO2 SERPL-SCNC: 24 MMOL/L (ref 23–31)
CREAT SERPL-MCNC: 0.9 MG/DL (ref 0.72–1.25)
DIFFERENTIAL METHOD BLD: ABNORMAL
EGFR (NO RACE VARIABLE) (RUSH/TITUS): 84 ML/MIN/1.73M2
EOSINOPHIL # BLD AUTO: 0.33 K/UL (ref 0–0.5)
EOSINOPHIL NFR BLD AUTO: 3.5 % (ref 1–4)
ERYTHROCYTE [DISTWIDTH] IN BLOOD BY AUTOMATED COUNT: 13.7 % (ref 11.5–14.5)
GLOBULIN SER-MCNC: 3 G/DL (ref 2–4)
GLUCOSE SERPL-MCNC: 190 MG/DL (ref 82–115)
HCT VFR BLD AUTO: 35.8 % (ref 40–54)
HGB BLD-MCNC: 11.2 G/DL (ref 13.5–18)
IMM GRANULOCYTES # BLD AUTO: 0.02 K/UL (ref 0–0.04)
IMM GRANULOCYTES NFR BLD: 0.2 % (ref 0–0.4)
INR BLD: 1.08
LYMPHOCYTES # BLD AUTO: 1.5 K/UL (ref 1–4.8)
LYMPHOCYTES NFR BLD AUTO: 15.8 % (ref 27–41)
MCH RBC QN AUTO: 29.1 PG (ref 27–31)
MCHC RBC AUTO-ENTMCNC: 31.3 G/DL (ref 32–36)
MCV RBC AUTO: 93 FL (ref 80–96)
MONOCYTES # BLD AUTO: 1.04 K/UL (ref 0–0.8)
MONOCYTES NFR BLD AUTO: 10.9 % (ref 2–6)
MPC BLD CALC-MCNC: 10.8 FL (ref 9.4–12.4)
NEUTROPHILS # BLD AUTO: 6.54 K/UL (ref 1.8–7.7)
NEUTROPHILS NFR BLD AUTO: 68.8 % (ref 53–65)
NRBC # BLD AUTO: 0 X10E3/UL
NRBC, AUTO (.00): 0 %
NT-PROBNP SERPL-MCNC: 1000 PG/ML (ref 1–450)
PLATELET # BLD AUTO: 211 K/UL (ref 150–400)
POTASSIUM SERPL-SCNC: 4.8 MMOL/L (ref 3.5–5.1)
PROT SERPL-MCNC: 6.4 G/DL (ref 5.8–7.6)
PROTHROMBIN TIME: 13.9 SECONDS (ref 11.7–14.7)
RBC # BLD AUTO: 3.85 M/UL (ref 4.6–6.2)
SODIUM SERPL-SCNC: 133 MMOL/L (ref 136–145)
TROPONIN I SERPL HS-MCNC: 10.3 NG/L
WBC # BLD AUTO: 9.51 K/UL (ref 4.5–11)

## 2025-01-17 PROCEDURE — 85025 COMPLETE CBC W/AUTO DIFF WBC: CPT | Performed by: FAMILY MEDICINE

## 2025-01-17 PROCEDURE — 85610 PROTHROMBIN TIME: CPT | Performed by: FAMILY MEDICINE

## 2025-01-17 PROCEDURE — 36415 COLL VENOUS BLD VENIPUNCTURE: CPT | Performed by: FAMILY MEDICINE

## 2025-01-17 PROCEDURE — 93010 ELECTROCARDIOGRAM REPORT: CPT | Mod: ,,, | Performed by: INTERNAL MEDICINE

## 2025-01-17 PROCEDURE — 80053 COMPREHEN METABOLIC PANEL: CPT | Performed by: FAMILY MEDICINE

## 2025-01-17 PROCEDURE — 84484 ASSAY OF TROPONIN QUANT: CPT | Performed by: FAMILY MEDICINE

## 2025-01-17 PROCEDURE — 93005 ELECTROCARDIOGRAM TRACING: CPT

## 2025-01-17 PROCEDURE — 99285 EMERGENCY DEPT VISIT HI MDM: CPT | Mod: 25

## 2025-01-17 PROCEDURE — 83880 ASSAY OF NATRIURETIC PEPTIDE: CPT | Performed by: FAMILY MEDICINE

## 2025-01-17 NOTE — ED PROVIDER NOTES
Encounter Date: 1/17/2025       History     Chief Complaint   Patient presents with    Chest Pain     Pov to er - wife states he woke her up at 0130 c/o cp - points to epi-gastric area      Patient comes in with chest pain could be epigastric.        Review of patient's allergies indicates:   Allergen Reactions    Mayonnaise      Upset stomach     Past Medical History:   Diagnosis Date    Abnormal thyroid stimulating hormone (TSH) level 08/22/2019    Anemia 08/15/2019    Atrial fibrillation 09/07/2012    Bradycardia 05/23/2017    asymptomatic    Change in bowel habit 11/13/2018    Chronic kidney disease, unspecified 01/14/2019    Coronary arteriosclerosis 09/07/2012    Disease of skin and subcutaneous tissue 08/16/2017    medial aspect RLE- cystic structure seen on venous doppler US.    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 Lewis Eye exam    Varicose veins of both  lower extremities with pain 2019    Venous insufficiency 2019     Past Surgical History:   Procedure Laterality Date    APPENDECTOMY      CARDIAC CATHETERIZATION      [facility]- Stent placement    CATARACT EXTRACTION, BILATERAL      COLONOSCOPY  2019    Dr. Mendoza    HERNIA REPAIR      Inguinal hernia repair    INCISION AND DRAINAGE OF PERIRECTAL REGION Right 2024    Procedure: INCISION AND DRAINAGE, WITH DEBRIDEMENT PERIRECTAL REGION;  Surgeon: Pop Butcher MD;  Location: [facility];  Service: General;  Laterality: Right;    INCISION AND DRAINAGE OF PERIRECTAL REGION Bilateral 2024    Procedure: INCISION AND DRAINAGE, PERIRECTAL REGION;  Surgeon: Pop Butcher MD;  Location: [facility] OR;  Service: General;  Laterality: Bilateral;    TONSILLECTOMY       Family History   Problem Relation Name Age of Onset    No Known Problems Mother      Heart disease Father      Throat cancer Sister      Diabetes Brother      No Known Problems Brother      Neurofibromatosis Brother      No Known Problems Maternal Grandmother      No Known Problems Maternal Grandfather      No Known Problems Paternal Grandmother      No Known Problems Paternal Grandfather       Social History     Tobacco Use    Smoking status: Former     Current packs/day: 0.00     Average packs/day: 0.1 packs/day for 3.0 years (0.3 ttl pk-yrs)     Types: Cigarettes     Start date:      Quit date: 1960     Years since quittin.0     Passive exposure: Past    Smokeless tobacco: Never    Tobacco comments:     Quit 10/15/1976   Substance Use Topics    Alcohol use: Not Currently     Comment: occasionally    Drug use: Never     Review of Systems   Constitutional:  Positive for fatigue. Negative for fever.   HENT: Negative.  Negative for sore throat.    Eyes: Negative.    Respiratory: Negative.  Negative for shortness of breath.    Cardiovascular: Negative.  Negative for chest pain.   Gastrointestinal: Negative.  Negative  for nausea.   Endocrine: Negative.    Genitourinary: Negative.  Negative for dysuria.   Musculoskeletal: Negative.  Negative for back pain.   Skin: Negative.  Negative for rash.   Allergic/Immunologic: Negative.    Neurological: Negative.  Negative for weakness.   Hematological: Negative.  Does not bruise/bleed easily.   Psychiatric/Behavioral: Negative.         Physical Exam     Initial Vitals [01/17/25 0220]   BP Pulse Resp Temp SpO2   (!) 174/83 63 14 98.2 °F (36.8 °C) 99 %      MAP       --         Physical Exam    Constitutional: He appears well-developed and well-nourished.   HENT:   Head: Normocephalic and atraumatic.   Right Ear: External ear normal.   Left Ear: External ear normal.   Nose: Nose normal. Mouth/Throat: Oropharynx is clear and moist.   Eyes: Conjunctivae and EOM are normal. Pupils are equal, round, and reactive to light.   Neck: Neck supple.   Normal range of motion.  Cardiovascular:  Normal rate, regular rhythm, normal heart sounds and intact distal pulses.           Pulmonary/Chest: Breath sounds normal.   Abdominal: Abdomen is soft. Bowel sounds are normal.   Genitourinary:    Prostate and penis normal.     Musculoskeletal:         General: Normal range of motion.      Cervical back: Normal range of motion and neck supple.     Neurological: He is alert and oriented to person, place, and time. He has normal strength and normal reflexes.   Skin: Skin is warm and dry.   Psychiatric: He has a normal mood and affect. His behavior is normal. Judgment and thought content normal.         Medical Screening Exam   See Full Note    ED Course   Procedures  Labs Reviewed   COMPREHENSIVE METABOLIC PANEL - Abnormal       Result Value    Sodium 133 (*)     Potassium 4.8      Chloride 103      CO2 24      Anion Gap 11      Glucose 190 (*)     BUN 36 (*)     Creatinine 0.90      BUN/Creatinine Ratio 40 (*)     Calcium 8.6 (*)     Total Protein 6.4      Albumin 3.4      Globulin 3.0      A/G Ratio 1.1       Bilirubin, Total 0.3      Alk Phos 72      ALT 12      AST 18      eGFR 84     NT-PRO NATRIURETIC PEPTIDE - Abnormal    ProBNP 1,000 (*)    CBC WITH DIFFERENTIAL - Abnormal    WBC 9.51      RBC 3.85 (*)     Hemoglobin 11.2 (*)     Hematocrit 35.8 (*)     MCV 93.0      MCH 29.1      MCHC 31.3 (*)     RDW 13.7      Platelet Count 211      MPV 10.8      Neutrophils % 68.8 (*)     Lymphocytes % 15.8 (*)     Monocytes % 10.9 (*)     Eosinophils % 3.5      Basophils % 0.8      Immature Granulocytes % 0.2      nRBC, Auto 0.0      Neutrophils, Abs 6.54      Lymphocytes, Absolute 1.50      Monocytes, Absolute 1.04 (*)     Eosinophils, Absolute 0.33      Basophils, Absolute 0.08      Immature Granulocytes, Absolute 0.02      nRBC, Absolute 0.00      Diff Type Auto     TROPONIN I - Normal    Troponin I High Sensitivity 10.3     PROTIME-INR - Normal    PT 13.9      INR 1.08     CBC W/ AUTO DIFFERENTIAL    Narrative:     The following orders were created for panel order CBC auto differential.  Procedure                               Abnormality         Status                     ---------                               -----------         ------                     CBC with Differential[[id number]]       Abnormal            Final result                 Please view results for these tests on the individual orders.          Imaging Results              X-Ray Chest AP Portable (In process)                      Medications - No data to display  Medical Decision Making  Amount and/or Complexity of Data Reviewed  Labs: ordered.  Radiology: ordered.                          Medical Decision Making:   Initial Assessment:   Patient with midsternal chest pain could be epigastric.  Differential Diagnosis:   Costochondritis versus gastritis  ED Management:  Follow-up primary care provider             Clinical Impression:   Final diagnoses:  [R07.9] Chest pain  [R07.9] Chest pain, unspecified type (Primary)        ED Disposition Condition     Discharge Stable          ED Prescriptions    None       Follow-up Information    None          Chago Whipple,   01/17/25 0455

## 2025-01-21 LAB
OHS QRS DURATION: 86 MS
OHS QTC CALCULATION: 402 MS

## 2025-01-27 ENCOUNTER — EXTERNAL HOME HEALTH (OUTPATIENT)
Dept: HOME HEALTH SERVICES | Facility: HOSPITAL | Age: 85
End: 2025-01-27
Payer: MEDICARE

## 2025-02-20 NOTE — PROGRESS NOTES
" [facility]  [address] [place] [address]  Phone: [phone]  Fax: [phone]     PATIENT NAME: Negro Hale   : 1940    AGE: 84 y.o. DATE OF ENCOUNTER: 25    Provider:    MRN: [id number]      Negro Hale presented for a follow-up Medicare AWV today. The following components were reviewed and updated:    Medical history  Family History  Social history  Allergies and Current Medications  Health Risk Assessment  Health Maintenance  Care Team    **See Completed Assessments for Annual Wellness visit with in the encounter summary    The following assessments were completed:  Depression Screening  Cognitive function Screening  Timed Get Up Test  Whisper Test      Opioid documentation:      Patient does not have a current opioid prescription.          Vitals:    25 1449   BP: 110/60   BP Location: Left arm   Patient Position: Sitting   Pulse: 60   Resp: 16   Temp: 98.3 °F (36.8 °C)   TempSrc: Oral   SpO2: 95%   Weight: 73.9 kg (163 lb)   Height: 5' 5" (1.651 m)     Body mass index is 27.12 kg/m².       Physical Exam  Vitals and nursing note reviewed.   Constitutional:       General: He is not in acute distress.     Appearance: Normal appearance. He is not ill-appearing.   HENT:      Head: Normocephalic.   Eyes:      Conjunctiva/sclera: Conjunctivae normal.   Cardiovascular:      Rate and Rhythm: Normal rate and regular rhythm.      Heart sounds: Normal heart sounds.   Pulmonary:      Effort: Pulmonary effort is normal. No respiratory distress.      Breath sounds: Normal breath sounds.   Skin:     General: Skin is warm and dry.      Coloration: Skin is not jaundiced or pale.   Neurological:      General: No focal deficit present.      Mental Status: He is alert and oriented to person, place, and time.      Gait: Gait abnormal (slow, shuffling).   Psychiatric:         Mood and Affect: Mood normal.         Behavior: Behavior normal.         Thought Content: " Thought content normal.         Cognition and Memory: Memory is impaired.         Judgment: Judgment normal.           Diagnoses and health risks identified today and associated recommendations/orders:  1. Encounter for subsequent annual wellness visit (AWV) in Medicare patient    2. Type 2 diabetes mellitus with hyperglycemia, with long-term current use of insulin  Assessment & Plan:  Lab Results   Component Value Date    HGBA1C 6.7 (H) 08/23/2024    HGBA1C 6.5 05/21/2024    HGBA1C 12.7 (H) 02/15/2024     Followed by Nicole Dewey NP but denies being up-to-date on A1c so will check today and send her results.    Orders:  -     Hemoglobin A1C; Future; Expected date: 02/26/2025    3. Stage 3b chronic kidney disease  Assessment & Plan:  BMP  Lab Results   Component Value Date     (L) 01/17/2025    K 4.8 01/17/2025     01/17/2025    CO2 24 01/17/2025    BUN 36 (H) 01/17/2025    CREATININE 0.90 01/17/2025    CALCIUM 8.6 (L) 01/17/2025    ANIONGAP 11 01/17/2025    EGFRNORACEVR 84 01/17/2025     Renal function improved, continue monitoring.  Avoid nephrotoxic agents.  Continue Farxiga 10 mg daily.      4. Atherosclerosis of native coronary artery of native heart without angina pectoris  Overview:  BHAKTI mid LCx 2/26/2007    Assessment & Plan:  Stable, followed by Cardiology.  Continue aspirin, Plavix, and statin.      5. Abnormality of gait and mobility  Assessment & Plan:  Stable, fall precautions.  Rarely using assistive device now.      6. BMI 27.0-27.9,adult    7. Need for vaccination    8. Hypercholesterolemia  Assessment & Plan:  Lab Results   Component Value Date    CHOL 166 05/21/2024    CHOL 169 05/08/2023    CHOL 127 06/07/2022     Lab Results   Component Value Date    HDL 50 05/21/2024    HDL 48 05/08/2023    HDL 45 06/07/2022     Lab Results   Component Value Date    LDLCALC 99 05/21/2024    LDLCALC 101 05/08/2023    LDLCALC 69 06/07/2022     Lab Results   Component Value Date    TRIG 83 05/21/2024     TRIG 102 05/08/2023    TRIG 67 06/07/2022       Lab Results   Component Value Date    CHOLHDL 3.3 05/21/2024    CHOLHDL 3.5 05/08/2023    CHOLHDL 2.8 06/07/2022     Controlled, continue rosuvastatin           Provided Negro with a 5-10 year written screening schedule and personal prevention plan. Recommendations were developed using the USPSTF age appropriate recommendations. Education, counseling, and referrals were provided as needed.  After Visit Summary printed and given to patient which includes a list of additional screenings\tests needed.      Follow up in about 1 year (around 2/26/2026) for Medicare AWV, and otherwise follow-up as routinely scheduled.    Signature:  BARBARA Green-BC    Future Appointments   Date Time Provider Department Center   5/8/2025 10:30 AM Donald Justice MD [facility] NEURO [facility]   6/2/2025  1:40 PM Smiley Trevino FNP [facility]   3/3/2026  1:00 PM [facility]       Covid vaccine - declined, Shingles vaccine - declined, RSV vaccine - declined, Glucose A1C - ordered this visit, Eye Exam-DAVID faxed.    I offered to discuss advanced care planning, including how to pick a person who would make decisions for you if you were unable to make them for yourself, called a health care power of , and what kind of decisions you might make such as use of life sustaining treatments such as ventilators and tube feeding when faced with a life limiting illness recorded on a living will that they will need to know. (How you want to be cared for as you near the end of your natural life)     X  Patient has advanced directives written and agrees to provide copies to the institution.

## 2025-02-26 ENCOUNTER — OFFICE VISIT (OUTPATIENT)
Dept: FAMILY MEDICINE | Facility: CLINIC | Age: 85
End: 2025-02-26
Payer: MEDICARE

## 2025-02-26 VITALS
HEIGHT: 65 IN | TEMPERATURE: 98 F | SYSTOLIC BLOOD PRESSURE: 110 MMHG | HEART RATE: 60 BPM | BODY MASS INDEX: 27.16 KG/M2 | DIASTOLIC BLOOD PRESSURE: 60 MMHG | WEIGHT: 163 LBS | RESPIRATION RATE: 16 BRPM | OXYGEN SATURATION: 95 %

## 2025-02-26 DIAGNOSIS — E11.65 TYPE 2 DIABETES MELLITUS WITH HYPERGLYCEMIA, WITH LONG-TERM CURRENT USE OF INSULIN: Chronic | ICD-10-CM

## 2025-02-26 DIAGNOSIS — R26.9 ABNORMALITY OF GAIT AND MOBILITY: ICD-10-CM

## 2025-02-26 DIAGNOSIS — Z79.4 TYPE 2 DIABETES MELLITUS WITH HYPERGLYCEMIA, WITH LONG-TERM CURRENT USE OF INSULIN: Chronic | ICD-10-CM

## 2025-02-26 DIAGNOSIS — Z00.00 ENCOUNTER FOR SUBSEQUENT ANNUAL WELLNESS VISIT (AWV) IN MEDICARE PATIENT: Primary | ICD-10-CM

## 2025-02-26 DIAGNOSIS — I25.10 ATHEROSCLEROSIS OF NATIVE CORONARY ARTERY OF NATIVE HEART WITHOUT ANGINA PECTORIS: Chronic | ICD-10-CM

## 2025-02-26 DIAGNOSIS — N18.32 STAGE 3B CHRONIC KIDNEY DISEASE: Chronic | ICD-10-CM

## 2025-02-26 DIAGNOSIS — E78.00 HYPERCHOLESTEROLEMIA: Chronic | ICD-10-CM

## 2025-02-26 DIAGNOSIS — Z23 NEED FOR VACCINATION: ICD-10-CM

## 2025-02-26 PROBLEM — R63.4 WEIGHT LOSS: Status: RESOLVED | Noted: 2024-10-03 | Resolved: 2025-02-26

## 2025-02-26 PROBLEM — K61.1 PERIRECTAL ABSCESS: Status: RESOLVED | Noted: 2024-08-23 | Resolved: 2025-02-26

## 2025-02-26 PROBLEM — M79.89 NECROTIZING SOFT TISSUE INFECTION: Status: RESOLVED | Noted: 2024-08-23 | Resolved: 2025-02-26

## 2025-02-26 PROBLEM — A04.72 C. DIFFICILE DIARRHEA: Status: RESOLVED | Noted: 2024-10-03 | Resolved: 2025-02-26

## 2025-02-26 PROBLEM — I10 PRIMARY HYPERTENSION: Chronic | Status: RESOLVED | Noted: 2021-03-17 | Resolved: 2025-02-26

## 2025-02-26 LAB
EST. AVERAGE GLUCOSE BLD GHB EST-MCNC: 169 MG/DL
HBA1C MFR BLD HPLC: 7.5 %

## 2025-02-26 PROCEDURE — 83036 HEMOGLOBIN GLYCOSYLATED A1C: CPT | Mod: ,,, | Performed by: CLINICAL MEDICAL LABORATORY

## 2025-02-26 PROCEDURE — G0439 PPPS, SUBSEQ VISIT: HCPCS | Mod: ,,, | Performed by: NURSE PRACTITIONER

## 2025-02-26 RX ORDER — ZOSTER VACCINE RECOMBINANT, ADJUVANTED 50 MCG/0.5
0.5 KIT INTRAMUSCULAR ONCE
Qty: 1 EACH | Refills: 0 | Status: CANCELLED | OUTPATIENT
Start: 2025-02-26 | End: 2025-02-26

## 2025-02-26 NOTE — LETTER
AUTHORIZATION FOR RELEASE OF   CONFIDENTIAL INFORMATION    [facility],    We are seeing Negro Hale, date of birth 1940, in the clinic at [facility]. Smiley Trevino FNP is the patient's PCP. Negro Hale has an outstanding lab/procedure at the time we reviewed his chart. In order to help keep his health information updated, he has authorized us to request the following medical record(s):        (  )  MAMMOGRAM                                      (  )  COLONOSCOPY      (  )  PAP SMEAR                                          (  )  OUTSIDE LAB RESULTS     (  )  DEXA SCAN                                          ( x )  EYE EXAM            (  )  FOOT EXAM                                          (  )  ENTIRE RECORD     (  )  OUTSIDE IMMUNIZATIONS                 (  )  _______________         Please fax records to [facility], Lafferty, Jennifer P, FNP, [phone]     If you have any questions, please contact  at [phone].           Patient Name: Negro Hale  : 1940  Patient Phone #: [phone]                Negro Hale  MRN: [id number]  : 1940  Age: 84 y.o.  Sex: male         Patient/Legal Guardian Signature  This signature was collected at 2025    Negro Hale       _______________________________   Printed Name/Relationship to Patient      Consent for Examination and Treatment: I hereby authorize the providers and employees of [facility] ([facility]) to provide medical treatment/services which includes, but is not limited to, performing and administering tests and diagnostic procedures that are deemed necessary, including, but not limited to, imaging examinations, blood tests and other laboratory procedures as may be required by the hospital, clinic, or may be ordered by my physician(s) or persons working under the general and/or special instructions of my physician(s).      I understand and agree that this  consent covers all authorized persons, including but not limited to physicians, residents, nurse practitioners, physicians' assistants, specialists, consultants, student nurses, and independently contracted physicians, who are called upon by the physician in charge, to carry out the diagnostic procedures and medical or surgical treatment.     I hereby authorize [facility] to retain or dispose of any specimens or tissue, should there be such remaining from any test or procedure.     I hereby authorize and give consent for [facility] providers and employees to take photographs, images or videotapes of such diagnostic, surgical or treatment procedures of Patient as may be required by [facility] or as may be ordered by a physician. I further acknowledge and agree that [facility] may use cameras or other devices for patient monitoring.     I am aware that the practice of medicine is not an exact science, and I acknowledge that no guarantees have been made to me as to the outcome of any tests, procedures or treatment.     Authorization for Release of Information: I understand that my insurance company and/or their agents may need information necessary to make determinations about payment/reimbursement. I hereby provide authorization to release to all insurance companies, their successors, assignees, other parties with whom they may have contracted, or others acting on their behalf, that are involved with payment for any hospital and/or clinic charges incurred by the patient, any information that they request and deem necessary for payment/reimbursement, and/or quality review.  I further authorize the release of my health information to physicians or other health care practitioners on staff who are involved in my health care now and in the future, and to other health care providers, entities, or institutions for the purpose of my continued care and treatment, including referrals.     REGISTRATION AUTHORIZATION  Form No. 36969 (Rev.  3/25/2024)    Page 1 of 3                       Medicare Patient's Certification and Authorization to Release Information and Payment Request:  I certify that the information given by me in applying for payment under Title XVIII of the Social Security Act is correct. I authorize any valenzuela of medical or other information about me to release to the Social SecurityAdministration, or its intermediaries or carriers, any information needed for this or a related Medicare claim. I request that payment of authorized benefits be made on my behalf.     Assignment of Insurance Benefits:   I hereby authorize any and all insurance companies, health plans, defined   benefit plans, health insurers or any entity that is or may be responsible for payment of my medical expenses to pay all hospital and medical benefits now due, and to become due and payable to me under any hospital benefits, sick benefits, injury benefits or any other benefit for services rendered to me, including Major Medical Benefits, direct to [facility] and all independently contracted physicians. I assign any and all rights that I may have against any and all insurance companies, health plans, defined benefit plans, health insurers or any entity that is or may be responsible for payment of my medical expenses, including, but not limited to any right to appeal a denial of a claim, any right to bring any action, lawsuit, administrative proceeding, or other cause of action on my behalf. I specifically assign my right to pursue litigation against any and all insurance companies, health plans, defined benefit plans, health insurers or any entity that is or may be responsible for payment of my medical expenses based upon a refusal to pay charges.            E. Valuables: It is understood and agreed that [facility] is not liable for the damage to or loss of any money, jewelry,   documents, dentures, eye glasses, hearing aids, prosthetics, or other property of value.     F.  Computer Equipment: I understand and agree that should I choose to use computer equipment owned by [facility] or if I choose to access the Internet via [facility]s network, I do so at my own risk. [facility] is not responsible for any damage to my computer equipment or to any damages of any type that might arise from my loss of equipment or data.     G. Acceptance of Financial Responsibility:  I agree that in consideration of the services and   supplies that have been   or will be furnished to the patient, I am hereby obligated to pay all charges made for or on the account of the patient according to the standard rates (in effect at the time the services and supplies are delivered) established by [facility], including its Patient Financial Assistance Policy to the extent it is applicable. I understand that I am responsible for all charges, or portions thereof, not covered by insurance or other sources. Patient refunds will be distributed only after balances at all [facility] facilities are paid.     H. Communication Authorization:  I hereby authorize [facility] and its representatives, along with any billing service   or  who may work on their behalf, to contact me on   my cell phone and/or home phone using pre- recorded messages, artificial voice messages, automatic telephone dialing devices or other computer assisted technology, or by electronic      mail, text messaging, or by any other form of electronic communication. This includes, but is not limited to, appointment reminders, yearly physical exam reminders, preventive care reminders, patient campaigns, welcome calls, and calls about account balances on my account or any account on which I am listed as a guarantor. I understand I have the right to opt out of these communications at any time.      Relationship  Between  Facility and  Provider:      I understand that some, but not all, providers furnishing services to the patient are not employees or agents of  [facility]. The patient is under the care and supervision of his/her attending physician, and it is the responsibility of the facility and its nursing staff to carry out the instructions of such physicians. It is the responsibility of the patient's physician/designee to obtain the patient's informed consent, when required, for medical or surgical treatment, special diagnostic or therapeutic procedures, or hospital services rendered for the patient under the special instructions of the physician/designee.           REGISTRATION AUTHORIZATION  Form No. 43895 (Rev. 3/25/2024)    Page 2 of 3                       Immunizations: [facility] shares immunization information with state sponsored health departments to help you and your doctor keep track of your immunization records. By signing, you consent to have this information shared with the health department in your state:                                [place] - LINKS ([place] Immunization Network for Kids Statewide)                                [place] - MIIX ([place] Immunization Information eXchange)                                [place] - ImmPRINT (Immunization Patient Registry with Integrated Technology)     TERM: This authorization is valid for this and subsequent care/treatment I receive at [facility] and will remain valid unless/until revoked in writing by me.     [facility]: As used in this document, [facility] means all [facility] owned and managed facilities, including, but not limited to, all health centers, surgery centers, clinics, urgent care centers, and hospitals.         [facility] complies with applicable Federal civil rights laws and does not discriminate on the basis of race, color, national origin, age, disability, or sex.  ATENCIÓN: si habla juliusañol, tiene a ayala disposición servicios gratuitos de asistencia lingüística. Jose al [phone].  CHÚ Ý: N?u b?n nói Ti?ng Vi?t, có các d?ch v? h? tr? ngôn ng? mi?n phí  dành cho b?n. G?i s? [phone].        REGISTRATION AUTHORIZATION  Form No. 08576 (Rev. 3/25/2024)   Page 3 of 3

## 2025-02-26 NOTE — PATIENT INSTRUCTIONS
Counseling and Referral of Other Preventative  (Italic type indicates deductible and co-insurance are waived)    Patient Name: Negro Hale  Today's Date: 2/26/2025    Health Maintenance       Date Due Completion Date    RSV Vaccine (Age 60+ and Pregnant patients) (1 - 1-dose 75+ series) Never done ---    Shingles Vaccine (2 of 2) 04/19/2024 2/23/2024    Diabetic Eye Exam 08/25/2024 8/25/2023    Override on 6/24/2022: Done ([facility])    Override on 5/28/2020: Done ([facility]. Scanned into documents.)    COVID-19 Vaccine (2 - 2024-25 season) 09/01/2024 2/21/2021    Hemoglobin A1c 02/23/2025 8/23/2024    Lipid Panel 05/21/2025 5/21/2024    Diabetes Urine Screening 05/29/2025 5/29/2024    TETANUS VACCINE 01/21/2034 1/21/2024        No orders of the defined types were placed in this encounter.      The following information is provided to all patients.  This information is to help you find resources for any of the problems found today that may be affecting your health:                  Living healthy guide: ms.gov    Understanding Diabetes: www.diabetes.org      Eating healthy: www.cdc.gov/healthyweight      CDC home safety checklist: www.cdc.gov/steadi/patient.html      Agency on Aging: ms.gov    Alcoholics anonymous (AA): www.aa.org      Physical Activity: www.dee dee.nih.gov/mj5emuj      Tobacco use: ms.gov

## 2025-02-26 NOTE — ASSESSMENT & PLAN NOTE
Lab Results   Component Value Date    CHOL 166 05/21/2024    CHOL 169 05/08/2023    CHOL 127 06/07/2022     Lab Results   Component Value Date    HDL 50 05/21/2024    HDL 48 05/08/2023    HDL 45 06/07/2022     Lab Results   Component Value Date    LDLCALC 99 05/21/2024    LDLCALC 101 05/08/2023    LDLCALC 69 06/07/2022     Lab Results   Component Value Date    TRIG 83 05/21/2024    TRIG 102 05/08/2023    TRIG 67 06/07/2022       Lab Results   Component Value Date    CHOLHDL 3.3 05/21/2024    CHOLHDL 3.5 05/08/2023    CHOLHDL 2.8 06/07/2022     Controlled, continue rosuvastatin

## 2025-02-26 NOTE — ASSESSMENT & PLAN NOTE
BMP  Lab Results   Component Value Date     (L) 01/17/2025    K 4.8 01/17/2025     01/17/2025    CO2 24 01/17/2025    BUN 36 (H) 01/17/2025    CREATININE 0.90 01/17/2025    CALCIUM 8.6 (L) 01/17/2025    ANIONGAP 11 01/17/2025    EGFRNORACEVR 84 01/17/2025     Renal function improved, continue monitoring.  Avoid nephrotoxic agents.  Continue Farxiga 10 mg daily.

## 2025-02-26 NOTE — ASSESSMENT & PLAN NOTE
Lab Results   Component Value Date    HGBA1C 6.7 (H) 08/23/2024    HGBA1C 6.5 05/21/2024    HGBA1C 12.7 (H) 02/15/2024     Followed by Nicole Dewey NP but denies being up-to-date on A1c so will check today and send her results.

## 2025-03-03 ENCOUNTER — RESULTS FOLLOW-UP (OUTPATIENT)
Dept: FAMILY MEDICINE | Facility: CLINIC | Age: 85
End: 2025-03-03

## 2025-03-04 NOTE — PROGRESS NOTES
Call patient and review results. Please fax results to Nicole Dewey NP.  She is managing his DM and they said he has an upcoming appt.

## 2025-03-12 ENCOUNTER — TELEPHONE (OUTPATIENT)
Dept: FAMILY MEDICINE | Facility: CLINIC | Age: 85
End: 2025-03-12
Payer: MEDICARE

## 2025-05-08 ENCOUNTER — OFFICE VISIT (OUTPATIENT)
Dept: NEUROLOGY | Facility: CLINIC | Age: 85
End: 2025-05-08
Payer: MEDICARE

## 2025-05-08 VITALS
BODY MASS INDEX: 26.99 KG/M2 | SYSTOLIC BLOOD PRESSURE: 122 MMHG | OXYGEN SATURATION: 100 % | WEIGHT: 162 LBS | HEART RATE: 83 BPM | DIASTOLIC BLOOD PRESSURE: 48 MMHG | RESPIRATION RATE: 18 BRPM | HEIGHT: 65 IN

## 2025-05-08 DIAGNOSIS — F03.90 DEMENTIA, UNSPECIFIED DEMENTIA SEVERITY, UNSPECIFIED DEMENTIA TYPE, UNSPECIFIED WHETHER BEHAVIORAL, PSYCHOTIC, OR MOOD DISTURBANCE OR ANXIETY: Primary | ICD-10-CM

## 2025-05-08 PROCEDURE — 99215 OFFICE O/P EST HI 40 MIN: CPT | Mod: PBBFAC | Performed by: PSYCHIATRY & NEUROLOGY

## 2025-05-08 PROCEDURE — 99214 OFFICE O/P EST MOD 30 MIN: CPT | Mod: S$PBB,,, | Performed by: PSYCHIATRY & NEUROLOGY

## 2025-05-08 PROCEDURE — 99999 PR PBB SHADOW E&M-EST. PATIENT-LVL V: CPT | Mod: PBBFAC,,, | Performed by: PSYCHIATRY & NEUROLOGY

## 2025-05-08 RX ORDER — DONEPEZIL HYDROCHLORIDE 10 MG/1
10 TABLET, FILM COATED ORAL DAILY
Qty: 90 TABLET | Refills: 3 | Status: SHIPPED | OUTPATIENT
Start: 2025-05-08

## 2025-05-08 NOTE — PATIENT INSTRUCTIONS
Cont Aricept but increase to 10 mg daily   Good family support network   Caution w/ driving   F/u 6 months

## 2025-05-08 NOTE — PROGRESS NOTES
Subjective:       Patient ID: Negro Hale is a 84 y.o. male     Chief Complaint:    Chief Complaint   Patient presents with    Dementia     Pt. States memory worse.        Allergies:  Mayonnaise    Current Medications:  Encounter Medications[1]    History of Present Illness  83 yo WM w/ dementia and tolerating Aricpet 5 mg but some incr memory loss and confusion but still driving w/ caution   Good support from family        Past Medical History:   Diagnosis Date    Abnormal thyroid stimulating hormone (TSH) level 08/22/2019    Anemia 08/15/2019    Atrial fibrillation 09/07/2012    Bradycardia 05/23/2017    asymptomatic    C. difficile diarrhea 10/03/2024    Change in bowel habit 11/13/2018    Chronic kidney disease, unspecified 01/14/2019    Coronary arteriosclerosis 09/07/2012    Disease of skin and subcutaneous tissue 08/16/2017    medial aspect RLE- cystic structure seen on venous doppler US.   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 Lewis    Type 2  "diabetes mellitus with mild nonproliferative retinopathy of right eye without macular edema 08/25/2023    See Dr. Lewis Eye exam    Varicose veins of both lower extremities with pain 01/02/2019    Venous insufficiency 01/08/2019       Past Surgical History:   Procedure Laterality Date    APPENDECTOMY      CARDIAC CATHETERIZATION  2007    [facility]- Stent placement    CATARACT EXTRACTION, BILATERAL      COLONOSCOPY  11/06/2019    Dr. Mendoza    HERNIA REPAIR      Inguinal hernia repair    INCISION AND DRAINAGE OF PERIRECTAL REGION Right 8/23/2024    Procedure: INCISION AND DRAINAGE, WITH DEBRIDEMENT PERIRECTAL REGION;  Surgeon: Pop Butcher MD;  Location: [facility];  Service: General;  Laterality: Right;    INCISION AND DRAINAGE OF PERIRECTAL REGION Bilateral 8/26/2024    Procedure: INCISION AND DRAINAGE, PERIRECTAL REGION;  Surgeon: Pop Butcher MD;  Location: [facility];  Service: General;  Laterality: Bilateral;    TONSILLECTOMY         Social History  Mr. Hale  reports that he quit smoking about 65 years ago. His smoking use included cigarettes. He started smoking about 68 years ago. He has a 0.3 pack-year smoking history. He has been exposed to tobacco smoke. He has never used smokeless tobacco. He reports that he does not currently use alcohol. He reports that he does not use drugs.    Family History  Mr.'s Hale family history includes Diabetes in his brother; Heart disease in his father; Neurofibromatosis in his brother; No Known Problems in his brother, maternal grandfather, maternal grandmother, mother, paternal grandfather, and paternal grandmother; Throat cancer in his sister.    Review of Systems  Review of Systems   Psychiatric/Behavioral:  Positive for memory loss.    All other systems reviewed and are negative.   Objective:   BP (!) 122/48 (BP Location: Right arm, Patient Position: Sitting)   Pulse 83   Resp 18   Ht 5' 5" (1.651 m)   Wt 73.5 kg (162 lb)   SpO2 100%   BMI " 26.96 kg/m²    NEUROLOGICAL EXAMINATION:     MENTAL STATUS   Level of consciousness: drowsy  Knowledge: consistent with education.        Dementia      CRANIAL NERVES   Cranial nerves II through XII intact.     MOTOR EXAM     Strength   Strength 5/5 throughout.     GAIT AND COORDINATION     Gait  Gait: normal     Physical Exam  Vitals reviewed.   Constitutional:       Appearance: He is normal weight.   Neurological:      Mental Status: He is alert. Mental status is at baseline.      Cranial Nerves: Cranial nerves 2-12 are intact.      Motor: Motor strength is normal.     Gait: Gait is intact.        Assessment:     Dementia, unspecified dementia severity, unspecified dementia type, unspecified whether behavioral, psychotic, or mood disturbance or anxiety    Other orders  -     donepeziL (ARICEPT) 10 MG tablet; Take 1 tablet (10 mg total) by mouth once daily.  Dispense: 90 tablet; Refill: 3         Primary Diagnosis and ICD10  Dementia, unspecified dementia severity, unspecified dementia type, unspecified whether behavioral, psychotic, or mood disturbance or anxiety [F03.90]    Plan:     Patient Instructions   Cont Aricept but increase to 10 mg daily   Good family support network   Caution w/ driving   F/u 6 months       Medications Discontinued During This Encounter   Medication Reason    donepeziL (ARICEPT) 5 MG tablet Reorder       Requested Prescriptions     Signed Prescriptions Disp Refills    donepeziL (ARICEPT) 10 MG tablet 90 tablet 3     Sig: Take 1 tablet (10 mg total) by mouth once daily.              [1]  Outpatient Encounter Medications as of 5/8/2025   Medication Sig Dispense Refill    blood-glucose sensor (FREESTYLE ADORE 3 SENSOR) Giulia 1 each by Misc.(Non-Drug; Combo Route) route once.      clopidogreL (PLAVIX) 75 mg tablet Take 1 tablet (75 mg total) by mouth once daily. 90 tablet 3    coenzyme Q10 200 mg capsule Take 200 mg by mouth once daily.      dapagliflozin propanediol (FARXIGA) 10 mg  "tablet Take 1 tablet (10 mg total) by mouth once daily. 90 tablet 3    GLUCOTROL XL 5 mg TR24       insulin glargine U-100, Lantus, (LANTUS SOLOSTAR U-100 INSULIN) 100 unit/mL (3 mL) InPn pen Inject 10 Units into the skin every evening.      menthol-zinc oxide (CALMOSEPTINE) 0.44-20.6 % Oint Apply topically Daily. 100 g 1    nystatin (MYCOSTATIN) cream Apply topically 2 (two) times daily. 30 g 2    pantoprazole (PROTONIX) 40 MG tablet Take 1 tablet (40 mg total) by mouth once daily. 90 tablet 3    rosuvastatin (CRESTOR) 40 MG Tab Take 1 tablet (40 mg total) by mouth every evening. 90 tablet 3    SURE COMFORT PEN NEEDLE 32 gauge x 5/32" Ndle AS DIRECTED      [DISCONTINUED] donepeziL (ARICEPT) 5 MG tablet Take 1 tablet (5 mg total) by mouth once daily. 90 tablet 3    aspirin (ECOTRIN) 81 MG EC tablet Take 1 tablet (81 mg total) by mouth once daily. (Patient not taking: Reported on 5/8/2025) 30 tablet 3    cholecalciferol, vitamin D3, 125 mcg (5,000 unit) capsule Take 5,000 Units by mouth once daily. (Patient not taking: Reported on 5/8/2025)      cyanocobalamin (VITAMIN B-12) 1000 MCG tablet Take 100 mcg by mouth once daily. (Patient not taking: Reported on 2/26/2025)      donepeziL (ARICEPT) 10 MG tablet Take 1 tablet (10 mg total) by mouth once daily. 90 tablet 3    nitroGLYCERIN (NITROSTAT) 0.4 MG SL tablet Place 1 tablet (0.4 mg total) under the tongue every 5 (five) minutes as needed for Chest pain. 25 tablet 1     No facility-administered encounter medications on file as of 5/8/2025.   "

## 2025-06-02 ENCOUNTER — OFFICE VISIT (OUTPATIENT)
Dept: FAMILY MEDICINE | Facility: CLINIC | Age: 85
End: 2025-06-02
Payer: MEDICARE

## 2025-06-02 VITALS
TEMPERATURE: 98 F | SYSTOLIC BLOOD PRESSURE: 134 MMHG | HEART RATE: 60 BPM | HEIGHT: 65 IN | BODY MASS INDEX: 26.96 KG/M2 | WEIGHT: 161.81 LBS | RESPIRATION RATE: 20 BRPM | OXYGEN SATURATION: 99 % | DIASTOLIC BLOOD PRESSURE: 61 MMHG

## 2025-06-02 DIAGNOSIS — E78.00 HYPERCHOLESTEROLEMIA: Chronic | ICD-10-CM

## 2025-06-02 DIAGNOSIS — Z95.5 HISTORY OF CORONARY ARTERY STENT PLACEMENT: ICD-10-CM

## 2025-06-02 DIAGNOSIS — Z79.4 TYPE 2 DIABETES MELLITUS WITH STAGE 3B CHRONIC KIDNEY DISEASE, WITH LONG-TERM CURRENT USE OF INSULIN: Primary | Chronic | ICD-10-CM

## 2025-06-02 DIAGNOSIS — N18.32 TYPE 2 DIABETES MELLITUS WITH STAGE 3B CHRONIC KIDNEY DISEASE, WITH LONG-TERM CURRENT USE OF INSULIN: Primary | Chronic | ICD-10-CM

## 2025-06-02 DIAGNOSIS — K21.9 GASTROESOPHAGEAL REFLUX DISEASE WITHOUT ESOPHAGITIS: Chronic | ICD-10-CM

## 2025-06-02 DIAGNOSIS — I25.10 ATHEROSCLEROSIS OF NATIVE CORONARY ARTERY OF NATIVE HEART WITHOUT ANGINA PECTORIS: Chronic | ICD-10-CM

## 2025-06-02 DIAGNOSIS — N18.32 STAGE 3B CHRONIC KIDNEY DISEASE: Chronic | ICD-10-CM

## 2025-06-02 DIAGNOSIS — F03.90 DEMENTIA WITHOUT BEHAVIORAL DISTURBANCE, PSYCHOTIC DISTURBANCE, MOOD DISTURBANCE, OR ANXIETY, UNSPECIFIED DEMENTIA SEVERITY, UNSPECIFIED DEMENTIA TYPE: Chronic | ICD-10-CM

## 2025-06-02 DIAGNOSIS — Z79.899 OTHER LONG TERM (CURRENT) DRUG THERAPY: ICD-10-CM

## 2025-06-02 DIAGNOSIS — E11.22 TYPE 2 DIABETES MELLITUS WITH STAGE 3B CHRONIC KIDNEY DISEASE, WITH LONG-TERM CURRENT USE OF INSULIN: Primary | Chronic | ICD-10-CM

## 2025-06-02 PROBLEM — I48.0 PAROXYSMAL ATRIAL FIBRILLATION: Status: RESOLVED | Noted: 2024-08-23 | Resolved: 2025-06-02

## 2025-06-02 PROBLEM — L98.412 NON-PRESSURE CHRONIC ULCER OF BUTTOCK WITH FAT LAYER EXPOSED: Status: RESOLVED | Noted: 2024-09-24 | Resolved: 2025-06-02

## 2025-06-02 LAB
ALBUMIN SERPL BCP-MCNC: 3.8 G/DL (ref 3.4–4.8)
ALBUMIN/GLOB SERPL: 1.2 {RATIO}
ALP SERPL-CCNC: 79 U/L (ref 40–150)
ALT SERPL W P-5'-P-CCNC: 34 U/L
ANION GAP SERPL CALCULATED.3IONS-SCNC: 12 MMOL/L (ref 7–16)
AST SERPL W P-5'-P-CCNC: 41 U/L (ref 11–45)
BASOPHILS # BLD AUTO: 0.06 K/UL (ref 0–0.2)
BASOPHILS NFR BLD AUTO: 0.8 % (ref 0–1)
BILIRUB SERPL-MCNC: 0.3 MG/DL
BUN SERPL-MCNC: 28 MG/DL (ref 8–26)
BUN/CREAT SERPL: 21 (ref 6–20)
CALCIUM SERPL-MCNC: 8.8 MG/DL (ref 8.8–10)
CHLORIDE SERPL-SCNC: 107 MMOL/L (ref 98–107)
CHOLEST SERPL-MCNC: 177 MG/DL
CHOLEST/HDLC SERPL: 4.1 {RATIO}
CO2 SERPL-SCNC: 26 MMOL/L (ref 23–31)
CREAT SERPL-MCNC: 1.34 MG/DL (ref 0.72–1.25)
CREAT UR-MCNC: 118 MG/DL (ref 23–375)
DIFFERENTIAL METHOD BLD: ABNORMAL
EGFR (NO RACE VARIABLE) (RUSH/TITUS): 52 ML/MIN/1.73M2
EOSINOPHIL # BLD AUTO: 0.11 K/UL (ref 0–0.5)
EOSINOPHIL NFR BLD AUTO: 1.5 % (ref 1–4)
ERYTHROCYTE [DISTWIDTH] IN BLOOD BY AUTOMATED COUNT: 13.4 % (ref 11.5–14.5)
EST. AVERAGE GLUCOSE BLD GHB EST-MCNC: 148 MG/DL
GLOBULIN SER-MCNC: 3.2 G/DL (ref 2–4)
GLUCOSE SERPL-MCNC: 134 MG/DL (ref 82–115)
HBA1C MFR BLD HPLC: 6.8 %
HCT VFR BLD AUTO: 39.1 % (ref 40–54)
HDLC SERPL-MCNC: 43 MG/DL (ref 35–60)
HGB BLD-MCNC: 12.5 G/DL (ref 13.5–18)
IMM GRANULOCYTES # BLD AUTO: 0.02 K/UL (ref 0–0.04)
IMM GRANULOCYTES NFR BLD: 0.3 % (ref 0–0.4)
LDLC SERPL CALC-MCNC: 121 MG/DL
LDLC/HDLC SERPL: 2.8 {RATIO}
LYMPHOCYTES # BLD AUTO: 1.61 K/UL (ref 1–4.8)
LYMPHOCYTES NFR BLD AUTO: 22.2 % (ref 27–41)
MCH RBC QN AUTO: 29.7 PG (ref 27–31)
MCHC RBC AUTO-ENTMCNC: 32 G/DL (ref 32–36)
MCV RBC AUTO: 92.9 FL (ref 80–96)
MICROALBUMIN UR-MCNC: 2.1 MG/DL
MICROALBUMIN/CREAT RATIO PNL UR: 17.8 MG/G (ref 0–30)
MONOCYTES # BLD AUTO: 0.58 K/UL (ref 0–0.8)
MONOCYTES NFR BLD AUTO: 8 % (ref 2–6)
MPC BLD CALC-MCNC: 11 FL (ref 9.4–12.4)
NEUTROPHILS # BLD AUTO: 4.86 K/UL (ref 1.8–7.7)
NEUTROPHILS NFR BLD AUTO: 67.2 % (ref 53–65)
NONHDLC SERPL-MCNC: 134 MG/DL
NRBC # BLD AUTO: 0 X10E3/UL
NRBC, AUTO (.00): 0 %
PLATELET # BLD AUTO: 258 K/UL (ref 150–400)
POTASSIUM SERPL-SCNC: 4.3 MMOL/L (ref 3.5–5.1)
PROT SERPL-MCNC: 7 G/DL (ref 5.8–7.6)
RBC # BLD AUTO: 4.21 M/UL (ref 4.6–6.2)
SODIUM SERPL-SCNC: 141 MMOL/L (ref 136–145)
TRIGL SERPL-MCNC: 66 MG/DL (ref 34–140)
TSH SERPL DL<=0.005 MIU/L-ACNC: 1.25 UIU/ML (ref 0.35–4.94)
VLDLC SERPL-MCNC: 13 MG/DL
WBC # BLD AUTO: 7.24 K/UL (ref 4.5–11)

## 2025-06-02 PROCEDURE — 83036 HEMOGLOBIN GLYCOSYLATED A1C: CPT | Mod: ,,, | Performed by: CLINICAL MEDICAL LABORATORY

## 2025-06-02 PROCEDURE — 82043 UR ALBUMIN QUANTITATIVE: CPT | Mod: ,,, | Performed by: CLINICAL MEDICAL LABORATORY

## 2025-06-02 PROCEDURE — 80053 COMPREHEN METABOLIC PANEL: CPT | Mod: ,,, | Performed by: CLINICAL MEDICAL LABORATORY

## 2025-06-02 PROCEDURE — 80061 LIPID PANEL: CPT | Mod: ,,, | Performed by: CLINICAL MEDICAL LABORATORY

## 2025-06-02 PROCEDURE — 85025 COMPLETE CBC W/AUTO DIFF WBC: CPT | Mod: ,,, | Performed by: CLINICAL MEDICAL LABORATORY

## 2025-06-02 PROCEDURE — 99214 OFFICE O/P EST MOD 30 MIN: CPT | Mod: ,,, | Performed by: NURSE PRACTITIONER

## 2025-06-02 PROCEDURE — 82570 ASSAY OF URINE CREATININE: CPT | Mod: ,,, | Performed by: CLINICAL MEDICAL LABORATORY

## 2025-06-02 PROCEDURE — 84443 ASSAY THYROID STIM HORMONE: CPT | Mod: ,,, | Performed by: CLINICAL MEDICAL LABORATORY

## 2025-06-02 RX ORDER — CLOPIDOGREL BISULFATE 75 MG/1
75 TABLET ORAL DAILY
Qty: 90 TABLET | Refills: 3 | Status: SHIPPED | OUTPATIENT
Start: 2025-06-02

## 2025-06-02 RX ORDER — ROSUVASTATIN CALCIUM 40 MG/1
40 TABLET, COATED ORAL NIGHTLY
Qty: 90 TABLET | Refills: 3 | Status: SHIPPED | OUTPATIENT
Start: 2025-06-02

## 2025-06-02 RX ORDER — GLIPIZIDE 5 MG/1
TABLET, EXTENDED RELEASE ORAL
Status: CANCELLED | OUTPATIENT
Start: 2025-06-02

## 2025-06-02 RX ORDER — PANTOPRAZOLE SODIUM 40 MG/1
40 TABLET, DELAYED RELEASE ORAL DAILY
Qty: 90 TABLET | Refills: 3 | Status: SHIPPED | OUTPATIENT
Start: 2025-06-02 | End: 2026-06-02

## 2025-06-02 RX ORDER — INSULIN GLARGINE 100 [IU]/ML
10 INJECTION, SOLUTION SUBCUTANEOUS NIGHTLY
Status: CANCELLED | OUTPATIENT
Start: 2025-06-02

## 2025-06-02 RX ORDER — DAPAGLIFLOZIN 10 MG/1
10 TABLET, FILM COATED ORAL DAILY
Qty: 90 TABLET | Refills: 3 | Status: SHIPPED | OUTPATIENT
Start: 2025-06-02

## 2025-06-04 ENCOUNTER — RESULTS FOLLOW-UP (OUTPATIENT)
Dept: FAMILY MEDICINE | Facility: CLINIC | Age: 85
End: 2025-06-04

## 2025-06-04 NOTE — PROGRESS NOTES
Stable, abnormal findings consistent with history of CKD and chronic anemia with improvement noted.  Diabetes control improving with A1c 6.8%.  LDL cholesterol is higher so make sure to take rosuvastatin 40 mg daily without fail.  No changes.  Send a copy of his lab results to Nicole Dewey NP for diabetes management.

## 2025-07-21 ENCOUNTER — OFFICE VISIT (OUTPATIENT)
Dept: FAMILY MEDICINE | Facility: CLINIC | Age: 85
End: 2025-07-21
Payer: MEDICARE

## 2025-07-21 VITALS
TEMPERATURE: 98 F | BODY MASS INDEX: 25.96 KG/M2 | RESPIRATION RATE: 20 BRPM | OXYGEN SATURATION: 96 % | WEIGHT: 155.81 LBS | HEART RATE: 58 BPM | DIASTOLIC BLOOD PRESSURE: 61 MMHG | SYSTOLIC BLOOD PRESSURE: 101 MMHG | HEIGHT: 65 IN

## 2025-07-21 DIAGNOSIS — N18.32 TYPE 2 DIABETES MELLITUS WITH STAGE 3B CHRONIC KIDNEY DISEASE, WITH LONG-TERM CURRENT USE OF INSULIN: Chronic | ICD-10-CM

## 2025-07-21 DIAGNOSIS — E11.22 TYPE 2 DIABETES MELLITUS WITH STAGE 3B CHRONIC KIDNEY DISEASE, WITH LONG-TERM CURRENT USE OF INSULIN: Chronic | ICD-10-CM

## 2025-07-21 DIAGNOSIS — R19.7 DIARRHEA, UNSPECIFIED TYPE: Primary | ICD-10-CM

## 2025-07-21 DIAGNOSIS — Z79.4 TYPE 2 DIABETES MELLITUS WITH STAGE 3B CHRONIC KIDNEY DISEASE, WITH LONG-TERM CURRENT USE OF INSULIN: Chronic | ICD-10-CM

## 2025-07-21 DIAGNOSIS — F03.90 DEMENTIA, UNSPECIFIED DEMENTIA SEVERITY, UNSPECIFIED DEMENTIA TYPE, UNSPECIFIED WHETHER BEHAVIORAL, PSYCHOTIC, OR MOOD DISTURBANCE OR ANXIETY: Chronic | ICD-10-CM

## 2025-07-21 PROBLEM — L25.8 INCONTINENCE ASSOCIATED DERMATITIS: Status: RESOLVED | Noted: 2024-10-09 | Resolved: 2025-07-21

## 2025-07-21 PROBLEM — R32 INCONTINENCE ASSOCIATED DERMATITIS: Status: RESOLVED | Noted: 2024-10-09 | Resolved: 2025-07-21

## 2025-07-21 PROCEDURE — 99214 OFFICE O/P EST MOD 30 MIN: CPT | Mod: ,,, | Performed by: NURSE PRACTITIONER

## 2025-07-21 RX ORDER — MENTHOL AND ZINC OXIDE .44; 20.625 G/100G; G/100G
OINTMENT TOPICAL DAILY
Qty: 100 G | Refills: 1 | Status: SHIPPED | OUTPATIENT
Start: 2025-07-21

## 2025-07-21 NOTE — ASSESSMENT & PLAN NOTE
Lab Results   Component Value Date    HGBA1C 6.8 06/02/2025    HGBA1C 7.5 (H) 02/26/2025    HGBA1C 6.7 (H) 08/23/2024     Lab Results   Component Value Date     06/02/2025    K 4.3 06/02/2025     06/02/2025    CO2 26 06/02/2025    BUN 28 (H) 06/02/2025    CREATININE 1.34 (H) 06/02/2025    CALCIUM 8.8 06/02/2025    ANIONGAP 12 06/02/2025    EGFRNORACEVR 52 (L) 06/02/2025     GFR typically in 3B range but improved on last check.      Is followed by Nicole Dewey NP for T2DM management and currently taking Lantus 8 units daily.  Has a follow-up appointment with her tomorrow.

## 2025-07-21 NOTE — ASSESSMENT & PLAN NOTE
Stable, followed by neuro.  Patient's wife with him at today's visit.  Continue Aricept 10 mg daily.

## 2025-07-21 NOTE — PROGRESS NOTES
[facility]  [address] [place] [address]  Phone: [phone]  Fax: [phone]       PATIENT NAME: Negro Hale   : 1940    AGE: 84 y.o. DATE OF ENCOUNTER: 25    MRN: [id number]      PCP: Smiley Trevino FNP    Subjective:   CHANGE CHIEF COMPLAINT      :[id number]}274}  Chief Complaint   Patient presents with    Diarrhea     Patient c/o diarrhea and states almost everything he eats goes right through him.     History of Present Illness    HPI:  Patient, an 85-year-old male, reports a recurrence of explosive diarrhea with a severe burning sensation in the rectal area. He notes gastric discomfort prior to bowel movements but denies cramping or abdominal pain. Due to the unpredictable nature of his diarrhea, he has been using adult diapers as a precautionary measure.    Patient has a history of a previous rectal sore, which has healed. He is currently using Calmoseptine cream, previously prescribed for wound care, to manage the rectal discomfort, but now to protect his skin. He applies the cream himself daily after using the bathroom, and his wife, Virginia, reapplies it for thorough coverage and to check for skin breakdown.    Patient reports weight loss, which he attributes to the diarrhea. His current diet consists of cheese biscuits for breakfast, protein drinks, milk, and frequent water intake throughout the day.    He has a history of Clostridium difficile infection, which was treated in the past. His last C. difficile test was on 2024.    Patient mentions issues with blood sugar monitoring. His current glucose sensor has , and he is waiting for a new order, but has a diabetes appointment follow-up tomorrow with Nicole Dewey NP. In the interim, he plans to resume regular use of his glucose meter, particularly to monitor blood sugar in relation to food intake.    He denies fever and stomach cramps.      ROS:  Constitutional: -fevers,  "+malaise  Gastrointestinal: +abdominal pain, +diarrhea, +rectal pain, +burning sensation         Allergies and Meds: 274}     Review of patient's allergies indicates:   Allergen Reactions    Mayonnaise      Upset stomach        Current Outpatient Medications   Medication Sig Dispense Refill    blood-glucose sensor (FREESTYLE ADORE 3 SENSOR) Giulia 1 each by Misc.(Non-Drug; Combo Route) route once.      clopidogreL (PLAVIX) 75 mg tablet Take 1 tablet (75 mg total) by mouth once daily. 90 tablet 3    dapagliflozin propanediol (FARXIGA) 10 mg tablet Take 1 tablet (10 mg total) by mouth once daily. 90 tablet 3    donepeziL (ARICEPT) 10 MG tablet Take 1 tablet (10 mg total) by mouth once daily. 90 tablet 3    insulin glargine U-100, Lantus, (LANTUS SOLOSTAR U-100 INSULIN) 100 unit/mL (3 mL) InPn pen Inject 8 Units into the skin every evening.      nitroGLYCERIN (NITROSTAT) 0.4 MG SL tablet Place 1 tablet (0.4 mg total) under the tongue every 5 (five) minutes as needed for Chest pain. 25 tablet 1    pantoprazole (PROTONIX) 40 MG tablet Take 1 tablet (40 mg total) by mouth once daily. 90 tablet 3    rosuvastatin (CRESTOR) 40 MG Tab Take 1 tablet (40 mg total) by mouth every evening. 90 tablet 3    SURE COMFORT PEN NEEDLE 32 gauge x 5/32" Ndle AS DIRECTED      menthol-zinc oxide (CALMOSEPTINE) 0.44-20.6 % Oint Apply topically Daily. 100 g 1     No current facility-administered medications for this visit.       Labs:274}   I have reviewed labs below:    Lab Results   Component Value Date    WBC 7.24 06/02/2025    RBC 4.21 (L) 06/02/2025    HGB 12.5 (L) 06/02/2025    HCT 39.1 (L) 06/02/2025     06/02/2025     06/02/2025    K 4.3 06/02/2025     06/02/2025    CALCIUM 8.8 06/02/2025     (H) 06/02/2025    BUN 28 (H) 06/02/2025    CREATININE 1.34 (H) 06/02/2025    ESTGFRAFRICA 64 03/19/2021    EGFRNONAA 45 (L) 05/19/2022    ALT 34 06/02/2025    AST 41 06/02/2025    INR 1.08 01/17/2025    CHOL 177 06/02/2025 "    TRIG 66 06/02/2025    HDL 43 06/02/2025    LDLCALC 121 06/02/2025    TSH 1.246 06/02/2025    PSA 0.636 03/19/2021    HGBA1C 6.8 06/02/2025    MICROALBUR 2.1 06/02/2025       Medical History: 274}     Past Medical History:   Diagnosis Date    Abnormal thyroid stimulating hormone (TSH) level 08/22/2019    Anemia 08/15/2019    Atrial fibrillation 09/07/2012    Bradycardia 05/23/2017    asymptomatic    C. difficile diarrhea 10/03/2024    Change in bowel habit 11/13/2018    Chronic kidney disease, unspecified 01/14/2019    Coronary arteriosclerosis 09/07/2012    Disease of skin and subcutaneous tissue 08/16/2017    medial aspect RLE- cystic structure seen on venous doppler US.    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 Lewis Eye exam    Varicose veins of both lower extremities with pain 01/02/2019    Venous insufficiency 01/08/2019      Tobacco Use History[1]   Past  "Surgical History:   Procedure Laterality Date    APPENDECTOMY      CARDIAC CATHETERIZATION  2007    [facility]- Stent placement    CATARACT EXTRACTION, BILATERAL      COLONOSCOPY  11/06/2019    Dr. Mendoza    HERNIA REPAIR      Inguinal hernia repair    INCISION AND DRAINAGE OF PERIRECTAL REGION Right 8/23/2024    Procedure: INCISION AND DRAINAGE, WITH DEBRIDEMENT PERIRECTAL REGION;  Surgeon: Pop Butcher MD;  Location: [facility];  Service: General;  Laterality: Right;    INCISION AND DRAINAGE OF PERIRECTAL REGION Bilateral 8/26/2024    Procedure: INCISION AND DRAINAGE, PERIRECTAL REGION;  Surgeon: Pop Butcher MD;  Location: [facility];  Service: General;  Laterality: Bilateral;    TONSILLECTOMY          Health Maintenance:      Health Maintenance Topics with due status: Not Due       Topic Last Completion Date    TETANUS VACCINE 01/21/2024    Influenza Vaccine 10/17/2024    Diabetes Urine Screening 06/02/2025    Lipid Panel 06/02/2025    Hemoglobin A1c 06/02/2025       Objective:  274}   Vital Signs  Temp: 97.6 °F (36.4 °C)  Temp Source: Oral  Pulse: (!) 58  Resp: 20  SpO2: 96 %  BP: 101/61  BP Location: Left arm  Patient Position: Sitting  Height and Weight  Height: 5' 5" (165.1 cm)  Weight: 70.7 kg (155 lb 12.8 oz)  BSA (Calculated - sq m): 1.8 sq meters  BMI (Calculated): 25.9  Weight in (lb) to have BMI = 25: 149.9    Over the last two weeks how often have you been bothered by little interest or pleasure in doing things: 0  Over the last two weeks how often have you been bothered by feeling down, depressed or hopeless: 0  PHQ-2 Total Score: 0  PHQ-9 Score: 0  PHQ-9 Interpretation: Minimal or None    Wt Readings from Last 3 Encounters:   07/21/25 70.7 kg (155 lb 12.8 oz)   06/02/25 73.4 kg (161 lb 12.8 oz)   05/08/25 73.5 kg (162 lb)     Physical Exam  Vitals and nursing note reviewed.   Constitutional:       General: He is not in acute distress.     Appearance: Normal appearance. He is not " ill-appearing.   HENT:      Head: Normocephalic.   Eyes:      Conjunctiva/sclera: Conjunctivae normal.   Cardiovascular:      Rate and Rhythm: Normal rate and regular rhythm.      Heart sounds: Normal heart sounds.   Pulmonary:      Effort: Pulmonary effort is normal. No respiratory distress.      Breath sounds: Normal breath sounds.   Genitourinary:     Comments: No skin breakdown noted.  Wearing adult briefs.  Skin:     General: Skin is warm and dry.      Coloration: Skin is not jaundiced or pale.   Neurological:      General: No focal deficit present.      Mental Status: He is alert and oriented to person, place, and time.      Gait: Gait abnormal (slow, shuffling).   Psychiatric:         Mood and Affect: Mood normal.         Behavior: Behavior normal.         Thought Content: Thought content normal.         Cognition and Memory: Memory is impaired.         Judgment: Judgment normal.          Assessment and Plan: 274}       1. Diarrhea, unspecified type  -     Stool culture; Future; Expected date: 07/21/2025  -     C Diff Toxin by PCR; Future; Expected date: 07/21/2025  -     Occult blood x 1, stool; Future; Expected date: 07/21/2025  -     Fecal leukocytes; Future; Expected date: 07/21/2025    2. Type 2 diabetes mellitus with stage 3b chronic kidney disease, with long-term current use of insulin  Assessment & Plan:  Lab Results   Component Value Date    HGBA1C 6.8 06/02/2025    HGBA1C 7.5 (H) 02/26/2025    HGBA1C 6.7 (H) 08/23/2024     Lab Results   Component Value Date     06/02/2025    K 4.3 06/02/2025     06/02/2025    CO2 26 06/02/2025    BUN 28 (H) 06/02/2025    CREATININE 1.34 (H) 06/02/2025    CALCIUM 8.8 06/02/2025    ANIONGAP 12 06/02/2025    EGFRNORACEVR 52 (L) 06/02/2025     GFR typically in 3B range but improved on last check.      Is followed by Nicole Dewey NP for T2DM management and currently taking Lantus 8 units daily.  Has a follow-up appointment with her tomorrow.      3. Dementia,  unspecified dementia severity, unspecified dementia type, unspecified whether behavioral, psychotic, or mood disturbance or anxiety  Assessment & Plan:  Stable, followed by neuro.  Patient's wife with him at today's visit.  Continue Aricept 10 mg daily.      Other orders  -     menthol-zinc oxide (CALMOSEPTINE) 0.44-20.6 % Oint; Apply topically Daily.  Dispense: 100 g; Refill: 1        Assessment & Plan    IMPRESSION:  - Considered age (85) and associated risks in determining diagnostic approach for recurrent diarrhea.  He was asking for a referral to have a colonoscopy.  - Prioritized stool specimen testing to rule out bacterial infection, particularly Clostridium difficile, before considering more invasive procedures like colonoscopy.  - Evaluated effectiveness of current topical treatment (Calmoseptine) for perianal discomfort.    CLOSTRIDIUM DIFFICILE INFECTION:  - Patient reports diarrhea and burning sensation, suggesting possible recurrence of Clostridium difficile infection.  - Ordered stool specimen test to check for C. difficile bacteria.  - Will notify patient of test results and treat with appropriate medication if C. difficile is confirmed.  - Discussed potential need for gastroenterology referral and endoscopic evaluation if stool studies are negative.  - Continued Calmoseptine cream for rectal area.    WEIGHT LOSS:  - Patient is losing most of what he eats, contributing to weight loss.    DIABETES MANAGEMENT:  - Patient uses a sensor to monitor blood sugar and plans to supplement with daily glucometer readings.  - Blood glucose levels are a concern that will be monitored closely.       Follow up for as scheduled and as needed.    Signature:  BARBARA Green-BC    Future Appointments   Date Time Provider Department Center   10/9/2025  3:00 PM Smiley Trevino FNP [facility]   11/7/2025 10:30 AM Donald Justice MD [facility]   3/3/2026  1:00 PM AWV NURSE, [facility]  FAMILY MEDICINE [facility] [place]   2026 10:45 AM Nora Moreno MD [facility]     This note was generated with the assistance of ambient listening technology. Verbal consent was obtained by the patient and accompanying visitor(s) for the recording of patient appointment to facilitate this note. I attest to having reviewed and edited the generated note for accuracy, though some syntax or spelling errors may persist. Please contact the author of this note for any clarification.           [1]   Social History  Tobacco Use   Smoking Status Former    Current packs/day: 0.00    Average packs/day: 0.1 packs/day for 3.0 years (0.3 ttl pk-yrs)    Types: Cigarettes    Start date:     Quit date:     Years since quittin.5    Passive exposure: Past   Smokeless Tobacco Never   Tobacco Comments    Quit 10/15/1976

## (undated) DEVICE — GOWN NONREINF SET-IN SLV 2XL

## (undated) DEVICE — PENCIL ELECTROSURG HOLST W/BLD

## (undated) DEVICE — SPONGE COTTON TRAY 4X4IN

## (undated) DEVICE — PAD ABDOMINAL 8X7.5 STERILE

## (undated) DEVICE — GLOVE SENSICARE PI GRN 6.5

## (undated) DEVICE — SOL NACL IRR 1000ML BTL

## (undated) DEVICE — BANDAGE GAUZE COT STRL 4.5X4.1

## (undated) DEVICE — GLOVE SENSICARE PI SURG 6

## (undated) DEVICE — ELECTRODE BLADE INSULATED 1 IN

## (undated) DEVICE — GOWN POLY REINF BRTH SLV XL

## (undated) DEVICE — GLOVE SENSICARE PI SURG 7

## (undated) DEVICE — GLOVE SENSICARE PI SURG 6.5

## (undated) DEVICE — DERM CURETTE 5MM DISP

## (undated) DEVICE — SWAB AEROBIC CULTURETTE

## (undated) DEVICE — SYR IRRIGATION BULB STER 60ML

## (undated) DEVICE — KIT LITHOTOMY RUSH

## (undated) DEVICE — GOWN ASTOUND AAMI LVL3 BLUE LG

## (undated) DEVICE — SPONGE LAP 18X18 PREWASHED

## (undated) DEVICE — COLLECTOR SPECIMEN ANAEROBIC